# Patient Record
Sex: FEMALE | Race: WHITE | NOT HISPANIC OR LATINO | Employment: FULL TIME | URBAN - METROPOLITAN AREA
[De-identification: names, ages, dates, MRNs, and addresses within clinical notes are randomized per-mention and may not be internally consistent; named-entity substitution may affect disease eponyms.]

---

## 2017-06-01 ENCOUNTER — TRANSFERRED RECORDS (OUTPATIENT)
Dept: HEALTH INFORMATION MANAGEMENT | Facility: CLINIC | Age: 37
End: 2017-06-01

## 2019-09-14 ENCOUNTER — TRANSFERRED RECORDS (OUTPATIENT)
Dept: HEALTH INFORMATION MANAGEMENT | Facility: CLINIC | Age: 39
End: 2019-09-14

## 2019-09-16 NOTE — TELEPHONE ENCOUNTER
ONCOLOGY INTAKE: Records Information      APPT INFORMATION: [id number] Dr. Juan Manuel Mac  Referring provider:  Dr. Jessica Cox  Referring provider s clinic:  [facility]  Reason for visit/diagnosis:  Enlarged Paracaval Lymph Node  Has patient been notified of appointment date and time?: Yes    RECORDS INFORMATION:  Were the records received with the referral (via [employer])? No    Has patient been seen for any external appt for this diagnosis? Yes    If yes, where? [facility]    Has patient had any imaging or procedures outside of [place] for this condition? Yes      If Yes, where? [facility]    ADDITIONAL INFORMATION:  None

## 2019-09-16 NOTE — TELEPHONE ENCOUNTER
RECORDS STATUS - ALL OTHER DIAGNOSIS      RECORDS RECEIVED FROM: [facility] (All recs and imaging from [facility] requested)    DATE RECEIVED:    NOTES STATUS DETAILS   OFFICE NOTE from referring provider Requested - 9/16/19 [facility], Dr. Jessica Cox   OFFICE NOTE from medical oncologist     DISCHARGE SUMMARY from hospital     DISCHARGE REPORT from the ER     OPERATIVE REPORT     MEDICATION LIST     CLINICAL TRIAL TREATMENTS TO DATE     LABS     PATHOLOGY REPORTS     ANYTHING RELATED TO DIAGNOSIS     GENONOMIC TESTING     TYPE:     IMAGING (NEED IMAGES & REPORT)     CT SCANS     MRI     MAMMO     ULTRASOUND     PET

## 2019-09-17 ENCOUNTER — ONCOLOGY VISIT (OUTPATIENT)
Dept: ONCOLOGY | Facility: CLINIC | Age: 39
End: 2019-09-17
Attending: INTERNAL MEDICINE
Payer: COMMERCIAL

## 2019-09-17 ENCOUNTER — PRE VISIT (OUTPATIENT)
Dept: ONCOLOGY | Facility: CLINIC | Age: 39
End: 2019-09-17

## 2019-09-17 VITALS
DIASTOLIC BLOOD PRESSURE: 78 MMHG | TEMPERATURE: 98.8 F | SYSTOLIC BLOOD PRESSURE: 138 MMHG | BODY MASS INDEX: 40.5 KG/M2 | HEART RATE: 95 BPM | OXYGEN SATURATION: 98 % | HEIGHT: 66 IN | RESPIRATION RATE: 14 BRPM | WEIGHT: 252 LBS

## 2019-09-17 DIAGNOSIS — R59.1 LYMPHADENOPATHY: Primary | ICD-10-CM

## 2019-09-17 LAB
ALBUMIN SERPL-MCNC: 3.9 G/DL (ref 3.4–5)
ALP SERPL-CCNC: 92 U/L (ref 40–150)
ALT SERPL W P-5'-P-CCNC: 20 U/L (ref 0–50)
ANION GAP SERPL CALCULATED.3IONS-SCNC: 4 MMOL/L (ref 3–14)
AST SERPL W P-5'-P-CCNC: 11 U/L (ref 0–45)
BASOPHILS # BLD AUTO: 0.1 10E9/L (ref 0–0.2)
BASOPHILS NFR BLD AUTO: 0.7 %
BILIRUB SERPL-MCNC: 0.5 MG/DL (ref 0.2–1.3)
BUN SERPL-MCNC: 8 MG/DL (ref 7–30)
CALCIUM SERPL-MCNC: 9.2 MG/DL (ref 8.5–10.1)
CHLORIDE SERPL-SCNC: 105 MMOL/L (ref 94–109)
CO2 SERPL-SCNC: 27 MMOL/L (ref 20–32)
CREAT SERPL-MCNC: 0.58 MG/DL (ref 0.52–1.04)
DIFFERENTIAL METHOD BLD: NORMAL
EOSINOPHIL # BLD AUTO: 0.4 10E9/L (ref 0–0.7)
EOSINOPHIL NFR BLD AUTO: 4.9 %
ERYTHROCYTE [DISTWIDTH] IN BLOOD BY AUTOMATED COUNT: 13.1 % (ref 10–15)
GFR SERPL CREATININE-BSD FRML MDRD: >90 ML/MIN/{1.73_M2}
GLUCOSE SERPL-MCNC: 95 MG/DL (ref 70–99)
HCT VFR BLD AUTO: 43.3 % (ref 35–47)
HGB BLD-MCNC: 14.2 G/DL (ref 11.7–15.7)
IMM GRANULOCYTES # BLD: 0 10E9/L (ref 0–0.4)
IMM GRANULOCYTES NFR BLD: 0.3 %
LDH SERPL L TO P-CCNC: 193 U/L (ref 81–234)
LYMPHOCYTES # BLD AUTO: 2 10E9/L (ref 0.8–5.3)
LYMPHOCYTES NFR BLD AUTO: 26 %
MCH RBC QN AUTO: 30.1 PG (ref 26.5–33)
MCHC RBC AUTO-ENTMCNC: 32.8 G/DL (ref 31.5–36.5)
MCV RBC AUTO: 92 FL (ref 78–100)
MONOCYTES # BLD AUTO: 0.6 10E9/L (ref 0–1.3)
MONOCYTES NFR BLD AUTO: 7.3 %
NEUTROPHILS # BLD AUTO: 4.6 10E9/L (ref 1.6–8.3)
NEUTROPHILS NFR BLD AUTO: 60.8 %
NRBC # BLD AUTO: 0 10*3/UL
NRBC BLD AUTO-RTO: 0 /100
PLATELET # BLD AUTO: 267 10E9/L (ref 150–450)
POTASSIUM SERPL-SCNC: 3.6 MMOL/L (ref 3.4–5.3)
PROT SERPL-MCNC: 8.1 G/DL (ref 6.8–8.8)
RBC # BLD AUTO: 4.71 10E12/L (ref 3.8–5.2)
SODIUM SERPL-SCNC: 137 MMOL/L (ref 133–144)
WBC # BLD AUTO: 7.5 10E9/L (ref 4–11)

## 2019-09-17 PROCEDURE — 85025 COMPLETE CBC W/AUTO DIFF WBC: CPT | Performed by: INTERNAL MEDICINE

## 2019-09-17 PROCEDURE — 84165 PROTEIN E-PHORESIS SERUM: CPT | Performed by: INTERNAL MEDICINE

## 2019-09-17 PROCEDURE — 00000402 ZZHCL STATISTIC TOTAL PROTEIN: Performed by: INTERNAL MEDICINE

## 2019-09-17 PROCEDURE — 36415 COLL VENOUS BLD VENIPUNCTURE: CPT

## 2019-09-17 PROCEDURE — 83615 LACTATE (LD) (LDH) ENZYME: CPT | Performed by: INTERNAL MEDICINE

## 2019-09-17 PROCEDURE — 99204 OFFICE O/P NEW MOD 45 MIN: CPT | Mod: ZP | Performed by: INTERNAL MEDICINE

## 2019-09-17 PROCEDURE — 82232 ASSAY OF BETA-2 PROTEIN: CPT | Performed by: INTERNAL MEDICINE

## 2019-09-17 PROCEDURE — G0463 HOSPITAL OUTPT CLINIC VISIT: HCPCS | Mod: ZF

## 2019-09-17 PROCEDURE — 80053 COMPREHEN METABOLIC PANEL: CPT | Performed by: INTERNAL MEDICINE

## 2019-09-17 ASSESSMENT — MIFFLIN-ST. JEOR: SCORE: 1829.81

## 2019-09-17 ASSESSMENT — PAIN SCALES - GENERAL: PAINLEVEL: NO PAIN (0)

## 2019-09-17 NOTE — TELEPHONE ENCOUNTER
Images from [facility] resolved, and viewable to PACS    Recs rec'd from [facility] - sent to HIM for urgent upload, emailed to Shanel Sloan  7:26 AM

## 2019-09-17 NOTE — NURSING NOTE
Labs completed via venipuncture, patient discharged.    See flow sheets.    Radha Palmer CMA ([facility])

## 2019-09-17 NOTE — PROGRESS NOTES
Addendum.  [facility]  HEMATOLOGY AND MEDICAL ONCOLOGY    CONSULTATION    PATIENT NAME: Francesca Rowland   MRN# [id number]     Date of Visit: Sep 17, 2019    Referring Provider: Jessica Villagran  [facility]  [address] [address] YOB: 1980     Reason for consult: New pericaval lymphadenopathy with remote history of cervical cancer    CHIEF COMPLAINT   Oncology Clinic Visit (New - [facility] Enlarged Paracaval Lymph Node)     HISTORY OF PRESENTING ILLNESS     Francesca Rowland is a 38 year old female with a history of cervical cancer s/p hysterectomy () who was incidentally found to have an enlarged pericaval lymph node. She presented to the ED at [facility] on 2019 with severe 8.5/10 RUQ pain associated with nausea, one episode of emesis, and decreased appetite. RUQ demonstrated cholelithiasis without cholecystitis, so an abdominal and pelvic CT scan was obtained. This revealed post-surgical changes consistent with prior hysterectomy, left adnexal cysts thought to be ovarian, and an enlarged pericaval lymph node suspicious for metastatic disease vs primary lymphoma. Her condition stabilized and she was discharged home with referrals to general surgery and oncology for biliary colic and further management of the lymphadenopathy, respectively.    She presents today to establish care and for further evaluation of the pericaval lymph node. She has been in her usual state of health and, aside from the episode of biliary colic, has been feeling well. She has had recurrent ovarian cysts since the birth of her first child in , and they are bothersome approximately twice per year. She has also noted some increasing urinary frequency over the past few years, which she attributes to childbirth ( x2). She denies any fevers, chills, night sweats, lymphadenopathy, frequent infections, or fatigue. She denies bruising/bleeding, headaches, changes in  "vision/hearing, chest pain, SOB, coughing/wheezing, nausea/vomiting, diarrhea/constipation, LE swelling, and new MSK pain.     PAST MEDICAL HISTORY     Past Medical History:   Diagnosis Date     History of cervical cancer         PAST SURGICAL HISTORY     Past Surgical History:   Procedure Laterality Date     HYSTERECTOMY RADICAL      PAUL         CURRENT OUTPATIENT MEDICATIONS     Current Outpatient Medications   Medication Sig     ibuprofen (ADVIL) 200 MG capsule Take  by mouth. 2-4 capsules daily     citalopram (CELEXA) 20 MG tablet Take 1 tablet by mouth daily. Pt will need to be seen in clinic or by primary MD prior to further refills. (Patient not taking: Reported on 9/17/2019)     imiquimod (ALDARA) 5 % cream Apply topically twice weekly at bedtime to vulva and wash off after 8 hours. May use for up to 16 weeks. (Patient not taking: Reported on 9/17/2019)     Multiple Vitamins-Minerals (MULTIVITAL) CHEW Take  by mouth daily.     No current facility-administered medications for this visit.         ALLERGIES     Allergies   Allergen Reactions     Compazine      CPCR     Prochlorperazine      \"i was told I turn blue\"     .     SOCIAL HISTORY     Social History     Socioeconomic History     Marital status:      Spouse name: Not on file     Number of children: Not on file     Years of education: Not on file     Highest education level: Not on file   Occupational History     Not on file   Social Needs     Financial resource strain: Not on file     Food insecurity:     Worry: Not on file     Inability: Not on file     Transportation needs:     Medical: Not on file     Non-medical: Not on file   Tobacco Use     Smoking status: Never Smoker     Smokeless tobacco: Never Used   Substance and Sexual Activity     Alcohol use: Yes     Comment: one drink a week     Drug use: No     Sexual activity: Not on file   Lifestyle     Physical activity:     Days per week: Not on file     Minutes per session: Not on file     " "Stress: Not on file   Relationships     Social connections:     Talks on phone: Not on file     Gets together: Not on file     Attends [employer] service: Not on file     Active member of club or organization: Not on file     Attends meetings of clubs or organizations: Not on file     Relationship status: Not on file     Intimate partner violence:     Fear of current or ex partner: Not on file     Emotionally abused: Not on file     Physically abused: Not on file     Forced sexual activity: Not on file   Other Topics Concern     Parent/sibling w/ CABG, MI or angioplasty before 65F 55M? Not Asked   Social History Narrative     Not on file          FAMILY HISTORY   Mother recently  following brain aneurysm  PGM had breast cancer requiring b/l mastectomies  Maternal aunt had stage I breast cancer  Sister had thyroid cancer at age 23  Two children (born  and ) alive and healthy.     REVIEW OF SYSTEMS   Pertinent positives have been included in HPI;  Review Of Systems  General: negative for fever, chills or night sweats. No fatigue  Skin: negative for rash  Eyes: negative for visual blurring  Ears/Nose/Throat: negative for hearing loss  Respiratory: negative for shortness of breath, dyspnea on exertion, cough, or hemoptysis  Cardiovascular: negative for palpitations and tachycardia  Gastrointestinal: negative for nausea, vomiting and abdominal pain  Genitourinary: negative for nocturia and dysuria  Musculoskeletal: negative for muscular pain or bone pain  Neurologic: negative for migraine headaches  Psychiatric: negative for anxiety  Hematologic/Lymphatic/Immunologic: as per hpi  Endocrine: negative for heat or cold intolerance       PHYSICAL EXAM   /78   Pulse 95   Temp 98.8  F (37.1  C) (Oral)   Resp 14   Ht 1.676 m (5' 6\")   Wt 114.3 kg (252 lb)   SpO2 98%   BMI 40.67 kg/m      GEN: Alert, NAD, sitting comfortably in chair  HEENT: NCAT, EOMI, PERRLA, sclera non-icteric/non-injected, no " mucosal purpura  HEME: No cervical, supraclavicular, axillary, or inguinal lymphadenopathy  CV: Regular rate and rhythm, no murmurs/rubs/gallops appreciated  RESP: CTAB, no crackles/wheezes, breathing comfortably on room air  ABD: Soft, non-tender, non-distended, active bowel sounds, no masses/splenomegaly  EXT: Warm, well-perfused, no peripheral edema  SKIN: No rashes, lesions, or ecchymoses  NEURO: Alert and oriented, CN II-XII grossly intact, no focal deficits, follows commands appropriately  PSYCH: Appropriate mood and affect       LABORATORY AND IMAGING STUDIES     Recent Labs   Lab Test 09/17/19  1545   WBC 7.5   RBC 4.71   HGB 14.2   HCT 43.3   MCV 92   MCH 30.1   MCHC 32.8   RDW 13.1      NEUTROPHIL 60.8   ANEU 4.6   ALYM 2.0   VALENTINO 0.6   AEOS 0.4     Recent Labs   Lab Test 09/17/19  1545      POTASSIUM 3.6   CHLORIDE 105   CO2 27   ANIONGAP 4   GLC 95   BUN 8   CR 0.58   SHAD 9.2     Recent Labs   Lab Test 09/17/19  1545   BILITOTAL 0.5   ALKPHOS 92   AST 11   ALT 20     Recent Labs   Lab Test 09/17/19  1545          Results for orders placed or performed in visit on 03/26/08   SONO PELVIS COMPLETE    Impression       Examination: Pelvic ultrasound, 3/26/2008.     Comparison: Abdominal CT, 2/18/2008.     History: 27-year-old female with history of cervical cancer, status  post hysterectomy and salpingectomy. CT guided biopsy 2/26/2008 showed  ovarian parenchyma without malignancy.     Findings:   Surgical absence of the uterus and cervix.     No free fluid in the cul-de-sac.     The bilateral ovaries are demonstrated adjacent to the anterior  abdominal and are best demonstrated on transabdominal images. Right  ovary measures 3.6 x 2.6 x 2.7 cm. The ovary demonstrates multiple  small anechoic follicles with increased through-transmission. No  ovarian mass identified. Doppler demonstrates patent arterial and  venous flow.     The left ovary is also identified just deep to the anterior  "abdominal  wall and measures 2.7 x 2.3 x 1.3 cm. No ovarian mass identified.  Doppler demonstrates patent arterial and venous flow.     Impression:   1. Normal appearing bilateral ovaries, with anatomic repositioning  adjacent to the anterior abdominal wall. No ovarian mass identified.  The ovaries are not seen by transvaginal imaging.   2. Surgical absence of the uterus and cervix.  I have personally reviewed the image and initial interpretation, and I  agree with findings.     Lab Results   Component Value Date    PATH  07/14/2014     Patient Name: SHIRLENE CLAUDIO  MR#: [id number]  Specimen #: [id number]  Collected: 7/14/2014  Received: 7/14/2014  Reported: 7/15/2014 20:00  Ordering Phy(s): MICHAEL DEAL              SPECIMEN(S):  Clitoral torres    FINAL DIAGNOSIS:  Vulva, clitoral torres, punch biopsy  -No evidence of carcinoma  -Erosion with associated squamous hyperplasia, epidermalization, and  hyperkeratosis  -PAS fungal stain pending    I have personally reviewed all specimens and or slides, including the  listed special stains, and used them with my medical judgement to  determine the final diagnosis.    Electronically signed out by:    Liat Reyes M.D, [facility]      CLINICAL HISTORY:  The patient is a 33-year-old female with a history cervical  adenocarcinoma ([id number]) now with a pruritic vulvar lesion.  She has a  history of SIOBHAN.  Operative procedure: 3mm punch biopsy of clitoral  lesion.      GROSS:  A specimen is received in formalin labeled with proper patient  identification, and designated \"vulvar biopsy, clitoral torres.\"  The  specimen consists of 0.4 x 0.3 shave biopsy with a thickness of 0.2 cm.  The overlying skin is remarkable for its nodular appearance.  The margin  is inked blue.  The specimen is submitted intact in one cassette.  (Srini Ford MD  07/14/14)        MICROSCOPIC:  Microscopic examination is performed.  There are rare neutrophils within  surface epitheliujm. "  There is modest dermal perivascular chronic  inflammation.                  CPT Codes:  A: 27894-HL5, 59024-QNG    TESTING LAB LOCATION:  [facility], [address]   [id number] [phone]    COLLECTION SITE:  Client: [facility]  Location: [place] (B)          ECOG PS: 0   ASSESSMENT AND RECOMMENDATIONS     In summary, Francesca Rowland is a 38 year old female with a history of cervical cancer s/p hysterectomy (2007) who was incidentally found to have an enlarged pericaval lymph node on 9/14/2019 suspicious for metastatic disease vs primary lymphoma.    We reviewed the images with the patient and her . Explained that the solitary 3.1 cm lymph node is concerning for remote recurrence of her cervical cancer vs a new lymphoma vs a reactive lymph node. Explained that we cannot know the etiology without a tissue biopsy. Thus, we recommended a referral to Interventional Radiology for biopsy. We will also draw a standard panel of labs today to evaluate for lymphoma. We will call her with the results of the labs, and will plan to see her one week after the biopsy to review the results and establish an appropriate plan. She and her  expressed their understanding and agreement with the plan, and their questions were answered to the best of our ability.    # Lymphadenopathy, r/o metastasis vs lymphoma vs reactive  She feels well and, aside from symptomatic cholelithiasis, has no concerning symptoms or complaints. Plan to proceed with labs today and image-guided biopsy of the lymph node by Interventional Radiology.  -- Labs today (CBC, CMP, LDH, SPEP, beta-2-microglobulin)  -- Refer to Interventional Radiology for biopsy of pericaval lymph node  -- Follow up in clinic 1 week after biopsy to review results and establish plan    # Symptomatic cholelithiasis  -- Gallstones noted on RUQ U/S and CT  A/P from 9/15/2019  -- Follow up with surgeon in [place] as scheduled (9/24/2019)      Patient seen and discussed with attending physician, Dr. Juan Manuel Mac.    Honorio Wilcox, MS4    Physician Attestation     Patient was seen and examined by me with the medical student Honorio Wilcox. I have reviewed today's vital signs, medications, labs and imaging results. Agree with the HPI, physical exam, assessment and plan as documented in the note.    Juan Manuel Mac MD   of Medicine  Division of Hematology, Oncology and Transplantation  [facility]     Addendum  Labs were reviewed and she has normal BMP, B2M, LDH, CBC, CMP and SPEP    Juan Manuel Mac MD   of Medicine  Department of Hematology, Oncology and Transplantation  [facility]

## 2019-09-17 NOTE — NURSING NOTE
"Oncology Rooming Note    September 17, 2019 2:52 PM   Francesca Rowland is a 38 year old female who presents for:    No chief complaint on file.    Initial Vitals: /78   Pulse 95   Temp 98.8  F (37.1  C) (Oral)   Resp 14   Ht 1.676 m (5' 6\")   Wt 114.3 kg (252 lb)   SpO2 98%   BMI 40.67 kg/m   Estimated body mass index is 40.67 kg/m  as calculated from the following:    Height as of this encounter: 1.676 m (5' 6\").    Weight as of this encounter: 114.3 kg (252 lb). Body surface area is 2.31 meters squared.  No Pain (0) Comment: Data Unavailable   No LMP recorded. Patient has had a hysterectomy.  Allergies reviewed: Yes  Medications reviewed: Yes    Medications: Medication refills not needed today.  Pharmacy name entered into [employer]: [id number] - [place], [place] - [address]    Clinical concerns: Questions and Plan moving forward       Randy Bartlett, EMT                "

## 2019-09-17 NOTE — LETTER
9/17/2019       RE: Francesca Rowland  [address]  [facility] [address]     Dear Colleague,    Thank you for referring your patient, Francesca Rowland, to the [facility]. Please see a copy of my visit note below.    Addendum.  [facility] PHYSICIANS  HEMATOLOGY AND MEDICAL ONCOLOGY    CONSULTATION    PATIENT NAME: Francesca Rowland   MRN# [id number]     Date of Visit: Sep 17, 2019    Referring Provider: Jessica Villagran  [facility]  [address] [address] YOB: 1980     Reason for consult: New pericaval lymphadenopathy with remote history of cervical cancer    CHIEF COMPLAINT   Oncology Clinic Visit (New - [facility] Enlarged Paracaval Lymph Node)     HISTORY OF PRESENTING ILLNESS     Francesca Rowland is a 38 year old female with a history of cervical cancer s/p hysterectomy (2007) who was incidentally found to have an enlarged pericaval lymph node. She presented to the ED at [facility] on 9/14/2019 with severe 8.5/10 RUQ pain associated with nausea, one episode of emesis, and decreased appetite. RUQ demonstrated cholelithiasis without cholecystitis, so an abdominal and pelvic CT scan was obtained. This revealed post-surgical changes consistent with prior hysterectomy, left adnexal cysts thought to be ovarian, and an enlarged pericaval lymph node suspicious for metastatic disease vs primary lymphoma. Her condition stabilized and she was discharged home with referrals to general surgery and oncology for biliary colic and further management of the lymphadenopathy, respectively.    She presents today to establish care and for further evaluation of the pericaval lymph node. She has been in her usual state of health and, aside from the episode of biliary colic, has been feeling well. She has had recurrent ovarian cysts since the birth of her first child in 2003, and they are bothersome approximately twice per year. She has also noted some  "increasing urinary frequency over the past few years, which she attributes to childbirth ( x2). She denies any fevers, chills, night sweats, lymphadenopathy, frequent infections, or fatigue. She denies bruising/bleeding, headaches, changes in vision/hearing, chest pain, SOB, coughing/wheezing, nausea/vomiting, diarrhea/constipation, LE swelling, and new MSK pain.     PAST MEDICAL HISTORY     Past Medical History:   Diagnosis Date     History of cervical cancer         PAST SURGICAL HISTORY     Past Surgical History:   Procedure Laterality Date     HYSTERECTOMY RADICAL      PAUL         CURRENT OUTPATIENT MEDICATIONS     Current Outpatient Medications   Medication Sig     ibuprofen (ADVIL) 200 MG capsule Take  by mouth. 2-4 capsules daily     citalopram (CELEXA) 20 MG tablet Take 1 tablet by mouth daily. Pt will need to be seen in clinic or by primary MD prior to further refills. (Patient not taking: Reported on 2019)     imiquimod (ALDARA) 5 % cream Apply topically twice weekly at bedtime to vulva and wash off after 8 hours. May use for up to 16 weeks. (Patient not taking: Reported on 2019)     Multiple Vitamins-Minerals (MULTIVITAL) CHEW Take  by mouth daily.     No current facility-administered medications for this visit.         ALLERGIES     Allergies   Allergen Reactions     Compazine      CPCR     Prochlorperazine      \"i was told I turn blue\"        SOCIAL HISTORY     Social History     Socioeconomic History     Marital status:      Spouse name: Not on file     Number of children: Not on file     Years of education: Not on file     Highest education level: Not on file   Occupational History     Not on file   Social Needs     Financial resource strain: Not on file     Food insecurity:     Worry: Not on file     Inability: Not on file     Transportation needs:     Medical: Not on file     Non-medical: Not on file   Tobacco Use     Smoking status: Never Smoker     Smokeless tobacco: Never " Used   Substance and Sexual Activity     Alcohol use: Yes     Comment: one drink a week     Drug use: No     Sexual activity: Not on file   Lifestyle     Physical activity:     Days per week: Not on file     Minutes per session: Not on file     Stress: Not on file   Relationships     Social connections:     Talks on phone: Not on file     Gets together: Not on file     Attends Hoahaoism service: Not on file     Active member of club or organization: Not on file     Attends meetings of clubs or organizations: Not on file     Relationship status: Not on file     Intimate partner violence:     Fear of current or ex partner: Not on file     Emotionally abused: Not on file     Physically abused: Not on file     Forced sexual activity: Not on file   Other Topics Concern     Parent/sibling w/ CABG, MI or angioplasty before 65F 55M? Not Asked   Social History Narrative     Not on file          FAMILY HISTORY   Mother recently  following brain aneurysm  PGM had breast cancer requiring b/l mastectomies  Maternal aunt had stage I breast cancer  Sister had thyroid cancer at age 23  Two children (born  and ) alive and healthy.     REVIEW OF SYSTEMS   Pertinent positives have been included in HPI;  Review Of Systems  General: negative for fever, chills or night sweats. No fatigue  Skin: negative for rash  Eyes: negative for visual blurring  Ears/Nose/Throat: negative for hearing loss  Respiratory: negative for shortness of breath, dyspnea on exertion, cough, or hemoptysis  Cardiovascular: negative for palpitations and tachycardia  Gastrointestinal: negative for nausea, vomiting and abdominal pain  Genitourinary: negative for nocturia and dysuria  Musculoskeletal: negative for muscular pain or bone pain  Neurologic: negative for migraine headaches  Psychiatric: negative for anxiety  Hematologic/Lymphatic/Immunologic: as per hpi  Endocrine: negative for heat or cold intolerance       PHYSICAL EXAM   /78    "Pulse 95   Temp 98.8  F (37.1  C) (Oral)   Resp 14   Ht 1.676 m (5' 6\")   Wt 114.3 kg (252 lb)   SpO2 98%   BMI 40.67 kg/m       GEN: Alert, NAD, sitting comfortably in chair  HEENT: NCAT, EOMI, PERRLA, sclera non-icteric/non-injected, no mucosal purpura  HEME: No cervical, supraclavicular, axillary, or inguinal lymphadenopathy  CV: Regular rate and rhythm, no murmurs/rubs/gallops appreciated  RESP: CTAB, no crackles/wheezes, breathing comfortably on room air  ABD: Soft, non-tender, non-distended, active bowel sounds, no masses/splenomegaly  EXT: Warm, well-perfused, no peripheral edema  SKIN: No rashes, lesions, or ecchymoses  NEURO: Alert and oriented, CN II-XII grossly intact, no focal deficits, follows commands appropriately  PSYCH: Appropriate mood and affect       LABORATORY AND IMAGING STUDIES     Recent Labs   Lab Test 09/17/19  1545   WBC 7.5   RBC 4.71   HGB 14.2   HCT 43.3   MCV 92   MCH 30.1   MCHC 32.8   RDW 13.1      NEUTROPHIL 60.8   ANEU 4.6   ALYM 2.0   VALENTINO 0.6   AEOS 0.4     Recent Labs   Lab Test 09/17/19  1545      POTASSIUM 3.6   CHLORIDE 105   CO2 27   ANIONGAP 4   GLC 95   BUN 8   CR 0.58   SHAD 9.2     Recent Labs   Lab Test 09/17/19  1545   BILITOTAL 0.5   ALKPHOS 92   AST 11   ALT 20     Recent Labs   Lab Test 09/17/19  1545          Results for orders placed or performed in visit on 03/26/08   SONO PELVIS COMPLETE    Impression       Examination: Pelvic ultrasound, 3/26/2008.     Comparison: Abdominal CT, 2/18/2008.     History: 27-year-old female with history of cervical cancer, status  post hysterectomy and salpingectomy. CT guided biopsy 2/26/2008 showed  ovarian parenchyma without malignancy.     Findings:   Surgical absence of the uterus and cervix.     No free fluid in the cul-de-sac.     The bilateral ovaries are demonstrated adjacent to the anterior  abdominal and are best demonstrated on transabdominal images. Right  ovary measures 3.6 x 2.6 x 2.7 cm. The " "ovary demonstrates multiple  small anechoic follicles with increased through-transmission. No  ovarian mass identified. Doppler demonstrates patent arterial and  venous flow.     The left ovary is also identified just deep to the anterior abdominal  wall and measures 2.7 x 2.3 x 1.3 cm. No ovarian mass identified.  Doppler demonstrates patent arterial and venous flow.     Impression:   1. Normal appearing bilateral ovaries, with anatomic repositioning  adjacent to the anterior abdominal wall. No ovarian mass identified.  The ovaries are not seen by transvaginal imaging.   2. Surgical absence of the uterus and cervix.  I have personally reviewed the image and initial interpretation, and I  agree with findings.     Lab Results   Component Value Date    PATH  07/14/2014     Patient Name: SHIRLENE CLAUDIO  MR#: [id number]  Specimen #: [id number]  Collected: 7/14/2014  Received: 7/14/2014  Reported: 7/15/2014 20:00  Ordering Phy(s): MICHAEL DEAL              SPECIMEN(S):  Clitoral torres    FINAL DIAGNOSIS:  Vulva, clitoral torres, punch biopsy  -No evidence of carcinoma  -Erosion with associated squamous hyperplasia, epidermalization, and  hyperkeratosis  -PAS fungal stain pending    I have personally reviewed all specimens and or slides, including the  listed special stains, and used them with my medical judgement to  determine the final diagnosis.    Electronically signed out by:    Liat Reyes M.D, [facility]      CLINICAL HISTORY:  The patient is a 33-year-old female with a history cervical  adenocarcinoma ([id number]) now with a pruritic vulvar lesion.  She has a  history of SIOBHAN.  Operative procedure: 3mm punch biopsy of clitoral  lesion.      GROSS:  A specimen is received in formalin labeled with proper patient  identification, and designated \"vulvar biopsy, clitoral torres.\"  The  specimen consists of 0.4 x 0.3 shave biopsy with a thickness of 0.2 cm.  The overlying skin is remarkable for its nodular " appearance.  The margin  is inked blue.  The specimen is submitted intact in one cassette.  (Srini Ford MD  07/14/14)        MICROSCOPIC:  Microscopic examination is performed.  There are rare neutrophils within  surface epitheliujm.  There is modest dermal perivascular chronic  inflammation.                  CPT Codes:  A: 26879-DH6, 17044-IVJ    TESTING LAB LOCATION:  [facility], [address]   [address] [phone]    COLLECTION SITE:  Client: General acute hospital  Location: [facility] (B)          ECOG PS: 0   ASSESSMENT AND RECOMMENDATIONS     In summary, Francesca Rowland is a 38 year old female with a history of cervical cancer s/p hysterectomy (2007) who was incidentally found to have an enlarged pericaval lymph node on 9/14/2019 suspicious for metastatic disease vs primary lymphoma.    We reviewed the images with the patient and her . Explained that the solitary 3.1 cm lymph node is concerning for remote recurrence of her cervical cancer vs a new lymphoma vs a reactive lymph node. Explained that we cannot know the etiology without a tissue biopsy. Thus, we recommended a referral to Interventional Radiology for biopsy. We will also draw a standard panel of labs today to evaluate for lymphoma. We will call her with the results of the labs, and will plan to see her one week after the biopsy to review the results and establish an appropriate plan. She and her  expressed their understanding and agreement with the plan, and their questions were answered to the best of our ability.    # Lymphadenopathy, r/o metastasis vs lymphoma vs reactive  She feels well and, aside from symptomatic cholelithiasis, has no concerning symptoms or complaints. Plan to proceed with labs today and image-guided biopsy of the lymph node by Interventional Radiology.  -- Labs today (CBC, CMP, LDH, SPEP,  beta-2-microglobulin)  -- Refer to Interventional Radiology for biopsy of pericaval lymph node  -- Follow up in clinic 1 week after biopsy to review results and establish plan    # Symptomatic cholelithiasis  -- Gallstones noted on RUQ U/S and CT A/P from 9/15/2019  -- Follow up with surgeon in [place] as scheduled (9/24/2019)    Patient seen and discussed with attending physician, Dr. Juan Manuel Mac.    Honorio Wilcox, MS4    Physician Attestation     Patient was seen and examined by me with the medical student Honorio Wilcox. I have reviewed today's vital signs, medications, labs and imaging results. Agree with the HPI, physical exam, assessment and plan as documented in the note.    Juan Manuel Mac MD   of Medicine  Division of Hematology, Oncology and Transplantation  [facility]     Addendum  Labs were reviewed and she has normal BMP, B2M, LDH, CBC, CMP and SPEP

## 2019-09-18 LAB
ALBUMIN SERPL ELPH-MCNC: 4.3 G/DL (ref 3.7–5.1)
ALPHA1 GLOB SERPL ELPH-MCNC: 0.3 G/DL (ref 0.2–0.4)
ALPHA2 GLOB SERPL ELPH-MCNC: 0.8 G/DL (ref 0.5–0.9)
B-GLOBULIN SERPL ELPH-MCNC: 0.9 G/DL (ref 0.6–1)
B2 MICROGLOB SERPL-MCNC: 1.6 MG/L
GAMMA GLOB SERPL ELPH-MCNC: 1.3 G/DL (ref 0.7–1.6)
M PROTEIN SERPL ELPH-MCNC: 0 G/DL
PROT PATTERN SERPL ELPH-IMP: NORMAL

## 2019-09-23 NOTE — TELEPHONE ENCOUNTER
RECORDS RECEIVED FROM: IR guided biopsy of the pericaval lymph node // sukhdeep Medina in [facility] // Dr. Mac referring   DATE RECEIVED: 9.25.19   NOTES STATUS DETAILS   OFFICE NOTE from referring provider Internal 9.17.19 Dr. Mac   OFFICE NOTE from other specialist Internal/CE    DISCHARGE SUMMARY from hospital N/A    DISCHARGE REPORT from the ER Care Everywhere 9.14.19 [facility] ER   OPERATIVE REPORT N/A    MEDICATION LIST Care Everywhere    XRAYS (IMAGES & REPORTS) In Pacs    PATHOLOGY  Slides & report N/A

## 2019-09-25 ENCOUNTER — PRE VISIT (OUTPATIENT)
Dept: INTERVENTIONAL RADIOLOGY/VASCULAR | Facility: CLINIC | Age: 39
End: 2019-09-25

## 2019-10-03 ENCOUNTER — OFFICE VISIT (OUTPATIENT)
Dept: INTERVENTIONAL RADIOLOGY/VASCULAR | Facility: CLINIC | Age: 39
End: 2019-10-03
Payer: COMMERCIAL

## 2019-10-03 VITALS
BODY MASS INDEX: 40.64 KG/M2 | WEIGHT: 251.8 LBS | SYSTOLIC BLOOD PRESSURE: 122 MMHG | OXYGEN SATURATION: 95 % | DIASTOLIC BLOOD PRESSURE: 81 MMHG | HEART RATE: 81 BPM

## 2019-10-03 DIAGNOSIS — R59.9 ENLARGED LYMPH NODES: Primary | ICD-10-CM

## 2019-10-03 NOTE — LETTER
10/3/2019       RE: Francesca Rowland  [address]  [facility] [address]     Dear Colleague,    Thank you for referring your patient, Francesca Rowland, to the [facility] INTERVENTIONAL RADIOLOGY at [facility]. Please see a copy of my visit note below.    First Name: Francesca   Age: 38 year old   Referring Physician: Dr. Mac   REASON FOR REFERRAL: Consult for lymph node biopsy    Assessment:  Francesca coleman is a 38 yowith a PMH of cervical CA in 2007, now with an incidentally found 3.1 cm right kristina-caval LN.  Biopsy is requested to evaluate for malignancy (metastatic disease vs lymphoma)    Plan:  Image guided biopsy of right kristina-caval lymph node  Send for pathology and flow cytometry  Draw an INR on the day of the biopsy    HPI: This is a patient with a PMH of cervical cancer s/p hysterectomy (2007) who was incidentally found to have an enlarged right pericaval lymph node. She presented to the ED at [facility] on 9/14/2019 with severe 8.5/10 RUQ pain associated with nausea, one episode of emesis, and decreased appetite. RUQ demonstrated cholelithiasis without cholecystitis, so an abdominal and pelvic CT scan was obtained. This revealed post-surgical changes consistent with prior hysterectomy, left adnexal cysts thought to be ovarian, and an enlarged right 3.1 cm pericaval lymph node suspicious for metastatic disease vs primary lymphoma. Her condition stabilized and she was discharged home with referrals to general surgery and oncology for biliary colic and further management of the lymphadenopathy.    She met with Dr. Mac in oncology on 9/17 for evaluation.  Dr. Mac referred the patient for biopsy.  Dr. Langley from Interventional Radiology reviewed the imaging and approved the biopsy.  Series 2, image 52    Before the patient could make it to [facility] appt for a consult, she had her appt with general surgery and she had her gall bladder out on Sepetmber 25.  Her final  diagnosis:  Cholelithiasis with severe acute and chronic cholecystitis and extensive mucosal ulceration. No dysplasia or malignancy.       PAST MEDICAL HISTORY:   Past Medical History:   Diagnosis Date     Chronic cholecystitis 09/25/2019     History of cervical cancer      PAST SURGICAL HISTORY:   Past Surgical History:   Procedure Laterality Date     CHOLECYSTECTOMY, LAPOROSCOPIC  09/25/2019     HYSTERECTOMY RADICAL  2007    PAUL     FAMILY HISTORY:   Family History   Problem Relation Age of Onset     Aneurysm Mother      Breast Cancer Paternal Grandmother         bilat mastectomies     Breast Cancer Maternal Aunt         stage 1     Thyroid Cancer Sister 23     SOCIAL HISTORY:   Social History     Tobacco Use     Smoking status: Never Smoker     Smokeless tobacco: Never Used   Substance Use Topics     Alcohol use: Yes     Comment: one drink a week     PROBLEM LIST:   Patient Active Problem List    Diagnosis Date Noted     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 Pt will need to be seen in clinic or by primary MD prior to further refills.    Class: Fax    Route: Oral    ibuprofen (ADVIL) 200 MG capsule            Sig: Take  by mouth. 2-4 capsules daily    Class: Historical    Route: Oral    imiquimod (ALDARA) 5 % cream  8 packet 3 8/8/2013    [facility]/pharmacy #[id number] - [place], [place] - [address]    Sig: Apply topically twice weekly at bedtime to vulva and wash off after 8 hours. May use for up to 16 weeks.    Class: Fax    Multiple Vitamins-Minerals (MULTIVITAL) CHEW            Sig: Take  by mouth daily.    Class: Historical     Route: Oral        ALLERGIES: Compazine and Prochlorperazine  VITALS: /81   Pulse 81   Wt 114.2 kg (251 lb 12.8 oz)   SpO2 95%   BMI 40.64 kg/m       Physical Examination: Vital signs are reviewed and they are stable  Constitutional: Pleasant, middle aged woman, in no acute physical distress, came with her  to the appt  Cardiovascular: negative, RRR  Respiratory: negative findings: normal respiratory rate and rhythm, lungs clear to auscultation  Musculoskeletal: extremities normal- no gross deformities noted, gait normal and normal muscle tone  Skin: no suspicious lesions or rashes  Neurologic: negative  Psychiatric: affect normal/bright and mentation appears normal.    BMP RESULTS:  Lab Results   Component Value Date     09/17/2019    POTASSIUM 3.6 09/17/2019    CHLORIDE 105 09/17/2019    CO2 27 09/17/2019    ANIONGAP 4 09/17/2019    GLC 95 09/17/2019    BUN 8 09/17/2019    CR 0.58 09/17/2019    GFRESTIMATED >90 09/17/2019    GFRESTBLACK >90 09/17/2019    SHAD 9.2 09/17/2019        CBC RESULTS:  Lab Results   Component Value Date    WBC 7.5 09/17/2019    RBC 4.71 09/17/2019    HGB 14.2 09/17/2019    HCT 43.3 09/17/2019    MCV 92 09/17/2019    MCH 30.1 09/17/2019    MCHC 32.8 09/17/2019    RDW 13.1 09/17/2019     09/17/2019       INR/PTT:  Lab Results   Component Value Date    INR 0.93 02/26/2008    PTT 32 02/26/2008       Diagnostic studies: see CT scan    PROVIDER NOTE:  I explained the procedure to Francesca and her .  This included:  Preparing for the procedure, the actual procedure and recovery.  I explained the risks of the lymph node biopsy:  Bleeding, infection, hitting an unintended organ (vessel or nerve)  I explained that usually the results return after two to four business days.    I explained that he/she would be contacted by Dr. Mac's  office following this to determine a future plan.  Thank you for involving us in the care of your patient.    30 minutes was spent  with Francesca and her .  25 minutes was spent in counseling.  Connie Degroot MS, APRN, CNS, CRN  Clinical Nurse Specialist  Interventional Radiology  [phone] (voice mail)  [phone] (pager)    CC  Patient Care Team:  Tamara Carlos MD as PCP - General (Family Practice)  Juan Manuel Mac MD as MD (Hematology & Oncology)  Connie Degroot, MIRA CNS as Nurse Practitioner (CLINICAL NURSE SPECIALIST - ADULT HEALTH )  Radha Holt, RN as Specialty Care Coordinator (Hematology & Oncology)  JUAN MANUEL MAC

## 2019-10-04 NOTE — PROGRESS NOTES
First Name: Francesca   Age: 38 year old   Referring Physician: Dr. Mac   REASON FOR REFERRAL: Consult for lymph node biopsy    Assessment:  Francesca coleman is a 38 yowith a PMH of cervical CA in 2007, now with an incidentally found 3.1 cm right kristina-caval LN.  Biopsy is requested to evaluate for malignancy (metastatic disease vs lymphoma)    Plan:  Image guided biopsy of right kristina-caval lymph node  Send for pathology and flow cytometry  Draw an INR on the day of the biopsy    HPI: This is a patient with a PMH of cervical cancer s/p hysterectomy (2007) who was incidentally found to have an enlarged right pericaval lymph node. She presented to the ED at [facility] on 9/14/2019 with severe 8.5/10 RUQ pain associated with nausea, one episode of emesis, and decreased appetite. RUQ demonstrated cholelithiasis without cholecystitis, so an abdominal and pelvic CT scan was obtained. This revealed post-surgical changes consistent with prior hysterectomy, left adnexal cysts thought to be ovarian, and an enlarged right 3.1 cm pericaval lymph node suspicious for metastatic disease vs primary lymphoma. Her condition stabilized and she was discharged home with referrals to general surgery and oncology for biliary colic and further management of the lymphadenopathy.    She met with Dr. Mac in oncology on 9/17 for evaluation.  Dr. Mac referred the patient for biopsy.  Dr. Langley from Interventional Radiology reviewed the imaging and approved the biopsy.  Series 2, image 52    Before the patient could make it to maria c appt for a consult, she had her appt with general surgery and she had her gall bladder out on Sepetmber 25.  Her final diagnosis:  Cholelithiasis with severe acute and chronic cholecystitis and extensive mucosal ulceration. No dysplasia or malignancy.       PAST MEDICAL HISTORY:   Past Medical History:   Diagnosis Date     Chronic cholecystitis 09/25/2019     History of cervical cancer      PAST SURGICAL HISTORY:    Past Surgical History:   Procedure Laterality Date     CHOLECYSTECTOMY, LAPOROSCOPIC  09/25/2019     HYSTERECTOMY RADICAL  2007    PAUL     FAMILY HISTORY:   Family History   Problem Relation Age of Onset     Aneurysm Mother      Breast Cancer Paternal Grandmother         bilat mastectomies     Breast Cancer Maternal Aunt         stage 1     Thyroid Cancer Sister 23     SOCIAL HISTORY:   Social History     Tobacco Use     Smoking status: Never Smoker     Smokeless tobacco: Never Used   Substance Use Topics     Alcohol use: Yes     Comment: one drink a week     PROBLEM LIST:   Patient Active Problem List    Diagnosis Date Noted     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 Pt will need to be seen in clinic or by primary MD prior to further refills.    Class: Fax    Route: Oral    ibuprofen (ADVIL) 200 MG capsule            Sig: Take  by mouth. 2-4 capsules daily    Class: Historical    Route: Oral    imiquimod (ALDARA) 5 % cream  8 packet 3 8/8/2013    [facility]/pharmacy #[id number] - [place], [place] - [address]    Sig: Apply topically twice weekly at bedtime to vulva and wash off after 8 hours. May use for up to 16 weeks.    Class: Fax    Multiple Vitamins-Minerals (MULTIVITAL) CHEW            Sig: Take  by mouth daily.    Class: Historical    Route: Oral        ALLERGIES: Compazine and Prochlorperazine  VITALS: /81   Pulse 81   Wt 114.2 kg (251 lb 12.8 oz)   SpO2 95%   BMI 40.64 kg/m      ROS:  Answers for HPI/ROS submitted by the patient on 10/3/2019   General Symptoms: No  Skin Symptoms: No  HENT Symptoms: No  EYE SYMPTOMS:  No  HEART SYMPTOMS: No  LUNG SYMPTOMS: No  INTESTINAL SYMPTOMS: No  URINARY SYMPTOMS: No  GYNECOLOGIC SYMPTOMS: No  BREAST SYMPTOMS: No  SKELETAL SYMPTOMS: No  BLOOD SYMPTOMS: No  NERVOUS SYSTEM SYMPTOMS: No  MENTAL HEALTH SYMPTOMS: No    Physical Examination: Vital signs are reviewed and they are stable  Constitutional: Pleasant, middle aged woman, in no acute physical distress, came with her  to the appt  Cardiovascular: negative, RRR  Respiratory: negative findings: normal respiratory rate and rhythm, lungs clear to auscultation  Musculoskeletal: extremities normal- no gross deformities noted, gait normal and normal muscle tone  Skin: no suspicious lesions or rashes  Neurologic: negative  Psychiatric: affect normal/bright and mentation appears normal.    BMP RESULTS:  Lab Results   Component Value Date     09/17/2019    POTASSIUM 3.6 09/17/2019    CHLORIDE 105 09/17/2019    CO2 27 09/17/2019    ANIONGAP 4 09/17/2019    GLC 95 09/17/2019    BUN 8 09/17/2019    CR 0.58 09/17/2019    GFRESTIMATED >90 09/17/2019    GFRESTBLACK >90 09/17/2019    SHAD 9.2 09/17/2019        CBC RESULTS:  Lab Results   Component Value Date    WBC 7.5 09/17/2019    RBC 4.71 09/17/2019    HGB 14.2 09/17/2019    HCT 43.3 09/17/2019    MCV 92 09/17/2019    MCH 30.1 09/17/2019    MCHC 32.8 09/17/2019    RDW 13.1 09/17/2019     09/17/2019       INR/PTT:  Lab Results   Component Value Date    INR 0.93 02/26/2008    PTT 32 02/26/2008       Diagnostic studies: see CT scan    PROVIDER NOTE:  I explained the procedure to Francesca and her .  This included:  Preparing for the procedure, the actual procedure and recovery.  I explained the risks of the lymph node biopsy:  Bleeding, infection, hitting an unintended organ (vessel or nerve)  I explained that usually the results return after two to four business days.    I explained that he/she would be contacted by Dr. Mca's  office following this to determine a future  plan.  Thank you for involving us in the care of your patient.    30 minutes was spent with Francesca and her .  25 minutes was spent in counseling.  Connie Degroot MS, APRN, CNS, CRN  Clinical Nurse Specialist  Interventional Radiology  [phone] (voice mail)  [phone] (pager)    CC  Patient Care Team:  Tamara Carlos MD as PCP - General (Family Practice)  Juan Manuel Mac MD as MD (Hematology & Oncology)  Connie Degroot APRN CNS as Nurse Practitioner (CLINICAL NURSE SPECIALIST - ADULT HEALTH )  Radha Holt, RN as Specialty Care Coordinator (Hematology & Oncology)  JUAN MANUEL MAC

## 2019-10-07 NOTE — PATIENT INSTRUCTIONS
You are scheduled for your lymph node biopsy on Tuesday, October 15, 2019  Report at 8:30 AM  [address] [facility], 2nd floor (which is street level), Imaging   Your procedure will start approximately at 9:30 AM    No solid foods or milk products for 6 hours prior to the procedure 3:30 AM  You may have clear liquids until 2 hours prior to the procedure (this includes water, apple juice, broth, coffee or tea without milk or sugar, jello-o, white grape juice) 7:30 AM    You will need a       If you have any questions you may call the nurse at [phone]

## 2019-10-15 ENCOUNTER — HOSPITAL ENCOUNTER (OUTPATIENT)
Dept: GENERAL RADIOLOGY | Facility: CLINIC | Age: 39
End: 2019-10-15
Attending: RADIOLOGY | Admitting: RADIOLOGY
Payer: COMMERCIAL

## 2019-10-15 ENCOUNTER — HOSPITAL ENCOUNTER (OUTPATIENT)
Facility: CLINIC | Age: 39
Discharge: HOME | End: 2019-10-15
Attending: RADIOLOGY | Admitting: RADIOLOGY
Payer: COMMERCIAL

## 2019-10-15 ENCOUNTER — HOSPITAL ENCOUNTER (OUTPATIENT)
Dept: CT IMAGING | Facility: CLINIC | Age: 39
End: 2019-10-15
Attending: CLINICAL NURSE SPECIALIST | Admitting: RADIOLOGY
Payer: COMMERCIAL

## 2019-10-15 VITALS
RESPIRATION RATE: 12 BRPM | SYSTOLIC BLOOD PRESSURE: 122 MMHG | HEART RATE: 73 BPM | OXYGEN SATURATION: 93 % | TEMPERATURE: 98 F | DIASTOLIC BLOOD PRESSURE: 71 MMHG

## 2019-10-15 DIAGNOSIS — R59.9 ENLARGED LYMPH NODES: ICD-10-CM

## 2019-10-15 LAB — INR PPP: 1.07 (ref 0.86–1.14)

## 2019-10-15 PROCEDURE — 88305 TISSUE EXAM BY PATHOLOGIST: CPT | Performed by: INTERNAL MEDICINE

## 2019-10-15 PROCEDURE — 25000128 H RX IP 250 OP 636: Performed by: RADIOLOGY

## 2019-10-15 PROCEDURE — 88341 IMHCHEM/IMCYTCHM EA ADD ANTB: CPT | Performed by: INTERNAL MEDICINE

## 2019-10-15 PROCEDURE — 88333 PATH CONSLTJ SURG CYTO XM 1: CPT | Performed by: INTERNAL MEDICINE

## 2019-10-15 PROCEDURE — 25000125 ZZHC RX 250: Performed by: RADIOLOGY

## 2019-10-15 PROCEDURE — 85610 PROTHROMBIN TIME: CPT | Performed by: PHYSICIAN ASSISTANT

## 2019-10-15 PROCEDURE — 27210258 CT LYMPH NODE BIOPSY

## 2019-10-15 PROCEDURE — 88342 IMHCHEM/IMCYTCHM 1ST ANTB: CPT | Performed by: INTERNAL MEDICINE

## 2019-10-15 PROCEDURE — 99152 MOD SED SAME PHYS/QHP 5/>YRS: CPT

## 2019-10-15 RX ORDER — LIDOCAINE 40 MG/G
CREAM TOPICAL
Status: DISCONTINUED | OUTPATIENT
Start: 2019-10-15 | End: 2019-10-21 | Stop reason: HOSPADM

## 2019-10-15 RX ORDER — NALOXONE HYDROCHLORIDE 0.4 MG/ML
.1-.4 INJECTION, SOLUTION INTRAMUSCULAR; INTRAVENOUS; SUBCUTANEOUS
Status: DISCONTINUED | OUTPATIENT
Start: 2019-10-15 | End: 2019-10-21 | Stop reason: HOSPADM

## 2019-10-15 RX ORDER — DEXTROSE MONOHYDRATE 25 G/50ML
25-50 INJECTION, SOLUTION INTRAVENOUS
Status: CANCELLED | OUTPATIENT
Start: 2019-10-15

## 2019-10-15 RX ORDER — NICOTINE POLACRILEX 4 MG
15-30 LOZENGE BUCCAL
Status: CANCELLED | OUTPATIENT
Start: 2019-10-15

## 2019-10-15 RX ORDER — FENTANYL CITRATE 50 UG/ML
25-50 INJECTION, SOLUTION INTRAMUSCULAR; INTRAVENOUS EVERY 5 MIN PRN
Status: DISCONTINUED | OUTPATIENT
Start: 2019-10-15 | End: 2019-10-21 | Stop reason: HOSPADM

## 2019-10-15 RX ORDER — FLUMAZENIL 0.1 MG/ML
0.2 INJECTION, SOLUTION INTRAVENOUS
Status: DISCONTINUED | OUTPATIENT
Start: 2019-10-15 | End: 2019-10-21 | Stop reason: HOSPADM

## 2019-10-15 RX ADMIN — MIDAZOLAM 1 MG: 1 INJECTION INTRAMUSCULAR; INTRAVENOUS at 09:15

## 2019-10-15 RX ADMIN — FENTANYL CITRATE 50 MCG: 50 INJECTION INTRAMUSCULAR; INTRAVENOUS at 09:15

## 2019-10-15 RX ADMIN — LIDOCAINE HYDROCHLORIDE 1 ML: 10 INJECTION, SOLUTION EPIDURAL; INFILTRATION; INTRACAUDAL; PERINEURAL at 09:39

## 2019-10-15 RX ADMIN — FENTANYL CITRATE 50 MCG: 50 INJECTION INTRAMUSCULAR; INTRAVENOUS at 09:21

## 2019-10-15 RX ADMIN — MIDAZOLAM 1 MG: 1 INJECTION INTRAMUSCULAR; INTRAVENOUS at 09:21

## 2019-10-15 NOTE — PROCEDURES
[facility], [place]    Procedure: CT guided biopsy right retroperitoneal node  Date/Time: 10/15/2019 9:42 AM  Performed by: Justin Langley MD  Authorized by: Justin Langley MD     UNIVERSAL PROTOCOL   Site Marked: Yes  Prior Images Obtained and Reviewed:  Yes  Required items: Required blood products, implants, devices and special equipment available    Patient identity confirmed:  Verbally with patient  Patient was reevaluated immediately before administering moderate or deep sedation or anesthesia  Confirmation Checklist:  Patient's identity using two indicators, relevant allergies, procedure was appropriate and matched the consent or emergent situation and correct equipment/implants were available  Time out: Immediately prior to the procedure a time out was called    Preparation: Patient was prepped and draped in usual sterile fashion       ANESTHESIA    Local Anesthetic: Lidocaine 1% without epinephrine  Anesthetic Total (mL):  1      SEDATION    Patient Sedated: Yes    Sedation Type:  Moderate (conscious) sedation  Sedation:  Fentanyl and midazolam  Vital signs: Vital signs monitored during sedation    See dictated procedure note for full details.  Findings: Four 18 gauge cores of right retroperitoneal node    Specimens: core needle biopsy specimens sent for pathological analysis    Complications: None    Condition: Stable    PROCEDURE   Patient Tolerance:  Patient tolerated the procedure well with no immediate complications    Time of Sedation in Minutes by Physician:  15

## 2019-10-15 NOTE — DISCHARGE INSTRUCTIONS
Invasive Radiology Procedures Discharge Instructions         _____________________________________________________________________    Patient Name:  Francesca Rowland  Today's Date:  October 15, 2019    The doctor who did your procedure today was Dr. Langley.    Procedure:  Biopsy of  Lymph node    If you have not received your results after 5 days, please call the doctor who ordered your test.  Diet and medicines    Go back to your normal diet.    You may start taking your normal medicines again (including Coumadin, or warfarin), as shown on your medicine sheet.    If you take aspirin, Plavix or other anti-platelet drugs: Start taking it tomorrow.    If take Coumadin (warfarin): Ask your doctor when to have your INR checked.    For minor pain, you may take Tylenol (acetaminophen) or Advil (ibuprofen).    Activity and puncture site     You may go back to normal activity in 24 hours. Wait 48 hours before lifting,   straining, exercise or other strenuous activity.    For the next day or two, check your puncture site often while you are awake.    Change the Band-Aid or bandage tomorrow.    You may shower tomorrow morning. No bathing or swimming until your   puncture site has fully healed.    If you received IV medicine to sedate you:  You were given: Versed  and Fentanyl  You may feel drowsy, forgetful or unsteady. For the next 24 hours, do not drive, drink alcohol or make any important decisions.    Know when to call for help  Call your doctor if you have:    A fever greater over 101 F (38.3 C), taken under the tongue.    A lot of bleeding or swelling at your puncture site.    Pain that is getting worse.     Shortness of breath.  Call 911 or go the emergency room if you have:    Severe chest pain or trouble breathing.    A tube that falls out.     Increased blood in your sputum (phlegm).    Bleeding that you cannot control.   Important phone numbers:  [facility] at  [phone]

## 2019-10-15 NOTE — IP AVS SNAPSHOT
MRN:[id number]                      After Visit Summary   10/15/2019    Francesca Rowland    MRN: [id number]           Visit Information        Provider Department      10/15/2019  9:30 AM URCT2; UR IR RAD; [facility]IRCT [facility], [place], Radiology           Review of your medicines      Notice    This visit is during an admission. Changes to the med list made in this visit will be reflected in the After Visit Summary of the admission.           Protect others around you: Learn how to safely use, store and throw away your medicines at www.disposemymeds.org.       Follow-ups after your visit       Your next 10 appointments already scheduled    Oct 22, 2019  2:45 PM CDT  (Arrive by 2:30 PM)  Return Visit with Juan Manuel Mac MD  [facility] [place] [address] [place] [id number] [phone]         Care Instructions       Further instructions from your care team         Invasive Radiology Procedures Discharge Instructions         _____________________________________________________________________    Patient Name:  Francesca Rowland  Today's Date:  October 15, 2019    The doctor who did your procedure today was Dr. Langley.    Procedure:  Biopsy of  Lymph node    If you have not received your results after 5 days, please call the doctor who ordered your test.  Diet and medicines    Go back to your normal diet.    You may start taking your normal medicines again (including Coumadin, or warfarin), as shown on your medicine sheet.    If you take aspirin, Plavix or other anti-platelet drugs: Start taking it tomorrow.    If take Coumadin (warfarin): Ask your doctor when to have your INR checked.    For minor pain, you may take Tylenol (acetaminophen) or Advil (ibuprofen).    Activity and puncture site     You may go back to normal activity in 24 hours. Wait 48 hours before lifting,   straining, exercise or other strenuous  "activity.    For the next day or two, check your puncture site often while you are awake.    Change the Band-Aid or bandage tomorrow.    You may shower tomorrow morning. No bathing or swimming until your   puncture site has fully healed.    If you received IV medicine to sedate you:  You were given: Versed  and Fentanyl  You may feel drowsy, forgetful or unsteady. For the next 24 hours, do not drive, drink alcohol or make any important decisions.    Know when to call for help  Call your doctor if you have:    A fever greater over 101 F (38.3 C), taken under the tongue.    A lot of bleeding or swelling at your puncture site.    Pain that is getting worse.     Shortness of breath.  Call 911 or go the emergency room if you have:    Severe chest pain or trouble breathing.    A tube that falls out.     Increased blood in your sputum (phlegm).    Bleeding that you cannot control.   Important phone numbers:  [facility] at [phone]    Additional Information About Your Visit       [facility] Information    [facility] lets you send messages to your doctor, view your test results, renew your prescriptions, schedule appointments and more. To sign up, go to [email or web address] . Click on \"Log in\" on the left side of the screen, which will take you to the Welcome page. Then click on \"Sign up Now\" on the right side of the page.     You will be asked to enter the access code listed below, as well as some personal information. Please follow the directions to create your username and password.     Your access code is: [email or web address]  Expires: 2019  6:31 AM     Your access code will  in 60 days. If you need help or a new code, please call your [facility] clinic or [phone].       Care EveryWhere ID    This is your Care EveryWhere ID. This could be used by other organizations to access your [facility] medical records  [id number]       Your Vitals Were  Most recent update: " 10/15/2019 10:38 AM    Blood Pressure   122/71 (BP Location: Right arm)    Pulse   73    Temperature   98  F (36.7  C) (Oral)    Respirations   12    Pulse Oximetry   93%          Primary Care Provider Office Phone # Fax #    Tamara Carlos [phone]      Equal Access to Services    [facility]: Hadii aad ku hadasho Soomaali, waaxda luqadaha, qaybta kaalmada adeegyada, waxay martina hayaan adesofia kofiaileen labrien . So [place] [phone].    ATENCIÓN: Si habla español, tiene a orozco disposición servicios gratuitos de asistencia lingüística. Llame al [phone].    We comply with applicable federal civil rights laws and [place] laws. We do not discriminate on the basis of race, color, national origin, age, disability, sex, sexual orientation, or gender identity.           Thank you!    Thank you for choosing [facility] for your care. Our goal is always to provide you with excellent care. Hearing back from our patients is one way we can continue to improve our services. Please take a few minutes to complete the written survey that you may receive in the mail after you visit with us. Thank you!         Medication List     Notice    This visit is during an admission. Changes to the med list made in this visit will be reflected in the After Visit Summary of the admission.

## 2019-10-15 NOTE — IP AVS SNAPSHOT
[facility], [place], Radiology  [address] [facility] [address]  Phone:  [phone]                                    After Visit Summary   10/15/2019    Francesca Rowland    MRN: [id number]           After Visit Summary Signature Page    I have received my discharge instructions, and my questions have been answered. I have discussed any challenges I see with this plan with the nurse or doctor.    ..........................................................................................................................................  Patient/Patient Representative Signature      ..........................................................................................................................................  Patient Representative Print Name and Relationship to Patient    ..................................................               ................................................  Date                                   Time    ..........................................................................................................................................  Reviewed by Signature/Title    ...................................................              ..............................................  Date                                               Time          22EPIC Rev 08/18

## 2019-10-17 LAB — COPATH REPORT: NORMAL

## 2019-10-22 ENCOUNTER — ONCOLOGY VISIT (OUTPATIENT)
Dept: ONCOLOGY | Facility: CLINIC | Age: 39
End: 2019-10-22
Attending: INTERNAL MEDICINE
Payer: COMMERCIAL

## 2019-10-22 ENCOUNTER — PATIENT OUTREACH (OUTPATIENT)
Dept: ONCOLOGY | Facility: CLINIC | Age: 39
End: 2019-10-22

## 2019-10-22 VITALS
DIASTOLIC BLOOD PRESSURE: 87 MMHG | OXYGEN SATURATION: 96 % | HEIGHT: 66 IN | HEART RATE: 74 BPM | WEIGHT: 244.7 LBS | RESPIRATION RATE: 14 BRPM | TEMPERATURE: 98.1 F | SYSTOLIC BLOOD PRESSURE: 136 MMHG | BODY MASS INDEX: 39.32 KG/M2

## 2019-10-22 DIAGNOSIS — C79.82 METASTATIC ADENOCARCINOMA TO CERVIX (H): Primary | ICD-10-CM

## 2019-10-22 PROBLEM — K80.00 ACUTE CHOLECYSTITIS DUE TO BILIARY CALCULUS: Status: ACTIVE | Noted: 2019-09-25

## 2019-10-22 PROCEDURE — 99214 OFFICE O/P EST MOD 30 MIN: CPT | Mod: GC | Performed by: INTERNAL MEDICINE

## 2019-10-22 PROCEDURE — G0463 HOSPITAL OUTPT CLINIC VISIT: HCPCS | Mod: ZF

## 2019-10-22 PROCEDURE — 40000114 ZZH STATISTIC NO CHARGE CLINIC VISIT

## 2019-10-22 RX ORDER — HYDROCODONE BITARTRATE AND ACETAMINOPHEN 10; 325 MG/1; MG/1
TABLET ORAL
Refills: 0 | Status: ON HOLD | COMMUNITY
Start: 2019-09-21 | End: 2019-11-13

## 2019-10-22 RX ORDER — ACETAMINOPHEN 500 MG
1000 TABLET ORAL
Status: ON HOLD | COMMUNITY
Start: 2019-09-26 | End: 2019-11-13

## 2019-10-22 RX ORDER — ONDANSETRON 4 MG/1
TABLET, ORALLY DISINTEGRATING ORAL
Refills: 0 | COMMUNITY
Start: 2019-09-21 | End: 2020-02-20

## 2019-10-22 RX ORDER — OXYCODONE HYDROCHLORIDE 5 MG/1
5 TABLET ORAL
Status: ON HOLD | COMMUNITY
Start: 2019-09-26 | End: 2019-11-13

## 2019-10-22 ASSESSMENT — MIFFLIN-ST. JEOR: SCORE: 1796.7

## 2019-10-22 ASSESSMENT — PAIN SCALES - GENERAL: PAINLEVEL: NO PAIN (0)

## 2019-10-22 NOTE — PROGRESS NOTES
Addendum.  [facility] PHYSICIANS  HEMATOLOGY AND MEDICAL ONCOLOGY    CONSULTATION    PATIENT NAME: Francesca Rowland   MRN# [id number]     Date of Visit: Oct 22, 2019    Referring Provider: Jessica Villagran  [facility]  [address] [address] YOB: 1980     Reason for consult: New pericaval lymphadenopathy with remote history of cervical cancer    CHIEF COMPLAINT   Oncology clinic visit: Initial visit for possible lymphoma, now confirmed recurrence of cervical cancer   HISTORY OF PRESENTING ILLNESS   Francesca Rowland is a 38 year old female with a history of cervical cancer s/p hysterectomy (2007) who was incidentally found to have an enlarged pericaval lymph node and was referred to the lymphoma clinic for further evaluation.  Her history of lymphadenopathy is as follows. She presented to the ED at [facility] on 9/14/2019 with severe 8.5/10 RUQ pain associated with nausea, one episode of emesis, and decreased appetite. RUQ demonstrated cholelithiasis without cholecystitis, so an abdominal and pelvic CT scan was obtained. This revealed post-surgical changes consistent with prior hysterectomy, left adnexal cysts thought to be ovarian, and an enlarged pericaval lymph node suspicious for metastatic disease vs primary lymphoma. Her condition stabilized and she was discharged home with referrals to general surgery and oncology for biliary colic and further management of the lymphadenopathy, respectively. CT-guided right retroperitoneal lymph node biopsy on 10/15/2019 was consistent with metastatic cervical carcinoma    She is here to follow up with her . She does not have any active complaints other than some lower abdominal intermittent pain.  She denies any fevers, chills, night sweats, lymphadenopathy, frequent infections, or fatigue. She denies bruising/bleeding, headaches, changes in vision/hearing, chest pain, SOB, coughing/wheezing, nausea/vomiting,  "diarrhea/constipation, LE swelling, and new MSK pain. We informed her about the recurrence of cervical cancer.      PAST MEDICAL HISTORY     Past Medical History:   Diagnosis Date     Chronic cholecystitis 09/25/2019     History of cervical cancer         PAST SURGICAL HISTORY     Past Surgical History:   Procedure Laterality Date     CHOLECYSTECTOMY, LAPOROSCOPIC  09/25/2019     HYSTERECTOMY RADICAL  2007    PAUL         CURRENT OUTPATIENT MEDICATIONS     Current Outpatient Medications   Medication Sig     acetaminophen (TYLENOL) 500 MG tablet Take 1,000 mg by mouth     HYDROcodone-acetaminophen (NORCO)  MG per tablet YAKE ONE TABLET BY MOUTH EVERY 6-8 HOURS AS NEEDED FOR PAIN     ibuprofen (ADVIL) 200 MG capsule Take  by mouth. 2-4 capsules daily     ondansetron (ZOFRAN-ODT) 4 MG ODT tab TAKE ONE TABLET AS NEEDED EVERY 6 TO 8 HOURS     oxyCODONE (ROXICODONE) 5 MG tablet Take 5 mg by mouth     No current facility-administered medications for this visit.         ALLERGIES     Allergies   Allergen Reactions     Compazine      CPCR     Prochlorperazine      \"i was told I turn blue\"     .     SOCIAL HISTORY     Social History     Socioeconomic History     Marital status:      Spouse name: Not on file     Number of children: Not on file     Years of education: Not on file     Highest education level: Not on file   Occupational History     Not on file   Social Needs     Financial resource strain: Not on file     Food insecurity:     Worry: Not on file     Inability: Not on file     Transportation needs:     Medical: Not on file     Non-medical: Not on file   Tobacco Use     Smoking status: Never Smoker     Smokeless tobacco: Never Used   Substance and Sexual Activity     Alcohol use: Yes     Comment: one drink a week     Drug use: No     Sexual activity: Not on file   Lifestyle     Physical activity:     Days per week: Not on file     Minutes per session: Not on file     Stress: Not on file   Relationships " "    Social connections:     Talks on phone: Not on file     Gets together: Not on file     Attends Restoration service: Not on file     Active member of club or organization: Not on file     Attends meetings of clubs or organizations: Not on file     Relationship status: Not on file     Intimate partner violence:     Fear of current or ex partner: Not on file     Emotionally abused: Not on file     Physically abused: Not on file     Forced sexual activity: Not on file   Other Topics Concern     Parent/sibling w/ CABG, MI or angioplasty before 65F 55M? Not Asked   Social History Narrative     Not on file        REVIEW OF SYSTEMS   Pertinent positives have been included in HPI;  Review Of Systems: As per HPI  General: negative for fever, chills or night sweats. No fatigue  Skin: negative for rash  Eyes: negative for visual blurring  Ears/Nose/Throat: negative for hearing loss  Respiratory: negative for shortness of breath, dyspnea on exertion, cough, or hemoptysis  Cardiovascular: negative for palpitations and tachycardia  Gastrointestinal: negative for nausea, vomiting and abdominal pain  Genitourinary: negative for nocturia and dysuria  Musculoskeletal: negative for muscular pain or bone pain  Neurologic: negative for migraine headaches  Psychiatric: negative for anxiety  Hematologic/Lymphatic/Immunologic: as per hpi  Endocrine: negative for heat or cold intolerance       PHYSICAL EXAM   /87   Pulse 74   Temp 98.1  F (36.7  C) (Oral)   Resp 14   Ht 1.676 m (5' 6\")   Wt 111 kg (244 lb 11.2 oz)   SpO2 96%   BMI 39.50 kg/m      GEN: Alert, NAD, sitting comfortably in chair  HEME: No cervical, supraclavicular, axillary, or inguinal lymphadenopathy  CV: Regular rate and rhythm, no murmurs/rubs/gallops appreciated  RESP: CTAB, no crackles/wheezes, breathing comfortably on room air  ABD: Soft, non-tender, non-distended, active bowel sounds, no masses/splenomegaly  EXT: Warm, well-perfused, no peripheral " edema  SKIN: No rashes, lesions, or ecchymoses  PSYCH: Appropriate mood and affect       LABORATORY AND IMAGING STUDIES     Recent Labs   Lab Test 09/17/19  1545   WBC 7.5   RBC 4.71   HGB 14.2   HCT 43.3   MCV 92   MCH 30.1   MCHC 32.8   RDW 13.1      NEUTROPHIL 60.8   ANEU 4.6   ALYM 2.0   VALENTINO 0.6   AEOS 0.4     Recent Labs   Lab Test 09/17/19  1545      POTASSIUM 3.6   CHLORIDE 105   CO2 27   ANIONGAP 4   GLC 95   BUN 8   CR 0.58   SHAD 9.2     Recent Labs   Lab Test 09/17/19  1545   BILITOTAL 0.5   ALKPHOS 92   AST 11   ALT 20     Recent Labs   Lab Test 09/17/19  1545          Results for orders placed or performed in visit on 03/26/08   SONO PELVIS COMPLETE    Impression       Examination: Pelvic ultrasound, 3/26/2008.     Comparison: Abdominal CT, 2/18/2008.     History: 27-year-old female with history of cervical cancer, status  post hysterectomy and salpingectomy. CT guided biopsy 2/26/2008 showed  ovarian parenchyma without malignancy.     Findings:   Surgical absence of the uterus and cervix.     No free fluid in the cul-de-sac.     The bilateral ovaries are demonstrated adjacent to the anterior  abdominal and are best demonstrated on transabdominal images. Right  ovary measures 3.6 x 2.6 x 2.7 cm. The ovary demonstrates multiple  small anechoic follicles with increased through-transmission. No  ovarian mass identified. Doppler demonstrates patent arterial and  venous flow.     The left ovary is also identified just deep to the anterior abdominal  wall and measures 2.7 x 2.3 x 1.3 cm. No ovarian mass identified.  Doppler demonstrates patent arterial and venous flow.     Impression:   1. Normal appearing bilateral ovaries, with anatomic repositioning  adjacent to the anterior abdominal wall. No ovarian mass identified.  The ovaries are not seen by transvaginal imaging.   2. Surgical absence of the uterus and cervix.  I have personally reviewed the image and initial interpretation, and  "I  agree with findings.     Lab Results   Component Value Date    PATH  10/15/2019     Patient Name: SHIRLENE CLAUDIO  MR#: [id number]  Specimen #: [id number]  Collected: 10/15/2019  Received: 10/15/2019  Reported: 10/17/2019 12:16  Ordering Phy(s): LOUISA WHITMORE    For improved result formatting, select 'View Enhanced Report Format' under   Linked Documents section.    SPECIMEN(S):  Lymph node biopsy, pericaval    FINAL DIAGNOSIS:  Lymph node , pericaval, biopsy:  - Metastatic cervical adenocarcinoma    COMMENT:  Immunohistochemical stains show the tumor is positive for CK7, PAX8 and   CEA while synaptophysin, chromogranin  and p40 are negative. The prior cervical primary was reviewed and the   current tumor is morphologically  similar. These results support the diagnosis.    I have personally reviewed all specimens and/or slides, including the   listed special stains, and used them  with my medical judgement to determine or confirm the final diagnosis.    Electronically signed out by:    Navdeep Rouse M.D., [facility]    CLINICAL HISTORY:  38-year old female with a history invasive adenocarcinoma of the cervix   ([id number]).    GROSS:  ATP: Pericaval lymph node:  \"Adequate\" (Gil Bettencourt M.D. and B[facility]bara ROBLERO [facility] via   telepathology)    The specimen is received in formalin with proper patient identification,   labeled \"pericaval lymph node\".  The  specimen consists of a 1.1 x 1.0 x 0.1 cm aggregate of yellow-tan soft   tissue cores, which are wrapped and  entirely submitted in cassettes A1A2. (Dictated by: Magi Campbell   10/15/2019 10:21 AM)    MICROSCOPIC:  Microscopic examination was performed.    The technical component of this testing was completed at the [facility], with the professional component performed   at the [facility], [address] [place] [phone] " ([phone])    CPT Codes:   76407-TN/TC  A: 38786-AX6, 59983-GBU, 27922-TQO, 94035-DAE, 18428-MUP, 36790-XBT,   72088-EYV    COLLECTION SITE:  Client: [facility]  Location: [facility] (B)    Resident  [id number]          ECOG PS: 1   ASSESSMENT AND RECOMMENDATIONS     In summary, Francesca Rowland is a 38 year old female with a history of cervical cancer s/p hysterectomy (2007) who was incidentally found to have an enlarged pericaval lymph node on 9/14/2019 suspicious for metastatic disease vs primary lymphoma.  She has underwent a  biopsy of the lymph node and results came back positive for metastatic cervical carcinoma. We have explained her disease recurrence and told that she is already scheduled for appointment with Gyn Onc on Monday, 10/28/19. We also discussed about possible clinical trials at [facility]  by Олег Bae (for HPV positive malignancies)    # Recurrent cervical carcinoma (confirmed on biopsy of pericaval lymph node)  - Appointment is set up with Gyn Onc on 10/28/2019  - She will meet with Radha Pierson, who will help with scheduling  - No need to follow up with malignant hematology specialist in future    # Symptomatic cholelithiasis  -- Gallstones noted on RUQ U/S and CT A/P from 9/15/2019  -- Follow up with surgeon in [place] as scheduled (9/24/2019)    No further appointment with Dr. Mac.    The case was discussed with Dr. Mac.  Ruth Ortiz MD  Hem/Onc fellow    Physician Attestation     Patient was seen and examined by me with the hematology & oncology fellow Larry Ortiz. I have reviewed today's vital signs, medications, labs and imaging results. Agree with the HPI, physical exam, assessment and plan as documented in the note.       Juan Manuel Mac MD   of Medicine  Division of Hematology, Oncology and Transplantation  [facility]

## 2019-10-22 NOTE — PROGRESS NOTES
"RN Care Coordination Note  Met with Francesca after clinic visit with Dr Mac to review bx results: metastatic cervical cancer.   Pt  verbalizes understanding of Dr. Mac's plan of care (appt with Gyn Onc asap).  Introduced self and role of RN Care Coordinator at [facility].     Provided my contact information and update that the New Pt Scheduling team will be calling her to set the appt with Gyn Oncologist on Monday 10/28. She will call me back on Thursday if she has not received the call.  Emotional support and active listening provided as pt and her spouse were very emotional about the news of dx presented by Dr Mac today.  Provided [facility]'s Easy Reading: If You Have Cervical Cancer booklet and reviewed sections with pt, including \"Questions to Ask My Doctor\"  Francesca and her spouse voiced understanding and appreciation of above information and denies any further questions, and he/she understands that I will follow and provide coordination as needed.  Radha Holt, RN, BSN, OCN  Care Coordinator  [facility]        "

## 2019-10-22 NOTE — LETTER
10/22/2019       RE: Francesca Rowland  [address]  [place] [address]     Dear Colleague,    Thank you for referring your patient, Francesac Rowland, to the [facility]. Please see a copy of my visit note below.    Addendum.  [facility] PHYSICIANS  HEMATOLOGY AND MEDICAL ONCOLOGY    CONSULTATION    PATIENT NAME: Francesca Rowland   MRN# [id number]     Date of Visit: Oct 22, 2019    Referring Provider: Jessica Villagran  [facility]  [address] [address] YOB: 1980     Reason for consult: New pericaval lymphadenopathy with remote history of cervical cancer    CHIEF COMPLAINT   Oncology clinic visit: Initial visit for possible lymphoma, now confirmed recurrence of cervical cancer   HISTORY OF PRESENTING ILLNESS   Francesca Rowland is a 38 year old female with a history of cervical cancer s/p hysterectomy (2007) who was incidentally found to have an enlarged pericaval lymph node and was referred to the lymphoma clinic for further evaluation.  Her history of lymphadenopathy is as follows. She presented to the ED at [facility] on 9/14/2019 with severe 8.5/10 RUQ pain associated with nausea, one episode of emesis, and decreased appetite. RUQ demonstrated cholelithiasis without cholecystitis, so an abdominal and pelvic CT scan was obtained. This revealed post-surgical changes consistent with prior hysterectomy, left adnexal cysts thought to be ovarian, and an enlarged pericaval lymph node suspicious for metastatic disease vs primary lymphoma. Her condition stabilized and she was discharged home with referrals to general surgery and oncology for biliary colic and further management of the lymphadenopathy, respectively. CT-guided right retroperitoneal lymph node biopsy on 10/15/2019 was consistent with metastatic cervical carcinoma    She is here to follow up with her . She does not have any active complaints other than some lower  "abdominal intermittent pain.  She denies any fevers, chills, night sweats, lymphadenopathy, frequent infections, or fatigue. She denies bruising/bleeding, headaches, changes in vision/hearing, chest pain, SOB, coughing/wheezing, nausea/vomiting, diarrhea/constipation, LE swelling, and new MSK pain. We informed her about the recurrence of cervical cancer.      PAST MEDICAL HISTORY     Past Medical History:   Diagnosis Date     Chronic cholecystitis 09/25/2019     History of cervical cancer         PAST SURGICAL HISTORY     Past Surgical History:   Procedure Laterality Date     CHOLECYSTECTOMY, LAPOROSCOPIC  09/25/2019     HYSTERECTOMY RADICAL  2007    [facility]         CURRENT OUTPATIENT MEDICATIONS     Current Outpatient Medications   Medication Sig     acetaminophen (TYLENOL) 500 MG tablet Take 1,000 mg by mouth     HYDROcodone-acetaminophen (NORCO)  MG per tablet YAKE ONE TABLET BY MOUTH EVERY 6-8 HOURS AS NEEDED FOR PAIN     ibuprofen (ADVIL) 200 MG capsule Take  by mouth. 2-4 capsules daily     ondansetron (ZOFRAN-ODT) 4 MG ODT tab TAKE ONE TABLET AS NEEDED EVERY 6 TO 8 HOURS     oxyCODONE (ROXICODONE) 5 MG tablet Take 5 mg by mouth     No current facility-administered medications for this visit.         ALLERGIES     Allergies   Allergen Reactions     Compazine      CPCR     Prochlorperazine      \"i was told I turn blue\"     .     SOCIAL HISTORY     Social History     Socioeconomic History     Marital status:      Spouse name: Not on file     Number of children: Not on file     Years of education: Not on file     Highest education level: Not on file   Occupational History     Not on file   Social Needs     Financial resource strain: Not on file     Food insecurity:     Worry: Not on file     Inability: Not on file     Transportation needs:     Medical: Not on file     Non-medical: Not on file   Tobacco Use     Smoking status: Never Smoker     Smokeless tobacco: Never Used   Substance and Sexual Activity " "    Alcohol use: Yes     Comment: one drink a week     Drug use: No     Sexual activity: Not on file   Lifestyle     Physical activity:     Days per week: Not on file     Minutes per session: Not on file     Stress: Not on file   Relationships     Social connections:     Talks on phone: Not on file     Gets together: Not on file     Attends [employer] service: Not on file     Active member of club or organization: Not on file     Attends meetings of clubs or organizations: Not on file     Relationship status: Not on file     Intimate partner violence:     Fear of current or ex partner: Not on file     Emotionally abused: Not on file     Physically abused: Not on file     Forced sexual activity: Not on file   Other Topics Concern     Parent/sibling w/ CABG, MI or angioplasty before 65F 55M? Not Asked   Social History Narrative     Not on file        REVIEW OF SYSTEMS   Pertinent positives have been included in HPI;  Review Of Systems: As per HPI  General: negative for fever, chills or night sweats. No fatigue  Skin: negative for rash  Eyes: negative for visual blurring  Ears/Nose/Throat: negative for hearing loss  Respiratory: negative for shortness of breath, dyspnea on exertion, cough, or hemoptysis  Cardiovascular: negative for palpitations and tachycardia  Gastrointestinal: negative for nausea, vomiting and abdominal pain  Genitourinary: negative for nocturia and dysuria  Musculoskeletal: negative for muscular pain or bone pain  Neurologic: negative for migraine headaches  Psychiatric: negative for anxiety  Hematologic/Lymphatic/Immunologic: as per hpi  Endocrine: negative for heat or cold intolerance       PHYSICAL EXAM   /87   Pulse 74   Temp 98.1  F (36.7  C) (Oral)   Resp 14   Ht 1.676 m (5' 6\")   Wt 111 kg (244 lb 11.2 oz)   SpO2 96%   BMI 39.50 kg/m       GEN: Alert, NAD, sitting comfortably in chair  HEME: No cervical, supraclavicular, axillary, or inguinal lymphadenopathy  CV: Regular rate and " rhythm, no murmurs/rubs/gallops appreciated  RESP: CTAB, no crackles/wheezes, breathing comfortably on room air  ABD: Soft, non-tender, non-distended, active bowel sounds, no masses/splenomegaly  EXT: Warm, well-perfused, no peripheral edema  SKIN: No rashes, lesions, or ecchymoses  PSYCH: Appropriate mood and affect       LABORATORY AND IMAGING STUDIES     Recent Labs   Lab Test 09/17/19  1545   WBC 7.5   RBC 4.71   HGB 14.2   HCT 43.3   MCV 92   MCH 30.1   MCHC 32.8   RDW 13.1      NEUTROPHIL 60.8   ANEU 4.6   ALYM 2.0   VALENTINO 0.6   AEOS 0.4     Recent Labs   Lab Test 09/17/19  1545      POTASSIUM 3.6   CHLORIDE 105   CO2 27   ANIONGAP 4   GLC 95   BUN 8   CR 0.58   SHAD 9.2     Recent Labs   Lab Test 09/17/19  1545   BILITOTAL 0.5   ALKPHOS 92   AST 11   ALT 20     Recent Labs   Lab Test 09/17/19  1545          Results for orders placed or performed in visit on 03/26/08   SONO PELVIS COMPLETE    Impression       Examination: Pelvic ultrasound, 3/26/2008.     Comparison: Abdominal CT, 2/18/2008.     History: 27-year-old female with history of cervical cancer, status  post hysterectomy and salpingectomy. CT guided biopsy 2/26/2008 showed  ovarian parenchyma without malignancy.     Findings:   Surgical absence of the uterus and cervix.     No free fluid in the cul-de-sac.     The bilateral ovaries are demonstrated adjacent to the anterior  abdominal and are best demonstrated on transabdominal images. Right  ovary measures 3.6 x 2.6 x 2.7 cm. The ovary demonstrates multiple  small anechoic follicles with increased through-transmission. No  ovarian mass identified. Doppler demonstrates patent arterial and  venous flow.     The left ovary is also identified just deep to the anterior abdominal  wall and measures 2.7 x 2.3 x 1.3 cm. No ovarian mass identified.  Doppler demonstrates patent arterial and venous flow.     Impression:   1. Normal appearing bilateral ovaries, with anatomic  "repositioning  adjacent to the anterior abdominal wall. No ovarian mass identified.  The ovaries are not seen by transvaginal imaging.   2. Surgical absence of the uterus and cervix.  I have personally reviewed the image and initial interpretation, and I  agree with findings.     Lab Results   Component Value Date    PATH  10/15/2019     Patient Name: SHIRLENE CLAUDIO  MR#: [id number]  Specimen #: [id number]  Collected: 10/15/2019  Received: 10/15/2019  Reported: 10/17/2019 12:16  Ordering Phy(s): LOUISA WHITMORE    For improved result formatting, select 'View Enhanced Report Format' under   Linked Documents section.    SPECIMEN(S):  Lymph node biopsy, pericaval    FINAL DIAGNOSIS:  Lymph node , pericaval, biopsy:  - Metastatic cervical adenocarcinoma    COMMENT:  Immunohistochemical stains show the tumor is positive for CK7, PAX8 and   CEA while synaptophysin, chromogranin  and p40 are negative. The prior cervical primary was reviewed and the   current tumor is morphologically  similar. These results support the diagnosis.    I have personally reviewed all specimens and/or slides, including the   listed special stains, and used them  with my medical judgement to determine or confirm the final diagnosis.    Electronically signed out by:    Navdeep Rouse M.D., [facility]    CLINICAL HISTORY:  38-year old female with a history invasive adenocarcinoma of the cervix   ([id number]).    GROSS:  ATP: Pericaval lymph node:  \"Adequate\" (Gil Bettencourt M.D. and Bárbara Neumann steven CT ASCP via   telepathology)    The specimen is received in formalin with proper patient identification,   labeled \"pericaval lymph node\".  The  specimen consists of a 1.1 x 1.0 x 0.1 cm aggregate of yellow-tan soft   tissue cores, which are wrapped and  entirely submitted in cassettes A1A2. (Dictated by: Magi Campbell   10/15/2019 10:21 AM)    MICROSCOPIC:  Microscopic examination was performed.    The technical component of this testing was completed " at the [facility], with the professional component performed   at the [facility], [address],   [place] [address] ([phone])    CPT Codes:   24014-UG/TC  A: 51572-SM6, 43430-VVE, 44765-XTP, 97465-JKH, 75466-ETD, 53507-ZOE,   32419-MQG    COLLECTION SITE:  Client: [facility]  Location: URIR (B)    Resident  [id number]          ECOG PS: 1   ASSESSMENT AND RECOMMENDATIONS     In summary, Francesca Rowland is a 38 year old female with a history of cervical cancer s/p hysterectomy (2007) who was incidentally found to have an enlarged pericaval lymph node on 9/14/2019 suspicious for metastatic disease vs primary lymphoma.  She has underwent a  biopsy of the lymph node and results came back positive for metastatic cervical carcinoma. We have explained her disease recurrence and told that she is already scheduled for appointment with Gyn Onc on Monday, 10/28/19. We also discussed about possible clinical trials at [facility]  by Олег Bae (for HPV positive malignancies)    # Recurrent cervical carcinoma (confirmed on biopsy of pericaval lymph node)  - Appointment is set up with Gyn Onc on 10/28/2019  - She will meet with Radha Pierson, who will help with scheduling  - No need to follow up with malignant hematology specialist in future    # Symptomatic cholelithiasis  -- Gallstones noted on RUQ U/S and CT A/P from 9/15/2019  -- Follow up with surgeon in [place] as scheduled (9/24/2019)    No further appointment with Dr. Mac.    The case was discussed with Dr. Mac.  Ruth Ortiz MD  Hem/Onc fellow    Physician Attestation     Patient was seen and examined by me with the hematology & oncology fellow Larry Ortiz. I have reviewed today's vital signs, medications, labs and imaging results. Agree with the HPI, physical exam, assessment and plan as  documented in the note.     Juan Manuel Mac MD   of Medicine  Division of Hematology, Oncology and Transplantation  [facility]

## 2019-10-23 NOTE — TELEPHONE ENCOUNTER
ONCOLOGY INTAKE: Records Information      APPT INFORMATION:  Referring provider:  Dr. 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 No, internal referral    Has patient been seen for any external appt for this diagnosis? no    If yes, where? na    Has patient had any imaging or procedures outside of [facility] for this condition? no      If Yes, where? na    ADDITIONAL INFORMATION:  na

## 2019-10-23 NOTE — TELEPHONE ENCOUNTER
RECORDS STATUS - ALL OTHER DIAGNOSIS      RECORDS RECEIVED FROM: Internal Referral   DATE RECEIVED: In epic   NOTES STATUS DETAILS   OFFICE NOTE from referring provider     OFFICE NOTE from medical oncologist     DISCHARGE SUMMARY from hospital     DISCHARGE REPORT from the ER     OPERATIVE REPORT     MEDICATION LIST     CLINICAL TRIAL TREATMENTS TO DATE     LABS     PATHOLOGY REPORTS     ANYTHING RELATED TO DIAGNOSIS     GENONOMIC TESTING     TYPE:     IMAGING (NEED IMAGES & REPORT)     CT SCANS     MRI     MAMMO     ULTRASOUND     PET

## 2019-10-27 NOTE — PROGRESS NOTES
Consult Notes on Referred Patient      Dr. Juan Manuel Mac MD  [address] [place] [address]       RE: Francesca Rowland  : 1980  BEST: 10/28/2019    Dear Dr. Juan Manuel Mac:    I had the pleasure of seeing your patient Francesca Rowland here at the Gynecologic Cancer Clinic at the [facility] on 10/28/2019.  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  Given these findings she was subsequently sent to the Gynecologic Cancer Clinic for new patient consultation.     HPI  Patient initially presented as a 27-year-old woman seen in referral due to a Pap smear showing high-grade squamous intraepithelial lesion that was positive for high risk HPV subtype in 2007. This Pap smear had been taken during her six week postpartum visit. Patient did have a remote history of abnormal Pap smears back in  while serving in [place] for the [employer].  Pap smears during her first pregnancy were all negative. Her Pap smear at the beginning of the most recent pregnancy was normal. Patient did have a colposcopy for evaluation of this abnormal Pap smear on 2007 and cervical biopsies of 6 and 12 o'clock position showed features consistent with papillary serous adenocarcinoma. Endocervical curettings were also taken which showed fragments of papillary serous carcinoma. The patient then underwent a LEEP procedure on 10/15/07 which showed microinvasive adenocarcinoma with a depth of invasion of 0.4 mm. Patient then made the decision to proceed with radical hysterectomy. She subsequently underwent a radical hysterectomy, bilateral salpingectomy, bilateral pelvic and periaortic lymph node dissection on 10/19/07. Patient's operative course was otherwise uncomplicated and she recoverred uneventfully.  Pathology did reveal patient to be stage IA2 adenocarcinoma of the cervix.     She underwent routine  surveillence through 2014 complicated only by SIOBHAN 1    She recently presented after incidentally being found to have an enlarged pericaval lymph node.  She presented to the ED at [facility] on 9/14/2019 with severe 8.5/10 RUQ pain associated with nausea, one episode of emesis, and decreased appetite. RUQ demonstrated cholelithiasis without cholecystitis, so an abdominal and pelvic CT scan was obtained. This revealed post-surgical changes consistent with prior hysterectomy, left adnexal cysts thought to be ovarian, and an enlarged pericaval lymph node suspicious for metastatic disease vs primary lymphoma. Her condition stabilized and she was discharged home with referrals to general surgery and oncology for biliary colic and further management of the lymphadenopathy, respectively. CT-guided right retroperitoneal lymph node biopsy on 10/15/2019 was consistent with metastatic cervical carcinoma    She does not have any active complaints other than some lower abdominal intermittent pain.  She denies any fevers, chills, night sweats, lymphadenopathy, frequent infections, or fatigue. She denies bruising/bleeding, headaches, changes in vision/hearing, chest pain, SOB, coughing/wheezing, nausea/vomiting, diarrhea/constipation, LE swelling, and new MSK pain. We informed her about the recurrence of cervical cancer.       Review of Systems:    Systemic           no weight changes; no fever; no chills; no night sweats; no appetite changes  Skin           no rashes, or lesions  Eye           no irritation; no changes in vision  Payton-Laryngeal           no dysphagia; no hoarseness   Pulmonary    no cough; no shortness of breath  Cardiovascular    no chest pain; no palpitations  Gastrointestinal    no diarrhea; no constipation; no abdominal pain; no changes in bowel  habits; no blood in stool  Genitourinary   no urinary frequency; no urinary urgency; no dysuria; no pain; no abnormal vaginal discharge; no abnormal vaginal  "bleeding  Breast   no breast discharge; no breast changes; no breast pain  Musculoskeletal    no myalgias; no arthralgias; no back pain  Psychiatric           no depressed mood; no anxiety    Hematologic           no tender lymph nodes; no noticeable swellings or lumps   Endocrine    no hot flashes; no heat/cold intolerance         Neurological   no tremor; no numbness and tingling; no headaches; no difficulty  sleeping      Past Medical History:    Past Medical History:   Diagnosis Date     Chronic cholecystitis 09/25/2019     History of cervical cancer          Past Surgical History:    Past Surgical History:   Procedure Laterality Date     CHOLECYSTECTOMY, LAPOROSCOPIC  09/25/2019     HYSTERECTOMY RADICAL  2007    [facility]         Health Maintenance:  Health Maintenance Due   Topic Date Due     PREVENTIVE CARE VISIT  1980     DEPRESSION ACTION PLAN  1980     PHQ-9  1980     HIV SCREENING  12/30/1993     HPV  12/30/1999     PAP  07/14/2019       Last Pap Smear:  2014 /888    Last Mammogram: None yet    Last Colonoscopy: Not due      Current Medications:     has a current medication list which includes the following prescription(s): acetaminophen, hydrocodone-acetaminophen, ibuprofen, ondansetron, and oxycodone.       Allergies:        Allergies   Allergen Reactions     Compazine      CPCR     Prochlorperazine      \"i was told I turn blue\"          Social History:     Social History     Tobacco Use     Smoking status: Never Smoker     Smokeless tobacco: Never Used   Substance Use Topics     Alcohol use: Yes     Comment: one drink a week       History   Drug Use No       Family History:     The patient's family history is notable for .    Family History   Problem Relation Age of Onset     Aneurysm Mother      Breast Cancer Paternal Grandmother         bilat mastectomies     Breast Cancer Maternal Aunt         stage 1     Thyroid Cancer Sister 23         Physical Exam:     There were no vitals taken for " this visit.  There is no height or weight on file to calculate BMI.    General Appearance: healthy and alert, no distress     HEENT:  no thyromegaly, no palpable nodules or masses        Cardiovascular: regular rate and rhythm, no gallops, rubs or murmurs     Respiratory: lungs clear, no rales, rhonchi or wheezes, normal diaphragmatic excursion    Musculoskeletal: extremities non tender and without edema    Skin: no lesions or rashes     Neurological: normal gait, no gross defects     Psychiatric: appropriate mood and affect                               Hematological: normal cervical, supraclavicular and inguinal lymph nodes     Gastrointestinal:       abdomen soft, non-tender, non-distended, no organomegaly or masses    Genitourinary: External genitalia and urethral meatus appears normal.  Vagina is smooth without nodularity or masses.  Cervix and uterus are surgically absent.  Bimanual exam reveal no masses, nodularity or fullness.  Recto-vaginal exam confirms these findings.      Assessment:    Francesca Rowland is a 38 year old woman with a new diagnosis of recurrent cervical cancer.     A total of 60 minutes was spent with the patient, 40 minutes of which were spent in counseling the patient and/or treatment planning.      Plan:     1.)   CxCA - We have discussed the clinical and radiologic findings.  I have recommended that she undergo further assessment with a PET scan to R/O wide spread metastases.  In the absence of metastases I would recommend excision followed by chemo potentitiated RT to the site.  I have reviewed the CT images with the patient and I think the lesion can be excised safely, but it does approximate the JASS which may have to be sacrificed.       2.) Genetic risk factors were assessed and the patient does not meet the qualifications for a referral.      3.) Labs and/or tests ordered include:  Pre-op testing, PET-CT scan.     4.) Health maintenance issues addressed today include  none.    5.) Pre-op teaching was completed today.  Risks of surgery were discussed to include: bleeding, transfusion, infection, unintentional injury to surrounding organs/structures.      Thank you for allowing us to participate in the care of your patient.         Sincerely,  Jluis Canela MD        Patient Care Team:  Tamara Carlos MD as PCP - General (Family Practice)  Juan Manuel Mac MD as MD (Hematology & Oncology)  Connie Degroot APRN CNS as Nurse Practitioner (CLINICAL NURSE SPECIALIST - ADULT HEALTH )  Radha Holt, RN as Specialty Care Coordinator (Hematology & Oncology)  JUAN MANUEL MAC

## 2019-10-28 ENCOUNTER — PRE VISIT (OUTPATIENT)
Dept: ONCOLOGY | Facility: CLINIC | Age: 39
End: 2019-10-28

## 2019-10-28 ENCOUNTER — DOCUMENTATION ONLY (OUTPATIENT)
Dept: ONCOLOGY | Facility: CLINIC | Age: 39
End: 2019-10-28

## 2019-10-28 ENCOUNTER — ONCOLOGY VISIT (OUTPATIENT)
Dept: ONCOLOGY | Facility: CLINIC | Age: 39
End: 2019-10-28
Attending: INTERNAL MEDICINE
Payer: COMMERCIAL

## 2019-10-28 VITALS
TEMPERATURE: 98.4 F | HEART RATE: 85 BPM | SYSTOLIC BLOOD PRESSURE: 135 MMHG | WEIGHT: 242.5 LBS | HEIGHT: 66 IN | OXYGEN SATURATION: 96 % | BODY MASS INDEX: 38.97 KG/M2 | DIASTOLIC BLOOD PRESSURE: 86 MMHG | RESPIRATION RATE: 14 BRPM

## 2019-10-28 DIAGNOSIS — C79.82 METASTATIC ADENOCARCINOMA TO CERVIX (H): ICD-10-CM

## 2019-10-28 PROCEDURE — 99205 OFFICE O/P NEW HI 60 MIN: CPT | Mod: ZP | Performed by: OBSTETRICS & GYNECOLOGY

## 2019-10-28 PROCEDURE — G0463 HOSPITAL OUTPT CLINIC VISIT: HCPCS | Mod: ZF

## 2019-10-28 RX ORDER — PHENAZOPYRIDINE HYDROCHLORIDE 200 MG/1
200 TABLET, FILM COATED ORAL ONCE
Status: CANCELLED | OUTPATIENT
Start: 2019-10-28 | End: 2019-10-28

## 2019-10-28 RX ORDER — CEFAZOLIN SODIUM 1 G/50ML
1 INJECTION, SOLUTION INTRAVENOUS SEE ADMIN INSTRUCTIONS
Status: CANCELLED | OUTPATIENT
Start: 2019-10-28

## 2019-10-28 RX ORDER — CEFAZOLIN SODIUM 2 G/50ML
2 SOLUTION INTRAVENOUS
Status: CANCELLED | OUTPATIENT
Start: 2019-10-28

## 2019-10-28 ASSESSMENT — PAIN SCALES - GENERAL: PAINLEVEL: NO PAIN (0)

## 2019-10-28 ASSESSMENT — MIFFLIN-ST. JEOR: SCORE: 1786.47

## 2019-10-28 NOTE — PROGRESS NOTES
I scheduled surgery for this patient with Dr. Canela on 11/13 at [facility]. Scheduled per RN.    The RN has completed the education regarding the surgery, and they were provided a surgery packet with instructions and a map.     They are aware that they will receive a call  ~2 days prior to the scheduled procedure and will be given an exact arrival/start time.    Per communication - I did not need to call the patient to provide any extra additional information. I will follow up if anything changes.

## 2019-10-28 NOTE — NURSING NOTE
"Oncology Rooming Note    October 28, 2019 11:04 AM   Francesca Rowland is a 38 year old female who presents for:    Chief Complaint   Patient presents with     Oncology Clinic Visit     New; Metastatic Adenocarcinoma of Cervix     Initial Vitals: /86   Pulse 85   Temp 98.4  F (36.9  C) (Oral)   Resp 14   Ht 1.676 m (5' 5.98\")   Wt 110 kg (242 lb 8 oz)   SpO2 96%   BMI 39.16 kg/m   Estimated body mass index is 39.16 kg/m  as calculated from the following:    Height as of this encounter: 1.676 m (5' 5.98\").    Weight as of this encounter: 110 kg (242 lb 8 oz). Body surface area is 2.26 meters squared.  No Pain (0) Comment: Data Unavailable   No LMP recorded. Patient has had a hysterectomy.  Allergies reviewed: Yes  Medications reviewed: Yes    Medications: Medication refills not needed today.  Pharmacy name entered into [employer]: [id number] - [place], [place] - [facility] [address]    Clinical concerns: No concerns        Junie Quintanilla CMA              "

## 2019-10-28 NOTE — LETTER
10/28/2019       RE: Francesca Rowland  [address]  [facility] [address]     Dear Colleague,    Thank you for referring your patient, Francesca Rowland, to the [facility]. Please see a copy of my visit note below.                            Consult Notes on Referred Patient      Dr. Juan Manuel Mac MD  [address] [place] [address]       RE: Francesca Rowland  : 1980  BEST: 10/28/2019    Dear Dr. Juan Manuel Mac:    I had the pleasure of seeing your patient Francesca Rwoland here at the Gynecologic Cancer Clinic at the [facility] on 10/28/2019.  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  Given these findings she was subsequently sent to the Gynecologic Cancer Clinic for new patient consultation.     HPI  Patient initially presented as a 27-year-old woman seen in referral due to a Pap smear showing high-grade squamous intraepithelial lesion that was positive for high risk HPV subtype in 2007. This Pap smear had been taken during her six week postpartum visit. Patient did have a remote history of abnormal Pap smears back in  while serving in [place] for the [employer].  Pap smears during her first pregnancy were all negative. Her Pap smear at the beginning of the most recent pregnancy was normal. Patient did have a colposcopy for evaluation of this abnormal Pap smear on 2007 and cervical biopsies of 6 and 12 o'clock position showed features consistent with papillary serous adenocarcinoma. Endocervical curettings were also taken which showed fragments of papillary serous carcinoma. The patient then underwent a LEEP procedure on 10/15/07 which showed microinvasive adenocarcinoma with a depth of invasion of 0.4 mm. Patient then made the decision to proceed with radical hysterectomy. She subsequently underwent a radical hysterectomy, bilateral salpingectomy, bilateral pelvic and  periaortic lymph node dissection on 10/19/07. Patient's operative course was otherwise uncomplicated and she recoverred uneventfully.  Pathology did reveal patient to be stage IA2 adenocarcinoma of the cervix.     She underwent routine surveillence through 2014 complicated only by SIOBHAN 1    She recently presented after incidentally being found to have an enlarged pericaval lymph node.  She presented to the ED at [facility] on 9/14/2019 with severe 8.5/10 RUQ pain associated with nausea, one episode of emesis, and decreased appetite. RUQ demonstrated cholelithiasis without cholecystitis, so an abdominal and pelvic CT scan was obtained. This revealed post-surgical changes consistent with prior hysterectomy, left adnexal cysts thought to be ovarian, and an enlarged pericaval lymph node suspicious for metastatic disease vs primary lymphoma. Her condition stabilized and she was discharged home with referrals to general surgery and oncology for biliary colic and further management of the lymphadenopathy, respectively. CT-guided right retroperitoneal lymph node biopsy on 10/15/2019 was consistent with metastatic cervical carcinoma    She does not have any active complaints other than some lower abdominal intermittent pain.  She denies any fevers, chills, night sweats, lymphadenopathy, frequent infections, or fatigue. She denies bruising/bleeding, headaches, changes in vision/hearing, chest pain, SOB, coughing/wheezing, nausea/vomiting, diarrhea/constipation, LE swelling, and new MSK pain. We informed her about the recurrence of cervical cancer.       Review of Systems:    Systemic           no weight changes; no fever; no chills; no night sweats; no appetite changes  Skin           no rashes, or lesions  Eye           no irritation; no changes in vision  Payton-Laryngeal           no dysphagia; no hoarseness   Pulmonary    no cough; no shortness of breath  Cardiovascular    no chest pain; no palpitations  Gastrointestinal  "   no diarrhea; no constipation; no abdominal pain; no changes in bowel  habits; no blood in stool  Genitourinary   no urinary frequency; no urinary urgency; no dysuria; no pain; no abnormal vaginal discharge; no abnormal vaginal bleeding  Breast   no breast discharge; no breast changes; no breast pain  Musculoskeletal    no myalgias; no arthralgias; no back pain  Psychiatric           no depressed mood; no anxiety    Hematologic           no tender lymph nodes; no noticeable swellings or lumps   Endocrine    no hot flashes; no heat/cold intolerance         Neurological   no tremor; no numbness and tingling; no headaches; no difficulty  sleeping      Past Medical History:    Past Medical History:   Diagnosis Date     Chronic cholecystitis 09/25/2019     History of cervical cancer          Past Surgical History:    Past Surgical History:   Procedure Laterality Date     CHOLECYSTECTOMY, LAPOROSCOPIC  09/25/2019     HYSTERECTOMY RADICAL  2007    [facility]         Health Maintenance:  Health Maintenance Due   Topic Date Due     PREVENTIVE CARE VISIT  1980     DEPRESSION ACTION PLAN  1980     PHQ-9  1980     HIV SCREENING  12/30/1993     HPV  12/30/1999     PAP  07/14/2019       Last Pap Smear:  2014 /888    Last Mammogram: None yet    Last Colonoscopy: Not due      Current Medications:     has a current medication list which includes the following prescription(s): acetaminophen, hydrocodone-acetaminophen, ibuprofen, ondansetron, and oxycodone.       Allergies:        Allergies   Allergen Reactions     Compazine      CPCR     Prochlorperazine      \"i was told I turn blue\"          Social History:     Social History     Tobacco Use     Smoking status: Never Smoker     Smokeless tobacco: Never Used   Substance Use Topics     Alcohol use: Yes     Comment: one drink a week       History   Drug Use No       Family History:     The patient's family history is notable for .    Family History   Problem Relation Age " of Onset     Aneurysm Mother      Breast Cancer Paternal Grandmother         bilat mastectomies     Breast Cancer Maternal Aunt         stage 1     Thyroid Cancer Sister 23         Physical Exam:     There were no vitals taken for this visit.  There is no height or weight on file to calculate BMI.    General Appearance: healthy and alert, no distress     HEENT:  no thyromegaly, no palpable nodules or masses        Cardiovascular: regular rate and rhythm, no gallops, rubs or murmurs     Respiratory: lungs clear, no rales, rhonchi or wheezes, normal diaphragmatic excursion    Musculoskeletal: extremities non tender and without edema    Skin: no lesions or rashes     Neurological: normal gait, no gross defects     Psychiatric: appropriate mood and affect                               Hematological: normal cervical, supraclavicular and inguinal lymph nodes     Gastrointestinal:       abdomen soft, non-tender, non-distended, no organomegaly or masses    Genitourinary: External genitalia and urethral meatus appears normal.  Vagina is smooth without nodularity or masses.  Cervix and uterus are surgically absent.  Bimanual exam reveal no masses, nodularity or fullness.  Recto-vaginal exam confirms these findings.      Assessment:    Francesca Rowland is a 38 year old woman with a new diagnosis of recurrent cervical cancer.     A total of 60 minutes was spent with the patient, 40 minutes of which were spent in counseling the patient and/or treatment planning.      Plan:     1.)   CxCA - We have discussed the clinical and radiologic findings.  I have recommended that she undergo further assessment with a PET scan to R/O wide spread metastases.  In the absence of metastases I would recommend excision followed by chemo potentitiated RT to the site.  I have reviewed the CT images with the patient and I think the lesion can be excised safely, but it does approximate the JASS which may have to be sacrificed.       2.) Genetic risk  factors were assessed and the patient does not meet the qualifications for a referral.      3.) Labs and/or tests ordered include:  Pre-op testing, PET-CT scan.     4.) Health maintenance issues addressed today include none.    5.) Pre-op teaching was completed today.  Risks of surgery were discussed to include: bleeding, transfusion, infection, unintentional injury to surrounding organs/structures.      Thank you for allowing us to participate in the care of your patient.         Sincerely,  Jluis Canela MD        Patient Care Team:  Tamara Carlos MD as PCP - General (Family Practice)  Juan Manuel Mac MD as MD (Hematology & Oncology)  Connie Degroot APRN CNS as Nurse Practitioner (CLINICAL NURSE SPECIALIST - ADULT HEALTH )  Radha Holt, RN as Specialty Care Coordinator (Hematology & Oncology)

## 2019-10-29 ENCOUNTER — ANCILLARY PROCEDURE (OUTPATIENT)
Dept: CT IMAGING | Facility: CLINIC | Age: 39
End: 2019-10-29
Attending: OBSTETRICS & GYNECOLOGY
Payer: COMMERCIAL

## 2019-10-29 DIAGNOSIS — C79.82 METASTATIC ADENOCARCINOMA TO CERVIX (H): ICD-10-CM

## 2019-10-29 LAB
ALBUMIN SERPL-MCNC: 3.9 G/DL (ref 3.4–5)
ALP SERPL-CCNC: 99 U/L (ref 40–150)
ALT SERPL W P-5'-P-CCNC: 26 U/L (ref 0–50)
ANION GAP SERPL CALCULATED.3IONS-SCNC: 4 MMOL/L (ref 3–14)
AST SERPL W P-5'-P-CCNC: 19 U/L (ref 0–45)
BILIRUB SERPL-MCNC: 0.5 MG/DL (ref 0.2–1.3)
BUN SERPL-MCNC: 10 MG/DL (ref 7–30)
CALCIUM SERPL-MCNC: 8.9 MG/DL (ref 8.5–10.1)
CHLORIDE SERPL-SCNC: 103 MMOL/L (ref 94–109)
CO2 SERPL-SCNC: 28 MMOL/L (ref 20–32)
CREAT SERPL-MCNC: 0.57 MG/DL (ref 0.52–1.04)
ERYTHROCYTE [DISTWIDTH] IN BLOOD BY AUTOMATED COUNT: 13.7 % (ref 10–15)
GFR SERPL CREATININE-BSD FRML MDRD: >90 ML/MIN/{1.73_M2}
GLUCOSE SERPL-MCNC: 89 MG/DL (ref 70–99)
HCT VFR BLD AUTO: 42.3 % (ref 35–47)
HGB BLD-MCNC: 13.9 G/DL (ref 11.7–15.7)
MCH RBC QN AUTO: 30.2 PG (ref 26.5–33)
MCHC RBC AUTO-ENTMCNC: 32.9 G/DL (ref 31.5–36.5)
MCV RBC AUTO: 92 FL (ref 78–100)
PLATELET # BLD AUTO: 260 10E9/L (ref 150–450)
POTASSIUM SERPL-SCNC: 3.9 MMOL/L (ref 3.4–5.3)
PROT SERPL-MCNC: 8 G/DL (ref 6.8–8.8)
RBC # BLD AUTO: 4.6 10E12/L (ref 3.8–5.2)
SODIUM SERPL-SCNC: 135 MMOL/L (ref 133–144)
WBC # BLD AUTO: 7.7 10E9/L (ref 4–11)

## 2019-10-29 RX ORDER — IOPAMIDOL 755 MG/ML
135 INJECTION, SOLUTION INTRAVASCULAR ONCE
Status: COMPLETED | OUTPATIENT
Start: 2019-10-29 | End: 2019-10-29

## 2019-10-29 RX ADMIN — IOPAMIDOL 135 ML: 755 INJECTION, SOLUTION INTRAVASCULAR at 16:44

## 2019-10-29 NOTE — DISCHARGE INSTRUCTIONS

## 2019-10-30 NOTE — NURSING NOTE
Pre Op Nurse Teaching Template    Relevant Diagnosis:  Cervix cancer    Teaching Topic:  Laparoscopic removal of pelvic mass possible open surgery and cancer operation    Person(s) involved in teaching :  Patient  & spouse  Motivation Level:  Asks Questions:    Yes      Eager to Learn:     Yes     Cooperative:          Yes    Receptive (willing. Able to accept information):    Yes      Patient and those who are listed above demonstrates understanding of the following:   Reason for the appointment, diagnosis and treatment plan:   Yes   Knowledge of proper use of medications and conditions for which they are ordered (with special attention to potential side effects or drug interactions): Yes   Which situations necessitate calling provider and whom to contact: Yes         Nutritional needs and diet plan:  Yes      Pain management techniques:     Yes, Pain Scale   Diet:   Yes, [facility] Diet Instructions    Teaching Concerns addressed: Yes    Infection Prevention:  Patient and those who are listed above demonstrate understanding of the following:  Pre-Op CHG Bathing Instructions: Yes  Surgical procedure site care taught:   Yes   Signs and symptoms of infection taught: Yes       Instructional Materials Used/Given:  The [employer] Before You Surgery Booklet  Showering or Bathing before Surgery Instructions & CHG Product  Hysterectomy Guidelines  Pain Assessment Tool   Home Care after Major Abdominal or Vaginal Surgery  Map  Accommodations Brochure  Phone numbers for [facility] and [facility]  Copy of Surgical Consent    Done Today:  Lab work, EKG, Chest Xray,   Preop Visit needed/not needed   Tests Ordered:  Post Op Visit Scheduled:tbd  Comments:    Surgery date/time:11/13    Consent to file in medical records  .

## 2019-11-04 NOTE — PATIENT INSTRUCTIONS
Surgery scheduled  11/13/9  Post op appointment  12/2/19  Lab work done today-will be notified of test results  CT scan scheduled-will be notified of test results

## 2019-11-08 ENCOUNTER — HEALTH MAINTENANCE LETTER (OUTPATIENT)
Age: 39
End: 2019-11-08

## 2019-11-12 ENCOUNTER — ANESTHESIA EVENT (OUTPATIENT)
Dept: SURGERY | Facility: CLINIC | Age: 39
End: 2019-11-12
Payer: COMMERCIAL

## 2019-11-12 NOTE — ANESTHESIA PREPROCEDURE EVALUATION
Anesthesia Pre-Procedure Evaluation    Patient: Francesca Rowland   MRN:     [id number] Gender:   female   Age:    38 year old :      1980        Preoperative Diagnosis: Metastatic adenocarcinoma to cervix (H) [C79.82]   Procedure(s):  LAPAROSCOPY, removal of paraaortic lymph node  possible open surgery, possible cancer operation     Past Medical History:   Diagnosis Date     Chronic cholecystitis 2019     History of cervical cancer       Past Surgical History:   Procedure Laterality Date     CHOLECYSTECTOMY, LAPOROSCOPIC  2019     HYSTERECTOMY RADICAL  2007    [facility]          Anesthesia Evaluation     . Pt has had prior anesthetic.            ROS/MED HX    ENT/Pulmonary:  - neg pulmonary ROS     Neurologic:  - neg neurologic ROS     Cardiovascular:  - neg cardiovascular ROS       METS/Exercise Tolerance:     Hematologic:  - neg hematologic  ROS       Musculoskeletal:  - neg musculoskeletal ROS       GI/Hepatic: Comment: S/p cholecystectomy    (+) cholecystitis/cholelithiasis,       Renal/Genitourinary:  - ROS Renal section negative       Endo:     (+) Obesity, .      Psychiatric:     (+) psychiatric history depression      Infectious Disease:  - neg infectious disease ROS       Malignancy:   (+) Malignancy History of Other  Other CA Adenocarcinoma of the cervix Active status post Surgery         Other:    (+) H/O Chronic Pain,H/O chronic opiod use ,                    JZG FV AN PHYSICAL EXAM    LABS:  CBC:   Lab Results   Component Value Date    WBC 7.7 10/29/2019    WBC 7.5 2019    HGB 13.9 10/29/2019    HGB 14.2 2019    HCT 42.3 10/29/2019    HCT 43.3 2019     10/29/2019     2019     BMP:   Lab Results   Component Value Date     10/29/2019     2019    POTASSIUM 3.9 10/29/2019    POTASSIUM 3.6 2019    CHLORIDE 103 10/29/2019    CHLORIDE 105 2019    CO2 28 10/29/2019    CO2 27 2019    BUN 10 10/29/2019    BUN 8 2019     "CR 0.57 10/29/2019    CR 0.58 09/17/2019    GLC 89 10/29/2019    GLC 95 09/17/2019     COAGS:   Lab Results   Component Value Date    PTT 32 02/26/2008    INR 1.07 10/15/2019     POC:   Lab Results   Component Value Date    HCG Negative 10/19/2007     OTHER:   Lab Results   Component Value Date    SHAD 8.9 10/29/2019    PHOS 2.6 10/20/2007    MAG 2.0 10/20/2007    ALBUMIN 3.9 10/29/2019    PROTTOTAL 8.0 10/29/2019    ALT 26 10/29/2019    AST 19 10/29/2019    ALKPHOS 99 10/29/2019    BILITOTAL 0.5 10/29/2019        Preop Vitals    BP Readings from Last 3 Encounters:   10/28/19 135/86   10/22/19 136/87   10/15/19 (P) 122/75    Pulse Readings from Last 3 Encounters:   10/28/19 85   10/22/19 74   10/15/19 73      Resp Readings from Last 3 Encounters:   10/28/19 14   10/22/19 14   10/15/19 (P) 10    SpO2 Readings from Last 3 Encounters:   10/28/19 96%   10/22/19 96%   10/15/19 (P) 94%      Temp Readings from Last 1 Encounters:   10/28/19 36.9  C (98.4  F) (Oral)    Ht Readings from Last 1 Encounters:   10/28/19 1.676 m (5' 5.98\")      Wt Readings from Last 1 Encounters:   10/28/19 110 kg (242 lb 8 oz)    Estimated body mass index is 39.16 kg/m  as calculated from the following:    Height as of 10/28/19: 1.676 m (5' 5.98\").    Weight as of 10/28/19: 110 kg (242 lb 8 oz).     LDA:        Assessment:   ASA SCORE: 3      Smoking Status:  Non-Smoker/Unknown        Plan:   Anes. Type:  General; Peripheral Nerve Block     Block Details: TAP   Pre-Medication: Acetaminophen; Gabapentin   Induction:  IV (Standard)   Airway: ETT; Oral   Access/Monitoring: PIV; 2nd PIV   Maintenance: Balanced     Postop Plan:   Postop Pain: Opioids  Postop Sedation/Airway: Not planned  Disposition: Inpatient/Admit     PONV Management:   Adult Risk Factors: Female, Non-Smoker, Postop Opioids   Prevention: Ondansetron, Dexamethasone                   Ania Moore MD  "

## 2019-11-13 ENCOUNTER — ANESTHESIA (OUTPATIENT)
Dept: SURGERY | Facility: CLINIC | Age: 39
End: 2019-11-13
Payer: COMMERCIAL

## 2019-11-13 ENCOUNTER — SURGERY (OUTPATIENT)
Age: 39
End: 2019-11-13
Payer: COMMERCIAL

## 2019-11-13 ENCOUNTER — HOSPITAL ENCOUNTER (OUTPATIENT)
Facility: CLINIC | Age: 39
Discharge: HOME | End: 2019-11-13
Attending: OBSTETRICS & GYNECOLOGY | Admitting: OBSTETRICS & GYNECOLOGY
Payer: COMMERCIAL

## 2019-11-13 VITALS
RESPIRATION RATE: 17 BRPM | TEMPERATURE: 97.9 F | DIASTOLIC BLOOD PRESSURE: 60 MMHG | SYSTOLIC BLOOD PRESSURE: 122 MMHG | HEIGHT: 66 IN | WEIGHT: 243.83 LBS | OXYGEN SATURATION: 94 % | HEART RATE: 86 BPM | BODY MASS INDEX: 39.19 KG/M2

## 2019-11-13 DIAGNOSIS — C79.82 METASTATIC ADENOCARCINOMA TO CERVIX (H): ICD-10-CM

## 2019-11-13 DIAGNOSIS — Z98.890 S/P LAPAROSCOPY: Primary | ICD-10-CM

## 2019-11-13 LAB
ABO + RH BLD: NORMAL
ABO + RH BLD: NORMAL
BLD GP AB SCN SERPL QL: NORMAL
BLOOD BANK CMNT PATIENT-IMP: NORMAL
GLUCOSE BLDC GLUCOMTR-MCNC: 121 MG/DL (ref 70–99)
SPECIMEN EXP DATE BLD: NORMAL

## 2019-11-13 PROCEDURE — 25000566 ZZH SEVOFLURANE, EA 15 MIN: Performed by: OBSTETRICS & GYNECOLOGY

## 2019-11-13 PROCEDURE — 25000132 ZZH RX MED GY IP 250 OP 250 PS 637: Performed by: OBSTETRICS & GYNECOLOGY

## 2019-11-13 PROCEDURE — 25000128 H RX IP 250 OP 636: Performed by: STUDENT IN AN ORGANIZED HEALTH CARE EDUCATION/TRAINING PROGRAM

## 2019-11-13 PROCEDURE — 25000125 ZZHC RX 250: Performed by: STUDENT IN AN ORGANIZED HEALTH CARE EDUCATION/TRAINING PROGRAM

## 2019-11-13 PROCEDURE — 25800030 ZZH RX IP 258 OP 636: Performed by: STUDENT IN AN ORGANIZED HEALTH CARE EDUCATION/TRAINING PROGRAM

## 2019-11-13 PROCEDURE — 71000014 ZZH RECOVERY PHASE 1 LEVEL 2 FIRST HR: Performed by: OBSTETRICS & GYNECOLOGY

## 2019-11-13 PROCEDURE — 37000008 ZZH ANESTHESIA TECHNICAL FEE, 1ST 30 MIN: Performed by: OBSTETRICS & GYNECOLOGY

## 2019-11-13 PROCEDURE — 27210794 ZZH OR GENERAL SUPPLY STERILE: Performed by: OBSTETRICS & GYNECOLOGY

## 2019-11-13 PROCEDURE — 88341 IMHCHEM/IMCYTCHM EA ADD ANTB: CPT | Performed by: OBSTETRICS & GYNECOLOGY

## 2019-11-13 PROCEDURE — 37000009 ZZH ANESTHESIA TECHNICAL FEE, EACH ADDTL 15 MIN: Performed by: OBSTETRICS & GYNECOLOGY

## 2019-11-13 PROCEDURE — 36000062 ZZH SURGERY LEVEL 4 1ST 30 MIN - UMMC: Performed by: OBSTETRICS & GYNECOLOGY

## 2019-11-13 PROCEDURE — 71000027 ZZH RECOVERY PHASE 2 EACH 15 MINS: Performed by: OBSTETRICS & GYNECOLOGY

## 2019-11-13 PROCEDURE — 86900 BLOOD TYPING SEROLOGIC ABO: CPT | Performed by: ANESTHESIOLOGY

## 2019-11-13 PROCEDURE — 36000064 ZZH SURGERY LEVEL 4 EA 15 ADDTL MIN - UMMC: Performed by: OBSTETRICS & GYNECOLOGY

## 2019-11-13 PROCEDURE — 40000556 ZZH STATISTIC PERIPHERAL IV START W US GUIDANCE

## 2019-11-13 PROCEDURE — 49321 LAPAROSCOPY BIOPSY: CPT | Mod: GC | Performed by: OBSTETRICS & GYNECOLOGY

## 2019-11-13 PROCEDURE — 25000128 H RX IP 250 OP 636: Performed by: OBSTETRICS & GYNECOLOGY

## 2019-11-13 PROCEDURE — 36415 COLL VENOUS BLD VENIPUNCTURE: CPT | Performed by: ANESTHESIOLOGY

## 2019-11-13 PROCEDURE — 82962 GLUCOSE BLOOD TEST: CPT

## 2019-11-13 PROCEDURE — 25000132 ZZH RX MED GY IP 250 OP 250 PS 637: Performed by: STUDENT IN AN ORGANIZED HEALTH CARE EDUCATION/TRAINING PROGRAM

## 2019-11-13 PROCEDURE — 86850 RBC ANTIBODY SCREEN: CPT | Performed by: ANESTHESIOLOGY

## 2019-11-13 PROCEDURE — 25800030 ZZH RX IP 258 OP 636: Performed by: ANESTHESIOLOGY

## 2019-11-13 PROCEDURE — 40000171 ZZH STATISTIC PRE-PROCEDURE ASSESSMENT III: Performed by: OBSTETRICS & GYNECOLOGY

## 2019-11-13 PROCEDURE — 88342 IMHCHEM/IMCYTCHM 1ST ANTB: CPT | Performed by: OBSTETRICS & GYNECOLOGY

## 2019-11-13 PROCEDURE — 88307 TISSUE EXAM BY PATHOLOGIST: CPT | Performed by: OBSTETRICS & GYNECOLOGY

## 2019-11-13 PROCEDURE — 86901 BLOOD TYPING SEROLOGIC RH(D): CPT | Performed by: ANESTHESIOLOGY

## 2019-11-13 RX ORDER — ACETAMINOPHEN 325 MG/1
650 TABLET ORAL EVERY 6 HOURS
Qty: 24 TABLET | Refills: 0 | Status: SHIPPED | OUTPATIENT
Start: 2019-11-13

## 2019-11-13 RX ORDER — PHENAZOPYRIDINE HYDROCHLORIDE 200 MG/1
200 TABLET, FILM COATED ORAL ONCE
Status: COMPLETED | OUTPATIENT
Start: 2019-11-13 | End: 2019-11-13

## 2019-11-13 RX ORDER — SODIUM CHLORIDE, SODIUM LACTATE, POTASSIUM CHLORIDE, CALCIUM CHLORIDE 600; 310; 30; 20 MG/100ML; MG/100ML; MG/100ML; MG/100ML
INJECTION, SOLUTION INTRAVENOUS CONTINUOUS
Status: DISCONTINUED | OUTPATIENT
Start: 2019-11-13 | End: 2019-11-13 | Stop reason: HOSPADM

## 2019-11-13 RX ORDER — LIDOCAINE 40 MG/G
CREAM TOPICAL
Status: DISCONTINUED | OUTPATIENT
Start: 2019-11-13 | End: 2019-11-13 | Stop reason: HOSPADM

## 2019-11-13 RX ORDER — ACETAMINOPHEN 325 MG/1
975 TABLET ORAL ONCE
Status: COMPLETED | OUTPATIENT
Start: 2019-11-13 | End: 2019-11-13

## 2019-11-13 RX ORDER — ONDANSETRON 4 MG/1
4 TABLET, ORALLY DISINTEGRATING ORAL
Status: DISCONTINUED | OUTPATIENT
Start: 2019-11-13 | End: 2019-11-13 | Stop reason: HOSPADM

## 2019-11-13 RX ORDER — LIDOCAINE HYDROCHLORIDE 20 MG/ML
INJECTION, SOLUTION INFILTRATION; PERINEURAL PRN
Status: DISCONTINUED | OUTPATIENT
Start: 2019-11-13 | End: 2019-11-13

## 2019-11-13 RX ORDER — KETOROLAC TROMETHAMINE 30 MG/ML
30 INJECTION, SOLUTION INTRAMUSCULAR; INTRAVENOUS ONCE
Status: DISCONTINUED | OUTPATIENT
Start: 2019-11-13 | End: 2019-11-13 | Stop reason: HOSPADM

## 2019-11-13 RX ORDER — EPHEDRINE SULFATE 50 MG/ML
INJECTION, SOLUTION INTRAMUSCULAR; INTRAVENOUS; SUBCUTANEOUS PRN
Status: DISCONTINUED | OUTPATIENT
Start: 2019-11-13 | End: 2019-11-13

## 2019-11-13 RX ORDER — FLUMAZENIL 0.1 MG/ML
0.2 INJECTION, SOLUTION INTRAVENOUS
Status: DISCONTINUED | OUTPATIENT
Start: 2019-11-13 | End: 2019-11-13 | Stop reason: HOSPADM

## 2019-11-13 RX ORDER — IBUPROFEN 600 MG/1
600 TABLET, FILM COATED ORAL EVERY 6 HOURS
Qty: 12 TABLET | Refills: 0 | Status: SHIPPED | OUTPATIENT
Start: 2019-11-13 | End: 2019-12-11

## 2019-11-13 RX ORDER — IBUPROFEN 200 MG
600 TABLET ORAL
Status: DISCONTINUED | OUTPATIENT
Start: 2019-11-13 | End: 2019-11-13 | Stop reason: HOSPADM

## 2019-11-13 RX ORDER — BUPIVACAINE HYDROCHLORIDE 2.5 MG/ML
INJECTION, SOLUTION EPIDURAL; INFILTRATION; INTRACAUDAL PRN
Status: DISCONTINUED | OUTPATIENT
Start: 2019-11-13 | End: 2019-11-13

## 2019-11-13 RX ORDER — BUPIVACAINE HYDROCHLORIDE 2.5 MG/ML
INJECTION, SOLUTION EPIDURAL; INFILTRATION; INTRACAUDAL PRN
Status: DISCONTINUED | OUTPATIENT
Start: 2019-11-13 | End: 2019-11-13 | Stop reason: HOSPADM

## 2019-11-13 RX ORDER — NALOXONE HYDROCHLORIDE 0.4 MG/ML
.1-.4 INJECTION, SOLUTION INTRAMUSCULAR; INTRAVENOUS; SUBCUTANEOUS
Status: DISCONTINUED | OUTPATIENT
Start: 2019-11-13 | End: 2019-11-13 | Stop reason: HOSPADM

## 2019-11-13 RX ORDER — AMOXICILLIN 250 MG
1-2 CAPSULE ORAL 2 TIMES DAILY
Qty: 30 TABLET | Refills: 0 | Status: SHIPPED | OUTPATIENT
Start: 2019-11-13 | End: 2019-12-11

## 2019-11-13 RX ORDER — DEXAMETHASONE SODIUM PHOSPHATE 10 MG/ML
INJECTION, SOLUTION INTRAMUSCULAR; INTRAVENOUS PRN
Status: DISCONTINUED | OUTPATIENT
Start: 2019-11-13 | End: 2019-11-13

## 2019-11-13 RX ORDER — OXYCODONE AND ACETAMINOPHEN 5; 325 MG/1; MG/1
1-2 TABLET ORAL
Status: DISCONTINUED | OUTPATIENT
Start: 2019-11-13 | End: 2019-11-13 | Stop reason: HOSPADM

## 2019-11-13 RX ORDER — PROPOFOL 10 MG/ML
INJECTION, EMULSION INTRAVENOUS PRN
Status: DISCONTINUED | OUTPATIENT
Start: 2019-11-13 | End: 2019-11-13

## 2019-11-13 RX ORDER — ONDANSETRON 4 MG/1
4 TABLET, ORALLY DISINTEGRATING ORAL EVERY 6 HOURS PRN
Status: CANCELLED | OUTPATIENT
Start: 2019-11-13

## 2019-11-13 RX ORDER — OXYCODONE HYDROCHLORIDE 5 MG/1
5 TABLET ORAL EVERY 6 HOURS PRN
Qty: 6 TABLET | Refills: 0 | Status: SHIPPED | OUTPATIENT
Start: 2019-11-13 | End: 2019-12-11

## 2019-11-13 RX ORDER — HYDROMORPHONE HYDROCHLORIDE 1 MG/ML
.3-.5 INJECTION, SOLUTION INTRAMUSCULAR; INTRAVENOUS; SUBCUTANEOUS EVERY 5 MIN PRN
Status: DISCONTINUED | OUTPATIENT
Start: 2019-11-13 | End: 2019-11-13 | Stop reason: HOSPADM

## 2019-11-13 RX ORDER — ONDANSETRON 2 MG/ML
4 INJECTION INTRAMUSCULAR; INTRAVENOUS EVERY 6 HOURS PRN
Status: CANCELLED | OUTPATIENT
Start: 2019-11-13

## 2019-11-13 RX ORDER — LIDOCAINE 40 MG/G
CREAM TOPICAL
Status: CANCELLED | OUTPATIENT
Start: 2019-11-13

## 2019-11-13 RX ORDER — NALOXONE HYDROCHLORIDE 0.4 MG/ML
.1-.4 INJECTION, SOLUTION INTRAMUSCULAR; INTRAVENOUS; SUBCUTANEOUS
Status: CANCELLED | OUTPATIENT
Start: 2019-11-13

## 2019-11-13 RX ORDER — ONDANSETRON 2 MG/ML
4 INJECTION INTRAMUSCULAR; INTRAVENOUS EVERY 30 MIN PRN
Status: DISCONTINUED | OUTPATIENT
Start: 2019-11-13 | End: 2019-11-13 | Stop reason: HOSPADM

## 2019-11-13 RX ORDER — DEXAMETHASONE SODIUM PHOSPHATE 4 MG/ML
INJECTION, SOLUTION INTRA-ARTICULAR; INTRALESIONAL; INTRAMUSCULAR; INTRAVENOUS; SOFT TISSUE PRN
Status: DISCONTINUED | OUTPATIENT
Start: 2019-11-13 | End: 2019-11-13

## 2019-11-13 RX ORDER — ONDANSETRON 4 MG/1
4 TABLET, ORALLY DISINTEGRATING ORAL EVERY 30 MIN PRN
Status: DISCONTINUED | OUTPATIENT
Start: 2019-11-13 | End: 2019-11-13 | Stop reason: HOSPADM

## 2019-11-13 RX ORDER — FENTANYL CITRATE 50 UG/ML
25-50 INJECTION, SOLUTION INTRAMUSCULAR; INTRAVENOUS
Status: DISCONTINUED | OUTPATIENT
Start: 2019-11-13 | End: 2019-11-13 | Stop reason: HOSPADM

## 2019-11-13 RX ORDER — CEFAZOLIN SODIUM 2 G/100ML
2 INJECTION, SOLUTION INTRAVENOUS
Status: COMPLETED | OUTPATIENT
Start: 2019-11-13 | End: 2019-11-13

## 2019-11-13 RX ORDER — CEFAZOLIN SODIUM 1 G/3ML
1 INJECTION, POWDER, FOR SOLUTION INTRAMUSCULAR; INTRAVENOUS SEE ADMIN INSTRUCTIONS
Status: DISCONTINUED | OUTPATIENT
Start: 2019-11-13 | End: 2019-11-13 | Stop reason: HOSPADM

## 2019-11-13 RX ORDER — GABAPENTIN 300 MG/1
300 CAPSULE ORAL ONCE
Status: COMPLETED | OUTPATIENT
Start: 2019-11-13 | End: 2019-11-13

## 2019-11-13 RX ORDER — ONDANSETRON 2 MG/ML
INJECTION INTRAMUSCULAR; INTRAVENOUS PRN
Status: DISCONTINUED | OUTPATIENT
Start: 2019-11-13 | End: 2019-11-13

## 2019-11-13 RX ORDER — FENTANYL CITRATE 50 UG/ML
25-50 INJECTION, SOLUTION INTRAMUSCULAR; INTRAVENOUS EVERY 5 MIN PRN
Status: DISCONTINUED | OUTPATIENT
Start: 2019-11-13 | End: 2019-11-13 | Stop reason: HOSPADM

## 2019-11-13 RX ADMIN — CEFAZOLIN SODIUM 2 G: 2 INJECTION, SOLUTION INTRAVENOUS at 07:44

## 2019-11-13 RX ADMIN — ROCURONIUM BROMIDE 20 MG: 10 INJECTION INTRAVENOUS at 08:55

## 2019-11-13 RX ADMIN — SODIUM CHLORIDE, POTASSIUM CHLORIDE, SODIUM LACTATE AND CALCIUM CHLORIDE: 600; 310; 30; 20 INJECTION, SOLUTION INTRAVENOUS at 07:16

## 2019-11-13 RX ADMIN — HYDROMORPHONE HYDROCHLORIDE 0.3 MG: 1 INJECTION, SOLUTION INTRAMUSCULAR; INTRAVENOUS; SUBCUTANEOUS at 10:57

## 2019-11-13 RX ADMIN — Medication 10 MG: at 08:04

## 2019-11-13 RX ADMIN — ROCURONIUM BROMIDE 20 MG: 10 INJECTION INTRAVENOUS at 09:26

## 2019-11-13 RX ADMIN — FENTANYL CITRATE 50 MCG: 50 INJECTION INTRAMUSCULAR; INTRAVENOUS at 07:36

## 2019-11-13 RX ADMIN — PHENYLEPHRINE HYDROCHLORIDE 100 MCG: 10 INJECTION INTRAVENOUS at 09:52

## 2019-11-13 RX ADMIN — HYDROMORPHONE HYDROCHLORIDE 0.5 MG: 1 INJECTION, SOLUTION INTRAMUSCULAR; INTRAVENOUS; SUBCUTANEOUS at 07:52

## 2019-11-13 RX ADMIN — SUGAMMADEX 200 MG: 100 INJECTION, SOLUTION INTRAVENOUS at 09:46

## 2019-11-13 RX ADMIN — ROCURONIUM BROMIDE 20 MG: 10 INJECTION INTRAVENOUS at 08:32

## 2019-11-13 RX ADMIN — PHENYLEPHRINE HYDROCHLORIDE 100 MCG: 10 INJECTION INTRAVENOUS at 07:58

## 2019-11-13 RX ADMIN — ACETAMINOPHEN 975 MG: 325 TABLET, FILM COATED ORAL at 05:47

## 2019-11-13 RX ADMIN — PROPOFOL 170 MG: 10 INJECTION, EMULSION INTRAVENOUS at 07:36

## 2019-11-13 RX ADMIN — ROCURONIUM BROMIDE 80 MG: 10 INJECTION INTRAVENOUS at 07:36

## 2019-11-13 RX ADMIN — DEXAMETHASONE SODIUM PHOSPHATE 8 MG: 4 INJECTION, SOLUTION INTRA-ARTICULAR; INTRALESIONAL; INTRAMUSCULAR; INTRAVENOUS; SOFT TISSUE at 08:24

## 2019-11-13 RX ADMIN — DEXAMETHASONE SODIUM PHOSPHATE 2 MG: 10 INJECTION, SOLUTION INTRAMUSCULAR; INTRAVENOUS at 07:22

## 2019-11-13 RX ADMIN — FENTANYL CITRATE 25 MCG: 50 INJECTION, SOLUTION INTRAMUSCULAR; INTRAVENOUS at 10:17

## 2019-11-13 RX ADMIN — PHENYLEPHRINE HYDROCHLORIDE 100 MCG: 10 INJECTION INTRAVENOUS at 08:04

## 2019-11-13 RX ADMIN — PHENAZOPYRIDINE HYDROCHLORIDE 200 MG: 200 TABLET ORAL at 05:47

## 2019-11-13 RX ADMIN — DEXMEDETOMIDINE HYDROCHLORIDE 40 MCG: 100 INJECTION, SOLUTION INTRAVENOUS at 07:22

## 2019-11-13 RX ADMIN — BUPIVACAINE HYDROCHLORIDE 60 ML: 2.5 INJECTION, SOLUTION EPIDURAL; INFILTRATION; INTRACAUDAL; PERINEURAL at 07:22

## 2019-11-13 RX ADMIN — CEFAZOLIN 1 G: 1 INJECTION, POWDER, FOR SOLUTION INTRAMUSCULAR; INTRAVENOUS at 09:43

## 2019-11-13 RX ADMIN — GABAPENTIN 300 MG: 300 CAPSULE ORAL at 05:47

## 2019-11-13 RX ADMIN — LIDOCAINE HYDROCHLORIDE 100 MG: 20 INJECTION, SOLUTION INFILTRATION; PERINEURAL at 07:36

## 2019-11-13 RX ADMIN — BUPIVACAINE HYDROCHLORIDE 10 ML: 2.5 INJECTION, SOLUTION EPIDURAL; INFILTRATION; INTRACAUDAL; PERINEURAL at 09:57

## 2019-11-13 RX ADMIN — FENTANYL CITRATE 50 MCG: 50 INJECTION INTRAMUSCULAR; INTRAVENOUS at 07:19

## 2019-11-13 RX ADMIN — MIDAZOLAM 1 MG: 1 INJECTION INTRAMUSCULAR; INTRAVENOUS at 07:18

## 2019-11-13 RX ADMIN — FENTANYL CITRATE 25 MCG: 50 INJECTION, SOLUTION INTRAMUSCULAR; INTRAVENOUS at 10:36

## 2019-11-13 RX ADMIN — ONDANSETRON 4 MG: 2 INJECTION INTRAMUSCULAR; INTRAVENOUS at 09:45

## 2019-11-13 ASSESSMENT — PAIN DESCRIPTION - DESCRIPTORS
DESCRIPTORS: CRAMPING
DESCRIPTORS: CRAMPING

## 2019-11-13 ASSESSMENT — MIFFLIN-ST. JEOR: SCORE: 1792.5

## 2019-11-13 NOTE — OR NURSING
GYN/ONC resident at bedside in phase II to assess patient after meeting all of the discharge criteria. Patient cleared for discharge to home.

## 2019-11-13 NOTE — LETTER
[facility]  [address] [facility] [address]  [phone]    2019    Re: Francesca Rowland  [address]  [facility] [address]  [phone] (home)     : 1980      To Whom It May Concern:      Francesca Rowland was under our care on 2019 for surgery. She will be require recovery until 12/3/2019 at which time she may return to work. No heavy lifting for 6 weeks.       Sincerely,        Leti Bennett MD   Obstetrics & Gynecology  19

## 2019-11-13 NOTE — DISCHARGE INSTRUCTIONS
REMEMBER: SAME DAY SURGERY IS NOT SAME DAY RECOVERY. TAKE IT EASY, GET PLENTY OF REST, AND HAVE SOMEONE WITH YOU FOR 24 HOURS. FOLLOW THESE INSTRUCTIONS AND PLEASE DO NOT HESITATE TO CALL WITH ANY QUESTIONS.       [facility] [place]  Same-Day Surgery   Adult Discharge Orders & Instructions     For 24 hours after surgery    1. Get plenty of rest.  A responsible adult must stay with you for at least 24 hours after you leave the hospital.   2. Do not drive or use heavy equipment.  If you have weakness or tingling, don't drive or use heavy equipment until this feeling goes away.  3. Do not drink alcohol.  4. Avoid strenuous or risky activities.  Ask for help when climbing stairs.   5. You may feel lightheaded.  IF so, sit for a few minutes before standing.  Have someone help you get up.   6. If you have nausea (feel sick to your stomach): Drink only clear liquids such as apple juice, ginger ale, broth or 7-Up.  Rest may also help.  Be sure to drink enough fluids.  Move to a regular diet as you feel able.  7. You may have a slight fever. Call the doctor if your fever is over 100 F (37.7 C) (taken under the tongue) or lasts longer than 24 hours.  8. You may have a dry mouth, a sore throat, muscle aches or trouble sleeping.  These should go away after 24 hours.  9. Do not make important or legal decisions.   Call your doctor for any of the followin.  Signs of infection (fever, growing tenderness at the surgery site, a large amount of drainage or bleeding, severe pain, foul-smelling drainage, redness, swelling).    2. It has been over 8 to 10 hours since surgery and you are still not able to urinate (pass water).    3.  Headache for over 24 hours.    To contact a doctor, call Dr. Canela's office at [phone] at the Women's Health/Gynecologic Clinic or:        [phone] and ask for the resident on call for GYN/ONC (answered 24 hours a day)      Emergency Department:    [place]  [place]: [phone]       (TT[place] for hearing impaired: [phone])

## 2019-11-13 NOTE — OP NOTE
Gynecologic Oncology Operative Note    DATE OF SURGERY: 11/13/19    PREOPERATIVE DIAGNOSES: recurrent cervical cancer  POSTOPERATIVE DIAGNOSES: same    OPERATIVE PROCEDURES: diagnostic laparoscopy, resection of of paraaortic lymph node, lysis of adhesions    SURGEON: Jluis Canela MD     ASSISTANTS: Kelsey Monaco MD; Pamella Childress MD    ANESTHESIA: General endotracheal anesthesia     ESTIMATED BLOOD LOSS: 10 mL    TOTAL INTRAVENOUS FLUIDS: 900 mL crystalloid    TOTAL URINE OUTPUT: 310 mL clear urine in Sorto; removed at end of case    SPECIMENS:  ID Type Source Tests Collected by Time Destination   A : PARAAORTIC LYMPH NODE Tissue Lymph Node, Para-Aortic (6) SURGICAL PATHOLOGY EXAM Jluis Canela MD 11/13/2019  9:02 AM      COMPLICATIONS: None apparent.     CONDITION: Stable to PACU.     INDICATIONS FOR PROCEDURE: 38 year old with a distant history of cervical cancer, now with an incidentally found enlarge pericaval lymph node that was biopsied to show metastatic cervical carcinoma.      OPERATIVE FINDINGS:   Midline omental and small bowel mesentery adhesions to the anterior abdominal wall noted on entry.  Smooth diaphragms and liver surfaces. Descending colon adhered to left pelvic sidewall.  On entry into the retroperitoneum, enlarged 3 cm para-aortic node noted.  Right ureter identified in retroperitoneum lateral to aorta.  Surgicel placed in paraaortic node dissection bed to maintain hemostasis.    Ovaries noted to be adherent to respective pelvic sidewalls.  Ovaries appeared to be in approximate locations of sidewall masses seen on CT scan.  Multiple surgical clips placed to yamilex right and left ovaries. Adhesions noted in right and left retroperitoneum from prior lymph node dissection, no obviously enlarged lymph nodes seen in retroperitoneum along external iliac vessels.       OPERATIVE PROCEDURE IN DETAIL:  After consent was verified, the patient was brought to the operating room where she  underwent general anesthesia. She was prepped and draped in the usual sterile fashion, and a Sorto catheter was placed. A safety time out was performed.    The umbilicus was everted, and the Veress needle was used to insufflate the abdomen through the base of the umbilicus.  A 5 mm incision was made, and a 5 mm trocar was placed through the umbilicus. The laparoscope was placed, and survey of the abdomen revealed the findings noted above. No trauma from entry was noted. There was no overt evidence of peritoneal or upper abdominal disease.  Attention was turned to the left lower quadrant. At a point 2 cm superomedial to the ASIS, a 12 mm incision was made, and a 12 mm trocar was placed under direct visualization.  Attention was then turned to the right lower quadrant. Similarly, at a point 2 cm superomedial to the ASIS, a 12 mm incision was made, and a 12 mm trocar was placed under direct visualization.  Midline omental adhesions near the umbilical port were lysed sharply.  An additional 5 mm trocar was placed on the left group home between the LLQ port and the camera port. The patient was placed in steep Trendelenburg position.    The small bowel was then reflected up over its mesentery so that the common iliac vessels and aorta could be seen.  The peritoneum overlying the aorta was then incised which revealed the 3 cm enlarge para aortic mass.  Blunt dissection was used to free the margins of the mass, which revealed the right ureter laterally.  Using a combination of blunt and sharp dissection with the monopolar scissor, the mass was shelled out above the aorta and IVC.  The JASS was noted to the patients left and was peeled away from the mass with gentle dissection.  Surgical clips were placed on the remaining pedicle at the base of the mass, which was then excised with the monopolar scissor.  The specimen was placed in an Endocatch bag and removed through a lower quadrant port.  The port was replaced, and Surgicel  was placed in the dissection bed to maintain hemostasis. Attention was turned to the pelvis.    The right ovary was easily visible but adhered to the pelvic sidewall.  The sidewall peritoneum was incised around the ovary to separate the ovary away from the sidewall.  The right external iliac vessels could be identified but the retroperitoneal space was fibrotic from her prior dissection.  No obvious enlarged nodes were seen.  A series of 4 surgical clips were placed on the right ovary to allow for easy identification on imaging.    Attention was turned to the left, where there were significant adhesions of the descending colon and small bowel mesentery to the abdominal wall.  These were lysed sharply which allowed for identification of the left ovary.  The left pelvic sidewall peritoneum was incised, and again the external iliac vessels could be identified in the retroperitoneum but no enlarged nodes were seen.  The left ovary was then marked as well with a series of surgical clips for future identification.    Good hemostasis was noted at the prior dissection bed and in the pelvis. The 12 mm fascial incisions were then closed with an 0 Vicryl suture using the Nic-Kael device.  All skin incisions were closed using 4-0 Monocryl, and covered with steri strips and a sterile dressing.  All sponge, needle, and instrument counts were correct.    The patient tolerated the procedure well and was transferred to recovery in stable condition. Dr. Canela was present and scrubbed for the entire procedure.    Kelsey Monaco MD

## 2019-11-13 NOTE — PROGRESS NOTES
Gynecology Post-Op Check     S: Pt feels well. Pain is tolerable with medications. Has ambulated, voided and ate crackers and juice.  No SOB, CP.  No nausea.     O:  Vitals:    11/13/19 1130 11/13/19 1200 11/13/19 1215 11/13/19 1230   BP: 124/73 126/71 118/67 114/72   Pulse: 84   87   Resp: 16 16 15 16   Temp:  98.1  F (36.7  C)     TempSrc:  Oral     SpO2: 94% 94% 95% 96%   Weight:       Height:            Gen: comfortable, sitting up in chair, no acute distress  CV: RRR, no murmurs  Lungs: CTAB, appropriate work of breathing  Abd: soft, non tender.  Incision with bandages in place, no drainage over them  Ext: warm and well perfused    Hemoglobin   Date Value Ref Range Status   10/29/2019 13.9 11.7 - 15.7 g/dL Final   09/17/2019 14.2 11.7 - 15.7 g/dL Final       A/P:Francesca Rowland is a 38 year old female POD #0 s/p Diagnostic laparoscopy, resection of para-aortic lymph nodes, lysis of adhesions.    - Procedure and results discussed with patient  - Discussed return precautions including bleeding, abdominal pain unresolved with medications, signs of fever  - Meeting post operative goals, okay for discharge to home  - Hg 13. 9>EBL 10cc  - Path result pending  - Sent home with [facility] paperwork and work letter, excused until 12/3 per patient    Dispo: Discharge to home    Leti Bennett MD PGY1  Obstetrics & Gynecology  11/13/19

## 2019-11-13 NOTE — ANESTHESIA POSTPROCEDURE EVALUATION
Anesthesia POST Procedure Evaluation    Patient: Francesca Rowland   MRN:     [id number] Gender:   female   Age:    38 year old :      1980        Preoperative Diagnosis: Metastatic adenocarcinoma to cervix (H) [C79.82]   Procedure(s):  LAPAROSCOPY, resection of of paraaortic lymph node, lysis of adhesions  possible open surgery, possible cancer operation   Postop Comments: No value filed.       Anesthesia Type:  Not documented  General, Peripheral Nerve Block    Reportable Event: NO     PAIN: Uncomplicated   Sign Out status: Comfortable, Well controlled pain     PONV: No PONV   Sign Out status:  No Nausea or Vomiting     Neuro/Psych: Uneventful perioperative course   Sign Out Status: Preoperative baseline; Age appropriate mentation     Airway/Resp.: Uneventful perioperative course   Sign Out Status: Non labored breathing, age appropriate RR; Resp. Status within EXPECTED Parameters     CV: Uneventful perioperative course   Sign Out status: Appropriate BP and perfusion indices; Appropriate HR/Rhythm     Disposition:   Sign Out in:  PACU  Disposition:  Phase II; Home  Recovery Course: Uneventful  Follow-Up: Not required           Last Anesthesia Record Vitals:  CRNA VITALS  2019 0932 - 2019 1032      2019             Resp Rate (set):  10          Last PACU Vitals:  Vitals Value Taken Time   /76 2019 11:10 AM   Temp 36.7  C (98  F) 2019 10:30 AM   Pulse 84 2019 11:10 AM   Resp     SpO2 96 % 2019 11:12 AM   Temp src     NIBP     Pulse     SpO2     Resp     Temp     Ht Rate     Temp 2     Vitals shown include unvalidated device data.      Electronically Signed By: Yahaira Quinones MD, 2019, 11:13 AM

## 2019-11-13 NOTE — ANESTHESIA PROCEDURE NOTES
Peripheral Nerve Block Procedure Note  Date/Time: 11/13/2019 7:22 AM    Staff:     Anesthesiologist:  Paulo Ramos MD    Resident/CRNA:  Ania Moore MD    Block performed by resident/CRNA in the presence of a teaching physician    Location: Pre-op  Procedure Start/Stop TImes:      11/13/2019 7:10 AM     11/13/2019 7:22 AM    patient identified, IV checked, site marked, risks and benefits discussed, informed consent, monitors and equipment checked, pre-op evaluation, at physician/surgeon's request and post-op pain management      Correct Patient: Yes      Correct Position: Yes      Correct Site: Yes      Correct Procedure: Yes      Correct Laterality:  Yes    Site Marked:  Yes  Procedure details:     Procedure:  TAP    ASA:  3    Diagnosis:  Postop pain control.     Laterality:  Bilateral    Position:  Supine    Sterile Prep: chloraprep, mask and sterile gloves      Local skin infiltration:  None    Needle:  Short bevel    Needle gauge:  21    Needle length (mm):  110    Ultrasound: Yes      Ultrasound used to identify targeted nerve, plexus, or vascular structure and placed a needle adjacent to it      Permanent Image entered into patiient's record      Abnormal pain on injection: No      Blood Aspirated: No      Paresthesias:  No    Bleeding at site: No      Bolus via:  Needle    Infusion Method:  Single Shot    Complications:  None  Assessment/Narrative:     Injection made incrementally with aspirations every (mL):  5

## 2019-11-13 NOTE — ANESTHESIA CARE TRANSFER NOTE
Patient: Francesca Rowland    Procedure(s):  LAPAROSCOPY, resection of of paraaortic lymph node, lysis of adhesions  possible open surgery, possible cancer operation    Diagnosis: Metastatic adenocarcinoma to cervix (H) [C79.82]  Diagnosis Additional Information: No value filed.    Anesthesia Type:   General, Peripheral Nerve Block     Note:  Airway :Face Mask  Patient transferred to:PACU  Comments: VSS. Breathing spontaneously at a regular rate with adequate tidal volumes and maintaining O2 sats on .L Denies nausea or pain. No apparent complications from anesthesia.     Ania Moore MD [facility] [place] [phone]  Anesthesia CA-1  Handoff Report: Identifed the Patient, Identified the Reponsible Provider, Reviewed the pertinent medical history, Discussed the surgical course, Reviewed Intra-OP anesthesia mangement and issues during anesthesia, Set expectations for post-procedure period and Allowed opportunity for questions and acknowledgement of understanding      Vitals: (Last set prior to Anesthesia Care Transfer)    CRNA VITALS  11/13/2019 0932 - 11/13/2019 1012      11/13/2019             Resp Rate (set):  10                Electronically Signed By: Ania Moore MD  November 13, 2019  10:12 AM

## 2019-11-19 LAB — COPATH REPORT: NORMAL

## 2019-12-01 NOTE — PROGRESS NOTES
Consult Notes on Referred Patient      Dr. Juan Manuel Mac MD  [address] [place] [address]       RE: Francesca Rowland  : 1980  BEST: 2019     Dear Dr. Juan Manuel Mac:    I had the pleasure of seeing your patient Francesca Rowland here at the Gynecologic Cancer Clinic at the [facility] on 10/28/2019.  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  Given these findings she was subsequently sent to the Gynecologic Cancer Clinic for new patient consultation.     HPI  Patient initially presented as a 27-year-old woman seen in referral due to a Pap smear showing high-grade squamous intraepithelial lesion that was positive for high risk HPV subtype in 2007. This Pap smear had been taken during her six week postpartum visit. Patient did have a remote history of abnormal Pap smears back in  while serving in [place] for the [employer].  Pap smears during her first pregnancy were all negative. Her Pap smear at the beginning of the most recent pregnancy was normal. Patient did have a colposcopy for evaluation of this abnormal Pap smear on 2007 and cervical biopsies of 6 and 12 o'clock position showed features consistent with papillary serous adenocarcinoma. Endocervical curettings were also taken which showed fragments of papillary serous carcinoma. The patient then underwent a LEEP procedure on 10/15/07 which showed microinvasive adenocarcinoma with a depth of invasion of 0.4 mm. Patient then made the decision to proceed with radical hysterectomy. She subsequently underwent a radical hysterectomy, bilateral salpingectomy, bilateral pelvic and periaortic lymph node dissection on 10/19/07. Patient's operative course was otherwise uncomplicated and she recoverred uneventfully.  Pathology did reveal patient to be stage IA2 adenocarcinoma of the cervix.     She underwent routine  surveillence through 2014 complicated only by SIOBHAN 1    She recently presented after incidentally being found to have an enlarged pericaval lymph node.  She presented to the ED at [facility] on 9/14/2019 with severe 8.5/10 RUQ pain associated with nausea, one episode of emesis, and decreased appetite. RUQ demonstrated cholelithiasis without cholecystitis, so an abdominal and pelvic CT scan was obtained. This revealed post-surgical changes consistent with prior hysterectomy, left adnexal cysts thought to be ovarian, and an enlarged pericaval lymph node suspicious for metastatic disease vs primary lymphoma. Her condition stabilized and she was discharged home with referrals to general surgery and oncology for biliary colic and further management of the lymphadenopathy, respectively. CT-guided right retroperitoneal lymph node biopsy on 10/15/2019 was consistent with metastatic cervical carcinoma    She underwent surgery to remove the mass and determine the extent of disease on 11/13/19    PATH:  FINAL DIAGNOSIS:   LYMPH NODES, PARA-AORTIC, RESECTION:   - Positive for carcinoma in three of four lymph nodes examined (3/4),   consistent with recurrent endocervical   adenocarcinoma   - Largest metastatic focus is 3.5 cm, positive for extra yuridia extension     Review of Systems:    Systemic           no weight changes; no fever; no chills; no night sweats; no appetite changes  Skin           no rashes, or lesions  Eye           no irritation; no changes in vision  Payton-Laryngeal           no dysphagia; no hoarseness   Pulmonary    no cough; no shortness of breath  Cardiovascular    no chest pain; no palpitations  Gastrointestinal    no diarrhea; no constipation; no abdominal pain; no changes in bowel  habits; no blood in stool  Genitourinary   no urinary frequency; no urinary urgency; no dysuria; no pain; no abnormal vaginal discharge; no abnormal vaginal bleeding  Breast   no breast discharge; no breast changes; no  "breast pain  Musculoskeletal    no myalgias; no arthralgias; no back pain  Psychiatric           no depressed mood; no anxiety    Hematologic           no tender lymph nodes; no noticeable swellings or lumps   Endocrine    no hot flashes; no heat/cold intolerance         Neurological   no tremor; no numbness and tingling; no headaches; no difficulty  sleeping      Past Medical History:    Past Medical History:   Diagnosis Date     Chronic cholecystitis 09/25/2019     History of cervical cancer          Past Surgical History:    Past Surgical History:   Procedure Laterality Date     CHOLECYSTECTOMY, LAPOROSCOPIC  09/25/2019     HYSTERECTOMY RADICAL  2007    PAUL     LAPAROSCOPIC LYMPHADENECTOMY N/A 11/13/2019    Procedure: LAPAROSCOPY, resection of of paraaortic lymph node, lysis of adhesions;  Surgeon: Jluis Canela MD;  Location:  OR         Health Maintenance:  Health Maintenance Due   Topic Date Due     PREVENTIVE CARE VISIT  1980     DEPRESSION ACTION PLAN  1978     PHQ-9  1980     HIV SCREENING  12/30/1993     PNEUMOCOCCAL IMMUNIZATION 19-64 HIGHEST RISK (1 of 3 - PCV13) 12/30/1997     HPV  12/30/1999     PAP  07/14/2019       Last Pap Smear:  2014 /888    Last Mammogram: None yet    Last Colonoscopy: Not due      Current Medications:     has a current medication list which includes the following prescription(s): acetaminophen, ibuprofen, ondansetron, oxycodone, and senna-docusate.       Allergies:        Allergies   Allergen Reactions     Prochlorperazine      \"i was told I turn blue\"          Social History:     Social History     Tobacco Use     Smoking status: Never Smoker     Smokeless tobacco: Never Used   Substance Use Topics     Alcohol use: Yes     Comment: one drink a week       History   Drug Use No       Family History:     The patient's family history is notable for .    Family History   Problem Relation Age of Onset     Aneurysm Mother      Breast Cancer Paternal " "Grandmother         bilat mastectomies     Breast Cancer Maternal Aunt         stage 1     Thyroid Cancer Sister 23         Physical Exam:     PS 0  VS /78   Pulse 84   Temp 98  F (36.7  C) (Oral)   Resp 14   Ht 1.676 m (5' 5.98\")   Wt 112.5 kg (248 lb 1.6 oz)   SpO2 97%   BMI 40.06 kg/m       General Appearance: healthy and alert, no distress     HEENT:  no thyromegaly, no palpable nodules or masses        Cardiovascular: regular rate and rhythm, no gallops, rubs or murmurs     Respiratory: lungs clear, no rales, rhonchi or wheezes, normal diaphragmatic excursion    Musculoskeletal: extremities non tender and without edema    Skin: no lesions or rashes     Neurological: normal gait, no gross defects     Psychiatric: appropriate mood and affect                               Hematological: normal cervical, supraclavicular and inguinal lymph nodes     Gastrointestinal:       abdomen soft, non-tender, non-distended, no organomegaly or masses    Genitourinary: deferred    Assessment:    Francesca Rowland is a 38 year old woman with a new diagnosis of recurrent cervical cancer.     A total of 60 minutes was spent with the patient, 40 minutes of which were spent in counseling the patient and/or treatment planning.      Plan:     1.)   CxCA - We have discussed the clinical and and pathologic findings.  I am encouraged that there did not appear to be other disease present at the time of surgery and have recommended that she consider pelvic RT with PA extension as we discussed pre-operatively.  I think this could start at any time, given that she underwent laparoscopy and is recovering now.  I have recommended a PET scan prior to her visit with RT, this was ordered pre-operatively, but not done.     2.) Genetic risk factors were assessed and the patient does not meet the qualifications for a referral.      3.) Labs and/or tests ordered include:  None, RT consult     4.) Health maintenance issues addressed today " include none.        Sincerely,  Jluis Canela MD      CC  Patient Care Team:  Tamara Carlos MD as PCP - General (Family Practice)  Juan Manuel Mac MD as MD (Hematology & Oncology)  Connie Degroot APRN CNS as Nurse Practitioner (CLINICAL NURSE SPECIALIST - ADULT HEALTH )  Radha Holt, RN as Specialty Care Coordinator (Hematology & Oncology)  JUAN MANUEL MAC

## 2019-12-02 ENCOUNTER — OFFICE VISIT (OUTPATIENT)
Dept: ONCOLOGY | Facility: CLINIC | Age: 39
End: 2019-12-02
Attending: OBSTETRICS & GYNECOLOGY
Payer: COMMERCIAL

## 2019-12-02 VITALS
SYSTOLIC BLOOD PRESSURE: 130 MMHG | BODY MASS INDEX: 39.87 KG/M2 | OXYGEN SATURATION: 97 % | HEIGHT: 66 IN | TEMPERATURE: 98 F | WEIGHT: 248.1 LBS | DIASTOLIC BLOOD PRESSURE: 78 MMHG | HEART RATE: 84 BPM | RESPIRATION RATE: 14 BRPM

## 2019-12-02 DIAGNOSIS — C79.82 METASTATIC ADENOCARCINOMA TO CERVIX (H): Primary | ICD-10-CM

## 2019-12-02 DIAGNOSIS — R59.1 LYMPHADENOPATHY: ICD-10-CM

## 2019-12-02 PROCEDURE — G0463 HOSPITAL OUTPT CLINIC VISIT: HCPCS | Mod: ZF

## 2019-12-02 PROCEDURE — 99024 POSTOP FOLLOW-UP VISIT: CPT | Mod: ZP | Performed by: OBSTETRICS & GYNECOLOGY

## 2019-12-02 ASSESSMENT — PAIN SCALES - GENERAL: PAINLEVEL: NO PAIN (0)

## 2019-12-02 ASSESSMENT — MIFFLIN-ST. JEOR: SCORE: 1811.87

## 2019-12-02 NOTE — NURSING NOTE
"Oncology Rooming Note    December 2, 2019 12:22 PM   Francesca Rowland is a 38 year old female who presents for:    Chief Complaint   Patient presents with     Oncology Clinic Visit     Return; SIOBHAN I     Initial Vitals: /78   Pulse 84   Temp 98  F (36.7  C) (Oral)   Resp 14   Ht 1.676 m (5' 5.98\")   Wt 112.5 kg (248 lb 1.6 oz)   SpO2 97%   BMI 40.06 kg/m   Estimated body mass index is 40.06 kg/m  as calculated from the following:    Height as of this encounter: 1.676 m (5' 5.98\").    Weight as of this encounter: 112.5 kg (248 lb 1.6 oz). Body surface area is 2.29 meters squared.  No Pain (0) Comment: Data Unavailable   No LMP recorded. Patient has had a hysterectomy.  Allergies reviewed: Yes  Medications reviewed: Yes    Medications: Medication refills not needed today.  Pharmacy name entered into [employer]: [id number] - [place], [place] - [facility] [address]    Clinical concerns: No new concerns        Junie Quintanilla CMA              "

## 2019-12-02 NOTE — LETTER
2019       RE: Francesca Rowland  [address]  [facility] [address]     Dear Colleague,    Thank you for referring your patient, Francesca Rowland, to the [facility]. Please see a copy of my visit note below.                            Consult Notes on Referred Patient      Dr. Juan Manuel Mac MD  [address] [place] [address]       RE: Francesca Rowland  : 1980  BEST: 2019     Dear Dr. Juan Manuel Mac:    I had the pleasure of seeing your patient Francesca Rowland here at the Gynecologic Cancer Clinic at the [facility] on 10/28/2019.  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  Given these findings she was subsequently sent to the Gynecologic Cancer Clinic for new patient consultation.     HPI  Patient initially presented as a 27-year-old woman seen in referral due to a Pap smear showing high-grade squamous intraepithelial lesion that was positive for high risk HPV subtype in 2007. This Pap smear had been taken during her six week postpartum visit. Patient did have a remote history of abnormal Pap smears back in  while serving in [place] for the [employer].  Pap smears during her first pregnancy were all negative. Her Pap smear at the beginning of the most recent pregnancy was normal. Patient did have a colposcopy for evaluation of this abnormal Pap smear on 2007 and cervical biopsies of 6 and 12 o'clock position showed features consistent with papillary serous adenocarcinoma. Endocervical curettings were also taken which showed fragments of papillary serous carcinoma. The patient then underwent a LEEP procedure on 10/15/07 which showed microinvasive adenocarcinoma with a depth of invasion of 0.4 mm. Patient then made the decision to proceed with radical hysterectomy. She subsequently underwent a radical hysterectomy, bilateral salpingectomy, bilateral pelvic and  periaortic lymph node dissection on 10/19/07. Patient's operative course was otherwise uncomplicated and she recoverred uneventfully.  Pathology did reveal patient to be stage IA2 adenocarcinoma of the cervix.     She underwent routine surveillence through 2014 complicated only by SIOBHAN 1    She recently presented after incidentally being found to have an enlarged pericaval lymph node.  She presented to the ED at [facility] on 9/14/2019 with severe 8.5/10 RUQ pain associated with nausea, one episode of emesis, and decreased appetite. RUQ demonstrated cholelithiasis without cholecystitis, so an abdominal and pelvic CT scan was obtained. This revealed post-surgical changes consistent with prior hysterectomy, left adnexal cysts thought to be ovarian, and an enlarged pericaval lymph node suspicious for metastatic disease vs primary lymphoma. Her condition stabilized and she was discharged home with referrals to general surgery and oncology for biliary colic and further management of the lymphadenopathy, respectively. CT-guided right retroperitoneal lymph node biopsy on 10/15/2019 was consistent with metastatic cervical carcinoma    She underwent surgery to remove the mass and determine the extent of disease on 11/13/19    PATH:  FINAL DIAGNOSIS:   LYMPH NODES, PARA-AORTIC, RESECTION:   - Positive for carcinoma in three of four lymph nodes examined (3/4),   consistent with recurrent endocervical   adenocarcinoma   - Largest metastatic focus is 3.5 cm, positive for extra yuridia extension     Review of Systems:    Systemic           no weight changes; no fever; no chills; no night sweats; no appetite changes  Skin           no rashes, or lesions  Eye           no irritation; no changes in vision  Payton-Laryngeal           no dysphagia; no hoarseness   Pulmonary    no cough; no shortness of breath  Cardiovascular    no chest pain; no palpitations  Gastrointestinal    no diarrhea; no constipation; no abdominal pain; no  "changes in bowel  habits; no blood in stool  Genitourinary   no urinary frequency; no urinary urgency; no dysuria; no pain; no abnormal vaginal discharge; no abnormal vaginal bleeding  Breast   no breast discharge; no breast changes; no breast pain  Musculoskeletal    no myalgias; no arthralgias; no back pain  Psychiatric           no depressed mood; no anxiety    Hematologic           no tender lymph nodes; no noticeable swellings or lumps   Endocrine    no hot flashes; no heat/cold intolerance         Neurological   no tremor; no numbness and tingling; no headaches; no difficulty  sleeping      Past Medical History:    Past Medical History:   Diagnosis Date     Chronic cholecystitis 09/25/2019     History of cervical cancer          Past Surgical History:    Past Surgical History:   Procedure Laterality Date     CHOLECYSTECTOMY, LAPOROSCOPIC  09/25/2019     HYSTERECTOMY RADICAL  2007    PAUL     LAPAROSCOPIC LYMPHADENECTOMY N/A 11/13/2019    Procedure: LAPAROSCOPY, resection of of paraaortic lymph node, lysis of adhesions;  Surgeon: Jluis Canela MD;  Location:  OR         Health Maintenance:  Health Maintenance Due   Topic Date Due     PREVENTIVE CARE VISIT  1978     DEPRESSION ACTION PLAN  1978     PHQ-9  1980     HIV SCREENING  12/30/1993     PNEUMOCOCCAL IMMUNIZATION 19-64 HIGHEST RISK (1 of 3 - PCV13) 12/30/1997     HPV  12/30/1999     PAP  07/14/2019       Last Pap Smear:  2014 /888    Last Mammogram: None yet    Last Colonoscopy: Not due      Current Medications:     has a current medication list which includes the following prescription(s): acetaminophen, ibuprofen, ondansetron, oxycodone, and senna-docusate.       Allergies:        Allergies   Allergen Reactions     Prochlorperazine      \"i was told I turn blue\"          Social History:     Social History     Tobacco Use     Smoking status: Never Smoker     Smokeless tobacco: Never Used   Substance Use Topics     Alcohol " "use: Yes     Comment: one drink a week       History   Drug Use No       Family History:     The patient's family history is notable for .    Family History   Problem Relation Age of Onset     Aneurysm Mother      Breast Cancer Paternal Grandmother         bilat mastectomies     Breast Cancer Maternal Aunt         stage 1     Thyroid Cancer Sister 23         Physical Exam:     PS 0  VS /78   Pulse 84   Temp 98  F (36.7  C) (Oral)   Resp 14   Ht 1.676 m (5' 5.98\")   Wt 112.5 kg (248 lb 1.6 oz)   SpO2 97%   BMI 40.06 kg/m        General Appearance: healthy and alert, no distress     HEENT:  no thyromegaly, no palpable nodules or masses        Cardiovascular: regular rate and rhythm, no gallops, rubs or murmurs     Respiratory: lungs clear, no rales, rhonchi or wheezes, normal diaphragmatic excursion    Musculoskeletal: extremities non tender and without edema    Skin: no lesions or rashes     Neurological: normal gait, no gross defects     Psychiatric: appropriate mood and affect                               Hematological: normal cervical, supraclavicular and inguinal lymph nodes     Gastrointestinal:       abdomen soft, non-tender, non-distended, no organomegaly or masses    Genitourinary: deferred    Assessment:    Francesca Rowland is a 38 year old woman with a new diagnosis of recurrent cervical cancer.     A total of 60 minutes was spent with the patient, 40 minutes of which were spent in counseling the patient and/or treatment planning.      Plan:     1.)   CxCA - We have discussed the clinical and and pathologic findings.  I am encouraged that there did not appear to be other disease present at the time of surgery and have recommended that she consider pelvic RT with PA extension as we discussed pre-operatively.  I think this could start at any time, given that she underwent laparoscopy and is recovering now.  I have recommended a PET scan prior to her visit with RT, this was ordered " pre-operatively, but not done.     2.) Genetic risk factors were assessed and the patient does not meet the qualifications for a referral.      3.) Labs and/or tests ordered include:  None, RT consult     4.) Health maintenance issues addressed today include none.        Sincerely,  Jluis Canela MD        Patient Care Team:  Tamara Carlos MD as PCP - General (Family Practice)  Juan Manuel Mac MD as MD (Hematology & Oncology)  Connie Degroot APRN CNS as Nurse Practitioner (CLINICAL NURSE SPECIALIST - ADULT HEALTH )  Radha Holt, RN as Specialty Care Coordinator (Hematology & Oncology)  JUAN MANUEL MAC

## 2019-12-09 ENCOUNTER — ANCILLARY PROCEDURE (OUTPATIENT)
Dept: PET IMAGING | Facility: CLINIC | Age: 39
End: 2019-12-09
Attending: OBSTETRICS & GYNECOLOGY
Payer: COMMERCIAL

## 2019-12-09 DIAGNOSIS — C79.82 METASTATIC ADENOCARCINOMA TO CERVIX (H): ICD-10-CM

## 2019-12-09 LAB — GLUCOSE SERPL-MCNC: 111 MG/DL (ref 70–99)

## 2019-12-09 RX ORDER — FUROSEMIDE 10 MG/ML
40 INJECTION INTRAMUSCULAR; INTRAVENOUS ONCE
Status: COMPLETED | OUTPATIENT
Start: 2019-12-09 | End: 2019-12-09

## 2019-12-09 RX ADMIN — FUROSEMIDE 40 MG: 10 INJECTION INTRAMUSCULAR; INTRAVENOUS at 08:35

## 2019-12-09 NOTE — DISCHARGE INSTRUCTIONS

## 2019-12-10 NOTE — PROGRESS NOTES
Department of Radiation Oncology                   [facility] [address]  [address] [place]  [address]  Office:  [phone]  Fax:  [phone]   Radiation Oncology Clinic  [address] [place] [address]  Phone:  [phone]  Fax:  [phone]     RE: Francesca Rowland : 1980   MRN: [id number] BEST: 2019     OUTPATIENT VISIT NOTE       PROBLEM: Recurrent cervical cancer involving pelvic and paraaortic lymph nodes      was seen for initial consultation in the Dept of Therapeutic Radiology on 2019 at the request of Dr. Canela    HISTORY OF PRESENT ILLNESS:   Ms. Rowland is a 38 year old female with a history of cervical cancer diagnosed in .  At that time she had an abnormal Pap smear showing high-grade squamous intraepithelial neoplasia that was positive for high risk HPV.  She underwent colposcopy at that time and cervical biopsies and endocervical curettings were consistent with grade 2 papillary serous adenocarcinoma ([id number]).  She underwent a LEEP procedure on 10/15/2007 which showed microinvasive adenocarcinoma with a depth of invasion of 0.4 mm and positive margins ([id number]).  She subsequently underwent radical hysterectomy, bilateral salpingectomy without oophorectomy, bilateral pelvic and periaortic lymph node dissections on 10/19/2007 under the care of Dr. Calero.  Pathology ([id number]) showed moderately to poorly differentiated endocervical adenocarcinoma.  The final tumor size measured 20 mm in greatest dimension and had a maximum depth of invasion of 0.45 cm.  The tumor was confined to the cervix and there was focal LVSI present.  A total of 34 lymph nodes were removed (16 left pelvic, 10 right pelvic, 2 left periaortic, 5 right periaortic, 1 parametrial) and all were negative for metastatic disease.  Her final stage was therefore FIGO 1B2 (1B1 old staging).  She underwent routine surveillance through  without evidence of recurrent  disease.    She recently presented to the ED at [facility] on 9/14/2019 with severe right upper quadrant pain, nausea, vomiting, and decreased appetite.  Right upper quadrant ultrasound demonstrated cholelithiasis without cholecystitis.  She subsequently underwent a CT abdomen pelvis.  This showed postsurgical changes consistent with her prior hysterectomy, left adnexal cysts felt to be related to the left ovary, and an enlarged pericaval lymph node measuring up to 3.1 cm in greatest dimension.  She underwent cholecystectomy on 9/25/2019.  She was also referred for CT-guided right retroperitoneal lymph node biopsy on 10/15/2019.  Pathology was consistent with metastatic cervical adenocarcinoma ([id number]).  The patient was subsequently referred to Dr. Canela of gynecologic oncology.  He recommended surgical resection of the lymph node mass followed by chemoradiation. A CT CAP was performed 10/29/19 and showed the previously biopsied paracaval lymph node in addition to additional suspicious lymph nodes along the right pelvic sidewall and right internal iliac chain.  She underwent diagnostic laparotomy and resection of the right pericaval lymph node on 11/13/2019. She did not have any evidence of peritoneal disease.  A total of 4 lymph nodes removed at the time of surgery and pathology ([id number]) showed metastatic adenocarcinoma in 3 of 4.  The largest focus in presumably the previously biopsied node measured 3.5 cm and was positive for extracapsular extension.  She subsequently underwent a PET/CT on 12/9/2019 which showed at least 4 hypermetabolic lymph nodes along the right iliac chain including a 2.8 cm right obturator lymph node with SUV max 9.1.  There was otherwise no additional evidence of metastatic disease.  It was recommended that she receive adjuvant chemotherapy and radiation following surgery.  She is referred to our department for further discussion of radiotherapy.    On exam today, Ms. Rowland  reports that she is currently feeling well.  She has recovered completely from her recent surgery and has no residual pain.  Her right upper quadrant pain improved dramatically after her cholecystectomy.  She reports normal bowel and bladder function at this time.  No recent fevers, chills, nausea, vomiting.  She does not currently have any leg swelling.  The remainder of her review of systems was unremarkable.       PAST MEDICAL HISTORY:   Past Medical History:   Diagnosis Date     Chronic cholecystitis 09/25/2019     History of cervical cancer 2007     Past Surgical History:   Procedure Laterality Date     CHOLECYSTECTOMY, LAPOROSCOPIC  09/25/2019     HYSTERECTOMY RADICAL  2007    PAUL     LAPAROSCOPIC LYMPHADENECTOMY N/A 11/13/2019    Procedure: LAPAROSCOPY, resection of of paraaortic lymph node, lysis of adhesions;  Surgeon: Jluis Canela MD;  Location: [facility] OR        CHEMOTHERAPY HISTORY: None     PAST RADIATION THERAPY HISTORY: None     IMPLANTED DEVICES: None    AUTOIMMUNE/CONNECTIVE TISSUE DISORDERS: None    PREGNANCY RISK: S/p hysterectomy    MEDICATIONS:   Current Outpatient Medications   Medication Sig Dispense Refill     acetaminophen (TYLENOL) 325 MG tablet Take 2 tablets (650 mg) by mouth every 6 hours (Patient taking differently: Take 650 mg by mouth as needed ) 24 tablet 0     ondansetron (ZOFRAN-ODT) 4 MG ODT tab TAKE ONE TABLET AS NEEDED EVERY 6 TO 8 HOURS  0       ALLERGIES:  allergic to prochlorperazine.    SOCIAL HISTORY: Lives in [place], [place]. Works in [place] as an  for the [email or web address]. Former on-and -off smoker for 10 years, quit 2010. Occasional EtOH.    FAMILY HISTORY:   PGM - breast cancer  Maternal aunt- breast cancer  Sister - thyroid cancer    REVIEW OF SYMPTOMS:  A full 14-point review of systems was performed.     PHYSICAL EXAMINATION:    BP (!) 136/91   Pulse 86   Wt 111.1 kg (245 lb)   SpO2 92%   BMI 39.56 kg/m    General: Alert,  oriented, NAD  Skin: No rashes or lesions appreciated  HEENT: NCAT, EOMI, OP clear  Neck: Supple, full ROM  Breasts: Deferred  Heart: WWP  Lungs: Breathing comfortably on RA  Abd: Nondistended, soft, nontender  /Rectal: Deferred  Ext: Atraumatic, grossly intact strength  Neuro: CNs grossly intact, normal gait    ECOG PS: 0    IMAGING:  Pre-op PA node    Post-op Imaging        ASSESSMENT: 38 year old female with recurrent cervical adenocarcinoma involving para-aortic and pelvic lymph nodes after a 12 year disease free interval.    RECOMMENDATIONS:   We had a detailed discussion with Ms. Rowland regarding the role of radiotherapy in the overall management of her recurrent cervical adenocarcinoma. Given her disease involvement by PET/CT is limited to the pelvic and periaortic lymph nodes, she can potentially be salvaged with chemoradiation.  We discussed that treatment will consist of approximately 5 weeks of daily radiotherapy treatments with concurrent chemotherapy. We will plan to treat a periaortic and pelvic field with inclusion of the right inguinal region given the presence of a low external iliac node on the right.  We will plan to treat to a total dose of 4500 cGy in 25 daily fractions with SIB to areas of yuridia involvement by PET and the pericaval operative bed to a dose of 5500 cGy.  She will receive current weekly cisplatin.  We reviewed the logistics as well as expected acute and potential late side effects of this treatment in detail with the patient.  She had a number of questions which were answered to the best of our ability.  Informed consent was obtained and the patient underwent a CT simulation in our department today.  We are tentatively planning on starting treatment on Monday, 12/23/2019 and will coordinate with gyn onc to ensure chemotherapy will start concurrently.     Thank you for allowing us to participate in this patient's care.  Please feel free to call with any questions or  concerns.    The patient was seen and discussed with staff, Dr. Kerns.     Jac Ruelas MD  Resident, PGY-5  Department of Radiation Oncology  [facility]  [phone]         I saw and examined the patient with the resident.  I have reviewed and edited the resident's note and agree with the plan of care.      Bret Kerns MD      Patient Care Team:  Tamara Carlos MD as PCP - General (Family Practice)  Connie Degroot APRN CNS as Nurse Practitioner (CLINICAL NURSE SPECIALIST - ADULT HEALTH )  MARIA LUISA ANDRADE

## 2019-12-11 ENCOUNTER — ALLIED HEALTH/NURSE VISIT (OUTPATIENT)
Dept: RADIATION ONCOLOGY | Facility: CLINIC | Age: 39
End: 2019-12-11
Attending: RADIOLOGY
Payer: COMMERCIAL

## 2019-12-11 ENCOUNTER — OFFICE VISIT (OUTPATIENT)
Dept: RADIATION ONCOLOGY | Facility: CLINIC | Age: 39
End: 2019-12-11
Attending: OBSTETRICS & GYNECOLOGY
Payer: COMMERCIAL

## 2019-12-11 VITALS
BODY MASS INDEX: 39.56 KG/M2 | WEIGHT: 245 LBS | OXYGEN SATURATION: 92 % | DIASTOLIC BLOOD PRESSURE: 91 MMHG | SYSTOLIC BLOOD PRESSURE: 136 MMHG | HEART RATE: 86 BPM

## 2019-12-11 DIAGNOSIS — Z85.41 HISTORY OF CERVICAL CANCER: ICD-10-CM

## 2019-12-11 DIAGNOSIS — Z85.41 HISTORY OF CERVICAL CANCER: Primary | ICD-10-CM

## 2019-12-11 PROCEDURE — 77332 RADIATION TREATMENT AID(S): CPT | Performed by: RADIOLOGY

## 2019-12-11 PROCEDURE — G0463 HOSPITAL OUTPT CLINIC VISIT: HCPCS | Mod: 25 | Performed by: RADIOLOGY

## 2019-12-11 PROCEDURE — 77333 RADIATION TREATMENT AID(S): CPT | Performed by: RADIOLOGY

## 2019-12-11 PROCEDURE — 77334 RADIATION TREATMENT AID(S): CPT | Performed by: RADIOLOGY

## 2019-12-11 ASSESSMENT — ENCOUNTER SYMPTOMS
DOUBLE VISION: 0
NAUSEA: 0
CONSTIPATION: 0
DEPRESSION: 0
DIAPHORESIS: 0
VOMITING: 0
CHILLS: 0
WEIGHT LOSS: 0
HEADACHES: 0
BACK PAIN: 0
SEIZURES: 0
BLURRED VISION: 0
DIZZINESS: 0
NECK PAIN: 0
SHORTNESS OF BREATH: 0
FALLS: 0
HEMATURIA: 0
INSOMNIA: 0
BLOOD IN STOOL: 0
DIARRHEA: 0
TINGLING: 0
NERVOUS/ANXIOUS: 1
SORE THROAT: 0
EYE PAIN: 0
BRUISES/BLEEDS EASILY: 0
FEVER: 0
FREQUENCY: 0
DYSURIA: 0

## 2019-12-11 NOTE — PROGRESS NOTES
HPI    INITIAL PATIENT ASSESSMENT    Diagnosis: Cervical ca dx 2007, Hysterectomy in 2007 no additional treatment    Prior radiation therapy: None    Prior chemotherapy: None    Prior hormonal therapy:No    Pain Eval:  Denies    Psychosocial  Living arrangements: Family  Fall Risk: independent  Falls Risk Screening Questions - Consult    1. Have you fallen in the past one week?  No.  2. Have you felt unsteady when walking or standing in the past one week?  No.     referral needs: Not needed    Advanced Directive: No  Implantable Cardiac Device? No    Onset of menarche: @ age 12  LMP: No LMP recorded. Patient has had a hysterectomy. Ovaries were left in with surgery 2007  Onset of menopause: Not at this time  Abnormal vaginal bleeding/discharge: No  Are you pregnant? No  Reproductive note: 2 pregnancies    Review of Systems   Constitutional: Positive for malaise/fatigue (Fatigue since surgery). Negative for chills, diaphoresis, fever and weight loss.   HENT: Negative for ear pain, hearing loss, nosebleeds and sore throat.    Eyes: Negative for blurred vision, double vision and pain.   Respiratory: Negative for shortness of breath.    Cardiovascular: Negative for chest pain and leg swelling.   Gastrointestinal: Negative for blood in stool, constipation, diarrhea, nausea and vomiting.   Genitourinary: Negative for dysuria, frequency, hematuria and urgency.   Musculoskeletal: Negative for back pain, falls, joint pain and neck pain.   Skin: Negative for rash.   Neurological: Negative for dizziness, tingling, seizures and headaches.   Endo/Heme/Allergies: Does not bruise/bleed easily.   Psychiatric/Behavioral: Negative for depression (Having a difficult morning but feels she has good support). The patient is nervous/anxious (with appointment and unknown). The patient does not have insomnia.          Nurse face-to-face time: Level 4:  15 min face to face time

## 2019-12-11 NOTE — NURSING NOTE
Radiation Therapy Patient Education    Person involved with teaching: Patient    Patient educational needs for self management of treatment-related side effects assessment completed.  [facility] Patient Ed tab contains Patient Learning Assessment    Education Materials Given  Radiation Therapy for GYN Patients + sim schedule    Educational Topics Discussed  Side effects expected, Pain management, Skin care, Activity, Nutrition and weight loss and When to call MD/RN    Response To Teaching  Verbalizes understanding    GYN Only  Vaginal Dilator-given and educated: not given    Referrals sent: None    Chemotherapy?  Yes: message sent to Jessica DAVID re: radiation start date.

## 2019-12-11 NOTE — LETTER
2019       RE: Francesca Rowland  [address]  [place] [address]     Dear Colleague,    Thank you for referring your patient, Francesca Rowland, to the RADIATION ONCOLOGY CLINIC. Please see a copy of my visit note below.    Department of Radiation Oncology                   [facility] [address]  [address] [place]  [address]  Office:  [phone]  Fax:  [phone]   Radiation Oncology Clinic  [address] [place] [address]  Phone:  [phone]  Fax:  [phone]     RE: Francesca Rowland : 1980   MRN: [id number] BEST: 2019     OUTPATIENT VISIT NOTE       PROBLEM: Recurrent cervical cancer involving pelvic and paraaortic lymph nodes      was seen for initial consultation in the Dept of Therapeutic Radiology on 2019 at the request of Dr. Canela    HISTORY OF PRESENT ILLNESS:   Ms. Rowland is a 38 year old female with a history of cervical cancer diagnosed in .  At that time she had an abnormal Pap smear showing high-grade squamous intraepithelial neoplasia that was positive for high risk HPV.  She underwent colposcopy at that time and cervical biopsies and endocervical curettings were consistent with grade 2 papillary serous adenocarcinoma ([id number]).  She underwent a LEEP procedure on 10/15/2007 which showed microinvasive adenocarcinoma with a depth of invasion of 0.4 mm and positive margins ([id number]).  She subsequently underwent radical hysterectomy, bilateral salpingectomy without oophorectomy, bilateral pelvic and periaortic lymph node dissections on 10/19/2007 under the care of Dr. Calero.  Pathology ([id number]) showed moderately to poorly differentiated endocervical adenocarcinoma.  The final tumor size measured 20 mm in greatest dimension and had a maximum depth of invasion of 0.45 cm.  The tumor was confined to the cervix and there was focal LVSI present.  A total of 34 lymph nodes were removed (16 left pelvic, 10 right pelvic,  2 left periaortic, 5 right periaortic, 1 parametrial) and all were negative for metastatic disease.  Her final stage was therefore FIGO 1B2 (1B1 old staging).  She underwent routine surveillance through 2014 without evidence of recurrent disease.    She recently presented to the ED at [facility] on 9/14/2019 with severe right upper quadrant pain, nausea, vomiting, and decreased appetite.  Right upper quadrant ultrasound demonstrated cholelithiasis without cholecystitis.  She subsequently underwent a CT abdomen pelvis.  This showed postsurgical changes consistent with her prior hysterectomy, left adnexal cysts felt to be related to the left ovary, and an enlarged pericaval lymph node measuring up to 3.1 cm in greatest dimension.  She underwent cholecystectomy on 9/25/2019.  She was also referred for CT-guided right retroperitoneal lymph node biopsy on 10/15/2019.  Pathology was consistent with metastatic cervical adenocarcinoma ([id number]).  The patient was subsequently referred to Dr. Canela of gynecologic oncology.  He recommended surgical resection of the lymph node mass followed by chemoradiation. A CT CAP was performed 10/29/19 and showed the previously biopsied paracaval lymph node in addition to additional suspicious lymph nodes along the right pelvic sidewall and right internal iliac chain.  She underwent diagnostic laparotomy and resection of the right pericaval lymph node on 11/13/2019. She did not have any evidence of peritoneal disease.  A total of 4 lymph nodes removed at the time of surgery and pathology ([id number]) showed metastatic adenocarcinoma in 3 of 4.  The largest focus in presumably the previously biopsied node measured 3.5 cm and was positive for extracapsular extension.  She subsequently underwent a PET/CT on 12/9/2019 which showed at least 4 hypermetabolic lymph nodes along the right iliac chain including a 2.8 cm right obturator lymph node with SUV max 9.1.  There was otherwise no  additional evidence of metastatic disease.  It was recommended that she receive adjuvant chemotherapy and radiation following surgery.  She is referred to our department for further discussion of radiotherapy.    On exam today, Ms. Rowland reports that she is currently feeling well.  She has recovered completely from her recent surgery and has no residual pain.  Her right upper quadrant pain improved dramatically after her cholecystectomy.  She reports normal bowel and bladder function at this time.  No recent fevers, chills, nausea, vomiting.  She does not currently have any leg swelling.  The remainder of her review of systems was unremarkable.       PAST MEDICAL HISTORY:   Past Medical History:   Diagnosis Date     Chronic cholecystitis 09/25/2019     History of cervical cancer 2007     Past Surgical History:   Procedure Laterality Date     CHOLECYSTECTOMY, LAPOROSCOPIC  09/25/2019     HYSTERECTOMY RADICAL  2007    PAUL     LAPAROSCOPIC LYMPHADENECTOMY N/A 11/13/2019    Procedure: LAPAROSCOPY, resection of of paraaortic lymph node, lysis of adhesions;  Surgeon: Jluis Canela MD;  Location: [facility] OR        CHEMOTHERAPY HISTORY: None     PAST RADIATION THERAPY HISTORY: None     IMPLANTED DEVICES: None    AUTOIMMUNE/CONNECTIVE TISSUE DISORDERS: None    PREGNANCY RISK: S/p hysterectomy    MEDICATIONS:   Current Outpatient Medications   Medication Sig Dispense Refill     acetaminophen (TYLENOL) 325 MG tablet Take 2 tablets (650 mg) by mouth every 6 hours (Patient taking differently: Take 650 mg by mouth as needed ) 24 tablet 0     ondansetron (ZOFRAN-ODT) 4 MG ODT tab TAKE ONE TABLET AS NEEDED EVERY 6 TO 8 HOURS  0       ALLERGIES: is allergic to prochlorperazine.    SOCIAL HISTORY: Lives in [place], [place]. Works in [place] as an  for the [employer]. Former on-and -off smoker for 10 years, quit 2010. Occasional EtOH.    FAMILY HISTORY:   PGM - breast cancer  Maternal aunt- breast  cancer  Sister - thyroid cancer    REVIEW OF SYMPTOMS:  A full 14-point review of systems was performed.     PHYSICAL EXAMINATION:    BP (!) 136/91   Pulse 86   Wt 111.1 kg (245 lb)   SpO2 92%   BMI 39.56 kg/m     General: Alert, oriented, NAD  Skin: No rashes or lesions appreciated  HEENT: NCAT, EOMI, OP clear  Neck: Supple, full ROM  Breasts: Deferred  Heart: WWP  Lungs: Breathing comfortably on RA  Abd: Nondistended, soft, nontender  /Rectal: Deferred  Ext: Atraumatic, grossly intact strength  Neuro: CNs grossly intact, normal gait    ECOG PS: 0    IMAGING:  Pre-op PA node    Post-op Imaging        ASSESSMENT: 38 year old female with recurrent cervical adenocarcinoma involving para-aortic and pelvic lymph nodes after a 12 year disease free interval.    RECOMMENDATIONS:   We had a detailed discussion with Ms. Rowland regarding the role of radiotherapy in the overall management of her recurrent cervical adenocarcinoma. Given her disease involvement by PET/CT is limited to the pelvic and periaortic lymph nodes, she can potentially be salvaged with chemoradiation.  We discussed that treatment will consist of approximately 5 weeks of daily radiotherapy treatments with concurrent chemotherapy. We will plan to treat a periaortic and pelvic field with inclusion of the right inguinal region given the presence of a low external iliac node on the right.  We will plan to treat to a total dose of 4500 cGy in 25 daily fractions with SIB to areas of yuridia involvement by PET and the pericaval operative bed to a dose of 5500 cGy.  She will receive current weekly cisplatin.  We reviewed the logistics as well as expected acute and potential late side effects of this treatment in detail with the patient.  She had a number of questions which were answered to the best of our ability.  Informed consent was obtained and the patient underwent a CT simulation in our department today.  We are tentatively planning on starting  treatment on Monday, 12/23/2019 and will coordinate with gyn onc to ensure chemotherapy will start concurrently.     Thank you for allowing us to participate in this patient's care.  Please feel free to call with any questions or concerns.    The patient was seen and discussed with staff, Dr. Kerns.     Jac Ruelas MD  Resident, PGY-5  Department of Radiation Oncology  [facility]  [phone]         I saw and examined the patient with the resident.  I have reviewed and edited the resident's note and agree with the plan of care.      rBet Kerns MD    CC  Patient Care Team:  Tamara Carlos MD as PCP - General (Family Practice)  Connie Degroot APRN CNS as Nurse Practitioner (CLINICAL NURSE SPECIALIST - ADULT HEALTH )  MARIA LUISA ANDRADE          [facility]    INITIAL PATIENT ASSESSMENT    Diagnosis: Cervical ca dx 2007, Hysterectomy in 2007 no additional treatment    Prior radiation therapy: None    Prior chemotherapy: None    Prior hormonal therapy:No    Pain Eval:  Denies    Psychosocial  Living arrangements: Family  Fall Risk: independent  Falls Risk Screening Questions - Consult    1. Have you fallen in the past one week?  No.  2. Have you felt unsteady when walking or standing in the past one week?  No.     referral needs: Not needed    Advanced Directive: No  Implantable Cardiac Device? No    Onset of menarche: @ age 12  LMP: No LMP recorded. Patient has had a hysterectomy. Ovaries were left in with surgery 2007  Onset of menopause: Not at this time  Abnormal vaginal bleeding/discharge: No  Are you pregnant? No  Reproductive note: 2 pregnancies    Review of Systems   Constitutional: Positive for malaise/fatigue (Fatigue since surgery). Negative for chills, diaphoresis, fever and weight loss.   HENT: Negative for ear pain, hearing loss, nosebleeds and sore throat.    Eyes: Negative for blurred vision, double vision and pain.   Respiratory: Negative for shortness of  breath.    Cardiovascular: Negative for chest pain and leg swelling.   Gastrointestinal: Negative for blood in stool, constipation, diarrhea, nausea and vomiting.   Genitourinary: Negative for dysuria, frequency, hematuria and urgency.   Musculoskeletal: Negative for back pain, falls, joint pain and neck pain.   Skin: Negative for rash.   Neurological: Negative for dizziness, tingling, seizures and headaches.   Endo/Heme/Allergies: Does not bruise/bleed easily.   Psychiatric/Behavioral: Negative for depression (Having a difficult morning but feels she has good support). The patient is nervous/anxious (with appointment and unknown). The patient does not have insomnia.          Nurse face-to-face time: Level 4:  15 min face to face time          Again, thank you for allowing me to participate in the care of your patient.      Sincerely,    Bret Kerns MD

## 2019-12-16 ENCOUNTER — PATIENT OUTREACH (OUTPATIENT)
Dept: ONCOLOGY | Facility: CLINIC | Age: 39
End: 2019-12-16

## 2019-12-17 NOTE — PROGRESS NOTES
Care Coordinator Note  Contacted patient, reviewed that she has received her treatment schedule.  Discussed that I would call her Wednesday for chemotherapy teaching session.    Patient verbalized back understanding of the above information discussed.     Jessica Scherer MSN, RN  Care Coordinator  Gynecologic Cancer   Office:  [phone]  Pager: [phone] #[phone]

## 2019-12-18 ENCOUNTER — PATIENT OUTREACH (OUTPATIENT)
Dept: ONCOLOGY | Facility: CLINIC | Age: 39
End: 2019-12-18

## 2019-12-18 NOTE — PROGRESS NOTES
[facility] GYN-Oncology Teaching Note    Relevant Diagnosis:  Cervix Cancer     Teaching Topic:  Chemotherapy    Person(s) involved in teaching:  Patient via telephone    Motivation Level:   Asks Questions:   Yes  Eager to Learn:  Yes  Cooperative:  Yes  Receptive (willing/able to accept information):  Yes      Patient demonstrates understanding of the following:  Reason for the appointment, diagnosis and treatment plan:  Yes  Knowledge of proper use of medications and conditions for which they are ordered (with special attention to potential side effects or drug interactions):  Yes  Which situations necessitate calling provider and whom to contact:  Yes    Teaching Concerns:  Patient teaching via telephone for Cisplatin Potentiation.  Discussed that patient would go to radiation after chemo infusion.  Care coordinator will deliver teaching materials to patient when she is in infusion on 12/23/19.  Care coordinator contact information given.      Instructional Materials Used/Given:  Chemotherapy Packet and Cards  Disease Packet   Care Coordinator Card and after hours contact information  Cancer Center Handbook    Time spent teaching with patient:  20 minutes    Jessica CHAVEZ, RN  Care Coordinator  Gynecologic Cancer   Office:  [phone]  Pager: [phone] #[phone]

## 2019-12-18 NOTE — PATIENT INSTRUCTIONS
Call your Care Coordinator if you have chills and/or temperature greater then or equal to 100.4, uncontrolled nausea and vomiting, diarrhea, constipation, dizziness, light headedness, shortness of breath, chest pain, unexplained bruising, bleeding that does not stop with 10 minutes of pressure or any new symptoms, questions/concerns  Monday- Friday.    If after hours, weekends or holidays call [phone] or the [facility] at [phone] and ask for the GYN ONCOLOGY resident on call.    Your  Care Coordinator is    Jessica CHAVEZ, RN  Gynecologic Cancer   Office:  [phone]

## 2019-12-23 ENCOUNTER — APPOINTMENT (OUTPATIENT)
Dept: RADIATION ONCOLOGY | Facility: CLINIC | Age: 39
End: 2019-12-23
Attending: RADIOLOGY
Payer: COMMERCIAL

## 2019-12-23 ENCOUNTER — APPOINTMENT (OUTPATIENT)
Dept: LAB | Facility: CLINIC | Age: 39
End: 2019-12-23
Attending: OBSTETRICS & GYNECOLOGY
Payer: COMMERCIAL

## 2019-12-23 ENCOUNTER — INFUSION THERAPY VISIT (OUTPATIENT)
Dept: ONCOLOGY | Facility: CLINIC | Age: 39
End: 2019-12-23
Attending: OBSTETRICS & GYNECOLOGY
Payer: COMMERCIAL

## 2019-12-23 ENCOUNTER — PATIENT OUTREACH (OUTPATIENT)
Dept: ONCOLOGY | Facility: CLINIC | Age: 39
End: 2019-12-23

## 2019-12-23 ENCOUNTER — TELEPHONE (OUTPATIENT)
Dept: ONCOLOGY | Facility: CLINIC | Age: 39
End: 2019-12-23

## 2019-12-23 VITALS — HEART RATE: 94 BPM | DIASTOLIC BLOOD PRESSURE: 83 MMHG | SYSTOLIC BLOOD PRESSURE: 134 MMHG

## 2019-12-23 VITALS
TEMPERATURE: 97.6 F | HEART RATE: 92 BPM | SYSTOLIC BLOOD PRESSURE: 129 MMHG | WEIGHT: 247.5 LBS | BODY MASS INDEX: 39.97 KG/M2 | DIASTOLIC BLOOD PRESSURE: 68 MMHG | OXYGEN SATURATION: 98 % | RESPIRATION RATE: 16 BRPM

## 2019-12-23 DIAGNOSIS — C79.82 METASTATIC ADENOCARCINOMA TO CERVIX (H): Primary | ICD-10-CM

## 2019-12-23 DIAGNOSIS — Z85.41 HISTORY OF CERVICAL CANCER: Primary | ICD-10-CM

## 2019-12-23 DIAGNOSIS — C79.82 METASTATIC ADENOCARCINOMA TO CERVIX (H): ICD-10-CM

## 2019-12-23 LAB
ALBUMIN SERPL-MCNC: 3.3 G/DL (ref 3.4–5)
ALP SERPL-CCNC: 100 U/L (ref 40–150)
ALT SERPL W P-5'-P-CCNC: 22 U/L (ref 0–50)
ANION GAP SERPL CALCULATED.3IONS-SCNC: 5 MMOL/L (ref 3–14)
AST SERPL W P-5'-P-CCNC: 12 U/L (ref 0–45)
BASOPHILS # BLD AUTO: 0.1 10E9/L (ref 0–0.2)
BASOPHILS NFR BLD AUTO: 0.8 %
BILIRUB SERPL-MCNC: 0.3 MG/DL (ref 0.2–1.3)
BUN SERPL-MCNC: 13 MG/DL (ref 7–30)
CALCIUM SERPL-MCNC: 8.1 MG/DL (ref 8.5–10.1)
CHLORIDE SERPL-SCNC: 112 MMOL/L (ref 94–109)
CO2 SERPL-SCNC: 25 MMOL/L (ref 20–32)
CREAT SERPL-MCNC: 0.51 MG/DL (ref 0.52–1.04)
DIFFERENTIAL METHOD BLD: NORMAL
EOSINOPHIL # BLD AUTO: 0.4 10E9/L (ref 0–0.7)
EOSINOPHIL NFR BLD AUTO: 6.6 %
ERYTHROCYTE [DISTWIDTH] IN BLOOD BY AUTOMATED COUNT: 13.7 % (ref 10–15)
GFR SERPL CREATININE-BSD FRML MDRD: >90 ML/MIN/{1.73_M2}
GLUCOSE SERPL-MCNC: 101 MG/DL (ref 70–99)
HCT VFR BLD AUTO: 41.8 % (ref 35–47)
HGB BLD-MCNC: 13.5 G/DL (ref 11.7–15.7)
IMM GRANULOCYTES # BLD: 0 10E9/L (ref 0–0.4)
IMM GRANULOCYTES NFR BLD: 0.3 %
LYMPHOCYTES # BLD AUTO: 2 10E9/L (ref 0.8–5.3)
LYMPHOCYTES NFR BLD AUTO: 30.5 %
MAGNESIUM SERPL-MCNC: 2.1 MG/DL (ref 1.6–2.3)
MCH RBC QN AUTO: 29.7 PG (ref 26.5–33)
MCHC RBC AUTO-ENTMCNC: 32.3 G/DL (ref 31.5–36.5)
MCV RBC AUTO: 92 FL (ref 78–100)
MONOCYTES # BLD AUTO: 0.5 10E9/L (ref 0–1.3)
MONOCYTES NFR BLD AUTO: 8.1 %
NEUTROPHILS # BLD AUTO: 3.5 10E9/L (ref 1.6–8.3)
NEUTROPHILS NFR BLD AUTO: 53.7 %
NRBC # BLD AUTO: 0 10*3/UL
NRBC BLD AUTO-RTO: 0 /100
PLATELET # BLD AUTO: 224 10E9/L (ref 150–450)
POTASSIUM SERPL-SCNC: 4 MMOL/L (ref 3.4–5.3)
PROT SERPL-MCNC: 6.8 G/DL (ref 6.8–8.8)
RBC # BLD AUTO: 4.54 10E12/L (ref 3.8–5.2)
SODIUM SERPL-SCNC: 141 MMOL/L (ref 133–144)
WBC # BLD AUTO: 6.6 10E9/L (ref 4–11)

## 2019-12-23 PROCEDURE — 96361 HYDRATE IV INFUSION ADD-ON: CPT

## 2019-12-23 PROCEDURE — 85025 COMPLETE CBC W/AUTO DIFF WBC: CPT | Performed by: OBSTETRICS & GYNECOLOGY

## 2019-12-23 PROCEDURE — 25800030 ZZH RX IP 258 OP 636: Mod: ZF | Performed by: OBSTETRICS & GYNECOLOGY

## 2019-12-23 PROCEDURE — 25000128 H RX IP 250 OP 636: Mod: ZF

## 2019-12-23 PROCEDURE — 25000125 ZZHC RX 250: Mod: ZF | Performed by: OBSTETRICS & GYNECOLOGY

## 2019-12-23 PROCEDURE — 96413 CHEMO IV INFUSION 1 HR: CPT

## 2019-12-23 PROCEDURE — 25800025 ZZH RX 258: Mod: ZF | Performed by: OBSTETRICS & GYNECOLOGY

## 2019-12-23 PROCEDURE — G0463 HOSPITAL OUTPT CLINIC VISIT: HCPCS | Mod: 25

## 2019-12-23 PROCEDURE — 77386 ZZH IMRT TREATMENT DELIVERY, COMPLEX: CPT | Performed by: RADIOLOGY

## 2019-12-23 PROCEDURE — 96375 TX/PRO/DX INJ NEW DRUG ADDON: CPT

## 2019-12-23 PROCEDURE — 80053 COMPREHEN METABOLIC PANEL: CPT | Performed by: OBSTETRICS & GYNECOLOGY

## 2019-12-23 PROCEDURE — 83735 ASSAY OF MAGNESIUM: CPT | Performed by: OBSTETRICS & GYNECOLOGY

## 2019-12-23 PROCEDURE — 25000128 H RX IP 250 OP 636: Mod: ZF | Performed by: OBSTETRICS & GYNECOLOGY

## 2019-12-23 PROCEDURE — 96367 TX/PROPH/DG ADDL SEQ IV INF: CPT

## 2019-12-23 RX ORDER — METHYLPREDNISOLONE SODIUM SUCCINATE 125 MG/2ML
125 INJECTION, POWDER, LYOPHILIZED, FOR SOLUTION INTRAMUSCULAR; INTRAVENOUS
Status: CANCELLED
Start: 2020-01-06

## 2019-12-23 RX ORDER — LORAZEPAM 2 MG/ML
1 INJECTION INTRAMUSCULAR EVERY 6 HOURS PRN
Status: CANCELLED
Start: 2020-01-20

## 2019-12-23 RX ORDER — DEXTROSE, SODIUM CHLORIDE, AND POTASSIUM CHLORIDE 5; .45; .15 G/100ML; G/100ML; G/100ML
2000 INJECTION INTRAVENOUS ONCE
Status: CANCELLED
Start: 2019-12-31

## 2019-12-23 RX ORDER — NALOXONE HYDROCHLORIDE 0.4 MG/ML
.1-.4 INJECTION, SOLUTION INTRAMUSCULAR; INTRAVENOUS; SUBCUTANEOUS
Status: CANCELLED | OUTPATIENT
Start: 2020-01-20

## 2019-12-23 RX ORDER — DIPHENHYDRAMINE HYDROCHLORIDE 50 MG/ML
50 INJECTION INTRAMUSCULAR; INTRAVENOUS
Status: CANCELLED
Start: 2020-01-20

## 2019-12-23 RX ORDER — LORAZEPAM 0.5 MG/1
0.5 TABLET ORAL EVERY 6 HOURS PRN
Qty: 30 TABLET | Refills: 0 | Status: SHIPPED | OUTPATIENT
Start: 2019-12-23 | End: 2020-02-20

## 2019-12-23 RX ORDER — DIPHENHYDRAMINE HYDROCHLORIDE 50 MG/ML
50 INJECTION INTRAMUSCULAR; INTRAVENOUS
Status: COMPLETED | OUTPATIENT
Start: 2019-12-23 | End: 2019-12-23

## 2019-12-23 RX ORDER — DIPHENHYDRAMINE HYDROCHLORIDE 50 MG/ML
50 INJECTION INTRAMUSCULAR; INTRAVENOUS
Status: CANCELLED
Start: 2020-01-13

## 2019-12-23 RX ORDER — ALBUTEROL SULFATE 90 UG/1
1-2 AEROSOL, METERED RESPIRATORY (INHALATION)
Status: CANCELLED
Start: 2020-01-27

## 2019-12-23 RX ORDER — ALBUTEROL SULFATE 90 UG/1
1-2 AEROSOL, METERED RESPIRATORY (INHALATION)
Status: CANCELLED
Start: 2019-12-31

## 2019-12-23 RX ORDER — ALBUTEROL SULFATE 90 UG/1
1-2 AEROSOL, METERED RESPIRATORY (INHALATION)
Status: CANCELLED
Start: 2020-01-06

## 2019-12-23 RX ORDER — EPINEPHRINE 0.3 MG/.3ML
0.3 INJECTION SUBCUTANEOUS EVERY 5 MIN PRN
Status: CANCELLED | OUTPATIENT
Start: 2020-01-20

## 2019-12-23 RX ORDER — NALOXONE HYDROCHLORIDE 0.4 MG/ML
.1-.4 INJECTION, SOLUTION INTRAMUSCULAR; INTRAVENOUS; SUBCUTANEOUS
Status: CANCELLED | OUTPATIENT
Start: 2020-01-06

## 2019-12-23 RX ORDER — LORAZEPAM 2 MG/ML
1 INJECTION INTRAMUSCULAR EVERY 6 HOURS PRN
Status: CANCELLED
Start: 2020-01-06

## 2019-12-23 RX ORDER — NALOXONE HYDROCHLORIDE 0.4 MG/ML
.1-.4 INJECTION, SOLUTION INTRAMUSCULAR; INTRAVENOUS; SUBCUTANEOUS
Status: CANCELLED | OUTPATIENT
Start: 2020-01-13

## 2019-12-23 RX ORDER — LORAZEPAM 2 MG/ML
1 INJECTION INTRAMUSCULAR EVERY 6 HOURS PRN
Status: DISCONTINUED | OUTPATIENT
Start: 2019-12-23 | End: 2019-12-23 | Stop reason: HOSPADM

## 2019-12-23 RX ORDER — ONDANSETRON 8 MG/1
8 TABLET, FILM COATED ORAL EVERY 8 HOURS PRN
Qty: 30 TABLET | Refills: 0 | Status: SHIPPED | OUTPATIENT
Start: 2019-12-23 | End: 2020-02-20

## 2019-12-23 RX ORDER — ALBUTEROL SULFATE 90 UG/1
1-2 AEROSOL, METERED RESPIRATORY (INHALATION)
Status: CANCELLED
Start: 2019-12-23

## 2019-12-23 RX ORDER — SODIUM CHLORIDE 9 MG/ML
1000 INJECTION, SOLUTION INTRAVENOUS CONTINUOUS PRN
Status: CANCELLED
Start: 2020-01-27

## 2019-12-23 RX ORDER — EPINEPHRINE 1 MG/ML
0.3 INJECTION, SOLUTION INTRAMUSCULAR; SUBCUTANEOUS EVERY 5 MIN PRN
Status: CANCELLED | OUTPATIENT
Start: 2020-01-13

## 2019-12-23 RX ORDER — EPINEPHRINE 1 MG/ML
0.3 INJECTION, SOLUTION INTRAMUSCULAR; SUBCUTANEOUS EVERY 5 MIN PRN
Status: CANCELLED | OUTPATIENT
Start: 2019-12-31

## 2019-12-23 RX ORDER — MEPERIDINE HYDROCHLORIDE 25 MG/ML
25 INJECTION INTRAMUSCULAR; INTRAVENOUS; SUBCUTANEOUS EVERY 30 MIN PRN
Status: CANCELLED | OUTPATIENT
Start: 2019-12-23

## 2019-12-23 RX ORDER — ALBUTEROL SULFATE 0.83 MG/ML
2.5 SOLUTION RESPIRATORY (INHALATION)
Status: CANCELLED | OUTPATIENT
Start: 2020-01-06

## 2019-12-23 RX ORDER — EPINEPHRINE 0.3 MG/.3ML
0.3 INJECTION SUBCUTANEOUS EVERY 5 MIN PRN
Status: CANCELLED | OUTPATIENT
Start: 2019-12-23

## 2019-12-23 RX ORDER — DIPHENHYDRAMINE HYDROCHLORIDE 50 MG/ML
50 INJECTION INTRAMUSCULAR; INTRAVENOUS
Status: CANCELLED
Start: 2020-01-06

## 2019-12-23 RX ORDER — DEXTROSE, SODIUM CHLORIDE, AND POTASSIUM CHLORIDE 5; .45; .15 G/100ML; G/100ML; G/100ML
2000 INJECTION INTRAVENOUS ONCE
Status: CANCELLED
Start: 2020-01-20

## 2019-12-23 RX ORDER — PALONOSETRON 0.05 MG/ML
0.25 INJECTION, SOLUTION INTRAVENOUS ONCE
Status: CANCELLED
Start: 2019-12-23

## 2019-12-23 RX ORDER — METHYLPREDNISOLONE SODIUM SUCCINATE 125 MG/2ML
125 INJECTION, POWDER, LYOPHILIZED, FOR SOLUTION INTRAMUSCULAR; INTRAVENOUS
Status: CANCELLED
Start: 2020-01-20

## 2019-12-23 RX ORDER — DEXTROSE, SODIUM CHLORIDE, AND POTASSIUM CHLORIDE 5; .45; .15 G/100ML; G/100ML; G/100ML
2000 INJECTION INTRAVENOUS ONCE
Status: CANCELLED
Start: 2020-01-13

## 2019-12-23 RX ORDER — EPINEPHRINE 1 MG/ML
0.3 INJECTION, SOLUTION INTRAMUSCULAR; SUBCUTANEOUS EVERY 5 MIN PRN
Status: CANCELLED | OUTPATIENT
Start: 2019-12-23

## 2019-12-23 RX ORDER — EPINEPHRINE 0.3 MG/.3ML
0.3 INJECTION SUBCUTANEOUS EVERY 5 MIN PRN
Status: CANCELLED | OUTPATIENT
Start: 2020-01-27

## 2019-12-23 RX ORDER — NALOXONE HYDROCHLORIDE 0.4 MG/ML
.1-.4 INJECTION, SOLUTION INTRAMUSCULAR; INTRAVENOUS; SUBCUTANEOUS
Status: CANCELLED | OUTPATIENT
Start: 2020-01-27

## 2019-12-23 RX ORDER — ALBUTEROL SULFATE 0.83 MG/ML
2.5 SOLUTION RESPIRATORY (INHALATION)
Status: CANCELLED | OUTPATIENT
Start: 2020-01-27

## 2019-12-23 RX ORDER — LORAZEPAM 2 MG/ML
1 INJECTION INTRAMUSCULAR EVERY 6 HOURS PRN
Status: CANCELLED
Start: 2020-01-13

## 2019-12-23 RX ORDER — DIPHENHYDRAMINE HYDROCHLORIDE 50 MG/ML
50 INJECTION INTRAMUSCULAR; INTRAVENOUS
Status: CANCELLED
Start: 2019-12-31

## 2019-12-23 RX ORDER — DEXTROSE, SODIUM CHLORIDE, AND POTASSIUM CHLORIDE 5; .45; .15 G/100ML; G/100ML; G/100ML
2000 INJECTION INTRAVENOUS ONCE
Status: CANCELLED
Start: 2019-12-23

## 2019-12-23 RX ORDER — DIPHENHYDRAMINE HYDROCHLORIDE 50 MG/ML
50 INJECTION INTRAMUSCULAR; INTRAVENOUS
Status: CANCELLED
Start: 2020-01-27

## 2019-12-23 RX ORDER — SODIUM CHLORIDE 9 MG/ML
1000 INJECTION, SOLUTION INTRAVENOUS CONTINUOUS PRN
Status: CANCELLED
Start: 2020-01-13

## 2019-12-23 RX ORDER — PALONOSETRON 0.05 MG/ML
0.25 INJECTION, SOLUTION INTRAVENOUS ONCE
Status: CANCELLED
Start: 2020-01-20

## 2019-12-23 RX ORDER — PALONOSETRON 0.05 MG/ML
0.25 INJECTION, SOLUTION INTRAVENOUS ONCE
Status: CANCELLED
Start: 2020-01-13

## 2019-12-23 RX ORDER — EPINEPHRINE 0.3 MG/.3ML
0.3 INJECTION SUBCUTANEOUS EVERY 5 MIN PRN
Status: CANCELLED | OUTPATIENT
Start: 2020-01-13

## 2019-12-23 RX ORDER — MEPERIDINE HYDROCHLORIDE 25 MG/ML
25 INJECTION INTRAMUSCULAR; INTRAVENOUS; SUBCUTANEOUS EVERY 30 MIN PRN
Status: CANCELLED | OUTPATIENT
Start: 2020-01-13

## 2019-12-23 RX ORDER — LORAZEPAM 2 MG/ML
1 INJECTION INTRAMUSCULAR EVERY 6 HOURS PRN
Status: CANCELLED
Start: 2019-12-31

## 2019-12-23 RX ORDER — SODIUM CHLORIDE 9 MG/ML
1000 INJECTION, SOLUTION INTRAVENOUS CONTINUOUS PRN
Status: CANCELLED
Start: 2020-01-20

## 2019-12-23 RX ORDER — ALBUTEROL SULFATE 0.83 MG/ML
2.5 SOLUTION RESPIRATORY (INHALATION)
Status: CANCELLED | OUTPATIENT
Start: 2019-12-31

## 2019-12-23 RX ORDER — NALOXONE HYDROCHLORIDE 0.4 MG/ML
.1-.4 INJECTION, SOLUTION INTRAMUSCULAR; INTRAVENOUS; SUBCUTANEOUS
Status: CANCELLED | OUTPATIENT
Start: 2019-12-23

## 2019-12-23 RX ORDER — DIPHENHYDRAMINE HYDROCHLORIDE 50 MG/ML
50 INJECTION INTRAMUSCULAR; INTRAVENOUS
Status: CANCELLED
Start: 2019-12-23

## 2019-12-23 RX ORDER — LORAZEPAM 2 MG/ML
INJECTION INTRAMUSCULAR
Status: COMPLETED
Start: 2019-12-23 | End: 2019-12-23

## 2019-12-23 RX ORDER — PALONOSETRON 0.05 MG/ML
0.25 INJECTION, SOLUTION INTRAVENOUS ONCE
Status: CANCELLED
Start: 2019-12-31

## 2019-12-23 RX ORDER — MEPERIDINE HYDROCHLORIDE 25 MG/ML
25 INJECTION INTRAMUSCULAR; INTRAVENOUS; SUBCUTANEOUS EVERY 30 MIN PRN
Status: CANCELLED | OUTPATIENT
Start: 2020-01-06

## 2019-12-23 RX ORDER — ALBUTEROL SULFATE 0.83 MG/ML
2.5 SOLUTION RESPIRATORY (INHALATION)
Status: CANCELLED | OUTPATIENT
Start: 2020-01-20

## 2019-12-23 RX ORDER — MEPERIDINE HYDROCHLORIDE 25 MG/ML
25 INJECTION INTRAMUSCULAR; INTRAVENOUS; SUBCUTANEOUS EVERY 30 MIN PRN
Status: CANCELLED | OUTPATIENT
Start: 2020-01-20

## 2019-12-23 RX ORDER — ALBUTEROL SULFATE 90 UG/1
1-2 AEROSOL, METERED RESPIRATORY (INHALATION)
Status: CANCELLED
Start: 2020-01-13

## 2019-12-23 RX ORDER — NALOXONE HYDROCHLORIDE 0.4 MG/ML
.1-.4 INJECTION, SOLUTION INTRAMUSCULAR; INTRAVENOUS; SUBCUTANEOUS
Status: CANCELLED | OUTPATIENT
Start: 2019-12-31

## 2019-12-23 RX ORDER — SODIUM CHLORIDE 9 MG/ML
1000 INJECTION, SOLUTION INTRAVENOUS CONTINUOUS PRN
Status: CANCELLED
Start: 2019-12-31

## 2019-12-23 RX ORDER — SODIUM CHLORIDE 9 MG/ML
1000 INJECTION, SOLUTION INTRAVENOUS CONTINUOUS PRN
Status: CANCELLED
Start: 2020-01-06

## 2019-12-23 RX ORDER — METHYLPREDNISOLONE SODIUM SUCCINATE 125 MG/2ML
125 INJECTION, POWDER, LYOPHILIZED, FOR SOLUTION INTRAMUSCULAR; INTRAVENOUS
Status: CANCELLED
Start: 2019-12-23

## 2019-12-23 RX ORDER — ALBUTEROL SULFATE 90 UG/1
1-2 AEROSOL, METERED RESPIRATORY (INHALATION)
Status: CANCELLED
Start: 2020-01-20

## 2019-12-23 RX ORDER — LORAZEPAM 2 MG/ML
1 INJECTION INTRAMUSCULAR EVERY 6 HOURS PRN
Status: CANCELLED
Start: 2020-01-27

## 2019-12-23 RX ORDER — METHYLPREDNISOLONE SODIUM SUCCINATE 125 MG/2ML
125 INJECTION, POWDER, LYOPHILIZED, FOR SOLUTION INTRAMUSCULAR; INTRAVENOUS
Status: CANCELLED
Start: 2020-01-13

## 2019-12-23 RX ORDER — PALONOSETRON 0.05 MG/ML
0.25 INJECTION, SOLUTION INTRAVENOUS ONCE
Status: COMPLETED | OUTPATIENT
Start: 2019-12-23 | End: 2019-12-23

## 2019-12-23 RX ORDER — EPINEPHRINE 0.3 MG/.3ML
0.3 INJECTION SUBCUTANEOUS EVERY 5 MIN PRN
Status: CANCELLED | OUTPATIENT
Start: 2020-01-06

## 2019-12-23 RX ORDER — DEXTROSE, SODIUM CHLORIDE, AND POTASSIUM CHLORIDE 5; .45; .15 G/100ML; G/100ML; G/100ML
2000 INJECTION INTRAVENOUS ONCE
Status: CANCELLED
Start: 2020-01-06

## 2019-12-23 RX ORDER — EPINEPHRINE 1 MG/ML
0.3 INJECTION, SOLUTION INTRAMUSCULAR; SUBCUTANEOUS EVERY 5 MIN PRN
Status: CANCELLED | OUTPATIENT
Start: 2020-01-20

## 2019-12-23 RX ORDER — SODIUM CHLORIDE 9 MG/ML
1000 INJECTION, SOLUTION INTRAVENOUS CONTINUOUS PRN
Status: CANCELLED
Start: 2019-12-23

## 2019-12-23 RX ORDER — PALONOSETRON 0.05 MG/ML
0.25 INJECTION, SOLUTION INTRAVENOUS ONCE
Status: CANCELLED
Start: 2020-01-27

## 2019-12-23 RX ORDER — EPINEPHRINE 1 MG/ML
0.3 INJECTION, SOLUTION INTRAMUSCULAR; SUBCUTANEOUS EVERY 5 MIN PRN
Status: CANCELLED | OUTPATIENT
Start: 2020-01-27

## 2019-12-23 RX ORDER — ALBUTEROL SULFATE 0.83 MG/ML
2.5 SOLUTION RESPIRATORY (INHALATION)
Status: CANCELLED | OUTPATIENT
Start: 2020-01-13

## 2019-12-23 RX ORDER — PALONOSETRON 0.05 MG/ML
0.25 INJECTION, SOLUTION INTRAVENOUS ONCE
Status: CANCELLED
Start: 2020-01-06

## 2019-12-23 RX ORDER — EPINEPHRINE 0.3 MG/.3ML
0.3 INJECTION SUBCUTANEOUS EVERY 5 MIN PRN
Status: CANCELLED | OUTPATIENT
Start: 2019-12-31

## 2019-12-23 RX ORDER — MEPERIDINE HYDROCHLORIDE 25 MG/ML
25 INJECTION INTRAMUSCULAR; INTRAVENOUS; SUBCUTANEOUS EVERY 30 MIN PRN
Status: CANCELLED | OUTPATIENT
Start: 2019-12-31

## 2019-12-23 RX ORDER — EPINEPHRINE 1 MG/ML
0.3 INJECTION, SOLUTION INTRAMUSCULAR; SUBCUTANEOUS EVERY 5 MIN PRN
Status: CANCELLED | OUTPATIENT
Start: 2020-01-06

## 2019-12-23 RX ORDER — METHYLPREDNISOLONE SODIUM SUCCINATE 125 MG/2ML
125 INJECTION, POWDER, LYOPHILIZED, FOR SOLUTION INTRAMUSCULAR; INTRAVENOUS
Status: CANCELLED
Start: 2019-12-31

## 2019-12-23 RX ORDER — DEXTROSE, SODIUM CHLORIDE, AND POTASSIUM CHLORIDE 5; .45; .15 G/100ML; G/100ML; G/100ML
2000 INJECTION INTRAVENOUS ONCE
Status: CANCELLED
Start: 2020-01-27

## 2019-12-23 RX ORDER — DEXTROSE, SODIUM CHLORIDE, AND POTASSIUM CHLORIDE 5; .45; .15 G/100ML; G/100ML; G/100ML
2000 INJECTION INTRAVENOUS ONCE
Status: COMPLETED | OUTPATIENT
Start: 2019-12-23 | End: 2019-12-23

## 2019-12-23 RX ORDER — METHYLPREDNISOLONE SODIUM SUCCINATE 125 MG/2ML
125 INJECTION, POWDER, LYOPHILIZED, FOR SOLUTION INTRAMUSCULAR; INTRAVENOUS
Status: CANCELLED
Start: 2020-01-27

## 2019-12-23 RX ORDER — MEPERIDINE HYDROCHLORIDE 25 MG/ML
25 INJECTION INTRAMUSCULAR; INTRAVENOUS; SUBCUTANEOUS EVERY 30 MIN PRN
Status: CANCELLED | OUTPATIENT
Start: 2020-01-27

## 2019-12-23 RX ORDER — LORAZEPAM 2 MG/ML
1 INJECTION INTRAMUSCULAR EVERY 6 HOURS PRN
Status: CANCELLED
Start: 2019-12-23

## 2019-12-23 RX ORDER — ALBUTEROL SULFATE 0.83 MG/ML
2.5 SOLUTION RESPIRATORY (INHALATION)
Status: CANCELLED | OUTPATIENT
Start: 2019-12-23

## 2019-12-23 RX ORDER — METHYLPREDNISOLONE SODIUM SUCCINATE 125 MG/2ML
125 INJECTION, POWDER, LYOPHILIZED, FOR SOLUTION INTRAMUSCULAR; INTRAVENOUS
Status: DISCONTINUED | OUTPATIENT
Start: 2019-12-23 | End: 2019-12-23 | Stop reason: HOSPADM

## 2019-12-23 RX ADMIN — POTASSIUM CHLORIDE, DEXTROSE MONOHYDRATE AND SODIUM CHLORIDE 2000 ML: 150; 5; 450 INJECTION, SOLUTION INTRAVENOUS at 08:59

## 2019-12-23 RX ADMIN — DIPHENHYDRAMINE HYDROCHLORIDE 50 MG: 50 INJECTION INTRAMUSCULAR; INTRAVENOUS at 08:23

## 2019-12-23 RX ADMIN — METHYLPREDNISOLONE SODIUM SUCCINATE 125 MG: 125 INJECTION, POWDER, FOR SOLUTION INTRAMUSCULAR; INTRAVENOUS at 08:25

## 2019-12-23 RX ADMIN — FOSAPREPITANT: 150 INJECTION, POWDER, LYOPHILIZED, FOR SOLUTION INTRAVENOUS at 08:18

## 2019-12-23 RX ADMIN — LORAZEPAM 0.5 MG: 2 INJECTION INTRAMUSCULAR at 08:30

## 2019-12-23 RX ADMIN — PALONOSETRON HYDROCHLORIDE 0.25 MG: 0.25 INJECTION, SOLUTION INTRAVENOUS at 08:15

## 2019-12-23 RX ADMIN — FAMOTIDINE 20 MG: 10 INJECTION INTRAVENOUS at 08:32

## 2019-12-23 RX ADMIN — LORAZEPAM 0.5 MG: 2 INJECTION INTRAMUSCULAR; INTRAVENOUS at 08:30

## 2019-12-23 RX ADMIN — CISPLATIN 90 MG: 1 INJECTION, SOLUTION INTRAVENOUS at 10:48

## 2019-12-23 RX ADMIN — DEXAMETHASONE SODIUM PHOSPHATE 12 MG: 10 INJECTION, SOLUTION INTRAMUSCULAR; INTRAVENOUS at 08:43

## 2019-12-23 ASSESSMENT — PAIN SCALES - GENERAL: PAINLEVEL: NO PAIN (0)

## 2019-12-23 NOTE — PROGRESS NOTES
[facility] PHYSICIANS  SPECIALIZING IN BREAKTHROUGHS  Radiation Oncology    On Treatment Visit Note      Francesca Rowland      Date: 2019   MRN: [id number]   : 1980  Diagnosis: Cervical cancer      Reason for Visit:  On Radiation Treatment Visit     Treatment Summary to Date  Treatment Site: pelvis Current Dose: 180/4500 cGy Fractions:       Chemotherapy  Chemo concurrent with radx?: Yes  Oncologist: Rola  Drug Name/Frequency 1: Cisplatin    ED Visit/Hosiptal Admission: None     Treatment Breaks: None       Subjective:   Had chemo and first radiation therapy today.  She developed allergic reaction to Emend, and will use Zofran for nausea control.  Otherwise tolerated treatment well.     Nursing ROS:   Nutrition Alteration  Diet Type: Patient's Preference  Skin  Skin Reaction: 0 - No changes           Gastrointestinal  GI Note: WNL  Genitourinary   Note: WNL, no vaginal bleeding  Psychosocial  Pyschosocial Note: feeling tired from the long day  Pain Assessment  0-10 Pain Scale: 0      Objective:   /83   Pulse 94   Gen: Appears well, in no acute distress  Skin: No erythema    Labs:  CBC RESULTS:   Recent Labs   Lab Test 19  0635   WBC 6.6   RBC 4.54   HGB 13.5   HCT 41.8   MCV 92   MCH 29.7   MCHC 32.3   RDW 13.7        ELECTROLYTES:  Recent Labs   Lab Test 19  0635      POTASSIUM 4.0   CHLORIDE 112*   SHAD 8.1*   CO2 25   BUN 13   CR 0.51*   *       Assessment:    Tolerating radiation therapy well.  All questions and concerns addressed.    Toxicities:  Nausea: Grade 0: No toxicity  Dermatitis: Grade 0: No toxicity    Plan:   1. Continue current therapy.    2. Reviewed radiation plan.        Mosaiq chart and setup information reviewed  MVCT/IGRT images checked    Medication Review  Med Note: no changes per pt    Educational Topic Discussed  Education Instructions: reviewed        Bret Kerns MD/PhD  [phone] clinic  Pager  [phone]    Please do not send letter to referring physician.

## 2019-12-23 NOTE — LETTER
2019       RE: Francesca Rowland  [address]  [place] [address]     Dear Colleague,    Thank you for referring your patient, Francesca Rowland, to the RADIATION ONCOLOGY CLINIC. Please see a copy of my visit note below.    [facility] PHYSICIANS  SPECIALIZING IN BREAKTHROUGHS  Radiation Oncology    On Treatment Visit Note      Francesca Rowland      Date: 2019   MRN: [id number]   : 1980  Diagnosis: Cervical cancer      Reason for Visit:  On Radiation Treatment Visit     Treatment Summary to Date  Treatment Site: pelvis Current Dose: 180/4500 cGy Fractions:       Chemotherapy  Chemo concurrent with radx?: Yes  Oncologist: Rola  Drug Name/Frequency 1: Cisplatin    ED Visit/Hosiptal Admission: None     Treatment Breaks: None       Subjective:   Had chemo and first radiation therapy today.  She developed allergic reaction to Emend, and will use Zofran for nausea control.  Otherwise tolerated treatment well.     Nursing ROS:   Nutrition Alteration  Diet Type: Patient's Preference  Skin  Skin Reaction: 0 - No changes           Gastrointestinal  GI Note: WNL  Genitourinary   Note: WNL, no vaginal bleeding  Psychosocial  Pyschosocial Note: feeling tired from the long day  Pain Assessment  0-10 Pain Scale: 0      Objective:   /83   Pulse 94   Gen: Appears well, in no acute distress  Skin: No erythema    Labs:  CBC RESULTS:   Recent Labs   Lab Test 19  0635   WBC 6.6   RBC 4.54   HGB 13.5   HCT 41.8   MCV 92   MCH 29.7   MCHC 32.3   RDW 13.7        ELECTROLYTES:  Recent Labs   Lab Test 19  0635      POTASSIUM 4.0   CHLORIDE 112*   SHAD 8.1*   CO2 25   BUN 13   CR 0.51*   *       Assessment:    Tolerating radiation therapy well.  All questions and concerns addressed.    Toxicities:  Nausea: Grade 0: No toxicity  Dermatitis: Grade 0: No toxicity    Plan:   1. Continue current therapy.    2. Reviewed radiation plan.        Carmineiq chart and  setup information reviewed  MVCT/IGRT images checked    Medication Review  Med Note: no changes per pt    Educational Topic Discussed  Education Instructions: reviewed        Bret Kerns MD/PhD  [phone] clinic  Pager [phone]    Please do not send letter to referring physician.      Again, thank you for allowing me to participate in the care of your patient.      Sincerely,    Bret Kerns MD

## 2019-12-23 NOTE — NURSING NOTE
Chief Complaint   Patient presents with     Blood Draw     Labs drawn via PIV placed by RN in lab. VS taken. Pt checked in for next appt     Labs drawn from PIV placed by RN. Line flushed with saline. Vitals taken. Pt checked in for appointment(s).    Liss MURILLO RN PHN BSN  BMT/Oncology Lab

## 2019-12-23 NOTE — LETTER
Date:December 26, 2019      Provider requested that no letter be sent. Do not send.       [facility] Health Information

## 2019-12-23 NOTE — PROGRESS NOTES
Infusion Nursing Note:  Francesca Rowland presents today for Cycle 1 Day 1 Cisplatin.    Patient seen by provider today: No   present during visit today: Not Applicable.    Note: Patient and patient's  Bill oriented to infusion suite. Patient received verbal and written education on Cisplatin. Patient's questions answered. Patient stated she was ready to proceed with treatment. During treatment Jessica Scherer RN met with patient to further discuss side effects.    Patient had a reaction to Emend, see note below. Emend was added to allergy list. Patient is also allergic to Compazine. Patient prescribed PO Zofran and Ativan for breakthrough nausea.     TORB 12/23/19 0840 Dr. Canela/Kaya Gonzalez RN: Ok to continue with treatment after Emend reaction.     Intravenous Access:  Peripheral IV placed.    Treatment Conditions:  Lab Results   Component Value Date    HGB 13.5 12/23/2019     Lab Results   Component Value Date    WBC 6.6 12/23/2019      Lab Results   Component Value Date    ANEU 3.5 12/23/2019     Lab Results   Component Value Date     12/23/2019      Lab Results   Component Value Date     12/23/2019                   Lab Results   Component Value Date    POTASSIUM 4.0 12/23/2019           Lab Results   Component Value Date    MAG 2.1 12/23/2019            Lab Results   Component Value Date    CR 0.51 12/23/2019                   Lab Results   Component Value Date    SHAD 8.1 12/23/2019                Lab Results   Component Value Date    BILITOTAL 0.3 12/23/2019           Lab Results   Component Value Date    ALBUMIN 3.3 12/23/2019                    Lab Results   Component Value Date    ALT 22 12/23/2019           Lab Results   Component Value Date    AST 12 12/23/2019     Results reviewed, labs MET treatment parameters, ok to proceed with treatment.      Post Infusion Assessment:  Patient tolerated infusion poorly due to : Hypersensitivity: Did patient have a hypersensitivity  reaction? : Yes  Drug or Product name: Emend  Were pre-meds administered?: Yes  If Yes, what pre-meds were administered?: Palonesetron (aloxi);Fosaprepitant (emend);Dexamethasone (decadron  First or Subsequent treatment: First time receiving  Rate of infusion when patient had hypersensitivity reaction: 825  Time the hypersensitivity reaction was first recognized: 0822  Symptoms observed or reported (select all that apply): Chest tightness;Flushing;Nausea;Hypertension  Interventions/treatment following reaction: Infusion stopped;Hypersensitivity medications administered;Oxygen applied;Therapy discontinued  What hypersensitivity medications were administered?: DiphendydrAMINE (benadryl);Methylprednisolone;Famotidine(Pepcid);Other: (Comment)(Ativan)  Name of provider notified: Jluis Canela  Time provider notified: 0830  Type of notification (select all that apply): Paged/Phone  Was the patient re-challenged today?: No - no re-challenge today(Emend added to allergy list)   Patient tolerated remaining IVF and Cisplatin without incident.  Blood return noted pre and post infusion.  Site patent and intact, free from redness, edema or discomfort.  No evidence of extravasations.  Access discontinued per protocol.     Discharge Plan:   Prescription refills given for Ativan, Zofran.  Patient and/or family verbalized understanding of discharge instructions and all questions answered.  Copy of AVS reviewed with patient and/or family. Patient will return 12/31/19 for next appointment.  Patient discharged in stable condition accompanied by: self and .  Departure Mode: Ambulatory.  Face to face time: 20    Kaya Gonzalez RN

## 2019-12-23 NOTE — PATIENT INSTRUCTIONS
[facility] Triage and after hours / weekends / holidays:  [phone]    Please call the triage or after hours line if you experience a temperature greater than or equal to 100.5, shaking chills, have uncontrolled nausea, vomiting and/or diarrhea, dizziness, shortness of breath, chest pain, bleeding, unexplained bruising, or if you have any other new/concerning symptoms, questions or concerns.      If you are having any concerning symptoms or wish to speak to a provider before your next infusion visit, please call your care coordinator or triage to notify them so we can adequately serve you.     If you need a refill on a narcotic prescription or other medication, please call before your infusion appointment.         December 2019 Sunday Monday Tuesday Wednesday Thursday Friday Saturday   1     2    P POST-OP  12:15 PM   (30 min.)   Jluis Canela MD   [facility] 3     4     5     6     7       8     9    [facility]/ ONCOLOGY   7:00 AM   (120 min.)   [facility] 10     11    P CONSULT   2:00 PM   (90 min.)   Bret Kerns MD   Radiation Oncology Clinic    [facility] TCT/SIM SUITE   3:00 PM   (60 min.)   Bret Kerns MD   Radiation Oncology Clinic 12    [facility] TREATMENT PLAN VISIT   7:00 AM   (15 min.)   Bret Kerns MD   Radiation Oncology Clinic 13     14       15     16     17     18     19     20     21       22     23    [facility] LAB DRAW   6:30 AM   (15 min.)    [facility] LAB DRAW   [facility] Lab Draw    [facility] ONC INFUSION 360   7:00 AM   (360 min.)    ONCOLOGY INFUSION   [facility] EXTERNAL RADIATION TREATMT   2:00 PM   (30 min.)   [facility] RAD ONC ERYN   Radiation Oncology Clinic    [facility] ON TREATMENT VISIT   2:30 PM   (15 min.)   Bret Kerns MD   Radiation Oncology Clinic 24    [facility] EXTERNAL RADIATION TREATMT   8:30 AM   (30 min.)   [facility] RAD ONC ERYN   Radiation Oncology Clinic 25     26    [facility] EXTERNAL  RADIATION TREATMT   2:00 PM   (30 min.)   [facility] RAD ONC ERYN   Radiation Oncology Clinic 27    [facility] EXTERNAL RADIATION TREATMT   2:45 PM   (30 min.)   [facility] RAD ONC ERYN   [facility]iation Oncology Clinic 28    [facility] EXTERNAL RADIATION TREATMT  11:30 AM   (30 min.)   [facility] RAD ONC ERYN   Radiation Oncology Clinic   29     30  Happy Birthday!    [facility] EXTERNAL RADIATION TREATMT   2:00 PM   (30 min.)   [facility] RAD ONC ERYN   [facility] Oncology Clinic 31    [facility] DRAW   6:15 AM   (15 min.)   [facility] DRAW   [facility] Lab Draw    [facility] ONC INFUSION 360   7:00 AM   (360 min.)   [facility]   [facility]    [facility] EXTERNAL RADIATION TREATMT   2:45 PM   (30 min.)   [facility] RAD ONC ERYN   Radiation Oncology Clinic    [facility] ON TREATMENT VISIT   3:15 PM   (15 min.)   Bret Kerns MD   Radiation Oncology Clinic                                 January 2020 Sunday Monday Tuesday Wednesday Thursday Friday Saturday                  1     2    [facility] EXTERNAL RADIATION TREATMT   3:30 PM   (30 min.)   [facility] RAD ONC ERYN   [facility] Oncology Clinic 3    [facility] EXTERNAL RADIATION TREATMT   3:30 PM   (30 min.)   [facility] RAD ONC ERYN   [facility] Oncology Clinic 4    [facility] EXTERNAL RADIATION TREATMT   9:30 AM   (30 min.)   [facility] RAD ONC ERYN   Radiation Oncology Clinic   5     6    [facility] DRAW   6:15 AM   (15 min.)   [facility] DRAW   [facility] Lab Draw    [facility] ONC INFUSION 360   7:00 AM   (360 min.)   [facility]COL[facility]Y INFUSION   [facility]    [facility] EXTERNAL RADIATION TREATMT   3:30 PM   (30 min.)   [facility] RAD ONC ERYN   Radiation Oncology Clinic    [facility] ON TREATMENT VISIT   4:00 PM   (15 min.)   Bret Kerns MD   Radiation Oncology Clinic 7    [facility] EXTERNAL RADIATION TREATMT   3:30 PM   (30 min.)   [facility] RAD ONC ERYN   [facility] Oncology Clinic 8    [facility] EXTERNAL RADIATION TREATMT   3:30 PM   (30 min.)   [facility] RAD ONC ERYN   [facility] Oncology Clinic 9    [facility] EXTERNAL RADIATION TREATMT   3:30 PM   (30  min.)   [facility] RAD ONC ERYN   [facility] Oncology Clinic 10    [facility] EXTERNAL RADIATION TREATMT   3:30 PM   (30 min.)   [facility] RAD ONC ERYN   Radiation Oncology Clinic 11       12     13    [facility] DRAW   6:15 AM   (15 min.)   [facility] DRAW   [facility] Lab Draw    [facility] ONC INFUSION 360   7:00 AM   (360 min.)   [facility] ONCOLOGY INFUSION   [facility]    [facility] EXTERNAL RADIATION TREATMT   3:30 PM   (30 min.)   [facility] RAD ONC ERYN   Radiation Oncology Clinic    [facility] ON TREATMENT VISIT   4:00 PM   (15 min.)   Bret Kerns MD   Radiation Oncology Clinic 14    [facility] EXTERNAL RADIATION TREATMT   3:30 PM   (30 min.)   [facility] RAD ONC ERYN   Radiation Oncology Clinic 15    [facility] EXTERNAL RADIATION TREATMT   3:30 PM   (30 min.)   [facility] RAD ONC ERYN   Radiation Oncology Clinic 16    [facility] EXTERNAL RADIATION TREATMT   3:30 PM   (30 min.)   [facility] RAD ONC ERYN   [facility]iation Oncology Clinic 17    [facility] EXTERNAL RADIATION TREATMT   3:30 PM   (30 min.)   [facility] RAD ONC ERYN   [facility]iation Oncology Clinic 18       19     20    [facility] DRAW   6:15 AM   (15 min.)    [facility] LAB DRAW   [facility] Lab Draw    [facility] ONC INFUSION 360   7:00 AM   (360 min.)   [facility] ONCOLOGY INFUSION   [facility]    [facility] EXTERNAL RADIATION TREATMT   3:30 PM   (30 min.)   [facility] RAD ONC ERYN   Radiation Oncology Clinic    [facility] ON TREATMENT VISIT   4:00 PM   (15 min.)   Bret Kerns MD   Radiation Oncology Clinic 21    [facility] EXTERNAL RADIATION TREATMT   3:30 PM   (30 min.)   [facility] RAD ONC ERYN   [facility]iation Oncology Clinic 22    [facility] EXTERNAL RADIATION TREATMT   3:30 PM   (30 min.)   [facility] RAD ONC ERYN   Radiation Oncology Clinic 23    [facility] EXTERNAL RADIATION TREATMT   3:30 PM   (30 min.)   [facility] RAD ONC ERYN   Radiation Oncology Clinic 24    [facility] EXTERNAL RADIATION TREATMT   3:30 PM   (30 min.)   [facility] RAD ONC ERYN   Radiation Oncology Clinic 25       26     27    [facility] DRAW   6:15 AM   (15 min.)   [facility] DRAW    [employer]  Lab Draw    [facility] ONC INFUSION 360   7:00 AM   (360 min.)    ONCOLOGY INFUSION   [facility] 28     29     30     31                         Recent Results (from the past 24 hour(s))   CBC with platelets differential    Collection Time: 12/23/19  6:35 AM   Result Value Ref Range    WBC 6.6 4.0 - 11.0 10e9/L    RBC Count 4.54 3.8 - 5.2 10e12/L    Hemoglobin 13.5 11.7 - 15.7 g/dL    Hematocrit 41.8 35.0 - 47.0 %    MCV 92 78 - 100 fl    MCH 29.7 26.5 - 33.0 pg    MCHC 32.3 31.5 - 36.5 g/dL    RDW 13.7 10.0 - 15.0 %    Platelet Count 224 150 - 450 10e9/L    Diff Method Automated Method     % Neutrophils 53.7 %    % Lymphocytes 30.5 %    % Monocytes 8.1 %    % Eosinophils 6.6 %    % Basophils 0.8 %    % Immature Granulocytes 0.3 %    Nucleated RBCs 0 0 /100    Absolute Neutrophil 3.5 1.6 - 8.3 10e9/L    Absolute Lymphocytes 2.0 0.8 - 5.3 10e9/L    Absolute Monocytes 0.5 0.0 - 1.3 10e9/L    Absolute Eosinophils 0.4 0.0 - 0.7 10e9/L    Absolute Basophils 0.1 0.0 - 0.2 10e9/L    Abs Immature Granulocytes 0.0 0 - 0.4 10e9/L    Absolute Nucleated RBC 0.0    Comprehensive metabolic panel    Collection Time: 12/23/19  6:35 AM   Result Value Ref Range    Sodium 141 133 - 144 mmol/L    Potassium 4.0 3.4 - 5.3 mmol/L    Chloride 112 (H) 94 - 109 mmol/L    Carbon Dioxide 25 20 - 32 mmol/L    Anion Gap 5 3 - 14 mmol/L    Glucose 101 (H) 70 - 99 mg/dL    Urea Nitrogen 13 7 - 30 mg/dL    Creatinine 0.51 (L) 0.52 - 1.04 mg/dL    GFR Estimate >90 >60 mL/min/[1.73_m2]    GFR Estimate If Black >90 >60 mL/min/[1.73_m2]    Calcium 8.1 (L) 8.5 - 10.1 mg/dL    Bilirubin Total 0.3 0.2 - 1.3 mg/dL    Albumin 3.3 (L) 3.4 - 5.0 g/dL    Protein Total 6.8 6.8 - 8.8 g/dL    Alkaline Phosphatase 100 40 - 150 U/L    ALT 22 0 - 50 U/L    AST 12 0 - 45 U/L   Magnesium    Collection Time: 12/23/19  6:35 AM   Result Value Ref Range    Magnesium 2.1 1.6 - 2.3 mg/dL

## 2019-12-23 NOTE — PROGRESS NOTES
[facility] GYN-Oncology Teaching Note    Relevant Diagnosis:  Cervix Cancer     Teaching Topic:  Chemotherapy    Person(s) involved in teaching:  Patient and      Motivation Level:   Asks Questions:   Yes  Eager to Learn:  Yes  Cooperative:  Yes  Receptive (willing/able to accept information):  Yes      Patient demonstrates understanding of the following:  Reason for the appointment, diagnosis and treatment plan:  Yes  Knowledge of proper use of medications and conditions for which they are ordered (with special attention to potential side effects or drug interactions):  Yes  Which situations necessitate calling provider and whom to contact:  Yes    Teaching Concerns:  Brief review of teaching materials with patient and .      Instructional Materials Used/Given:  Chemotherapy Packet and Cards  Disease Packet   Cancer Center Handbook   Care coordinator cards  Treatment calendar    Time spent teaching with patient:  10 minutes    Jessica CHAVEZ, RN  Care Coordinator  Gynecologic Cancer   Office:  [phone]  Pager: [phone] #[phone]

## 2019-12-23 NOTE — TELEPHONE ENCOUNTER
PA Initiation    Medication: Ondansetron-PA Initiated  Insurance Company: [employer] - Phone [phone] Fax [phone]  Pharmacy Filling the Rx: [facility] [place], [place] - [address] [place] [address] [phone]  Filling Pharmacy Phone:    Filling Pharmacy Fax:    Start Date: 12/23/2019

## 2019-12-23 NOTE — TELEPHONE ENCOUNTER
Prior Authorization Approval    Authorization Effective Date: 12/23/2019  Authorization Expiration Date: 6/23/2020  Medication: Ondansetron-PA Approved  Approved Dose/Quantity: 30/10ds  Reference #: [id number]   Insurance Company: [employer] - Phone [phone] Fax [phone]  Which Pharmacy is filling the prescription (Not needed for infusion/clinic administered): [facility] [address]  Pharmacy Notified:  yes  Patient Notified:  yes

## 2019-12-24 ENCOUNTER — APPOINTMENT (OUTPATIENT)
Dept: RADIATION ONCOLOGY | Facility: CLINIC | Age: 39
End: 2019-12-24
Attending: RADIOLOGY
Payer: COMMERCIAL

## 2019-12-24 PROCEDURE — 77386 ZZH IMRT TREATMENT DELIVERY, COMPLEX: CPT | Performed by: RADIOLOGY

## 2019-12-26 ENCOUNTER — APPOINTMENT (OUTPATIENT)
Dept: RADIATION ONCOLOGY | Facility: CLINIC | Age: 39
End: 2019-12-26
Attending: RADIOLOGY
Payer: COMMERCIAL

## 2019-12-26 PROCEDURE — 77386 ZZH IMRT TREATMENT DELIVERY, COMPLEX: CPT | Performed by: RADIOLOGY

## 2019-12-27 ENCOUNTER — APPOINTMENT (OUTPATIENT)
Dept: RADIATION ONCOLOGY | Facility: CLINIC | Age: 39
End: 2019-12-27
Attending: RADIOLOGY
Payer: COMMERCIAL

## 2019-12-27 PROCEDURE — 77386 ZZH IMRT TREATMENT DELIVERY, COMPLEX: CPT | Performed by: RADIOLOGY

## 2019-12-28 ENCOUNTER — APPOINTMENT (OUTPATIENT)
Dept: RADIATION ONCOLOGY | Facility: CLINIC | Age: 39
End: 2019-12-28
Attending: RADIOLOGY
Payer: COMMERCIAL

## 2019-12-28 PROCEDURE — 77386 ZZH IMRT TREATMENT DELIVERY, COMPLEX: CPT | Performed by: RADIOLOGY

## 2019-12-28 PROCEDURE — 77336 RADIATION PHYSICS CONSULT: CPT | Performed by: RADIOLOGY

## 2019-12-30 ENCOUNTER — APPOINTMENT (OUTPATIENT)
Dept: RADIATION ONCOLOGY | Facility: CLINIC | Age: 39
End: 2019-12-30
Attending: RADIOLOGY
Payer: COMMERCIAL

## 2019-12-30 PROCEDURE — 77386 ZZH IMRT TREATMENT DELIVERY, COMPLEX: CPT | Performed by: RADIOLOGY

## 2019-12-31 ENCOUNTER — APPOINTMENT (OUTPATIENT)
Dept: RADIATION ONCOLOGY | Facility: CLINIC | Age: 39
End: 2019-12-31
Attending: RADIOLOGY
Payer: COMMERCIAL

## 2019-12-31 ENCOUNTER — APPOINTMENT (OUTPATIENT)
Dept: LAB | Facility: CLINIC | Age: 39
End: 2019-12-31
Attending: OBSTETRICS & GYNECOLOGY
Payer: COMMERCIAL

## 2019-12-31 ENCOUNTER — INFUSION THERAPY VISIT (OUTPATIENT)
Dept: ONCOLOGY | Facility: CLINIC | Age: 39
End: 2019-12-31
Attending: OBSTETRICS & GYNECOLOGY
Payer: COMMERCIAL

## 2019-12-31 VITALS
BODY MASS INDEX: 37.45 KG/M2 | TEMPERATURE: 97.8 F | SYSTOLIC BLOOD PRESSURE: 128 MMHG | HEART RATE: 89 BPM | RESPIRATION RATE: 12 BRPM | DIASTOLIC BLOOD PRESSURE: 83 MMHG | WEIGHT: 231.9 LBS | OXYGEN SATURATION: 97 %

## 2019-12-31 VITALS
BODY MASS INDEX: 37.3 KG/M2 | WEIGHT: 231 LBS | DIASTOLIC BLOOD PRESSURE: 80 MMHG | HEART RATE: 103 BPM | SYSTOLIC BLOOD PRESSURE: 127 MMHG

## 2019-12-31 DIAGNOSIS — C79.82 METASTATIC ADENOCARCINOMA TO CERVIX (H): Primary | ICD-10-CM

## 2019-12-31 DIAGNOSIS — C53.9 MALIGNANT NEOPLASM OF CERVIX, UNSPECIFIED SITE (H): Primary | ICD-10-CM

## 2019-12-31 LAB
ALBUMIN SERPL-MCNC: 4.1 G/DL (ref 3.4–5)
ALP SERPL-CCNC: 82 U/L (ref 40–150)
ALT SERPL W P-5'-P-CCNC: 37 U/L (ref 0–50)
ANION GAP SERPL CALCULATED.3IONS-SCNC: 3 MMOL/L (ref 3–14)
AST SERPL W P-5'-P-CCNC: 19 U/L (ref 0–45)
BASOPHILS # BLD AUTO: 0 10E9/L (ref 0–0.2)
BASOPHILS NFR BLD AUTO: 0.8 %
BILIRUB SERPL-MCNC: 0.7 MG/DL (ref 0.2–1.3)
BUN SERPL-MCNC: 14 MG/DL (ref 7–30)
CALCIUM SERPL-MCNC: 8.9 MG/DL (ref 8.5–10.1)
CHLORIDE SERPL-SCNC: 101 MMOL/L (ref 94–109)
CO2 SERPL-SCNC: 31 MMOL/L (ref 20–32)
CREAT SERPL-MCNC: 0.79 MG/DL (ref 0.52–1.04)
DIFFERENTIAL METHOD BLD: ABNORMAL
EOSINOPHIL # BLD AUTO: 0.2 10E9/L (ref 0–0.7)
EOSINOPHIL NFR BLD AUTO: 4.2 %
ERYTHROCYTE [DISTWIDTH] IN BLOOD BY AUTOMATED COUNT: 12.8 % (ref 10–15)
GFR SERPL CREATININE-BSD FRML MDRD: >90 ML/MIN/{1.73_M2}
GLUCOSE SERPL-MCNC: 137 MG/DL (ref 70–99)
HCT VFR BLD AUTO: 43.2 % (ref 35–47)
HGB BLD-MCNC: 15.1 G/DL (ref 11.7–15.7)
IMM GRANULOCYTES # BLD: 0 10E9/L (ref 0–0.4)
IMM GRANULOCYTES NFR BLD: 0.8 %
LYMPHOCYTES # BLD AUTO: 0.5 10E9/L (ref 0.8–5.3)
LYMPHOCYTES NFR BLD AUTO: 9.3 %
MAGNESIUM SERPL-MCNC: 2.4 MG/DL (ref 1.6–2.3)
MCH RBC QN AUTO: 30 PG (ref 26.5–33)
MCHC RBC AUTO-ENTMCNC: 35 G/DL (ref 31.5–36.5)
MCV RBC AUTO: 86 FL (ref 78–100)
MONOCYTES # BLD AUTO: 0.5 10E9/L (ref 0–1.3)
MONOCYTES NFR BLD AUTO: 10.1 %
NEUTROPHILS # BLD AUTO: 3.9 10E9/L (ref 1.6–8.3)
NEUTROPHILS NFR BLD AUTO: 74.8 %
NRBC # BLD AUTO: 0 10*3/UL
NRBC BLD AUTO-RTO: 0 /100
PLATELET # BLD AUTO: 236 10E9/L (ref 150–450)
POTASSIUM SERPL-SCNC: 3.3 MMOL/L (ref 3.4–5.3)
PROT SERPL-MCNC: 8.1 G/DL (ref 6.8–8.8)
RBC # BLD AUTO: 5.03 10E12/L (ref 3.8–5.2)
SODIUM SERPL-SCNC: 135 MMOL/L (ref 133–144)
WBC # BLD AUTO: 5.3 10E9/L (ref 4–11)

## 2019-12-31 PROCEDURE — 25800030 ZZH RX IP 258 OP 636: Mod: ZF | Performed by: OBSTETRICS & GYNECOLOGY

## 2019-12-31 PROCEDURE — 25000128 H RX IP 250 OP 636: Mod: ZF | Performed by: OBSTETRICS & GYNECOLOGY

## 2019-12-31 PROCEDURE — 36415 COLL VENOUS BLD VENIPUNCTURE: CPT

## 2019-12-31 PROCEDURE — 85025 COMPLETE CBC W/AUTO DIFF WBC: CPT | Performed by: OBSTETRICS & GYNECOLOGY

## 2019-12-31 PROCEDURE — 96413 CHEMO IV INFUSION 1 HR: CPT

## 2019-12-31 PROCEDURE — 83735 ASSAY OF MAGNESIUM: CPT | Performed by: OBSTETRICS & GYNECOLOGY

## 2019-12-31 PROCEDURE — 96361 HYDRATE IV INFUSION ADD-ON: CPT

## 2019-12-31 PROCEDURE — 25800025 ZZH RX 258: Mod: ZF | Performed by: OBSTETRICS & GYNECOLOGY

## 2019-12-31 PROCEDURE — 80053 COMPREHEN METABOLIC PANEL: CPT | Performed by: OBSTETRICS & GYNECOLOGY

## 2019-12-31 PROCEDURE — 96375 TX/PRO/DX INJ NEW DRUG ADDON: CPT

## 2019-12-31 PROCEDURE — 77386 ZZH IMRT TREATMENT DELIVERY, COMPLEX: CPT | Performed by: RADIOLOGY

## 2019-12-31 PROCEDURE — 96367 TX/PROPH/DG ADDL SEQ IV INF: CPT

## 2019-12-31 PROCEDURE — 40000556 ZZH STATISTIC PERIPHERAL IV START W US GUIDANCE: Mod: ZF

## 2019-12-31 RX ORDER — LORAZEPAM 2 MG/ML
1 INJECTION INTRAMUSCULAR EVERY 6 HOURS PRN
Status: DISCONTINUED | OUTPATIENT
Start: 2019-12-31 | End: 2019-12-31 | Stop reason: HOSPADM

## 2019-12-31 RX ORDER — DEXTROSE, SODIUM CHLORIDE, AND POTASSIUM CHLORIDE 5; .45; .15 G/100ML; G/100ML; G/100ML
2000 INJECTION INTRAVENOUS ONCE
Status: COMPLETED | OUTPATIENT
Start: 2019-12-31 | End: 2019-12-31

## 2019-12-31 RX ORDER — PALONOSETRON 0.05 MG/ML
0.25 INJECTION, SOLUTION INTRAVENOUS ONCE
Status: COMPLETED | OUTPATIENT
Start: 2019-12-31 | End: 2019-12-31

## 2019-12-31 RX ADMIN — CISPLATIN 90 MG: 1 INJECTION, SOLUTION INTRAVENOUS at 09:59

## 2019-12-31 RX ADMIN — PALONOSETRON HYDROCHLORIDE 0.25 MG: 0.25 INJECTION, SOLUTION INTRAVENOUS at 08:02

## 2019-12-31 RX ADMIN — DEXAMETHASONE SODIUM PHOSPHATE: 10 INJECTION, SOLUTION INTRAMUSCULAR; INTRAVENOUS at 09:32

## 2019-12-31 RX ADMIN — LORAZEPAM 1 MG: 2 INJECTION INTRAMUSCULAR; INTRAVENOUS at 09:52

## 2019-12-31 RX ADMIN — POTASSIUM CHLORIDE, DEXTROSE MONOHYDRATE AND SODIUM CHLORIDE 2000 ML: 150; 5; 450 INJECTION, SOLUTION INTRAVENOUS at 08:07

## 2019-12-31 ASSESSMENT — PAIN SCALES - GENERAL: PAINLEVEL: NO PAIN (0)

## 2019-12-31 NOTE — PROGRESS NOTES
[facility] PHYSICIANS  SPECIALIZING IN BREAKTHROUGHS  Radiation Oncology    On Treatment Visit Note      Francesca Rowland      Date: 2019   MRN: [id number]   : 1980  Diagnosis: Cervical cancer      Reason for Visit:  On Radiation Treatment Visit     Treatment Summary to Date  Treatment Site: pelvis Current Dose: 1260/4500 cGy Fractions:       Chemotherapy  Chemo concurrent with radx?: Yes  Oncologist: Rola  Drug Name/Frequency 1: Cisplatin    ED Visit/Hosiptal Admission: None     Treatment Breaks:  ()      Subjective:   Ms. Rowland continues to struggle with nausea.  She had an allergic reaction to Emend and therefore has only been taking Ativan.  She is also allergic to compazine.  She does have Zofran but has not started using it.  She had one episode of diarrhea on Saturday, which has since resolved.      Nursing ROS:   Nutrition Alteration  Diet Type: Patient's Preference  Skin  Skin Reaction: 0 - No changes     Cardiovascular  Respiratory effort: 1 - Normal - without distress  Gastrointestinal  Nausea: 1 - One to two episodes of nausea/24  GI Note: WNL  Genitourinary   Note: WNL, no vaginal bleeding     Pain Assessment  0-10 Pain Scale: 0      Objective:   /80   Pulse 103   Wt 104.8 kg (231 lb)   BMI 37.30 kg/m    Gen: Appears well, in no acute distress  Skin: Mild diffuse erythema over treatment field    Labs:  CBC RESULTS:   Recent Labs   Lab Test 19  0627   WBC 5.3   RBC 5.03   HGB 15.1   HCT 43.2   MCV 86   MCH 30.0   MCHC 35.0   RDW 12.8        ELECTROLYTES:  Recent Labs   Lab Test 19  0627      POTASSIUM 3.3*   CHLORIDE 101   SHAD 8.9   CO2 31   BUN 14   CR 0.79   *       Assessment:    Tolerating radiation therapy well.  All questions and concerns addressed.    Toxicities:  Nausea: Grade 1: Loss of appetite without alteration in eating habits    Plan:   1. Continue current therapy.    2. Nausea: recommend taking  Zofran every 8 hours if nausea is persistent.  3. Diarrhea: maybe diet related.  Will continue to monitor.        Mosaiq chart and setup information reviewed  Ports checked    Medication Review  Med list reviewed with patient?: Yes    Educational Topic Discussed  Education Instructions: reviewed        Bret Kerns MD/PhD  [phone] clinic  Pager [phone]    Please do not send letter to referring physician.

## 2019-12-31 NOTE — PATIENT INSTRUCTIONS
Contact Numbers  [facility]: [phone]    After Hours:  [phone]  Triage: [phone]    Please call the [facility] Triage line if you experience a temperature greater than or equal to 100.5, shaking chills, have uncontrolled nausea, vomiting and/or diarrhea, dizziness, shortness of breath, chest pain, bleeding, unexplained bruising, or if you have any other new/concerning symptoms, questions or concerns.     If it is after hours, weekends, or holidays, please call the main hospital  at  [phone] and ask to speak to the Oncology doctor on call.     If you are having any concerning symptoms or wish to speak to a provider before your next infusion visit, please call your care coordinator or triage to notify them so we can adequately serve you.     If you need a refill on a narcotic prescription or other medication, please call triage before your infusion appointment.         December 2019 Sunday Monday Tuesday Wednesday Thursday Friday Saturday   1     2    P POST-OP  12:15 PM   (30 min.)   Jluis Canela MD   [facility] 3     4     5     6     7       8     9    [facility]/ ONCOLOGY   7:00 AM   (120 min.)   [facility]   [facility] 10     11    P CONSULT   2:00 PM   (90 min.)   Bret Kerns MD   Radiation Oncology Clinic    [facility] TCT/SIM SUITE   3:00 PM   (60 min.)   Bret Kerns MD   [facility] 12    [facility] TREATMENT PLAN VISIT   7:00 AM   (15 min.)   Bret Kerns MD   [facility] 13     14       15     16     17     18     19    [facility] TREATMENT PLAN VISIT   7:00 AM   (15 min.)   Bret Kerns MD   [facility] 20     21       22     23    [facility] DRAW   6:30 AM   (15 min.)    [facility] DRAW   [facility] Lab Draw    [facility] ONC INFUSION 360   7:00 AM   (360 min.)    ONCOLOGY INFUSION   [facility] EXTERNAL RADIATION TREATMT   2:00 PM   (30  min.)   [facility] ONC ERYN   [facility]    [facility] ON TREATMENT VISIT   2:30 PM   (15 min.)   Bret Kerns MD   [facility] 24    [facility] EXTERNAL RADIATION TREATMT   8:30 AM   (30 min.)   [facility] RAD ONC ERYN   [facility] 25     26    [facility] EXTERNAL RADIATION TREATMT   2:45 PM   (30 min.)   [facility] RAD ONC ERYN   [facility] 27    [facility] EXTERNAL RADIATION TREATMT   2:45 PM   (30 min.)   [facility] RAD ONC ERYN   [facility] 28    [facility] EXTERNAL RADIATION TREATMT  11:30 AM   (30 min.)   [facility] RAD ONC ERYN   [facility]   29     30  Happy Birthday!    [facility] EXTERNAL RADIATION TREATMT   2:45 PM   (30 min.)   [facility] RAD ONC ERYN   [facility] 31    [facility] DRAW   6:15 AM   (15 min.)   [facility] DRAW   [facility] Draw    [facility] ONC INFUSION 360   7:00 AM   (360 min.)   [facility] ONCOLOGY INFUSION   [facility]    [facility] EXTERNAL RADIATION TREATMT   2:45 PM   (30 min.)   [facility] RAD ONC ERYN   [facility]    [facility] ON TREATMENT VISIT   3:15 PM   (15 min.)   Bret Krens MD   [facility]cology Clinic                                 January 2020 Sunday Monday Tuesday Wednesday Thursday Friday Saturday                  1     2    [facility] EXTERNAL RADIATION TREATMT   3:30 PM   (30 min.)   [facility] RAD ONC ERYN   [facility] 3    [facility] EXTERNAL RADIATION TREATMT   3:30 PM   (30 min.)   [facility] RAD ONC ERYN   [facility] 4    [facility] EXTERNAL RADIATION TREATMT   9:30 AM   (30 min.)   [facility] RAD ONC ERYN   [facility]   5     6    [facility] DRAW   6:15 AM   (15 min.)   [facility] DRAW   [facility] Lab Draw    P ONC INFUSION 360   7:00 AM   (360 min.)   [facility] ONCOLOGY INFUSION   [facility]    [facility] EXTERNAL RADIATION TREATMT   3:30 PM   (30 min.)   [facility] RAD ONC ERYN   [facility]    [facility] ON TREATMENT VISIT   4:00 PM   (15 min.)   Bret Kerns MD    [facility] 7    [facility] EXTERNAL RADIATION TREATMT   3:30 PM   (30 min.)   [facility] RAD ONC ERYN   [facility] 8    [facility] EXTERNAL RADIATION TREATMT   3:30 PM   (30 min.)   [facility] RAD ONC ERYN   [facility] 9    [facility] EXTERNAL RADIATION TREATMT   3:30 PM   (30 min.)   [facility] RAD ONC ERYN   [facility] 10    [facility] EXTERNAL RADIATION TREATMT   3:30 PM   (30 min.)   [facility] RAD ONC ERYN   [facility] 11       12     13    [facility] DRAW   6:15 AM   (15 min.)   [facility] DRAW   [facility] Draw    [facility] ONC INFUSION 360   7:00 AM   (360 min.)   [facility] ONCOLOGY INFUSION   [facility]    [facility] EXTERNAL RADIATION TREATMT   3:30 PM   (30 min.)   [facility] RAD ONC ERYN   [facility]    [facility] ON TREATMENT VISIT   4:00 PM   (15 min.)   Bret Kerns MD   [facility] 14    [facility] EXTERNAL RADIATION TREATMT   3:30 PM   (30 min.)   [facility] RAD ONC ERYN   [facility] 15    [facility] EXTERNAL RADIATION TREATMT   3:30 PM   (30 min.)   [facility] RAD ONC ERYN   [facility] 16    [facility] EXTERNAL RADIATION TREATMT   3:30 PM   (30 min.)   [facility] RAD ONC ERYN   [facility] 17    [facility] EXTERNAL RADIATION TREATMT   3:30 PM   (30 min.)   [facility] RAD ONC ERYN   [facility] 18       19     20    [facility] DRAW   6:15 AM   (15 min.)   [facility] DRAW   [facility] Lab Draw    [facility] ONC INFUSION 360   7:00 AM   (360 min.)   [facility] ONCOLOGY INFUSION   [facility]    [facility] EXTERNAL RADIATION TREATMT   3:30 PM   (30 min.)   [facility] RAD ONC ERYN   [facility]    [facility] ON TREATMENT VISIT   4:00 PM   (15 min.)   Bret Kerns MD   [facility] 21    [facility] EXTERNAL RADIATION TREATMT   3:30 PM   (30 min.)   [facility] RAD ONC ERYN   [facility] 22    [facility] EXTERNAL RADIATION TREATMT   3:30 PM   (30 min.)   [facility] RAD ONC ERYN   [facility] 23    [facility] EXTERNAL RADIATION  TREATMT   3:30 PM   (30 min.)   [facility] RAD ONC ERYN   Radiation Oncology Clinic 24    [facility] EXTERNAL RADIATION TREATMT   3:30 PM   (30 min.)   [facility] RAD ONC ERYN   [facility] Oncology Clinic 25       26     27    [facility] DRAW   6:15 AM   (15 min.)   [facility] LAB DRAW   [facility] Lab Draw    [facility] ONC INFUSION 360   7:00 AM   (360 min.)    ONCOLOGY INFUSION   [facility] [facility] 28     29     30     31                          Lab Results:  Recent Results (from the past 12 hour(s))   Comprehensive metabolic panel    Collection Time: 12/31/19  6:27 AM   Result Value Ref Range    Sodium 135 133 - 144 mmol/L    Potassium 3.3 (L) 3.4 - 5.3 mmol/L    Chloride 101 94 - 109 mmol/L    Carbon Dioxide 31 20 - 32 mmol/L    Anion Gap 3 3 - 14 mmol/L    Glucose 137 (H) 70 - 99 mg/dL    Urea Nitrogen 14 7 - 30 mg/dL    Creatinine 0.79 0.52 - 1.04 mg/dL    GFR Estimate >90 >60 mL/min/[1.73_m2]    GFR Estimate If Black >90 >60 mL/min/[1.73_m2]    Calcium 8.9 8.5 - 10.1 mg/dL    Bilirubin Total 0.7 0.2 - 1.3 mg/dL    Albumin 4.1 3.4 - 5.0 g/dL    Protein Total 8.1 6.8 - 8.8 g/dL    Alkaline Phosphatase 82 40 - 150 U/L    ALT 37 0 - 50 U/L    AST 19 0 - 45 U/L   Magnesium    Collection Time: 12/31/19  6:27 AM   Result Value Ref Range    Magnesium 2.4 (H) 1.6 - 2.3 mg/dL   CBC with platelets differential    Collection Time: 12/31/19  6:27 AM   Result Value Ref Range    WBC 5.3 4.0 - 11.0 10e9/L    RBC Count 5.03 3.8 - 5.2 10e12/L    Hemoglobin 15.1 11.7 - 15.7 g/dL    Hematocrit 43.2 35.0 - 47.0 %    MCV 86 78 - 100 fl    MCH 30.0 26.5 - 33.0 pg    MCHC 35.0 31.5 - 36.5 g/dL    RDW 12.8 10.0 - 15.0 %    Platelet Count 236 150 - 450 10e9/L    Diff Method Automated Method     % Neutrophils 74.8 %    % Lymphocytes 9.3 %    % Monocytes 10.1 %    % Eosinophils 4.2 %    % Basophils 0.8 %    % Immature Granulocytes 0.8 %    Nucleated RBCs 0 0 /100    Absolute Neutrophil 3.9 1.6 - 8.3 10e9/L    Absolute Lymphocytes 0.5 (L)  0.8 - 5.3 10e9/L    Absolute Monocytes 0.5 0.0 - 1.3 10e9/L    Absolute Eosinophils 0.2 0.0 - 0.7 10e9/L    Absolute Basophils 0.0 0.0 - 0.2 10e9/L    Abs Immature Granulocytes 0.0 0 - 0.4 10e9/L    Absolute Nucleated RBC 0.0

## 2019-12-31 NOTE — LETTER
Date:January 2, 2020      Provider requested that no letter be sent. Do not send.       [facility] Health Information

## 2019-12-31 NOTE — NURSING NOTE
Chief Complaint   Patient presents with     Blood Draw     Labs drawn via  by RN in lab. VS taken.      Labs drawn via venipuncture. Vital signs taken. Checked into next appointment.   Lis Robles RN

## 2019-12-31 NOTE — PROGRESS NOTES
Infusion Nursing Note:  Francesca Rowland presents today for C1D8 Cisplatin.    Patient seen by provider today: No   present during visit today: Not Applicable.    Note: Patient feels well. States had few episode of nausea with no vomiting. Good relief with medication. States eating and drinking well. No new concerns made. Otherwise well.     Patient request Ativan as premeds. Voiding freely pre, during and post Cisplatin.     Intravenous Access:  Peripheral IV placed by Vascular Access.    Treatment Conditions:  Lab Results   Component Value Date    HGB 15.1 12/31/2019     Lab Results   Component Value Date    WBC 5.3 12/31/2019      Lab Results   Component Value Date    ANEU 3.9 12/31/2019     Lab Results   Component Value Date     12/31/2019      Lab Results   Component Value Date     12/31/2019                   Lab Results   Component Value Date    POTASSIUM 3.3 12/31/2019           Lab Results   Component Value Date    MAG 2.4 12/31/2019            Lab Results   Component Value Date    CR 0.79 12/31/2019                   Lab Results   Component Value Date    SHAD 8.9 12/31/2019                Lab Results   Component Value Date    BILITOTAL 0.7 12/31/2019           Lab Results   Component Value Date    ALBUMIN 4.1 12/31/2019                    Lab Results   Component Value Date    ALT 37 12/31/2019           Lab Results   Component Value Date    AST 19 12/31/2019       Results reviewed, labs MET treatment parameters, ok to proceed with treatment.    TORB: 12/31/19/0946/ Lissy Meade (oncall resident)/ Supriya Almeida RN. K 3.3 no intervention needed.       Post Infusion Assessment:  Patient tolerated infusion without incident.  Blood return noted pre and post infusion.  Site patent and intact, free from redness, edema or discomfort.  No evidence of extravasations.  Access discontinued per protocol.       Discharge Plan:   Patient declined prescription refills.  Discharge instructions  reviewed with: Patient and Family.  Patient and/or family verbalized understanding of discharge instructions and all questions answered.  AVS to patient via [employer].  Patient will return 1/6/20 for next appointment.   Patient discharged in stable condition accompanied by: self and friend.  Departure Mode: Ambulatory.  Face to Face time: 5.    MANUEL LAZO RN

## 2020-01-02 ENCOUNTER — APPOINTMENT (OUTPATIENT)
Dept: RADIATION ONCOLOGY | Facility: CLINIC | Age: 40
End: 2020-01-02
Attending: RADIOLOGY
Payer: COMMERCIAL

## 2020-01-02 PROCEDURE — 77386 ZZH IMRT TREATMENT DELIVERY, COMPLEX: CPT | Performed by: RADIOLOGY

## 2020-01-03 ENCOUNTER — APPOINTMENT (OUTPATIENT)
Dept: RADIATION ONCOLOGY | Facility: CLINIC | Age: 40
End: 2020-01-03
Attending: RADIOLOGY
Payer: COMMERCIAL

## 2020-01-03 PROCEDURE — 77386 ZZH IMRT TREATMENT DELIVERY, COMPLEX: CPT | Performed by: RADIOLOGY

## 2020-01-04 ENCOUNTER — APPOINTMENT (OUTPATIENT)
Dept: RADIATION ONCOLOGY | Facility: CLINIC | Age: 40
End: 2020-01-04
Attending: RADIOLOGY
Payer: COMMERCIAL

## 2020-01-04 PROCEDURE — 77336 RADIATION PHYSICS CONSULT: CPT | Performed by: RADIOLOGY

## 2020-01-04 PROCEDURE — 77386 ZZH IMRT TREATMENT DELIVERY, COMPLEX: CPT | Performed by: RADIOLOGY

## 2020-01-06 ENCOUNTER — INFUSION THERAPY VISIT (OUTPATIENT)
Dept: ONCOLOGY | Facility: CLINIC | Age: 40
End: 2020-01-06
Attending: OBSTETRICS & GYNECOLOGY
Payer: COMMERCIAL

## 2020-01-06 ENCOUNTER — APPOINTMENT (OUTPATIENT)
Dept: RADIATION ONCOLOGY | Facility: CLINIC | Age: 40
End: 2020-01-06
Attending: RADIOLOGY
Payer: COMMERCIAL

## 2020-01-06 ENCOUNTER — APPOINTMENT (OUTPATIENT)
Dept: LAB | Facility: CLINIC | Age: 40
End: 2020-01-06
Attending: OBSTETRICS & GYNECOLOGY
Payer: COMMERCIAL

## 2020-01-06 VITALS
HEART RATE: 72 BPM | OXYGEN SATURATION: 97 % | WEIGHT: 233.5 LBS | SYSTOLIC BLOOD PRESSURE: 111 MMHG | RESPIRATION RATE: 18 BRPM | TEMPERATURE: 97.5 F | BODY MASS INDEX: 37.71 KG/M2 | DIASTOLIC BLOOD PRESSURE: 76 MMHG

## 2020-01-06 VITALS — OXYGEN SATURATION: 96 % | HEART RATE: 85 BPM | DIASTOLIC BLOOD PRESSURE: 82 MMHG | SYSTOLIC BLOOD PRESSURE: 118 MMHG

## 2020-01-06 DIAGNOSIS — C53.9 MALIGNANT NEOPLASM OF CERVIX, UNSPECIFIED SITE (H): Primary | ICD-10-CM

## 2020-01-06 DIAGNOSIS — C79.82 METASTATIC ADENOCARCINOMA TO CERVIX (H): Primary | ICD-10-CM

## 2020-01-06 LAB
ALBUMIN SERPL-MCNC: 3.7 G/DL (ref 3.4–5)
ALP SERPL-CCNC: 73 U/L (ref 40–150)
ALT SERPL W P-5'-P-CCNC: 38 U/L (ref 0–50)
ANION GAP SERPL CALCULATED.3IONS-SCNC: 2 MMOL/L (ref 3–14)
AST SERPL W P-5'-P-CCNC: 20 U/L (ref 0–45)
BASOPHILS # BLD AUTO: 0 10E9/L (ref 0–0.2)
BASOPHILS NFR BLD AUTO: 0.7 %
BILIRUB SERPL-MCNC: 0.5 MG/DL (ref 0.2–1.3)
BUN SERPL-MCNC: 10 MG/DL (ref 7–30)
CALCIUM SERPL-MCNC: 8.5 MG/DL (ref 8.5–10.1)
CHLORIDE SERPL-SCNC: 105 MMOL/L (ref 94–109)
CO2 SERPL-SCNC: 30 MMOL/L (ref 20–32)
CREAT BLD-MCNC: 0.5 MG/DL (ref 0.52–1.04)
CREAT SERPL-MCNC: 0.69 MG/DL (ref 0.52–1.04)
DIFFERENTIAL METHOD BLD: ABNORMAL
EOSINOPHIL # BLD AUTO: 0.3 10E9/L (ref 0–0.7)
EOSINOPHIL NFR BLD AUTO: 7.5 %
ERYTHROCYTE [DISTWIDTH] IN BLOOD BY AUTOMATED COUNT: 12.9 % (ref 10–15)
GFR SERPL CREATININE-BSD FRML MDRD: >90 ML/MIN/{1.73_M2}
GFR SERPL CREATININE-BSD FRML MDRD: >90 ML/MIN/{1.73_M2}
GLUCOSE SERPL-MCNC: 110 MG/DL (ref 70–99)
HCT VFR BLD AUTO: 38.4 % (ref 35–47)
HGB BLD-MCNC: 13.2 G/DL (ref 11.7–15.7)
IMM GRANULOCYTES # BLD: 0 10E9/L (ref 0–0.4)
IMM GRANULOCYTES NFR BLD: 0.5 %
LYMPHOCYTES # BLD AUTO: 0.5 10E9/L (ref 0.8–5.3)
LYMPHOCYTES NFR BLD AUTO: 12.2 %
MAGNESIUM SERPL-MCNC: 1.8 MG/DL (ref 1.6–2.3)
MCH RBC QN AUTO: 29.9 PG (ref 26.5–33)
MCHC RBC AUTO-ENTMCNC: 34.4 G/DL (ref 31.5–36.5)
MCV RBC AUTO: 87 FL (ref 78–100)
MONOCYTES # BLD AUTO: 0.3 10E9/L (ref 0–1.3)
MONOCYTES NFR BLD AUTO: 8.5 %
NEUTROPHILS # BLD AUTO: 2.8 10E9/L (ref 1.6–8.3)
NEUTROPHILS NFR BLD AUTO: 70.6 %
NRBC # BLD AUTO: 0 10*3/UL
NRBC BLD AUTO-RTO: 0 /100
PLATELET # BLD AUTO: 106 10E9/L (ref 150–450)
POTASSIUM SERPL-SCNC: 3.8 MMOL/L (ref 3.4–5.3)
PROT SERPL-MCNC: 7.2 G/DL (ref 6.8–8.8)
RBC # BLD AUTO: 4.41 10E12/L (ref 3.8–5.2)
SODIUM SERPL-SCNC: 137 MMOL/L (ref 133–144)
WBC # BLD AUTO: 4 10E9/L (ref 4–11)

## 2020-01-06 PROCEDURE — 96367 TX/PROPH/DG ADDL SEQ IV INF: CPT

## 2020-01-06 PROCEDURE — 80053 COMPREHEN METABOLIC PANEL: CPT | Performed by: OBSTETRICS & GYNECOLOGY

## 2020-01-06 PROCEDURE — 85025 COMPLETE CBC W/AUTO DIFF WBC: CPT | Performed by: OBSTETRICS & GYNECOLOGY

## 2020-01-06 PROCEDURE — 25000128 H RX IP 250 OP 636: Mod: ZF | Performed by: NURSE PRACTITIONER

## 2020-01-06 PROCEDURE — 96413 CHEMO IV INFUSION 1 HR: CPT

## 2020-01-06 PROCEDURE — 96375 TX/PRO/DX INJ NEW DRUG ADDON: CPT

## 2020-01-06 PROCEDURE — 25800030 ZZH RX IP 258 OP 636: Mod: ZF | Performed by: OBSTETRICS & GYNECOLOGY

## 2020-01-06 PROCEDURE — 77386 ZZH IMRT TREATMENT DELIVERY, COMPLEX: CPT | Performed by: RADIOLOGY

## 2020-01-06 PROCEDURE — 25800030 ZZH RX IP 258 OP 636: Mod: ZF | Performed by: NURSE PRACTITIONER

## 2020-01-06 PROCEDURE — 40000556 ZZH STATISTIC PERIPHERAL IV START W US GUIDANCE: Mod: ZF

## 2020-01-06 PROCEDURE — 25000128 H RX IP 250 OP 636: Mod: ZF | Performed by: OBSTETRICS & GYNECOLOGY

## 2020-01-06 PROCEDURE — 25800025 ZZH RX 258: Mod: ZF | Performed by: OBSTETRICS & GYNECOLOGY

## 2020-01-06 PROCEDURE — 83735 ASSAY OF MAGNESIUM: CPT | Performed by: OBSTETRICS & GYNECOLOGY

## 2020-01-06 RX ORDER — DEXTROSE, SODIUM CHLORIDE, AND POTASSIUM CHLORIDE 5; .45; .15 G/100ML; G/100ML; G/100ML
2000 INJECTION INTRAVENOUS ONCE
Status: COMPLETED | OUTPATIENT
Start: 2020-01-06 | End: 2020-01-06

## 2020-01-06 RX ORDER — PALONOSETRON 0.05 MG/ML
0.25 INJECTION, SOLUTION INTRAVENOUS ONCE
Status: COMPLETED | OUTPATIENT
Start: 2020-01-06 | End: 2020-01-06

## 2020-01-06 RX ADMIN — POTASSIUM CHLORIDE, DEXTROSE MONOHYDRATE AND SODIUM CHLORIDE 2000 ML: 150; 5; 450 INJECTION, SOLUTION INTRAVENOUS at 08:14

## 2020-01-06 RX ADMIN — CISPLATIN 90 MG: 1 INJECTION, SOLUTION INTRAVENOUS at 09:33

## 2020-01-06 RX ADMIN — PALONOSETRON HYDROCHLORIDE 0.25 MG: 0.25 INJECTION, SOLUTION INTRAVENOUS at 08:29

## 2020-01-06 RX ADMIN — DEXAMETHASONE SODIUM PHOSPHATE: 10 INJECTION, SOLUTION INTRAMUSCULAR; INTRAVENOUS at 09:03

## 2020-01-06 ASSESSMENT — PAIN SCALES - GENERAL: PAINLEVEL: NO PAIN (0)

## 2020-01-06 NOTE — LETTER
2020       RE: Francesca Rowland  [address]  [place] [address]     Dear Colleague,    Thank you for referring your patient, Francesca Rowland, to the RADIATION ONCOLOGY CLINIC. Please see a copy of my visit note below.    [facility] PHYSICIANS  SPECIALIZING IN BREAKTHROUGHS  Radiation Oncology    On Treatment Visit Note      Francesca Rowland      Date: 2020   MRN: [id number]   : 1980  Diagnosis: Cervical cancer      Reason for Visit:  On Radiation Treatment Visit     Treatment Summary to Date  Treatment Site: pelvis Current Dose:  cGy Fractions:       Chemotherapy  Chemo concurrent with radx?: Yes  Oncologist: Rola  Drug Name/Frequency 1: Cisplatin    ED Visit/Hosiptal Admission: None     Treatment Breaks: None       Subjective:   Francesca denies nausea despite not on anti-emetics. She is allergic to Emend and Compazine, and not taking Zofran.  She did however develop watery diarrhea, up to 5 episodes a day.  She is getting a bit fatigued and find it difficult to work full time at [employer].      Nursing ROS:   Nutrition Alteration  Diet Type: Patient's Preference  Skin  Skin Reaction: 0 - No changes        Cardiovascular  Respiratory effort: 1 - Normal - without distress  Gastrointestinal  Nausea: 0 - None  Diarrhea: 2 - Three to five soft or liquid bowel movements per day(Will try some imodium)  GI Note: WNL  Genitourinary   Note: WNL, no vaginal bleeding  Psychosocial  Pyschosocial Note: No complaints per pt  Pain Assessment  0-10 Pain Scale: 0      Objective:   /82   Pulse 85   SpO2 96%   Gen: Appears well, in no acute distress  Skin: Deferred    Labs:  CBC RESULTS:   Recent Labs   Lab Test 20  0703   WBC 4.0   RBC 4.41   HGB 13.2   HCT 38.4   MCV 87   MCH 29.9   MCHC 34.4   RDW 12.9   *     ELECTROLYTES:  Recent Labs   Lab Test 20  0703      POTASSIUM 3.8   CHLORIDE 105   SHAD 8.5   CO2 30   BUN 10   CR 0.69   GLC  110*       Assessment:    Tolerating radiation therapy well.  All questions and concerns addressed.    Toxicities:  Fatigue: Grade 1: Fatigue relieved by rest  Nausea: Grade 0: No toxicity  Diarrhea: Grade 1: Increase of <4 stools per day over baseline; mild increase in ostomy output compared to baseline  Urinary frequency: Grade 0: No toxicity  Urinary tract pain: Grade 0: No toxicity  Urinary urgency: Grade 0: No toxicity    Plan:   1. Continue current therapy.    2. Diarrhea: she will start Imodium.       Mosaiq chart and setup information reviewed  MVCT checked    Medication Review  Med Note: No changes per pt    Educational Topic Discussed  Education Instructions: reviewed        Bret Kerns MD/PhD  [phone] clinic  Pager [phone]    Please do not send letter to referring physician.      Again, thank you for allowing me to participate in the care of your patient.      Sincerely,    Bret Kerns MD

## 2020-01-06 NOTE — NURSING NOTE
Chief Complaint   Patient presents with     Blood Draw     Venipuncture labs collected by RN. Unable to place PIV, will need ultra sound.

## 2020-01-06 NOTE — PROGRESS NOTES
Infusion Nursing Note:  Francesca Rowland presents today for Cycle 1 Day 15 Cisplatin.    Patient seen by provider today: No   present during visit today: Not Applicable.    Note: Pt presents to infusion today feeling well. Reports nausea relieved by at home Ativan. She states she has been having diarrhea x4 days and multiple times per day. Pt receiving radiation treatment. IB sent to Jessica Scherer, RN Care Coordinator, for Immodium. Pt reports her appetite has been good and has been eating. Encouraged her to increase her fluid intake due to side effects from Cisplatin and the diarrhea. Pt verbalized an understanding.     Intravenous Access:  Peripheral IV placed by Vascular Access.    Treatment Conditions:  Lab Results   Component Value Date    HGB 13.2 01/06/2020     Lab Results   Component Value Date    WBC 4.0 01/06/2020      Lab Results   Component Value Date    ANEU 2.8 01/06/2020     Lab Results   Component Value Date     01/06/2020      Lab Results   Component Value Date     01/06/2020                   Lab Results   Component Value Date    POTASSIUM 3.8 01/06/2020           Lab Results   Component Value Date    MAG 1.8 01/06/2020            Lab Results   Component Value Date    CR 0.69 01/06/2020                   Lab Results   Component Value Date    SHAD 8.5 01/06/2020                Lab Results   Component Value Date    BILITOTAL 0.5 01/06/2020           Lab Results   Component Value Date    ALBUMIN 3.7 01/06/2020                    Lab Results   Component Value Date    ALT 38 01/06/2020           Lab Results   Component Value Date    AST 20 01/06/2020       Results reviewed, labs MET treatment parameters, ok to proceed with treatment.      Post Infusion Assessment:  Patient tolerated infusion without incident.  Blood return noted pre and post infusion.  Site patent and intact, free from redness, edema or discomfort.  No evidence of extravasations.  Access discontinued per protocol.        Discharge Plan:   Patient declined prescription refills.  Discharge instructions reviewed with: Patient.  Patient and/or family verbalized understanding of discharge instructions and all questions answered.  AVS to patient via [employer].  Patient will return 1/13 for next appointment.   Patient discharged in stable condition accompanied by: self and friend.  Departure Mode: Ambulatory.  Face to Face time: 1.    Izabela Mcgarry RN

## 2020-01-06 NOTE — PROGRESS NOTES
[facility] PHYSICIANS  SPECIALIZING IN BREAKTHROUGHS  Radiation Oncology    On Treatment Visit Note      Francesca Rowland      Date: 2020   MRN: [id number]   : 1980  Diagnosis: Cervical cancer      Reason for Visit:  On Radiation Treatment Visit     Treatment Summary to Date  Treatment Site: pelvis Current Dose: / cGy Fractions:       Chemotherapy  Chemo concurrent with radx?: Yes  Oncologist: Rola  Drug Name/Frequency 1: Cisplatin    ED Visit/Hosiptal Admission: None     Treatment Breaks: None       Subjective:   Francesca denies nausea despite not on anti-emetics. She is allergic to Emend and Compazine, and not taking Zofran.  She did however develop watery diarrhea, up to 5 episodes a day.  She is getting a bit fatigued and find it difficult to work full time at the [employer].      Nursing ROS:   Nutrition Alteration  Diet Type: Patient's Preference  Skin  Skin Reaction: 0 - No changes        Cardiovascular  Respiratory effort: 1 - Normal - without distress  Gastrointestinal  Nausea: 0 - None  Diarrhea: 2 - Three to five soft or liquid bowel movements per day(Will try some imodium)  GI Note: WNL  Genitourinary   Note: WNL, no vaginal bleeding  Psychosocial  Pyschosocial Note: No complaints per pt  Pain Assessment  0-10 Pain Scale: 0      Objective:   /82   Pulse 85   SpO2 96%   Gen: Appears well, in no acute distress  Skin: Deferred    Labs:  CBC RESULTS:   Recent Labs   Lab Test 20  0703   WBC 4.0   RBC 4.41   HGB 13.2   HCT 38.4   MCV 87   MCH 29.9   MCHC 34.4   RDW 12.9   *     ELECTROLYTES:  Recent Labs   Lab Test 20  0703      POTASSIUM 3.8   CHLORIDE 105   SHAD 8.5   CO2 30   BUN 10   CR 0.69   *       Assessment:    Tolerating radiation therapy well.  All questions and concerns addressed.    Toxicities:  Fatigue: Grade 1: Fatigue relieved by rest  Nausea: Grade 0: No toxicity  Diarrhea: Grade 1: Increase of <4 stools per day  over baseline; mild increase in ostomy output compared to baseline  Urinary frequency: Grade 0: No toxicity  Urinary tract pain: Grade 0: No toxicity  Urinary urgency: Grade 0: No toxicity    Plan:   1. Continue current therapy.    2. Diarrhea: she will start Imodium.       Mosaiq chart and setup information reviewed  MVCT checked    Medication Review  Med Note: No changes per pt    Educational Topic Discussed  Education Instructions: reviewed        Bret Kerns MD/PhD  [phone] clinic  Pager [phone]    Please do not send letter to referring physician.

## 2020-01-06 NOTE — LETTER
[facility] [place]  RADIATION ONCOLOGY CLINIC  [facility]  [address]ST FLOOR  [address] [address]  [phone]      1/22/2020    Re: Francesca Rowland      TO WHOM IT MAY CONCERN:    Francesca Rowland is currently under my care.  She will be finishing her treatments 1/24/2020. Please allow her to continue to decrease her work schedule to 2 days per week for the next 2 weeks. She is experiencing side effects caused by her treatments and they will continue for several weeks after her radiation is completed.     Cordially,    Bret Kerns MD

## 2020-01-06 NOTE — LETTER
Date:January 7, 2020      Provider requested that no letter be sent. Do not send.       [facility] Health Information

## 2020-01-07 ENCOUNTER — APPOINTMENT (OUTPATIENT)
Dept: RADIATION ONCOLOGY | Facility: CLINIC | Age: 40
End: 2020-01-07
Attending: RADIOLOGY
Payer: COMMERCIAL

## 2020-01-07 PROCEDURE — 77386 ZZH IMRT TREATMENT DELIVERY, COMPLEX: CPT | Performed by: RADIOLOGY

## 2020-01-08 ENCOUNTER — APPOINTMENT (OUTPATIENT)
Dept: RADIATION ONCOLOGY | Facility: CLINIC | Age: 40
End: 2020-01-08
Attending: RADIOLOGY
Payer: COMMERCIAL

## 2020-01-08 PROCEDURE — 77386 ZZH IMRT TREATMENT DELIVERY, COMPLEX: CPT | Performed by: RADIOLOGY

## 2020-01-09 ENCOUNTER — APPOINTMENT (OUTPATIENT)
Dept: RADIATION ONCOLOGY | Facility: CLINIC | Age: 40
End: 2020-01-09
Attending: RADIOLOGY
Payer: COMMERCIAL

## 2020-01-09 PROCEDURE — 77386 ZZH IMRT TREATMENT DELIVERY, COMPLEX: CPT | Performed by: RADIOLOGY

## 2020-01-10 ENCOUNTER — APPOINTMENT (OUTPATIENT)
Dept: RADIATION ONCOLOGY | Facility: CLINIC | Age: 40
End: 2020-01-10
Attending: RADIOLOGY
Payer: COMMERCIAL

## 2020-01-10 PROCEDURE — 77386 ZZH IMRT TREATMENT DELIVERY, COMPLEX: CPT | Performed by: RADIOLOGY

## 2020-01-10 PROCEDURE — 77336 RADIATION PHYSICS CONSULT: CPT | Performed by: RADIOLOGY

## 2020-01-13 ENCOUNTER — APPOINTMENT (OUTPATIENT)
Dept: LAB | Facility: CLINIC | Age: 40
End: 2020-01-13
Attending: OBSTETRICS & GYNECOLOGY
Payer: COMMERCIAL

## 2020-01-13 ENCOUNTER — APPOINTMENT (OUTPATIENT)
Dept: RADIATION ONCOLOGY | Facility: CLINIC | Age: 40
End: 2020-01-13
Attending: RADIOLOGY
Payer: COMMERCIAL

## 2020-01-13 ENCOUNTER — INFUSION THERAPY VISIT (OUTPATIENT)
Dept: ONCOLOGY | Facility: CLINIC | Age: 40
End: 2020-01-13
Attending: OBSTETRICS & GYNECOLOGY
Payer: COMMERCIAL

## 2020-01-13 VITALS
WEIGHT: 227.7 LBS | RESPIRATION RATE: 16 BRPM | BODY MASS INDEX: 36.77 KG/M2 | TEMPERATURE: 98.4 F | HEART RATE: 79 BPM | SYSTOLIC BLOOD PRESSURE: 113 MMHG | DIASTOLIC BLOOD PRESSURE: 79 MMHG | OXYGEN SATURATION: 98 %

## 2020-01-13 VITALS — HEART RATE: 84 BPM | DIASTOLIC BLOOD PRESSURE: 80 MMHG | SYSTOLIC BLOOD PRESSURE: 121 MMHG

## 2020-01-13 DIAGNOSIS — C53.9 MALIGNANT NEOPLASM OF CERVIX, UNSPECIFIED SITE (H): Primary | ICD-10-CM

## 2020-01-13 DIAGNOSIS — C79.82 METASTATIC ADENOCARCINOMA TO CERVIX (H): Primary | ICD-10-CM

## 2020-01-13 DIAGNOSIS — R30.0 DYSURIA: ICD-10-CM

## 2020-01-13 LAB
ALBUMIN SERPL-MCNC: 3.7 G/DL (ref 3.4–5)
ALBUMIN UR-MCNC: NEGATIVE MG/DL
ALP SERPL-CCNC: 78 U/L (ref 40–150)
ALT SERPL W P-5'-P-CCNC: 41 U/L (ref 0–50)
ANION GAP SERPL CALCULATED.3IONS-SCNC: 7 MMOL/L (ref 3–14)
APPEARANCE UR: CLEAR
AST SERPL W P-5'-P-CCNC: 21 U/L (ref 0–45)
BASOPHILS # BLD AUTO: 0 10E9/L (ref 0–0.2)
BASOPHILS NFR BLD AUTO: 0.8 %
BILIRUB SERPL-MCNC: 0.7 MG/DL (ref 0.2–1.3)
BILIRUB UR QL STRIP: NEGATIVE
BUN SERPL-MCNC: 10 MG/DL (ref 7–30)
CALCIUM SERPL-MCNC: 8.6 MG/DL (ref 8.5–10.1)
CHLORIDE SERPL-SCNC: 105 MMOL/L (ref 94–109)
CO2 SERPL-SCNC: 27 MMOL/L (ref 20–32)
COLOR UR AUTO: YELLOW
CREAT SERPL-MCNC: 0.76 MG/DL (ref 0.52–1.04)
DIFFERENTIAL METHOD BLD: ABNORMAL
EOSINOPHIL # BLD AUTO: 0.4 10E9/L (ref 0–0.7)
EOSINOPHIL NFR BLD AUTO: 12.3 %
ERYTHROCYTE [DISTWIDTH] IN BLOOD BY AUTOMATED COUNT: 12.3 % (ref 10–15)
GFR SERPL CREATININE-BSD FRML MDRD: >90 ML/MIN/{1.73_M2}
GLUCOSE SERPL-MCNC: 118 MG/DL (ref 70–99)
GLUCOSE UR STRIP-MCNC: 50 MG/DL
HCT VFR BLD AUTO: 37 % (ref 35–47)
HGB BLD-MCNC: 13 G/DL (ref 11.7–15.7)
HGB UR QL STRIP: NEGATIVE
IMM GRANULOCYTES # BLD: 0 10E9/L (ref 0–0.4)
IMM GRANULOCYTES NFR BLD: 0.8 %
KETONES UR STRIP-MCNC: NEGATIVE MG/DL
LEUKOCYTE ESTERASE UR QL STRIP: NEGATIVE
LYMPHOCYTES # BLD AUTO: 0.3 10E9/L (ref 0.8–5.3)
LYMPHOCYTES NFR BLD AUTO: 7.3 %
MAGNESIUM SERPL-MCNC: 1.8 MG/DL (ref 1.6–2.3)
MCH RBC QN AUTO: 29.5 PG (ref 26.5–33)
MCHC RBC AUTO-ENTMCNC: 35.1 G/DL (ref 31.5–36.5)
MCV RBC AUTO: 84 FL (ref 78–100)
MONOCYTES # BLD AUTO: 0.4 10E9/L (ref 0–1.3)
MONOCYTES NFR BLD AUTO: 9.8 %
MUCOUS THREADS #/AREA URNS LPF: PRESENT /LPF
NEUTROPHILS # BLD AUTO: 2.5 10E9/L (ref 1.6–8.3)
NEUTROPHILS NFR BLD AUTO: 69 %
NITRATE UR QL: NEGATIVE
NRBC # BLD AUTO: 0 10*3/UL
NRBC BLD AUTO-RTO: 0 /100
PH UR STRIP: 6 PH (ref 5–7)
PLATELET # BLD AUTO: 101 10E9/L (ref 150–450)
POTASSIUM SERPL-SCNC: 3.3 MMOL/L (ref 3.4–5.3)
PROT SERPL-MCNC: 7.3 G/DL (ref 6.8–8.8)
RBC # BLD AUTO: 4.4 10E12/L (ref 3.8–5.2)
RBC #/AREA URNS AUTO: <1 /HPF (ref 0–2)
SODIUM SERPL-SCNC: 139 MMOL/L (ref 133–144)
SOURCE: ABNORMAL
SP GR UR STRIP: 1.01 (ref 1–1.03)
SQUAMOUS #/AREA URNS AUTO: <1 /HPF (ref 0–1)
UROBILINOGEN UR STRIP-MCNC: 0 MG/DL (ref 0–2)
WBC # BLD AUTO: 3.6 10E9/L (ref 4–11)
WBC #/AREA URNS AUTO: 1 /HPF (ref 0–5)

## 2020-01-13 PROCEDURE — 96375 TX/PRO/DX INJ NEW DRUG ADDON: CPT

## 2020-01-13 PROCEDURE — 36415 COLL VENOUS BLD VENIPUNCTURE: CPT

## 2020-01-13 PROCEDURE — 80053 COMPREHEN METABOLIC PANEL: CPT | Performed by: OBSTETRICS & GYNECOLOGY

## 2020-01-13 PROCEDURE — 96361 HYDRATE IV INFUSION ADD-ON: CPT

## 2020-01-13 PROCEDURE — 77386 ZZH IMRT TREATMENT DELIVERY, COMPLEX: CPT | Performed by: RADIOLOGY

## 2020-01-13 PROCEDURE — 83735 ASSAY OF MAGNESIUM: CPT | Performed by: OBSTETRICS & GYNECOLOGY

## 2020-01-13 PROCEDURE — 25800030 ZZH RX IP 258 OP 636: Mod: ZF | Performed by: OBSTETRICS & GYNECOLOGY

## 2020-01-13 PROCEDURE — 40000556 ZZH STATISTIC PERIPHERAL IV START W US GUIDANCE: Mod: ZF

## 2020-01-13 PROCEDURE — G0463 HOSPITAL OUTPT CLINIC VISIT: HCPCS | Mod: 25

## 2020-01-13 PROCEDURE — 25000128 H RX IP 250 OP 636: Mod: ZF | Performed by: NURSE PRACTITIONER

## 2020-01-13 PROCEDURE — 96413 CHEMO IV INFUSION 1 HR: CPT

## 2020-01-13 PROCEDURE — 25800030 ZZH RX IP 258 OP 636: Mod: ZF | Performed by: NURSE PRACTITIONER

## 2020-01-13 PROCEDURE — 25000128 H RX IP 250 OP 636: Mod: ZF | Performed by: OBSTETRICS & GYNECOLOGY

## 2020-01-13 PROCEDURE — 81001 URINALYSIS AUTO W/SCOPE: CPT | Performed by: NURSE PRACTITIONER

## 2020-01-13 PROCEDURE — 25800025 ZZH RX 258: Mod: ZF | Performed by: OBSTETRICS & GYNECOLOGY

## 2020-01-13 PROCEDURE — 85025 COMPLETE CBC W/AUTO DIFF WBC: CPT | Performed by: OBSTETRICS & GYNECOLOGY

## 2020-01-13 RX ORDER — PALONOSETRON 0.05 MG/ML
0.25 INJECTION, SOLUTION INTRAVENOUS ONCE
Status: COMPLETED | OUTPATIENT
Start: 2020-01-13 | End: 2020-01-13

## 2020-01-13 RX ORDER — LOPERAMIDE HCL 2 MG
2 CAPSULE ORAL 4 TIMES DAILY PRN
COMMUNITY
End: 2020-02-20

## 2020-01-13 RX ORDER — DEXTROSE, SODIUM CHLORIDE, AND POTASSIUM CHLORIDE 5; .45; .15 G/100ML; G/100ML; G/100ML
2000 INJECTION INTRAVENOUS ONCE
Status: COMPLETED | OUTPATIENT
Start: 2020-01-13 | End: 2020-01-13

## 2020-01-13 RX ADMIN — DEXAMETHASONE SODIUM PHOSPHATE: 10 INJECTION, SOLUTION INTRAMUSCULAR; INTRAVENOUS at 08:41

## 2020-01-13 RX ADMIN — CISPLATIN 90 MG: 1 INJECTION, SOLUTION INTRAVENOUS at 09:47

## 2020-01-13 RX ADMIN — PALONOSETRON HYDROCHLORIDE 0.25 MG: 0.25 INJECTION, SOLUTION INTRAVENOUS at 08:41

## 2020-01-13 RX ADMIN — POTASSIUM CHLORIDE, DEXTROSE MONOHYDRATE AND SODIUM CHLORIDE 2000 ML: 150; 5; 450 INJECTION, SOLUTION INTRAVENOUS at 08:00

## 2020-01-13 ASSESSMENT — PAIN SCALES - GENERAL: PAINLEVEL: NO PAIN (0)

## 2020-01-13 NOTE — PATIENT INSTRUCTIONS
Contact Numbers    [facility] Main Line (for Scheduling/Triage/After Hours Nurse Line): [phone]    Please call the [facility] nurse triage or the after hours nurse line if you experience a temperature greater than or equal to 100.5, shaking chills, have uncontrolled nausea, vomiting and/or diarrhea, dizziness, lightheadedness, shortness of breath, chest pain, bleeding, unexplained bruising, or if you have any other new/concerning symptoms, questions or concerns.     If you are having any concerning symptoms or wish to speak to a provider before your next infusion visit, please call your care coordinator or triage to notify them so we can adequately serve you.     If you need a refill on a narcotic prescription or other medication, please call triage before your infusion appointment.       January 2020 Sunday Monday Tuesday Wednesday Thursday Friday Saturday                  1     2    [facility] EXTERNAL RADIATION TREATMT   3:30 PM   (30 min.)   [facility] RAD ONC ERYN   Radiation Oncology Clinic 3    P EXTERNAL RADIATION TREATMT   3:30 PM   (30 min.)   [facility] RAD ONC ERYN   Radiation Oncology Clinic 4    P EXTERNAL RADIATION TREATMT   9:30 AM   (30 min.)   [facility] RAD ONC ERYN   Radiation Oncology Clinic   5     6    [facility] LAB DRAW   6:15 AM   (15 min.)    [facility] LAB DRAW   [facility] Lab Draw    [facility] ONC INFUSION 360   7:00 AM   (360 min.)    ONCOLOGY INFUSION   [facility]   3:30 PM   (30 min.)   [facility] RAD ONC ERYN   Radiation Oncology Clinic    [facility] ON TREATMENT VISIT   4:00 PM   (15 min.)   Bret Kerns MD   Radiation Oncology Clinic 7    P EXTERNAL RADIATION TREATMT   3:30 PM   (30 min.)   [facility] RAD ONC ERYN   Radiation Oncology Clinic 8    P EXTERNAL RADIATION TREATMT   3:30 PM   (30 min.)   [facility] RAD ONC ERYN   Radiation Oncology Clinic 9    [facility] EXTERNAL RADIATION TREATMT   2:00 PM   (30 min.)   [facility] RAD ONC ERYN   Radiation Oncology Clinic 10    [facility]  EXTERNAL RADIATION TREATMT   3:30 PM   (30 min.)   [facility] RAD ONC ERYN   Radiation Oncology Clinic 11       12     13    [facility] DRAW   6:15 AM   (15 min.)   [facility]   [facility] Lab Draw    [facility] ONC INFUSION 360   7:00 AM   (360 min.)   [facility] ONCOLOGY INFUSION   [facility]    [facility] EXTERNAL RADIATION TREATMT   3:30 PM   (30 min.)   [facility] RAD ONC ERYN   Radiation Oncology Clinic    [facility] ON TREATMENT VISIT   4:00 PM   (15 min.)   Bret Kerns MD   Radiation Oncology Clinic 14    [facility] EXTERNAL RADIATION TREATMT   3:30 PM   (30 min.)   [facility] RAD ONC ERYN   Radiation Oncology Clinic 15    [facility] EXTERNAL RADIATION TREATMT   3:30 PM   (30 min.)   [facility] RAD ONC ERYN   Radiation Oncology Clinic 16    [facility] EXTERNAL RADIATION TREATMT   3:30 PM   (30 min.)   P RAD ONC ERYN   Radiation Oncology Clinic 17    [facility] EXTERNAL RADIATION TREATMT   3:30 PM   (30 min.)   [facility] RAD ONC ERYN   Radiation Oncology Clinic 18       19     20    [facility] DRAW   6:15 AM   (15 min.)   [facility] D[facility]   [facility] [address] Lab Draw    P ONC INFUSION 360   7:00 AM   (360 min.)   [facility] ONCOLOGY INFUSION   [facility]    [facility] EXTERNAL RADIATION TREATMT   3:30 PM   (30 min.)   [facility] RAD ONC ERYN   Radiation Oncology Clinic    [facility] ON TREATMENT VISIT   4:00 PM   (15 min.)   Bret Kerns MD   Radiation Oncology Clinic 21    [facility] EXTERNAL RADIATION TREATMT   3:30 PM   (30 min.)   [facility] RAD ONC ERYN   Radiation Oncology Clinic 22    [facility] EXTERNAL RADIATION TREATMT   3:30 PM   (30 min.)   [facility] RAD ONC ERYN   Radiation Oncology Clinic 23    [facility] EXTERNAL RADIATION TREATMT   3:30 PM   (30 min.)   [facility] RAD ONC ERYN   Radiation Oncology Clinic 24    [facility] EXTERNAL RADIATION TREATMT   3:30 PM   (30 min.)   [facility] RAD ONC ERYN   Radiation Oncology Clinic 25       26     27    [facility] DRAW   6:15 AM   (15 min.)   [facility] DRAW   [facility] Lab Draw    [facility] ONC INFUSION 360   7:00 AM   (360 min.)   [facility]  ONCOLOGY [facility] 28 29 30 31 February 2020 Sunday Monday Tuesday Wednesday Thursday Friday Saturday                                 1       2     3     4     5     6     7     8       9     10     11     12     13     14     15       16     17     18     19     20     21     22       23     24     25     26     27     28     29                    Recent Results (from the past 24 hour(s))   CBC with platelets differential    Collection Time: 01/13/20  6:31 AM   Result Value Ref Range    WBC 3.6 (L) 4.0 - 11.0 10e9/L    RBC Count 4.40 3.8 - 5.2 10e12/L    Hemoglobin 13.0 11.7 - 15.7 g/dL    Hematocrit 37.0 35.0 - 47.0 %    MCV 84 78 - 100 fl    MCH 29.5 26.5 - 33.0 pg    MCHC 35.1 31.5 - 36.5 g/dL    RDW 12.3 10.0 - 15.0 %    Platelet Count 101 (L) 150 - 450 10e9/L    Diff Method Automated Method     % Neutrophils 69.0 %    % Lymphocytes 7.3 %    % Monocytes 9.8 %    % Eosinophils 12.3 %    % Basophils 0.8 %    % Immature Granulocytes 0.8 %    Nucleated RBCs 0 0 /100    Absolute Neutrophil 2.5 1.6 - 8.3 10e9/L    Absolute Lymphocytes 0.3 (L) 0.8 - 5.3 10e9/L    Absolute Monocytes 0.4 0.0 - 1.3 10e9/L    Absolute Eosinophils 0.4 0.0 - 0.7 10e9/L    Absolute Basophils 0.0 0.0 - 0.2 10e9/L    Abs Immature Granulocytes 0.0 0 - 0.4 10e9/L    Absolute Nucleated RBC 0.0    Comprehensive metabolic panel    Collection Time: 01/13/20  6:31 AM   Result Value Ref Range    Sodium 139 133 - 144 mmol/L    Potassium 3.3 (L) 3.4 - 5.3 mmol/L    Chloride 105 94 - 109 mmol/L    Carbon Dioxide 27 20 - 32 mmol/L    Anion Gap 7 3 - 14 mmol/L    Glucose 118 (H) 70 - 99 mg/dL    Urea Nitrogen 10 7 - 30 mg/dL    Creatinine 0.76 0.52 - 1.04 mg/dL    GFR Estimate >90 >60 mL/min/[1.73_m2]    GFR Estimate If Black >90 >60 mL/min/[1.73_m2]    Calcium 8.6 8.5 - 10.1 mg/dL    Bilirubin Total 0.7 0.2 - 1.3 mg/dL    Albumin 3.7 3.4 - 5.0 g/dL    Protein Total 7.3 6.8 - 8.8 g/dL     Alkaline Phosphatase 78 40 - 150 U/L    ALT 41 0 - 50 U/L    AST 21 0 - 45 U/L   Magnesium    Collection Time: 01/13/20  6:31 AM   Result Value Ref Range    Magnesium 1.8 1.6 - 2.3 mg/dL

## 2020-01-13 NOTE — LETTER
2020       RE: Francesca Rowland  [address]  [place] [address]     Dear Colleague,    Thank you for referring your patient, Francesca Rowland, to the RADIATION ONCOLOGY CLINIC. Please see a copy of my visit note below.    [facility] PHYSICIANS  SPECIALIZING IN BREAKTHROUGHS  Radiation Oncology    On Treatment Visit Note      Francesca Rowland      Date: 2020   MRN: [id number]   : 1980  Diagnosis: Cervical cancer      Reason for Visit:  On Radiation Treatment Visit     Treatment Summary to Date  Treatment Site: pelvis Current Dose: 3880/4500 cGy Fractions:       Chemotherapy  Chemo concurrent with radx?: Yes  Oncologist: Rola  Drug Name/Frequency 1: Cisplatin    ED Visit/Hosiptal Admission: None     Treatment Breaks: None      Subjective:   Francesca continues to have diarrhea.  She usually takes 2 Imodium a day.  Today, she had 3 episodes of diarrhea.  She also complains of sensitivity in her labia and mild sting with urination.  UA done today does not suggest UTI.  She is now feeling more fatigued, working only 2 days a week instead of full time.      Nursing ROS:   Nutrition Alteration  Diet Type: Patient's Preference  Skin  Skin Reaction: 0 - No changes        Cardiovascular  Respiratory effort: 1 - Normal - without distress  Gastrointestinal  Nausea: 0 - None  Diarrhea: 2 - Three to five soft or liquid bowel movements per day(using imodium 2-3 tbas/day.)  GI Note: WNL  Genitourinary   Note: WNL, no vaginal bleeding  Psychosocial  Pyschosocial Note: No complaints per pt  Pain Assessment  0-10 Pain Scale: 0      Objective:   /80   Pulse 84   Gen: Fatigued, but no acute distress  Skin: Will check on treatment table tomorrow     Labs:  CBC RESULTS:   Recent Labs   Lab Test 20   WBC 3.6*   RBC 4.40   HGB 13.0   HCT 37.0   MCV 84   MCH 29.5   MCHC 35.1   RDW 12.3   *     ELECTROLYTES:  Recent Labs   Lab Test 20      POTASSIUM  3.3*   CHLORIDE 105   SHAD 8.6   CO2 27   BUN 10   CR 0.76   *       Assessment:    Tolerating radiation therapy well.  All questions and concerns addressed.    Toxicities:  Fatigue: Grade 1: Fatigue relieved by rest  Diarrhea: Grade 1: Increase of <4 stools per day over baseline; mild increase in ostomy output compared to baseline  Urinary frequency: Grade 0: No toxicity  Urinary tract pain: Grade 1: Mild pain  Urinary urgency: Grade 0: No toxicity    Plan:   1. Continue current therapy.    2. Dysuria and labial tenderness: Camelia-bottle provided.  3. Diarrhea: continue wit Imodium as needed.        Mosaiq chart and setup information reviewed  MVCT/IGRT images checked    Medication Review  Med Note: No changes per pt    Educational Topic Discussed  Education Instructions: reviewed        Bret Kerns MD/PhD  [phone] clinic  Pager [phone]    Please do not send letter to referring physician.      Again, thank you for allowing me to participate in the care of your patient.      Sincerely,    Bret Kerns MD

## 2020-01-13 NOTE — PROGRESS NOTES
"Infusion Nursing Note:  Francesca Rowland presents today for Cycle 1 Day 22 Cisplatin.    Patient seen by provider today: No   present during visit today: Not Applicable.    Note: Patient states she continues to have diarrhea.  Patient just started using Imodium 3 days ago with some relief.  Encouraged patient to continue to use Imodium and increase as needed.  Patient states her nausea has been \"okay\" this week, but that she doesn't have an appetite.  Encouraged patient to eat small frequent meals and to try to increase protein intake when she can.  Patient also reports some \"discomfort\" when she voids.  Radha Rocha NP notified of patient's symptoms and labs today.    Intravenous Access:  Peripheral IV placed by vascular access via ultrasound.    Treatment Conditions:  Lab Results   Component Value Date    HGB 13.0 01/13/2020     Lab Results   Component Value Date    WBC 3.6 01/13/2020      Lab Results   Component Value Date    ANEU 2.5 01/13/2020     Lab Results   Component Value Date     01/13/2020      Lab Results   Component Value Date     01/13/2020                   Lab Results   Component Value Date    POTASSIUM 3.3 01/13/2020           Lab Results   Component Value Date    MAG 1.8 01/13/2020            Lab Results   Component Value Date    CR 0.76 01/13/2020                   Lab Results   Component Value Date    SHAD 8.6 01/13/2020                Lab Results   Component Value Date    BILITOTAL 0.7 01/13/2020           Lab Results   Component Value Date    ALBUMIN 3.7 01/13/2020                    Lab Results   Component Value Date    ALT 41 01/13/2020           Lab Results   Component Value Date    AST 21 01/13/2020       Results reviewed, labs MET treatment parameters, ok to proceed with treatment.    1/13/20 0820 TORB:  Radha Valdez NP/Naeem Maloeny RN  - Please send UA   - No additional potassium replacement needed at this time.  The 40 mEq that patient receives in her " IV fluids is okay for now.  Will continue to monitor labs.    1/13/20 1011 TORB:  Radha Valdez NP/Naeem Branch RN  - Relay to patient that her UA looked good, other than she did have some glucose in it.  Would recommend that patient should follow-up with her PCP regarding elevated blood glucose levels.      Relayed above information to patient and her spouse.  They verbalized understanding.    Post Infusion Assessment:  Patient tolerated infusion without incident.  Patient voided pre, during, and post Cisplatin infusion.  Blood return noted pre and post infusion.  Site patent and intact, free from redness, edema or discomfort.  No evidence of extravasations.  Access discontinued per protocol.     Discharge Plan:   Patient declined prescription refills.  Discharge instructions reviewed with: Patient and Family.  Patient and/or family verbalized understanding of discharge instructions and all questions answered.  Copy of AVS reviewed with patient and/or family.  Patient will return 1/20/20 for next appointment.  Patient discharged in stable condition accompanied by: .  Departure Mode: Ambulatory.  Face to Face time: 4 mininutes.    NAEEM BRANCH RN

## 2020-01-13 NOTE — LETTER
Date:January 14, 2020      Provider requested that no letter be sent. Do not send.       [facility] Health Information

## 2020-01-13 NOTE — NURSING NOTE
Chief Complaint   Patient presents with     Blood Draw     labs drawn with vpt by rn.  vs taken     Labs drawn with vpt by rn.  Pt tolerated well.  VS taken.  Pt checked in for next appt.    Jocelyn Adorno RN

## 2020-01-14 ENCOUNTER — APPOINTMENT (OUTPATIENT)
Dept: RADIATION ONCOLOGY | Facility: CLINIC | Age: 40
End: 2020-01-14
Attending: RADIOLOGY
Payer: COMMERCIAL

## 2020-01-14 PROCEDURE — 77386 ZZH IMRT TREATMENT DELIVERY, COMPLEX: CPT | Performed by: RADIOLOGY

## 2020-01-15 ENCOUNTER — APPOINTMENT (OUTPATIENT)
Dept: RADIATION ONCOLOGY | Facility: CLINIC | Age: 40
End: 2020-01-15
Attending: RADIOLOGY
Payer: COMMERCIAL

## 2020-01-15 PROCEDURE — 77386 ZZH IMRT TREATMENT DELIVERY, COMPLEX: CPT | Performed by: RADIOLOGY

## 2020-01-16 ENCOUNTER — APPOINTMENT (OUTPATIENT)
Dept: RADIATION ONCOLOGY | Facility: CLINIC | Age: 40
End: 2020-01-16
Attending: RADIOLOGY
Payer: COMMERCIAL

## 2020-01-16 PROCEDURE — 77386 ZZH IMRT TREATMENT DELIVERY, COMPLEX: CPT | Performed by: RADIOLOGY

## 2020-01-17 ENCOUNTER — APPOINTMENT (OUTPATIENT)
Dept: RADIATION ONCOLOGY | Facility: CLINIC | Age: 40
End: 2020-01-17
Attending: RADIOLOGY
Payer: COMMERCIAL

## 2020-01-17 DIAGNOSIS — C53.9 MALIGNANT NEOPLASM OF CERVIX, UNSPECIFIED SITE (H): ICD-10-CM

## 2020-01-17 PROCEDURE — 77386 ZZH IMRT TREATMENT DELIVERY, COMPLEX: CPT | Performed by: RADIOLOGY

## 2020-01-17 PROCEDURE — 77336 RADIATION PHYSICS CONSULT: CPT | Performed by: RADIOLOGY

## 2020-01-20 ENCOUNTER — INFUSION THERAPY VISIT (OUTPATIENT)
Dept: ONCOLOGY | Facility: CLINIC | Age: 40
End: 2020-01-20
Attending: OBSTETRICS & GYNECOLOGY
Payer: COMMERCIAL

## 2020-01-20 ENCOUNTER — APPOINTMENT (OUTPATIENT)
Dept: RADIATION ONCOLOGY | Facility: CLINIC | Age: 40
End: 2020-01-20
Attending: RADIOLOGY
Payer: COMMERCIAL

## 2020-01-20 VITALS
TEMPERATURE: 97.7 F | OXYGEN SATURATION: 97 % | DIASTOLIC BLOOD PRESSURE: 75 MMHG | RESPIRATION RATE: 16 BRPM | WEIGHT: 223.8 LBS | SYSTOLIC BLOOD PRESSURE: 117 MMHG | BODY MASS INDEX: 36.14 KG/M2 | HEART RATE: 78 BPM

## 2020-01-20 VITALS — DIASTOLIC BLOOD PRESSURE: 83 MMHG | HEART RATE: 100 BPM | OXYGEN SATURATION: 99 % | SYSTOLIC BLOOD PRESSURE: 130 MMHG

## 2020-01-20 DIAGNOSIS — C53.9 MALIGNANT NEOPLASM OF CERVIX, UNSPECIFIED SITE (H): Primary | ICD-10-CM

## 2020-01-20 DIAGNOSIS — C79.82 METASTATIC ADENOCARCINOMA TO CERVIX (H): ICD-10-CM

## 2020-01-20 LAB
ALBUMIN SERPL-MCNC: 3.8 G/DL (ref 3.4–5)
ALP SERPL-CCNC: 82 U/L (ref 40–150)
ALT SERPL W P-5'-P-CCNC: 65 U/L (ref 0–50)
ANION GAP SERPL CALCULATED.3IONS-SCNC: 6 MMOL/L (ref 3–14)
AST SERPL W P-5'-P-CCNC: 32 U/L (ref 0–45)
BASOPHILS # BLD AUTO: 0 10E9/L (ref 0–0.2)
BASOPHILS NFR BLD AUTO: 0.8 %
BILIRUB SERPL-MCNC: 0.8 MG/DL (ref 0.2–1.3)
BUN SERPL-MCNC: 11 MG/DL (ref 7–30)
CALCIUM SERPL-MCNC: 8.8 MG/DL (ref 8.5–10.1)
CHLORIDE SERPL-SCNC: 101 MMOL/L (ref 94–109)
CO2 SERPL-SCNC: 29 MMOL/L (ref 20–32)
CREAT SERPL-MCNC: 0.74 MG/DL (ref 0.52–1.04)
DIFFERENTIAL METHOD BLD: ABNORMAL
EOSINOPHIL # BLD AUTO: 0.1 10E9/L (ref 0–0.7)
EOSINOPHIL NFR BLD AUTO: 4 %
ERYTHROCYTE [DISTWIDTH] IN BLOOD BY AUTOMATED COUNT: 12.1 % (ref 10–15)
GFR SERPL CREATININE-BSD FRML MDRD: >90 ML/MIN/{1.73_M2}
GLUCOSE SERPL-MCNC: 134 MG/DL (ref 70–99)
HCT VFR BLD AUTO: 33.2 % (ref 35–47)
HGB BLD-MCNC: 11.9 G/DL (ref 11.7–15.7)
IMM GRANULOCYTES # BLD: 0 10E9/L (ref 0–0.4)
IMM GRANULOCYTES NFR BLD: 0.8 %
LYMPHOCYTES # BLD AUTO: 0.2 10E9/L (ref 0.8–5.3)
LYMPHOCYTES NFR BLD AUTO: 7.6 %
MAGNESIUM SERPL-MCNC: 1.6 MG/DL (ref 1.6–2.3)
MCH RBC QN AUTO: 29.8 PG (ref 26.5–33)
MCHC RBC AUTO-ENTMCNC: 35.8 G/DL (ref 31.5–36.5)
MCV RBC AUTO: 83 FL (ref 78–100)
MONOCYTES # BLD AUTO: 0.3 10E9/L (ref 0–1.3)
MONOCYTES NFR BLD AUTO: 10.4 %
NEUTROPHILS # BLD AUTO: 1.9 10E9/L (ref 1.6–8.3)
NEUTROPHILS NFR BLD AUTO: 76.4 %
NRBC # BLD AUTO: 0 10*3/UL
NRBC BLD AUTO-RTO: 0 /100
PLATELET # BLD AUTO: 81 10E9/L (ref 150–450)
POTASSIUM SERPL-SCNC: 3.6 MMOL/L (ref 3.4–5.3)
PROT SERPL-MCNC: 7.4 G/DL (ref 6.8–8.8)
RBC # BLD AUTO: 4 10E12/L (ref 3.8–5.2)
SODIUM SERPL-SCNC: 136 MMOL/L (ref 133–144)
WBC # BLD AUTO: 2.5 10E9/L (ref 4–11)

## 2020-01-20 PROCEDURE — 25000128 H RX IP 250 OP 636: Mod: ZF | Performed by: OBSTETRICS & GYNECOLOGY

## 2020-01-20 PROCEDURE — 25800025 ZZH RX 258: Mod: ZF | Performed by: OBSTETRICS & GYNECOLOGY

## 2020-01-20 PROCEDURE — 77386 ZZH IMRT TREATMENT DELIVERY, COMPLEX: CPT | Performed by: RADIOLOGY

## 2020-01-20 PROCEDURE — 96413 CHEMO IV INFUSION 1 HR: CPT

## 2020-01-20 PROCEDURE — 25800030 ZZH RX IP 258 OP 636: Mod: ZF | Performed by: OBSTETRICS & GYNECOLOGY

## 2020-01-20 PROCEDURE — 83735 ASSAY OF MAGNESIUM: CPT | Performed by: OBSTETRICS & GYNECOLOGY

## 2020-01-20 PROCEDURE — 80053 COMPREHEN METABOLIC PANEL: CPT | Performed by: OBSTETRICS & GYNECOLOGY

## 2020-01-20 PROCEDURE — 96361 HYDRATE IV INFUSION ADD-ON: CPT

## 2020-01-20 PROCEDURE — 96367 TX/PROPH/DG ADDL SEQ IV INF: CPT

## 2020-01-20 PROCEDURE — 96375 TX/PRO/DX INJ NEW DRUG ADDON: CPT

## 2020-01-20 PROCEDURE — 85025 COMPLETE CBC W/AUTO DIFF WBC: CPT | Performed by: OBSTETRICS & GYNECOLOGY

## 2020-01-20 PROCEDURE — 40000556 ZZH STATISTIC PERIPHERAL IV START W US GUIDANCE: Mod: ZF

## 2020-01-20 PROCEDURE — 36415 COLL VENOUS BLD VENIPUNCTURE: CPT

## 2020-01-20 RX ORDER — PALONOSETRON 0.05 MG/ML
0.25 INJECTION, SOLUTION INTRAVENOUS ONCE
Status: COMPLETED | OUTPATIENT
Start: 2020-01-20 | End: 2020-01-20

## 2020-01-20 RX ORDER — DEXTROSE, SODIUM CHLORIDE, AND POTASSIUM CHLORIDE 5; .45; .15 G/100ML; G/100ML; G/100ML
2000 INJECTION INTRAVENOUS ONCE
Status: COMPLETED | OUTPATIENT
Start: 2020-01-20 | End: 2020-01-20

## 2020-01-20 RX ADMIN — PALONOSETRON HYDROCHLORIDE 0.25 MG: 0.25 INJECTION, SOLUTION INTRAVENOUS at 08:37

## 2020-01-20 RX ADMIN — DEXAMETHASONE SODIUM PHOSPHATE: 10 INJECTION, SOLUTION INTRAMUSCULAR; INTRAVENOUS at 08:39

## 2020-01-20 RX ADMIN — CISPLATIN 90 MG: 1 INJECTION, SOLUTION INTRAVENOUS at 10:04

## 2020-01-20 RX ADMIN — POTASSIUM CHLORIDE, DEXTROSE MONOHYDRATE AND SODIUM CHLORIDE 2000 ML: 150; 5; 450 INJECTION, SOLUTION INTRAVENOUS at 08:26

## 2020-01-20 ASSESSMENT — PAIN SCALES - GENERAL: PAINLEVEL: NO PAIN (0)

## 2020-01-20 NOTE — LETTER
2020       RE: Francesca Rowland  [address]  [place] [address]     Dear Colleague,    Thank you for referring your patient, Francesca Rowland, to the RADIATION ONCOLOGY CLINIC. Please see a copy of my visit note below.    [facility] PHYSICIANS  SPECIALIZING IN BREAKTHROUGHS  Radiation Oncology    On Treatment Visit Note      Fracnesca Rowland      Date: 2020   MRN: [id number]   : 1980  Diagnosis: Cervical cancer      Reason for Visit:  On Radiation Treatment Visit     Treatment Summary to Date  Treatment Site: pelvis Current Dose: 3780/4500 cGy Fractions:       Chemotherapy  Chemo concurrent with radx?: Yes  Oncologist: Rola  Drug Name/Frequency 1: Cisplatin    ED Visit/Hosiptal Admission: None     Treatment Breaks: None       Subjective:   Francesca is doing better this week in terms of nausea.  She takes Zofran as needed.  Diarrhea is controlled with 1 to 2 tabs of Imodium a day.  No urinary complaints.  She is getting increasingly more fatigued, currently not working. Platelet count was 81,000; nevertheless cisplatin was administered today.    Nursing ROS:   Nutrition Alteration  Diet Type: Patient's Preference  Nutrition Note: appeite good  Skin  Skin Reaction: 0 - No changes        Cardiovascular  Respiratory effort: 1 - Normal - without distress  Gastrointestinal  Nausea: 0 - None  Diarrhea: 1 - Abdominal cramping, two or less soft or liquid bowel movements(using imodium 1/day)  GI Note: WNL  Genitourinary   Note: WNL, no vaginal bleeding(No dilator needed)  Psychosocial  Pyschosocial Note: No complaints per pt  Pain Assessment  0-10 Pain Scale: 0      Objective:   /83   Pulse 100   SpO2 99%   Gen: fatigued, but no acute distress.   Skin: No erythema    Labs:  CBC RESULTS:   Recent Labs   Lab Test 20  0652   WBC 2.5*   RBC 4.00   HGB 11.9   HCT 33.2*   MCV 83   MCH 29.8   MCHC 35.8   RDW 12.1   PLT 81*     ELECTROLYTES:  Recent Labs   Lab Test  01/20/20  0652      POTASSIUM 3.6   CHLORIDE 101   SHAD 8.8   CO2 29   BUN 11   CR 0.74   *       Assessment:    Tolerating radiation therapy well.  All questions and concerns addressed.    Toxicities:  Fatigue: Grade 1: Fatigue relieved by rest  Nausea: Grade 1: Loss of appetite without alteration in eating habits  Diarrhea: Grade 1: Increase of <4 stools per day over baseline; mild increase in ostomy output compared to baseline  Urinary frequency: Grade 0: No toxicity  Urinary tract pain: Grade 0: No toxicity  Urinary urgency: Grade 0: No toxicity    Plan:   1. Continue current therapy.    2. Will complete treatment in 4 more fractions this Friday.  3. Follow-up in our clinic in 1 month.       Mosaiq chart and setup information reviewed  MVCT/IGRT images checked    Medication Review  Med Note: No changes per pt    Educational Topic Discussed  Education Instructions: reviewed        Bret Kerns MD/PhD  [phone] clinic  Pager [phone]    Please do not send letter to referring physician.      Again, thank you for allowing me to participate in the care of your patient.      Sincerely,    Bret Kerns MD

## 2020-01-20 NOTE — LETTER
Date:January 21, 2020      Provider requested that no letter be sent. Do not send.       [facility] Health Information       Patient examined at bedside, reports feeling better than yesterday  Still having LLQ pain  No nausea, no vomiting  Is passing flatus, had BM, nonbloody   Reports a burning sensation of both of her legs        T(F): 97.9 (03-30-18 @ 05:02), Max: 99.1 (03-29-18 @ 13:28)  HR: 77 (03-30-18 @ 05:02) (77 - 82)  BP: 106/70 (03-30-18 @ 05:02) (99/61 - 115/73)  RR: 16 (03-30-18 @ 05:02) (15 - 16)  SpO2: 98% (03-30-18 @ 05:02) (96% - 98%)  Wt(kg): --        Physical Exam  General: AAOx3, No acute distress  Skin: No jaundice, no icterus  Abdomen: soft, LLQ tenderness, localized LLQ rebound tenderness, nondistended, no guarding, no palpable masses  Extremities: non edematous bilaterally, no calf pain bilaterally, no chords bilaterally, sensation and motor are intact without deficits

## 2020-01-20 NOTE — PROGRESS NOTES
Infusion Nursing Note:  Francesca Rowland presents today for Cycle 1 Day 29 Cisplatin.  Patient seen by provider today: No   present during visit today: Not Applicable.    Note: Patient presents for chemotherapy, saying she felt very tired since treatment last week.  Has not had much energy, had some vomiting last Wednesday. Her appetite has decreased, but she's drinking fluids well, small amounts of food, diarrhea has resolved.    Intravenous Access:  PIV placed by vascular access.    Treatment Conditions:  Lab Results   Component Value Date    HGB 11.9 01/20/2020     Lab Results   Component Value Date    WBC 2.5 01/20/2020      Lab Results   Component Value Date    ANEU 1.9 01/20/2020     Lab Results   Component Value Date    PLT 81 01/20/2020      Lab Results   Component Value Date     01/20/2020                   Lab Results   Component Value Date    POTASSIUM 3.6 01/20/2020           Lab Results   Component Value Date    MAG 1.6 01/20/2020            Lab Results   Component Value Date    CR 0.74 01/20/2020                   Lab Results   Component Value Date    SHAD 8.8 01/20/2020                Lab Results   Component Value Date    BILITOTAL 0.8 01/20/2020           Lab Results   Component Value Date    ALBUMIN 3.8 01/20/2020                    Lab Results   Component Value Date    ALT 65 01/20/2020           Lab Results   Component Value Date    AST 32 01/20/2020       Results reviewed, labs did NOT meet treatment parameters: Platelets 81,000 today. SEE TORB on treatment plan, ok to treat today per Dr. Rola RICO.       Post Infusion Assessment:  Patient tolerated infusion without incident.  Positive blood return pre and post cisplatin.   Patient voiding in large amounts pre during and post chemo.   Patient educated on thrombocytopenic precautions.       Discharge Plan:   Patient declined prescription refills.  Discharge instructions reviewed with: Patient.  AVS to patient via [employer].  Patient  call clinic for follow up as this week is her last chemo and week of radiation. IB sent to her care team.   Patient discharged in stable condition accompanied by: friend.  Departure Mode: Ambulatory.  Face to Face time: 5 minutes.    Antonieta Adam RN

## 2020-01-20 NOTE — PATIENT INSTRUCTIONS
Continuing Management of the Effects of Radiation Treatment    The side effects of radiation therapy should gradually decrease in 2 to 3 weeks after you have finished radiation.  Some effects take longer to resolve.    Skin reactions:  Skin changes (such as redness or irritation) should begin to get better gradually.  Some people will have a permanent change in skin color.  Their skin may be more pink or  tan  than the untreated skin.  The skin may be thinner or more fragile than before treatment.  Continue to use a gentle moisturizing lotion for several months.  You should always protect the skin in the area that was treated by using sunscreen of spf 30 or higher.      Other skin care instructions:    Fatigue caused by radiation therapy will decrease and your energy will improve.    For concerns or questions call Department of Therapeutic Radiology [phone]

## 2020-01-20 NOTE — PATIENT INSTRUCTIONS
[facility] Triage and after hours / weekends / holidays:  [phone]    Please call the triage or after hours line if you experience a temperature greater than or equal to 100.5, shaking chills, have uncontrolled nausea, vomiting and/or diarrhea, dizziness, shortness of breath, chest pain, bleeding, unexplained bruising, or if you have any other new/concerning symptoms, questions or concerns.      If you are having any concerning symptoms or wish to speak to a provider before your next infusion visit, please call your care coordinator or triage to notify them so we can adequately serve you.     If you need a refill on a narcotic prescription or other medication, please call before your infusion appointment.

## 2020-01-20 NOTE — PROGRESS NOTES
[facility] PHYSICIANS  SPECIALIZING IN BREAKTHROUGHS  Radiation Oncology    On Treatment Visit Note      Francesca Rowland      Date: 2020   MRN: [id number]   : 1980  Diagnosis: Cervical cancer      Reason for Visit:  On Radiation Treatment Visit     Treatment Summary to Date  Treatment Site: pelvis Current Dose: 3780/4500 cGy Fractions:       Chemotherapy  Chemo concurrent with radx?: Yes  Oncologist: Rola  Drug Name/Frequency 1: Cisplatin    ED Visit/Hosiptal Admission: None     Treatment Breaks: None       Subjective:   Francesca is doing better this week in terms of nausea.  She takes Zofran as needed.  Diarrhea is controlled with 1 to 2 tabs of Imodium a day.  No urinary complaints.  She is getting increasingly more fatigued, currently not working. Platelet count was 81,000; nevertheless cisplatin was administered today.    Nursing ROS:   Nutrition Alteration  Diet Type: Patient's Preference  Nutrition Note: appeite good  Skin  Skin Reaction: 0 - No changes        Cardiovascular  Respiratory effort: 1 - Normal - without distress  Gastrointestinal  Nausea: 0 - None  Diarrhea: 1 - Abdominal cramping, two or less soft or liquid bowel movements(using imodium 1/day)  GI Note: WNL  Genitourinary   Note: WNL, no vaginal bleeding(No dilator needed)  Psychosocial  Pyschosocial Note: No complaints per pt  Pain Assessment  0-10 Pain Scale: 0      Objective:   /83   Pulse 100   SpO2 99%   Gen: fatigued, but no acute distress.   Skin: No erythema    Labs:  CBC RESULTS:   Recent Labs   Lab Test 20  0652   WBC 2.5*   RBC 4.00   HGB 11.9   HCT 33.2*   MCV 83   MCH 29.8   MCHC 35.8   RDW 12.1   PLT 81*     ELECTROLYTES:  Recent Labs   Lab Test 20  0652      POTASSIUM 3.6   CHLORIDE 101   SHAD 8.8   CO2 29   BUN 11   CR 0.74   *       Assessment:    Tolerating radiation therapy well.  All questions and concerns addressed.    Toxicities:  Fatigue: Grade 1: Fatigue  relieved by rest  Nausea: Grade 1: Loss of appetite without alteration in eating habits  Diarrhea: Grade 1: Increase of <4 stools per day over baseline; mild increase in ostomy output compared to baseline  Urinary frequency: Grade 0: No toxicity  Urinary tract pain: Grade 0: No toxicity  Urinary urgency: Grade 0: No toxicity    Plan:   1. Continue current therapy.    2. Will complete treatment in 4 more fractions this Friday.  3. Follow-up in our clinic in 1 month.       Mosaiq chart and setup information reviewed  MVCT/IGRT images checked    Medication Review  Med Note: No changes per pt    Educational Topic Discussed  Education Instructions: reviewed        Bret Kerns MD/PhD  [phone] clinic  Pager [phone]    Please do not send letter to referring physician.

## 2020-01-21 ENCOUNTER — APPOINTMENT (OUTPATIENT)
Dept: RADIATION ONCOLOGY | Facility: CLINIC | Age: 40
End: 2020-01-21
Attending: RADIOLOGY
Payer: COMMERCIAL

## 2020-01-21 ENCOUNTER — PATIENT OUTREACH (OUTPATIENT)
Dept: ONCOLOGY | Facility: CLINIC | Age: 40
End: 2020-01-21

## 2020-01-21 PROCEDURE — 77386 ZZH IMRT TREATMENT DELIVERY, COMPLEX: CPT | Performed by: RADIOLOGY

## 2020-01-21 NOTE — PROGRESS NOTES
Care Coordinator Note  Patient notified that 1/27/20 appointments for lab and infusion will be cancelled, as not needed.  Also stated I would request an April treatment follow up appointment for her with Dr. Canela.    Patient verbalized back understanding of the above information discussed.     Jessica Scherer MSN, RN  Care Coordinator  Gynecologic Cancer   Office:  [phone]  Pager: [phone] #[phone]

## 2020-01-22 ENCOUNTER — APPOINTMENT (OUTPATIENT)
Dept: RADIATION ONCOLOGY | Facility: CLINIC | Age: 40
End: 2020-01-22
Attending: RADIOLOGY
Payer: COMMERCIAL

## 2020-01-22 PROCEDURE — 77386 ZZH IMRT TREATMENT DELIVERY, COMPLEX: CPT | Performed by: RADIOLOGY

## 2020-01-23 ENCOUNTER — APPOINTMENT (OUTPATIENT)
Dept: RADIATION ONCOLOGY | Facility: CLINIC | Age: 40
End: 2020-01-23
Attending: RADIOLOGY
Payer: COMMERCIAL

## 2020-01-23 PROCEDURE — 77386 ZZH IMRT TREATMENT DELIVERY, COMPLEX: CPT | Performed by: RADIOLOGY

## 2020-01-24 ENCOUNTER — APPOINTMENT (OUTPATIENT)
Dept: RADIATION ONCOLOGY | Facility: CLINIC | Age: 40
End: 2020-01-24
Attending: RADIOLOGY
Payer: COMMERCIAL

## 2020-01-24 PROCEDURE — 77386 ZZH IMRT TREATMENT DELIVERY, COMPLEX: CPT | Performed by: RADIOLOGY

## 2020-01-24 PROCEDURE — 77336 RADIATION PHYSICS CONSULT: CPT | Performed by: RADIOLOGY

## 2020-01-25 ENCOUNTER — ONCOLOGY VISIT (OUTPATIENT)
Dept: RADIATION ONCOLOGY | Facility: CLINIC | Age: 40
End: 2020-01-25

## 2020-01-28 NOTE — PROCEDURES
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     Treatment Summary:  Radiation Oncology - Course: 1 Protocol:   Treatment Site  Current Dose Modality From To Elapsed Days Fx.  1 Pelvis and PA   4,500 cGy 06 X 12/23/2019  2020  32  25                Dose per Fraction:     180 cGy/220 cGy   Total Dose:      4500 cGy/5500 cGy        COMMENTS:     Ms. Claudio is a 38 year old female with a history of cervical cancer diagnosed in .  She   underwent a LEEP procedure on 10/15/2007 which showed microinvasive adenocarcinoma with a depth of invasion of 0.4   mm and positive margins ([id number]).  She subsequently underwent radical hysterectomy, bilateral salpingectomy without   oophorectomy, bilateral pelvic and periaortic lymph node dissections on 10/19/2007.  Pathology ([id number]) showed moderately to poorly differentiated endocervical adenocarcinoma.  The final tumor size measured 20 mm in   greatest dimension and had a maximum depth of invasion of 0.45 cm.  The tumor was confined to the cervix and there   was focal LVSI present.  A total of 34 lymph nodes were removed (16 left pelvic, 10 right pelvic, 2 left periaortic, 5 right   periaortic, 1 parametrial) and all were negative for metastatic disease.  Her final stage was therefore FIGO 1B2 (1B1 old   staging).  She underwent routine surveillance through  without evidence of recurrent disease.     She recently presented to the ED at [facility] on 2019 with  severe right upper quadrant pain, nausea,   vomiting, and decreased appetite.  CT scan showed postsurgical changes consistent with her prior hysterectomy, left   adnexal cysts felt to be related to the left ovary, and an enlarged pericaval lymph node measuring up to 3.1 cm in greatest   dimension. CT guided biopsy was consistent with metastatic cervical adenocarcinoma ([id number]). She underwent   diagnostic laparotomy and resection of the right pericaval lymph node on 11/13/2019. A total of 4 lymph nodes removed   at the time of surgery and pathology ([id number]) showed metastatic adenocarcinoma in 3 of 4.  The largest focus in   presumably the previously biopsied node measured 3.5 cm and was positive for extracapsular extension.  She   subsequently underwent a PET/CT on 12/9/2019 which showed at least 4 hypermetabolic lymph nodes along the right   iliac chain including a 2.8 cm right obturator lymph node with SUV max 9.1.  There was otherwise no additional evidence   of metastatic disease.       She was subsequently referred to us and received salvage radiation therapy with concurrent cisplatin.  She was treated to   4500 cGy in 25 fractions to the para-aortic chain and pelvis.  The resection bed and the grossly positive obturator lymph   nodes received simultaneously integrated boost of 5500 cGy in 220 cGy per fraction.  Given the long disease free interval   and lack of evidence of cuff recurrence, the vagina was not included in the treatment field.      Ms. Rowland tolerated treatment reasonably well.  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  She was allergic to Emend and Compazine, and thus used Zofran for nausea control.  She   developed mild diarrhea, requiring Imodium as needed.          ED visits/hospitalizations: None      Missed treatments:  None      Acute Toxicity Profile by CTC v5.0:  Fatigue: Grade 1: Fatigue relieved by rest  Nausea: Grade 1: Loss  of appetite without alteration in eating habits  Diarrhea: Grade 1: Increase of <4 stools per day over baseline; mild increase in ostomy output compared to baseline  Urinary frequency: Grade 0: No toxicity  Urinary tract pain: Grade 0: No toxicity  Urinary urgency: Grade 0: No toxicity                PAIN MANAGEMENT:   Not required                           FOLLOW UP PLAN:    1. Follow up in our clinic in 1 month  2. Follow up with Dr. Canela in April as scheduled.                Staff Physician: Bret Kerns M.D.  Physicist: Jesus Phoenix , PhD     CC: MD Tamara Shaffer MD                                     Radiation Oncology:  [facility] [address] [place] [address]

## 2020-02-20 ENCOUNTER — OFFICE VISIT (OUTPATIENT)
Dept: RADIATION ONCOLOGY | Facility: CLINIC | Age: 40
End: 2020-02-20
Attending: RADIOLOGY
Payer: COMMERCIAL

## 2020-02-20 VITALS
WEIGHT: 222.7 LBS | HEART RATE: 86 BPM | DIASTOLIC BLOOD PRESSURE: 77 MMHG | SYSTOLIC BLOOD PRESSURE: 129 MMHG | BODY MASS INDEX: 35.96 KG/M2 | OXYGEN SATURATION: 100 %

## 2020-02-20 DIAGNOSIS — C53.9 MALIGNANT NEOPLASM OF CERVIX, UNSPECIFIED SITE (H): Primary | ICD-10-CM

## 2020-02-20 PROCEDURE — G0463 HOSPITAL OUTPT CLINIC VISIT: HCPCS | Performed by: RADIOLOGY

## 2020-02-20 RX ORDER — IBUPROFEN 200 MG
200 TABLET ORAL EVERY 4 HOURS PRN
Status: ON HOLD | COMMUNITY
End: 2021-03-19

## 2020-02-20 RX ORDER — ASPIRIN 81 MG
100 TABLET, DELAYED RELEASE (ENTERIC COATED) ORAL DAILY
Status: ON HOLD | COMMUNITY
End: 2020-11-25

## 2020-02-20 NOTE — LETTER
2020       RE: Francesca Rowland  [address]  [facility] [address]     Dear Colleague,    Thank you for referring your patient, Francesca Rowland, to the RADIATION ONCOLOGY CLINIC. Please see a copy of my visit note below.    Department of Therapeutic Radiology--Radiation Oncology                   [place] [address]  [address] [place]  [address]  Office:  [phone]  Fax:  [phone]   Radiation Oncology Clinic  [address] [place] [address]  Phone:  [phone]  Fax:  [phone]     RE: Francesca Rowland : 1980   MRN: [id number] BEST: 2020     OUTPATIENT VISIT NOTE       DIAGNOSIS: Recurrent cervical cancer     AREAS TREATED: Pelvis and paraaortic chains    DOSE:  4500 cGy in 25 fractions to the pelvis and paraaortic chains with simultaneous integrated boost of 5500 cGy in 25 fractions to the grossly positive lymph nodes and paraaortic yuridia bed    TYPES OF RADIATION GIVEN: Tomotherapy      INTERVAL SINCE COMPLETION OF RADIATION THERAPY: ~ 1 month (she completed treatment on 2020)    SUBJECTIVE: Ms. Rowland is a 39 year old female with a history of cervical cancer diagnosed in .  She underwent a LEEP procedure on 10/15/2007 which showed microinvasive adenocarcinoma with a depth of invasion of 0.4 mm and positive margins ([id number]).  She subsequently underwent radical hysterectomy, bilateral salpingectomy without oophorectomy, bilateral pelvic and periaortic lymph node dissections on 10/19/2007.  Pathology ([id number]) showed moderately to poorly differentiated endocervical adenocarcinoma.  The final tumor size measured 20 mm in greatest dimension and had a maximum depth of invasion of 0.45 cm.  The tumor was confined to the cervix and there was focal LVSI present.  A total of 34 lymph nodes were removed (16 left pelvic, 10 right pelvic, 2 left periaortic, 5 right periaortic, 1 parametrial) and all were negative for metastatic disease.  Her  final stage was therefore FIGO 1B2 (1B1 old staging).  She underwent routine surveillance through 2014 without evidence of recurrent disease.     She recently presented to the ED at [facility] on 9/14/2019 with severe right upper quadrant pain, nausea, vomiting, and decreased appetite.  CT scan showed postsurgical changes consistent with her prior hysterectomy, left adnexal cysts felt to be related to the left ovary, and an enlarged pericaval lymph node measuring up to 3.1 cm in greatest dimension. CT guided biopsy was consistent with metastatic cervical adenocarcinoma ([id number]). She underwent diagnostic laparotomy and resection of the right pericaval lymph node on 11/13/2019. A total of 4 lymph nodes removed at the time of surgery and pathology ([id number]) showed metastatic adenocarcinoma in 3 of 4.  The largest focus in   presumably the previously biopsied node measured 3.5 cm and was positive for extracapsular extension.  She subsequently underwent a PET/CT on 12/9/2019 which showed at least 4 hypermetabolic lymph nodes along the right iliac chain including a 2.8 cm right obturator lymph node with SUV max 9.1.  There was otherwise no additional evidence of metastatic disease.       She was subsequently referred to us and received salvage radiation therapy with concurrent cisplatin.  She was treated to 4500 cGy in 25 fractions to the para-aortic chain and pelvis.  The resection bed and the grossly positive obturator lymph nodes received simultaneously integrated boost of 5500 cGy in 220 cGy per fraction.  Given the long disease free interval and lack of evidence of cuff recurrence, the vagina was not included in the treatment field.      Ms. Rowland tolerated treatment reasonably well.  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  She was allergic to Emend and Compazine, and thus used Zofran for nausea control.  She developed mild diarrhea, requiring Imodium  as needed.          Ms. Rowland is here today for a routine 1 month follow-up visit.  She is still fatigued and not quite back at baseline, but she did resume working this week at [employer].  She no longer has nausea.  Her diarrhea resolved but now she is constipated.  She denies any change in her urinary habits.  She has no vaginal discharge/bleeding, and does not have tightness or discomfort with sexual intercourse.  She reports daily headache in the last week or so.  She also developed low back pain, radiating to her bilateral legs.  The pain is not quite to the point where she needs to take any pain relievers.            OBJECTIVE:    /77   Pulse 86   Wt 101 kg (222 lb 11.2 oz)   SpO2 100%   BMI 35.96 kg/m      Gen: NAD  HEENT: unremarkable.  Abd: soft, nontender.  Skin: Hyperpigmentation of the pelvis and bilateral groin.  Evidence of desquamation in the right groin, which is now healed.  No apparent skin lesion on the back.   MS: Tenderness to palpation in lower lumbar/sacral area.  No mass lesions.     ASSESSMENT AND PLAN: In summary, Ms. Rowland is a 38 yo female with recurrent cervical cancer in the pelvic and paraaortic lymph nodes.  She is status post resection of grossly positive lymph nodes.  She is 1 month status post radiation therapy directed at the pelvic and paraaortic yuridia chain.     Francesca is recovering from the acute side effects of treatment.  Her skin is nicely healed.  She no longer has nausea and diarrhea.  She has been using enema for constipation.  We discussed prunes and stool softeners which she will try.      The etiology of her back pain is unclear.  The symptom is likely degenerative in nature.  Nevertheless, given her advanced yuridia disease, I will order a CT of the abdomen and pelvis to evaluate the status of her gross lymph nodes and rule out possible bony disease.  She is in agreement.     Assuming CT scan does not reveal any concerning findings, she will be  discharged from our clinic.  She has follow-up appointment with Dr. Canela in April.           Bret Kerns M.D./Ph.D.  Radiation Oncologist   Department of Therapeutic Radiology   [facility] [place]  Phone: [phone]

## 2020-02-20 NOTE — PROGRESS NOTES
Department of Therapeutic Radiology--Radiation Oncology                   [place] [address]  [address] [place]  [address]  Office:  [phone]  Fax:  [phone]   Radiation Oncology Clinic  [address] [place] [address]  Phone:  [phone]  Fax:  [phone]     RE: Francesca Rowland : 1980   MRN: [id number] BEST: 2020     OUTPATIENT VISIT NOTE       DIAGNOSIS: Recurrent cervical cancer     AREAS TREATED: Pelvis and paraaortic chains    DOSE:  4500 cGy in 25 fractions to the pelvis and paraaortic chains with simultaneous integrated boost of 5500 cGy in 25 fractions to the grossly positive lymph nodes and paraaortic yuridia bed    TYPES OF RADIATION GIVEN: Tomotherapy      INTERVAL SINCE COMPLETION OF RADIATION THERAPY: ~ 1 month (she completed treatment on 2020)    SUBJECTIVE: Ms. Rowland is a 39 year old female with a history of cervical cancer diagnosed in .  She underwent a LEEP procedure on 10/15/2007 which showed microinvasive adenocarcinoma with a depth of invasion of 0.4 mm and positive margins ([id number]).  She subsequently underwent radical hysterectomy, bilateral salpingectomy without oophorectomy, bilateral pelvic and periaortic lymph node dissections on 10/19/2007.  Pathology ([id number]) showed moderately to poorly differentiated endocervical adenocarcinoma.  The final tumor size measured 20 mm in greatest dimension and had a maximum depth of invasion of 0.45 cm.  The tumor was confined to the cervix and there was focal LVSI present.  A total of 34 lymph nodes were removed (16 left pelvic, 10 right pelvic, 2 left periaortic, 5 right periaortic, 1 parametrial) and all were negative for metastatic disease.  Her final stage was therefore FIGO 1B2 (1B1 old staging).  She underwent routine surveillance through  without evidence of recurrent disease.     She recently presented to the ED at [facility] on 2019 with severe right upper quadrant  pain, nausea, vomiting, and decreased appetite.  CT scan showed postsurgical changes consistent with her prior hysterectomy, left adnexal cysts felt to be related to the left ovary, and an enlarged pericaval lymph node measuring up to 3.1 cm in greatest dimension. CT guided biopsy was consistent with metastatic cervical adenocarcinoma ([id number]). She underwent diagnostic laparotomy and resection of the right pericaval lymph node on 11/13/2019. A total of 4 lymph nodes removed at the time of surgery and pathology ([id number]) showed metastatic adenocarcinoma in 3 of 4.  The largest focus in   presumably the previously biopsied node measured 3.5 cm and was positive for extracapsular extension.  She subsequently underwent a PET/CT on 12/9/2019 which showed at least 4 hypermetabolic lymph nodes along the right iliac chain including a 2.8 cm right obturator lymph node with SUV max 9.1.  There was otherwise no additional evidence of metastatic disease.       She was subsequently referred to us and received salvage radiation therapy with concurrent cisplatin.  She was treated to 4500 cGy in 25 fractions to the para-aortic chain and pelvis.  The resection bed and the grossly positive obturator lymph nodes received simultaneously integrated boost of 5500 cGy in 220 cGy per fraction.  Given the long disease free interval and lack of evidence of cuff recurrence, the vagina was not included in the treatment field.      Ms. Rowland tolerated treatment reasonably well.  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  She was allergic to Emend and Compazine, and thus used Zofran for nausea control.  She developed mild diarrhea, requiring Imodium as needed.          Ms. Rowland is here today for a routine 1 month follow-up visit.  She is still fatigued and not quite back at baseline, but she did resume working this week at [employer].  She no longer has nausea.  Her diarrhea  resolved but now she is constipated.  She denies any change in her urinary habits.  She has no vaginal discharge/bleeding, and does not have tightness or discomfort with sexual intercourse.  She reports daily headache in the last week or so.  She also developed low back pain, radiating to her bilateral legs.  The pain is not quite to the point where she needs to take any pain relievers.            OBJECTIVE:    /77   Pulse 86   Wt 101 kg (222 lb 11.2 oz)   SpO2 100%   BMI 35.96 kg/m     Gen: NAD  HEENT: unremarkable.  Abd: soft, nontender.  Skin: Hyperpigmentation of the pelvis and bilateral groin.  Evidence of desquamation in the right groin, which is now healed.  No apparent skin lesion on the back.   MS: Tenderness to palpation in lower lumbar/sacral area.  No mass lesions.     ASSESSMENT AND PLAN: In summary, Ms. Rowland is a 40 yo female with recurrent cervical cancer in the pelvic and paraaortic lymph nodes.  She is status post resection of grossly positive lymph nodes.  She is 1 month status post radiation therapy directed at the pelvic and paraaortic yuridia chain.     Francesca is recovering from the acute side effects of treatment.  Her skin is nicely healed.  She no longer has nausea and diarrhea.  She has been using enema for constipation.  We discussed prunes and stool softeners which she will try.      The etiology of her back pain is unclear.  The symptom is likely degenerative in nature.  Nevertheless, given her advanced yuridia disease, I will order a CT of the abdomen and pelvis to evaluate the status of her gross lymph nodes and rule out possible bony disease.  She is in agreement.     Assuming CT scan does not reveal any concerning findings, she will be discharged from our clinic.  She has follow-up appointment with Dr. Canela in April.           Bret Kerns M.D./Ph.D.  Radiation Oncologist   Department of Therapeutic Radiology   [facility]  Phone: [phone]

## 2020-02-20 NOTE — NURSING NOTE
FOLLOW-UP VISIT    Patient Name: Francesca Rowland      : 1980     Age: 39 year old        ______________________________________________________________________________     Chief Complaint   Patient presents with     Cancer     Radiation follow up     /77   Pulse 86   Wt 101 kg (222 lb 11.2 oz)   SpO2 100%   BMI 35.96 kg/m       Date Radiation Completed: Pelvis 4500 cGy completed 20    Pain  Current history of pain associated with this visit:   Intensity: 6/10  Location: Near the butt crack  Treatment: Ibuprofen 2 tabs q 6 hours. Brings pain 4    Labs  Other Labs: No    Imaging  None        Diarrhea: 0- None    Constipation: 2- Persistent symptoms with regular use of laxative or enemas indicated    Nausea: 0- None    Vomitin- No vomiting    Genitourinary system: 0- Normal    Dysuria: 0- None    Vaginal dilator use: No dilator at this time. No vaginal discharge    Other Appointments: No    MD Name:  Appointment Date:    MD Name: Appointment Date:   MD Name: Appointment Date:   Other Appointment Notes:     Residual Radiation side effect: Energy better but still improving. Skin had opened up after radiation but healed/intack now.     Additional Instructions:     Nurse face-to-face time: Level 3:  10 min face to face time

## 2020-02-23 ENCOUNTER — HEALTH MAINTENANCE LETTER (OUTPATIENT)
Age: 40
End: 2020-02-23

## 2020-02-27 ENCOUNTER — ANCILLARY PROCEDURE (OUTPATIENT)
Dept: CT IMAGING | Facility: CLINIC | Age: 40
End: 2020-02-27
Attending: RADIOLOGY
Payer: COMMERCIAL

## 2020-02-27 DIAGNOSIS — C53.9 MALIGNANT NEOPLASM OF CERVIX, UNSPECIFIED SITE (H): ICD-10-CM

## 2020-02-27 RX ORDER — IOPAMIDOL 755 MG/ML
135 INJECTION, SOLUTION INTRAVASCULAR ONCE
Status: COMPLETED | OUTPATIENT
Start: 2020-02-27 | End: 2020-02-27

## 2020-02-27 RX ADMIN — IOPAMIDOL 135 ML: 755 INJECTION, SOLUTION INTRAVASCULAR at 13:49

## 2020-04-20 ENCOUNTER — PATIENT OUTREACH (OUTPATIENT)
Dept: ONCOLOGY | Facility: CLINIC | Age: 40
End: 2020-04-20

## 2020-04-20 NOTE — PROGRESS NOTES
Pt left message on voice mail  Was in ER early this am and missed appt today  Just got home from ER   Needs to reschedule

## 2020-04-21 NOTE — PROGRESS NOTES
Spoke with pt   In ER yesterday for right sided abdominal and chest pain  Labs, ekg , CT done could find no cause for pain  Received dilaudid and morphine for pain while there  Having no pain today and taking no pain medication  Has hx of gall bladder issues, did call surgeons office on this nurse there did not think it was gall bladder issue but advised her to take prilosec for a few weeks. They did not think they needed to see her at this time    Encouraged pt to follow up with PCP on this issue and call surgeons office back if pain returns    Discussed CT scan results from ER visit explained to nothing in chest scan to indicate a cancer concern  Last CT here in February was negative    appt on 4/27 with MD to have 3 month follow up exam via phone,

## 2020-04-26 NOTE — PROGRESS NOTES
Consult Notes on Referred Patient      Dr. Juan Manuel Mac MD  [address] [place] [address]       RE: Francesca Rowland  : 1980  BEST: 2020     Dear Dr. Juan Manuel Mac:    I had the pleasure of seeing your patient Francesca Rowland here at the Gynecologic Cancer Clinic at the [facility] on 10/28/2019.  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  Given these findings she was subsequently sent to the Gynecologic Cancer Clinic for new patient consultation.     HPI  Patient initially presented as a 27-year-old woman seen in referral due to a Pap smear showing high-grade squamous intraepithelial lesion that was positive for high risk HPV subtype in 2007. This Pap smear had been taken during her six week postpartum visit. Patient did have a remote history of abnormal Pap smears back in  while serving in [place] for the [employer].  Pap smears during her first pregnancy were all negative. Her Pap smear at the beginning of the most recent pregnancy was normal. Patient did have a colposcopy for evaluation of this abnormal Pap smear on 2007 and cervical biopsies of 6 and 12 o'clock position showed features consistent with papillary serous adenocarcinoma. Endocervical curettings were also taken which showed fragments of papillary serous carcinoma. The patient then underwent a LEEP procedure on 10/15/07 which showed microinvasive adenocarcinoma with a depth of invasion of 0.4 mm. Patient then made the decision to proceed with radical hysterectomy. She subsequently underwent a radical hysterectomy, bilateral salpingectomy, bilateral pelvic and periaortic lymph node dissection on 10/19/07. Patient's operative course was otherwise uncomplicated and she recoverred uneventfully.  Pathology did reveal patient to be stage IA2 adenocarcinoma of the cervix.     She underwent routine  Group: [facility]         H/p  NOTE    Patient Identification:  Caitlyn Longoria  82 y.o.  female  1936  [id number]                CC: Worsening shortness of breath    History of Present Illness:  Very pleasant 82-year-old female with history of asthma and elevated left hemidiaphragm possibly GERD follows my partner Dr. Coburn.  She tells me that she has been short of breath since April progressively getting worse.  She was started on bronchodilators with no improvement.  She saw her cardiologist who stated it could be related to GERD and treated her GERD with no improvement.  Today she came to the emergency room and was noted to be hypoxic on room air.  Previously patient has not been on any oxygen at home.  She says she has had some cough congestion and sputum production.  No fever chills or aspiration was reported.  She reports that she saw her allergist to put her on some Levaquin and told her to go to the emergency room if she did not improve.  She is currently on Coumadin for A. fib.      Review of Systems  Constitutional: Negative for chills and fever.   HENT: Positive for congestion (chest). Negative for sore throat.    Eyes: Negative.    Respiratory: Positive for cough (non-productive) and shortness of breath.    Cardiovascular: Negative for chest pain.   Gastrointestinal: Negative for abdominal pain, diarrhea and vomiting.   Genitourinary: Negative for dysuria.   Musculoskeletal: Negative for neck pain.   Skin: Negative for rash.   Allergic/Immunologic: Negative.    Neurological: Negative for weakness, numbness and headaches.   Hematological: Negative.    Psychiatric/Behavioral: Negative.    All other systems reviewed and are negative.  Past Medical History:  Past Medical History:   Diagnosis Date   • Aortic calcification (CMS/HCC)     mild, on echo 12/17/2017   • Aortic regurgitation     Trace   • CAD (coronary artery disease)    • Chronic combined systolic and diastolic congestive  heart failure (CMS/[facility])    • CKD (chronic kidney disease) stage 3, GFR 30-59 ml/min (CMS/[facility])    • Coronary artery disease involving native coronary artery of native heart with angina pectoris (CMS/[facility])    • DM type 2 (diabetes mellitus, type 2) (CMS/[facility])    • Hypertension    • Mild mitral regurgitation    • Mitral annular calcification     12/8/2017- echo, moderate   • PAF (paroxysmal atrial fibrillation) (CMS/[facility])    • SSS (sick sinus syndrome) (CMS/[facility])    • Tricuspid regurgitation     Trace       Past Surgical History:  Past Surgical History:   Procedure Laterality Date   • CARDIAC CATHETERIZATION     • CHOLECYSTECTOMY     • CORONARY STENT PLACEMENT     • HERNIA REPAIR     • HYSTERECTOMY     • PACEMAKER IMPLANTATION     • REPLACEMENT TOTAL KNEE          Home Meds:    (Not in a hospital admission)    Allergies:  Allergies   Allergen Reactions   • Accupril [Quinapril Hcl] Delirium     Swelling, HA,, confusion and constipation per pt   • Ahist [Chlorpheniramine] Nausea Only, Other (See Comments) and Dizziness     Headache, Blurred vision   • Chlorcyclizine Unknown (See Comments)   • Clarithromycin Nausea Only     Other reaction(s): Headache, Depression, Flushed   • Diclofenac Sodium Unknown (See Comments)   • Diphenhydramine      Benadryl   • Esomeprazole GI Intolerance     Nexium. Stomach issues   • Ibuprofen Other (See Comments)     Does not recall    • Levalbuterol Swelling     Xopenex   • Levocetirizine Other (See Comments)     Xyzal. Diarrhea, Stomach cramps   • Lipitor [Atorvastatin] Other (See Comments)     Muscle pain and weakness, dark urine   • Lodine [Etodolac]    • Omeprazole Nausea Only     Prilosec. Headache   • Pravachol [Pravastatin] Nausea Only and Other (See Comments)     Bloated, Constipation, Headaches   • Quinapril Swelling and Confusion     Accupril. Headaches, constipation   • Sulfa Antibiotics    • Sulindac Myalgia     Other reaction(s): Headache, joint pain, bruising   • Valdecoxib  surveillence through 2014 complicated only by SIOBHAN 1    She recently presented after incidentally being found to have an enlarged pericaval lymph node.  She presented to the ED at [facility] on 9/14/2019 with severe 8.5/10 RUQ pain associated with nausea, one episode of emesis, and decreased appetite. RUQ demonstrated cholelithiasis without cholecystitis, so an abdominal and pelvic CT scan was obtained. This revealed post-surgical changes consistent with prior hysterectomy, left adnexal cysts thought to be ovarian, and an enlarged pericaval lymph node suspicious for metastatic disease vs primary lymphoma. Her condition stabilized and she was discharged home with referrals to general surgery and oncology for biliary colic and further management of the lymphadenopathy, respectively. CT-guided right retroperitoneal lymph node biopsy on 10/15/2019 was consistent with metastatic cervical carcinoma    She underwent surgery to remove the mass and determine the extent of disease on 11/13/19    PATH:  FINAL DIAGNOSIS:   LYMPH NODES, PARA-AORTIC, RESECTION:   - Positive for carcinoma in three of four lymph nodes examined (3/4),   consistent with recurrent endocervical   adenocarcinoma   - Largest metastatic focus is 3.5 cm, positive for extra yuridia extension       Chemo-Potentiated RT 12/2019-1/2020 2/2020 CT:  IMPRESSION: Postoperative changes of hysterectomy and lymph node  resection for cervical cancer. The metastatic right pelvic lymph nodes  which were previously identified have decreased in size. No new  metastases identified.     She recently presented to the ED for an acute RUQ pain episode, which has greatly improved despite no clear diagnosis.  During this visit she underwent a CAP CT which did not demonstrate evidence of cancer but a slightly enlarged spleen.  She  had a mild eosinophilia (~2%) but has not traveled or eaten exotic food recently, and has not had any new contacts to suggest mono.      She  "Irritability   • Prednisone Rash       Social History:   Social History     Socioeconomic History   • Marital status: Single     Spouse name: Not on file   • Number of children: Not on file   • Years of education: Not on file   • Highest education level: Not on file   Tobacco Use   • Smoking status: Never Smoker   • Smokeless tobacco: Never Used   • Tobacco comment: caffeine use   Substance and Sexual Activity   • Alcohol use: No   • Drug use: No   • Sexual activity: Defer       Family History:  Family History   Problem Relation Age of Onset   • Heart disease Mother    • Hypertension Mother    • Heart disease Father    • Hypertension Father    • Hypertension Brother    • Heart disease Brother        Physical Exam:  /78   Pulse 80   Temp 99 °F (37.2 °C) (Tympanic)   Resp 24   Ht 162.6 cm (64\")   Wt 106 kg (233 lb)   SpO2 96%   BMI 39.99 kg/m²  Body mass index is 39.99 kg/m². 96% 106 kg (233 lb)  Physical Exam  Elderly female resting comfortably no distress  Well-developed obese body habitus  Eyes pupils reactive to light normal conjunctiva  ENT Mallampati 4 with normal nasal exam  Neck midline trachea no thyromegaly  Chest diminished breath sound occasional rhonchi on the bases bilaterally diminished breath sounds on the left base no labored breathing  CVs irregular rate and rhythm 1+ lower extremity edema  Abdomen soft nontender no masses felt no hepatosplenomegaly  CNS intact normal sensory exam  Skin no rashes no nodules  Psych oriented to time place and person normal memory  Musculoskeletal no cyanosis no clubbing normal range of motion      LABS:  Lab Results   Component Value Date    CALCIUM 9.0 09/16/2019    PHOS 2.9 05/01/2014     Results from last 7 days   Lab Units 09/16/19  1207   SODIUM mmol/L 142   POTASSIUM mmol/L 3.7   CHLORIDE mmol/L 102   CO2 mmol/L 24.6   BUN mg/dL 22   CREATININE mg/dL 1.12*   GLUCOSE mg/dL 92   CALCIUM mg/dL 9.0   WBC 10*3/mm3 12.92*   HEMOGLOBIN g/dL 13.9   PLATELETS " reports no vaginal bleeding and no weight changes.  Her bladder function is normal but she has been having mild diarrhea.      Review of Systems:    Systemic           no weight changes; no fever; no chills; no night sweats; no appetite changes  Skin           no rashes, or lesions  Eye           no irritation; no changes in vision  Payton-Laryngeal           no dysphagia; no hoarseness   Pulmonary    no cough; no shortness of breath  Cardiovascular    no chest pain; no palpitations  Gastrointestinal    no diarrhea; no constipation; no abdominal pain; no changes in bowel  habits; no blood in stool  Genitourinary   no urinary frequency; no urinary urgency; no dysuria; no pain; no abnormal vaginal discharge; no abnormal vaginal bleeding  Breast   no breast discharge; no breast changes; no breast pain  Musculoskeletal    no myalgias; no arthralgias; no back pain  Psychiatric           no depressed mood; no anxiety    Hematologic           no tender lymph nodes; no noticeable swellings or lumps   Endocrine    no hot flashes; no heat/cold intolerance         Neurological   no tremor; no numbness and tingling; no headaches; no difficulty  sleeping      Past Medical History:    Past Medical History:   Diagnosis Date     Chronic cholecystitis 09/25/2019     History of cervical cancer 2007     Metastatic adenocarcinoma to cervix (H) 10/28/2019    Added automatically from request for surgery [id number]         Past Surgical History:    Past Surgical History:   Procedure Laterality Date     CHOLECYSTECTOMY, LAPOROSCOPIC  09/25/2019     HYSTERECTOMY RADICAL  2007    PAUL     LAPAROSCOPIC LYMPHADENECTOMY N/A 11/13/2019    Procedure: LAPAROSCOPY, resection of of paraaortic lymph node, lysis of adhesions;  Surgeon: Jluis Canela MD;  Location:  OR         Health Maintenance:  Health Maintenance Due   Topic Date Due     PREVENTIVE CARE VISIT  1980     DEPRESSION ACTION PLAN  1978     PHQ-9  1980     HIV  "SCREENING  12/30/1993     PNEUMOCOCCAL IMMUNIZATION 19-64 HIGHEST RISK (1 of 3 - PCV13) 12/30/1997     PAP  07/14/2017       Last Pap Smear:  2014 /888    Last Mammogram: None yet    Last Colonoscopy: Not due      Current Medications:     has a current medication list which includes the following prescription(s): acetaminophen, docusate sodium, and ibuprofen.       Allergies:        Allergies   Allergen Reactions     Emend [Aprepitant]      Prochlorperazine      \"i was told I turn blue\"          Social History:     Social History     Tobacco Use     Smoking status: Never Smoker     Smokeless tobacco: Never Used   Substance Use Topics     Alcohol use: Yes     Comment: one drink a week       History   Drug Use No       Family History:     The patient's family history is notable for .    Family History   Problem Relation Age of Onset     Aneurysm Mother      Breast Cancer Paternal Grandmother         bilat mastectomies     Breast Cancer Maternal Aunt         stage 1     Thyroid Cancer Sister 23         Physical Exam:     No- examination; phone visit    Assessment:    Francesca Rowland is a 38 year old woman with a new diagnosis of recurrent cervical cancer.     A total of 25 minutes was spent with the patient, 17 minutes of which were spent in phone consultation and treatment planning.      Plan:     1.)   CxCA -  We have discussed the clinical and and pathologic findings.  She has completed planned RT/Chemo 1/2020 and has subsequently had 2 CT scans without evidence of disease.  Her current symptoms are unusual, but appear to be unrealted to cancer.       2.) Genetic risk factors were assessed and the patient does not meet the qualifications for a referral.      3.) Labs and/or tests ordered include:  None, RT consult     4.) Health maintenance issues addressed today: her lab abnormalities should be furhter evaluated and I have recommended that she have her CBC redrawn in 1-2 weeks with her miguel RICO, and to present " 10*3/mm3 165   ALT (SGPT) U/L 19   AST (SGOT) U/L 25   PROBNP pg/mL 291.9     Lab Results   Component Value Date    CKTOTAL 91 12/09/2017    TROPONINT <0.010 09/16/2019     Results from last 7 days   Lab Units 09/16/19  1207   TROPONIN T ng/mL <0.010         Results from last 7 days   Lab Units 09/16/19  1406   LACTATE mmol/L 1.2     Results from last 7 days   Lab Units 09/16/19  1707   PH, ARTERIAL pH units 7.495*   PCO2, ARTERIAL mm Hg 30.0*   PO2 ART mm Hg 53.5*   MODALITY  Room Air   O2 SATURATION CALC % 90.5*         Results from last 7 days   Lab Units 09/16/19  1207   INR  2.50*         No results found for: TSH  Estimated Creatinine Clearance: 44.4 mL/min (A) (by C-G formula based on SCr of 1.12 mg/dL (H)).         Imaging: I personally visualized the images of scans/x-rays performed within last 3 days.      Assessment:  Acute hypoxemia  Asthma exacerbation  GERD suspected hiatal hernia  Elevated left hemidiaphragm  Paroxysmal A. fib  Diabetes mellitus  Hypertension  History of CVA  History of systolic and diastolic CHF  CHARLENE on CPAP  Morbid obesity      Recommendations:  At this point we have female with multiple medical problems and issues.  Acute hypoxemia with dyspnea etiology asthma exacerbation versus elevated hemidiaphragm/hiatal hernia.  CT chest with PE protocol has been ordered in the emergency room.  I will treat with steroid and bronchodilators  She recently completed antibiotics and I doubt she has any infection reflecting as such with minimal leukocytosis.  I will check a procalcitonin  Home CPAP once available  Home medications be restarted  Early physical therapy  Discussed plan of care with the family  She will be admitted to monitored bed            Dania La MD  9/16/2019  5:46 PM      Much of this encounter note is an electronic transcription/translation of spoken language to printed text using Dragon Software.   if her symptoms do not continue to improve.        Sincerely,  Jluis Canela MD      CC  Patient Care Team:  Tamara Carlos MD as PCP - General (Family Practice)  Tawana Rivera RN as Specialty Care Coordinator (Gyn-Onc)  Jluis Canela MD as MD (Gyn-Onc)  LOUISA WHITMORE

## 2020-04-27 ENCOUNTER — VIRTUAL VISIT (OUTPATIENT)
Dept: ONCOLOGY | Facility: CLINIC | Age: 40
End: 2020-04-27
Attending: OBSTETRICS & GYNECOLOGY
Payer: COMMERCIAL

## 2020-04-27 DIAGNOSIS — C53.9 MALIGNANT NEOPLASM OF CERVIX, UNSPECIFIED SITE (H): Primary | ICD-10-CM

## 2020-04-27 PROCEDURE — 40000114 ZZH STATISTIC NO CHARGE CLINIC VISIT

## 2020-04-27 PROCEDURE — 99214 OFFICE O/P EST MOD 30 MIN: CPT | Mod: 95 | Performed by: OBSTETRICS & GYNECOLOGY

## 2020-04-27 NOTE — PROGRESS NOTES
"Francesca Rowland is a 39 year old female who is being evaluated via a billable video visit.      The patient has been notified of following:     \"This video visit will be conducted via a call between you and your physician/provider. We have found that certain health care needs can be provided without the need for an in-person physical exam.  This service lets us provide the care you need with a video conversation.  If a prescription is necessary we can send it directly to your pharmacy.  If lab work is needed we can place an order for that and you can then stop by our lab to have the test done at a later time.    Video visits are billed at different rates depending on your insurance coverage.  Please reach out to your insurance provider with any questions.    If during the course of the call the physician/provider feels a video visit is not appropriate, you will not be charged for this service.\"    Patient has given verbal consent for Video visit? Yes    How would you like to obtain your AVS? Arthur    Patient would like the video invitation sent by: Text to cell phone: [phone]    Will anyone else be joining your video visit? No       GIANCARLO Stewart            "

## 2020-07-21 ENCOUNTER — ONCOLOGY VISIT (OUTPATIENT)
Dept: ONCOLOGY | Facility: CLINIC | Age: 40
End: 2020-07-21
Attending: OBSTETRICS & GYNECOLOGY
Payer: COMMERCIAL

## 2020-07-21 VITALS
TEMPERATURE: 97.9 F | RESPIRATION RATE: 16 BRPM | WEIGHT: 219.6 LBS | SYSTOLIC BLOOD PRESSURE: 123 MMHG | HEART RATE: 68 BPM | BODY MASS INDEX: 35.46 KG/M2 | DIASTOLIC BLOOD PRESSURE: 81 MMHG | OXYGEN SATURATION: 99 %

## 2020-07-21 DIAGNOSIS — N90.0 VULVAR INTRAEPITHELIAL NEOPLASIA (VIN) GRADE 1: Primary | ICD-10-CM

## 2020-07-21 DIAGNOSIS — C79.82 METASTATIC ADENOCARCINOMA TO CERVIX (H): ICD-10-CM

## 2020-07-21 PROCEDURE — G0463 HOSPITAL OUTPT CLINIC VISIT: HCPCS | Mod: ZF

## 2020-07-21 PROCEDURE — 99213 OFFICE O/P EST LOW 20 MIN: CPT | Mod: ZP | Performed by: OBSTETRICS & GYNECOLOGY

## 2020-07-21 ASSESSMENT — PAIN SCALES - GENERAL: PAINLEVEL: NO PAIN (0)

## 2020-07-21 NOTE — PROGRESS NOTES
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   She is here today for surveillance visit.  She reports feeling well today.  She denies any vaginal discharge or bleeding. She denies changes in bowel or bladder habits.  She denies abdominal pain or LE edema/pain.  She does notes that she has had 3 episodes of fever, anorexia and fatigue over the last month.  She reports that the episode lasts for about 24 hours and then resolves.  She denies weight loss and reports a good appetite.  She has no other concerns today.     Oncology History:     Patient initially presented as a 27-year-old woman seen in referral due to a Pap smear showing high-grade squamous intraepithelial lesion that was positive for high risk HPV subtype in 2007. This Pap smear had been taken during her six week postpartum visit. Patient did have a remote history of abnormal Pap smears back in  while serving in [place] for the [employer].  Pap smears during her first pregnancy were all negative. Her Pap smear at the beginning of the most recent pregnancy was normal. Patient did have a colposcopy for evaluation of this abnormal Pap smear on 2007 and cervical biopsies of 6 and 12 o'clock position showed features consistent with papillary serous adenocarcinoma. Endocervical curettings were also taken which showed fragments of papillary serous carcinoma. The patient then underwent a LEEP procedure on 10/15/07 which showed microinvasive adenocarcinoma with a depth of invasion of 0.4 mm. Patient then made the decision to proceed with radical hysterectomy. She subsequently underwent a radical hysterectomy, bilateral salpingectomy, bilateral pelvic and periaortic lymph node dissection on 10/19/07. Patient's operative course was otherwise uncomplicated and she  recoverred uneventfully.  Pathology did reveal patient to be stage IA2 adenocarcinoma of the cervix.      She underwent routine surveillence through 2014 complicated only by SIOBHAN 1     She recently presented after incidentally being found to have an enlarged pericaval lymph node.  She presented to the ED at [facility] on 9/14/2019 with severe 8.5/10 RUQ pain associated with nausea, one episode of emesis, and decreased appetite. RUQ demonstrated cholelithiasis without cholecystitis, so an abdominal and pelvic CT scan was obtained. This revealed post-surgical changes consistent with prior hysterectomy, left adnexal cysts thought to be ovarian, and an enlarged pericaval lymph node suspicious for metastatic disease vs primary lymphoma. Her condition stabilized and she was discharged home with referrals to general surgery and oncology for biliary colic and further management of the lymphadenopathy, respectively. CT-guided right retroperitoneal lymph node biopsy on 10/15/2019 was consistent with metastatic cervical carcinoma     She underwent surgery to remove the mass and determine the extent of disease on 11/13/19     PATH:  FINAL DIAGNOSIS:   LYMPH NODES, PARA-AORTIC, RESECTION:   - Positive for carcinoma in three of four lymph nodes examined (3/4),   consistent with recurrent endocervical   adenocarcinoma   - Largest metastatic focus is 3.5 cm, positive for extra yuridia extension         Chemo-Potentiated RT 12/2019-1/2020 2/2020 CT:  IMPRESSION: Postoperative changes of hysterectomy and lymph node  resection for cervical cancer. The metastatic right pelvic lymph nodes  which were previously identified have decreased in size. No new  metastases identified.      She recently presented to the ED for an acute RUQ pain episode, which has greatly improved despite no clear diagnosis.  During this visit she underwent a CAP CT which did not demonstrate evidence of cancer but a slightly enlarged spleen.  She  had a mild  eosinophilia (~2%) but has not traveled or eaten exotic food recently, and has not had any new contacts to suggest mono.        Review of Systems:    Systemic           no weight changes; no fever; no chills; no night sweats; no appetite changes  Skin           no rashes, or lesions  Eye           no irritation; no changes in vision  Payton-Laryngeal           no dysphagia; no hoarseness   Pulmonary    no cough; no shortness of breath  Cardiovascular    no chest pain; no palpitations  Gastrointestinal    no diarrhea; no constipation; no abdominal pain; no changes in bowel  habits; no blood in stool  Genitourinary   no urinary frequency; no urinary urgency; no dysuria; no pain; no abnormal vaginal discharge; no abnormal vaginal bleeding  Breast   no breast discharge; no breast changes; no breast pain  Musculoskeletal    no myalgias; no arthralgias; no back pain  Psychiatric           no depressed mood; no anxiety    Hematologic           no tender lymph nodes; no noticeable swellings or lumps   Endocrine    no hot flashes; no heat/cold intolerance         Neurological   no tremor; no numbness and tingling; no headaches; no difficulty  sleeping      Past Medical History:    Past Medical History:   Diagnosis Date     Chronic cholecystitis 09/25/2019     History of cervical cancer 2007     Metastatic adenocarcinoma to cervix (H) 10/28/2019    Added automatically from request for surgery [id number]         Past Surgical History:    Past Surgical History:   Procedure Laterality Date     CHOLECYSTECTOMY, LAPOROSCOPIC  09/25/2019     HYSTERECTOMY RADICAL  2007    PAUL     LAPAROSCOPIC LYMPHADENECTOMY N/A 11/13/2019    Procedure: LAPAROSCOPY, resection of of paraaortic lymph node, lysis of adhesions;  Surgeon: Jluis Canela MD;  Location:  OR         Health Maintenance Due   Topic Date Due     PREVENTIVE CARE VISIT  1978     DEPRESSION ACTION PLAN  1978     PHQ-9  1978     HIV SCREENING  12/30/1993  "    PNEUMOCOCCAL IMMUNIZATION 19-64 HIGHEST RISK (1 of 3 - PCV13) 12/30/1997     PAP  07/14/2017       Current Medications:     Current Outpatient Medications   Medication Sig Dispense Refill     Ascorbic Acid (VITAMIN C GUMMIE PO) Take by mouth daily       acetaminophen (TYLENOL) 325 MG tablet Take 2 tablets (650 mg) by mouth every 6 hours (Patient not taking: Reported on 4/27/2020) 24 tablet 0     docusate sodium 100 MG PO tablet Take 100 mg by mouth daily       ibuprofen 200 MG PO tablet Take 200 mg by mouth every 4 hours as needed for mild pain           Allergies:        Allergies   Allergen Reactions     Emend [Aprepitant]      Prochlorperazine      \"i was told I turn blue\"        Social History:     Social History     Tobacco Use     Smoking status: Never Smoker     Smokeless tobacco: Never Used   Substance Use Topics     Alcohol use: Yes     Comment: one drink a week       History   Drug Use No         Family History:     The patient's family history is notable for:    Family History   Problem Relation Age of Onset     Aneurysm Mother      Breast Cancer Paternal Grandmother         bilat mastectomies     Breast Cancer Maternal Aunt         stage 1     Thyroid Cancer Sister 23         Physical Exam:     /81   Pulse 68   Temp 97.9  F (36.6  C) (Oral)   Resp 16   Wt 99.6 kg (219 lb 9.6 oz)   SpO2 99%   BMI 35.46 kg/m    Body mass index is 35.46 kg/m .    General Appearance: healthy and alert, no distress     HEENT:  no thyromegaly, no palpable nodules or masses        Cardiovascular: regular rate and rhythm, no gallops, rubs or murmurs     Respiratory: lungs clear, no rales, rhonchi or wheezes, normal diaphragmatic excursion    Musculoskeletal: extremities non tender and without edema    Skin: no lesions or rashes     Neurological: normal gait, no gross defects     Psychiatric: appropriate mood and affect                               Hematological: normal cervical, supraclavicular and inguinal " lymph nodes     Gastrointestinal:       abdomen soft, non-tender, non-distended, no organomegaly or masses. Laparoscopic scars well healed.     Genitourinary: External genitalia and urethral meatus appears normal.  Vagina is smooth without nodularity or masses.  Vaginal cuff appears normal and without lesions.  Bimanual exam reveal no masses, nodularity or fullness.  Recto-vaginal exam confirms these findings.      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   She is here today for surveillance visit.       A total of 20 minutes was spent with the patient, 10 minutes of which were spent in counseling the patient and/or treatment planning.      Plan:     1.)      CxCA -  We have discussed the clinical and and pathologic findings.  She has completed planned RT/Chemo 1/2020 and has subsequently had a CT scan without evidence of disease.  Her current symptoms are unusual, but appear to be unrealted to cancer.  Will plan for continued surveillance at this time with follow up in 3 months with a CT scan prior to that visit.  Discussed keeping track of her symptoms and fever should it recur.      2.)        Genetic risk factors were assessed and the patient does not meet the qualifications for a referral.       3.)        Labs and/or tests ordered include: CT abdomen/pelvis                4.)        Health maintenance issues addressed today: None    Charly Arias MD  Gynecologic Oncology  Fellow Physician, PGY5  Pager: [phone]    I have examined this patient personally with Dr. Arias and agree with the above findings and plan.    Jluis Canela        CC  Patient Care Team:  Tamara Carlos MD as PCP - General (Family Practice)  Tawana Rivera RN as Specialty Care Coordinator (Gyn-Onc)  Jluis Canela MD as MD (Gyn-Onc)

## 2020-07-21 NOTE — LETTER
2020         RE: Francesca Rowland  [address]  [place] [address]        Dear Colleague,    Thank you for referring your patient, Francesca Rowland, to the [facility]. Please see a copy of my visit note below.                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   She is here today for surveillance visit.  She reports feeling well today.  She denies any vaginal discharge or bleeding. She denies changes in bowel or bladder habits.  She denies abdominal pain or LE edema/pain.  She does notes that she has had 3 episodes of fever, anorexia and fatigue over the last month.  She reports that the episode lasts for about 24 hours and then resolves.  She denies weight loss and reports a good appetite.  She has no other concerns today.     Oncology History:     Patient initially presented as a 27-year-old woman seen in referral due to a Pap smear showing high-grade squamous intraepithelial lesion that was positive for high risk HPV subtype in 2007. This Pap smear had been taken during her six week postpartum visit. Patient did have a remote history of abnormal Pap smears back in  while serving in [place] for the [employer].  Pap smears during her first pregnancy were all negative. Her Pap smear at the beginning of the most recent pregnancy was normal. Patient did have a colposcopy for evaluation of this abnormal Pap smear on 2007 and cervical biopsies of 6 and 12 o'clock position showed features consistent with papillary serous adenocarcinoma. Endocervical curettings were also taken which showed fragments of papillary serous carcinoma. The patient then underwent a LEEP procedure on 10/15/07 which showed microinvasive adenocarcinoma with a depth of invasion of 0.4 mm. Patient then made the  decision to proceed with radical hysterectomy. She subsequently underwent a radical hysterectomy, bilateral salpingectomy, bilateral pelvic and periaortic lymph node dissection on 10/19/07. Patient's operative course was otherwise uncomplicated and she recoverred uneventfully.  Pathology did reveal patient to be stage IA2 adenocarcinoma of the cervix.      She underwent routine surveillence through 2014 complicated only by SIOBHAN 1     She recently presented after incidentally being found to have an enlarged pericaval lymph node.  She presented to the ED at [facility] on 9/14/2019 with severe 8.5/10 RUQ pain associated with nausea, one episode of emesis, and decreased appetite. RUQ demonstrated cholelithiasis without cholecystitis, so an abdominal and pelvic CT scan was obtained. This revealed post-surgical changes consistent with prior hysterectomy, left adnexal cysts thought to be ovarian, and an enlarged pericaval lymph node suspicious for metastatic disease vs primary lymphoma. Her condition stabilized and she was discharged home with referrals to general surgery and oncology for biliary colic and further management of the lymphadenopathy, respectively. CT-guided right retroperitoneal lymph node biopsy on 10/15/2019 was consistent with metastatic cervical carcinoma     She underwent surgery to remove the mass and determine the extent of disease on 11/13/19     PATH:  FINAL DIAGNOSIS:   LYMPH NODES, PARA-AORTIC, RESECTION:   - Positive for carcinoma in three of four lymph nodes examined (3/4),   consistent with recurrent endocervical   adenocarcinoma   - Largest metastatic focus is 3.5 cm, positive for extra yuridia extension         Chemo-Potentiated RT 12/2019-1/2020 2/2020 CT:  IMPRESSION: Postoperative changes of hysterectomy and lymph node  resection for cervical cancer. The metastatic right pelvic lymph nodes  which were previously identified have decreased in size. No new  metastases identified.       She recently presented to the ED for an acute RUQ pain episode, which has greatly improved despite no clear diagnosis.  During this visit she underwent a CAP CT which did not demonstrate evidence of cancer but a slightly enlarged spleen.  She  had a mild eosinophilia (~2%) but has not traveled or eaten exotic food recently, and has not had any new contacts to suggest mono.        Review of Systems:    Systemic           no weight changes; no fever; no chills; no night sweats; no appetite changes  Skin           no rashes, or lesions  Eye           no irritation; no changes in vision  Payton-Laryngeal           no dysphagia; no hoarseness   Pulmonary    no cough; no shortness of breath  Cardiovascular    no chest pain; no palpitations  Gastrointestinal    no diarrhea; no constipation; no abdominal pain; no changes in bowel  habits; no blood in stool  Genitourinary   no urinary frequency; no urinary urgency; no dysuria; no pain; no abnormal vaginal discharge; no abnormal vaginal bleeding  Breast   no breast discharge; no breast changes; no breast pain  Musculoskeletal    no myalgias; no arthralgias; no back pain  Psychiatric           no depressed mood; no anxiety    Hematologic           no tender lymph nodes; no noticeable swellings or lumps   Endocrine    no hot flashes; no heat/cold intolerance         Neurological   no tremor; no numbness and tingling; no headaches; no difficulty  sleeping      Past Medical History:    Past Medical History:   Diagnosis Date     Chronic cholecystitis 09/25/2019     History of cervical cancer 2007     Metastatic adenocarcinoma to cervix (H) 10/28/2019    Added automatically from request for surgery [id number]         Past Surgical History:    Past Surgical History:   Procedure Laterality Date     CHOLECYSTECTOMY, LAPOROSCOPIC  09/25/2019     HYSTERECTOMY RADICAL  2007    PAUL     LAPAROSCOPIC LYMPHADENECTOMY N/A 11/13/2019    Procedure: LAPAROSCOPY, resection of of paraaortic lymph  "node, lysis of adhesions;  Surgeon: Jluis Canela MD;  Location:  OR         Health Maintenance Due   Topic Date Due     PREVENTIVE CARE VISIT  1978     DEPRESSION ACTION PLAN  1978     PHQ-9  1978     HIV SCREENING  12/30/1993     PNEUMOCOCCAL IMMUNIZATION 19-64 HIGHEST RISK (1 of 3 - PCV13) 12/30/1997     PAP  07/14/2017       Current Medications:     Current Outpatient Medications   Medication Sig Dispense Refill     Ascorbic Acid (VITAMIN C GUMMIE PO) Take by mouth daily       acetaminophen (TYLENOL) 325 MG tablet Take 2 tablets (650 mg) by mouth every 6 hours (Patient not taking: Reported on 4/27/2020) 24 tablet 0     docusate sodium 100 MG PO tablet Take 100 mg by mouth daily       ibuprofen 200 MG PO tablet Take 200 mg by mouth every 4 hours as needed for mild pain           Allergies:        Allergies   Allergen Reactions     Emend [Aprepitant]      Prochlorperazine      \"i was told I turn blue\"        Social History:     Social History     Tobacco Use     Smoking status: Never Smoker     Smokeless tobacco: Never Used   Substance Use Topics     Alcohol use: Yes     Comment: one drink a week       History   Drug Use No         Family History:     The patient's family history is notable for:    Family History   Problem Relation Age of Onset     Aneurysm Mother      Breast Cancer Paternal Grandmother         bilat mastectomies     Breast Cancer Maternal Aunt         stage 1     Thyroid Cancer Sister 23         Physical Exam:     /81   Pulse 68   Temp 97.9  F (36.6  C) (Oral)   Resp 16   Wt 99.6 kg (219 lb 9.6 oz)   SpO2 99%   BMI 35.46 kg/m    Body mass index is 35.46 kg/m .    General Appearance: healthy and alert, no distress     HEENT:  no thyromegaly, no palpable nodules or masses        Cardiovascular: regular rate and rhythm, no gallops, rubs or murmurs     Respiratory: lungs clear, no rales, rhonchi or wheezes, normal diaphragmatic " excursion    Musculoskeletal: extremities non tender and without edema    Skin: no lesions or rashes     Neurological: normal gait, no gross defects     Psychiatric: appropriate mood and affect                               Hematological: normal cervical, supraclavicular and inguinal lymph nodes     Gastrointestinal:       abdomen soft, non-tender, non-distended, no organomegaly or masses. Laparoscopic scars well healed.     Genitourinary: External genitalia and urethral meatus appears normal.  Vagina is smooth without nodularity or masses.  Vaginal cuff appears normal and without lesions.  Bimanual exam reveal no masses, nodularity or fullness.  Recto-vaginal exam confirms these findings.      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   She is here today for surveillance visit.       A total of 20 minutes was spent with the patient, 10 minutes of which were spent in counseling the patient and/or treatment planning.      Plan:     1.)      CxCA -  We have discussed the clinical and and pathologic findings.  She has completed planned RT/Chemo 1/2020 and has subsequently had a CT scan without evidence of disease.  Her current symptoms are unusual, but appear to be unrealted to cancer.  Will plan for continued surveillance at this time with follow up in 3 months with a CT scan prior to that visit.  Discussed keeping track of her symptoms and fever should it recur.      2.)        Genetic risk factors were assessed and the patient does not meet the qualifications for a referral.       3.)        Labs and/or tests ordered include: CT abdomen/pelvis                4.)        Health maintenance issues addressed today: None    Charly Arias MD  Gynecologic Oncology  Fellow Physician, PGY5  Pager: [phone]    I have examined this patient personally with Dr. Arias and agree with the above findings and plan.    Jluis RICHARDS  Patient Care  Team:  Tamara Carlos MD as PCP - General (Family Practice)  Tawana Rivera RN as Specialty Care Coordinator (Gyn-Onc)  Jluis Canela MD as MD (Gyn-Onc)    Again, thank you for allowing me to participate in the care of your patient.        Sincerely,        Jluis Canela MD

## 2020-10-13 ENCOUNTER — HOSPITAL ENCOUNTER (OUTPATIENT)
Dept: CT IMAGING | Facility: CLINIC | Age: 40
Discharge: HOME | End: 2020-10-13
Attending: OBSTETRICS & GYNECOLOGY | Admitting: OBSTETRICS & GYNECOLOGY
Payer: COMMERCIAL

## 2020-10-13 DIAGNOSIS — N90.0 VULVAR INTRAEPITHELIAL NEOPLASIA (VIN) GRADE 1: ICD-10-CM

## 2020-10-13 DIAGNOSIS — C79.82 METASTATIC ADENOCARCINOMA TO CERVIX (H): ICD-10-CM

## 2020-10-13 PROCEDURE — 250N000011 HC RX IP 250 OP 636: Performed by: OBSTETRICS & GYNECOLOGY

## 2020-10-13 PROCEDURE — 250N000009 HC RX 250: Performed by: OBSTETRICS & GYNECOLOGY

## 2020-10-13 PROCEDURE — 74177 CT ABD & PELVIS W/CONTRAST: CPT

## 2020-10-13 RX ORDER — IOPAMIDOL 755 MG/ML
107 INJECTION, SOLUTION INTRAVASCULAR ONCE
Status: COMPLETED | OUTPATIENT
Start: 2020-10-13 | End: 2020-10-13

## 2020-10-13 RX ADMIN — SODIUM CHLORIDE 71 ML: 9 INJECTION, SOLUTION INTRAVENOUS at 16:17

## 2020-10-13 RX ADMIN — IOPAMIDOL 107 ML: 755 INJECTION, SOLUTION INTRAVENOUS at 16:16

## 2020-10-19 ENCOUNTER — ONCOLOGY VISIT (OUTPATIENT)
Dept: ONCOLOGY | Facility: CLINIC | Age: 40
End: 2020-10-19
Attending: OBSTETRICS & GYNECOLOGY
Payer: COMMERCIAL

## 2020-10-19 DIAGNOSIS — C79.82 METASTATIC ADENOCARCINOMA TO CERVIX (H): Primary | ICD-10-CM

## 2020-10-19 DIAGNOSIS — C53.1 MALIGNANT NEOPLASM OF EXOCERVIX (H): ICD-10-CM

## 2020-10-19 PROCEDURE — 99214 OFFICE O/P EST MOD 30 MIN: CPT | Performed by: OBSTETRICS & GYNECOLOGY

## 2020-10-19 PROCEDURE — 999N001193 HC VIDEO/TELEPHONE VISIT; NO CHARGE

## 2020-10-19 NOTE — LETTER
10/19/2020         RE: Francesca Rowland  [address]  [place] [address]        Dear Colleague,    Thank you for referring your patient, Francesca Rowland, to the [facility]. Please see a copy of my visit note below.                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   She is here today for surveillance visit.  She reports feeling well today.  She denies any vaginal discharge or bleeding. She denies changes in bowel or bladder habits.  She denies abdominal pain or LE edema/pain.  She does notes that she has had 3 episodes of fever, anorexia and fatigue over the last month.  She reports that the episode lasts for about 24 hours and then resolves.  She denies weight loss and reports a good appetite.  She has no other concerns today.     Oncology History:     Patient initially presented as a 27-year-old woman seen in referral due to a Pap smear showing high-grade squamous intraepithelial lesion that was positive for high risk HPV subtype in 2007. This Pap smear had been taken during her six week postpartum visit. Patient did have a remote history of abnormal Pap smears back in  while serving in [place] for the [employer].  Pap smears during her first pregnancy were all negative. Her Pap smear at the beginning of the most recent pregnancy was normal. Patient did have a colposcopy for evaluation of this abnormal Pap smear on 2007 and cervical biopsies of 6 and 12 o'clock position showed features consistent with papillary serous adenocarcinoma. Endocervical curettings were also taken which showed fragments of papillary serous carcinoma. The patient then underwent a LEEP procedure on 10/15/07 which showed microinvasive adenocarcinoma with a depth of invasion of 0.4 mm. Patient then made the decision to proceed  with radical hysterectomy. She subsequently underwent a radical hysterectomy, bilateral salpingectomy, bilateral pelvic and periaortic lymph node dissection on 10/19/07. Patient's operative course was otherwise uncomplicated and she recoverred uneventfully.  Pathology did reveal patient to be stage IA2 adenocarcinoma of the cervix.      She underwent routine surveillence through 2014 complicated only by SIOBHAN 1     She recently presented after incidentally being found to have an enlarged pericaval lymph node.  She presented to the ED at [facility] on 9/14/2019 with severe 8.5/10 RUQ pain associated with nausea, one episode of emesis, and decreased appetite. RUQ demonstrated cholelithiasis without cholecystitis, so an abdominal and pelvic CT scan was obtained. This revealed post-surgical changes consistent with prior hysterectomy, left adnexal cysts thought to be ovarian, and an enlarged pericaval lymph node suspicious for metastatic disease vs primary lymphoma. Her condition stabilized and she was discharged home with referrals to general surgery and oncology for biliary colic and further management of the lymphadenopathy, respectively. CT-guided right retroperitoneal lymph node biopsy on 10/15/2019 was consistent with metastatic cervical carcinoma     She underwent surgery to remove the mass and determine the extent of disease on 11/13/19     PATH:  FINAL DIAGNOSIS:   LYMPH NODES, PARA-AORTIC, RESECTION:   - Positive for carcinoma in three of four lymph nodes examined (3/4),   consistent with recurrent endocervical   adenocarcinoma   - Largest metastatic focus is 3.5 cm, positive for extra yuridia extension         Chemo-Potentiated RT 12/2019-1/2020 2/2020 CT:  IMPRESSION: Postoperative changes of hysterectomy and lymph node  resection for cervical cancer. The metastatic right pelvic lymph nodes  which were previously identified have decreased in size. No new  metastases identified.      She recently presented  to the ED for an acute RUQ pain episode, which has greatly improved despite no clear diagnosis.  During this visit she underwent a CAP CT which did not demonstrate evidence of cancer but a slightly enlarged spleen.  She  had a mild eosinophilia (~2%) but has not traveled or eaten exotic food recently, and has not had any new contacts to suggest mono.     10/13/2020 CT:  IMPRESSION:  1.  Recurrent right external iliac lymphadenopathy.  2.  Rebound thymic hyperplasia.      Review of Systems:    Systemic           no weight changes; no fever; no chills; no night sweats; no appetite changes  Skin           no rashes, or lesions  Eye           no irritation; no changes in vision  Payton-Laryngeal           no dysphagia; no hoarseness   Pulmonary    no cough; no shortness of breath  Cardiovascular    no chest pain; no palpitations  Gastrointestinal    no diarrhea; no constipation; no abdominal pain; no changes in bowel  habits; no blood in stool  Genitourinary   no urinary frequency; no urinary urgency; no dysuria; no pain; no abnormal vaginal discharge; no abnormal vaginal bleeding  Breast   no breast discharge; no breast changes; no breast pain  Musculoskeletal    no myalgias; no arthralgias; no back pain  Psychiatric           no depressed mood; no anxiety    Hematologic           no tender lymph nodes; no noticeable swellings or lumps   Endocrine    no hot flashes; no heat/cold intolerance         Neurological   no tremor; no numbness and tingling; no headaches; no difficulty  sleeping      Past Medical History:    Past Medical History:   Diagnosis Date     Chronic cholecystitis 09/25/2019     History of cervical cancer 2007     Metastatic adenocarcinoma to cervix (H) 10/28/2019    Added automatically from request for surgery [id number]         Past Surgical History:    Past Surgical History:   Procedure Laterality Date     CHOLECYSTECTOMY, LAPOROSCOPIC  09/25/2019     HYSTERECTOMY RADICAL  2007    PAUL     LAPAROSCOPIC  "LYMPHADENECTOMY N/A 11/13/2019    Procedure: LAPAROSCOPY, resection of of paraaortic lymph node, lysis of adhesions;  Surgeon: Jluis Canela MD;  Location:  OR         Health Maintenance Due   Topic Date Due     PREVENTIVE CARE VISIT  1978     DEPRESSION ACTION PLAN  1978     PHQ-9  1978     Pneumococcal Vaccine: Pediatrics (0 to 5 Years) and At-Risk Patients (6 to 64 Years) (1 of 3 - PCV13) 12/30/1984     HIV SCREENING  12/30/1993     PAP  07/14/2017     INFLUENZA VACCINE (1) 09/01/2020       Current Medications:     Current Outpatient Medications   Medication Sig Dispense Refill     acetaminophen (TYLENOL) 325 MG tablet Take 2 tablets (650 mg) by mouth every 6 hours (Patient not taking: Reported on 4/27/2020) 24 tablet 0     Ascorbic Acid (VITAMIN C GUMMIE PO) Take by mouth daily       docusate sodium 100 MG PO tablet Take 100 mg by mouth daily       ibuprofen 200 MG PO tablet Take 200 mg by mouth every 4 hours as needed for mild pain       iohexol (OMNIPAQUE) 140 MG/ML solution for oral use Mix entire bottle (50ml) of contast with 600ml (20 ounces) of water and drink half 2 hrs prior to CT scan and half 1 hr prior to scan 140 mL 0         Allergies:        Allergies   Allergen Reactions     Emend [Aprepitant]      Prochlorperazine      \"i was told I turn blue\"        Social History:     Social History     Tobacco Use     Smoking status: Never Smoker     Smokeless tobacco: Never Used   Substance Use Topics     Alcohol use: Yes     Comment: one drink a week       History   Drug Use No         Family History:     The patient's family history is notable for:    Family History   Problem Relation Age of Onset     Aneurysm Mother      Breast Cancer Paternal Grandmother         bilat mastectomies     Breast Cancer Maternal Aunt         stage 1     Thyroid Cancer Sister 23         Physical Exam:     PS 1  VS: P 80, rr 12    General Appearance: healthy and alert, no distress     HEENT:  no " thyromegaly, no palpable nodules or masses        Cardiovascular: regular rate and rhythm, no gallops, rubs or murmurs     Respiratory: lungs clear, no rales, rhonchi or wheezes, normal diaphragmatic excursion    Musculoskeletal: extremities non tender and without edema    Skin: no lesions or rashes     Neurological: normal gait, no gross defects     Psychiatric: appropriate mood and affect                               Hematological: normal cervical, supraclavicular and inguinal lymph nodes     Gastrointestinal:       abdomen soft, non-tender, non-distended, no organomegaly or masses. Laparoscopic scars well healed.     LE - no sig edema - no evidence of skin breaks or infection    Genitourinary: External genitalia and urethral meatus appears normal.  Vagina is smooth without nodularity or masses.  Vaginal cuff appears normal and without lesions.  Bimanual exam reveal no masses, nodularity or fullness.  Recto-vaginal exam confirms these findings.      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   She is here today for surveillance visit.       A total of 30 minutes was spent with the patient, 20 minutes of which were spent in counseling the patient and/or treatment planning.      Plan:     1.)      CxCA -  We have discussed the clinical and and pathologic findings.  She has completed planned RT/Chemo 1/2020.  She has a mildly enlarged R LN int he chain previously involved with cancer.  I have recommended a PET scan followed by possible biopsy or excision if positive, versus observation if negative.  She is in agreement with this plan - and does not want to simply observe at this point which is reasonable.     2.)        Genetic risk factors were assessed and the patient does not meet the qualifications for a referral.       3.)        Labs and/or tests ordered include: PET scan                4.)        Health maintenance issues addressed today:  None        Jluis Canela          Patient Care Team:  Tamara Carlos MD as PCP - General (Family Practice)  Tawana Rivera RN as Specialty Care Coordinator (Gyn-Onc)  Jluis Canela MD as MD (Gyn-Onc)      Again, thank you for allowing me to participate in the care of your patient.        Sincerely,        Jluis Canela MD

## 2020-10-19 NOTE — PROGRESS NOTES
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   She is here today for surveillance visit.  She reports feeling well today.  She denies any vaginal discharge or bleeding. She denies changes in bowel or bladder habits.  She denies abdominal pain or LE edema/pain.  She does notes that she has had 3 episodes of fever, anorexia and fatigue over the last month.  She reports that the episode lasts for about 24 hours and then resolves.  She denies weight loss and reports a good appetite.  She has no other concerns today.     Oncology History:     Patient initially presented as a 27-year-old woman seen in referral due to a Pap smear showing high-grade squamous intraepithelial lesion that was positive for high risk HPV subtype in 2007. This Pap smear had been taken during her six week postpartum visit. Patient did have a remote history of abnormal Pap smears back in  while serving in [place] for the [employer].  Pap smears during her first pregnancy were all negative. Her Pap smear at the beginning of the most recent pregnancy was normal. Patient did have a colposcopy for evaluation of this abnormal Pap smear on 2007 and cervical biopsies of 6 and 12 o'clock position showed features consistent with papillary serous adenocarcinoma. Endocervical curettings were also taken which showed fragments of papillary serous carcinoma. The patient then underwent a LEEP procedure on 10/15/07 which showed microinvasive adenocarcinoma with a depth of invasion of 0.4 mm. Patient then made the decision to proceed with radical hysterectomy. She subsequently underwent a radical hysterectomy, bilateral salpingectomy, bilateral pelvic and periaortic lymph node dissection on 10/19/07. Patient's operative course was otherwise uncomplicated and she recoverred uneventfully.   Pathology did reveal patient to be stage IA2 adenocarcinoma of the cervix.      She underwent routine surveillence through 2014 complicated only by SIOBHAN 1     She recently presented after incidentally being found to have an enlarged pericaval lymph node.  She presented to the ED at [facility] on 9/14/2019 with severe 8.5/10 RUQ pain associated with nausea, one episode of emesis, and decreased appetite. RUQ demonstrated cholelithiasis without cholecystitis, so an abdominal and pelvic CT scan was obtained. This revealed post-surgical changes consistent with prior hysterectomy, left adnexal cysts thought to be ovarian, and an enlarged pericaval lymph node suspicious for metastatic disease vs primary lymphoma. Her condition stabilized and she was discharged home with referrals to general surgery and oncology for biliary colic and further management of the lymphadenopathy, respectively. CT-guided right retroperitoneal lymph node biopsy on 10/15/2019 was consistent with metastatic cervical carcinoma     She underwent surgery to remove the mass and determine the extent of disease on 11/13/19     PATH:  FINAL DIAGNOSIS:   LYMPH NODES, PARA-AORTIC, RESECTION:   - Positive for carcinoma in three of four lymph nodes examined (3/4),   consistent with recurrent endocervical   adenocarcinoma   - Largest metastatic focus is 3.5 cm, positive for extra yuridia extension         Chemo-Potentiated RT 12/2019-1/2020 2/2020 CT:  IMPRESSION: Postoperative changes of hysterectomy and lymph node  resection for cervical cancer. The metastatic right pelvic lymph nodes  which were previously identified have decreased in size. No new  metastases identified.      She recently presented to the ED for an acute RUQ pain episode, which has greatly improved despite no clear diagnosis.  During this visit she underwent a CAP CT which did not demonstrate evidence of cancer but a slightly enlarged spleen.  She  had a mild eosinophilia (~2%) but has  not traveled or eaten exotic food recently, and has not had any new contacts to suggest mono.     10/13/2020 CT:  IMPRESSION:  1.  Recurrent right external iliac lymphadenopathy.  2.  Rebound thymic hyperplasia.      Review of Systems:    Systemic           no weight changes; no fever; no chills; no night sweats; no appetite changes  Skin           no rashes, or lesions  Eye           no irritation; no changes in vision  Payton-Laryngeal           no dysphagia; no hoarseness   Pulmonary    no cough; no shortness of breath  Cardiovascular    no chest pain; no palpitations  Gastrointestinal    no diarrhea; no constipation; no abdominal pain; no changes in bowel  habits; no blood in stool  Genitourinary   no urinary frequency; no urinary urgency; no dysuria; no pain; no abnormal vaginal discharge; no abnormal vaginal bleeding  Breast   no breast discharge; no breast changes; no breast pain  Musculoskeletal    no myalgias; no arthralgias; no back pain  Psychiatric           no depressed mood; no anxiety    Hematologic           no tender lymph nodes; no noticeable swellings or lumps   Endocrine    no hot flashes; no heat/cold intolerance         Neurological   no tremor; no numbness and tingling; no headaches; no difficulty  sleeping      Past Medical History:    Past Medical History:   Diagnosis Date     Chronic cholecystitis 09/25/2019     History of cervical cancer 2007     Metastatic adenocarcinoma to cervix (H) 10/28/2019    Added automatically from request for surgery [id number]         Past Surgical History:    Past Surgical History:   Procedure Laterality Date     CHOLECYSTECTOMY, LAPOROSCOPIC  09/25/2019     HYSTERECTOMY RADICAL  2007    PAUL     LAPAROSCOPIC LYMPHADENECTOMY N/A 11/13/2019    Procedure: LAPAROSCOPY, resection of of paraaortic lymph node, lysis of adhesions;  Surgeon: Jluis Canela MD;  Location:  OR         Health Maintenance Due   Topic Date Due     PREVENTIVE CARE VISIT  1978      "DEPRESSION ACTION PLAN  1978     PHQ-9  1978     Pneumococcal Vaccine: Pediatrics (0 to 5 Years) and At-Risk Patients (6 to 64 Years) (1 of 3 - PCV13) 12/30/1984     HIV SCREENING  12/30/1993     PAP  07/14/2017     INFLUENZA VACCINE (1) 09/01/2020       Current Medications:     Current Outpatient Medications   Medication Sig Dispense Refill     acetaminophen (TYLENOL) 325 MG tablet Take 2 tablets (650 mg) by mouth every 6 hours (Patient not taking: Reported on 4/27/2020) 24 tablet 0     Ascorbic Acid (VITAMIN C GUMMIE PO) Take by mouth daily       docusate sodium 100 MG PO tablet Take 100 mg by mouth daily       ibuprofen 200 MG PO tablet Take 200 mg by mouth every 4 hours as needed for mild pain       iohexol (OMNIPAQUE) 140 MG/ML solution for oral use Mix entire bottle (50ml) of contast with 600ml (20 ounces) of water and drink half 2 hrs prior to CT scan and half 1 hr prior to scan 140 mL 0         Allergies:        Allergies   Allergen Reactions     Emend [Aprepitant]      Prochlorperazine      \"i was told I turn blue\"        Social History:     Social History     Tobacco Use     Smoking status: Never Smoker     Smokeless tobacco: Never Used   Substance Use Topics     Alcohol use: Yes     Comment: one drink a week       History   Drug Use No         Family History:     The patient's family history is notable for:    Family History   Problem Relation Age of Onset     Aneurysm Mother      Breast Cancer Paternal Grandmother         bilat mastectomies     Breast Cancer Maternal Aunt         stage 1     Thyroid Cancer Sister 23         Physical Exam:     PS 1  VS: P 80, rr 12    General Appearance: healthy and alert, no distress     HEENT:  no thyromegaly, no palpable nodules or masses        Cardiovascular: regular rate and rhythm, no gallops, rubs or murmurs     Respiratory: lungs clear, no rales, rhonchi or wheezes, normal diaphragmatic excursion    Musculoskeletal: extremities non tender and without " edema    Skin: no lesions or rashes     Neurological: normal gait, no gross defects     Psychiatric: appropriate mood and affect                               Hematological: normal cervical, supraclavicular and inguinal lymph nodes     Gastrointestinal:       abdomen soft, non-tender, non-distended, no organomegaly or masses. Laparoscopic scars well healed.     LE - no sig edema - no evidence of skin breaks or infection    Genitourinary: External genitalia and urethral meatus appears normal.  Vagina is smooth without nodularity or masses.  Vaginal cuff appears normal and without lesions.  Bimanual exam reveal no masses, nodularity or fullness.  Recto-vaginal exam confirms these findings.      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   She is here today for surveillance visit.       A total of 30 minutes was spent with the patient, 20 minutes of which were spent in counseling the patient and/or treatment planning.      Plan:     1.)      CxCA -  We have discussed the clinical and and pathologic findings.  She has completed planned RT/Chemo 1/2020.  She has a mildly enlarged R LN int he chain previously involved with cancer.  I have recommended a PET scan followed by possible biopsy or excision if positive, versus observation if negative.  She is in agreement with this plan - and does not want to simply observe at this point which is reasonable.     2.)        Genetic risk factors were assessed and the patient does not meet the qualifications for a referral.       3.)        Labs and/or tests ordered include: PET scan                4.)        Health maintenance issues addressed today: None        Jluis Canela        CC  Patient Care Team:  Tamara Carlos MD as PCP - General (Family Practice)  Tawana Rivera RN as Specialty Care Coordinator (Gyn-Onc)  Jluis Canela MD as MD (Gyn-Onc)

## 2020-10-19 NOTE — PATIENT INSTRUCTIONS
Follow up appointment in 3 months, scheduling will call you to help make an appointment  PET scan ordered, scheduling will call you to help make an appointment

## 2020-10-30 ENCOUNTER — ANCILLARY PROCEDURE (OUTPATIENT)
Dept: PET IMAGING | Facility: CLINIC | Age: 40
End: 2020-10-30
Attending: OBSTETRICS & GYNECOLOGY
Payer: COMMERCIAL

## 2020-10-30 DIAGNOSIS — C79.82 METASTATIC ADENOCARCINOMA TO CERVIX (H): ICD-10-CM

## 2020-10-30 LAB
CREAT BLD-MCNC: 0.8 MG/DL (ref 0.5–1.2)
GFR SERPL CREATININE-BSD FRML MDRD: >90 ML/MIN/{1.73_M2}
GFRB: NORMAL
GLUCOSE SERPL-MCNC: 95 MG/DL (ref 70–99)

## 2020-10-30 RX ORDER — FUROSEMIDE 10 MG/ML
40 INJECTION INTRAMUSCULAR; INTRAVENOUS ONCE
Status: COMPLETED | OUTPATIENT
Start: 2020-10-30 | End: 2020-10-30

## 2020-10-30 RX ADMIN — FUROSEMIDE 40 MG: 10 INJECTION INTRAMUSCULAR; INTRAVENOUS at 09:25

## 2020-10-30 NOTE — DISCHARGE INSTRUCTIONS

## 2020-11-02 ENCOUNTER — TELEPHONE (OUTPATIENT)
Dept: ONCOLOGY | Facility: CLINIC | Age: 40
End: 2020-11-02

## 2020-11-02 NOTE — TELEPHONE ENCOUNTER
Spoke with patient today, regarding her recent PET. The LN in the pelvis is stable to slightly enlarged.  There were other, not heretofore identified lesions in the axilla (right) which should be evaluated for mets/new breast primary, prior to making plan for her abdominal tumor.    Rupa will help to arrange an US/biopsy in breast imaging clinic    Jluis Canela MD

## 2020-11-09 ENCOUNTER — ANCILLARY PROCEDURE (OUTPATIENT)
Dept: MAMMOGRAPHY | Facility: CLINIC | Age: 40
End: 2020-11-09
Attending: OBSTETRICS & GYNECOLOGY
Payer: COMMERCIAL

## 2020-11-09 DIAGNOSIS — R92.8 ABNORMAL FINDING ON BREAST IMAGING: ICD-10-CM

## 2020-11-09 PROCEDURE — G0279 TOMOSYNTHESIS, MAMMO: HCPCS | Performed by: RADIOLOGY

## 2020-11-09 PROCEDURE — 77066 DX MAMMO INCL CAD BI: CPT | Performed by: RADIOLOGY

## 2020-11-09 PROCEDURE — 76642 ULTRASOUND BREAST LIMITED: CPT | Mod: RT | Performed by: RADIOLOGY

## 2020-11-10 DIAGNOSIS — C79.82 METASTATIC ADENOCARCINOMA TO CERVIX (H): Primary | ICD-10-CM

## 2020-11-10 DIAGNOSIS — R59.9 ENLARGED LYMPH NODE: ICD-10-CM

## 2020-11-10 DIAGNOSIS — C53.1 MALIGNANT NEOPLASM OF EXOCERVIX (H): Primary | ICD-10-CM

## 2020-11-10 DIAGNOSIS — R59.9 LYMPH NODE ENLARGEMENT: ICD-10-CM

## 2020-11-12 DIAGNOSIS — R59.1 LYMPHADENOPATHY: Primary | ICD-10-CM

## 2020-11-12 DIAGNOSIS — C79.82 METASTATIC ADENOCARCINOMA TO CERVIX (H): ICD-10-CM

## 2020-11-12 DIAGNOSIS — R59.9 ENLARGED LYMPH NODES: ICD-10-CM

## 2020-11-12 NOTE — PROGRESS NOTES
Request for lymph node biopsy below worked up in error.    Biopsy request is concerning the small node in the right axillary breast region seen on PET CT dated 10/30/2020 and possibly captured on ultrasound dated 11/9/2020.    Plan for procedure changed, IR will attempt biopsy of the right axillary node.  It is very small and may have a nondiagnostic sampling.  Patient may need excisional biopsy if unsuccessful.    Plan communicated to referring through epic messaging.

## 2020-11-13 DIAGNOSIS — Z11.59 ENCOUNTER FOR SCREENING FOR OTHER VIRAL DISEASES: Primary | ICD-10-CM

## 2020-11-23 ENCOUNTER — TELEPHONE (OUTPATIENT)
Dept: INTERVENTIONAL RADIOLOGY/VASCULAR | Facility: CLINIC | Age: 40
End: 2020-11-23

## 2020-11-23 DIAGNOSIS — Z11.59 ENCOUNTER FOR SCREENING FOR OTHER VIRAL DISEASES: ICD-10-CM

## 2020-11-23 PROCEDURE — U0003 INFECTIOUS AGENT DETECTION BY NUCLEIC ACID (DNA OR RNA); SEVERE ACUTE RESPIRATORY SYNDROME CORONAVIRUS 2 (SARS-COV-2) (CORONAVIRUS DISEASE [COVID-19]), AMPLIFIED PROBE TECHNIQUE, MAKING USE OF HIGH THROUGHPUT TECHNOLOGIES AS DESCRIBED BY CMS-2020-01-R: HCPCS | Performed by: OBSTETRICS & GYNECOLOGY

## 2020-11-24 LAB
LABORATORY COMMENT REPORT: NORMAL
SARS-COV-2 RNA SPEC QL NAA+PROBE: NEGATIVE
SARS-COV-2 RNA SPEC QL NAA+PROBE: NORMAL
SPECIMEN SOURCE: NORMAL
SPECIMEN SOURCE: NORMAL

## 2020-11-25 ENCOUNTER — APPOINTMENT (OUTPATIENT)
Dept: MEDSURG UNIT | Facility: CLINIC | Age: 40
End: 2020-11-25
Attending: OBSTETRICS & GYNECOLOGY
Payer: COMMERCIAL

## 2020-11-25 ENCOUNTER — HOSPITAL ENCOUNTER (OUTPATIENT)
Facility: CLINIC | Age: 40
Discharge: HOME | End: 2020-11-25
Attending: OBSTETRICS & GYNECOLOGY | Admitting: RADIOLOGY
Payer: COMMERCIAL

## 2020-11-25 ENCOUNTER — APPOINTMENT (OUTPATIENT)
Dept: INTERVENTIONAL RADIOLOGY/VASCULAR | Facility: CLINIC | Age: 40
End: 2020-11-25
Attending: OBSTETRICS & GYNECOLOGY
Payer: COMMERCIAL

## 2020-11-25 VITALS
DIASTOLIC BLOOD PRESSURE: 51 MMHG | OXYGEN SATURATION: 100 % | HEART RATE: 65 BPM | SYSTOLIC BLOOD PRESSURE: 100 MMHG | RESPIRATION RATE: 16 BRPM | TEMPERATURE: 97.7 F

## 2020-11-25 DIAGNOSIS — R59.1 LYMPHADENOPATHY: ICD-10-CM

## 2020-11-25 LAB
B-HCG FREE SERPL-ACNC: 1 [IU]/L (ref 0.86–1.14)
ERYTHROCYTE [DISTWIDTH] IN BLOOD BY AUTOMATED COUNT: 13.8 % (ref 10–15)
HCT VFR BLD AUTO: 37.4 % (ref 35–47)
HGB BLD-MCNC: 11.7 G/DL (ref 11.7–15.7)
MCH RBC QN AUTO: 29.9 PG (ref 26.5–33)
MCHC RBC AUTO-ENTMCNC: 31.3 G/DL (ref 31.5–36.5)
MCV RBC AUTO: 96 FL (ref 78–100)
PLATELET # BLD AUTO: 167 10E9/L (ref 150–450)
RBC # BLD AUTO: 3.91 10E12/L (ref 3.8–5.2)
WBC # BLD AUTO: 3.3 10E9/L (ref 4–11)

## 2020-11-25 PROCEDURE — 70490 CT SOFT TISSUE NECK W/O DYE: CPT | Mod: 26 | Performed by: RADIOLOGY

## 2020-11-25 PROCEDURE — 999N000142 HC STATISTIC PROCEDURE PREP ONLY

## 2020-11-25 PROCEDURE — 85610 PROTHROMBIN TIME: CPT

## 2020-11-25 PROCEDURE — 70490 CT SOFT TISSUE NECK W/O DYE: CPT

## 2020-11-25 PROCEDURE — 85027 COMPLETE CBC AUTOMATED: CPT | Performed by: PHYSICIAN ASSISTANT

## 2020-11-25 PROCEDURE — 258N000003 HC RX IP 258 OP 636: Performed by: PHYSICIAN ASSISTANT

## 2020-11-25 RX ORDER — FLUMAZENIL 0.1 MG/ML
0.2 INJECTION, SOLUTION INTRAVENOUS
Status: DISCONTINUED | OUTPATIENT
Start: 2020-11-25 | End: 2020-11-25 | Stop reason: HOSPADM

## 2020-11-25 RX ORDER — MULTIPLE VITAMINS W/ MINERALS TAB 9MG-400MCG
1 TAB ORAL DAILY
COMMUNITY

## 2020-11-25 RX ORDER — FENTANYL CITRATE 50 UG/ML
25-50 INJECTION, SOLUTION INTRAMUSCULAR; INTRAVENOUS EVERY 5 MIN PRN
Status: DISCONTINUED | OUTPATIENT
Start: 2020-11-25 | End: 2020-11-25 | Stop reason: HOSPADM

## 2020-11-25 RX ORDER — NICOTINE POLACRILEX 4 MG
15-30 LOZENGE BUCCAL
Status: DISCONTINUED | OUTPATIENT
Start: 2020-11-25 | End: 2020-11-25 | Stop reason: HOSPADM

## 2020-11-25 RX ORDER — NALOXONE HYDROCHLORIDE 0.4 MG/ML
.1-.4 INJECTION, SOLUTION INTRAMUSCULAR; INTRAVENOUS; SUBCUTANEOUS
Status: DISCONTINUED | OUTPATIENT
Start: 2020-11-25 | End: 2020-11-25 | Stop reason: HOSPADM

## 2020-11-25 RX ORDER — DEXTROSE MONOHYDRATE 25 G/50ML
25-50 INJECTION, SOLUTION INTRAVENOUS
Status: DISCONTINUED | OUTPATIENT
Start: 2020-11-25 | End: 2020-11-25 | Stop reason: HOSPADM

## 2020-11-25 RX ORDER — LIDOCAINE 40 MG/G
CREAM TOPICAL
Status: DISCONTINUED | OUTPATIENT
Start: 2020-11-25 | End: 2020-11-25 | Stop reason: HOSPADM

## 2020-11-25 RX ORDER — SODIUM CHLORIDE 9 MG/ML
INJECTION, SOLUTION INTRAVENOUS CONTINUOUS
Status: DISCONTINUED | OUTPATIENT
Start: 2020-11-25 | End: 2020-11-25 | Stop reason: HOSPADM

## 2020-11-25 RX ADMIN — SODIUM CHLORIDE: 9 INJECTION, SOLUTION INTRAVENOUS at 11:57

## 2020-11-25 NOTE — PRE-PROCEDURE
GENERAL PRE-PROCEDURE:   Procedure:  Right chest wall lymphadenopathy biopsy.   Date/Time:  11/25/2020 11:31 AM    Verbal consent obtained?: Yes    Written consent obtained?: Yes    Risks and benefits: Risks, benefits and alternatives were discussed    Consent given by:  Parent  Patient states understanding of procedure being performed: Yes    Patient's understanding of procedure matches consent: Yes    Procedure consent matches procedure scheduled: Yes    Expected level of sedation:  Moderate  Appropriately NPO:  Yes  ASA Class:  Class 2- mild systemic disease, no acute problems, no functional limitations  Mallampati  :  Grade 2- soft palate, base of uvula, tonsillar pillars, and portion of posterior pharyngeal wall visible  Lungs:  Lungs clear with good breath sounds bilaterally  Heart:  Normal heart sounds and rate  History & Physical reviewed:  History and physical reviewed and no updates needed  Statement of review:  I have reviewed the lab findings, diagnostic data, medications, and the plan for sedation

## 2020-11-25 NOTE — PROGRESS NOTES
Patient arrived to floor with RN from IR procedure cancelled, no sedation administered. IV discontinued.

## 2020-11-25 NOTE — PROGRESS NOTES
Patient Name: Francesca Rowland  Medical Record Number: [id number]  Today's Date: 11/25/2020    Procedure: Image guided right right chest biopsy  Proceduralist: Dr. Vogel, Dr. Davis    On pre-scan, biopsy site had shrunk in size. It was decided by MDs to cancel biopsy. Dr. Davis discussed this with patient and patient was transferred back to .

## 2020-12-06 ENCOUNTER — HEALTH MAINTENANCE LETTER (OUTPATIENT)
Age: 40
End: 2020-12-06

## 2021-01-17 NOTE — PROGRESS NOTES
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   She is here today for surveillance visit.  She reports feeling well today.  She denies any vaginal discharge or bleeding. She denies changes in bowel or bladder habits.  She denies abdominal pain or LE edema/pain.  She does notes that she has had 3 episodes of fever, anorexia and fatigue over the last month.  She reports that the episode lasts for about 24 hours and then resolves.  She denies weight loss and reports a good appetite.  She has no other concerns today.     Oncology History:     Patient initially presented as a 27-year-old woman seen in referral due to a Pap smear showing high-grade squamous intraepithelial lesion that was positive for high risk HPV subtype in 2007. This Pap smear had been taken during her six week postpartum visit. Patient did have a remote history of abnormal Pap smears back in  while serving in [place] for the [employer].  Pap smears during her first pregnancy were all negative. Her Pap smear at the beginning of the most recent pregnancy was normal. Patient did have a colposcopy for evaluation of this abnormal Pap smear on 2007 and cervical biopsies of 6 and 12 o'clock position showed features consistent with papillary serous adenocarcinoma. Endocervical curettings were also taken which showed fragments of papillary serous carcinoma. The patient then underwent a LEEP procedure on 10/15/07 which showed microinvasive adenocarcinoma with a depth of invasion of 0.4 mm. Patient then made the decision to proceed with radical hysterectomy. She subsequently underwent a radical hysterectomy, bilateral salpingectomy, bilateral pelvic and periaortic lymph node dissection on 10/19/07. Patient's operative course was otherwise uncomplicated and she recoverred uneventfully.   Pathology did reveal patient to be stage IA2 adenocarcinoma of the cervix.      She underwent routine surveillence through 2014 complicated only by SIOBHAN 1     She recently presented after incidentally being found to have an enlarged pericaval lymph node.  She presented to the ED at [facility] on 9/14/2019 with severe 8.5/10 RUQ pain associated with nausea, one episode of emesis, and decreased appetite. RUQ demonstrated cholelithiasis without cholecystitis, so an abdominal and pelvic CT scan was obtained. This revealed post-surgical changes consistent with prior hysterectomy, left adnexal cysts thought to be ovarian, and an enlarged pericaval lymph node suspicious for metastatic disease vs primary lymphoma. Her condition stabilized and she was discharged home with referrals to general surgery and oncology for biliary colic and further management of the lymphadenopathy, respectively. CT-guided right retroperitoneal lymph node biopsy on 10/15/2019 was consistent with metastatic cervical carcinoma     She underwent surgery to remove the mass and determine the extent of disease on 11/13/19     PATH:  FINAL DIAGNOSIS:   LYMPH NODES, PARA-AORTIC, RESECTION:   - Positive for carcinoma in three of four lymph nodes examined (3/4),   consistent with recurrent endocervical   adenocarcinoma   - Largest metastatic focus is 3.5 cm, positive for extra yuridia extension         Chemo-Potentiated RT 12/2019-1/2020 2/2020 CT:  IMPRESSION: Postoperative changes of hysterectomy and lymph node  resection for cervical cancer. The metastatic right pelvic lymph nodes  which were previously identified have decreased in size. No new  metastases identified.      She recently presented to the ED for an acute RUQ pain episode, which has greatly improved despite no clear diagnosis.  During this visit she underwent a CAP CT which did not demonstrate evidence of cancer but a slightly enlarged spleen.  She  had a mild eosinophilia (~2%) but has  not traveled or eaten exotic food recently, and has not had any new contacts to suggest mono.     10/13/2020 CT:  IMPRESSION:  1.  Recurrent right external iliac lymphadenopathy.  2.  Rebound thymic hyperplasia.    10/30/2020 PET  IMPRESSION:   In this patient with history of metastatic cervical cancer status-post  radical hysterectomy and chemoradiation, there is evidence of  recurrent disease:  1. Increased size of a hypermetabolic right external iliac chain lymph  node since 2/27/2020.  2. Hypermetabolic right subpectoral and axillary lymph nodes.  Recommend follow-up in the breast center for axillary ultrasound and  possible tissue sampling.  3. Idiopathic thymic activation/hyperplasia.    She was evaluated int he breast clinic with plan made for biopsy - however at biopsy the node had shruck considerably and was thus felt to be c/w inflammation (no biopsy obtained)    She presents for routine follow-up.      Review of Systems:    Systemic           no weight changes; no fever; no chills; no night sweats; no appetite changes  Skin           no rashes, or lesions  Eye           no irritation; no changes in vision  Payton-Laryngeal           no dysphagia; no hoarseness   Pulmonary    no cough; no shortness of breath  Cardiovascular    no chest pain; no palpitations  Gastrointestinal    no diarrhea; no constipation; no abdominal pain; no changes in bowel  habits; no blood in stool  Genitourinary   no urinary frequency; no urinary urgency; no dysuria; no pain; no abnormal vaginal discharge; no abnormal vaginal bleeding  Breast   no breast discharge; no breast changes; no breast pain  Musculoskeletal    no myalgias; no arthralgias; no back pain  Psychiatric           no depressed mood; no anxiety    Hematologic           no tender lymph nodes; no noticeable swellings or lumps   Endocrine    no hot flashes; no heat/cold intolerance         Neurological   no tremor; no numbness and tingling; no headaches; no difficulty   "sleeping      Past Medical History:    Past Medical History:   Diagnosis Date     Chronic cholecystitis 09/25/2019     History of cervical cancer 2007     Metastatic adenocarcinoma to cervix (H) 10/28/2019    Added automatically from request for surgery [id number]         Past Surgical History:    Past Surgical History:   Procedure Laterality Date     CHOLECYSTECTOMY, LAPOROSCOPIC  09/25/2019     HYSTERECTOMY RADICAL  2007    PAUL     LAPAROSCOPIC LYMPHADENECTOMY N/A 11/13/2019    Procedure: LAPAROSCOPY, resection of of paraaortic lymph node, lysis of adhesions;  Surgeon: Jluis Canela MD;  Location: [facility] OR         Health Maintenance Due   Topic Date Due     PREVENTIVE CARE VISIT  1978     DEPRESSION ACTION PLAN  1978     PHQ-9  1978     Pneumococcal Vaccine: Pediatrics (0 to 5 Years) and At-Risk Patients (6 to 64 Years) (1 of 3 - PCV13) 12/30/1984     HIV SCREENING  12/30/1993     HEPATITIS C SCREENING  12/30/1996     PAP  07/14/2017       Current Medications:     Current Outpatient Medications   Medication Sig Dispense Refill     acetaminophen (TYLENOL) 325 MG tablet Take 2 tablets (650 mg) by mouth every 6 hours (Patient not taking: Reported on 4/27/2020) 24 tablet 0     Ascorbic Acid (VITAMIN C GUMMIE PO) Take by mouth daily       ibuprofen 200 MG PO tablet Take 200 mg by mouth every 4 hours as needed for mild pain       multivitamin w/minerals (MULTI-VITAMIN) tablet Take 1 tablet by mouth daily       Prenatal Vit-Fe Fumarate-FA (M-VIT PO)            Allergies:        Allergies   Allergen Reactions     Emend [Aprepitant]      Prochlorperazine      \"i was told I turn blue\"        Social History:     Social History     Tobacco Use     Smoking status: Never Smoker     Smokeless tobacco: Never Used   Substance Use Topics     Alcohol use: Yes     Comment: one drink a week       History   Drug Use No         Family History:     The patient's family history is notable for:    Family History "   Problem Relation Age of Onset     Aneurysm Mother      Breast Cancer Paternal Grandmother         bilat mastectomies     Breast Cancer Maternal Aunt         stage 1     Thyroid Cancer Sister 23         Physical Exam:     PS 1  VS: P 80 RR14    General Appearance: healthy and alert, no distress     HEENT:  no thyromegaly, no palpable nodules or masses        Cardiovascular: regular rate and rhythm, no gallops, rubs or murmurs     Respiratory: lungs clear, no rales, rhonchi or wheezes, normal diaphragmatic excursion    Musculoskeletal: extremities non tender and without edema    Skin: no lesions or rashes     Neurological: normal gait, no gross defects     Psychiatric: appropriate mood and affect                               Hematological: normal cervical, supraclavicular and inguinal lymph nodes     Gastrointestinal:       abdomen soft, non-tender, non-distended, no organomegaly or masses. Laparoscopic scars well healed.     LE - no sig edema - no evidence of skin breaks or infection    Genitourinary: External genitalia and urethral meatus appears normal.  Vagina is smooth without nodularity or masses.  Vaginal cuff appears normal and without lesions.  Bimanual exam reveal no masses, nodularity or fullness.  Recto-vaginal exam confirms these findings.      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   She is here today for surveillance visit.       A total of 30 minutes was spent with the patient, 20 minutes of which were spent in counseling the patient and/or treatment planning.      Plan:     1.)      CxCA -  We have discussed the clinical and and pathologic findings.  She has completed planned RT/Chemo 1/2020.  She has a mildly enlarged R LN on her 10/2020 CT/PET but attempted biopsy of this showed essential resolution in the axilla (the pelvis has not been re-evaluated yet).  I have recommended re-evaluation of the pelvis and Axilla (with CT PET) followed  by routine surveillance if negative.      2.)        Genetic risk factors were assessed again and the patient has multiple family members with cancer diagnosed younger than age 50 including herself (age 40, but with likely HPV related disease) and her sister (thyroid cancer at age 23) among others.  I will ask her to meet with the genetic counseling division.     3.)        Labs and/or tests ordered include: PET, genetic ref                4.)        Health maintenance issues addressed today: None        Jluis Canela        CC  Patient Care Team:  Tamara Carlos MD as PCP - General (Family Practice)  Tawana Rivera RN as Specialty Care Coordinator (Gynecologic Oncology)  Jluis Canela MD as MD (Gynecologic Oncology)  Bret Kerns MD as Assigned Cancer Care Provider    Answers for HPI/ROS submitted by the patient on 1/15/2021   General Symptoms: No  Skin Symptoms: No  HENT Symptoms: No  EYE SYMPTOMS: No  HEART SYMPTOMS: No  LUNG SYMPTOMS: No  INTESTINAL SYMPTOMS: No  URINARY SYMPTOMS: No  GYNECOLOGIC SYMPTOMS: No  BREAST SYMPTOMS: No  SKELETAL SYMPTOMS: No  BLOOD SYMPTOMS: No  NERVOUS SYSTEM SYMPTOMS: No  MENTAL HEALTH SYMPTOMS: No

## 2021-01-18 ENCOUNTER — PRE VISIT (OUTPATIENT)
Dept: ONCOLOGY | Facility: CLINIC | Age: 41
End: 2021-01-18

## 2021-01-18 ENCOUNTER — ONCOLOGY VISIT (OUTPATIENT)
Dept: ONCOLOGY | Facility: CLINIC | Age: 41
End: 2021-01-18
Attending: OBSTETRICS & GYNECOLOGY
Payer: COMMERCIAL

## 2021-01-18 DIAGNOSIS — C79.82 METASTATIC ADENOCARCINOMA TO CERVIX (H): Primary | ICD-10-CM

## 2021-01-18 PROCEDURE — G0463 HOSPITAL OUTPT CLINIC VISIT: HCPCS

## 2021-01-18 PROCEDURE — 99214 OFFICE O/P EST MOD 30 MIN: CPT | Performed by: OBSTETRICS & GYNECOLOGY

## 2021-01-18 NOTE — PATIENT INSTRUCTIONS
Follow up appointment in 3 months, scheduling will call you to help make an appointment  referral to genetic counseling, scheduling will call you to help make an appointment  Pet scan ordered to be done in the next few weeks, you will be notified via my chart/telephone with test results

## 2021-01-18 NOTE — TELEPHONE ENCOUNTER
ONCOLOGY INTAKE: Records Information      APPT INFORMATION:  Referring provider:  Nneka Childress  Referring provider s clinic:  [facility]  Reason for visit/diagnosis:  Metastatic adenocarcinoma to cervix  Has patient been notified of appointment date and time?: yes    RECORDS INFORMATION:  Were the records received with the referral (via [employer])? no    Has patient been seen for any external appt for this diagnosis? no    If yes, where? n/a    Has patient had any imaging or procedures outside of [facility] for this condition? no      If Yes, where? n/a    ADDITIONAL INFORMATION:  none

## 2021-01-18 NOTE — TELEPHONE ENCOUNTER
ONCOLOGY INTAKE: Records Information      APPT INFORMATION:  Referring provider:  dr. montgomery  Referring provider s clinic:  [facility]  Reason for visit/diagnosis:  Metastatic adenocarcinoma to cervix  Has patient been notified of appointment date and time?: yes    RECORDS INFORMATION:  Were the records received with the referral (via [employer])? no    Has patient been seen for any external appt for this diagnosis? no    If yes, where? n/a    Has patient had any imaging or procedures outside of [facility] for this condition? no      If Yes, where? n/a    ADDITIONAL INFORMATION:  none

## 2021-01-18 NOTE — LETTER
2021         RE: Francesca Rowland  [address]  [place] [address]        Dear Colleague,    Thank you for referring your patient, Francesca Rowland, to the [facility]. Please see a copy of my visit note below.                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   She is here today for surveillance visit.  She reports feeling well today.  She denies any vaginal discharge or bleeding. She denies changes in bowel or bladder habits.  She denies abdominal pain or LE edema/pain.  She does notes that she has had 3 episodes of fever, anorexia and fatigue over the last month.  She reports that the episode lasts for about 24 hours and then resolves.  She denies weight loss and reports a good appetite.  She has no other concerns today.     Oncology History:     Patient initially presented as a 27-year-old woman seen in referral due to a Pap smear showing high-grade squamous intraepithelial lesion that was positive for high risk HPV subtype in 2007. This Pap smear had been taken during her six week postpartum visit. Patient did have a remote history of abnormal Pap smears back in  while serving in [place] for the [employer].  Pap smears during her first pregnancy were all negative. Her Pap smear at the beginning of the most recent pregnancy was normal. Patient did have a colposcopy for evaluation of this abnormal Pap smear on 2007 and cervical biopsies of 6 and 12 o'clock position showed features consistent with papillary serous adenocarcinoma. Endocervical curettings were also taken which showed fragments of papillary serous carcinoma. The patient then underwent a LEEP procedure on 10/15/07 which showed microinvasive adenocarcinoma with a depth of invasion of 0.4 mm. Patient then made the decision to proceed  with radical hysterectomy. She subsequently underwent a radical hysterectomy, bilateral salpingectomy, bilateral pelvic and periaortic lymph node dissection on 10/19/07. Patient's operative course was otherwise uncomplicated and she recoverred uneventfully.  Pathology did reveal patient to be stage IA2 adenocarcinoma of the cervix.      She underwent routine surveillence through 2014 complicated only by SIOBHAN 1     She recently presented after incidentally being found to have an enlarged pericaval lymph node.  She presented to the ED at [facility] on 9/14/2019 with severe 8.5/10 RUQ pain associated with nausea, one episode of emesis, and decreased appetite. RUQ demonstrated cholelithiasis without cholecystitis, so an abdominal and pelvic CT scan was obtained. This revealed post-surgical changes consistent with prior hysterectomy, left adnexal cysts thought to be ovarian, and an enlarged pericaval lymph node suspicious for metastatic disease vs primary lymphoma. Her condition stabilized and she was discharged home with referrals to general surgery and oncology for biliary colic and further management of the lymphadenopathy, respectively. CT-guided right retroperitoneal lymph node biopsy on 10/15/2019 was consistent with metastatic cervical carcinoma     She underwent surgery to remove the mass and determine the extent of disease on 11/13/19     PATH:  FINAL DIAGNOSIS:   LYMPH NODES, PARA-AORTIC, RESECTION:   - Positive for carcinoma in three of four lymph nodes examined (3/4),   consistent with recurrent endocervical   adenocarcinoma   - Largest metastatic focus is 3.5 cm, positive for extra yuridia extension         Chemo-Potentiated RT 12/2019-1/2020 2/2020 CT:  IMPRESSION: Postoperative changes of hysterectomy and lymph node  resection for cervical cancer. The metastatic right pelvic lymph nodes  which were previously identified have decreased in size. No new  metastases identified.      She recently presented  to the ED for an acute RUQ pain episode, which has greatly improved despite no clear diagnosis.  During this visit she underwent a CAP CT which did not demonstrate evidence of cancer but a slightly enlarged spleen.  She  had a mild eosinophilia (~2%) but has not traveled or eaten exotic food recently, and has not had any new contacts to suggest mono.     10/13/2020 CT:  IMPRESSION:  1.  Recurrent right external iliac lymphadenopathy.  2.  Rebound thymic hyperplasia.    10/30/2020 PET  IMPRESSION:   In this patient with history of metastatic cervical cancer status-post  radical hysterectomy and chemoradiation, there is evidence of  recurrent disease:  1. Increased size of a hypermetabolic right external iliac chain lymph  node since 2/27/2020.  2. Hypermetabolic right subpectoral and axillary lymph nodes.  Recommend follow-up in the breast center for axillary ultrasound and  possible tissue sampling.  3. Idiopathic thymic activation/hyperplasia.    She was evaluated int he breast clinic with plan made for biopsy - however at biopsy the node had shruck considerably and was thus felt to be c/w inflammation (no biopsy obtained)    She presents for routine follow-up.      Review of Systems:    Systemic           no weight changes; no fever; no chills; no night sweats; no appetite changes  Skin           no rashes, or lesions  Eye           no irritation; no changes in vision  Payton-Laryngeal           no dysphagia; no hoarseness   Pulmonary    no cough; no shortness of breath  Cardiovascular    no chest pain; no palpitations  Gastrointestinal    no diarrhea; no constipation; no abdominal pain; no changes in bowel  habits; no blood in stool  Genitourinary   no urinary frequency; no urinary urgency; no dysuria; no pain; no abnormal vaginal discharge; no abnormal vaginal bleeding  Breast   no breast discharge; no breast changes; no breast pain  Musculoskeletal    no myalgias; no arthralgias; no back pain  Psychiatric           " no depressed mood; no anxiety    Hematologic           no tender lymph nodes; no noticeable swellings or lumps   Endocrine    no hot flashes; no heat/cold intolerance         Neurological   no tremor; no numbness and tingling; no headaches; no difficulty  sleeping      Past Medical History:    Past Medical History:   Diagnosis Date     Chronic cholecystitis 09/25/2019     History of cervical cancer 2007     Metastatic adenocarcinoma to cervix (H) 10/28/2019    Added automatically from request for surgery [id number]         Past Surgical History:    Past Surgical History:   Procedure Laterality Date     CHOLECYSTECTOMY, LAPOROSCOPIC  09/25/2019     HYSTERECTOMY RADICAL  2007    PAUL     LAPAROSCOPIC LYMPHADENECTOMY N/A 11/13/2019    Procedure: LAPAROSCOPY, resection of of paraaortic lymph node, lysis of adhesions;  Surgeon: Jluis Canela MD;  Location:  OR         Health Maintenance Due   Topic Date Due     PREVENTIVE CARE VISIT  1978     DEPRESSION ACTION PLAN  1978     PHQ-9  1978     Pneumococcal Vaccine: Pediatrics (0 to 5 Years) and At-Risk Patients (6 to 64 Years) (1 of 3 - PCV13) 12/30/1984     HIV SCREENING  12/30/1993     HEPATITIS C SCREENING  12/30/1996     PAP  07/14/2017       Current Medications:     Current Outpatient Medications   Medication Sig Dispense Refill     acetaminophen (TYLENOL) 325 MG tablet Take 2 tablets (650 mg) by mouth every 6 hours (Patient not taking: Reported on 4/27/2020) 24 tablet 0     Ascorbic Acid (VITAMIN C GUMMIE PO) Take by mouth daily       ibuprofen 200 MG PO tablet Take 200 mg by mouth every 4 hours as needed for mild pain       multivitamin w/minerals (MULTI-VITAMIN) tablet Take 1 tablet by mouth daily       Prenatal Vit-Fe Fumarate-FA (M-VIT PO)            Allergies:        Allergies   Allergen Reactions     Emend [Aprepitant]      Prochlorperazine      \"i was told I turn blue\"        Social History:     Social History     Tobacco Use     " Smoking status: Never Smoker     Smokeless tobacco: Never Used   Substance Use Topics     Alcohol use: Yes     Comment: one drink a week       History   Drug Use No         Family History:     The patient's family history is notable for:    Family History   Problem Relation Age of Onset     Aneurysm Mother      Breast Cancer Paternal Grandmother         bilat mastectomies     Breast Cancer Maternal Aunt         stage 1     Thyroid Cancer Sister 23         Physical Exam:     PS 1  VS: P 80 RR14    General Appearance: healthy and alert, no distress     HEENT:  no thyromegaly, no palpable nodules or masses        Cardiovascular: regular rate and rhythm, no gallops, rubs or murmurs     Respiratory: lungs clear, no rales, rhonchi or wheezes, normal diaphragmatic excursion    Musculoskeletal: extremities non tender and without edema    Skin: no lesions or rashes     Neurological: normal gait, no gross defects     Psychiatric: appropriate mood and affect                               Hematological: normal cervical, supraclavicular and inguinal lymph nodes     Gastrointestinal:       abdomen soft, non-tender, non-distended, no organomegaly or masses. Laparoscopic scars well healed.     LE - no sig edema - no evidence of skin breaks or infection    Genitourinary: External genitalia and urethral meatus appears normal.  Vagina is smooth without nodularity or masses.  Vaginal cuff appears normal and without lesions.  Bimanual exam reveal no masses, nodularity or fullness.  Recto-vaginal exam confirms these findings.      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   She is here today for surveillance visit.       A total of 30 minutes was spent with the patient, 20 minutes of which were spent in counseling the patient and/or treatment planning.      Plan:     1.)      CxCA -  We have discussed the clinical and and pathologic findings.  She has completed planned RT/Chemo  1/2020.  She has a mildly enlarged R LN on her 10/2020 CT/PET but attempted biopsy of this showed essential resolution in the axilla (the pelvis has not been re-evaluated yet).  I have recommended re-evaluation of the pelvis and Axilla (with CT PET) followed by routine surveillance if negative.      2.)        Genetic risk factors were assessed again and the patient has multiple family members with cancer diagnosed younger than age 50 including herself (age 38, but with likely HPV related disease) and her sister (thyroid cancer at age 23) among others.  I will ask her to meet with the genetic counseling division.     3.)        Labs and/or tests ordered include: PET, genetic ref                4.)        Health maintenance issues addressed today: None        Jluis Canela        CC  Patient Care Team:  Tamara Carlos MD as PCP - General (Family Practice)  Tawana Rivera RN as Specialty Care Coordinator (Gynecologic Oncology)  Bret Kerns MD as Assigned Cancer Care Provider

## 2021-01-19 ENCOUNTER — VIRTUAL VISIT (OUTPATIENT)
Dept: ONCOLOGY | Facility: CLINIC | Age: 41
End: 2021-01-19
Attending: OBSTETRICS & GYNECOLOGY
Payer: COMMERCIAL

## 2021-01-19 DIAGNOSIS — Z80.8 FAMILY HISTORY OF THYROID CANCER: ICD-10-CM

## 2021-01-19 DIAGNOSIS — C53.9 MALIGNANT NEOPLASM OF CERVIX, UNSPECIFIED SITE (H): Primary | ICD-10-CM

## 2021-01-19 DIAGNOSIS — Z80.3 FAMILY HISTORY OF MALIGNANT NEOPLASM OF BREAST: ICD-10-CM

## 2021-01-19 PROCEDURE — 96040 HC GENETIC COUNSELING, EACH 30 MINUTES: CPT | Mod: GT | Performed by: GENETIC COUNSELOR, MS

## 2021-01-19 NOTE — PROGRESS NOTES
1/19/2021    Francesca is a 40 year old who is being evaluated via a billable video visit.      Video-Visit Details    Type of service:  Video Visit    Video Start Time: 11:54 am   Video End Time: 12:47 pm    Originating Location (pt. Location): Home    Distant Location (provider location): Cancer Risk Management Program    Platform used for Video Visit: [facility]     Video visit joined today by Samera Al-Najjar, GC Intern    Referring Provider: Jluis Canela MD    Presenting Information:   I spoke with Francesca Rowland over video today for genetic counseling to discuss her personal and family history of cancer. With her permission, this appointment was conducted over video due to COVID-19 precautions. We talked today to review this history, cancer screening recommendations, and available genetic testing options.    Personal History:  Francesca is a 40 year old female. She was first diagnosed with cervical cancer in 2007 at age 25. She had a pap smear showing high-grade squamous intraepithelial lesion that was positive for high risk HPV subtype in August 2007. This was followed by colposcopy, cervical biopsies, and a LEEP procedure. She then opted to proceed with radical hysterectomy and bilateral salpingectomy, bilateral pelvic and periaortic lymph node dissection on 10/19/2007. Surgical pathology revealed endocervical adenocarcinoma. On 10/15/19 she underwent CT guided biopsy of right retroperitoneal node, and pathology revealed metastatic cervical adenocarcinoma. She had surgery on 11/13/19 and Chemo-Potentiated RT from 12/2019-1/2020.    She had her first menstrual period at age 12 and her first child at age 22. Francesca has her ovaries in place. She reports that she has not used hormone replacement therapy. She used oral contraceptives in the past. She has clinical breast exams and mammograms; her most recent mammogram on 11/9/20 had led to recommendation for a biopsy, although this was cancelled when follow up  imaging revealed that the suspicious lymph node had decreased in size. Her most recent colonoscopy was in 2008 and was negative per her report. Francesca reported former tobacco use and alcohol use.    Family History: (Please see scanned pedigree for detailed family history information)  Siblings:  Her sister is 41 years old and was diagnosed with thyroid cancer at age 22. She underwent thyroidectomy and radiation.   Maternal:  Her mother passed away at age 64 with no known history of cancer.   She is not aware of any diagnoses of cancer in her maternal relatives.   Paternal:  Her father is 70 years old with no known history of cancer.    Her aunt is 72 years old and was diagnosed with breast cancer at age 65. Treatment included chemotherapy. Her uterus and ovaries are intact.   Her son (Francesca's cousin) is 52 years old and was diagnosed with a blood cancer at age 49 (unknown type).   Her half-uncle (through her grandfather) was diagnosed with throat cancer in his early-mid 70s. He had no known history of smoking, but may have had chemical exposures through his work as a hairstylist.   Her grandmother was diagnosed with breast cancer in approximately her early 50s. Treatment included bilateral mastectomy. She passed away at age 89.    Her maternal ethnicity is [place], [place]. Her paternal ethnicity is [place]. There is no known Ashkenazi Confucianism ancestry on either side of her family.     Discussion:    We reviewed the features of sporadic, familial, and hereditary cancers. In looking at Francesca's family history, it is possible that a cancer susceptibility gene is present due to her family history of cancers. We reviewed that most cervical cancers are not due to an inherited gene mutation. She did test positive for HPV at the time of her original diagnosis in 2007.     We discussed the natural history and genetics of hereditary cancer. A detailed handout regarding genetic testing will be provided to Francesca via [employer]  and can be found in the after visit summary. Topics included: inheritance pattern, cancer risks, cancer screening recommendations, and also risks, benefits and limitations of testing.    Based on her personal and family history, we discussed the option of genetic testing. We discussed that there are multiple genes that could cause increased risk for breast and thyroid cancer. As many of these genes present with overlapping features in a family and accurate cancer risk cannot always be established based upon the pedigree analysis alone, it would be reasonable for Francesca to consider panel genetic testing to analyze multiple genes at once.    We reviewed genetic testing options for Francesca based on her personal and family history: a smaller panel of genes associated with certain cancer types, or larger panel options to include genes associated with multiple different types of cancer. She expressed an interest in more broad testing and opted for a CustomNext-Cancer Panel (a combination of the CancerNext panel + additional genes associated with an increased risk for thyroid cancer).  Genetic testing is available for 36 genes associated with hereditary cancer: CancerNext (APC, ACE, AXIN2, BARD1, BMPR1A, BRCA1, BRCA2, BRIP1, CDH1, CDK4, CDKN2A, CHEK2, DICER1, EPCAM, GREM1, HOXB13, MLH1, MSH2, MSH3, MSH6, MUTYH, NBN, NF1, NTHL1, PALB2, PMS2, POLD1, POLE, PTEN, RAD51C, RAD51D, RECQL, SMAD4, SMARCA4, STK11, and TP53).  We discussed that many of the genes in the CancerNext panel are associated with specific hereditary cancer syndromes and published management guidelines: Hereditary Breast and Ovarian Cancer syndrome (BRCA1, BRCA2), Cordero syndrome (MLH1, MSH2, MSH6, PMS2, EPCAM), Familial Adenomatous Polyposis (APC), Hereditary Diffuse Gastric Cancer (CDH1), Familial Atypical Multiple Mole Melanoma syndrome (CDK4, CDKN2A), Juvenile Polyposis syndrome (BMPR1A, SMAD4), Cowden syndrome (PTEN), Li Fraumeni syndrome (TP53),  Peutz-Jeghers syndrome (STK11), MUTYH Associated Polyposis (MUTYH), and Neurofibromatosis type 1 (NF1).   The ACE, AXIN2, BRIP1, CHEK2, GREM1, MSH3, NBN, NTHL1, PALB2, POLD1, POLE, RAD51C, and RAD51D genes are associated with increased cancer risk and have published management guidelines for certain cancers.    The remaining genes (BARD1, DICER1, HOXB13, RECQL, and SMARCA4) are associated with increased cancer risk and may allow us to make medical recommendations when mutations are identified.    As described above, additional genes associated with an increased risk for thyroid cancer will be added to her testing (IZLAG0I, RET).   Due to COVID-19 precautions consent was obtained over the video today. Genetic testing via a CustomNext-Cancer Panel (a combination of the CancerNext panel + additional genes associated with an increased risk for thyroid cancer) will be sent to [facility]. Francesca opted to have her blood drawn for testing. Turnaround time from date when sample is received at the lab: approximately 3-4 weeks.    Medical Management: For Francesca, we reviewed that the information from genetic testing may determine:    additional cancer screening for which Francesca may qualify (i.e. mammogram and breast MRI, more frequent colonoscopies, more frequent dermatologic exams, etc.),    options for risk reducing surgeries Francesca could consider (i.e. bilateral mastectomy, surgery to remove her ovaries, etc),      and targeted chemotherapies for certain cancers (i.e. immunotherapy for individuals with Cordero syndrome, PARP inhibitors, etc.).     These recommendations will be discussed in detail once genetic testing is completed.     Plan:  1) Today Francesca elected to proceed with genetic testing via a CustomNext-Cancer Panel (a combination of the CancerNext panel + additional genes associated with an increased risk for thyroid cancer) offered by [facility].  2) She will be scheduled for a virtual visit to  discuss these results when available.     Time spent over video: 53 minutes    Nneka Childress MS, [facility]  Licensed, Certified Genetic Counselor  Office: [phone]  Email: [email or web address]

## 2021-01-19 NOTE — LETTER
Cancer Risk Management  Program Locations    [facility]  [facility]  [facility]  [facility]  [facility]  Mailing Address  Cancer Risk Management Program  [facility]  [address]  [place] [address]    New patient appointments  [phone]  May 31, 2021    Francesca Rowland  [address]  [place] [address]      Dear Francesca,    It was a pleasure speaking with you over video for genetic counseling on 1/19/2021. Here is a copy of the progress note from our discussion. If you have any additional questions, please feel free to call.    Referring Provider: Jluis Canela MD    Presenting Information:   I spoke with Francesca Janett over video today for genetic counseling to discuss her personal and family history of cancer. With her permission, this appointment was conducted over video due to COVID-19 precautions. We talked today to review this history, cancer screening recommendations, and available genetic testing options.    Personal History:  Francesca is a 40 year old female. She was first diagnosed with cervical cancer in 2007 at age 25. She had a pap smear showing high-grade squamous intraepithelial lesion that was positive for high risk HPV subtype in August 2007. This was followed by colposcopy, cervical biopsies, and a LEEP procedure. She then opted to proceed with radical hysterectomy and bilateral salpingectomy, bilateral pelvic and periaortic lymph node dissection on 10/19/2007. Surgical pathology revealed endocervical adenocarcinoma. On 10/15/19 she underwent CT guided biopsy of right retroperitoneal node, and pathology revealed metastatic cervical adenocarcinoma. She had surgery on 11/13/19 and Chemo-Potentiated RT from 12/2019-1/2020.    She had her first menstrual period at age 12 and her first child at age 22. Francesca has her  ovaries in place. She reports that she has not used hormone replacement therapy. She used oral contraceptives in the past. She has clinical breast exams and mammograms; her most recent mammogram on 11/9/20 had led to recommendation for a biopsy, although this was cancelled when follow up imaging revealed that the suspicious lymph node had decreased in size. Her most recent colonoscopy was in 2008 and was negative per her report. Francesca reported former tobacco use and alcohol use.    Family History: (Please see scanned pedigree for detailed family history information)  Siblings:  Her sister is 41 years old and was diagnosed with thyroid cancer at age 22. She underwent thyroidectomy and radiation.   Maternal:  Her mother passed away at age 64 with no known history of cancer.   She is not aware of any diagnoses of cancer in her maternal relatives.   Paternal:  Her father is 70 years old with no known history of cancer.    Her aunt is 72 years old and was diagnosed with breast cancer at age 65. Treatment included chemotherapy. Her uterus and ovaries are intact.   Her son (Francesca's cousin) is 52 years old and was diagnosed with a blood cancer at age 49 (unknown type).   Her half-uncle (through her grandfather) was diagnosed with throat cancer in his early-mid 70s. He had no known history of smoking, but may have had chemical exposures through his work as a hairstylist.   Her grandmother was diagnosed with breast cancer in approximately her early 50s. Treatment included bilateral mastectomy. She passed away at age 89.    Her maternal ethnicity is [place], Occitan. Her paternal ethnicity is [place]. There is no known Ashkenazi Roman Catholic ancestry on either side of her family.     Discussion:    We reviewed the features of sporadic, familial, and hereditary cancers. In looking at Francesca's family history, it is possible that a cancer susceptibility gene is present due to her family history of cancers. We reviewed that most  cervical cancers are not due to an inherited gene mutation. She did test positive for HPV at the time of her original diagnosis in 2007.     We discussed the natural history and genetics of hereditary cancer. A detailed handout regarding genetic testing will be provided to Francesca via [employer] and can be found in the after visit summary. Topics included: inheritance pattern, cancer risks, cancer screening recommendations, and also risks, benefits and limitations of testing.    Based on her personal and family history, we discussed the option of genetic testing. We discussed that there are multiple genes that could cause increased risk for breast and thyroid cancer. As many of these genes present with overlapping features in a family and accurate cancer risk cannot always be established based upon the pedigree analysis alone, it would be reasonable for Francesca to consider panel genetic testing to analyze multiple genes at once.    We reviewed genetic testing options for Francesca based on her personal and family history: a smaller panel of genes associated with certain cancer types, or larger panel options to include genes associated with multiple different types of cancer. She expressed an interest in more broad testing and opted for a CustomNext-Cancer Panel (a combination of the CancerNext panel + additional genes associated with an increased risk for thyroid cancer).  Genetic testing is available for 36 genes associated with hereditary cancer: CancerNext (APC, ACE, AXIN2, BARD1, BMPR1A, BRCA1, BRCA2, BRIP1, CDH1, CDK4, CDKN2A, CHEK2, DICER1, EPCAM, GREM1, HOXB13, MLH1, MSH2, MSH3, MSH6, MUTYH, NBN, NF1, NTHL1, PALB2, PMS2, POLD1, POLE, PTEN, RAD51C, RAD51D, RECQL, SMAD4, SMARCA4, STK11, and TP53).  We discussed that many of the genes in the CancerNext panel are associated with specific hereditary cancer syndromes and published management guidelines: Hereditary Breast and Ovarian Cancer syndrome (BRCA1, BRCA2), Cordero  syndrome (MLH1, MSH2, MSH6, PMS2, EPCAM), Familial Adenomatous Polyposis (APC), Hereditary Diffuse Gastric Cancer (CDH1), Familial Atypical Multiple Mole Melanoma syndrome (CDK4, CDKN2A), Juvenile Polyposis syndrome (BMPR1A, SMAD4), Cowden syndrome (PTEN), Li Fraumeni syndrome (TP53), Peutz-Jeghers syndrome (STK11), MUTYH Associated Polyposis (MUTYH), and Neurofibromatosis type 1 (NF1).   The ACE, AXIN2, BRIP1, CHEK2, GREM1, MSH3, NBN, NTHL1, PALB2, POLD1, POLE, RAD51C, and RAD51D genes are associated with increased cancer risk and have published management guidelines for certain cancers.    The remaining genes (BARD1, DICER1, HOXB13, RECQL, and SMARCA4) are associated with increased cancer risk and may allow us to make medical recommendations when mutations are identified.    As described above, additional genes associated with an increased risk for thyroid cancer will be added to her testing (DVHUS5K, RET).   Due to COVID-19 precautions consent was obtained over the video today. Genetic testing via a CustomNext-Cancer Panel (a combination of the CancerNext panel + additional genes associated with an increased risk for thyroid cancer) [facility]. Francesca opted to have her blood drawn for testing. Turnaround time from date when sample is received at the lab: approximately 3-4 weeks.    Medical Management: For Francesca, we reviewed that the information from genetic testing may determine:    additional cancer screening for which Francesca may qualify (i.e. mammogram and breast MRI, more frequent colonoscopies, more frequent dermatologic exams, etc.),    options for risk reducing surgeries Francesca could consider (i.e. bilateral mastectomy, surgery to remove her ovaries, etc),      and targeted chemotherapies for certain cancers (i.e. immunotherapy for individuals with Cordero syndrome, PARP inhibitors, etc.).     These recommendations will be discussed in detail once genetic testing is completed.      Plan:  1) Today Francesca elected to proceed with genetic testing via a CustomNext-Cancer Panel (a combination of the CancerNext panel + additional genes associated with an increased risk for thyroid cancer) offered by [employer].  2) She will be scheduled for a virtual visit to discuss these results when available.     Nneka Childress MS, [facility]  Licensed, Certified Genetic Counselor  Office: [phone]  Email: [email or web address]

## 2021-01-29 ENCOUNTER — ANCILLARY PROCEDURE (OUTPATIENT)
Dept: PET IMAGING | Facility: CLINIC | Age: 41
End: 2021-01-29
Attending: OBSTETRICS & GYNECOLOGY
Payer: COMMERCIAL

## 2021-01-29 DIAGNOSIS — C79.82 METASTATIC ADENOCARCINOMA TO CERVIX (H): ICD-10-CM

## 2021-01-29 LAB
CREAT BLD-MCNC: 0.8 MG/DL (ref 0.5–1.2)
GFR SERPL CREATININE-BSD FRML MDRD: 91 ML/MIN/{1.73_M2}
GFRB: NORMAL
GLUCOSE SERPL-MCNC: 60 MG/DL (ref 70–99)

## 2021-01-29 RX ORDER — FUROSEMIDE 10 MG/ML
40 INJECTION INTRAMUSCULAR; INTRAVENOUS ONCE
Status: COMPLETED | OUTPATIENT
Start: 2021-01-29 | End: 2021-01-29

## 2021-01-29 RX ADMIN — FUROSEMIDE 40 MG: 10 INJECTION INTRAMUSCULAR; INTRAVENOUS at 09:51

## 2021-01-29 NOTE — DISCHARGE INSTRUCTIONS

## 2021-02-11 ENCOUNTER — PATIENT OUTREACH (OUTPATIENT)
Dept: ONCOLOGY | Facility: CLINIC | Age: 41
End: 2021-02-11

## 2021-02-22 ENCOUNTER — TELEPHONE (OUTPATIENT)
Dept: ONCOLOGY | Facility: CLINIC | Age: 41
End: 2021-02-22

## 2021-02-22 DIAGNOSIS — C53.9 CERVIX CANCER (H): Primary | ICD-10-CM

## 2021-03-01 DIAGNOSIS — Z11.59 ENCOUNTER FOR SCREENING FOR OTHER VIRAL DISEASES: ICD-10-CM

## 2021-03-01 DIAGNOSIS — C79.82 METASTATIC ADENOCARCINOMA TO CERVIX (H): Primary | ICD-10-CM

## 2021-03-01 DIAGNOSIS — C53.9 MALIGNANT NEOPLASM OF CERVIX, UNSPECIFIED SITE (H): Primary | ICD-10-CM

## 2021-03-01 NOTE — PROGRESS NOTES
Patient referred to interventional radiology by:     IR REFERRAL [[id number]]    Awaiting signature from: Jluis Canela MD Status: Active   Mode: Ordering in Verbal with readback mode Communicated by: Trish Rivera RN   Ordering user: Trish Rivera RN 03/01/21 0941           Request for biopsy of RIGHT pelvic wall lesion noted PET + on recent scan.    ===    See imaging PET and CT on 1/29/2021     INDICATION: follow up cervical cancer; Metastatic adenocarcinoma to  cervix (H)    ABDOMEN AND PELVIS:  Postoperative changes from radical hysterectomy, chemotherapy  radiation and lymph node dissection. FDG avid lymph node along the  right pelvic wall measures 2.6 x 1.5 cm (unchanged in size since  10/30/2020), and has an SUV max of 12.14 (12.82 on 10/30/2020).    IMPRESSION: In this patient with a history of cervical cancer who  completed radiation therapy and chemotherapy in January 2020:     1. Stable hypermetabolic lymph node within the right hemipelvis since  PET CT of 11/25/2020. Finding is still suspicious disease given its  size and hypermatabolism although it remains stable since prior exam.          ===    Review of Epic entered chart data shows the patient is on no anticoagulation    Platelets = none recent  INR = none recent    COVID-19 PCR results = none recent    ===    Case request and imaging reviewed with IR Dr. Oral [place]. Approved for CT-guided biopsy of RIGHT pelvic sidewall lesion seen on PET/CT dated 1/29/2021 Se: 503, Im 202.    LEIGH Rock, PAEricC

## 2021-03-08 ENCOUNTER — TRANSFERRED RECORDS (OUTPATIENT)
Dept: HEALTH INFORMATION MANAGEMENT | Facility: CLINIC | Age: 41
End: 2021-03-08

## 2021-03-11 ENCOUNTER — TELEPHONE (OUTPATIENT)
Dept: ONCOLOGY | Facility: CLINIC | Age: 41
End: 2021-03-11

## 2021-03-11 DIAGNOSIS — Z85.41 HISTORY OF CERVICAL CANCER: Primary | ICD-10-CM

## 2021-03-11 DIAGNOSIS — G93.89 BRAIN MASS: ICD-10-CM

## 2021-03-11 DIAGNOSIS — C53.9 MALIGNANT NEOPLASM OF CERVIX, UNSPECIFIED SITE (H): Primary | ICD-10-CM

## 2021-03-11 RX ORDER — DEXAMETHASONE 4 MG/1
4 TABLET ORAL 2 TIMES DAILY WITH MEALS
Qty: 30 TABLET | Refills: 0 | Status: SHIPPED | OUTPATIENT
Start: 2021-03-11 | End: 2021-03-11

## 2021-03-11 RX ORDER — DEXAMETHASONE 4 MG/1
4 TABLET ORAL 2 TIMES DAILY WITH MEALS
Qty: 30 TABLET | Refills: 0 | Status: ON HOLD | OUTPATIENT
Start: 2021-03-11 | End: 2021-03-19

## 2021-03-11 NOTE — TELEPHONE ENCOUNTER
Patient recently presented to her primary with headache, also reported some possible changes in her gait.  MRI demonstrated new cerebellar lesion.  She is interactive na appropriate in conversation today (by phone) and I have recommended that she meets with RT and neurosurgery ASAP (Rupa is working on this).  She is aware that this will likely require multimodal therapy (including chemo given what appears to be stable lesion in pelvis).    Jluis Canela MD

## 2021-03-12 ENCOUNTER — TELEPHONE (OUTPATIENT)
Dept: NEUROSURGERY | Facility: CLINIC | Age: 41
End: 2021-03-12

## 2021-03-12 ENCOUNTER — PRE VISIT (OUTPATIENT)
Dept: NEUROSURGERY | Facility: CLINIC | Age: 41
End: 2021-03-12

## 2021-03-12 ENCOUNTER — VIRTUAL VISIT (OUTPATIENT)
Dept: NEUROSURGERY | Facility: CLINIC | Age: 41
End: 2021-03-12
Payer: COMMERCIAL

## 2021-03-12 ENCOUNTER — TELEPHONE (OUTPATIENT)
Dept: INTERVENTIONAL RADIOLOGY/VASCULAR | Facility: CLINIC | Age: 41
End: 2021-03-12

## 2021-03-12 DIAGNOSIS — G93.6 CEREBRAL EDEMA (H): ICD-10-CM

## 2021-03-12 DIAGNOSIS — Z11.59 ENCOUNTER FOR SCREENING FOR OTHER VIRAL DISEASES: ICD-10-CM

## 2021-03-12 DIAGNOSIS — C79.31 BRAIN METASTASIS: Primary | ICD-10-CM

## 2021-03-12 LAB
SARS-COV-2 RNA RESP QL NAA+PROBE: NORMAL
SPECIMEN SOURCE: NORMAL

## 2021-03-12 PROCEDURE — U0005 INFEC AGEN DETEC AMPLI PROBE: HCPCS | Performed by: PHYSICIAN ASSISTANT

## 2021-03-12 PROCEDURE — U0003 INFECTIOUS AGENT DETECTION BY NUCLEIC ACID (DNA OR RNA); SEVERE ACUTE RESPIRATORY SYNDROME CORONAVIRUS 2 (SARS-COV-2) (CORONAVIRUS DISEASE [COVID-19]), AMPLIFIED PROBE TECHNIQUE, MAKING USE OF HIGH THROUGHPUT TECHNOLOGIES AS DESCRIBED BY CMS-2020-01-R: HCPCS | Performed by: PHYSICIAN ASSISTANT

## 2021-03-12 PROCEDURE — 99204 OFFICE O/P NEW MOD 45 MIN: CPT | Mod: 95 | Performed by: NEUROLOGICAL SURGERY

## 2021-03-12 RX ORDER — CEFAZOLIN SODIUM 2 G/50ML
2 SOLUTION INTRAVENOUS
Status: CANCELLED | OUTPATIENT
Start: 2021-03-12

## 2021-03-12 RX ORDER — CEFAZOLIN SODIUM 2 G/50ML
2 SOLUTION INTRAVENOUS SEE ADMIN INSTRUCTIONS
Status: CANCELLED | OUTPATIENT
Start: 2021-03-12

## 2021-03-12 NOTE — TELEPHONE ENCOUNTER
Patient is scheduled for surgery with Dr. Myrick       Spoke or left message with: Patient    Date of Surgery: 03/15/21    Location [facility] OR    H&P: Scheduled with same day possibly working with Jair to see if she can do video Monday morning left a voice message     Post op: 03/31/21    Additional imaging/appointments: COVID done 03/12    Surgery packet sent: Given address over the phone      Additional comments: The patient was given my direct number for any questions [phone]

## 2021-03-12 NOTE — PROGRESS NOTES
Francesca is a 40 year old who is being evaluated via a billable video visit.      How would you like to obtain your AVS? [employer]  If the video visit is dropped, the invitation should be resent by: [phone]  Will anyone else be joining your video visit? no        Video-Visit Details    Type of service:  Video Visit          Distant Location (provider location):  [facility] NEUROSURGERY [facility]     Platform used for Video Visit: [employer]

## 2021-03-12 NOTE — TELEPHONE ENCOUNTER
I spoke to the patient and let her know that I was working on her case request and will be calling her back by the end of the day with an update once I hear back from the OR.

## 2021-03-12 NOTE — TELEPHONE ENCOUNTER
RECORDS RECEIVED FROM: Internal   Date of Appt: 3/12/21   NOTES (FOR ALL VISITS) STATUS DETAILS   OFFICE NOTE from referring provider Internal Dr Canela @ [facility]:  3/11/21 telephone encounter  1/18/21   OFFICE NOTE from other specialist N/A    DISCHARGE SUMMARY from hospital N/A    DISCHARGE REPORT from the ER [facility]:  10/11/19   OPERATIVE REPORT N/A    MEDICATION LIST Internal    IMAGING  (FOR ALL VISITS)     EMG N/A    EEG N/A    LUMBAR PUNCTURE N/A    GARRETT SCAN N/A    ULTRASOUND (CAROTID BILAT) *VASCULAR* N/A    MRI (HEAD, NECK, SPINE) Received [facility]:  MRI Brain 3/8/21  MRI Brain 10/11/19   CT (HEAD, NECK, SPINE) N/A       Action 3/12/21 MV 10.09am   Action Taken Imaging request faxed to [facility] for:  MRI Brain 10/11/19     Imaging Received  3/12/21 MV 12.43pm  [facility]   Image Type (x): Disc:   PACS: x   Exam Date/Name MRI Brain 10/11/19 Comments: images resolved in PACS

## 2021-03-12 NOTE — PROGRESS NOTES
[facility]  Department of Neurosurgery  Center for Skull Base and Pituitary Surgery      Francesca Rowland   40 year old female who is being evaluated via a video visit      Dear Dr. Kerns and Dr. Canela,    It was a pleasure to see Ms. Rowland in [facility] today as a new patient. As you recall, Ms. Rowland is a 40 year old right-handed female who has a history of cervical cancer diagnosed at age 25 in 2007.  She underwent a hysterectomy with lymph node dissection in 2007 with pathology showing endocervical adenocarcinoma. She then did well until 2019 when she was found with a positive retroperitoneal lymph node after presenting with abdominal pain and underwent surgery and radiation and chemotherapy treatment. She had an MRI in 2019 for symptoms but this was clear. She has followed with her oncologist every three months. In October 2020, she had a CT scan that showed lesions in the hip and right arm. A biopsy of the arm lesion was planned but a new scan was clear so this was aborted. A PET scan was then performed which showed the hip lesion was still present. A biopsy of the hip lesion was planned.  Then more recently, she had developed consistent headaches and being unable to bend down without losing balance. After this continued for two weeks, she believed something was wrong. Her PCP ordered labs that were normal. An MRI was then done on 3/8/2021 then results of which she is referred here. She was started on dexamethasone by the Radiation Oncology office yesterday.  She is ambulatory but her balance is off for the past two weeks. No nausea, vomiting. She gets only occasional headaches but over the past two weeks, these headaches are uncharacteristic.    I have reviewed and updated the patient's Past Medical History (cholecystectomy, surgeries as above), Allergies (prochlorperazine, aprepitant), Social History (originally from [place], family here, two sons and a step daughter, works as a  field ), Family History (mother with brain aneurysm, multiple family members with cancer diagnosed before age 50), and Medication List (dexamethasone, omeprazole, MVA, vitamin C).    Exam by video:  EOMI  Face symmetric, HB1  Shoulders full  Tongue midline  No drift    Imaging:  We reviewed the MRI brain from 3/8/2021 which demonstrates a 2.7 x 2.7cm enhancing mass in the right inferior cerebellar hemisphere with surrounding FLAIR change. This comes close to the posterior aspect of the medulla. There is mass effect on the fourth ventricle but no overt hydrocephalus. There is a right dominant transverse-sigmoid sinus system but no enlarged occipital sinus. This is new from the noncontrast MRI in 2019.    Assessment:  1. Metastatic cervical adenocarcinoma  2. New right inferior cerebellar metastasis  3. Preoperative headaches and gait imbalance    Plan:  1. We reviewed the imaging findings and options for management, including surveillance, radiation, and surgical resection followed by radiation. We discussed the relative advantages and disadvantages/risks of each approach. After discussing with Dr. Kerns, we recommended surgery for resection followed by radiation given its size and proximity to the fourth ventricle and brainstem. We discussed the risks of surgery including bleeding, infection, hydrocephalus, CSF leak, need for  shunt, neurologic injury, stroke, incomplete resection, tumor recurrence, need for rehab stay given her imbalance. She agrees to proceed.   2. The procedure will be a midline suboccipital craniectomy/craniotomy with right frontal ventriculostomy.  3. We will place an urgent case request and will contact her regarding timing of surgery.  4. She will have a covid19 test later today.  5. We will try to get an urgent PAC appointment  6. I encouraged her to contact us with any questions or concerns.    Visit:  Video started at 1:00pm  Video ended at 1:37pm      Martín  "Elen RICO      Patient statement: cannot visit in person due to Covid19    Physician has received verbal consent for a Video Visit from the patient? Yes    Patient would like the video invitation sent by: Am Well    Originating Location (pt. Location): Home    Distant Location (provider location):  [facility] NEUROSURGERY [facility]     Mode of Communication:  Video Conference via [employer] DIANA Rowland is a 40 year old female who is being evaluated via a billable video visit.      The patient has been notified of following:     \"This video visit will be conducted via a call between you and your physician/provider. We have found that certain health care needs can be provided without the need for an in-person physical exam.  This service lets us provide the care you need with a video conversation.  If a prescription is necessary we can send it directly to your pharmacy.  If lab work is needed we can place an order for that and you can then stop by our lab to have the test done at a later time.    If during the course of the call the physician/provider feels a video visit is not appropriate, you will not be charged for this service.\"     "

## 2021-03-12 NOTE — PROGRESS NOTES
I called Francesca to follow up. She is exhausted as her headache has been keeping her up last night. It is focal, posterior, pressure-like headache. She also cannot walk in straight line, but she denies vertigo. She has no focal weakness or numbness, nausea, or lightheadedness.    I briefly reviewed the MRI finding with Francesca, and discussed the utility and side effects of Decadron. I prescribed 4 mg BID to start with PPI and sent to her pharmacy to fill immediately. We will arrange to see her for consultation in coordination with Neurosurgery. I finally discussed that she should be seen in the ED should there be any clear exacerbation of her current symptoms or development of new neurological deficits. She is in agreement.     The patient was discussed and managed with my attending, Dr. Kerns,    Quang Lua MD  Resident, PGY-3  Department of Radiation Oncology  [facility]

## 2021-03-12 NOTE — LETTER
CENTER FOR SKULL BASE AND PITUITARY SURGERY  [facility]UROSURGERY [facility]INIC [place]  [address]  [address]RD FLO[address]  [place] [address]  Phone: [phone]  Fax: [phone]          3/12/2021    RE:   Francesca Rowland  [address]  [place] [address]      Dear Colleague,    Thank you for referring your patient, Francesca Rowland, to the Center for Skull Base and Pituitary Surgery. Please see a copy of my visit note below.      Francesca is a 40 year old who is being evaluated via a billable video visit.      How would you like to obtain your AVS? Clinkle  If the video visit is dropped, the invitation should be resent by: [phone]  Will anyone else be joining your video visit? no    Video-Visit Details    Type of service:  Video Visit      Distant Location (provider location):  [facility] NEUROSURGERY [facility]     Platform used for Video Visit: [employer]    [facility]  Department of Neurosurgery  Center for Skull Base and Pituitary Surgery    Francesca Rowland   40 year old female who is being evaluated via a video visit      Dear Dr. Kerns and Dr. Canela,    It was a pleasure to see Ms. Rowland in [facility] today as a new patient. As you recall, Ms. Rowland is a 40 year old right-handed female who has a history of cervical cancer diagnosed at age 25 in 2007.  She underwent a hysterectomy with lymph node dissection in 2007 with pathology showing endocervical adenocarcinoma. She then did well until 2019 when she was found with a positive retroperitoneal lymph node after presenting with abdominal pain and underwent surgery and radiation and chemotherapy treatment. She had an MRI in 2019 for symptoms but this was clear. She has followed with her oncologist every three months. In October 2020, she had a CT scan that showed lesions in the hip and right arm. A biopsy of the arm lesion was planned but a new scan was clear so this was aborted. A  PET scan was then performed which showed the hip lesion was still present. A biopsy of the hip lesion was planned.  Then more recently, she had developed consistent headaches and being unable to bend down without losing balance. After this continued for two weeks, she believed something was wrong. Her PCP ordered labs that were normal. An MRI was then done on 3/8/2021 then results of which she is referred here. She was started on dexamethasone by the Radiation Oncology office yesterday.  She is ambulatory but her balance is off for the past two weeks. No nausea, vomiting. She gets only occasional headaches but over the past two weeks, these headaches are uncharacteristic.    I have reviewed and updated the patient's Past Medical History (cholecystectomy, surgeries as above), Allergies (prochlorperazine, aprepitant), Social History (originally from [place], family here, two sons and a step daughter, works as a field ), Family History (mother with brain aneurysm, multiple family members with cancer diagnosed before age 50), and Medication List (dexamethasone, omeprazole, MVA, vitamin C).    Exam by video:  EOMI  Face symmetric, HB1  Shoulders full  Tongue midline  No drift    Imaging:  We reviewed the MRI brain from 3/8/2021 which demonstrates a 2.7 x 2.7cm enhancing mass in the right inferior cerebellar hemisphere with surrounding FLAIR change. This comes close to the posterior aspect of the medulla. There is mass effect on the fourth ventricle but no overt hydrocephalus. There is a right dominant transverse-sigmoid sinus system but no enlarged occipital sinus. This is new from the noncontrast MRI in 2019.    Assessment:  1. Metastatic cervical adenocarcinoma  2. New right inferior cerebellar metastasis  3. Preoperative headaches and gait imbalance    Plan:  1. We reviewed the imaging findings and options for management, including surveillance, radiation, and surgical resection followed by radiation.  "We discussed the relative advantages and disadvantages/risks of each approach. After discussing with Dr. Kerns, we recommended surgery for resection followed by radiation given its size and proximity to the fourth ventricle and brainstem. We discussed the risks of surgery including bleeding, infection, hydrocephalus, CSF leak, need for  shunt, neurologic injury, stroke, incomplete resection, tumor recurrence, need for rehab stay given her imbalance. She agrees to proceed.   2. The procedure will be a midline suboccipital craniectomy/craniotomy with right frontal ventriculostomy.  3. We will place an urgent case request and will contact her regarding timing of surgery.  4. She will have a covid19 test later today.  5. We will try to get an urgent PAC appointment  6. I encouraged her to contact us with any questions or concerns.    Visit:  Video started at 1:00pm  Video ended at 1:37pm      Martín Myrick MD      Patient statement: cannot visit in person due to Covid19    Physician has received verbal consent for a Video Visit from the patient? Yes    Patient would like the video invitation sent by: Am Well    Originating Location (pt. Location): Home    Distant Location (provider location):  [facility] NEUROSURGERY CLINIC [place]     Mode of Communication:  Video Conference via [employer] S Janett is a 40 year old female who is being evaluated via a billable video visit.      The patient has been notified of following:     \"This video visit will be conducted via a call between you and your physician/provider. We have found that certain health care needs can be provided without the need for an in-person physical exam.  This service lets us provide the care you need with a video conversation.  If a prescription is necessary we can send it directly to your pharmacy.  If lab work is needed we can place an order for that and you can then stop by our lab to have the test done at a later " "time.    If during the course of the call the physician/provider feels a video visit is not appropriate, you will not be charged for this service.\"       Again, thank you for allowing me to participate in the care of your patient.      Sincerely,    Martín Myrick MD      "

## 2021-03-12 NOTE — LETTER
3/12/2021       RE: Francesca Rowland  [address]  [place] [address]     Dear Colleague,    Thank you for referring your patient, Francesca Rowland, to the [facility] NEUROSURGERY CLINIC [place] [facility]. Please see a copy of my visit note below.    Francesca is a 40 year old who is being evaluated via a billable video visit.      How would you like to obtain your AVS? [facility]  If the video visit is dropped, the invitation should be resent by: [phone]  Will anyone else be joining your video visit? no        Video-Visit Details    Type of service:  Video Visit          Distant Location (provider location):  [facility] NEUROSURGERY [facility]     Platform used for Video Visit: [employer]    [facility]  Department of Neurosurgery  Center for Skull Base and Pituitary Surgery      Francesca Rowland   40 year old female who is being evaluated via a video visit      Dear Dr. Kerns and Dr. Canela,    It was a pleasure to see Ms. Rowland in [facility] today as a new patient. As you recall, Ms. Rowland is a 40 year old right-handed female who has a history of cervical cancer diagnosed at age 25 in 2007.  She underwent a hysterectomy with lymph node dissection in 2007 with pathology showing endocervical adenocarcinoma. She then did well until 2019 when she was found with a positive retroperitoneal lymph node after presenting with abdominal pain and underwent surgery and radiation and chemotherapy treatment. She had an MRI in 2019 for symptoms but this was clear. She has followed with her oncologist every three months. In October 2020, she had a CT scan that showed lesions in the hip and right arm. A biopsy of the arm lesion was planned but a new scan was clear so this was aborted. A PET scan was then performed which showed the hip lesion was still present. A biopsy of the hip lesion was planned.  Then more  recently, she had developed consistent headaches and being unable to bend down without losing balance. After this continued for two weeks, she believed something was wrong. Her PCP ordered labs that were normal. An MRI was then done on 3/8/2021 then results of which she is referred here. She was started on dexamethasone by the Radiation Oncology office yesterday.  She is ambulatory but her balance is off for the past two weeks. No nausea, vomiting. She gets only occasional headaches but over the past two weeks, these headaches are uncharacteristic.    I have reviewed and updated the patient's Past Medical History (cholecystectomy, surgeries as above), Allergies (prochlorperazine, aprepitant), Social History (originally from [place], family here, two sons and a step daughter, works as a field ), Family History (mother with brain aneurysm, multiple family members with cancer diagnosed before age 50), and Medication List (dexamethasone, omeprazole, MVA, vitamin C).    Exam by video:  EOMI  Face symmetric, HB1  Shoulders full  Tongue midline  No drift    Imaging:  We reviewed the MRI brain from 3/8/2021 which demonstrates a 2.7 x 2.7cm enhancing mass in the right inferior cerebellar hemisphere with surrounding FLAIR change. This comes close to the posterior aspect of the medulla. There is mass effect on the fourth ventricle but no overt hydrocephalus. There is a right dominant transverse-sigmoid sinus system but no enlarged occipital sinus. This is new from the noncontrast MRI in 2019.    Assessment:  1. Metastatic cervical adenocarcinoma  2. New right inferior cerebellar metastasis  3. Preoperative headaches and gait imbalance    Plan:  1. We reviewed the imaging findings and options for management, including surveillance, radiation, and surgical resection followed by radiation. We discussed the relative advantages and disadvantages/risks of each approach. After discussing with Dr. Kerns, we recommended  "surgery for resection followed by radiation given its size and proximity to the fourth ventricle and brainstem. We discussed the risks of surgery including bleeding, infection, hydrocephalus, CSF leak, need for  shunt, neurologic injury, stroke, incomplete resection, tumor recurrence, need for rehab stay given her imbalance. She agrees to proceed.   2. The procedure will be a midline suboccipital craniectomy/craniotomy with right frontal ventriculostomy.  3. We will place an urgent case request and will contact her regarding timing of surgery.  4. She will have a covid19 test later today.  5. We will try to get an urgent PAC appointment  6. I encouraged her to contact us with any questions or concerns.    Visit:  Video started at 1:00pm  Video ended at 1:37pm      Martín Myrick MD      Patient statement: cannot visit in person due to Covid19    Physician has received verbal consent for a Video Visit from the patient? Yes    Patient would like the video invitation sent by: Am Well    Originating Location (pt. Location): Home    Distant Location (provider location):  [facility] NEUROSURGERY CLINIC [place]     Mode of Communication:  Video Conference via [employer]      Chelby S Janett is a 40 year old female who is being evaluated via a billable video visit.      The patient has been notified of following:     \"This video visit will be conducted via a call between you and your physician/provider. We have found that certain health care needs can be provided without the need for an in-person physical exam.  This service lets us provide the care you need with a video conversation.  If a prescription is necessary we can send it directly to your pharmacy.  If lab work is needed we can place an order for that and you can then stop by our lab to have the test done at a later time.    If during the course of the call the physician/provider feels a video visit is not appropriate, you will not be charged for " "this service.\"         Again, thank you for allowing me to participate in the care of your patient.      Sincerely,    Martín Myrick MD      "

## 2021-03-13 ENCOUNTER — ANESTHESIA EVENT (OUTPATIENT)
Dept: SURGERY | Facility: CLINIC | Age: 41
End: 2021-03-13
Payer: COMMERCIAL

## 2021-03-13 LAB
LABORATORY COMMENT REPORT: NORMAL
SARS-COV-2 RNA RESP QL NAA+PROBE: NEGATIVE
SPECIMEN SOURCE: NORMAL

## 2021-03-13 NOTE — ANESTHESIA PREPROCEDURE EVALUATION
"Anesthesia Pre-Procedure Evaluation    Patient: Francesca Rowland   MRN: [id number] : 1980        Preoperative Diagnosis: Brain metastasis (H) [C79.31]  Cerebral edema (H) [G93.6]   Procedure : Procedure(s):  stealth assisted Midline suboccipital craniectomy/craniotomy for tumor  stealth assisted  right frontal ventriculostomy  SURGICAL PROCUREMENT, FAT GRAFT     Past Medical History:   Diagnosis Date     Chronic cholecystitis 2019     History of cervical cancer      Metastatic adenocarcinoma to cervix (H) 10/28/2019    Added automatically from request for surgery [id number]      Past Surgical History:   Procedure Laterality Date     CHOLECYSTECTOMY, LAPOROSCOPIC  2019     HYSTERECTOMY RADICAL  2007    PAUL     LAPAROSCOPIC LYMPHADENECTOMY N/A 2019    Procedure: LAPAROSCOPY, resection of of paraaortic lymph node, lysis of adhesions;  Surgeon: Jluis Canela MD;  Location: [facility] OR      Allergies   Allergen Reactions     Emend [Aprepitant]      Prochlorperazine      \"i was told I turn blue\"      Social History     Tobacco Use     Smoking status: Never Smoker     Smokeless tobacco: Never Used   Substance Use Topics     Alcohol use: Yes     Comment: one drink a week      Wt Readings from Last 1 Encounters:   20 99.6 kg (219 lb 9.6 oz)        Anesthesia Evaluation   Pt has had prior anesthetic. Type: General.    No history of anesthetic complications       ROS/MED HX  ENT/Pulmonary:  - neg pulmonary ROS     Neurologic: Comment: Headaches, balance issues, new right inferior cerebellar metastasis from cervical adenoCA      Cardiovascular:  - neg cardiovascular ROS     METS/Exercise Tolerance: >4 METS    Hematologic:  - neg hematologic  ROS     Musculoskeletal:  - neg musculoskeletal ROS     GI/Hepatic:     (+) GERD,     Renal/Genitourinary:  - neg Renal ROS     Endo:     (+) Chronic steroid usage for Other. Obesity,     Psychiatric/Substance Use:  - neg psychiatric ROS   "   Infectious Disease:       Malignancy:   (+) Malignancy, History of Other.Other CA Metastatic cervical adenocarcinoma, new brain mets Active status post Surgery.    Other:            Physical Exam    Airway        Mallampati: II   TM distance: > 3 FB   Neck ROM: full   Mouth opening: > 3 cm    Respiratory Devices and Support         Dental  no notable dental history         Cardiovascular   cardiovascular exam normal          Pulmonary   pulmonary exam normal                OUTSIDE LABS:  CBC:   Lab Results   Component Value Date    WBC 3.3 (L) 11/25/2020    WBC 2.5 (L) 01/20/2020    HGB 11.7 11/25/2020    HGB 11.9 01/20/2020    HCT 37.4 11/25/2020    HCT 33.2 (L) 01/20/2020     11/25/2020    PLT 81 (L) 01/20/2020     BMP:   Lab Results   Component Value Date     01/20/2020     01/13/2020    POTASSIUM 3.6 01/20/2020    POTASSIUM 3.3 (L) 01/13/2020    CHLORIDE 101 01/20/2020    CHLORIDE 105 01/13/2020    CO2 29 01/20/2020    CO2 27 01/13/2020    BUN 11 01/20/2020    BUN 10 01/13/2020    CR 0.74 01/20/2020    CR 0.76 01/13/2020    GLC 60 (A) 01/29/2021    GLC 95 10/30/2020     COAGS:   Lab Results   Component Value Date    PTT 32 02/26/2008    INR 1.07 10/15/2019     POC:   Lab Results   Component Value Date     (H) 11/13/2019    HCG Negative 10/19/2007     HEPATIC:   Lab Results   Component Value Date    ALBUMIN 3.8 01/20/2020    PROTTOTAL 7.4 01/20/2020    ALT 65 (H) 01/20/2020    AST 32 01/20/2020    ALKPHOS 82 01/20/2020    BILITOTAL 0.8 01/20/2020     OTHER:   Lab Results   Component Value Date    SHAD 8.8 01/20/2020    PHOS 2.6 10/20/2007    MAG 1.6 01/20/2020       Anesthesia Plan    ASA Status:  3   NPO Status:  NPO Appropriate    Anesthesia Type: General.     - Airway: ETT   Induction: Propofol.   Maintenance: Balanced.   Techniques and Equipment:     - Lines/Monitors: 2nd IV, Arterial Line     Consents    Anesthesia Plan(s) and associated risks, benefits, and realistic alternatives  discussed. Questions answered and patient/representative(s) expressed understanding.     - Discussed with:  Patient      - Extended Intubation/Ventilatory Support Discussed: Yes.      - Patient is DNR/DNI Status: No    Use of blood products discussed: Yes.     - Discussed with: Patient.     - Consented: consented to blood products     Postoperative Care    Pain management: IV analgesics.   PONV prophylaxis: Ondansetron (or other 5HT-3), Dexamethasone or Solumedrol     Comments:         H&P reviewed: Unable to attach H&P to encounter due to EHR limitations. H&P Update: appropriate H&P reviewed, patient examined. No interval changes since H&P (within 30 days).         Rigo Hernandez MD

## 2021-03-15 ENCOUNTER — APPOINTMENT (OUTPATIENT)
Dept: CT IMAGING | Facility: CLINIC | Age: 41
End: 2021-03-15
Attending: NEUROLOGICAL SURGERY
Payer: COMMERCIAL

## 2021-03-15 ENCOUNTER — HOSPITAL ENCOUNTER (INPATIENT)
Facility: CLINIC | Age: 41
LOS: 5 days | Discharge: HOME | End: 2021-03-20
Attending: NEUROLOGICAL SURGERY | Admitting: NEUROLOGICAL SURGERY
Payer: COMMERCIAL

## 2021-03-15 ENCOUNTER — ANESTHESIA (OUTPATIENT)
Dept: SURGERY | Facility: CLINIC | Age: 41
End: 2021-03-15
Payer: COMMERCIAL

## 2021-03-15 ENCOUNTER — TRANSFERRED RECORDS (OUTPATIENT)
Dept: HEALTH INFORMATION MANAGEMENT | Facility: CLINIC | Age: 41
End: 2021-03-15

## 2021-03-15 DIAGNOSIS — C79.31 BRAIN METASTASIS: ICD-10-CM

## 2021-03-15 DIAGNOSIS — G93.6 CEREBRAL EDEMA (H): ICD-10-CM

## 2021-03-15 LAB
ABO + RH BLD: NORMAL
ABO + RH BLD: NORMAL
ALBUMIN SERPL-MCNC: 3.5 G/DL (ref 3.4–5)
ANION GAP SERPL CALCULATED.3IONS-SCNC: 8 MMOL/L (ref 3–14)
BLD GP AB SCN SERPL QL: NORMAL
BLOOD BANK CMNT PATIENT-IMP: NORMAL
BUN SERPL-MCNC: 15 MG/DL (ref 7–30)
CALCIUM SERPL-MCNC: 8.5 MG/DL (ref 8.5–10.1)
CHLORIDE SERPL-SCNC: 106 MMOL/L (ref 94–109)
CO2 SERPL-SCNC: 24 MMOL/L (ref 20–32)
CREAT SERPL-MCNC: 0.74 MG/DL (ref 0.52–1.04)
CREAT SERPL-MCNC: 0.76 MG/DL (ref 0.52–1.04)
GFR SERPL CREATININE-BSD FRML MDRD: >90 ML/MIN/{1.73_M2}
GFR SERPL CREATININE-BSD FRML MDRD: >90 ML/MIN/{1.73_M2}
GLUCOSE BLDC GLUCOMTR-MCNC: 160 MG/DL (ref 70–99)
GLUCOSE BLDC GLUCOMTR-MCNC: 206 MG/DL (ref 70–99)
GLUCOSE BLDC GLUCOMTR-MCNC: 92 MG/DL (ref 70–99)
GLUCOSE SERPL-MCNC: 210 MG/DL (ref 70–99)
HCG UR QL: NEGATIVE
HGB BLD-MCNC: 12.9 G/DL (ref 11.7–15.7)
MAGNESIUM SERPL-MCNC: 2.1 MG/DL (ref 1.6–2.3)
PHOSPHATE SERPL-MCNC: 4.9 MG/DL (ref 2.5–4.5)
POTASSIUM SERPL-SCNC: 4 MMOL/L (ref 3.4–5.3)
POTASSIUM SERPL-SCNC: 4 MMOL/L (ref 3.4–5.3)
SODIUM SERPL-SCNC: 138 MMOL/L (ref 133–144)
SPECIMEN EXP DATE BLD: NORMAL

## 2021-03-15 PROCEDURE — 999N001017 HC STATISTIC GLUCOSE BY METER IP

## 2021-03-15 PROCEDURE — 88342 IMHCHEM/IMCYTCHM 1ST ANTB: CPT | Mod: TC | Performed by: NEUROLOGICAL SURGERY

## 2021-03-15 PROCEDURE — 86850 RBC ANTIBODY SCREEN: CPT | Performed by: ANESTHESIOLOGY

## 2021-03-15 PROCEDURE — 88307 TISSUE EXAM BY PATHOLOGIST: CPT | Mod: 26 | Performed by: SPECIALIST

## 2021-03-15 PROCEDURE — 88341 IMHCHEM/IMCYTCHM EA ADD ANTB: CPT | Mod: 26 | Performed by: SPECIALIST

## 2021-03-15 PROCEDURE — 85018 HEMOGLOBIN: CPT | Performed by: ANESTHESIOLOGY

## 2021-03-15 PROCEDURE — 009630Z DRAINAGE OF CEREBRAL VENTRICLE WITH DRAINAGE DEVICE, PERCUTANEOUS APPROACH: ICD-10-PCS | Performed by: NEUROLOGICAL SURGERY

## 2021-03-15 PROCEDURE — 250N000009 HC RX 250

## 2021-03-15 PROCEDURE — 250N000025 HC SEVOFLURANE, PER MIN: Performed by: NEUROLOGICAL SURGERY

## 2021-03-15 PROCEDURE — 70470 CT HEAD/BRAIN W/O & W/DYE: CPT

## 2021-03-15 PROCEDURE — 999N000128 HC STATISTIC PERIPHERAL IV START W/O US GUIDANCE

## 2021-03-15 PROCEDURE — 370N000017 HC ANESTHESIA TECHNICAL FEE, PER MIN: Performed by: NEUROLOGICAL SURGERY

## 2021-03-15 PROCEDURE — 84132 ASSAY OF SERUM POTASSIUM: CPT | Performed by: ANESTHESIOLOGY

## 2021-03-15 PROCEDURE — 250N000011 HC RX IP 250 OP 636

## 2021-03-15 PROCEDURE — 250N000011 HC RX IP 250 OP 636: Performed by: STUDENT IN AN ORGANIZED HEALTH CARE EDUCATION/TRAINING PROGRAM

## 2021-03-15 PROCEDURE — 88307 TISSUE EXAM BY PATHOLOGIST: CPT | Mod: TC | Performed by: NEUROLOGICAL SURGERY

## 2021-03-15 PROCEDURE — 0NU03JZ SUPPLEMENT SKULL WITH SYNTHETIC SUBSTITUTE, PERCUTANEOUS APPROACH: ICD-10-PCS | Performed by: NEUROLOGICAL SURGERY

## 2021-03-15 PROCEDURE — 250N000011 HC RX IP 250 OP 636: Performed by: ANESTHESIOLOGY

## 2021-03-15 PROCEDURE — 999N001021 HC STATISTIC H-SPECIAL HANDLING: Performed by: NEUROLOGICAL SURGERY

## 2021-03-15 PROCEDURE — 272N000004 HC RX 272: Performed by: NEUROLOGICAL SURGERY

## 2021-03-15 PROCEDURE — 93005 ELECTROCARDIOGRAM TRACING: CPT

## 2021-03-15 PROCEDURE — 83735 ASSAY OF MAGNESIUM: CPT | Performed by: STUDENT IN AN ORGANIZED HEALTH CARE EDUCATION/TRAINING PROGRAM

## 2021-03-15 PROCEDURE — 36415 COLL VENOUS BLD VENIPUNCTURE: CPT | Performed by: ANESTHESIOLOGY

## 2021-03-15 PROCEDURE — 250N000011 HC RX IP 250 OP 636: Performed by: NEUROLOGICAL SURGERY

## 2021-03-15 PROCEDURE — 999N000127 HC STATISTIC PERIPHERAL IV START W US GUIDANCE

## 2021-03-15 PROCEDURE — 999N000141 HC STATISTIC PRE-PROCEDURE NURSING ASSESSMENT: Performed by: NEUROLOGICAL SURGERY

## 2021-03-15 PROCEDURE — 250N000011 HC RX IP 250 OP 636: Performed by: NURSE ANESTHETIST, CERTIFIED REGISTERED

## 2021-03-15 PROCEDURE — 82565 ASSAY OF CREATININE: CPT | Performed by: ANESTHESIOLOGY

## 2021-03-15 PROCEDURE — 00U20KZ SUPPLEMENT DURA MATER WITH NONAUTOLOGOUS TISSUE SUBSTITUTE, OPEN APPROACH: ICD-10-PCS | Performed by: NEUROLOGICAL SURGERY

## 2021-03-15 PROCEDURE — 8E09XBG COMPUTER ASSISTED PROCEDURE OF HEAD AND NECK REGION, WITH COMPUTERIZED TOMOGRAPHY: ICD-10-PCS | Performed by: NEUROLOGICAL SURGERY

## 2021-03-15 PROCEDURE — 80069 RENAL FUNCTION PANEL: CPT | Performed by: STUDENT IN AN ORGANIZED HEALTH CARE EDUCATION/TRAINING PROGRAM

## 2021-03-15 PROCEDURE — 278N000051 HC OR IMPLANT GENERAL: Performed by: NEUROLOGICAL SURGERY

## 2021-03-15 PROCEDURE — 250N000009 HC RX 250: Performed by: NEUROLOGICAL SURGERY

## 2021-03-15 PROCEDURE — 258N000003 HC RX IP 258 OP 636

## 2021-03-15 PROCEDURE — 86900 BLOOD TYPING SEROLOGIC ABO: CPT | Performed by: ANESTHESIOLOGY

## 2021-03-15 PROCEDURE — 88341 IMHCHEM/IMCYTCHM EA ADD ANTB: CPT | Mod: TC | Performed by: NEUROLOGICAL SURGERY

## 2021-03-15 PROCEDURE — 250N000012 HC RX MED GY IP 250 OP 636 PS 637: Performed by: STUDENT IN AN ORGANIZED HEALTH CARE EDUCATION/TRAINING PROGRAM

## 2021-03-15 PROCEDURE — 710N000010 HC RECOVERY PHASE 1, LEVEL 2, PER MIN: Performed by: NEUROLOGICAL SURGERY

## 2021-03-15 PROCEDURE — 70470 CT HEAD/BRAIN W/O & W/DYE: CPT | Mod: 26 | Performed by: RADIOLOGY

## 2021-03-15 PROCEDURE — 81025 URINE PREGNANCY TEST: CPT | Performed by: ANESTHESIOLOGY

## 2021-03-15 PROCEDURE — 88342 IMHCHEM/IMCYTCHM 1ST ANTB: CPT | Mod: 26 | Performed by: SPECIALIST

## 2021-03-15 PROCEDURE — 88331 PATH CONSLTJ SURG 1 BLK 1SPC: CPT | Mod: 26 | Performed by: SPECIALIST

## 2021-03-15 PROCEDURE — 272N000001 HC OR GENERAL SUPPLY STERILE: Performed by: NEUROLOGICAL SURGERY

## 2021-03-15 PROCEDURE — 258N000003 HC RX IP 258 OP 636: Performed by: STUDENT IN AN ORGANIZED HEALTH CARE EDUCATION/TRAINING PROGRAM

## 2021-03-15 PROCEDURE — 93010 ELECTROCARDIOGRAM REPORT: CPT | Performed by: INTERNAL MEDICINE

## 2021-03-15 PROCEDURE — 360N000079 HC SURGERY LEVEL 6, PER MIN: Performed by: NEUROLOGICAL SURGERY

## 2021-03-15 PROCEDURE — 86901 BLOOD TYPING SEROLOGIC RH(D): CPT | Performed by: ANESTHESIOLOGY

## 2021-03-15 PROCEDURE — 00BC0ZZ EXCISION OF CEREBELLUM, OPEN APPROACH: ICD-10-PCS | Performed by: NEUROLOGICAL SURGERY

## 2021-03-15 PROCEDURE — C1763 CONN TISS, NON-HUMAN: HCPCS | Performed by: NEUROLOGICAL SURGERY

## 2021-03-15 PROCEDURE — 88331 PATH CONSLTJ SURG 1 BLK 1SPC: CPT | Mod: TC | Performed by: NEUROLOGICAL SURGERY

## 2021-03-15 PROCEDURE — C1713 ANCHOR/SCREW BN/BN,TIS/BN: HCPCS | Performed by: NEUROLOGICAL SURGERY

## 2021-03-15 PROCEDURE — 200N000002 HC R&B ICU UMMC

## 2021-03-15 PROCEDURE — 250N000009 HC RX 250: Performed by: STUDENT IN AN ORGANIZED HEALTH CARE EDUCATION/TRAINING PROGRAM

## 2021-03-15 PROCEDURE — C1762 CONN TISS, HUMAN(INC FASCIA): HCPCS | Performed by: NEUROLOGICAL SURGERY

## 2021-03-15 PROCEDURE — 250N000009 HC RX 250: Performed by: NURSE ANESTHETIST, CERTIFIED REGISTERED

## 2021-03-15 DEVICE — IMP SCR SYN MATRIX LOW PRO 1.5X04MM SELF DRILL 04.503.104.01: Type: IMPLANTABLE DEVICE | Site: CRANIAL | Status: FUNCTIONAL

## 2021-03-15 DEVICE — IMP BUR HOLE COVER 17MM LOW PROFILE TI 421.527: Type: IMPLANTABLE DEVICE | Site: CRANIAL | Status: FUNCTIONAL

## 2021-03-15 DEVICE — GRAFT DUREPAIR DURA MATRIX 2X2" 62100: Type: IMPLANTABLE DEVICE | Site: BRAIN | Status: FUNCTIONAL

## 2021-03-15 RX ORDER — HYDRALAZINE HYDROCHLORIDE 20 MG/ML
10-20 INJECTION INTRAMUSCULAR; INTRAVENOUS EVERY 30 MIN PRN
Status: DISCONTINUED | OUTPATIENT
Start: 2021-03-15 | End: 2021-03-20 | Stop reason: HOSPADM

## 2021-03-15 RX ORDER — DEXAMETHASONE 1 MG
1 TABLET ORAL EVERY 8 HOURS SCHEDULED
Status: DISCONTINUED | OUTPATIENT
Start: 2021-03-21 | End: 2021-03-20 | Stop reason: HOSPADM

## 2021-03-15 RX ORDER — NALOXONE HYDROCHLORIDE 0.4 MG/ML
0.4 INJECTION, SOLUTION INTRAMUSCULAR; INTRAVENOUS; SUBCUTANEOUS
Status: DISCONTINUED | OUTPATIENT
Start: 2021-03-15 | End: 2021-03-15

## 2021-03-15 RX ORDER — SODIUM CHLORIDE, SODIUM LACTATE, POTASSIUM CHLORIDE, CALCIUM CHLORIDE 600; 310; 30; 20 MG/100ML; MG/100ML; MG/100ML; MG/100ML
INJECTION, SOLUTION INTRAVENOUS CONTINUOUS PRN
Status: DISCONTINUED | OUTPATIENT
Start: 2021-03-15 | End: 2021-03-15

## 2021-03-15 RX ORDER — MULTIPLE VITAMINS W/ MINERALS TAB 9MG-400MCG
1 TAB ORAL DAILY
Status: DISCONTINUED | OUTPATIENT
Start: 2021-03-16 | End: 2021-03-20 | Stop reason: HOSPADM

## 2021-03-15 RX ORDER — CEFAZOLIN SODIUM 2 G/100ML
2 INJECTION, SOLUTION INTRAVENOUS SEE ADMIN INSTRUCTIONS
Status: DISCONTINUED | OUTPATIENT
Start: 2021-03-15 | End: 2021-03-15 | Stop reason: HOSPADM

## 2021-03-15 RX ORDER — LIDOCAINE 40 MG/G
CREAM TOPICAL
Status: DISCONTINUED | OUTPATIENT
Start: 2021-03-15 | End: 2021-03-15 | Stop reason: HOSPADM

## 2021-03-15 RX ORDER — BUPIVACAINE HYDROCHLORIDE AND EPINEPHRINE 5; 5 MG/ML; UG/ML
INJECTION, SOLUTION PERINEURAL PRN
Status: DISCONTINUED | OUTPATIENT
Start: 2021-03-15 | End: 2021-03-15 | Stop reason: HOSPADM

## 2021-03-15 RX ORDER — DEXTROSE MONOHYDRATE 25 G/50ML
25-50 INJECTION, SOLUTION INTRAVENOUS
Status: DISCONTINUED | OUTPATIENT
Start: 2021-03-15 | End: 2021-03-20 | Stop reason: HOSPADM

## 2021-03-15 RX ORDER — DEXAMETHASONE 1.5 MG/1
3 TABLET ORAL EVERY 8 HOURS SCHEDULED
Status: COMPLETED | OUTPATIENT
Start: 2021-03-17 | End: 2021-03-19

## 2021-03-15 RX ORDER — METOCLOPRAMIDE HYDROCHLORIDE 5 MG/ML
10 INJECTION INTRAMUSCULAR; INTRAVENOUS EVERY 6 HOURS PRN
Status: DISCONTINUED | OUTPATIENT
Start: 2021-03-15 | End: 2021-03-20 | Stop reason: HOSPADM

## 2021-03-15 RX ORDER — ONDANSETRON 2 MG/ML
4-8 INJECTION INTRAMUSCULAR; INTRAVENOUS EVERY 6 HOURS PRN
Status: DISCONTINUED | OUTPATIENT
Start: 2021-03-15 | End: 2021-03-20 | Stop reason: HOSPADM

## 2021-03-15 RX ORDER — POLYETHYLENE GLYCOL 3350 17 G/17G
17 POWDER, FOR SOLUTION ORAL DAILY
Status: DISCONTINUED | OUTPATIENT
Start: 2021-03-16 | End: 2021-03-20 | Stop reason: HOSPADM

## 2021-03-15 RX ORDER — ONDANSETRON 2 MG/ML
4 INJECTION INTRAMUSCULAR; INTRAVENOUS EVERY 30 MIN PRN
Status: DISCONTINUED | OUTPATIENT
Start: 2021-03-15 | End: 2021-03-15 | Stop reason: HOSPADM

## 2021-03-15 RX ORDER — SODIUM CHLORIDE 9 MG/ML
INJECTION, SOLUTION INTRAVENOUS CONTINUOUS
Status: ACTIVE | OUTPATIENT
Start: 2021-03-15 | End: 2021-03-16

## 2021-03-15 RX ORDER — METOPROLOL TARTRATE 1 MG/ML
1-2 INJECTION, SOLUTION INTRAVENOUS EVERY 5 MIN PRN
Status: DISCONTINUED | OUTPATIENT
Start: 2021-03-15 | End: 2021-03-15 | Stop reason: HOSPADM

## 2021-03-15 RX ORDER — NALOXONE HYDROCHLORIDE 0.4 MG/ML
0.2 INJECTION, SOLUTION INTRAMUSCULAR; INTRAVENOUS; SUBCUTANEOUS
Status: DISCONTINUED | OUTPATIENT
Start: 2021-03-15 | End: 2021-03-20 | Stop reason: HOSPADM

## 2021-03-15 RX ORDER — LABETALOL HYDROCHLORIDE 5 MG/ML
INJECTION, SOLUTION INTRAVENOUS PRN
Status: DISCONTINUED | OUTPATIENT
Start: 2021-03-15 | End: 2021-03-15

## 2021-03-15 RX ORDER — HYDRALAZINE HYDROCHLORIDE 20 MG/ML
INJECTION INTRAMUSCULAR; INTRAVENOUS PRN
Status: DISCONTINUED | OUTPATIENT
Start: 2021-03-15 | End: 2021-03-15

## 2021-03-15 RX ORDER — ONDANSETRON 4 MG/1
4 TABLET, ORALLY DISINTEGRATING ORAL EVERY 30 MIN PRN
Status: DISCONTINUED | OUTPATIENT
Start: 2021-03-15 | End: 2021-03-15 | Stop reason: HOSPADM

## 2021-03-15 RX ORDER — FENTANYL CITRATE 50 UG/ML
INJECTION, SOLUTION INTRAMUSCULAR; INTRAVENOUS PRN
Status: DISCONTINUED | OUTPATIENT
Start: 2021-03-15 | End: 2021-03-15

## 2021-03-15 RX ORDER — CEFAZOLIN SODIUM 1 G/3ML
1 INJECTION, POWDER, FOR SOLUTION INTRAMUSCULAR; INTRAVENOUS EVERY 8 HOURS
Status: COMPLETED | OUTPATIENT
Start: 2021-03-16 | End: 2021-03-16

## 2021-03-15 RX ORDER — ONDANSETRON 2 MG/ML
INJECTION INTRAMUSCULAR; INTRAVENOUS PRN
Status: DISCONTINUED | OUTPATIENT
Start: 2021-03-15 | End: 2021-03-15

## 2021-03-15 RX ORDER — NICOTINE POLACRILEX 4 MG
15-30 LOZENGE BUCCAL
Status: DISCONTINUED | OUTPATIENT
Start: 2021-03-15 | End: 2021-03-20 | Stop reason: HOSPADM

## 2021-03-15 RX ORDER — SODIUM CHLORIDE 9 MG/ML
75 INJECTION, SOLUTION INTRAVENOUS CONTINUOUS
Status: DISCONTINUED | OUTPATIENT
Start: 2021-03-15 | End: 2021-03-15

## 2021-03-15 RX ORDER — LABETALOL HYDROCHLORIDE 5 MG/ML
10-40 INJECTION, SOLUTION INTRAVENOUS EVERY 10 MIN PRN
Status: DISCONTINUED | OUTPATIENT
Start: 2021-03-15 | End: 2021-03-20 | Stop reason: HOSPADM

## 2021-03-15 RX ORDER — MEPERIDINE HYDROCHLORIDE 25 MG/ML
12.5 INJECTION INTRAMUSCULAR; INTRAVENOUS; SUBCUTANEOUS EVERY 5 MIN PRN
Status: DISCONTINUED | OUTPATIENT
Start: 2021-03-15 | End: 2021-03-15 | Stop reason: HOSPADM

## 2021-03-15 RX ORDER — ACETAMINOPHEN 325 MG/1
650 TABLET ORAL EVERY 4 HOURS PRN
Status: DISCONTINUED | OUTPATIENT
Start: 2021-03-15 | End: 2021-03-20 | Stop reason: HOSPADM

## 2021-03-15 RX ORDER — FENTANYL CITRATE 50 UG/ML
25-50 INJECTION, SOLUTION INTRAMUSCULAR; INTRAVENOUS
Status: DISCONTINUED | OUTPATIENT
Start: 2021-03-15 | End: 2021-03-15 | Stop reason: HOSPADM

## 2021-03-15 RX ORDER — SODIUM CHLORIDE, SODIUM LACTATE, POTASSIUM CHLORIDE, CALCIUM CHLORIDE 600; 310; 30; 20 MG/100ML; MG/100ML; MG/100ML; MG/100ML
INJECTION, SOLUTION INTRAVENOUS CONTINUOUS
Status: DISCONTINUED | OUTPATIENT
Start: 2021-03-15 | End: 2021-03-15 | Stop reason: HOSPADM

## 2021-03-15 RX ORDER — DEXAMETHASONE 4 MG/1
4 TABLET ORAL EVERY 8 HOURS SCHEDULED
Status: COMPLETED | OUTPATIENT
Start: 2021-03-15 | End: 2021-03-17

## 2021-03-15 RX ORDER — AMOXICILLIN 250 MG
1 CAPSULE ORAL 2 TIMES DAILY
Status: DISCONTINUED | OUTPATIENT
Start: 2021-03-15 | End: 2021-03-20 | Stop reason: HOSPADM

## 2021-03-15 RX ORDER — CEFAZOLIN SODIUM 2 G/100ML
2 INJECTION, SOLUTION INTRAVENOUS
Status: COMPLETED | OUTPATIENT
Start: 2021-03-15 | End: 2021-03-15

## 2021-03-15 RX ORDER — NALOXONE HYDROCHLORIDE 0.4 MG/ML
0.2 INJECTION, SOLUTION INTRAMUSCULAR; INTRAVENOUS; SUBCUTANEOUS
Status: DISCONTINUED | OUTPATIENT
Start: 2021-03-15 | End: 2021-03-15

## 2021-03-15 RX ORDER — OXYCODONE HYDROCHLORIDE 5 MG/1
5-10 TABLET ORAL
Status: DISCONTINUED | OUTPATIENT
Start: 2021-03-15 | End: 2021-03-20 | Stop reason: HOSPADM

## 2021-03-15 RX ORDER — GLYCOPYRROLATE 0.2 MG/ML
INJECTION, SOLUTION INTRAMUSCULAR; INTRAVENOUS PRN
Status: DISCONTINUED | OUTPATIENT
Start: 2021-03-15 | End: 2021-03-15

## 2021-03-15 RX ORDER — DEXAMETHASONE 2 MG/1
2 TABLET ORAL EVERY 8 HOURS SCHEDULED
Status: DISCONTINUED | OUTPATIENT
Start: 2021-03-19 | End: 2021-03-20 | Stop reason: HOSPADM

## 2021-03-15 RX ORDER — IOPAMIDOL 755 MG/ML
75 INJECTION, SOLUTION INTRAVASCULAR ONCE
Status: DISCONTINUED | OUTPATIENT
Start: 2021-03-15 | End: 2021-03-15

## 2021-03-15 RX ORDER — LIDOCAINE HYDROCHLORIDE 20 MG/ML
INJECTION, SOLUTION INFILTRATION; PERINEURAL PRN
Status: DISCONTINUED | OUTPATIENT
Start: 2021-03-15 | End: 2021-03-15

## 2021-03-15 RX ORDER — AMOXICILLIN 250 MG
2 CAPSULE ORAL 2 TIMES DAILY
Status: DISCONTINUED | OUTPATIENT
Start: 2021-03-15 | End: 2021-03-20 | Stop reason: HOSPADM

## 2021-03-15 RX ORDER — ONDANSETRON 4 MG/1
4-8 TABLET, ORALLY DISINTEGRATING ORAL EVERY 6 HOURS PRN
Status: DISCONTINUED | OUTPATIENT
Start: 2021-03-15 | End: 2021-03-20 | Stop reason: HOSPADM

## 2021-03-15 RX ORDER — LIDOCAINE 40 MG/G
CREAM TOPICAL
Status: DISCONTINUED | OUTPATIENT
Start: 2021-03-15 | End: 2021-03-20 | Stop reason: HOSPADM

## 2021-03-15 RX ORDER — HYDROMORPHONE HYDROCHLORIDE 1 MG/ML
.3-.5 INJECTION, SOLUTION INTRAMUSCULAR; INTRAVENOUS; SUBCUTANEOUS
Status: DISCONTINUED | OUTPATIENT
Start: 2021-03-15 | End: 2021-03-20 | Stop reason: HOSPADM

## 2021-03-15 RX ORDER — NALOXONE HYDROCHLORIDE 0.4 MG/ML
0.4 INJECTION, SOLUTION INTRAMUSCULAR; INTRAVENOUS; SUBCUTANEOUS
Status: DISCONTINUED | OUTPATIENT
Start: 2021-03-15 | End: 2021-03-20 | Stop reason: HOSPADM

## 2021-03-15 RX ORDER — HYDROMORPHONE HYDROCHLORIDE 1 MG/ML
.3-.5 INJECTION, SOLUTION INTRAMUSCULAR; INTRAVENOUS; SUBCUTANEOUS EVERY 5 MIN PRN
Status: DISCONTINUED | OUTPATIENT
Start: 2021-03-15 | End: 2021-03-15 | Stop reason: HOSPADM

## 2021-03-15 RX ORDER — FLUMAZENIL 0.1 MG/ML
0.2 INJECTION, SOLUTION INTRAVENOUS
Status: ACTIVE | OUTPATIENT
Start: 2021-03-15 | End: 2021-03-17

## 2021-03-15 RX ORDER — DIAZEPAM 5 MG
5 TABLET ORAL EVERY 8 HOURS PRN
Status: DISCONTINUED | OUTPATIENT
Start: 2021-03-15 | End: 2021-03-16

## 2021-03-15 RX ORDER — HYDRALAZINE HYDROCHLORIDE 20 MG/ML
2.5-5 INJECTION INTRAMUSCULAR; INTRAVENOUS EVERY 10 MIN PRN
Status: DISCONTINUED | OUTPATIENT
Start: 2021-03-15 | End: 2021-03-15 | Stop reason: HOSPADM

## 2021-03-15 RX ORDER — PROPOFOL 10 MG/ML
INJECTION, EMULSION INTRAVENOUS PRN
Status: DISCONTINUED | OUTPATIENT
Start: 2021-03-15 | End: 2021-03-15

## 2021-03-15 RX ORDER — MANNITOL 20 G/100ML
INJECTION, SOLUTION INTRAVENOUS PRN
Status: DISCONTINUED | OUTPATIENT
Start: 2021-03-15 | End: 2021-03-15

## 2021-03-15 RX ORDER — DEXAMETHASONE SODIUM PHOSPHATE 4 MG/ML
INJECTION, SOLUTION INTRA-ARTICULAR; INTRALESIONAL; INTRAMUSCULAR; INTRAVENOUS; SOFT TISSUE PRN
Status: DISCONTINUED | OUTPATIENT
Start: 2021-03-15 | End: 2021-03-15

## 2021-03-15 RX ADMIN — DEXAMETHASONE 4 MG: 4 TABLET ORAL at 22:04

## 2021-03-15 RX ADMIN — ROCURONIUM BROMIDE 60 MG: 10 INJECTION INTRAVENOUS at 10:50

## 2021-03-15 RX ADMIN — HYDROMORPHONE HYDROCHLORIDE 0.5 MG: 1 INJECTION, SOLUTION INTRAMUSCULAR; INTRAVENOUS; SUBCUTANEOUS at 18:20

## 2021-03-15 RX ADMIN — GLYCOPYRROLATE 0.1 MG: 0.2 INJECTION, SOLUTION INTRAMUSCULAR; INTRAVENOUS at 11:38

## 2021-03-15 RX ADMIN — PROPOFOL 30 MG: 10 INJECTION, EMULSION INTRAVENOUS at 13:51

## 2021-03-15 RX ADMIN — LABETALOL HYDROCHLORIDE 10 MG: 5 INJECTION INTRAVENOUS at 13:23

## 2021-03-15 RX ADMIN — ONDANSETRON 4 MG: 2 INJECTION INTRAMUSCULAR; INTRAVENOUS at 17:28

## 2021-03-15 RX ADMIN — LIDOCAINE HYDROCHLORIDE 100 MG: 20 INJECTION, SOLUTION INFILTRATION; PERINEURAL at 10:49

## 2021-03-15 RX ADMIN — SUGAMMADEX 200 MG: 100 INJECTION, SOLUTION INTRAVENOUS at 17:42

## 2021-03-15 RX ADMIN — SODIUM CHLORIDE, POTASSIUM CHLORIDE, SODIUM LACTATE AND CALCIUM CHLORIDE: 600; 310; 30; 20 INJECTION, SOLUTION INTRAVENOUS at 10:39

## 2021-03-15 RX ADMIN — ROCURONIUM BROMIDE 10 MG: 10 INJECTION INTRAVENOUS at 16:31

## 2021-03-15 RX ADMIN — HYDRALAZINE HYDROCHLORIDE 10 MG: 20 INJECTION, SOLUTION INTRAMUSCULAR; INTRAVENOUS at 19:48

## 2021-03-15 RX ADMIN — MANNITOL 50 G: 20 INJECTION, SOLUTION INTRAVENOUS at 12:56

## 2021-03-15 RX ADMIN — ROCURONIUM BROMIDE 10 MG: 10 INJECTION INTRAVENOUS at 11:59

## 2021-03-15 RX ADMIN — DEXAMETHASONE SODIUM PHOSPHATE 10 MG: 4 INJECTION, SOLUTION INTRA-ARTICULAR; INTRALESIONAL; INTRAMUSCULAR; INTRAVENOUS; SOFT TISSUE at 11:22

## 2021-03-15 RX ADMIN — HYDRALAZINE HYDROCHLORIDE 10 MG: 20 INJECTION, SOLUTION INTRAMUSCULAR; INTRAVENOUS at 20:22

## 2021-03-15 RX ADMIN — ROCURONIUM BROMIDE 30 MG: 10 INJECTION INTRAVENOUS at 13:18

## 2021-03-15 RX ADMIN — HYDROMORPHONE HYDROCHLORIDE 0.5 MG: 1 INJECTION, SOLUTION INTRAMUSCULAR; INTRAVENOUS; SUBCUTANEOUS at 17:07

## 2021-03-15 RX ADMIN — FENTANYL CITRATE 50 MCG: 50 INJECTION, SOLUTION INTRAMUSCULAR; INTRAVENOUS at 12:36

## 2021-03-15 RX ADMIN — PHENYLEPHRINE HYDROCHLORIDE 50 MCG: 10 INJECTION INTRAVENOUS at 11:38

## 2021-03-15 RX ADMIN — ROCURONIUM BROMIDE 30 MG: 10 INJECTION INTRAVENOUS at 12:11

## 2021-03-15 RX ADMIN — HYDRALAZINE HYDROCHLORIDE 10 MG: 20 INJECTION, SOLUTION INTRAMUSCULAR; INTRAVENOUS at 18:28

## 2021-03-15 RX ADMIN — ROCURONIUM BROMIDE 20 MG: 10 INJECTION INTRAVENOUS at 14:20

## 2021-03-15 RX ADMIN — METOCLOPRAMIDE HYDROCHLORIDE 10 MG: 5 INJECTION INTRAMUSCULAR; INTRAVENOUS at 20:29

## 2021-03-15 RX ADMIN — GLYCOPYRROLATE 0.2 MG: 0.2 INJECTION, SOLUTION INTRAMUSCULAR; INTRAVENOUS at 13:47

## 2021-03-15 RX ADMIN — FENTANYL CITRATE 100 MCG: 50 INJECTION, SOLUTION INTRAMUSCULAR; INTRAVENOUS at 12:00

## 2021-03-15 RX ADMIN — LABETALOL HYDROCHLORIDE 5 MG: 5 INJECTION INTRAVENOUS at 18:02

## 2021-03-15 RX ADMIN — ROCURONIUM BROMIDE 10 MG: 10 INJECTION INTRAVENOUS at 16:29

## 2021-03-15 RX ADMIN — HYDRALAZINE HYDROCHLORIDE 2 MG: 20 INJECTION INTRAMUSCULAR; INTRAVENOUS at 13:52

## 2021-03-15 RX ADMIN — NICARDIPINE HYDROCHLORIDE 2.5 MG/HR: 0.2 INJECTION, SOLUTION INTRAVENOUS at 21:39

## 2021-03-15 RX ADMIN — PROPOFOL 50 MG: 10 INJECTION, EMULSION INTRAVENOUS at 13:47

## 2021-03-15 RX ADMIN — LABETALOL HYDROCHLORIDE 10 MG: 5 INJECTION INTRAVENOUS at 17:45

## 2021-03-15 RX ADMIN — PROPOFOL 100 MG: 10 INJECTION, EMULSION INTRAVENOUS at 10:50

## 2021-03-15 RX ADMIN — ROCURONIUM BROMIDE 20 MG: 10 INJECTION INTRAVENOUS at 13:57

## 2021-03-15 RX ADMIN — ROCURONIUM BROMIDE 10 MG: 10 INJECTION INTRAVENOUS at 16:07

## 2021-03-15 RX ADMIN — FENTANYL CITRATE 100 MCG: 50 INJECTION, SOLUTION INTRAMUSCULAR; INTRAVENOUS at 10:48

## 2021-03-15 RX ADMIN — ROCURONIUM BROMIDE 30 MG: 10 INJECTION INTRAVENOUS at 11:29

## 2021-03-15 RX ADMIN — Medication 2 G: at 11:17

## 2021-03-15 RX ADMIN — FENTANYL CITRATE 50 MCG: 50 INJECTION, SOLUTION INTRAMUSCULAR; INTRAVENOUS at 14:17

## 2021-03-15 RX ADMIN — FENTANYL CITRATE 100 MCG: 50 INJECTION, SOLUTION INTRAMUSCULAR; INTRAVENOUS at 13:46

## 2021-03-15 RX ADMIN — ROCURONIUM BROMIDE 10 MG: 10 INJECTION INTRAVENOUS at 15:08

## 2021-03-15 RX ADMIN — SODIUM CHLORIDE, POTASSIUM CHLORIDE, SODIUM LACTATE AND CALCIUM CHLORIDE: 600; 310; 30; 20 INJECTION, SOLUTION INTRAVENOUS at 12:50

## 2021-03-15 RX ADMIN — CEFAZOLIN 1 G: 330 INJECTION, POWDER, FOR SOLUTION INTRAMUSCULAR; INTRAVENOUS at 23:57

## 2021-03-15 RX ADMIN — PROPOFOL 20 MG: 10 INJECTION, EMULSION INTRAVENOUS at 11:38

## 2021-03-15 RX ADMIN — FENTANYL CITRATE 50 MCG: 50 INJECTION, SOLUTION INTRAMUSCULAR; INTRAVENOUS at 11:18

## 2021-03-15 RX ADMIN — FENTANYL CITRATE 50 MCG: 50 INJECTION, SOLUTION INTRAMUSCULAR; INTRAVENOUS at 14:21

## 2021-03-15 RX ADMIN — LABETALOL HYDROCHLORIDE 10 MG: 5 INJECTION INTRAVENOUS at 17:50

## 2021-03-15 RX ADMIN — LABETALOL HYDROCHLORIDE 5 MG: 5 INJECTION INTRAVENOUS at 16:37

## 2021-03-15 RX ADMIN — LABETALOL HYDROCHLORIDE 10 MG: 5 INJECTION INTRAVENOUS at 13:32

## 2021-03-15 RX ADMIN — SODIUM CHLORIDE: 9 INJECTION, SOLUTION INTRAVENOUS at 21:16

## 2021-03-15 RX ADMIN — Medication 2 G: at 15:17

## 2021-03-15 RX ADMIN — ROCURONIUM BROMIDE 10 MG: 10 INJECTION INTRAVENOUS at 15:38

## 2021-03-15 RX ADMIN — IOPAMIDOL 75 ML: 755 INJECTION, SOLUTION INTRAVENOUS at 10:02

## 2021-03-15 RX ADMIN — MIDAZOLAM 2 MG: 1 INJECTION INTRAMUSCULAR; INTRAVENOUS at 10:39

## 2021-03-15 RX ADMIN — HYDRALAZINE HYDROCHLORIDE 20 MG: 20 INJECTION, SOLUTION INTRAMUSCULAR; INTRAVENOUS at 20:53

## 2021-03-15 ASSESSMENT — MIFFLIN-ST. JEOR: SCORE: 1760.75

## 2021-03-15 ASSESSMENT — ACTIVITIES OF DAILY LIVING (ADL): ADLS_ACUITY_SCORE: 9

## 2021-03-15 NOTE — OR NURSING
Vipin RICO at bedside. Opened ICP drain, leveled at 10. No need to draw labs currently. MD performed neuro exam at bedside. Assessed pt's symptoms, ordered reglan PRN. Assessed pressures and gave parameters to call (if >20 ml out in an hour).     MDA called [phone], will see and place signout.

## 2021-03-15 NOTE — ANESTHESIA CARE TRANSFER NOTE
Patient: Francesca Rowland    Procedure(s):  stealth assisted Midline suboccipital craniectomy/craniotomy for tumor  stealth assisted  right frontal ventriculostomy    Diagnosis: Brain metastasis (H) [C79.31]  Cerebral edema (H) [G93.6]  Diagnosis Additional Information: No value filed.    Anesthesia Type:   General     Note:    Oropharynx: oropharynx clear of all foreign objects and spontaneously breathing  Level of Consciousness: drowsy  Oxygen Supplementation: face mask  Level of Supplemental Oxygen (L/min / FiO2): 6  Independent Airway: airway patency satisfactory and stable  Dentition: dentition unchanged  Vital Signs Stable: post-procedure vital signs reviewed and stable  Report to RN Given: handoff report given  Patient transferred to: PACU    Handoff Report: Identifed the Patient, Identified the Reponsible Provider, Reviewed the pertinent medical history, Discussed the surgical course, Reviewed Intra-OP anesthesia mangement and issues during anesthesia, Set expectations for post-procedure period and Allowed opportunity for questions and acknowledgement of understanding      Vitals: (Last set prior to Anesthesia Care Transfer)  CRNA VITALS  3/15/2021 1725 - 3/15/2021 1802      3/15/2021             EKG:  Sinus rhythm        Electronically Signed By: MIRA Orta CRNA  March 15, 2021  6:02 PM

## 2021-03-15 NOTE — BRIEF OP NOTE
"[facility]    Brief Operative Note    Pre-operative diagnosis: Brain metastasis (H) [C79.31]  Cerebral edema (H) [G93.6]  Post-operative diagnosis Same as pre-operative diagnosis    Procedure: Procedure(s):  stealth assisted Midline suboccipital craniectomy/craniotomy for tumor  stealth assisted  right frontal ventriculostomy  Surgeon: Surgeon(s) and Role:     * Martín Myrick MD - Primary     * Leif Lew MD - Resident - Assisting  Anesthesia: General   Estimated blood loss: 100 mL  Drains: None  Specimens:   ID Type Source Tests Collected by Time Destination   A : Cerebellar Tumor Tissue Brain SURGICAL PATHOLOGY EXAM Martín Myrick MD 3/15/2021  2:04 PM    B : Cerebellar Tumor Tissue Brain SURGICAL PATHOLOGY EXAM Martín Myrick MD 3/15/2021  2:24 PM      Findings:   Resection of vascular mass in right cerebellar hemisphere encroaching upon medulla..  Complications: None.  Implants:   Implant Name Type Inv. Item Serial No.  Lot No. LRB No. Used Action   GRAFT DUREPAIR DURA MATRIX 2X2\" [id number] Bone/Tissue/Biologic GRAFT DUREPAIR DURA MATRIX 2X2\" [id number]  [employer]-NEURO [id number] N/A 1 Implanted   IMP SCR SYN MATRIX LOW PRO 1.5X04MM SELF DRILL [id number] Metallic Hardware/[place] IMP SCR SYN MATRIX LOW PRO 1.5X04MM SELF DRILL [id number]  [employer]   N/A 6 Implanted   IMP BUR HOLE COVER 17MM LOW PROFILE .527 Metallic Hardware/[place] IMP BUR HOLE COVER 17MM LOW PROFILE .527  [employer]STRATEC   N/A 2 Implanted       Leif Lew M.D.  Neurosurgery Resident, PGY-2    Please contact neurosurgery resident on call with questions.    Dial * * [phone], enter [phone] when prompted.     "

## 2021-03-15 NOTE — ANESTHESIA PROCEDURE NOTES
Airway   Date/Time: 3/15/2021 10:52 AM   Patient location during procedure: OR  Staff -   Anesthesiologist:  Rigo Hernandez MD  CRNA: Wilmer Davenport APRN CRNA  Performed By: CRNA    Consent for Airway   Urgency: elective    Indications and Patient Condition  Indications for airway management: kristina-procedural  Induction type:intravenousMask difficulty assessment: 1 - vent by mask    Final Airway Details  Final airway type: endotracheal airway  Successful airway:ETT - single and Oral  Endotracheal Airway Details   ETT size (mm): 7.5  Cuffed: yes  Successful intubation technique: direct laryngoscopy  Grade View of Cords: 2  Adjucts: stylet  Measured from: lips  Secured at (cm): 21  Secured with: pink tape  Bite block used: Soft    Post intubation assessment   Placement verified by: capnometry, equal breath sounds and chest rise   Number of attempts at approach: 1  Number of other approaches attempted: 0  Secured with:pink tape  Ease of procedure: easy  Dentition: Intact and Unchanged

## 2021-03-15 NOTE — ANESTHESIA PROCEDURE NOTES
Arterial Line Procedure Note    Staff -   Anesthesiologist:  Rigo Hernandez MD  CRNA: Wilmer Davenport APRN CRNA  Other Anesthesia Staff: Shiva Good  Performed By: KAYKAY  Location: In OR After Induction  Procedure Start/Stop Times:     patient identified, IV checked, site marked, risks and benefits discussed, informed consent, monitors and equipment checked, pre-op evaluation and at physician/surgeon's request      Correct Patient: Yes      Correct Position: Yes      Correct Site: Yes      Correct Procedure: Yes      Correct Laterality:  Yes    Site Marked:  N/A  Line Placement:     Procedure:  Arterial Line    Insertion Site:  Radial    Insertion laterality:  Left    Skin Prep: Chloraprep      Patient Prep: patient draped, mask, sterile gloves, sterile gown, hat and hand hygiene      Local skin infiltration:  None    Ultrasound Guided?: No      Catheter size:  20 gauge, 12 cm    Cath secured with: other (comment)      Cath secured with comment:  Tegaderm    Dressing:  Tegaderm    Complications:  None obvious    Arterial waveform: Yes      IBP within 10% of NIBP: Yes

## 2021-03-15 NOTE — OP NOTE
[facility]  Department of Neurosurgery  Operative Report     Procedure Date:  3/15/2020     STAFF SURGEON:  Martín Myrick MD     RESIDENT SURGEON:  Thomas Lew MD     PREOPERATIVE DIAGNOSIS:    1. Cerebellar mass  2. Cerebral edema  3. History of metastatic cervical adenocarcinoma    POSTOPERATIVE DIAGNOSIS:  Same     PROCEDURE PERFORMED:   1. Midline suboccipital craniotomy for resection of mass  2. Right frontal ventriculostomy  3. Stereotactic navigation  4. Use of operating microscope    ANESTHESIA:  General endotracheal.      ESTIMATED BLOOD LOSS:  100 mL.      INDICATION FOR THE OPERATION:    Patient is a 40 year old female with history of metastatic cervical adenocarcinoma who presented with gait imbalance and worsening headaches. Imaging demonstrated a 2.7cm cerebellar mass with surrounding edema. She was discussed together with the Radiation Oncology team and a recommendation for surgery followed by radiation was made, given the lesion size and proximity to the brainstem and fourth ventricle. After a discussion of risks, benefits, and alternatives, she agreed to proceed with resection of this cerebellar mass with ventriculostomy placement. Informed consent was obtained.    DESCRIPTION OF PROCEDURE:    Patient was brought to the operating room in the hospital bed. The anesthesia team induced general anesthesia and performed the intubation. Intravenous and intraarterial access was obtained.    The bed was turned 180 degrees in preparation for the right frontal ventriculostomy. Frameless Axiem navigation was registered and its accuracy confirmed. An incision was marked 11cm behind the nasion and 3cm to the right of midline. Local anesthetic was administered. A timeout was performed to confirm details of the procedure.  A skin incision was made with a 10 blade. A twist drill was used to make the fredy hole. The dura was pierced and a small cortiectomy was made. Using image guidance, a bactiseal  catheter was introduced and clear, colorless CSF was obtained. Opening pressure was 15. The catheter was placed to 7cm at the outer table. The skin incision was closed with a running monocryl suture. The wound was sterilely dressed and a sterile drainage bag was connected to the ventriculostomy catheter and kept clamped.    We then positioned the patient prone. The [place] head clamp was attached and patient was positioned prone on the operating room table with gel rolls in place. All pressure points were well padded. The head was placed in  tuck position and attached to the bed frame. Stereotaxy was registered and its accuracy confirmed.  The back of the head and neck was marked, prepped, and draped in usual sterile fashion. A second time out was performed to verify the details of the procedure. Lidocaine with 1:200,000 epinepherine was injected into the incision for a total of 10cc.    A 10 blade was used to make the initial incision from above the external occipital protuberance down to C2. The avascular midline raphe was developed. We exposed the occipital bone and C1 posterior arch. A suboccipital craniotomy was performed using a cutting matchstick drill bit connecting this to the foramen magnum, which was very thick. The dura was intact and clearly under moderate pressure.      We then proceeded with tumor resection. We did this using the operating microscope. We used stereotaxy to confirm our exposure around the tumor. The dura was then opened in a standard  Y  configuration. We made a small corticectomy in the right inferior cerebellum and the tumor was encountered. A sample was sent for frozen section which returned  metastatic carcinoma.  We continued to debulk the center. It was very vascular.  We continued to debulk the tumor until the capsule could be mobilized. Most of the capsule fell apart making this a challenging dissection. We were able to fold in the capsule along the inferior, medial,  and lateral borders. We reached normal cerebellar cortex and developed this plane. We continued the resection until clean borders were achieved. We then reinspected the cavity and found no evidence of residual tumor. At this point, we had completed the tumor resection. Point sources of bleeding were identified and cauterized. Hemostasis was excellent at this point, and we confirmed hemostasis with several rounds of warm saline irrigation.    We then turned to closure. A piece of durarepair was sewn in as a graft. Duraseal was used to cover the duraplasty. Surgicel was layered on this. The bone flap was replaced with the [employer] plating system. The muscle was reapproximated with 0 vicryl sutures. The fascia was reapproximated with 0 vicryl sutures.  The subcutaneous layer was reapproximated with interrupted 2-0 vicryl sutures. The skin was closed with a running nylon suture. The wound was sterilely dressed and the drapes were taken down.     The patient was then flipped supine on the hospital bed and taken out of the [place] headholder. The ventriculostomy at this point was opened and kept at 10cm above the EAM postoperatively. The patient was extubated and brought to the postoperative care unit.  I updated the  by telephone.    I was present during the key portions of the operation and I was immediately available for the entire procedure.     Martín Myrick MD PhD

## 2021-03-16 ENCOUNTER — APPOINTMENT (OUTPATIENT)
Dept: OCCUPATIONAL THERAPY | Facility: CLINIC | Age: 41
End: 2021-03-16
Attending: NEUROLOGICAL SURGERY
Payer: COMMERCIAL

## 2021-03-16 ENCOUNTER — APPOINTMENT (OUTPATIENT)
Dept: MRI IMAGING | Facility: CLINIC | Age: 41
End: 2021-03-16
Attending: NEUROLOGICAL SURGERY
Payer: COMMERCIAL

## 2021-03-16 ENCOUNTER — APPOINTMENT (OUTPATIENT)
Dept: MEDSURG UNIT | Facility: CLINIC | Age: 41
End: 2021-03-16
Payer: COMMERCIAL

## 2021-03-16 LAB
ANION GAP SERPL CALCULATED.3IONS-SCNC: 8 MMOL/L (ref 3–14)
BUN SERPL-MCNC: 13 MG/DL (ref 7–30)
CALCIUM SERPL-MCNC: 8.4 MG/DL (ref 8.5–10.1)
CHLORIDE SERPL-SCNC: 107 MMOL/L (ref 94–109)
CO2 SERPL-SCNC: 25 MMOL/L (ref 20–32)
CREAT SERPL-MCNC: 0.75 MG/DL (ref 0.52–1.04)
ERYTHROCYTE [DISTWIDTH] IN BLOOD BY AUTOMATED COUNT: 14.2 % (ref 10–15)
GFR SERPL CREATININE-BSD FRML MDRD: >90 ML/MIN/{1.73_M2}
GLUCOSE BLDC GLUCOMTR-MCNC: 118 MG/DL (ref 70–99)
GLUCOSE BLDC GLUCOMTR-MCNC: 127 MG/DL (ref 70–99)
GLUCOSE BLDC GLUCOMTR-MCNC: 130 MG/DL (ref 70–99)
GLUCOSE BLDC GLUCOMTR-MCNC: 138 MG/DL (ref 70–99)
GLUCOSE BLDC GLUCOMTR-MCNC: 157 MG/DL (ref 70–99)
GLUCOSE SERPL-MCNC: 150 MG/DL (ref 70–99)
HCT VFR BLD AUTO: 36.8 % (ref 35–47)
HGB BLD-MCNC: 12.2 G/DL (ref 11.7–15.7)
INTERPRETATION ECG - MUSE: NORMAL
MAGNESIUM SERPL-MCNC: 2.1 MG/DL (ref 1.6–2.3)
MCH RBC QN AUTO: 30.9 PG (ref 26.5–33)
MCHC RBC AUTO-ENTMCNC: 33.2 G/DL (ref 31.5–36.5)
MCV RBC AUTO: 93 FL (ref 78–100)
PHOSPHATE SERPL-MCNC: 2.8 MG/DL (ref 2.5–4.5)
PLATELET # BLD AUTO: 208 10E9/L (ref 150–450)
POTASSIUM SERPL-SCNC: 3.6 MMOL/L (ref 3.4–5.3)
RBC # BLD AUTO: 3.95 10E12/L (ref 3.8–5.2)
SODIUM SERPL-SCNC: 140 MMOL/L (ref 133–144)
WBC # BLD AUTO: 10.7 10E9/L (ref 4–11)

## 2021-03-16 PROCEDURE — 97535 SELF CARE MNGMENT TRAINING: CPT | Mod: GO | Performed by: OCCUPATIONAL THERAPIST

## 2021-03-16 PROCEDURE — 84100 ASSAY OF PHOSPHORUS: CPT | Performed by: NEUROLOGICAL SURGERY

## 2021-03-16 PROCEDURE — 255N000002 HC RX 255 OP 636: Performed by: NEUROLOGICAL SURGERY

## 2021-03-16 PROCEDURE — 80048 BASIC METABOLIC PNL TOTAL CA: CPT | Performed by: NEUROLOGICAL SURGERY

## 2021-03-16 PROCEDURE — 97166 OT EVAL MOD COMPLEX 45 MIN: CPT | Mod: GO | Performed by: OCCUPATIONAL THERAPIST

## 2021-03-16 PROCEDURE — 70553 MRI BRAIN STEM W/O & W/DYE: CPT

## 2021-03-16 PROCEDURE — 258N000003 HC RX IP 258 OP 636: Performed by: STUDENT IN AN ORGANIZED HEALTH CARE EDUCATION/TRAINING PROGRAM

## 2021-03-16 PROCEDURE — 83735 ASSAY OF MAGNESIUM: CPT | Performed by: NEUROLOGICAL SURGERY

## 2021-03-16 PROCEDURE — 250N000013 HC RX MED GY IP 250 OP 250 PS 637: Performed by: NEUROLOGICAL SURGERY

## 2021-03-16 PROCEDURE — 250N000013 HC RX MED GY IP 250 OP 250 PS 637: Performed by: STUDENT IN AN ORGANIZED HEALTH CARE EDUCATION/TRAINING PROGRAM

## 2021-03-16 PROCEDURE — A9585 GADOBUTROL INJECTION: HCPCS | Performed by: NEUROLOGICAL SURGERY

## 2021-03-16 PROCEDURE — 999N001017 HC STATISTIC GLUCOSE BY METER IP

## 2021-03-16 PROCEDURE — 250N000009 HC RX 250: Performed by: STUDENT IN AN ORGANIZED HEALTH CARE EDUCATION/TRAINING PROGRAM

## 2021-03-16 PROCEDURE — 250N000012 HC RX MED GY IP 250 OP 636 PS 637: Performed by: STUDENT IN AN ORGANIZED HEALTH CARE EDUCATION/TRAINING PROGRAM

## 2021-03-16 PROCEDURE — 70553 MRI BRAIN STEM W/O & W/DYE: CPT | Mod: 26 | Performed by: RADIOLOGY

## 2021-03-16 PROCEDURE — 250N000013 HC RX MED GY IP 250 OP 250 PS 637: Performed by: NURSE PRACTITIONER

## 2021-03-16 PROCEDURE — 200N000002 HC R&B ICU UMMC

## 2021-03-16 PROCEDURE — 85027 COMPLETE CBC AUTOMATED: CPT | Performed by: NEUROLOGICAL SURGERY

## 2021-03-16 PROCEDURE — 250N000011 HC RX IP 250 OP 636: Performed by: STUDENT IN AN ORGANIZED HEALTH CARE EDUCATION/TRAINING PROGRAM

## 2021-03-16 RX ORDER — GADOBUTROL 604.72 MG/ML
10 INJECTION INTRAVENOUS ONCE
Status: COMPLETED | OUTPATIENT
Start: 2021-03-16 | End: 2021-03-16

## 2021-03-16 RX ORDER — SODIUM CHLORIDE 9 MG/ML
INJECTION, SOLUTION INTRAVENOUS CONTINUOUS
Status: DISCONTINUED | OUTPATIENT
Start: 2021-03-16 | End: 2021-03-17

## 2021-03-16 RX ORDER — POTASSIUM CHLORIDE 750 MG/1
20 TABLET, EXTENDED RELEASE ORAL ONCE
Status: COMPLETED | OUTPATIENT
Start: 2021-03-16 | End: 2021-03-16

## 2021-03-16 RX ADMIN — MULTIPLE VITAMINS W/ MINERALS TAB 1 TABLET: TAB at 08:28

## 2021-03-16 RX ADMIN — LABETALOL HYDROCHLORIDE 20 MG: 5 INJECTION, SOLUTION INTRAVENOUS at 16:07

## 2021-03-16 RX ADMIN — CEFAZOLIN 1 G: 330 INJECTION, POWDER, FOR SOLUTION INTRAMUSCULAR; INTRAVENOUS at 08:22

## 2021-03-16 RX ADMIN — DEXAMETHASONE 4 MG: 4 TABLET ORAL at 05:53

## 2021-03-16 RX ADMIN — DEXAMETHASONE 4 MG: 4 TABLET ORAL at 22:27

## 2021-03-16 RX ADMIN — Medication 5 MG: at 13:19

## 2021-03-16 RX ADMIN — ONDANSETRON 4 MG: 2 INJECTION INTRAMUSCULAR; INTRAVENOUS at 08:16

## 2021-03-16 RX ADMIN — GADOBUTROL 10 ML: 604.72 INJECTION INTRAVENOUS at 18:34

## 2021-03-16 RX ADMIN — POLYETHYLENE GLYCOL 3350 17 G: 17 POWDER, FOR SOLUTION ORAL at 08:29

## 2021-03-16 RX ADMIN — HYDROMORPHONE HYDROCHLORIDE 0.5 MG: 1 INJECTION, SOLUTION INTRAMUSCULAR; INTRAVENOUS; SUBCUTANEOUS at 00:46

## 2021-03-16 RX ADMIN — METOCLOPRAMIDE HYDROCHLORIDE 10 MG: 5 INJECTION INTRAMUSCULAR; INTRAVENOUS at 17:15

## 2021-03-16 RX ADMIN — CEFAZOLIN 1 G: 330 INJECTION, POWDER, FOR SOLUTION INTRAMUSCULAR; INTRAVENOUS at 16:07

## 2021-03-16 RX ADMIN — OMEPRAZOLE 20 MG: 20 CAPSULE, DELAYED RELEASE ORAL at 08:28

## 2021-03-16 RX ADMIN — ACETAMINOPHEN 650 MG: 325 TABLET, FILM COATED ORAL at 08:28

## 2021-03-16 RX ADMIN — METOCLOPRAMIDE HYDROCHLORIDE 10 MG: 5 INJECTION INTRAMUSCULAR; INTRAVENOUS at 08:38

## 2021-03-16 RX ADMIN — NICARDIPINE HYDROCHLORIDE 12.5 MG/HR: 0.2 INJECTION, SOLUTION INTRAVENOUS at 04:02

## 2021-03-16 RX ADMIN — HYDROMORPHONE HYDROCHLORIDE 0.5 MG: 1 INJECTION, SOLUTION INTRAMUSCULAR; INTRAVENOUS; SUBCUTANEOUS at 11:13

## 2021-03-16 RX ADMIN — NICARDIPINE HYDROCHLORIDE 12.5 MG/HR: 0.2 INJECTION, SOLUTION INTRAVENOUS at 00:54

## 2021-03-16 RX ADMIN — LABETALOL HYDROCHLORIDE 10 MG: 5 INJECTION, SOLUTION INTRAVENOUS at 15:25

## 2021-03-16 RX ADMIN — POTASSIUM CHLORIDE 20 MEQ: 750 TABLET, EXTENDED RELEASE ORAL at 05:53

## 2021-03-16 RX ADMIN — SODIUM CHLORIDE: 9 INJECTION, SOLUTION INTRAVENOUS at 19:08

## 2021-03-16 RX ADMIN — ONDANSETRON 4 MG: 2 INJECTION INTRAMUSCULAR; INTRAVENOUS at 00:46

## 2021-03-16 RX ADMIN — DOCUSATE SODIUM 50 MG AND SENNOSIDES 8.6 MG 2 TABLET: 8.6; 5 TABLET, FILM COATED ORAL at 20:13

## 2021-03-16 RX ADMIN — SODIUM CHLORIDE: 9 INJECTION, SOLUTION INTRAVENOUS at 04:02

## 2021-03-16 RX ADMIN — DEXAMETHASONE 4 MG: 4 TABLET ORAL at 13:19

## 2021-03-16 RX ADMIN — HYDROMORPHONE HYDROCHLORIDE 0.5 MG: 1 INJECTION, SOLUTION INTRAMUSCULAR; INTRAVENOUS; SUBCUTANEOUS at 16:07

## 2021-03-16 RX ADMIN — ONDANSETRON 4 MG: 2 INJECTION INTRAMUSCULAR; INTRAVENOUS at 15:25

## 2021-03-16 ASSESSMENT — ACTIVITIES OF DAILY LIVING (ADL)
ADLS_ACUITY_SCORE: 12

## 2021-03-16 ASSESSMENT — MIFFLIN-ST. JEOR: SCORE: 1770.75

## 2021-03-16 NOTE — PLAN OF CARE
Admitted/transferred from: PACU    Reason for admission/transfer: R Crani, R ventriculostomy    2 RN skin assessment: completed by Gab VAUGHAN RN. Vic ORTIZ RN    Result of skin assessment and interventions/actions: Protective mepilex applied.    Height, weight, drug calc weight: Done    Patient belongings (see Flowsheet)    MDRO education added to care planN/A  ?  Pt A&O x4 this shift. Pain controlled with IV analgesia. Pt c/o nausea and vomiting with mobility. SR-ST. Nicardipine drip titrated to maintain SBP<140. O2 WNL on 2L via NC. Pt passed bedside swallow eval, tolerating sips of CLR liquids with meds. AUOP via mosley. R EVD and posterior crani site dressings WNL. PIV x2 WNL. L radial ART WNL.

## 2021-03-16 NOTE — PROGRESS NOTES
IR note.    Pt was on IR outpatient schedule 3/16 for CT guided right pelvic side wall mass biopsy (referral dated 3/1). Given surgery for brain metastasis 3/15 unclear need for this biopsy. Will cancel procedure at this time and this can be rescheduled in the future by oncology provider if desired.    Page out to Dr. Canela 3/15 without response. Discussed with Dr. Linn from IR.    Leona Morales DNP, APRN  Interventional Radiology   IR on-call pager: [phone]

## 2021-03-16 NOTE — ANESTHESIA POSTPROCEDURE EVALUATION
Patient: Francesca Rowland    Procedure(s):  stealth assisted Midline suboccipital craniectomy/craniotomy for tumor  stealth assisted  right frontal ventriculostomy    Diagnosis:Brain metastasis (H) [C79.31]  Cerebral edema (H) [G93.6]  Diagnosis Additional Information: No value filed.    Anesthesia Type:  General    Note:  Disposition: ICU            ICU Sign Out: Anesthesiologist/ICU physician sign out WAS performed   Postop Pain Control: Uneventful            Sign Out: Well controlled pain   PONV:    Neuro/Psych: Uneventful            Sign Out: Acceptable/Baseline neuro status   Airway/Respiratory: Uneventful            Sign Out: Acceptable/Baseline resp. status   CV/Hemodynamics: Uneventful            Sign Out: Acceptable CV status   Other NRE:    DID A NON-ROUTINE EVENT OCCUR?          Last vitals:  Vitals:    03/15/21 2115 03/15/21 2130 03/15/21 2145   BP:      Pulse: 73 79 82   Resp:      Temp:      SpO2: 98% 98% 98%       Last vitals prior to Anesthesia Care Transfer:  CRNA VITALS  3/15/2021 1725 - 3/15/2021 1825      3/15/2021             EKG:  Sinus rhythm          Electronically Signed By: Mary Cristobal MD  March 15, 2021  10:00 PM

## 2021-03-16 NOTE — PROGRESS NOTES
03/16/21 1400   Quick Adds   Type of Visit Initial Occupational Therapy Evaluation   Living Environment   People in home significant other;child(elfego), dependent   Current Living Arrangements house   Transportation Anticipated car, drives self;family or friend will provide   Living Environment Comments 3 JORDY, 7 stairs up to bedroom and tub shower, has 24/7 assist if needed at d/c   Self-Care   Usual Activity Tolerance good   Current Activity Tolerance poor   Regular Exercise No   Equipment Currently Used at Home none   Activity/Exercise/Self-Care Comment Pt was I in all ADL/IADLs prior to admission including cooking, cleaning, driving, and medication management. Pt states she can walk a couple miles if needed. 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    Disability/Function   Fall history within last six months no   Change in Functional Status Since Onset of Current Illness/Injury yes   General Information   Referring Physician Leif Lew MD   Patient/Family Therapy Goal Statement (OT) Return to PLOF   Additional Occupational Profile Info/Pertinent History of Current Problem 40 year old female who is now s/p midline suboccipital craniectomy/craniotomy for tumor resection with EVD placement on 3/15. The tumor was causing brain compression and cerebral edema.   Existing Precautions/Restrictions fall  (craniotomy)   Cognitive Status Examination   Cognitive Status Comments No overt concerns on eval, but groggy. Pt reports no issues at baseline. Will monitor.   Visual Perception   Visual Acuity No concerns   Range of Motion Comprehensive   Comment, General Range of Motion WFL B UEs   Strength Comprehensive (MMT)   Comment, General Manual Muscle Testing (MMT) Assessment 4/5 grossly B hands   Clinical Impression   Criteria for Skilled Therapeutic Interventions Met (OT) yes   OT Diagnosis Decreased I in ADLs due to deconditioning   Assessment of Occupational Performance 3-5 Performance Deficits   Identified  Performance Deficits dressing, bathing, toileting, functional endurnace   Clinical Decision Making Complexity (OT) moderate complexity   Therapy Frequency (OT) 5x/week   Predicted Duration of Therapy 2 weeks   Risk & Benefits of therapy have been explained patient;spouse/significant other   Total Evaluation Time (Minutes)   Total Evaluation Time (Minutes) 10

## 2021-03-16 NOTE — PLAN OF CARE
Major Shift Events:  Neuros intact. EVD 10 above. ICPs 8-16, outputs <20/hr. C/o neck stiffness, valium given prn. Nicardipine gtt turned off this am. SBP goal <140, labetalol given prn. Weaned to RA, capno WNL. Sorto in place. Intermittent nausea with emesis x2 this shift, especially after movement. IV zofran and Reglan given with some relief. PIV x2, NS @125. Pain managed with tylenol and IV dilaudid. Brain MRI completed.     Plan: Continue to monitor neuro status. Work to manage pt nausea and emesis. Encourage activity as tolerated. Keep SBPs <140 with prns.     For vital signs and complete assessments, please see documentation flowsheets.

## 2021-03-16 NOTE — PROGRESS NOTES
[facility], [place]   03/16/2021  Neurosurgery Progress Note:    Assessment:  Francesca Rowland is a 40 year old female who is now s/p midline suboccipital craniectomy/craniotomy for tumor resection with EVD placement on 3/15. The tumor was causing brain compression and cerebral edema.    Plan:  - Serial neuro exams  - EVD @ 10  - MRI today  - Ancef x24 hours  - Pain control  - HOB 30 degrees  - Advance diet as tolerated  - Bowel regimen  - PRN antiemetics  - IVF until taking adequate PO  - PT/OT  - SCDs for DVT proph    -----------------------------------  Kunal Vazquez MD  Neurosurgery Resident, PGY-2    Please contact neurosurgery resident on call with questions.    Dial * * [phone], enter [phone] when prompted.   -----------------------------------    Interval History: OR yesterday    Objective:   Temp:  [98.2  F (36.8  C)-100.2  F (37.9  C)] 100.2  F (37.9  C)  Pulse:  [] 81  Resp:  [16-20] 16  BP: (151-161)/(80-91) 151/80  MAP:  [68 mmHg-116 mmHg] 70 mmHg  Arterial Line BP: (0-172)/(-21-77) 114/51  SpO2:  [93 %-98 %] 97 %  I/O last 3 completed shifts:  In: 3798.93 [I.V.:3798.93]  Out: 3174 [Urine:2660; Emesis/NG output:250; Other:164; Blood:100]    Gen: Appears comfortable, NAD  Wound: clean, dry, intact  Neurologic:  - Alert & Oriented to person, place, time, and situation  - Follows commands briskly  - Speech fluent, spontaneous. No aphasia or dysarthria.  - No gaze preference. No apparent hemineglect.  - PERRL, EOMI  - Face symmetric  - No dysmetria noted     Del Tr Bi WE WF Gr   R 5 5 5 5 5 5   L 5 5 5 5 5 5    HF KE KF DF PF EHL   R 5 5 5 5 5 5   L 5 5 5 5 5 5     Sensation intact and symmetric to light touch throughout    LABS:  Recent Labs   Lab 03/16/21  0357 03/15/21  2004 03/15/21  0850    138  --    POTASSIUM 3.6 4.0 4.0   CHLORIDE 107 106  --    CO2 25 24  --    ANIONGAP 8 8  --    * 210*  --    BUN 13 15  --    CR 0.75 0.76 0.74   SHAD 8.4* 8.5  --         Recent Labs   Lab 03/16/21  0357   WBC 10.7   RBC 3.95   HGB 12.2   HCT 36.8   MCV 93   MCH 30.9   MCHC 33.2   RDW 14.2          IMAGING:  Recent Results (from the past 24 hour(s))   CT Head w/o & w Contrast    Narrative    Stealth CT imaging for purposes of stereotactic evaluation    Provided History: Preprocedural planning. Right cerebellar mass.  Comparison: MRI 8/20/2021, PET 1/29/2021    Technique: CT imaging performed with axial, sagittal, and coronal  reconstructed images obtained without intravenous contrast.    Contrast: iopamidol (ISOVUE-370) solution 75 mL    Findings: Limited imaging for stereotactic localization demonstrates a  mixed attenuation rim enhancing mass in the inferior right cerebellum  measuring 2.6 x 2.7 x 2.3 cm (AP, transverse, craniocaudal). No  intracranial hemorrhage or mass effect.    The ventricles are not enlarged, and there is no evidence of  hydrocephalus. The visualized paranasal sinuses and mastoid air cells  are clear.      Impression    Impression: Peripherally enhancing mass in the inferior right  cerebellum. Limited imaging performed primarily for the purposes of  stereotactic localization.    I have personally reviewed the examination and initial interpretation  and I agree with the findings.    HAWK FINN MD

## 2021-03-17 ENCOUNTER — APPOINTMENT (OUTPATIENT)
Dept: PHYSICAL THERAPY | Facility: CLINIC | Age: 41
End: 2021-03-17
Attending: NEUROLOGICAL SURGERY
Payer: COMMERCIAL

## 2021-03-17 LAB
ANION GAP SERPL CALCULATED.3IONS-SCNC: 8 MMOL/L (ref 3–14)
BUN SERPL-MCNC: 13 MG/DL (ref 7–30)
CALCIUM SERPL-MCNC: 8.9 MG/DL (ref 8.5–10.1)
CHLORIDE SERPL-SCNC: 103 MMOL/L (ref 94–109)
CO2 SERPL-SCNC: 24 MMOL/L (ref 20–32)
COPATH REPORT: NORMAL
CREAT SERPL-MCNC: 0.66 MG/DL (ref 0.52–1.04)
ERYTHROCYTE [DISTWIDTH] IN BLOOD BY AUTOMATED COUNT: 14.3 % (ref 10–15)
GFR SERPL CREATININE-BSD FRML MDRD: >90 ML/MIN/{1.73_M2}
GLUCOSE BLDC GLUCOMTR-MCNC: 121 MG/DL (ref 70–99)
GLUCOSE BLDC GLUCOMTR-MCNC: 130 MG/DL (ref 70–99)
GLUCOSE BLDC GLUCOMTR-MCNC: 131 MG/DL (ref 70–99)
GLUCOSE BLDC GLUCOMTR-MCNC: 157 MG/DL (ref 70–99)
GLUCOSE BLDC GLUCOMTR-MCNC: 160 MG/DL (ref 70–99)
GLUCOSE SERPL-MCNC: 116 MG/DL (ref 70–99)
HCT VFR BLD AUTO: 34.9 % (ref 35–47)
HGB BLD-MCNC: 11.4 G/DL (ref 11.7–15.7)
MAGNESIUM SERPL-MCNC: 2.4 MG/DL (ref 1.6–2.3)
MCH RBC QN AUTO: 30.2 PG (ref 26.5–33)
MCHC RBC AUTO-ENTMCNC: 32.7 G/DL (ref 31.5–36.5)
MCV RBC AUTO: 92 FL (ref 78–100)
PHOSPHATE SERPL-MCNC: 2.2 MG/DL (ref 2.5–4.5)
PLATELET # BLD AUTO: 219 10E9/L (ref 150–450)
POTASSIUM SERPL-SCNC: 4 MMOL/L (ref 3.4–5.3)
RBC # BLD AUTO: 3.78 10E12/L (ref 3.8–5.2)
SODIUM SERPL-SCNC: 135 MMOL/L (ref 133–144)
WBC # BLD AUTO: 8.8 10E9/L (ref 4–11)

## 2021-03-17 PROCEDURE — 80048 BASIC METABOLIC PNL TOTAL CA: CPT | Performed by: NEUROLOGICAL SURGERY

## 2021-03-17 PROCEDURE — 999N000157 HC STATISTIC RCP TIME EA 10 MIN

## 2021-03-17 PROCEDURE — 250N000011 HC RX IP 250 OP 636: Performed by: STUDENT IN AN ORGANIZED HEALTH CARE EDUCATION/TRAINING PROGRAM

## 2021-03-17 PROCEDURE — 200N000002 HC R&B ICU UMMC

## 2021-03-17 PROCEDURE — 250N000013 HC RX MED GY IP 250 OP 250 PS 637: Performed by: NEUROLOGICAL SURGERY

## 2021-03-17 PROCEDURE — 999N000015 HC STATISTIC ARTERIAL MONITORING DAILY

## 2021-03-17 PROCEDURE — 250N000013 HC RX MED GY IP 250 OP 250 PS 637: Performed by: NURSE PRACTITIONER

## 2021-03-17 PROCEDURE — 85027 COMPLETE CBC AUTOMATED: CPT | Performed by: NEUROLOGICAL SURGERY

## 2021-03-17 PROCEDURE — 97161 PT EVAL LOW COMPLEX 20 MIN: CPT | Mod: GP | Performed by: REHABILITATION PRACTITIONER

## 2021-03-17 PROCEDURE — 83735 ASSAY OF MAGNESIUM: CPT | Performed by: NEUROLOGICAL SURGERY

## 2021-03-17 PROCEDURE — 36415 COLL VENOUS BLD VENIPUNCTURE: CPT | Performed by: NEUROLOGICAL SURGERY

## 2021-03-17 PROCEDURE — 258N000003 HC RX IP 258 OP 636: Performed by: STUDENT IN AN ORGANIZED HEALTH CARE EDUCATION/TRAINING PROGRAM

## 2021-03-17 PROCEDURE — 97530 THERAPEUTIC ACTIVITIES: CPT | Mod: GP | Performed by: REHABILITATION PRACTITIONER

## 2021-03-17 PROCEDURE — 999N000076 HC STATISTIC ICP MONITORING

## 2021-03-17 PROCEDURE — 84100 ASSAY OF PHOSPHORUS: CPT | Performed by: NEUROLOGICAL SURGERY

## 2021-03-17 PROCEDURE — 999N000127 HC STATISTIC PERIPHERAL IV START W US GUIDANCE

## 2021-03-17 PROCEDURE — 999N001017 HC STATISTIC GLUCOSE BY METER IP

## 2021-03-17 PROCEDURE — 250N000013 HC RX MED GY IP 250 OP 250 PS 637: Performed by: STUDENT IN AN ORGANIZED HEALTH CARE EDUCATION/TRAINING PROGRAM

## 2021-03-17 PROCEDURE — 97116 GAIT TRAINING THERAPY: CPT | Mod: GP | Performed by: REHABILITATION PRACTITIONER

## 2021-03-17 PROCEDURE — 250N000012 HC RX MED GY IP 250 OP 636 PS 637: Performed by: STUDENT IN AN ORGANIZED HEALTH CARE EDUCATION/TRAINING PROGRAM

## 2021-03-17 RX ORDER — CEFAZOLIN SODIUM 1 G/3ML
1 INJECTION, POWDER, FOR SOLUTION INTRAMUSCULAR; INTRAVENOUS EVERY 8 HOURS
Status: DISCONTINUED | OUTPATIENT
Start: 2021-03-17 | End: 2021-03-20 | Stop reason: HOSPADM

## 2021-03-17 RX ADMIN — POTASSIUM & SODIUM PHOSPHATES POWDER PACK 280-160-250 MG 1 PACKET: 280-160-250 PACK at 14:07

## 2021-03-17 RX ADMIN — LABETALOL HYDROCHLORIDE 10 MG: 5 INJECTION, SOLUTION INTRAVENOUS at 04:36

## 2021-03-17 RX ADMIN — DOCUSATE SODIUM 50 MG AND SENNOSIDES 8.6 MG 2 TABLET: 8.6; 5 TABLET, FILM COATED ORAL at 07:53

## 2021-03-17 RX ADMIN — DEXAMETHASONE 4 MG: 4 TABLET ORAL at 14:07

## 2021-03-17 RX ADMIN — OMEPRAZOLE 20 MG: 20 CAPSULE, DELAYED RELEASE ORAL at 07:53

## 2021-03-17 RX ADMIN — OXYCODONE HYDROCHLORIDE 5 MG: 5 TABLET ORAL at 20:26

## 2021-03-17 RX ADMIN — SODIUM CHLORIDE: 9 INJECTION, SOLUTION INTRAVENOUS at 01:59

## 2021-03-17 RX ADMIN — DOCUSATE SODIUM 50 MG AND SENNOSIDES 8.6 MG 2 TABLET: 8.6; 5 TABLET, FILM COATED ORAL at 20:26

## 2021-03-17 RX ADMIN — CEFAZOLIN 1 G: 330 INJECTION, POWDER, FOR SOLUTION INTRAMUSCULAR; INTRAVENOUS at 14:06

## 2021-03-17 RX ADMIN — LABETALOL HYDROCHLORIDE 10 MG: 5 INJECTION, SOLUTION INTRAVENOUS at 06:21

## 2021-03-17 RX ADMIN — CEFAZOLIN 1 G: 330 INJECTION, POWDER, FOR SOLUTION INTRAMUSCULAR; INTRAVENOUS at 06:07

## 2021-03-17 RX ADMIN — Medication 5 MG: at 00:09

## 2021-03-17 RX ADMIN — POTASSIUM & SODIUM PHOSPHATES POWDER PACK 280-160-250 MG 1 PACKET: 280-160-250 PACK at 09:00

## 2021-03-17 RX ADMIN — CEFAZOLIN 1 G: 330 INJECTION, POWDER, FOR SOLUTION INTRAMUSCULAR; INTRAVENOUS at 22:29

## 2021-03-17 RX ADMIN — DEXAMETHASONE 4 MG: 4 TABLET ORAL at 06:07

## 2021-03-17 RX ADMIN — OXYCODONE HYDROCHLORIDE 5 MG: 5 TABLET ORAL at 12:24

## 2021-03-17 RX ADMIN — POLYETHYLENE GLYCOL 3350 17 G: 17 POWDER, FOR SOLUTION ORAL at 07:53

## 2021-03-17 RX ADMIN — MULTIPLE VITAMINS W/ MINERALS TAB 1 TABLET: TAB at 07:53

## 2021-03-17 RX ADMIN — DEXAMETHASONE 3 MG: 1.5 TABLET ORAL at 22:29

## 2021-03-17 RX ADMIN — Medication 5 MG: at 06:21

## 2021-03-17 RX ADMIN — POTASSIUM & SODIUM PHOSPHATES POWDER PACK 280-160-250 MG 1 PACKET: 280-160-250 PACK at 20:26

## 2021-03-17 RX ADMIN — ONDANSETRON 4 MG: 2 INJECTION INTRAMUSCULAR; INTRAVENOUS at 10:30

## 2021-03-17 ASSESSMENT — VISUAL ACUITY
OU: NORMAL ACUITY
OU: NORMAL ACUITY

## 2021-03-17 ASSESSMENT — ACTIVITIES OF DAILY LIVING (ADL)
ADLS_ACUITY_SCORE: 12

## 2021-03-17 ASSESSMENT — MIFFLIN-ST. JEOR: SCORE: 1772.75

## 2021-03-17 NOTE — PLAN OF CARE
PT 4A: Cancel- Per discussion with OT who worked with patient this afternoon, patient with ongoing nausea limiting participation in therapy today. Will reschedule PT evaluation.

## 2021-03-17 NOTE — PROGRESS NOTES
[facility], [place]   03/17/2021  Neurosurgery Progress Note:    Assessment:  Francesca Rowland is a 40 year old female who is now s/p midline suboccipital craniectomy/craniotomy for tumor resection with EVD placement on 3/15. The tumor was causing brain compression and cerebral edema.    Plan:  - Serial neuro exams  - EVD @ 10  - Pain control  - HOB 30 degrees  - Advance diet as tolerated  - Bowel regimen  - PRN antiemetics  - IVF until taking adequate PO  - PT/OT  - SCDs for DVT proph    -----------------------------------  Kunal Vazquez MD  Neurosurgery Resident, PGY-2    Please contact neurosurgery resident on call with questions.    Dial * * *[phone], enter [phone] when prompted.   -----------------------------------    Interval History: MRI completed yesterday.    Objective:   Temp:  [98.9  F (37.2  C)-100  F (37.8  C)] 99.7  F (37.6  C)  Pulse:  [] 65  Resp:  [14-18] 18  BP: (131-152)/(78-93) 134/81  MAP:  [73 mmHg-107 mmHg] 93 mmHg  Arterial Line BP: (112-151)/(53-92) 124/74  SpO2:  [95 %-100 %] 96 %  I/O last 3 completed shifts:  In: 2497.33 [I.V.:2497.33]  Out: 3942 [Urine:3335; Emesis/NG output:250; Other:357]    Gen: Appears comfortable, NAD  Wound: clean, dry, intact  Neurologic:  - Alert & Oriented to person, place, time, and situation  - Follows commands briskly  - Speech fluent, spontaneous. No aphasia or dysarthria.  - No gaze preference. No apparent hemineglect.  - PERRL, EOMI  - Face symmetric  - No dysmetria noted     Del Tr Bi WE WF Gr   R 5 5 5 5 5 5   L 5 5 5 5 5 5    HF KE KF DF PF EHL   R 5 5 5 5 5 5   L 5 5 5 5 5 5     Sensation intact and symmetric to light touch throughout    LABS:  Recent Labs   Lab 03/17/21  0743 03/16/21  0357 03/15/21  2004    140 138   POTASSIUM 4.0 3.6 4.0   CHLORIDE 103 107 106   CO2 24 25 24   ANIONGAP 8 8 8   * 150* 210*   BUN 13 13 15   CR 0.66 0.75 0.76   SHAD 8.9 8.4* 8.5       Recent Labs   Lab 03/17/21  0743   WBC  8.8   RBC 3.78*   HGB 11.4*   HCT 34.9*   MCV 92   MCH 30.2   MCHC 32.7   RDW 14.3          IMAGING:  Recent Results (from the past 24 hour(s))   MR Brain w/o & w Contrast    Narrative     MR BRAIN W/O & W CONTRAST 3/16/2021 6:33 PM    Provided History: Brain mass or lesion.    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  Suboccipital craniotomy and mass resection, right frontal  ventriculostomy on 3/15/2021.    Comparison: CT head 3/15/2021, PET/CT 1/29/2021. Brain MRI from an  outside institution 3/8/2021.    Technique: Multiplanar T1-weighted, axial FLAIR, and susceptibility  images were obtained without intravenous contrast. Following  intravenous gadolinium-based contrast administration, axial  T2-weighted, diffusion, and T1-weighted images (in multiple planes)  were obtained, including dynamic perfusion imaging.    Contrast: Gadavist 10 mL IV.    Findings:  Postsurgical changes of suboccipital craniotomy and right cerebellar  mass resection. The resection cavity measures approximately 1.9 x 1.5  cm and exhibits mild peripheral intrinsic T1 hyperintensity. No  significant masslike enhancement at the margins of the resection  cavity. There is mild restricted diffusion surrounding the resection  cavity. Slight decrease in vasogenic edema in the right cerebellar  hemisphere surrounding the resection cavity with persistent local mass  effect. There is a 4 mm leftward midline shift in the posterior fossa  and effacement of the fourth ventricle similar to the prior exam. Mild  downward displacement of the cerebellar tonsils. There is decreased  relative cerebral blood volume in the resection cavity.    Right frontal approach ventriculostomy drain with the tip in the right  lateral ventricle near the foramen of Linton. Minimal postoperative  pneumocephalus. Ventricles appear adequately decompressed.    Susceptibility  weighted images demonstrating hemosiderin staining in  the resection cavity and deep to the craniotomy. Additional foci of  susceptibility effect associated with the ventriculostomy drain, and  perioperative pneumocephalus.    No new intracranial mass lesion.    The paranasal sinuses and mastoid air cells are clear. The orbits are  unremarkable.      Impression    Impression:  1. Interval postsurgical changes of suboccipital craniotomy and right  cerebellar hemisphere tumor resection. Mild adjacent parenchymal  cytotoxic edema.  2. Slight decrease in the parenchymal edema surrounding the resection  cavity with persistent local mass effect including effacement of the  fourth ventricle and mild downward displacement of the tonsils.  3. Stable shunted ventricles.    I have personally reviewed the examination and initial interpretation  and I agree with the findings.    ZACK GRACE MD

## 2021-03-17 NOTE — PROGRESS NOTES
03/17/21 1016   Quick Adds   Type of Visit Initial PT Evaluation   Living Environment   People in home significant other;child(elfego), dependent   Current Living Arrangements house   Home Accessibility stairs to enter home;stairs within home   Number of Stairs, Main Entrance 3   Number of Stairs, Within Home, Primary 7   Transportation Anticipated car, drives self   Self-Care   Usual Activity Tolerance good   Current Activity Tolerance fair   Regular Exercise No   Equipment Currently Used at Home none   Activity/Exercise/Self-Care Comment Pt reports she is IND at baseline with all ADLs and IADLs, and 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    Disability/Function   Hearing Difficulty or Deaf no   Wear Glasses or Blind no   Concentrating, Remembering or Making Decisions Difficulty no   Difficulty Communicating no   Difficulty Eating/Swallowing no   Walking or Climbing Stairs Difficulty no   Dressing/Bathing Difficulty no   Toileting issues no   Doing Errands Independently Difficulty (such as shopping) no   Fall history within last six months no   Change in Functional Status Since Onset of Current Illness/Injury yes   General Information   Onset of Illness/Injury or Date of Surgery 03/15/21   Referring Physician Leif Lew MD   Pertinent History of Current Problem (include personal factors and/or comorbidities that impact the POC) Pt is a 40 year old female who is now s/p midline suboccipital craniectomy/craniotomy for tumor resection with EVD placement on 3/15. The tumor was causing brain compression and cerebral edema.   Existing Precautions/Restrictions other (see comments);fall  (crani precautions)   Cognition   Orientation Status (Cognition) oriented x 4   Affect/Mental Status (Cognition) WNL   Follows Commands (Cognition) WNL   Pain Assessment   Patient Currently in Pain Yes, see Vital Sign flowsheet  (Pt reports mild headache)   Integumentary/Edema   Integumentary/Edema other  (describe)   Integumentary/Edema Comments EVD, posterior cranial incision   Posture    Posture Not impaired   Range of Motion (ROM)   ROM Quick Adds ROM WNL   Strength   Strength Comments Formal MMT not tested, 3/5 strength observed with functional transfers   Transfers   Transfer Safety Comments Pt tx sit->stand with CGA.    Gait/Stairs (Locomotion)   Comment (Gait/Stairs) Pt amb ~ 20 feet with CGA and single UE on IV pole. Pt ambulates with NBOS, small steps.   Balance   Balance other (describe)   Balance Comments Minimal sway in static standing   Sensory Examination   Sensory Perception WNL   Clinical Impression   Criteria for Skilled Therapeutic Intervention yes, treatment indicated   PT Diagnosis (PT) Impaired Functional Mobility   Influenced by the following impairments activity intolerance, impaired balance, impaired gait, post-surgical precautions   Functional limitations due to impairments bed mob, transfers, gait   Clinical Presentation Evolving/Changing   Clinical Presentation Rationale PMHx, clinical judgement, current presentation   Clinical Decision Making (Complexity) low complexity   Therapy Frequency (PT) Daily   Predicted Duration of Therapy Intervention (days/wks) 3/24/21   Planned Therapy Interventions (PT) bed mobility training;gait training;neuromuscular re-education;stair training;strengthening;transfer training   Anticipated Equipment Needs at Discharge (PT) shower chair  (pt is interested in a shower chair at discharge)   Risk & Benefits of therapy have been explained care plan/treatment goals reviewed   PT Discharge Planning    PT Discharge Recommendation (DC Rec) home with assist;home with outpatient physical therapy   PT Rationale for DC Rec Pt is mobilizing well, limited by headache and emesis after activity this session. Anticipate pt will be safe to discharge home when medically ready for discharge.    PT Brief overview of current status  Nursing Staff: up with A x 1 + gait belt    Total Evaluation Time   Total Evaluation Time (Minutes) 10

## 2021-03-17 NOTE — PLAN OF CARE
Major Shift Events: Pt grossly neuro intact. EVD 10 above EAM. ICP 12-17, OP 10-20. SR, no ectopy noted. IV Labetalol effective for occasional HTN. O2 WNL on RA. Tolerating CLR liquids. No emesis this shift. AUOP via mosley. Skin unchanged. Access unchanged.    Plan: Continue serial neuros. Encourage mobility. Maintain SBP<140.    For vital signs and complete assessments, please see documentation flowsheets.

## 2021-03-18 ENCOUNTER — APPOINTMENT (OUTPATIENT)
Dept: PHYSICAL THERAPY | Facility: CLINIC | Age: 41
End: 2021-03-18
Attending: NEUROLOGICAL SURGERY
Payer: COMMERCIAL

## 2021-03-18 ENCOUNTER — APPOINTMENT (OUTPATIENT)
Dept: OCCUPATIONAL THERAPY | Facility: CLINIC | Age: 41
End: 2021-03-18
Attending: NEUROLOGICAL SURGERY
Payer: COMMERCIAL

## 2021-03-18 LAB
ABO + RH BLD: NORMAL
ABO + RH BLD: NORMAL
ANION GAP SERPL CALCULATED.3IONS-SCNC: 8 MMOL/L (ref 3–14)
BLD GP AB SCN SERPL QL: NORMAL
BLOOD BANK CMNT PATIENT-IMP: NORMAL
BUN SERPL-MCNC: 18 MG/DL (ref 7–30)
CALCIUM SERPL-MCNC: 9.2 MG/DL (ref 8.5–10.1)
CHLORIDE SERPL-SCNC: 101 MMOL/L (ref 94–109)
CO2 SERPL-SCNC: 26 MMOL/L (ref 20–32)
CREAT SERPL-MCNC: 0.74 MG/DL (ref 0.52–1.04)
ERYTHROCYTE [DISTWIDTH] IN BLOOD BY AUTOMATED COUNT: 13.9 % (ref 10–15)
GFR SERPL CREATININE-BSD FRML MDRD: >90 ML/MIN/{1.73_M2}
GLUCOSE BLDC GLUCOMTR-MCNC: 111 MG/DL (ref 70–99)
GLUCOSE BLDC GLUCOMTR-MCNC: 132 MG/DL (ref 70–99)
GLUCOSE BLDC GLUCOMTR-MCNC: 133 MG/DL (ref 70–99)
GLUCOSE BLDC GLUCOMTR-MCNC: 145 MG/DL (ref 70–99)
GLUCOSE SERPL-MCNC: 135 MG/DL (ref 70–99)
HCT VFR BLD AUTO: 35.1 % (ref 35–47)
HGB BLD-MCNC: 11.4 G/DL (ref 11.7–15.7)
MAGNESIUM SERPL-MCNC: 2.5 MG/DL (ref 1.6–2.3)
MCH RBC QN AUTO: 30 PG (ref 26.5–33)
MCHC RBC AUTO-ENTMCNC: 32.5 G/DL (ref 31.5–36.5)
MCV RBC AUTO: 92 FL (ref 78–100)
PHOSPHATE SERPL-MCNC: 2.8 MG/DL (ref 2.5–4.5)
PLATELET # BLD AUTO: 229 10E9/L (ref 150–450)
POTASSIUM SERPL-SCNC: 4 MMOL/L (ref 3.4–5.3)
RBC # BLD AUTO: 3.8 10E12/L (ref 3.8–5.2)
SODIUM SERPL-SCNC: 134 MMOL/L (ref 133–144)
SPECIMEN EXP DATE BLD: NORMAL
WBC # BLD AUTO: 8.4 10E9/L (ref 4–11)

## 2021-03-18 PROCEDURE — 999N001018 HC STATISTIC H-CELL BLOCK W/STAIN: Performed by: STUDENT IN AN ORGANIZED HEALTH CARE EDUCATION/TRAINING PROGRAM

## 2021-03-18 PROCEDURE — 250N000013 HC RX MED GY IP 250 OP 250 PS 637: Performed by: STUDENT IN AN ORGANIZED HEALTH CARE EDUCATION/TRAINING PROGRAM

## 2021-03-18 PROCEDURE — 86850 RBC ANTIBODY SCREEN: CPT | Performed by: STUDENT IN AN ORGANIZED HEALTH CARE EDUCATION/TRAINING PROGRAM

## 2021-03-18 PROCEDURE — 80048 BASIC METABOLIC PNL TOTAL CA: CPT | Performed by: NEUROLOGICAL SURGERY

## 2021-03-18 PROCEDURE — 97535 SELF CARE MNGMENT TRAINING: CPT | Mod: GO | Performed by: OCCUPATIONAL THERAPIST

## 2021-03-18 PROCEDURE — 88108 CYTOPATH CONCENTRATE TECH: CPT | Mod: 26 | Performed by: PATHOLOGY

## 2021-03-18 PROCEDURE — 200N000002 HC R&B ICU UMMC

## 2021-03-18 PROCEDURE — 999N000076 HC STATISTIC ICP MONITORING

## 2021-03-18 PROCEDURE — 85027 COMPLETE CBC AUTOMATED: CPT | Performed by: NEUROLOGICAL SURGERY

## 2021-03-18 PROCEDURE — 97116 GAIT TRAINING THERAPY: CPT | Mod: GP | Performed by: REHABILITATION PRACTITIONER

## 2021-03-18 PROCEDURE — 97530 THERAPEUTIC ACTIVITIES: CPT | Mod: GP | Performed by: REHABILITATION PRACTITIONER

## 2021-03-18 PROCEDURE — 88305 TISSUE EXAM BY PATHOLOGIST: CPT | Mod: TC | Performed by: STUDENT IN AN ORGANIZED HEALTH CARE EDUCATION/TRAINING PROGRAM

## 2021-03-18 PROCEDURE — 86901 BLOOD TYPING SEROLOGIC RH(D): CPT | Performed by: STUDENT IN AN ORGANIZED HEALTH CARE EDUCATION/TRAINING PROGRAM

## 2021-03-18 PROCEDURE — 250N000012 HC RX MED GY IP 250 OP 636 PS 637: Performed by: STUDENT IN AN ORGANIZED HEALTH CARE EDUCATION/TRAINING PROGRAM

## 2021-03-18 PROCEDURE — 999N001017 HC STATISTIC GLUCOSE BY METER IP

## 2021-03-18 PROCEDURE — 88305 TISSUE EXAM BY PATHOLOGIST: CPT | Mod: 26 | Performed by: PATHOLOGY

## 2021-03-18 PROCEDURE — 88108 CYTOPATH CONCENTRATE TECH: CPT | Mod: TC | Performed by: STUDENT IN AN ORGANIZED HEALTH CARE EDUCATION/TRAINING PROGRAM

## 2021-03-18 PROCEDURE — 250N000011 HC RX IP 250 OP 636: Performed by: STUDENT IN AN ORGANIZED HEALTH CARE EDUCATION/TRAINING PROGRAM

## 2021-03-18 PROCEDURE — 999N001014 HC STATISTIC CYTO WRIGHT STAIN TC: Performed by: STUDENT IN AN ORGANIZED HEALTH CARE EDUCATION/TRAINING PROGRAM

## 2021-03-18 PROCEDURE — 999N000157 HC STATISTIC RCP TIME EA 10 MIN

## 2021-03-18 PROCEDURE — 84100 ASSAY OF PHOSPHORUS: CPT | Performed by: NEUROLOGICAL SURGERY

## 2021-03-18 PROCEDURE — 36415 COLL VENOUS BLD VENIPUNCTURE: CPT | Performed by: NEUROLOGICAL SURGERY

## 2021-03-18 PROCEDURE — 83735 ASSAY OF MAGNESIUM: CPT | Performed by: NEUROLOGICAL SURGERY

## 2021-03-18 PROCEDURE — 86900 BLOOD TYPING SEROLOGIC ABO: CPT | Performed by: STUDENT IN AN ORGANIZED HEALTH CARE EDUCATION/TRAINING PROGRAM

## 2021-03-18 RX ADMIN — OXYCODONE HYDROCHLORIDE 5 MG: 5 TABLET ORAL at 04:22

## 2021-03-18 RX ADMIN — DEXAMETHASONE 3 MG: 1.5 TABLET ORAL at 22:05

## 2021-03-18 RX ADMIN — CEFAZOLIN 1 G: 330 INJECTION, POWDER, FOR SOLUTION INTRAMUSCULAR; INTRAVENOUS at 22:05

## 2021-03-18 RX ADMIN — ACETAMINOPHEN 650 MG: 325 TABLET, FILM COATED ORAL at 04:22

## 2021-03-18 RX ADMIN — INSULIN ASPART 1 UNITS: 100 INJECTION, SOLUTION INTRAVENOUS; SUBCUTANEOUS at 08:17

## 2021-03-18 RX ADMIN — DEXAMETHASONE 3 MG: 1.5 TABLET ORAL at 06:07

## 2021-03-18 RX ADMIN — OMEPRAZOLE 20 MG: 20 CAPSULE, DELAYED RELEASE ORAL at 07:34

## 2021-03-18 RX ADMIN — CEFAZOLIN 1 G: 330 INJECTION, POWDER, FOR SOLUTION INTRAMUSCULAR; INTRAVENOUS at 06:07

## 2021-03-18 RX ADMIN — DOCUSATE SODIUM 50 MG AND SENNOSIDES 8.6 MG 2 TABLET: 8.6; 5 TABLET, FILM COATED ORAL at 20:28

## 2021-03-18 RX ADMIN — POLYETHYLENE GLYCOL 3350 17 G: 17 POWDER, FOR SOLUTION ORAL at 07:34

## 2021-03-18 RX ADMIN — MULTIPLE VITAMINS W/ MINERALS TAB 1 TABLET: TAB at 07:34

## 2021-03-18 RX ADMIN — DOCUSATE SODIUM 50 MG AND SENNOSIDES 8.6 MG 2 TABLET: 8.6; 5 TABLET, FILM COATED ORAL at 07:34

## 2021-03-18 RX ADMIN — CEFAZOLIN 1 G: 330 INJECTION, POWDER, FOR SOLUTION INTRAMUSCULAR; INTRAVENOUS at 13:47

## 2021-03-18 RX ADMIN — DEXAMETHASONE 3 MG: 1.5 TABLET ORAL at 13:46

## 2021-03-18 RX ADMIN — ACETAMINOPHEN 650 MG: 325 TABLET, FILM COATED ORAL at 22:05

## 2021-03-18 ASSESSMENT — VISUAL ACUITY
OU: NORMAL ACUITY

## 2021-03-18 ASSESSMENT — ACTIVITIES OF DAILY LIVING (ADL)
ADLS_ACUITY_SCORE: 12
ADLS_ACUITY_SCORE: 11
ADLS_ACUITY_SCORE: 12
ADLS_ACUITY_SCORE: 12
ADLS_ACUITY_SCORE: 11
ADLS_ACUITY_SCORE: 11

## 2021-03-18 NOTE — PLAN OF CARE
0700 - 1900   Neuro: A&Ox4. Follows all commands. +5 strength throughout. PERRLA intact. Denied any numbness/tingling. EVD 10 above EAM has been clamped since 0900 tolerating well - ICP 12-20.   Cardiac: SR 70's. SBP <140 -  no PRN needed. Afebrile. Generalized edema. Palpable pulses throughout.   Respiratory: RA. LS clear. Denies any SOB. No cough noted.   GI: +gas/+BS. No BM this shift. Denied any abd pain, N/V. Abd soft, non-tender.   : Voids via commode adequate amounts.   Diet: Advanced to Regular diet, tolerating well.   Skin: Posterior crani incision - primapore CDI. EVD site - dressing marked.  Lines: R PIV SL with intermittent ancef.   Labs: BG q4h - sliding scale insulin given.   Pain: Denied any pain.   Activity: up in chair majority of day. Walked around 4A multiple times with therapy.   Plan: Monitor neuro status. Possible EVD removal tomorrow pending CT and tolerating being clamped overnight.  For vital signs and complete assessments, please see documentation flowsheets.

## 2021-03-18 NOTE — PROGRESS NOTES
[facility], [place]   03/18/2021  Neurosurgery Progress Note:    Assessment:  Francesca Rowland is a 40 year old female who is now s/p midline suboccipital craniectomy/craniotomy for tumor resection with EVD placement on 3/15. The tumor was causing brain compression and cerebral edema.    Plan:  - Serial neuro exams  - EVD clamped  - Pain control  - HOB 30 degrees  - Advance diet as tolerated  - Bowel regimen  - PRN antiemetics  - IVF until taking adequate PO  - PT/OT  - SCDs for DVT proph    -----------------------------------  Kunal Vazquez MD  Neurosurgery Resident, PGY-2    Please contact neurosurgery resident on call with questions.    Dial * * *[phone], enter [phone] when prompted.   -----------------------------------    Interval History: PARUL, clamping EVD today    Objective:   Temp:  [98.4  F (36.9  C)-100.2  F (37.9  C)] 99.1  F (37.3  C)  Pulse:  [64-98] 76  Resp:  [14-16] 14  BP: (120-146)/(72-89) 137/85  SpO2:  [91 %-98 %] 95 %  I/O last 3 completed shifts:  In: 396 [I.V.:396]  Out: 3233 [Urine:2925; Other:308]    Gen: Appears comfortable, NAD  Wound: clean, dry, intact  Neurologic:  - Alert & Oriented to person, place, time, and situation  - Follows commands briskly  - Speech fluent, spontaneous. No aphasia or dysarthria.  - No gaze preference. No apparent hemineglect.  - PERRL, EOMI  - Face symmetric  - No dysmetria noted     Del Tr Bi WE WF Gr   R 5 5 5 5 5 5   L 5 5 5 5 5 5    HF KE KF DF PF EHL   R 5 5 5 5 5 5   L 5 5 5 5 5 5     Sensation intact and symmetric to light touch throughout    LABS:  Recent Labs   Lab 03/18/21  0536 03/17/21  0743 03/16/21  0357    135 140   POTASSIUM 4.0 4.0 3.6   CHLORIDE 101 103 107   CO2 26 24 25   ANIONGAP 8 8 8   * 116* 150*   BUN 18 13 13   CR 0.74 0.66 0.75   SHAD 9.2 8.9 8.4*       Recent Labs   Lab 03/18/21  0536   WBC 8.4   RBC 3.80   HGB 11.4*   HCT 35.1   MCV 92   MCH 30.0   MCHC 32.5   RDW 13.9           IMAGING:  No results found for this or any previous visit (from the past 24 hour(s)).

## 2021-03-18 NOTE — PLAN OF CARE
Major Shift Events: Q2h neuro exam intact and unchanged. SBA. EVD 10 above with ICP's 7-10 and output 8-19 and clear. Systolic <140 without intervention. Room air. Voiding on commode. Not voiding as frequently as during the day. Tolerating clear liquid diet.     Plan: Continue with current POC    For vital signs and complete assessments, please see documentation flowsheets.

## 2021-03-19 ENCOUNTER — APPOINTMENT (OUTPATIENT)
Dept: CT IMAGING | Facility: CLINIC | Age: 41
End: 2021-03-19
Attending: STUDENT IN AN ORGANIZED HEALTH CARE EDUCATION/TRAINING PROGRAM
Payer: COMMERCIAL

## 2021-03-19 ENCOUNTER — APPOINTMENT (OUTPATIENT)
Dept: PHYSICAL THERAPY | Facility: CLINIC | Age: 41
End: 2021-03-19
Attending: NEUROLOGICAL SURGERY
Payer: COMMERCIAL

## 2021-03-19 ENCOUNTER — APPOINTMENT (OUTPATIENT)
Dept: OCCUPATIONAL THERAPY | Facility: CLINIC | Age: 41
End: 2021-03-19
Attending: NEUROLOGICAL SURGERY
Payer: COMMERCIAL

## 2021-03-19 LAB
ANION GAP SERPL CALCULATED.3IONS-SCNC: 7 MMOL/L (ref 3–14)
BUN SERPL-MCNC: 21 MG/DL (ref 7–30)
CALCIUM SERPL-MCNC: 9.2 MG/DL (ref 8.5–10.1)
CHLORIDE SERPL-SCNC: 102 MMOL/L (ref 94–109)
CO2 SERPL-SCNC: 27 MMOL/L (ref 20–32)
COPATH REPORT: NORMAL
CREAT SERPL-MCNC: 0.67 MG/DL (ref 0.52–1.04)
ERYTHROCYTE [DISTWIDTH] IN BLOOD BY AUTOMATED COUNT: 13.6 % (ref 10–15)
GFR SERPL CREATININE-BSD FRML MDRD: >90 ML/MIN/{1.73_M2}
GLUCOSE BLDC GLUCOMTR-MCNC: 102 MG/DL (ref 70–99)
GLUCOSE BLDC GLUCOMTR-MCNC: 122 MG/DL (ref 70–99)
GLUCOSE BLDC GLUCOMTR-MCNC: 136 MG/DL (ref 70–99)
GLUCOSE BLDC GLUCOMTR-MCNC: 164 MG/DL (ref 70–99)
GLUCOSE SERPL-MCNC: 119 MG/DL (ref 70–99)
HCT VFR BLD AUTO: 34.3 % (ref 35–47)
HGB BLD-MCNC: 11.6 G/DL (ref 11.7–15.7)
MAGNESIUM SERPL-MCNC: 2.4 MG/DL (ref 1.6–2.3)
MCH RBC QN AUTO: 30.9 PG (ref 26.5–33)
MCHC RBC AUTO-ENTMCNC: 33.8 G/DL (ref 31.5–36.5)
MCV RBC AUTO: 92 FL (ref 78–100)
PHOSPHATE SERPL-MCNC: 2.6 MG/DL (ref 2.5–4.5)
PLATELET # BLD AUTO: 214 10E9/L (ref 150–450)
POTASSIUM SERPL-SCNC: 3.8 MMOL/L (ref 3.4–5.3)
RBC # BLD AUTO: 3.75 10E12/L (ref 3.8–5.2)
SODIUM SERPL-SCNC: 136 MMOL/L (ref 133–144)
WBC # BLD AUTO: 6.3 10E9/L (ref 4–11)

## 2021-03-19 PROCEDURE — 97116 GAIT TRAINING THERAPY: CPT | Mod: GP

## 2021-03-19 PROCEDURE — 999N001017 HC STATISTIC GLUCOSE BY METER IP

## 2021-03-19 PROCEDURE — 70450 CT HEAD/BRAIN W/O DYE: CPT

## 2021-03-19 PROCEDURE — 97530 THERAPEUTIC ACTIVITIES: CPT | Mod: GP

## 2021-03-19 PROCEDURE — 80048 BASIC METABOLIC PNL TOTAL CA: CPT | Performed by: NEUROLOGICAL SURGERY

## 2021-03-19 PROCEDURE — 83735 ASSAY OF MAGNESIUM: CPT | Performed by: NEUROLOGICAL SURGERY

## 2021-03-19 PROCEDURE — 250N000013 HC RX MED GY IP 250 OP 250 PS 637: Performed by: STUDENT IN AN ORGANIZED HEALTH CARE EDUCATION/TRAINING PROGRAM

## 2021-03-19 PROCEDURE — 85027 COMPLETE CBC AUTOMATED: CPT | Performed by: NEUROLOGICAL SURGERY

## 2021-03-19 PROCEDURE — 70450 CT HEAD/BRAIN W/O DYE: CPT | Mod: 26 | Performed by: STUDENT IN AN ORGANIZED HEALTH CARE EDUCATION/TRAINING PROGRAM

## 2021-03-19 PROCEDURE — 250N000013 HC RX MED GY IP 250 OP 250 PS 637: Performed by: NEUROLOGICAL SURGERY

## 2021-03-19 PROCEDURE — 84100 ASSAY OF PHOSPHORUS: CPT | Performed by: NEUROLOGICAL SURGERY

## 2021-03-19 PROCEDURE — 250N000012 HC RX MED GY IP 250 OP 636 PS 637: Performed by: STUDENT IN AN ORGANIZED HEALTH CARE EDUCATION/TRAINING PROGRAM

## 2021-03-19 PROCEDURE — 999N000076 HC STATISTIC ICP MONITORING

## 2021-03-19 PROCEDURE — 97530 THERAPEUTIC ACTIVITIES: CPT | Mod: GO

## 2021-03-19 PROCEDURE — 120N000002 HC R&B MED SURG/OB UMMC

## 2021-03-19 PROCEDURE — 999N000157 HC STATISTIC RCP TIME EA 10 MIN

## 2021-03-19 PROCEDURE — 250N000011 HC RX IP 250 OP 636: Performed by: STUDENT IN AN ORGANIZED HEALTH CARE EDUCATION/TRAINING PROGRAM

## 2021-03-19 PROCEDURE — 97535 SELF CARE MNGMENT TRAINING: CPT | Mod: GO

## 2021-03-19 PROCEDURE — 36415 COLL VENOUS BLD VENIPUNCTURE: CPT | Performed by: NEUROLOGICAL SURGERY

## 2021-03-19 RX ORDER — DIAZEPAM 5 MG
5 TABLET ORAL EVERY 8 HOURS PRN
Qty: 30 TABLET | Refills: 0 | Status: SHIPPED | OUTPATIENT
Start: 2021-03-19 | End: 2021-03-20

## 2021-03-19 RX ORDER — IBUPROFEN 200 MG
200 TABLET ORAL EVERY 4 HOURS PRN
COMMUNITY
Start: 2021-03-22

## 2021-03-19 RX ORDER — DEXAMETHASONE 2 MG/1
2 TABLET ORAL EVERY 8 HOURS
Qty: 5 TABLET | Refills: 0 | Status: SHIPPED | OUTPATIENT
Start: 2021-03-19 | End: 2021-03-20

## 2021-03-19 RX ORDER — AMOXICILLIN 250 MG
2 CAPSULE ORAL 2 TIMES DAILY PRN
Qty: 60 TABLET | Refills: 0 | Status: SHIPPED | OUTPATIENT
Start: 2021-03-19 | End: 2021-03-20

## 2021-03-19 RX ORDER — POTASSIUM CHLORIDE 1.5 G/1.58G
20 POWDER, FOR SOLUTION ORAL ONCE
Status: COMPLETED | OUTPATIENT
Start: 2021-03-19 | End: 2021-03-19

## 2021-03-19 RX ORDER — OXYCODONE HYDROCHLORIDE 5 MG/1
5-10 TABLET ORAL EVERY 4 HOURS PRN
Qty: 56 TABLET | Refills: 0 | Status: SHIPPED | OUTPATIENT
Start: 2021-03-19 | End: 2021-03-20

## 2021-03-19 RX ADMIN — DEXAMETHASONE 3 MG: 1.5 TABLET ORAL at 06:14

## 2021-03-19 RX ADMIN — CEFAZOLIN 1 G: 330 INJECTION, POWDER, FOR SOLUTION INTRAMUSCULAR; INTRAVENOUS at 23:12

## 2021-03-19 RX ADMIN — DEXAMETHASONE 2 MG: 2 TABLET ORAL at 22:28

## 2021-03-19 RX ADMIN — CEFAZOLIN 1 G: 330 INJECTION, POWDER, FOR SOLUTION INTRAMUSCULAR; INTRAVENOUS at 06:14

## 2021-03-19 RX ADMIN — DOCUSATE SODIUM 50 MG AND SENNOSIDES 8.6 MG 2 TABLET: 8.6; 5 TABLET, FILM COATED ORAL at 08:22

## 2021-03-19 RX ADMIN — MULTIPLE VITAMINS W/ MINERALS TAB 1 TABLET: TAB at 08:22

## 2021-03-19 RX ADMIN — DEXAMETHASONE 3 MG: 1.5 TABLET ORAL at 14:15

## 2021-03-19 RX ADMIN — POLYETHYLENE GLYCOL 3350 17 G: 17 POWDER, FOR SOLUTION ORAL at 08:22

## 2021-03-19 RX ADMIN — POTASSIUM & SODIUM PHOSPHATES POWDER PACK 280-160-250 MG 1 PACKET: 280-160-250 PACK at 19:44

## 2021-03-19 RX ADMIN — OMEPRAZOLE 20 MG: 20 CAPSULE, DELAYED RELEASE ORAL at 08:22

## 2021-03-19 RX ADMIN — CEFAZOLIN 1 G: 330 INJECTION, POWDER, FOR SOLUTION INTRAMUSCULAR; INTRAVENOUS at 14:15

## 2021-03-19 RX ADMIN — DOCUSATE SODIUM 50 MG AND SENNOSIDES 8.6 MG 1 TABLET: 8.6; 5 TABLET, FILM COATED ORAL at 19:44

## 2021-03-19 RX ADMIN — POTASSIUM & SODIUM PHOSPHATES POWDER PACK 280-160-250 MG 1 PACKET: 280-160-250 PACK at 08:22

## 2021-03-19 RX ADMIN — POTASSIUM CHLORIDE 20 MEQ: 1.5 POWDER, FOR SOLUTION ORAL at 06:14

## 2021-03-19 ASSESSMENT — ACTIVITIES OF DAILY LIVING (ADL)
ADLS_ACUITY_SCORE: 12
ADLS_ACUITY_SCORE: 11
ADLS_ACUITY_SCORE: 11
ADLS_ACUITY_SCORE: 12
ADLS_ACUITY_SCORE: 12
ADLS_ACUITY_SCORE: 11

## 2021-03-19 ASSESSMENT — VISUAL ACUITY
OU: NORMAL ACUITY

## 2021-03-19 ASSESSMENT — MIFFLIN-ST. JEOR: SCORE: 1770.75

## 2021-03-19 NOTE — PROGRESS NOTES
Neurosurgery Brief Procedure Note     External ventricular drain removal     Drain not deemed to be necessary with low output. Drain site was prepped in usual sterile fashion with chloraprep. The drain was taken off suction. The sutures securing the drain were cut and the site was re-prepped. The drain was removed. A single interrupted stitch with 3-0 monocryl was placed with adequate hemostasis and on visible egress of CSF. No immediate complication was observed and the patient tolerated the procedure well.      Paulo Duncan MD  Neurosurgery, PGY-1     Please contact neurosurgery resident on call with questions.    Dial * * [phone], enter [phone] when prompted.

## 2021-03-19 NOTE — PROGRESS NOTES
"SPIRITUAL HEALTH SERVICES  SPIRITUAL ASSESSMENT Progress Note  [facility] ([place]) 4A     REFERRAL SOURCE: -initiated visited to assess emotional and spiritual needs in light of length of hospital stay.      Per patient, \"doing pretty well\" today; patient appreciative of check in, declined SHS support at this time. Oriented to SHS availability during hospital stay in case needs change..       PLAN: No follow up indicated at this time. Spiritual Health Services remains available for patient, family, and staff support.     Please page the on call  either via [employer] (Spiritual Health/[facility]) or by placing a STAT or ASAP Epic referral for Spiritual Health (which will roll to the on call pager).          Liliam Montelongo  Chaplain Resident  Pager: [phone]    "

## 2021-03-19 NOTE — DISCHARGE SUMMARY
[facility] Discharge Summary and Instructions    Francesca Rowland MRN# [id number]   Age: 40 year old YOB: 1980     Date of Admission:  3/15/2021  Date of Discharge::  3/20/2021  Admitting Physician:  Martín Myrick MD  Discharge Physician:  Martín Myrick MD          Admission Diagnoses:   Brain metastasis (H) [C79.31]  Cerebral edema (H) [G93.6]          Discharge Diagnosis:     Brain metastasis (H) [C79.31]  Cerebral edema (H) [G93.6]          Procedures:   3/15/2021:   Midline Suboccipital Craniotomy for Resection of Mass  Right Frontal Ventriculostomy  Stereotactic Navigation  Use of Operating Microscope            Brief History of Illness:   Ms. Rowland is a delightful 40 year old female whose past medical history includes Cervical Cancer. 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 In 2019 she was found to have a positive retroperitoneal lymph node for which she underwent surgery followed by chemoradiation. In October 2020, surveillance imaging demonstrated a lesion on the patient's hip -- present on CT and PET scan. Biopsy was planned but was postponed after the patient started to develop persistent headaches and imbalance. An MRI Brain was obtained on 3/8/2021 that demonstrated an enhancing mass in the right inferior cerebellar region with mass effect on the 4th ventricle. Given these findings, the patient was referred to Dr. Myrick for surgical consideration. The patient had a virtual appointment with Dr. Myrick on 3/12/2021. Surgical resection of the lesion was recommended. After the risks and benefits of the operation had been discussed with the patient, she provided consent to proceed with the operative intervention.            Hospital Course:   Ms. Rowland was admitted to the hospital on 3/15/2021 and underwent the above named operation by Dr. Myrick. There  "were no kristina-operative complications. Surgical pathology was consistent with \"Metatstatic Carcinoma consistent with Metastasis with patient's known Cervical Adenocarcinoma.\" Following the operation, the patient was transferred to Unit 4A, Neuro-Surgical ICU for routine post-operative cares and EVD management. She was initiated on an 8 day taper of Dexamethasone for the treatment of Brain Edema. On post-operative day #1, the patient was evaluated with an MRI Brain with and without Contrast which demonstrated post-surgical resection of the right cerebellar mass, mildly decreased brain edema, and no evidence of hydrocephalus. On post-operative day #1, the patient was experiencing neck stiffness and incisional pain. She was initiated on Diazepam for treatment of muscle spasm. She also received anti-emetics for post-operative nausea. On 3/18/2021, CSF studies were sent for Cytology. The results of CSF Cytology are \"Negative for Malignancy.\"  On 3/18/2021, the patient's EVD was clamped. That day, the patient was also evaluated by our inpatient rehabilitation therapists and was found to have no needs from a rehabilitation standpoint upon discharge. A CT Head was obtained on 3/19/2021 and demonstrated stable size of the ventricles. Therefore, the patient's EVD was removed on 3/19/2021. A follow-up CT Head was obtained on 3/20/2021 that demonstrated no change in the size of the patient's ventricles. On 3/20/2021, the patient had met all of her discharge goals and was deemed medically and neurologically stable and was therefore discharged home.     Examination:   /81 (BP Location: Right arm)   Pulse 71   Temp 97.4  F (36.3  C) (Oral)   Resp 16   Ht 1.676 m (5' 6\")   Wt 109.4 kg (241 lb 2.9 oz)   SpO2 95%   BMI 38.93 kg/m      General Appearance: Calm; Pleasant and Conversant; In No Apparent Distress  Mentation: Alert; Oriented to Person, Place, Time and Situation  Cranial Nerves: CN II-XII Intact  Motor: 5/5 in " BUE/BLE  Sensory: Intact to Light Touch   Cerebellum: No Dysmetria Present  Sensation: Intact to Light Touch in BUE/BLE            Discharge Medications:     Current Discharge Medication List      START taking these medications    Details   diazepam (VALIUM) 5 MG tablet Take 1 tablet (5 mg) by mouth every 8 hours as needed for muscle spasms  Qty: 30 tablet, Refills: 0    Comments: Indication: Neck Pain Related to Muscle Spasm  Associated Diagnoses: Brain metastasis (H)      oxyCODONE (ROXICODONE) 5 MG tablet Take 1-2 tablets (5-10 mg) by mouth every 4 hours as needed (Moderate to Severe Pain)  Qty: 56 tablet, Refills: 0    Comments: Indication: Moderate to Severe Post-Operative Pain  Associated Diagnoses: Brain metastasis (H)      senna-docusate (SENOKOT-S/PERICOLACE) 8.6-50 MG tablet Take 2 tablets by mouth 2 times daily as needed for constipation  Qty: 60 tablet, Refills: 0    Comments: Indication: Prevention of Constipation with Concurrent use of Opioid Analgesics (Oxycodone)  Associated Diagnoses: Brain metastasis (H)         CONTINUE these medications which have CHANGED    Details   dexamethasone (DECADRON) 2 MG tablet Take 1 tablet (2 mg) by mouth every 8 hours  Qty: 5 tablet, Refills: 0    Comments: Indication:  Brain Swelling  Please take 2 mg every 8 hours on 3/20/2021 for 3 doses.  Then, take 1 mg every 8 hours on 3/21/2021 and 3/22/2021, then stop.  Associated Diagnoses: Brain metastasis (H)      ibuprofen (ADVIL/MOTRIN) 200 MG tablet Take 1 tablet (200 mg) by mouth every 4 hours as needed for mild pain  Qty:      Comments: Please DO NOT resume until post-operative day #7 (3/22/2021). Thank you.         CONTINUE these medications which have NOT CHANGED    Details   acetaminophen (TYLENOL) 325 MG tablet Take 2 tablets (650 mg) by mouth every 6 hours  Qty: 24 tablet, Refills: 0    Associated Diagnoses: S/P laparoscopy      Ascorbic Acid (VITAMIN C GUMMIE PO) Take by mouth daily      multivitamin w/minerals  (MULTI-VITAMIN) tablet Take 1 tablet by mouth daily      omeprazole (PRILOSEC) 20 MG DR capsule Take 1 capsule (20 mg) by mouth daily  Qty: 30 capsule, Refills: 0    Associated Diagnoses: Malignant neoplasm of cervix, unspecified site (H)      Prenatal Vit-Fe Fumarate-FA (M-VIT PO)                      Discharge Instructions and Follow-Up:     Discharge diet: Regular Diet with No Restrictions     Discharge activity: You may advance activity as tolerated. No strenuous exercise or heay lifting greater than 10 lbs for 4 weeks or until seen and cleared in clinic.    Please DO NOT drive or operate heavy machinery while using Oxycodone or Diazepam.      Discharge follow-up: Please follow-up with Dr. Martín Myrick in the Neurosurgery Clinic on 3/31/2021 at 9:30AM for routine post-operative evaluation and wound assessment. If you need to reschedule your appointment, please call the Neurosurgery Office: [phone]    Please follow-up with your Primary Oncologist and Radiation Oncologist within 2 weeks.      Wound care: Ok to shower,however no scrubbing of the wound and no soaking of the wound, meaning no bathtubs or swimming pools. Pat dry only. Leave wound open to air.    Wound Closure Method: Nylon Suture        Please call if you have:  1. increased pain, redness, drainage, swelling at your incision  2. fevers > 101.5 F degrees  3. with any questions or concerns.  You may reach the Neurosurgery clinic at [phone] during regular work hours. ER at [phone].    and ask for the Neurosurgery Resident on call at [phone], during off hours or weekends.         Discharge Disposition:     Discharged to home          Rosie Alas, MSN, ACNP-BC, CNRN  Department of Neurosurgery  Pager: [phone]

## 2021-03-19 NOTE — PLAN OF CARE
Physical Therapy Discharge Summary    Reason for therapy discharge:    All goals and outcomes met, no further needs identified.    Progress towards therapy goal(s). See goals on Care Plan in [facility] electronic health record for goal details.  Goals met    Therapy recommendation(s):    No further therapy is recommended.  Recommend ambulation 3-4x/day to progress activity tolerance.

## 2021-03-19 NOTE — PLAN OF CARE
Major Shift Events:  EVD discontinued this am. Patient tolerated well. No acute events this shift. Patient is alert and oriented x 4, ambulating with SBA. LS clear on room air. SR on monitor, Afebrile. Voiding well, BM X 1. PIV saline locked. Denies pain.     Plan: Continue POC  For vital signs and complete assessments, please see documentation flowsheets.     Brianda Coronado RN

## 2021-03-19 NOTE — PROGRESS NOTES
[facility], [place]   03/19/2021  Neurosurgery Progress Note:    Assessment:  Francesca Rowland is a 40 year old female who is now s/p midline suboccipital craniectomy/craniotomy for tumor resection with EVD placement on 3/15. The tumor was causing brain compression and cerebral edema.    Plan:  - Serial neuro exams  - EVD clamped, possible remove today  - Pain control  - HOB 30 degrees  - Advance diet as tolerated  - Bowel regimen  - PRN antiemetics  - IVF until taking adequate PO  - PT/OT  - SCDs for DVT proph    -----------------------------------  Kunal Vazquez MD  Neurosurgery Resident, PGY-2    Please contact neurosurgery resident on call with questions.    Dial * * [phone], enter [phone] when prompted.   -----------------------------------    Interval History: Tolerating EVD clamping    Objective:   Temp:  [98.2  F (36.8  C)-99.1  F (37.3  C)] 98.5  F (36.9  C)  Pulse:  [55-87] 71  Resp:  [12-17] 14  BP: (120-142)/(60-94) 123/79  SpO2:  [93 %-98 %] 98 %  I/O last 3 completed shifts:  In: 2490 [P.O.:2180; I.V.:310]  Out: 2994 [Urine:2950; Other:44]    Gen: Appears comfortable, NAD  Wound: clean, dry, intact  Neurologic:  - Alert & Oriented to person, place, time, and situation  - Follows commands briskly  - Speech fluent, spontaneous. No aphasia or dysarthria.  - No gaze preference. No apparent hemineglect.  - PERRL, EOMI  - Face symmetric  - No dysmetria noted     Del Tr Bi WE WF Gr   R 5 5 5 5 5 5   L 5 5 5 5 5 5    HF KE KF DF PF EHL   R 5 5 5 5 5 5   L 5 5 5 5 5 5     Sensation intact and symmetric to light touch throughout    LABS:  Recent Labs   Lab 03/19/21  0340 03/18/21  0536 03/17/21  0743    134 135   POTASSIUM 3.8 4.0 4.0   CHLORIDE 102 101 103   CO2 27 26 24   ANIONGAP 7 8 8   * 135* 116*   BUN 21 18 13   CR 0.67 0.74 0.66   SHAD 9.2 9.2 8.9       Recent Labs   Lab 03/19/21  0340   WBC 6.3   RBC 3.75*   HGB 11.6*   HCT 34.3*   MCV 92   MCH 30.9   MCHC 33.8    RDW 13.6          IMAGING:  No results found for this or any previous visit (from the past 24 hour(s)).

## 2021-03-19 NOTE — PLAN OF CARE
Major Shift Events: Q2h neuro exams unchanged and intact. EVD clamped all shift. ICP's 8-20. Denies much pain. SBA. Systolic <140 without intervention. Room air. Denies nausea. Voiding on commode well. No BM. K replaced. Phos scheduled to be replaced today. Both recheck tomorrow AM.    Plan: Continue with current POC.    For vital signs and complete assessments, please see documentation flowsheets.

## 2021-03-19 NOTE — PLAN OF CARE
Occupational Therapy Discharge Summary    Reason for therapy discharge:    All goals and outcomes met, no further needs identified.    Progress towards therapy goal(s). See goals on Care Plan in [facility] electronic health record for goal details.  Goals met    Therapy recommendation(s):    No further therapy is recommended.

## 2021-03-20 ENCOUNTER — APPOINTMENT (OUTPATIENT)
Dept: CT IMAGING | Facility: CLINIC | Age: 41
End: 2021-03-20
Attending: STUDENT IN AN ORGANIZED HEALTH CARE EDUCATION/TRAINING PROGRAM
Payer: COMMERCIAL

## 2021-03-20 VITALS
OXYGEN SATURATION: 95 % | WEIGHT: 241.18 LBS | RESPIRATION RATE: 16 BRPM | DIASTOLIC BLOOD PRESSURE: 81 MMHG | TEMPERATURE: 97.4 F | HEIGHT: 66 IN | BODY MASS INDEX: 38.76 KG/M2 | HEART RATE: 71 BPM | SYSTOLIC BLOOD PRESSURE: 133 MMHG

## 2021-03-20 LAB
ANION GAP SERPL CALCULATED.3IONS-SCNC: 5 MMOL/L (ref 3–14)
BUN SERPL-MCNC: 20 MG/DL (ref 7–30)
CALCIUM SERPL-MCNC: 9.3 MG/DL (ref 8.5–10.1)
CHLORIDE SERPL-SCNC: 102 MMOL/L (ref 94–109)
CO2 SERPL-SCNC: 28 MMOL/L (ref 20–32)
CREAT SERPL-MCNC: 0.74 MG/DL (ref 0.52–1.04)
ERYTHROCYTE [DISTWIDTH] IN BLOOD BY AUTOMATED COUNT: 13.7 % (ref 10–15)
GFR SERPL CREATININE-BSD FRML MDRD: >90 ML/MIN/{1.73_M2}
GLUCOSE BLDC GLUCOMTR-MCNC: 127 MG/DL (ref 70–99)
GLUCOSE SERPL-MCNC: 111 MG/DL (ref 70–99)
HCT VFR BLD AUTO: 33.4 % (ref 35–47)
HGB BLD-MCNC: 10.9 G/DL (ref 11.7–15.7)
MAGNESIUM SERPL-MCNC: 2.2 MG/DL (ref 1.6–2.3)
MCH RBC QN AUTO: 29.8 PG (ref 26.5–33)
MCHC RBC AUTO-ENTMCNC: 32.6 G/DL (ref 31.5–36.5)
MCV RBC AUTO: 91 FL (ref 78–100)
PHOSPHATE SERPL-MCNC: 3 MG/DL (ref 2.5–4.5)
PLATELET # BLD AUTO: 234 10E9/L (ref 150–450)
POTASSIUM SERPL-SCNC: 3.9 MMOL/L (ref 3.4–5.3)
RBC # BLD AUTO: 3.66 10E12/L (ref 3.8–5.2)
SODIUM SERPL-SCNC: 135 MMOL/L (ref 133–144)
WBC # BLD AUTO: 5.4 10E9/L (ref 4–11)

## 2021-03-20 PROCEDURE — 250N000011 HC RX IP 250 OP 636: Performed by: STUDENT IN AN ORGANIZED HEALTH CARE EDUCATION/TRAINING PROGRAM

## 2021-03-20 PROCEDURE — 70450 CT HEAD/BRAIN W/O DYE: CPT | Mod: 26 | Performed by: RADIOLOGY

## 2021-03-20 PROCEDURE — 85027 COMPLETE CBC AUTOMATED: CPT | Performed by: STUDENT IN AN ORGANIZED HEALTH CARE EDUCATION/TRAINING PROGRAM

## 2021-03-20 PROCEDURE — 250N000012 HC RX MED GY IP 250 OP 636 PS 637: Performed by: STUDENT IN AN ORGANIZED HEALTH CARE EDUCATION/TRAINING PROGRAM

## 2021-03-20 PROCEDURE — 999N001017 HC STATISTIC GLUCOSE BY METER IP

## 2021-03-20 PROCEDURE — 84100 ASSAY OF PHOSPHORUS: CPT | Performed by: STUDENT IN AN ORGANIZED HEALTH CARE EDUCATION/TRAINING PROGRAM

## 2021-03-20 PROCEDURE — 250N000013 HC RX MED GY IP 250 OP 250 PS 637: Performed by: STUDENT IN AN ORGANIZED HEALTH CARE EDUCATION/TRAINING PROGRAM

## 2021-03-20 PROCEDURE — 83735 ASSAY OF MAGNESIUM: CPT | Performed by: STUDENT IN AN ORGANIZED HEALTH CARE EDUCATION/TRAINING PROGRAM

## 2021-03-20 PROCEDURE — 70450 CT HEAD/BRAIN W/O DYE: CPT

## 2021-03-20 PROCEDURE — 80048 BASIC METABOLIC PNL TOTAL CA: CPT | Performed by: STUDENT IN AN ORGANIZED HEALTH CARE EDUCATION/TRAINING PROGRAM

## 2021-03-20 PROCEDURE — 36415 COLL VENOUS BLD VENIPUNCTURE: CPT | Performed by: STUDENT IN AN ORGANIZED HEALTH CARE EDUCATION/TRAINING PROGRAM

## 2021-03-20 RX ORDER — DIAZEPAM 5 MG
5 TABLET ORAL EVERY 8 HOURS PRN
Qty: 30 TABLET | Refills: 0 | Status: SHIPPED | OUTPATIENT
Start: 2021-03-20 | End: 2021-04-08

## 2021-03-20 RX ORDER — AMOXICILLIN 250 MG
2 CAPSULE ORAL 2 TIMES DAILY PRN
Qty: 60 TABLET | Refills: 0 | Status: SHIPPED | OUTPATIENT
Start: 2021-03-20 | End: 2021-04-08

## 2021-03-20 RX ORDER — DEXAMETHASONE 2 MG/1
2 TABLET ORAL EVERY 8 HOURS
Qty: 5 TABLET | Refills: 0 | Status: SHIPPED | OUTPATIENT
Start: 2021-03-20 | End: 2021-04-08

## 2021-03-20 RX ORDER — OXYCODONE HYDROCHLORIDE 5 MG/1
5-10 TABLET ORAL EVERY 4 HOURS PRN
Qty: 56 TABLET | Refills: 0 | Status: SHIPPED | OUTPATIENT
Start: 2021-03-20 | End: 2021-04-08

## 2021-03-20 RX ADMIN — OMEPRAZOLE 20 MG: 20 CAPSULE, DELAYED RELEASE ORAL at 08:32

## 2021-03-20 RX ADMIN — MULTIPLE VITAMINS W/ MINERALS TAB 1 TABLET: TAB at 08:32

## 2021-03-20 RX ADMIN — CEFAZOLIN 1 G: 330 INJECTION, POWDER, FOR SOLUTION INTRAMUSCULAR; INTRAVENOUS at 05:20

## 2021-03-20 RX ADMIN — DEXAMETHASONE 2 MG: 2 TABLET ORAL at 05:20

## 2021-03-20 ASSESSMENT — ACTIVITIES OF DAILY LIVING (ADL)
ADLS_ACUITY_SCORE: 12

## 2021-03-20 NOTE — PROGRESS NOTES
Admitted/transferred from: 4A to 6A. Pt now in room 6214-2.  Reason for admission/transfer: transfer out of ICU.  Patient belongings (see Flowsheet). Sent w/ pt to 5804-2.  MDRO education added to care plan. Yes  Report given to POLO RN on 6A.  Wilfrid Bravo RN on 3/19/2021 at 8:03 PM    ?

## 2021-03-20 NOTE — PLAN OF CARE
VSS. AxOx4. Steady on feet, ambulting to and from bathroom. PIV removed prior to discharge. Voiding spontaneously. Incision PARIS. Patient educated regarding follow up appointments and signs and symptoms of infection. Pt verbalized understanding of this. Discussed steroid taper plan and patient verbalized understanding. Significant other at bedside, supportive with discharge teaching. Patient wheeled to discharge pharmacy with all belongings by significant other.

## 2021-03-20 NOTE — PLAN OF CARE
Arrived from:  4A @ 2030  Status: s/p midline suboccipital craniectomy/craniotomy for tumor resection with EVD placement on 3/15. EVD removed 3/19.  Vitals: VSS on RA  Neuros: Intact  IV: PIV TKO In between abx  Labs/Electrolytes: WNL  Resp/trach: LS clear, denies SOB  Diet: Regular  Bowel status: BS+, reports having BM earlier today.  : Voiding spontaneously   Skin: Posterior head incision covered with primapore- CDI. 2 frontal head incisions. WNL, PARIS.  Pain: denies  Activity: SBA   Plan: Head CT at 4am. Potential discharge home tomorrow.  Updates this shift: admitted to 6a

## 2021-03-20 NOTE — PLAN OF CARE
Status: s/p midline suboccipital craniectomy/craniotomy for tumor resection with EVD placement on 3/15. EVD removed 3/19.  Vitals: VSS on RA  Neuros: A&O x4. Strength 5/5 throughout. Denies N/T  IV: PIV TKO between antibiotics  Labs/Electrolytes: WNL  Resp/trach: LS clear  Diet: Regular  Bowel status: Last BM 03/19  : Voiding spontaneously   Skin: Posterior head incision with primapore dressing, CDI.  2 frontal head incisions with sutures, WDL PARIS.  Pain: denies  Activity: SBA.   Social: Family up to date per pt. Pt expressed she feels ready to discharge   Plan: Head CT completed this AM. Cont with current POC

## 2021-03-21 ENCOUNTER — PATIENT OUTREACH (OUTPATIENT)
Dept: CARE COORDINATION | Facility: CLINIC | Age: 41
End: 2021-03-21

## 2021-03-22 DIAGNOSIS — C79.31 METASTASIS TO BRAIN (H): Primary | ICD-10-CM

## 2021-03-22 NOTE — PROGRESS NOTES
[facility]: Post-Discharge Note  SITUATION                                                      Admission:    Admission Date: 03/15/21   Reason for Admission: Brain metastasis  Discharge:   Discharge Date: 03/20/21  Discharge Diagnosis: Brain metastasis    BACKGROUND                                                      Ms. Rowland is a delightful 40 year old female whose past medical history includes Cervical Cancer. 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 In 2019 she was found to have a positive retroperitoneal lymph node for which she underwent surgery followed by chemoradiation. In October 2020, surveillance imaging demonstrated a lesion on the patient's hip -- present on CT and PET scan. Biopsy was planned but was postponed after the patient started to develop persistent headaches and imbalance. An MRI Brain was obtained on 3/8/2021 that demonstrated an enhancing mass in the right inferior cerebellar region with mass effect on the 4th ventricle. Given these findings, the patient was referred to Dr. Myrick for surgical consideration. The patient had a virtual appointment with Dr. Myrick on 3/12/2021. Surgical resection of the lesion was recommended. After the risks and benefits of the operation had been discussed with the patient, she provided consent to proceed with the operative intervention.     ASSESSMENT      Discharge Assessment  Patient reports symptoms are: Improved  Does the patient have all of their medications?: Yes  Does patient know what their new medications are for?: Yes  Does patient have a follow-up appointment scheduled?: Yes  Does patient have any other questions or concerns?: No    Post-op  Did the patient have surgery or a procedure: Yes  Fever: No  Chills: No  Eating & Drinking: eating and drinking without complaints/concerns  Bowel Function: normal  Urinary Status: voiding without  complaint/concerns        PLAN                                                      Outpatient Plan:  As below     Future Appointments   Date Time Provider Department Center   3/31/2021  9:30 AM Martín Myrick MD [facility]           Ely Ortez

## 2021-03-29 ENCOUNTER — TELEPHONE (OUTPATIENT)
Dept: NEUROSURGERY | Facility: CLINIC | Age: 41
End: 2021-03-29

## 2021-03-29 ENCOUNTER — PATIENT OUTREACH (OUTPATIENT)
Dept: ONCOLOGY | Facility: CLINIC | Age: 41
End: 2021-03-29

## 2021-03-29 NOTE — TELEPHONE ENCOUNTER
Writer WALT for pt letting her know that her appt on 3/31 has been changed to be with Summer Patel instead of Dr. Myrick, Writer explained that Dr. Myrick will still be in the appt as well    Liat Carcamo

## 2021-03-30 NOTE — PROGRESS NOTES
Spoke with pt   reviewed pet scan results  Explained md has not seen pet results yet   Will discuss results with md and have md reach out with plan  Pt reports understanding

## 2021-03-31 ENCOUNTER — PATIENT OUTREACH (OUTPATIENT)
Dept: ONCOLOGY | Facility: CLINIC | Age: 41
End: 2021-03-31

## 2021-03-31 ENCOUNTER — OFFICE VISIT (OUTPATIENT)
Dept: NEUROSURGERY | Facility: CLINIC | Age: 41
End: 2021-03-31
Payer: COMMERCIAL

## 2021-03-31 VITALS
RESPIRATION RATE: 16 BRPM | DIASTOLIC BLOOD PRESSURE: 78 MMHG | OXYGEN SATURATION: 97 % | SYSTOLIC BLOOD PRESSURE: 114 MMHG | HEART RATE: 71 BPM

## 2021-03-31 DIAGNOSIS — Z48.02 VISIT FOR SUTURE REMOVAL: ICD-10-CM

## 2021-03-31 DIAGNOSIS — C79.31 BRAIN METASTASIS: Primary | ICD-10-CM

## 2021-03-31 PROCEDURE — 99024 POSTOP FOLLOW-UP VISIT: CPT | Performed by: PHYSICIAN ASSISTANT

## 2021-03-31 ASSESSMENT — PAIN SCALES - GENERAL: PAINLEVEL: NO PAIN (0)

## 2021-03-31 NOTE — PROGRESS NOTES
[facility]      Name: Francesca Rowland  MRN: [id number]  Age: 40 year old  : 1980      Chief Complaint:   Brain metastasis s/p midline suboccipital craniotomy for resection 3/15/2021, 2 week postop    History of Present Illness:   Francesca Rowland is a pleasant 40 year old female with a history of cervical cancer, here with her  Bill for her 2 week postoperative visit. She had a hysterectomy with lymph node dissection in , surgical pathology was endocervical adenocarcinoma. She underwent surgery and chemoradiation in  for positive retroperitoneal lymph node, then in 2020 she had a lesion on her hip found during surveillance. She developed persistent headaches and imbalance so MRI of her brain was done which found a mass in the right inferior cerebellar region with mass effect on the 4th ventricle. She underwent a midline suboccipital craniotomy for mass resection on 3/15/2021 with Dr. Myrick. Right frontal ventriculostomy was placed and she was monitored in the ICU postoperatively and she passed an EVD clamp trial 3/18. EVD was removed the following day and she discharged home on 3/20/2021. She's been doing well since then. She has some mild left anterior thigh numbness that was present following surgery, has improved slightly since she got home. She denies new headache, fever, nausea, vomiting, dizziness, vertigo, confusion, other paresthesias, or weakness. She has plans set to meet with Radiation Oncology in earlier April. She is also due for a follow up with her Oncologist in the next month.        Review of Systems:   Pertinent items are noted in HPI or as in patient entered ROS below, remainder of complete ROS is negative.     Physical Exam:   /78   Pulse 71   Resp 16   SpO2 97%    General: No acute distress.   Neuro: The patient is fully oriented and quite pleasant. Speech is normal. Gait is normal.  Psych: Normal mood  and affect. Behavior is normal.    Incision: Her midline suboccipital incision is clean, dry, intact and well healed. The lower portion of the suture line is slightly buried, skin has started to heal over the sutures. The running suture was removed with sterile technique without complication today. Wound remained well healed and intact.    Imaging:  No new imaging to review.     Assessment:  Brain metastasis s/p midline suboccipital craniotomy for resection 3/15/2021, 2 week postop    Plan:  Discussed wound care, her wound has healed nicely and she's cleared for Radiation at this point from a wound perspective. She will follow up with her Oncologist for ongoing management, and with Rad Onc as scheduled. Future follow ups with Neurosurgery to be discussed with Dr. Myrick.        Summer Patel PA-C

## 2021-03-31 NOTE — LETTER
3/31/2021       RE: Francesca Rowland  [address]  [facility] [address]     Dear Colleague,    Thank you for referring your patient, Francesca Rowland, to the [facility] [place] at [facility]. Please see a copy of my visit note below.      [facility]      Name: Francesca Rowland  MRN: [id number]  Age: 40 year old  : 1980      Chief Complaint:   Brain metastasis s/p midline suboccipital craniotomy for resection 3/15/2021, 2 week postop    History of Present Illness:   Francesca Rowland is a pleasant 40 year old female with a history of cervical cancer, here with her  Bill for her 2 week postoperative visit. She had a hysterectomy with lymph node dissection in , surgical pathology was endocervical adenocarcinoma. She underwent surgery and chemoradiation in  for positive retroperitoneal lymph node, then in 2020 she had a lesion on her hip found during surveillance. She developed persistent headaches and imbalance so MRI of her brain was done which found a mass in the right inferior cerebellar region with mass effect on the 4th ventricle. She underwent a midline suboccipital craniotomy for mass resection on 3/15/2021 with Dr. Myrick. Right frontal ventriculostomy was placed and she was monitored in the ICU postoperatively and she passed an EVD clamp trial 3/18. EVD was removed the following day and she discharged home on 3/20/2021. She's been doing well since then. She has some mild left anterior thigh numbness that was present following surgery, has improved slightly since she got home. She denies new headache, fever, nausea, vomiting, dizziness, vertigo, confusion, other paresthesias, or weakness. She has plans set to meet with Radiation Oncology in earlier April. She is also due for a follow up with her Oncologist in the next month.        Review of  Systems:   Pertinent items are noted in HPI or as in patient entered ROS below, remainder of complete ROS is negative.     Physical Exam:   /78   Pulse 71   Resp 16   SpO2 97%    General: No acute distress.   Neuro: The patient is fully oriented and quite pleasant. Speech is normal. Gait is normal.  Psych: Normal mood and affect. Behavior is normal.    Incision: Her midline suboccipital incision is clean, dry, intact and well healed. The lower portion of the suture line is slightly buried, skin has started to heal over the sutures. The running suture was removed with sterile technique without complication today. Wound remained well healed and intact.    Imaging:  No new imaging to review.     Assessment:  Brain metastasis s/p midline suboccipital craniotomy for resection 3/15/2021, 2 week postop    Plan:  Discussed wound care, her wound has healed nicely and she's cleared for Radiation at this point from a wound perspective. She will follow up with her Oncologist for ongoing management, and with Rad Onc as scheduled. Future follow ups with Neurosurgery to be discussed with Dr. Myrick.      Summer Patel PA-C

## 2021-04-01 ENCOUNTER — PRE VISIT (OUTPATIENT)
Dept: RADIATION ONCOLOGY | Facility: CLINIC | Age: 41
End: 2021-04-01

## 2021-04-01 NOTE — PROGRESS NOTES
Spoke with pt   Brain surgery procedure went well   Plan for radiation in 2 weeks, start 4/12 for 5 days  Pt inquiring on follow up with dr Canela  Explained that will now need to reschedule IR appt for biopsy  Once that appt is set can make arrangements for follow up with Dr Canela  Pt reports unstanding

## 2021-04-01 NOTE — TELEPHONE ENCOUNTER
Date: 2021   Age: 40 year old  Ethnicity:   Sex: female  : 1980   Lives In: [place]      Diagnosis: Metastatic Carcinoma Cervix    Prior radiation therapy:   Site Treated: pelvis  Facility: [facility] Dr[facility]  Dates: 19 thru 20  Dose: 4500cGy in 25 fractions      Prior chemotherapy:   See Below    RN time with patient:  Educated on Gamma knife;    Doctors: Dr. Martín Myrick, Dr. Jluis Canela,     Pain at time of consult:  Does pt have a living will:  Is patient pregnant: had a hysterectomy  Does pt have implanted cardiac device:    Has pt fallen in past week:  Does pt feel steady on feet:       Review Since Diagnosis:    ; abnormal pap smears while serving in [employer] in [place]    2007, abnormal pap smear, showed a high grade squamous intraepithelial lesion, positive for high risk HPV subtype    2007; colposcopy done with cervical biopsies which showed features consistent with papillary serous adenocarcinoma    10/15/07; LEEP procedure (Loop Electrosurgical Excision Procedure), showed non invasive adenocarcinoma     10/19/07; radical hysterectomy with lymph node dissection, path showed stage IA2 adenocarcinoma of cervix     Surveillance    ; incidental finding of enlarged pericaval lymph node    19; to ED at [facility] due to severe right upper quadrant pain, N/V, found cholelithiasis.  Led to CT Abd/Pelvis, postsurgical changes hysterectomy, left adnexal cysts thought to be ovarian and enlarged pericaval lyph node suspicious for mets     10/15/19; right retroperitoneal lymph node biopsy showed metastatic cervical carcinoma     19; had para aortic resection, pos for carcinoma in 3/4 lymph nodes, consistent with recurrent endocervical adenocarcinoma    19 thru 20; chemo radiation, weekly Cisplatin with radiation     ; CT right pelvic lymph nodes decreased in size, no new mets     10/13/20; CT recurrent right external iliac  lymphadenopathy    10/30/20; PET, increase size of right external iliac chain lymph node, hypermetabolic right subpectoral and axillary lymph nodes    Plan to do axillary ultrasound at breast center with tissue sampling but at biopsy node shrunk considerably so felt to be inflammation     1/18/21; Dr. Jluis Canela saw pt for surveillance visit, feels is doing well, has had three episodes in past month of fever, anorexia and fatigue which last about 24 hours and then resolve.  Wants CT/PET     1/29/21;PET, stable lymph node right hemipelvis since PET 11/25/20, suspicious for mets but stable, thymic activation anterior mediastinum unchanged, previously hypermetabolic right axillary and subpectoral lymph nodes no longer active    March; 2021; headaches and unable to bend down without losing balance     3/8/21; Brain MRI, 2.7 cm solid/cystic mass right cerebellum, some edema     3/12/21; saw Dr. Myrick, discussed options, favored surgery    3/15/21; crani by Dr. Myrick for gross total resection of met in cerebellum, biopsy showed metastatic carcinoma, consistent with cervical met     3/16/2; Brain MRI, postsurgical resection    Chief Complaint: at consult

## 2021-04-02 ENCOUNTER — HOSPITAL ENCOUNTER (OUTPATIENT)
Facility: CLINIC | Age: 41
End: 2021-04-02
Admitting: OBSTETRICS & GYNECOLOGY
Payer: COMMERCIAL

## 2021-04-02 DIAGNOSIS — C79.31 METASTASIS TO BRAIN (H): Primary | ICD-10-CM

## 2021-04-02 RX ORDER — SODIUM CHLORIDE 9 MG/ML
INJECTION, SOLUTION INTRAVENOUS CONTINUOUS
Status: CANCELLED | OUTPATIENT
Start: 2021-04-02

## 2021-04-02 RX ORDER — LIDOCAINE 40 MG/G
CREAM TOPICAL
Status: CANCELLED | OUTPATIENT
Start: 2021-04-02

## 2021-04-02 NOTE — PROGRESS NOTES
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 Canela    HISTORY OF PRESENT ILLNESS:     Ms. Rowland is a 40-year-old female, well acquainted to our clinic, who has a known history of cervical cancer.  She was first diagnosed in  and was definitively treated with radical hysterectomy, bilateral salpingectomy without oophorectomy, bilateral pelvic and periaortic lymph node dissections on 10/19/2007, with surgical pathology supporting disease stage of FIGO IB2. She finished surveillance in  without evidence of recurrence, but in 2019, she developed abdominal pain, nausea and vomiting, and workup with imaging revealed left adnexal cysts and an enlarged pericaval lymph node, which was biopsy proven to be recurrence of her cervical cancer. She underwent laparotomy and resection of the right pericaval node on 2019. A total of 4 nodes were resected, and 3 were found involved with the largest measuring 3.5 cm with PATRICIA. Restaging PET/CT on 2019 found at least 4 hypermetabolic lymph nodes along the right iliac chain but no other evidence of disease spread. She was therefore referred to us and underwent radiation concurrent with cisplatin as outlined below.     She had since been followed with serial imaging studies and with Dr. Canela of Gynecology Oncology. The treated right pelvic nodes had decreased in size initially, but a right external iliac node was again found enlarged and  hypermetabolic. A biopsy was planned. Additionally, there were suspicion for diease spread to right axilla per PET/CT on 10/30/2020, but the suspicious axillary nodes have decreased in size upon repeat CT later, and were favored to be reactive but not malignant.     Most recently, Ms. Rowland developed intractable headache and imbalance for 2 weeks. Ultimately a MRI brain was obtained on 3/8/2021, which revealed a heterogeneous mixed solid and cystic 2.7 cm mass in the right cerebellum. We were notified at that time for possible GK radiosurgery. Upon review of the imaging and assessment of her symptoms, it was recommended that she undergo surgical resection first to relieve the mass effect. Dr. Myrick of Neurosurgery brought her to the OR on 3/15/2021 for midline a suboccipital craniotomy, right ventriculostomy and stereotactic-guided resection of the mass. Gross total resection was noted by Dr. Myrick and confirmed with repeat MRI brain on 3/15/2021. Surgical pathology confirmed metastatic carcinoma with immunohistochemical profile consistent with known cervical adenocarcinoma ([id number]).    Ms. Rowland presents with her  today to discuss SRS to op bed for improved disease control. She is doing well, and has no headache, nausea/vomiting, dizziness, lightheadedness or imbalance. She has some tingling in her left leg but has no other symptoms.          PAST MEDICAL HISTORY:   Known cervical cancer, as outlined above.  Chronic cholecstitis, s/p cholecystectomy on 9/25/2019.     CHEMOTHERAPY HISTORY:   CDDP concurrent with RT 90 mg weekly from 12/23/2019 - 1/20/2020.      PAST RADIATION THERAPY HISTORY:   4500 cGy in 25 fractions to the pelvis and paraaortic chains with simultaneous integrated boost of 5500 cGy in 25 fractions to the grossly positive lymph nodes and paraaortic yuridia bed, completed on 1/24/2020.          IMPLANTED DEVICES: None     AUTOIMMUNE/CONNECTIVE TISSUE DISORDERS:  None     PREGNANCY RISK: S/p hysterectomy    MEDICATIONS: reviewed    ALLERGIES:  is allergic to emend [aprepitant] and prochlorperazine.    SOCIAL HISTORY:   Lives in [place], [place]. Works in [place] as an  for the [employer]. Former on-and -off smoker for 10 years, quit 2010. Occasional EtOH.       FAMILY HISTORY:   PGM - breast cancer  Maternal aunt- breast cancer  Sister - thyroid cancer    REVIEW OF SYMPTOMS:  A full 14-point review of systems was performed.     PHYSICAL EXAMINATION:    /70   Pulse 72   Resp 16   SpO2 95%     General: NAD, patient well appearing.  Extremities: No acute findings. No edema   Skin: color, texture and turgor wnl. No rashes and lesions.  Neuro:   MS: AAO x 3, appropriately interactive, normal affect, speech fluent  CN: II,III -- PERRLA, no visual field cut;   III,IV,VI -- EOMI, no ptosis;   V -- sensation intact to LT/PP; masseter function intact  VII -- no facial weakness/droop;   VIII -- hears finger rub equally bilaterally;   IX,X -- voice normal, palate elevates symmetrically,  XI -- SCM/trapezii 5/5 strength bilaterally;   XII -- tongue protrudes midline, no atrophy or fasciculation.  Motor: Normal tone, no tremor.   0-No movement. 1-Muscle contrac. 2-Moves on bed. 3-Antigrav. 4-Mildly weak. 5-Full.               Delt     Bicep   Tricep   FinAb  Ilopso  Hams  Quad   Gastr  TA      ExHL   R  5  5  5  5  5  5  5  5  5  5    L  5  5  5  5  5  5  5  5  5  5        Sensation: Normal sensation to LT intact bilaterally upper and lower extremities  Gait: Natural gait intact. Tandem gait revealed slight imbalance.     ECOG PS: 1    IMAGING:  Pre-op MRI 3/8/2021    Post-op MRI      PET/CT 1/29/2021      Planning MRI      ASSESSMENT:     Ms. Rowland  is a 40 year-old female with brain metastasis secondary to cervical cancer, s/p surgical resection.     RECOMMENDATIONS:     We discussed with the patient on our recommendation for gamma knife  stereotactic radiosurgery to improve local control following resection. We plan to treat the post resection cavity with 5 fractions. As part of the planning process, a mask will be created for immobilization, and she is scheduled to undergo brain MRI with 1 mm fine slice. We specifically discussed the possibility of detecting additional intracranial diseases, and discussed the possibility of recommending whole brain radiation if her intracranial disease burden is significant.     We discussed the logistics, the technical aspects, and the side effects of radiation therapy. Overall, it is anticipated to be well tolerated, but there is a low risk of radionecrosis that might require further treatment and potentially surgical resection. All of their questions were answered and the patient signed informed consent. Mask was made and brain MRI was done afterwards. The patient will start treatment on 4/12/2021.     Tentatively, we are planning to deliver 3,000 cGy to the resection cavity in 5 daily fractions. The specifics of her treatment will be finalized upon completion of treatment planning.    Thank you for allowing us to participate in this patient's care.  Please feel free to call with any questions or concerns.     The patient was seen and discussed with staff, Dr. Kerns.    Quang Lua MD  Resident, PGY-3  Department of Radiation Oncology  [facility]           I saw and examined the patient with the resident.  I have reviewed and edited the resident's note and agree with the plan of care.      I reviewed patient's chart, internal/external medical records, imaging studies (including actual images), labs and pathology reports.  I interviewed and counseled the patient face to face.  I additionally ordered tests and discussed the case with patient's referring physicians and care team (Dr. Myrick, Dr. Padilla).    Reviewing of the planning MRI revealed an additional 3 mm lesion in the right occipital  lobe.  This lesion will also be treated with GK SRS using a single fraction.      90 minutes were spent on the date of the encounter doing chart review, history and exam, documentation and further activities as noted above.       Bret Kerns MD      CC  Patient Care Team:  Tyrese Bowie as PCP - General (Family Medicine)  Tawana Rivera RN as Specialty Care Coordinator (Gynecologic Oncology)  Jluis Canela MD as MD (Gynecologic Oncology)  Bret Kerns MD as Assigned Cancer Care Provider  Martín Myrick MD as Assigned Neuroscience Provider

## 2021-04-06 DIAGNOSIS — Z11.59 ENCOUNTER FOR SCREENING FOR OTHER VIRAL DISEASES: ICD-10-CM

## 2021-04-07 DIAGNOSIS — C79.31 METASTASIS TO BRAIN (H): ICD-10-CM

## 2021-04-07 LAB
LABORATORY COMMENT REPORT: NORMAL
SARS-COV-2 RNA RESP QL NAA+PROBE: NEGATIVE
SARS-COV-2 RNA RESP QL NAA+PROBE: NORMAL
SPECIMEN SOURCE: NORMAL
SPECIMEN SOURCE: NORMAL

## 2021-04-07 PROCEDURE — 99207 PR NO CHARGE LOS: CPT

## 2021-04-07 PROCEDURE — U0003 INFECTIOUS AGENT DETECTION BY NUCLEIC ACID (DNA OR RNA); SEVERE ACUTE RESPIRATORY SYNDROME CORONAVIRUS 2 (SARS-COV-2) (CORONAVIRUS DISEASE [COVID-19]), AMPLIFIED PROBE TECHNIQUE, MAKING USE OF HIGH THROUGHPUT TECHNOLOGIES AS DESCRIBED BY CMS-2020-01-R: HCPCS | Performed by: RADIOLOGY

## 2021-04-07 PROCEDURE — U0005 INFEC AGEN DETEC AMPLI PROBE: HCPCS | Performed by: RADIOLOGY

## 2021-04-08 ENCOUNTER — HOSPITAL ENCOUNTER (OUTPATIENT)
Dept: MRI IMAGING | Facility: CLINIC | Age: 41
End: 2021-04-08
Attending: RADIOLOGY
Payer: COMMERCIAL

## 2021-04-08 ENCOUNTER — OFFICE VISIT (OUTPATIENT)
Dept: RADIATION ONCOLOGY | Facility: CLINIC | Age: 41
End: 2021-04-08
Attending: RADIOLOGY
Payer: COMMERCIAL

## 2021-04-08 VITALS
DIASTOLIC BLOOD PRESSURE: 70 MMHG | OXYGEN SATURATION: 95 % | SYSTOLIC BLOOD PRESSURE: 120 MMHG | RESPIRATION RATE: 16 BRPM | HEART RATE: 72 BPM

## 2021-04-08 DIAGNOSIS — C79.31 METASTASIS TO BRAIN (H): ICD-10-CM

## 2021-04-08 DIAGNOSIS — C79.31 METASTASIS TO BRAIN (H): Primary | ICD-10-CM

## 2021-04-08 PROCEDURE — 70552 MRI BRAIN STEM W/DYE: CPT | Mod: 26 | Performed by: RADIOLOGY

## 2021-04-08 PROCEDURE — 255N000002 HC RX 255 OP 636: Performed by: RADIOLOGY

## 2021-04-08 PROCEDURE — 70552 MRI BRAIN STEM W/DYE: CPT

## 2021-04-08 PROCEDURE — 77290 THER RAD SIMULAJ FIELD CPLX: CPT | Performed by: RADIOLOGY

## 2021-04-08 PROCEDURE — 77334 RADIATION TREATMENT AID(S): CPT | Performed by: RADIOLOGY

## 2021-04-08 PROCEDURE — 77334 RADIATION TREATMENT AID(S): CPT | Mod: 26 | Performed by: RADIOLOGY

## 2021-04-08 PROCEDURE — A9585 GADOBUTROL INJECTION: HCPCS | Performed by: RADIOLOGY

## 2021-04-08 PROCEDURE — G0463 HOSPITAL OUTPT CLINIC VISIT: HCPCS | Mod: 25 | Performed by: RADIOLOGY

## 2021-04-08 PROCEDURE — 77290 THER RAD SIMULAJ FIELD CPLX: CPT | Mod: 26 | Performed by: RADIOLOGY

## 2021-04-08 RX ORDER — GADOBUTROL 604.72 MG/ML
10 INJECTION INTRAVENOUS ONCE
Status: COMPLETED | OUTPATIENT
Start: 2021-04-08 | End: 2021-04-08

## 2021-04-08 RX ADMIN — GADOBUTROL 10 ML: 604.72 INJECTION INTRAVENOUS at 15:42

## 2021-04-08 SDOH — HEALTH STABILITY: MENTAL HEALTH: HOW OFTEN DO YOU HAVE A DRINK CONTAINING ALCOHOL?: 2-3 TIMES A WEEK

## 2021-04-08 SDOH — ECONOMIC STABILITY: TRANSPORTATION INSECURITY
IN THE PAST 12 MONTHS, HAS LACK OF TRANSPORTATION KEPT YOU FROM MEETINGS, WORK, OR FROM GETTING THINGS NEEDED FOR DAILY LIVING?: NOT ASKED

## 2021-04-08 SDOH — ECONOMIC STABILITY: INCOME INSECURITY: HOW HARD IS IT FOR YOU TO PAY FOR THE VERY BASICS LIKE FOOD, HOUSING, MEDICAL CARE, AND HEATING?: NOT ASKED

## 2021-04-08 SDOH — ECONOMIC STABILITY: TRANSPORTATION INSECURITY
IN THE PAST 12 MONTHS, HAS THE LACK OF TRANSPORTATION KEPT YOU FROM MEDICAL APPOINTMENTS OR FROM GETTING MEDICATIONS?: NOT ASKED

## 2021-04-08 SDOH — HEALTH STABILITY: MENTAL HEALTH: HOW MANY STANDARD DRINKS CONTAINING ALCOHOL DO YOU HAVE ON A TYPICAL DAY?: NOT ASKED

## 2021-04-08 SDOH — HEALTH STABILITY: MENTAL HEALTH: HOW OFTEN DO YOU HAVE 6 OR MORE DRINKS ON ONE OCCASION?: NOT ASKED

## 2021-04-08 SDOH — ECONOMIC STABILITY: FOOD INSECURITY: WITHIN THE PAST 12 MONTHS, YOU WORRIED THAT YOUR FOOD WOULD RUN OUT BEFORE YOU GOT MONEY TO BUY MORE.: NOT ASKED

## 2021-04-08 SDOH — ECONOMIC STABILITY: FOOD INSECURITY: WITHIN THE PAST 12 MONTHS, THE FOOD YOU BOUGHT JUST DIDN'T LAST AND YOU DIDN'T HAVE MONEY TO GET MORE.: NOT ASKED

## 2021-04-08 ASSESSMENT — PAIN SCALES - GENERAL: PAINLEVEL: NO PAIN (0)

## 2021-04-08 ASSESSMENT — ENCOUNTER SYMPTOMS
CARDIOVASCULAR NEGATIVE: 1
EYES NEGATIVE: 1
GASTROINTESTINAL NEGATIVE: 1
TINGLING: 1
MUSCULOSKELETAL NEGATIVE: 1
CONSTITUTIONAL NEGATIVE: 1
INSOMNIA: 1
RESPIRATORY NEGATIVE: 1

## 2021-04-08 NOTE — PROGRESS NOTES
HPI  Date: 2021   Age: 40 year old  Ethnicity:   Sex: female  : 1980   Lives In: [place], [place]      Diagnosis: Metastatic Carcinoma Cervix    Prior radiation therapy:   Site Treated: pelvis  Facility: [facility] Dr. Kerns  Dates: 19 thru 20  Dose: 4500cGy in 25 fractions      Prior chemotherapy:   See Below    RN time with patient: 50 minutes  Educated on Gamma knife: Gamma Knife    Doctors: Dr. Martín Myrick, Dr. Jluis Canela, Dr. Tyrese Bowie (PCP)    Pain at time of consult: None  Does pt have a living will: No  Is patient pregnant: had a hysterectomy  Does pt have implanted cardiac device: No    Has pt fallen in past week: No  Does pt feel steady on feet: Yes      Review Since Diagnosis:    ; abnormal pap smears while serving in [employer] in [place]    2007, abnormal pap smear, showed a high grade squamous intraepithelial lesion, positive for high risk HPV subtype    2007; colposcopy done with cervical biopsies which showed features consistent with papillary serous adenocarcinoma    10/15/07; LEEP procedure (Loop Electrosurgical Excision Procedure), showed non invasive adenocarcinoma     10/19/07; radical hysterectomy with lymph node dissection, path showed stage IA2 adenocarcinoma of cervix     Surveillance    ; incidental finding of enlarged pericaval lymph node    19; to ED at [facility] due to severe right upper quadrant pain, N/V, found cholelithiasis.  Led to CT Abd/Pelvis, postsurgical changes hysterectomy, left adnexal cysts thought to be ovarian and enlarged pericaval lyph node suspicious for mets     10/15/19; right retroperitoneal lymph node biopsy showed metastatic cervical carcinoma     19; had para aortic resection, pos for carcinoma in 3/4 lymph nodes, consistent with recurrent endocervical adenocarcinoma    19 thru 20; chemo radiation, weekly Cisplatin with radiation     ; CT right pelvic lymph nodes decreased in  size, no new mets     10/13/20; CT recurrent right external iliac lymphadenopathy    10/30/20; PET, increase size of right external iliac chain lymph node, hypermetabolic right subpectoral and axillary lymph nodes    Plan to do axillary ultrasound at breast center with tissue sampling but at biopsy node shrunk considerably so felt to be inflammation     1/18/21; Dr. Jluis Canela saw pt for surveillance visit, feels is doing well, has had three episodes in past month of fever, anorexia and fatigue which last about 24 hours and then resolve.  Wants CT/PET     1/29/21;PET, stable lymph node right hemipelvis since PET 11/25/20, suspicious for mets but stable, thymic activation anterior mediastinum unchanged, previously hypermetabolic right axillary and subpectoral lymph nodes no longer active    March; 2021; headaches and unable to bend down without losing balance     3/8/21; Brain MRI, 2.7 cm solid/cystic mass right cerebellum, some edema     3/12/21; saw Dr. Myrick, discussed options, favored surgery    3/15/21; crani by Dr. Myrick for gross total resection of met in cerebellum, biopsy showed metastatic carcinoma, consistent with cervical met     3/16/21; Brain MRI, postsurgical resection    Chief Complaint: at consult    Review of Systems   Constitutional: Negative.    HENT: Negative.    Eyes: Negative.    Respiratory: Negative.    Cardiovascular: Negative.    Gastrointestinal: Negative.    Genitourinary: Negative.    Musculoskeletal: Negative.    Skin: Negative.    Neurological: Positive for tingling.        Tingling in left upper leg. Symptom occurred after the crani surgery on 3/15/21. Feel it is related to positioning during surgery. It is improving. No issues with ambulation   Endo/Heme/Allergies: Negative.    Psychiatric/Behavioral: The patient has insomnia.

## 2021-04-08 NOTE — PROGRESS NOTES
Name:Francesca Rowland  :1980  Medical Record # [id number]  Diagnosis:C77.8  Date of Treatment Plannin2021    Gamma Knife Simulation Note       After thorough review of this patient s clinical and radiographic data, I determined Fractionated Stereotactic Radiosurgery using the ICON Gamma Knife was indicated as explained in my consult with the patient.       The patient was brought to the Gamma Knife suite.  The MASK was fabricated consisting of an accuform mold at the posterior skull and a special aquaplast mask over the face and chin.        The High Definition Motion Management (HDMM) system consists of an infrared stereoscopic camera, a set of reference markers, and a patient marker will be placed.    A cone beam CT scan will be done to use as reference and for fusion.      A stereotactic brain   planning MRI with gadolinium  will be performed.  The MRI images will be transferred electronically to the Gamma Knife treatment planning computer.  The Loop App Gamma Knife treatment planning software will be used to design the optimal treatment plan.       procedures will be done prior to treatment.        Bret Kerns MD

## 2021-04-08 NOTE — LETTER
2021         RE: Francesca Rowland  [address]  Located within [facility] [address]        Dear Colleague,    Thank you for referring your patient, Francesca Rowland, to the [facility] RADIATION ONCOLOGY GAMMA KNIFE. Please see a copy of my visit note below.    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 Canela    HISTORY OF PRESENT ILLNESS:     Ms. Rowland is a 40-year-old female, well acquainted to our clinic, who has a known history of cervical cancer.  She was first diagnosed in  and was definitively treated with radical hysterectomy, bilateral salpingectomy without oophorectomy, bilateral pelvic and periaortic lymph node dissections on 10/19/2007, with surgical pathology supporting disease stage of FIGO IB2. She finished surveillance in  without evidence of recurrence, but in 2019, she developed abdominal pain, nausea and vomiting, and workup with imaging revealed left adnexal cysts and an enlarged pericaval lymph node, which was biopsy proven to be recurrence of her cervical cancer. She underwent laparotomy and resection of the right pericaval node on 2019. A total of 4 nodes were resected, and 3 were found involved with the largest measuring 3.5 cm with PATRICIA. Restaging PET/CT on 2019 found at least 4 hypermetabolic lymph nodes along the right iliac chain but no other evidence of disease spread. She was therefore referred to us and underwent radiation  concurrent with cisplatin as outlined below.     She had since been followed with serial imaging studies and with Dr. Canela of Gynecology Oncology. The treated right pelvic nodes had decreased in size initially, but a right external iliac node was again found enlarged and hypermetabolic. A biopsy was planned. Additionally, there were suspicion for diease spread to right axilla per PET/CT on 10/30/2020, but the suspicious axillary nodes have decreased in size upon repeat CT later, and were favored to be reactive but not malignant.     Most recently, Ms. Rowland developed intractable headache and imbalance for 2 weeks. Ultimately a MRI brain was obtained on 3/8/2021, which revealed a heterogeneous mixed solid and cystic 2.7 cm mass in the right cerebellum. We were notified at that time for possible GK radiosurgery. Upon review of the imaging and assessment of her symptoms, it was recommended that she undergo surgical resection first to relieve the mass effect. Dr. Myrick of Neurosurgery brought her to the OR on 3/15/2021 for midline a suboccipital craniotomy, right ventriculostomy and stereotactic-guided resection of the mass. Gross total resection was noted by Dr. Myrick and confirmed with repeat MRI brain on 3/15/2021. Surgical pathology confirmed metastatic carcinoma with immunohistochemical profile consistent with known cervical adenocarcinoma ([id number]).    Ms. Rowland presents with her  today to discuss SRS to op bed for improved disease control. She is doing well, and has no headache, nausea/vomiting, dizziness, lightheadedness or imbalance. She has some tingling in her left leg but has no other symptoms.          PAST MEDICAL HISTORY:   Known cervical cancer, as outlined above.  Chronic cholecstitis, s/p cholecystectomy on 9/25/2019.     CHEMOTHERAPY HISTORY:   CDDP concurrent with RT 90 mg weekly from 12/23/2019 - 1/20/2020.      PAST RADIATION THERAPY HISTORY:   4500 cGy in 25 fractions to  the pelvis and paraaortic chains with simultaneous integrated boost of 5500 cGy in 25 fractions to the grossly positive lymph nodes and paraaortic yuridia bed, completed on 1/24/2020.          IMPLANTED DEVICES: None     AUTOIMMUNE/CONNECTIVE TISSUE DISORDERS: None     PREGNANCY RISK: S/p hysterectomy    MEDICATIONS: reviewed    ALLERGIES:  is allergic to emend [aprepitant] and prochlorperazine.    SOCIAL HISTORY:   Lives in [place], [place]. Works in [place] as an  for the [employer]. Former on-and -off smoker for 10 years, quit 2010. Occasional EtOH.       FAMILY HISTORY:   PGM - breast cancer  Maternal aunt- breast cancer  Sister - thyroid cancer    REVIEW OF SYMPTOMS:  A full 14-point review of systems was performed.     PHYSICAL EXAMINATION:    /70   Pulse 72   Resp 16   SpO2 95%     General: NAD, patient well appearing.  Extremities: No acute findings. No edema   Skin: color, texture and turgor wnl. No rashes and lesions.  Neuro:   MS: AAO x 3, appropriately interactive, normal affect, speech fluent  CN: II,III -- PERRLA, no visual field cut;   III,IV,VI -- EOMI, no ptosis;   V -- sensation intact to LT/PP; masseter function intact  VII -- no facial weakness/droop;   VIII -- hears finger rub equally bilaterally;   IX,X -- voice normal, palate elevates symmetrically,  XI -- SCM/trapezii 5/5 strength bilaterally;   XII -- tongue protrudes midline, no atrophy or fasciculation.  Motor: Normal tone, no tremor.   0-No movement. 1-Muscle contrac. 2-Moves on bed. 3-Antigrav. 4-Mildly weak. 5-Full.               Delt     Bicep   Tricep   FinAb  Ilopso  Hams  Quad   Gastr  TA      ExHL   R  5  5  5  5  5  5  5  5  5  5    L  5  5  5  5  5  5  5  5  5  5        Sensation: Normal sensation to LT intact bilaterally upper and lower extremities  Gait: Natural gait intact. Tandem gait revealed slight imbalance.     ECOG PS: 1    IMAGING:  Pre-op MRI 3/8/2021    Post-op MRI      PET/CT  1/29/2021      Planning MRI      ASSESSMENT:     Ms. Rowland  is a 40 year-old female with brain metastasis secondary to cervical cancer, s/p surgical resection.     RECOMMENDATIONS:     We discussed with the patient on our recommendation for gamma knife stereotactic radiosurgery to improve local control following resection. We plan to treat the post resection cavity with 5 fractions. As part of the planning process, a mask will be created for immobilization, and she is scheduled to undergo brain MRI with 1 mm fine slice. We specifically discussed the possibility of detecting additional intracranial diseases, and discussed the possibility of recommending whole brain radiation if her intracranial disease burden is significant.     We discussed the logistics, the technical aspects, and the side effects of radiation therapy. Overall, it is anticipated to be well tolerated, but there is a low risk of radionecrosis that might require further treatment and potentially surgical resection. All of their questions were answered and the patient signed informed consent. Mask was made and brain MRI was done afterwards. The patient will start treatment on 4/12/2021.     Tentatively, we are planning to deliver 3,000 cGy to the resection cavity in 5 daily fractions. The specifics of her treatment will be finalized upon completion of treatment planning.    Thank you for allowing us to participate in this patient's care.  Please feel free to call with any questions or concerns.     The patient was seen and discussed with staff, Dr. Kerns.    Quang Lua MD  Resident, PGY-3  Department of Radiation Oncology  [facility]           I saw and examined the patient with the resident.  I have reviewed and edited the resident's note and agree with the plan of care.      I reviewed patient's chart, internal/external medical records, imaging studies (including actual images), labs and pathology reports.  I interviewed and  counseled the patient face to face.  I additionally ordered tests and discussed the case with patient's referring physicians and care team (Dr. Myrick, Dr. Padilla).    Reviewing of the planning MRI revealed an additional 3 mm lesion in the right occipital lobe.  This lesion will also be treated with GK SRS using a single fraction.      90 minutes were spent on the date of the encounter doing chart review, history and exam, documentation and further activities as noted above.       Bret Kerns MD      CC  Patient Care Team:  Tyrese Bowie as PCP - General (Family Medicine)  Tawana Rivera, RN as Specialty Care Coordinator (Gynecologic Oncology)  Jluis Canela MD as MD (Gynecologic Oncology)  Bret Kerns MD as Assigned Cancer Care Provider  Martín Myrick MD as Assigned Neuroscience Provider        HPI  Date: 2021   Age: 40 year old  Ethnicity:   Sex: female  : 1980   Lives In: [place], [place]      Diagnosis: Metastatic Carcinoma Cervix    Prior radiation therapy:   Site Treated: pelvis  Facility: [facility] Dr. Kerns  Dates: 19 thru 20  Dose: 4500cGy in 25 fractions      Prior chemotherapy:   See Below    RN time with patient: 50 minutes  Educated on Gamma knife: Gamma Knife    Doctors: Dr. Martín Myrick, Dr. Jluis Canela, Dr. Tyrese Bowie (PCP)    Pain at time of consult: None  Does pt have a living will: No  Is patient pregnant: had a hysterectomy  Does pt have implanted cardiac device: No    Has pt fallen in past week: No  Does pt feel steady on feet: Yes      Review Since Diagnosis:    ; abnormal pap smears while serving in  in [place]    2007, abnormal pap smear, showed a high grade squamous intraepithelial lesion, positive for high risk HPV subtype    2007; colposcopy done with cervical biopsies which showed features consistent with papillary serous adenocarcinoma    10/15/07; LEEP procedure (Loop Electrosurgical  Excision Procedure), showed non invasive adenocarcinoma     10/19/07; radical hysterectomy with lymph node dissection, path showed stage IA2 adenocarcinoma of cervix     Surveillance    9/19; incidental finding of enlarged pericaval lymph node    9/14/19; to ED at [facility] due to severe right upper quadrant pain, N/V, found cholelithiasis.  Led to CT Abd/Pelvis, postsurgical changes hysterectomy, left adnexal cysts thought to be ovarian and enlarged pericaval lyph node suspicious for mets     10/15/19; right retroperitoneal lymph node biopsy showed metastatic cervical carcinoma     11/13/19; had para aortic resection, pos for carcinoma in 3/4 lymph nodes, consistent with recurrent endocervical adenocarcinoma    12/23/19 thru 1/24/20; chemo radiation, weekly Cisplatin with radiation     2/20; CT right pelvic lymph nodes decreased in size, no new mets     10/13/20; CT recurrent right external iliac lymphadenopathy    10/30/20; PET, increase size of right external iliac chain lymph node, hypermetabolic right subpectoral and axillary lymph nodes    Plan to do axillary ultrasound at breast center with tissue sampling but at biopsy node shrunk considerably so felt to be inflammation     1/18/21; Dr. Jluis Canela saw pt for surveillance visit, feels is doing well, has had three episodes in past month of fever, anorexia and fatigue which last about 24 hours and then resolve.  Wants CT/PET     1/29/21;PET, stable lymph node right hemipelvis since PET 11/25/20, suspicious for mets but stable, thymic activation anterior mediastinum unchanged, previously hypermetabolic right axillary and subpectoral lymph nodes no longer active    March; 2021; headaches and unable to bend down without losing balance     3/8/21; Brain MRI, 2.7 cm solid/cystic mass right cerebellum, some edema     3/12/21; saw Dr. Myrick, discussed options, favored surgery    3/15/21; crani by Dr. Myrick for gross total resection of met in cerebellum, biopsy  showed metastatic carcinoma, consistent with cervical met     3/16/21; Brain MRI, postsurgical resection    Chief Complaint: at consult    Review of Systems   Constitutional: Negative.    HENT: Negative.    Eyes: Negative.    Respiratory: Negative.    Cardiovascular: Negative.    Gastrointestinal: Negative.    Genitourinary: Negative.    Musculoskeletal: Negative.    Skin: Negative.    Neurological: Positive for tingling.        Tingling in left upper leg. Symptom occurred after the crani surgery on 3/15/21. Feel it is related to positioning during surgery. It is improving. No issues with ambulation   Endo/Heme/Allergies: Negative.    Psychiatric/Behavioral: The patient has insomnia.          Again, thank you for allowing me to participate in the care of your patient.        Sincerely,        rBet Kerns MD

## 2021-04-08 NOTE — LETTER
2021         RE: Francesca Rowland  [address]   [place] [address]        Dear Colleague,    Thank you for referring your patient, Francesca Rowland, to the [facility] RADIATION ONCOLOGY GAMMA KNIFE. Please see a copy of my visit note below.    Name:Francesca Rowland  :1980  Medical Record # [id number]  Diagnosis:C77.8  Date of Treatment Plannin2021    Gamma Knife Simulation Note       After thorough review of this patient s clinical and radiographic data, I determined Fractionated Stereotactic Radiosurgery using the ICON Gamma Knife was indicated as explained in my consult with the patient.       The patient was brought to the Gamma Knife suite.  The MASK was fabricated consisting of an accuform mold at the posterior skull and a special aquaplast mask over the face and chin.        The High Definition Motion Management (HDMM) system consists of an infrared stereoscopic camera, a set of reference markers, and a patient marker will be placed.    A cone beam CT scan will be done to use as reference and for fusion.      A stereotactic brain   planning MRI with gadolinium  will be performed.  The MRI images will be transferred electronically to the Gamma Knife treatment planning computer.  The Promethean Gamma Knife treatment planning software will be used to design the optimal treatment plan.       procedures will be done prior to treatment.        Bret Kerns MD      Again, thank you for allowing me to participate in the care of your patient.        Sincerely,        Bret Kerns MD

## 2021-04-12 ENCOUNTER — OFFICE VISIT (OUTPATIENT)
Dept: RADIATION ONCOLOGY | Facility: CLINIC | Age: 41
End: 2021-04-12
Attending: NEUROLOGICAL SURGERY
Payer: COMMERCIAL

## 2021-04-12 ENCOUNTER — OFFICE VISIT (OUTPATIENT)
Dept: RADIATION ONCOLOGY | Facility: CLINIC | Age: 41
End: 2021-04-12
Attending: RADIOLOGY
Payer: COMMERCIAL

## 2021-04-12 DIAGNOSIS — C79.31 METASTASIS TO BRAIN (H): Primary | ICD-10-CM

## 2021-04-12 DIAGNOSIS — C79.31 BRAIN METASTASIS: Primary | ICD-10-CM

## 2021-04-12 PROCEDURE — 77263 THER RADIOLOGY TX PLNG CPLX: CPT | Performed by: RADIOLOGY

## 2021-04-12 PROCEDURE — 77370 RADIATION PHYSICS CONSULT: CPT | Performed by: RADIOLOGY

## 2021-04-12 PROCEDURE — 77300 RADIATION THERAPY DOSE PLAN: CPT | Mod: 26 | Performed by: RADIOLOGY

## 2021-04-12 PROCEDURE — 77470 SPECIAL RADIATION TREATMENT: CPT | Mod: 26 | Performed by: RADIOLOGY

## 2021-04-12 PROCEDURE — 77300 RADIATION THERAPY DOSE PLAN: CPT | Performed by: RADIOLOGY

## 2021-04-12 PROCEDURE — 77470 SPECIAL RADIATION TREATMENT: CPT | Performed by: RADIOLOGY

## 2021-04-12 PROCEDURE — 77334 RADIATION TREATMENT AID(S): CPT | Performed by: RADIOLOGY

## 2021-04-12 PROCEDURE — 77373 STRTCTC BDY RAD THER TX DLVR: CPT | Performed by: RADIOLOGY

## 2021-04-12 PROCEDURE — 77295 3-D RADIOTHERAPY PLAN: CPT | Mod: 26 | Performed by: RADIOLOGY

## 2021-04-12 PROCEDURE — 77334 RADIATION TREATMENT AID(S): CPT | Mod: 26 | Performed by: RADIOLOGY

## 2021-04-12 PROCEDURE — 77295 3-D RADIOTHERAPY PLAN: CPT | Performed by: RADIOLOGY

## 2021-04-12 NOTE — LETTER
2021         RE: Francesca Rowland  [address]  formerly [facility] [address]        Dear Colleague,    Thank you for referring your patient, Francesca Rowland, to the [facility] RADIATION ONCOLOGY GAMMA KNIFE. Please see a copy of my visit note below.    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   2aa R Occipital 2,000 cGy to 50% [place] 60 21  0 1              Fraction 1 of 5  Treatment Summary:  Radiation Oncology - Course: 2 Protocol:    Treatment Site Current Dose Modality From To Elapsed Days Fx.   2a R Cerebel Cavity 500 cGy [place] 60 21  0 1       DESCRIPTION OF PROCEDURE:    On 2021 the patient was brought to the [place] Gamma Knife IconTM suite at [facility] and treated with fractionated stereotactic radiotherapy.     The [place] Gamma Knife IconTM Plan software was used to create a highly conformal dose distribution using the number and size collimators detailed above.     TREATMENT:    The patient was brought to the Gamma Knife suite. A timeout was performed to confirm the correct patient and correct procedure.    Patient was identified by 2 methods.  Site was verified.    The mask was placed and a cone beam CT was done and fused to the previously approved plan.        The physicist and I evaluated the fusion and confirmed the target coverage after auto-registration.      The treatment was delivered using the [place] Gamma Knife IconTM without complication.      The mask was removed and the patient was discharged  home in stable condition.    The patient tolerated the treatment well and had no complications.    Approved by:  Bret Kerns MD        Again, thank you for allowing me to participate in the care of your patient.        Sincerely,        Bret Kerns MD

## 2021-04-12 NOTE — PROGRESS NOTES
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   2aa R Occipital 2,000 cGy to 50% [place] 60 21  0 1              Fraction 1 of 5  Treatment Summary:  Radiation Oncology - Course: 2 Protocol:    Treatment Site Current Dose Modality From To Elapsed Days Fx.   2a R Cerebel Cavity 500 cGy [place] 60 21  0 1       DESCRIPTION OF PROCEDURE:    On 2021 the patient was brought to the [facility] Gamma Knife IconTM suite at [facility] and treated with fractionated stereotactic radiotherapy.     The [place] Gamma Knife IconTM Plan software was used to create a highly conformal dose distribution using the number and size collimators detailed above.     TREATMENT:    The patient was brought to the Gamma Knife suite. A timeout was performed to confirm the correct patient and correct procedure.    Patient was identified by 2 methods.  Site was verified.    The mask was placed and a cone beam CT was done and fused to the previously approved plan.        The physicist and I evaluated the fusion and confirmed the target coverage after auto-registration.      The treatment was delivered using the [place] Gamma Knife IconTM without complication.      The mask was removed and the patient was discharged home in stable condition.    The patient tolerated the treatment well and had no complications.    Approved by:  Bret Kerns MD

## 2021-04-12 NOTE — LETTER
4/12/2021         RE: Francesca Rowland  [address]  [facility] [address]        Dear Colleague,    Thank you for referring your patient, Francesca Rowland, to the [facility] RADIATION ONCOLOGY GAMMA KNIFE. Please see a copy of my visit note below.    [facility]  Department of Neurosurgery  Operative Report      DATE OF INITIAL TREATMENT: 4/12/2021    PROCEDURES:  1. Stereotactic MRI of the Brain  2. Generation of Stereotactic Treatment Plan  3. Gamma Knife Treatment (two lesions)    RADIATION ONCOLOGIST: Bret Kerns MD    RADIATION PHYSICIST:  Jennifer Miguel PhD    NEUROSURGEON: Martín Myrick MD     INDICATIONS FOR PROCEDURE:  Patient is a 40 year old female with history of cervical cancer diagnosed in 2007 with known metastases to the retroperitoneal lymph nodes. She has done well until she developed new headaches and an MRI brain showed a large cerebellar mass. She underwent a midline suboccipital craniotomy with ventriculostomy with me on 3/15/2021. She did well postoperatively and her ventriculostomy was weaned. Pathology was consistent with metastasis. We discussed the role of fractionated postoperative radiotherapy to improve local control rates. After a full discussion of risks, benefits and alternatives, patient requested we proceed.    DESCRIPTION OF PROCEDURE:    After obtaining informed consent, the patient had an Aquaplast mask generated for fractionated stereotactic radiotherapy.  We obtained an initial stereotactic MRI on 4/8/2021 with thin sections and used this to generate our initial treatment plan. The stereotactic MRI was transferred to the Gamma Knife treatment planning station, and a three-dimensional stereotactic treatment plan was generated for the resection cavity of the metastasis by the radiation oncology and neurosurgery teams. We outlined the resection cavity to create a three dimensional volume representation. A 1 mm margin was added to the lesion.  The right cerebellar cavity plan had 10 shots of mixed collimator sizes, treated to a dose of 30 Gy to the 50% isodose line. Volume was 4.86cm3 treated over 5 fractions. A second new right occipital lesion was identified which was consistent with metastasis as well, so a target was drawn around this lesion too. The right occipital lesion plan was made with two shots using a 4mm collimator size, treated to a dose of 20 Gy at the 50% isodose line. Volume was 0.125cm3 treated in 1 fraction. The plan was then turned over to our radiation physicist for .  Our radiation oncology colleagues then prescribed the radiation doses.  Maximum dimension was 2.2cm.     I was present for all neurosurgical portions of the procedure.    Martín Myrick MD

## 2021-04-13 ENCOUNTER — OFFICE VISIT (OUTPATIENT)
Dept: RADIATION ONCOLOGY | Facility: CLINIC | Age: 41
End: 2021-04-13
Attending: RADIOLOGY
Payer: COMMERCIAL

## 2021-04-13 DIAGNOSIS — C79.31 BRAIN METASTASIS: Primary | ICD-10-CM

## 2021-04-13 PROCEDURE — 77373 STRTCTC BDY RAD THER TX DLVR: CPT | Performed by: RADIOLOGY

## 2021-04-13 NOTE — PROGRESS NOTES
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   2a R Cerebel Cavity 1,000 cGy [place] 60 21  1 2       DESCRIPTION OF PROCEDURE:    On 2021 the patient was brought to the [facility] Gamma Knife IconTM suite at [facility] and treated with fractionated stereotactic radiotherapy.     The [employer] Gamma Knife IconTM Plan software was used to create a highly conformal dose distribution using the number and size collimators detailed above.     TREATMENT:    The patient was brought to the Gamma Knife suite. A timeout was performed to confirm the correct patient and correct procedure.    Patient was identified by 2 methods.  Site was verified.    The mask was placed and a cone beam CT was done and fused to the previously approved plan.        The physicist and I evaluated the fusion and confirmed the target coverage after auto-registration.      The treatment was delivered using the [place] Gamma Knife IconTM without complication.      The mask was removed and the patient was discharged home in stable condition.    The patient tolerated the treatment well and had no complications.    Approved by:  LARRY Cordova MD

## 2021-04-13 NOTE — LETTER
2021         RE: Francesca Rowland  [address]  [facility] [address]        Dear Colleague,    Thank you for referring your patient, Francesca Rowland, to the [facility] RADIATION ONCOLOGY GAMMA KNIFE. Please see a copy of my visit note below.      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   2a R Cerebel Cavity 1,000 cGy [place] 60 21  1 2       DESCRIPTION OF PROCEDURE:    On 2021 the patient was brought to the [facility] Gamma Knife IconTM suite at [facility] and treated with fractionated stereotactic radiotherapy.     The [facility] Gamma Knife IconTM Plan software was used to create a highly conformal dose distribution using the number and size collimators detailed above.     TREATMENT:    The patient was brought to the Gamma Knife suite. A timeout was performed to confirm the correct patient and correct procedure.    Patient was identified by 2 methods.  Site was verified.    The mask was placed and a cone beam CT was done and fused to the previously approved plan.        The physicist and I evaluated the fusion and confirmed the target coverage after auto-registration.      The treatment was delivered using the [place] Gamma Knife IconTM without complication.      The mask was removed and the patient was discharged home in stable condition.    The patient tolerated the treatment well and had no complications.    Approved by:  LARRY Cordova MD        Again, thank you for allowing me to participate in the care of your  patient.        Sincerely,        Zeke Cordova MD

## 2021-04-13 NOTE — PROGRESS NOTES
[facility]  Department of Neurosurgery  Operative Report      DATE OF INITIAL TREATMENT: 4/12/2021    PROCEDURES:  1. Stereotactic MRI of the Brain  2. Generation of Stereotactic Treatment Plan  3. Gamma Knife Treatment (two lesions)    RADIATION ONCOLOGIST: Bret Kerns MD    RADIATION PHYSICIST:  Jennifer Miguel PhD    NEUROSURGEON: Martín Myrick MD     INDICATIONS FOR PROCEDURE:  Patient is a 40 year old female with history of cervical cancer diagnosed in 2007 with known metastases to the retroperitoneal lymph nodes. She has done well until she developed new headaches and an MRI brain showed a large cerebellar mass. She underwent a midline suboccipital craniotomy with ventriculostomy with me on 3/15/2021. She did well postoperatively and her ventriculostomy was weaned. Pathology was consistent with metastasis. We discussed the role of fractionated postoperative radiotherapy to improve local control rates. After a full discussion of risks, benefits and alternatives, patient requested we proceed.    DESCRIPTION OF PROCEDURE:    After obtaining informed consent, the patient had an Aquaplast mask generated for fractionated stereotactic radiotherapy.  We obtained an initial stereotactic MRI on 4/8/2021 with thin sections and used this to generate our initial treatment plan. The stereotactic MRI was transferred to the Gamma Knife treatment planning station, and a three-dimensional stereotactic treatment plan was generated for the resection cavity of the metastasis by the radiation oncology and neurosurgery teams. We outlined the resection cavity to create a three dimensional volume representation. A 1 mm margin was added to the lesion. The right cerebellar cavity plan had 10 shots of mixed collimator sizes, treated to a dose of 30 Gy to the 50% isodose line. Volume was 4.86cm3 treated over 5 fractions. A second new right occipital lesion was identified which was consistent with metastasis as well, so  a target was drawn around this lesion too. The right occipital lesion plan was made with two shots using a 4mm collimator size, treated to a dose of 20 Gy at the 50% isodose line. Volume was 0.125cm3 treated in 1 fraction. The plan was then turned over to our radiation physicist for .  Our radiation oncology colleagues then prescribed the radiation doses.  Maximum dimension was 2.2cm.     I was present for all neurosurgical portions of the procedure.    Martín Myrick MD

## 2021-04-14 ENCOUNTER — OFFICE VISIT (OUTPATIENT)
Dept: RADIATION ONCOLOGY | Facility: CLINIC | Age: 41
End: 2021-04-14
Attending: RADIOLOGY
Payer: COMMERCIAL

## 2021-04-14 DIAGNOSIS — C79.31 BRAIN METASTASIS: Primary | ICD-10-CM

## 2021-04-14 PROCEDURE — 77373 STRTCTC BDY RAD THER TX DLVR: CPT | Performed by: RADIOLOGY

## 2021-04-14 NOTE — PROGRESS NOTES
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  2a R Cerebel Cavity 1,500 cGy [place] 60 21  2 3    DESCRIPTION OF PROCEDURE:  On 2021 the patient was brought to the [facility] Gamma Knife IconTM suite at [facility] and treated with fractionated stereotactic radiotherapy.     The [facility] Gamma Knife IconTM Plan software was used to create a highly conformal dose distribution using the number and size collimators detailed above.     TREATMENT:    The patient was brought to the Gamma Knife suite. A timeout was performed to confirm the correct patient and correct procedure.    Patient was identified by 2 methods.  Site was verified.    The mask was placed and a cone beam CT was done and fused to the previously approved plan.        The physicist and I evaluated the fusion and confirmed the target coverage after auto-registration.      The treatment was delivered using the [facility] Gamma Knife IconTM without complication.      The mask was removed and the patient was discharged home in stable condition.    The patient tolerated the treatment well and had no complications.    Approved by:  Tawnya Craig MD

## 2021-04-14 NOTE — LETTER
2021         RE: Francesca Rowland  [address]  [facility] [address]        Dear Colleague,    Thank you for referring your patient, Francesca Rowland, to the [facility] RADIATION ONCOLOGY GAMMA KNIFE. Please see a copy of my visit note below.      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  2a R Cerebel Cavity 1,500 cGy [place] 60 21  2 3    DESCRIPTION OF PROCEDURE:  On 2021 the patient was brought to the [facility] Gamma Knife IconTM suite at [facility] and treated with fractionated stereotactic radiotherapy.     The [employer] Gamma Knife IconTM Plan software was used to create a highly conformal dose distribution using the number and size collimators detailed above.     TREATMENT:    The patient was brought to the Gamma Knife suite. A timeout was performed to confirm the correct patient and correct procedure.    Patient was identified by 2 methods.  Site was verified.    The mask was placed and a cone beam CT was done and fused to the previously approved plan.        The physicist and I evaluated the fusion and confirmed the target coverage after auto-registration.      The treatment was delivered using the [facility] Gamma Knife IconTM without complication.      The mask was removed and the patient was discharged home in stable condition.    The patient tolerated the treatment well and had no complications.    Approved by:  Tawnya Craig MD        Again, thank you for allowing me to participate in the care of your patient.         Sincerely,        Tawnya Craig MD

## 2021-04-15 ENCOUNTER — OFFICE VISIT (OUTPATIENT)
Dept: RADIATION ONCOLOGY | Facility: CLINIC | Age: 41
End: 2021-04-15
Attending: RADIOLOGY
Payer: COMMERCIAL

## 2021-04-15 DIAGNOSIS — C79.31 BRAIN METASTASIS: Primary | ICD-10-CM

## 2021-04-15 PROCEDURE — 77373 STRTCTC BDY RAD THER TX DLVR: CPT | Performed by: RADIOLOGY

## 2021-04-15 NOTE — LETTER
4/15/2021         RE: Francesca Rowland  [address]  [facility] [address]        Dear Colleague,    Thank you for referring your patient, Francesca Rowland, to the [facility] RADIATION ONCOLOGY GAMMA KNIFE. Please see a copy of my visit note below.    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  2a R Cerebel Cavity 2,000 cGy [place] 60 04/12/21 04/15/21  3 4      DESCRIPTION OF PROCEDURE:    On 4/15/2021 the patient was brought to the [facility] Gamma Knife IconTM suite at [facility] and treated with fractionated stereotactic radiotherapy.     The [facility] Gamma Knife IconTM Plan software was used to create a highly conformal dose distribution using the number and size collimators detailed above.     TREATMENT:    The patient was brought to the Gamma Knife suite. A timeout was performed to confirm the correct patient and correct procedure.    Patient was identified by 2 methods.  Site was verified.    The mask was placed and a cone beam CT was done and fused to the previously approved plan.        The physicist and I evaluated the fusion and confirmed the target coverage after auto-registration.      The treatment was delivered using the [facility] Gamma Knife IconTM without complication.      The mask was removed and the patient was discharged home in stable condition.    The patient tolerated the treatment well and had no complications.    Approved by:  Bret Kerns MD        Again, thank you for allowing me to participate in the care of your patient.         Sincerely,        Bret Kerns MD

## 2021-04-15 NOTE — PROGRESS NOTES
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  2a R Cerebel Cavity 2,000 cGy [place] 60 04/12/21 04/15/21  3 4      DESCRIPTION OF PROCEDURE:    On 4/15/2021 the patient was brought to the [facility] Gamma Knife IconTM suite at [facility] and treated with fractionated stereotactic radiotherapy.     The [facility] Gamma Knife IconTM Plan software was used to create a highly conformal dose distribution using the number and size collimators detailed above.     TREATMENT:    The patient was brought to the Gamma Knife suite. A timeout was performed to confirm the correct patient and correct procedure.    Patient was identified by 2 methods.  Site was verified.    The mask was placed and a cone beam CT was done and fused to the previously approved plan.        The physicist and I evaluated the fusion and confirmed the target coverage after auto-registration.      The treatment was delivered using the [facility] Gamma Knife IconTM without complication.      The mask was removed and the patient was discharged home in stable condition.    The patient tolerated the treatment well and had no complications.    Approved by:  Bret Kerns MD

## 2021-04-16 ENCOUNTER — OFFICE VISIT (OUTPATIENT)
Dept: RADIATION ONCOLOGY | Facility: CLINIC | Age: 41
End: 2021-04-16
Attending: RADIOLOGY
Payer: COMMERCIAL

## 2021-04-16 VITALS
RESPIRATION RATE: 16 BRPM | DIASTOLIC BLOOD PRESSURE: 85 MMHG | SYSTOLIC BLOOD PRESSURE: 127 MMHG | OXYGEN SATURATION: 97 % | HEART RATE: 76 BPM

## 2021-04-16 DIAGNOSIS — C79.31 BRAIN METASTASIS: Primary | ICD-10-CM

## 2021-04-16 DIAGNOSIS — C79.31 METASTASIS TO BRAIN (H): Primary | ICD-10-CM

## 2021-04-16 PROCEDURE — 77373 STRTCTC BDY RAD THER TX DLVR: CPT | Performed by: RADIOLOGY

## 2021-04-16 PROCEDURE — 77336 RADIATION PHYSICS CONSULT: CPT | Performed by: RADIOLOGY

## 2021-04-16 PROCEDURE — 77435 SBRT MANAGEMENT: CPT | Performed by: RADIOLOGY

## 2021-04-16 NOTE — PROGRESS NOTES
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  2a R Cerebel Cavity 2,500 cGy [place] 60 21  4 5      DESCRIPTION OF PROCEDURE:    On 2021 the patient was brought to the [facility] Gamma Knife IconTM suite at [facility] and treated with fractionated stereotactic radiotherapy.     The [employer] Gamma Knife IconTM Plan software was used to create a highly conformal dose distribution using the number and size collimators detailed above.     TREATMENT:    The patient was brought to the Gamma Knife suite. A timeout was performed to confirm the correct patient and correct procedure.    Patient was identified by 2 methods.  Site was verified.    The mask was placed and a cone beam CT was done and fused to the previously approved plan.        The physicist and I evaluated the fusion and confirmed the target coverage after auto-registration.      The treatment was delivered using the [facility] Gamma Knife IconTM without complication.      The mask was removed and the patient was discharged home in stable condition.    The patient tolerated the treatment well and had no complications.    Approved by:  Meng Horton MD

## 2021-04-19 ENCOUNTER — TELEPHONE (OUTPATIENT)
Dept: RADIATION ONCOLOGY | Facility: CLINIC | Age: 41
End: 2021-04-19

## 2021-04-19 ENCOUNTER — ONCOLOGY VISIT (OUTPATIENT)
Dept: ONCOLOGY | Facility: CLINIC | Age: 41
End: 2021-04-19
Attending: OBSTETRICS & GYNECOLOGY
Payer: COMMERCIAL

## 2021-04-19 VITALS
DIASTOLIC BLOOD PRESSURE: 79 MMHG | SYSTOLIC BLOOD PRESSURE: 124 MMHG | TEMPERATURE: 98.3 F | RESPIRATION RATE: 16 BRPM | OXYGEN SATURATION: 97 % | HEART RATE: 76 BPM | WEIGHT: 225 LBS | BODY MASS INDEX: 36.16 KG/M2 | HEIGHT: 66 IN

## 2021-04-19 DIAGNOSIS — C53.0 MALIGNANT NEOPLASM OF ENDOCERVIX (H): Primary | ICD-10-CM

## 2021-04-19 PROCEDURE — 99214 OFFICE O/P EST MOD 30 MIN: CPT | Performed by: OBSTETRICS & GYNECOLOGY

## 2021-04-19 PROCEDURE — G0463 HOSPITAL OUTPT CLINIC VISIT: HCPCS

## 2021-04-19 ASSESSMENT — MIFFLIN-ST. JEOR: SCORE: 1697.34

## 2021-04-19 ASSESSMENT — PAIN SCALES - GENERAL: PAINLEVEL: NO PAIN (0)

## 2021-04-19 NOTE — TELEPHONE ENCOUNTER
Called and spoke to patient for follow-up post gamma knife on 4/16/21. States generally feeling well. Some fatigue but no other concerns. Just leaving oncology appointment. Will start chemotherapy next week for spot on hip. Patient has our number to call if concerns.

## 2021-04-19 NOTE — PROGRESS NOTES
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   She is here today for surveillance visit.  She reports feeling well today.  She denies any vaginal discharge or bleeding. She denies changes in bowel or bladder habits.  She denies abdominal pain or LE edema/pain.  She does notes that she has had 3 episodes of fever, anorexia and fatigue over the last month.  She reports that the episode lasts for about 24 hours and then resolves.  She denies weight loss and reports a good appetite.  She has no other concerns today.     Oncology History:     Patient initially presented as a 27-year-old woman seen in referral due to a Pap smear showing high-grade squamous intraepithelial lesion that was positive for high risk HPV subtype in 2007. This Pap smear had been taken during her six week postpartum visit. Patient did have a remote history of abnormal Pap smears back in  while serving in [place] for the [employer].  Pap smears during her first pregnancy were all negative. Her Pap smear at the beginning of the most recent pregnancy was normal. Patient did have a colposcopy for evaluation of this abnormal Pap smear on 2007 and cervical biopsies of 6 and 12 o'clock position showed features consistent with papillary serous adenocarcinoma. Endocervical curettings were also taken which showed fragments of papillary serous carcinoma. The patient then underwent a LEEP procedure on 10/15/07 which showed microinvasive adenocarcinoma with a depth of invasion of 0.4 mm. Patient then made the decision to proceed with radical hysterectomy. She subsequently underwent a radical hysterectomy, bilateral salpingectomy, bilateral pelvic and periaortic lymph node dissection on 10/19/07. Patient's operative course was otherwise uncomplicated and she recoverred uneventfully.   Pathology did reveal patient to be stage IA2 adenocarcinoma of the cervix.      She underwent routine surveillence through 2014 complicated only by SIOBHAN 1     She recently presented after incidentally being found to have an enlarged pericaval lymph node.  She presented to the ED at [facility] on 9/14/2019 with severe 8.5/10 RUQ pain associated with nausea, one episode of emesis, and decreased appetite. RUQ demonstrated cholelithiasis without cholecystitis, so an abdominal and pelvic CT scan was obtained. This revealed post-surgical changes consistent with prior hysterectomy, left adnexal cysts thought to be ovarian, and an enlarged pericaval lymph node suspicious for metastatic disease vs primary lymphoma. Her condition stabilized and she was discharged home with referrals to general surgery and oncology for biliary colic and further management of the lymphadenopathy, respectively. CT-guided right retroperitoneal lymph node biopsy on 10/15/2019 was consistent with metastatic cervical carcinoma     She underwent surgery to remove the mass and determine the extent of disease on 11/13/19     PATH:  FINAL DIAGNOSIS:   LYMPH NODES, PARA-AORTIC, RESECTION:   - Positive for carcinoma in three of four lymph nodes examined (3/4),   consistent with recurrent endocervical   adenocarcinoma   - Largest metastatic focus is 3.5 cm, positive for extra yuridia extension         Chemo-Potentiated RT 12/2019-1/2020 2/2020 CT:  IMPRESSION: Postoperative changes of hysterectomy and lymph node  resection for cervical cancer. The metastatic right pelvic lymph nodes  which were previously identified have decreased in size. No new  metastases identified.      She recently presented to the ED for an acute RUQ pain episode, which has greatly improved despite no clear diagnosis.  During this visit she underwent a CAP CT which did not demonstrate evidence of cancer but a slightly enlarged spleen.  She  had a mild eosinophilia (~2%) but has  not traveled or eaten exotic food recently, and has not had any new contacts to suggest mono.     10/13/2020 CT:  IMPRESSION:  1.  Recurrent right external iliac lymphadenopathy.  2.  Rebound thymic hyperplasia.    10/30/2020 PET  IMPRESSION:   In this patient with history of metastatic cervical cancer status-post  radical hysterectomy and chemoradiation, there is evidence of  recurrent disease:  1. Increased size of a hypermetabolic right external iliac chain lymph node since 2/27/2020.  2. Hypermetabolic right subpectoral and axillary lymph nodes.  Recommend follow-up in the breast center for axillary ultrasound and  possible tissue sampling.  3. Idiopathic thymic activation/hyperplasia.    She was evaluated int he breast clinic with plan made for biopsy - however at biopsy the node had shruck considerably and was thus felt to be c/w inflammation (no biopsy obtained)    3/8/2021 MRI Brain (for dizziness)  IMPRESSION:  1. Heterogeneous mixed solid and cystic 2.7 cm mass in the right cerebellum, most likely a solitary metastatic lesion. There is moderate surrounding vasogenic edema as well as mass effect on the fourth ventricle. No obstructive hydrocephalus.    2. Unremarkable MR angiogram of the head and neck.    [facility]  1. Midline suboccipital craniotomy for resection of mass  2. Right frontal ventriculostomy  3. Stereotactic navigation  4. Use of operating microscope    4/16/21 Completed 5 fx of gamma knife (Luis F)      Review of Systems:    Systemic           no weight changes; no fever; no chills; no night sweats; no appetite changes  Skin           no rashes, or lesions  Eye           no irritation; no changes in vision  Payton-Laryngeal           no dysphagia; no hoarseness   Pulmonary    no cough; no shortness of breath  Cardiovascular    no chest pain; no palpitations  Gastrointestinal    no diarrhea; no constipation; no abdominal pain; no changes in bowel  habits; no blood in  stool  Genitourinary   no urinary frequency; no urinary urgency; no dysuria; no pain; no abnormal vaginal discharge; no abnormal vaginal bleeding  Breast   no breast discharge; no breast changes; no breast pain  Musculoskeletal    no myalgias; no arthralgias; no back pain  Psychiatric           no depressed mood; no anxiety    Hematologic           no tender lymph nodes; no noticeable swellings or lumps   Endocrine    no hot flashes; no heat/cold intolerance         Neurological   no tremor; no numbness and tingling; no headaches; no difficulty  sleeping      Past Medical History:    Past Medical History:   Diagnosis Date     Chronic cholecystitis 09/25/2019     History of cervical cancer 2007     Metastasis to brain (H) 03/08/2021     Metastatic adenocarcinoma to cervix (H) 10/28/2019    Added automatically from request for surgery [id number]         Past Surgical History:    Past Surgical History:   Procedure Laterality Date     CHOLECYSTECTOMY, LAPOROSCOPIC  09/25/2019     HYSTERECTOMY RADICAL  2007    PAUL     LAPAROSCOPIC LYMPHADENECTOMY N/A 11/13/2019    Procedure: LAPAROSCOPY, resection of of paraaortic lymph node, lysis of adhesions;  Surgeon: Jluis Canela MD;  Location: [facility] OR     OPTICAL TRACKING SYSTEM CRANIOTOMY, EXCISE TUMOR, COMBINED Bilateral 3/15/2021    Procedure: stealth assisted Midline suboccipital craniectomy/craniotomy for tumor;  Surgeon: Martín Myrick MD;  Location: [facility] OR     OPTICAL TRACKING SYSTEM VENTRICULOSTOMY Right 3/15/2021    Procedure: stealth assisted  right frontal ventriculostomy;  Surgeon: Martín Myrick MD;  Location: [facility] OR         Health Maintenance Due   Topic Date Due     PREVENTIVE CARE VISIT  Never done     ADVANCE CARE PLANNING  Never done     DEPRESSION ACTION PLAN  Never done     PHQ-9  Never done     Pneumococcal Vaccine: Pediatrics (0 to 5 Years) and At-Risk Patients (6 to 64 Years) (1 of 4 - PCV13) Never done     HIV SCREENING  Never done  "    HEPATITIS C SCREENING  Never done     PAP  07/14/2017       Current Medications:     Current Outpatient Medications   Medication Sig Dispense Refill     acetaminophen (TYLENOL) 325 MG tablet Take 2 tablets (650 mg) by mouth every 6 hours (Patient taking differently: Take 650 mg by mouth every 6 hours As needed) 24 tablet 0     Ascorbic Acid (VITAMIN C GUMMIE PO) Take by mouth daily       ibuprofen (ADVIL/MOTRIN) 200 MG tablet Take 1 tablet (200 mg) by mouth every 4 hours as needed for mild pain       multivitamin w/minerals (MULTI-VITAMIN) tablet Take 1 tablet by mouth daily           Allergies:        Allergies   Allergen Reactions     Emend [Aprepitant]      Prochlorperazine      \"i was told I turn blue\"        Social History:     Social History     Tobacco Use     Smoking status: Former Smoker     Types: Cigarettes     Smokeless tobacco: Never Used   Substance Use Topics     Alcohol use: Yes     Frequency: 2-3 times a week     Comment: one drink a week       History   Drug Use No         Family History:     The patient's family history is notable for:    Family History   Problem Relation Age of Onset     Aneurysm Mother      Breast Cancer Paternal Grandmother         bilat mastectomies     Breast Cancer Maternal Aunt         stage 1     Thyroid Cancer Sister 23         Physical Exam:     PS 1  VS: /79 (BP Location: Right arm, Patient Position: Chair, Cuff Size: Adult Large)   Pulse 76   Temp 98.3  F (36.8  C) (Oral)   Resp 16   Ht 1.676 m (5' 6\")   Wt 102.1 kg (225 lb)   SpO2 97%   BMI 36.32 kg/m       General Appearance: healthy and alert, no distress     HEENT:  no thyromegaly, no palpable nodules or masses; she has the stigmata of recent craniotomy and gamma knife surgery.        Cardiovascular: regular rate and rhythm, no gallops, rubs or murmurs     Respiratory: lungs clear, no rales, rhonchi or wheezes, normal diaphragmatic excursion    Musculoskeletal: extremities non tender and without " edema    Skin: no lesions or rashes     Neurological: normal gait, no gross defects     Psychiatric: appropriate mood and affect                               Hematological: normal cervical, supraclavicular and inguinal lymph nodes     Gastrointestinal:       abdomen soft, non-tender, non-distended, no organomegaly or masses. Laparoscopic scars well healed.     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   She is here today for surveillance visit.     A total of 30 minutes was spent with the patient, 20 minutes of which were spent in counseling the patient and/or treatment planning.    Plan:   1.)      CxCA with metastasis to the brain - we have discussed options and she is inclined to treat with chemo post surgery.  WE have discussed the implications of the blood brain barrier as well as recent data suggesting that that should not be a significant consideration.  I have recommended CDDP/Tax/Avastin and we have discussed the potential treatment risks.    I have had a lengthy (and upsetting) discussion with Francesca and her  about the uncertainties of her prognosis in the setting of brain mets; as well as the importance of having an advanced directive if she were to recur or have an unexpected neurologic event.  They are willing to begin these discussions and I have offered to help insomuch as they would like.    2.)        Genetic risk factors were assessed again and the patient has multiple family members with cancer diagnosed younger than age 50 including herself (age 40, but with likely HPV related disease) and her sister (thyroid cancer at age 23) among others.  I will ask her to meet with the genetic counseling division.     3.)        Labs and/or tests ordered include: none - will have her brain mass set for Foundation/Car[facility] testing                4.)         Health maintenance issues addressed today: None        Jluis Canela        CC  Patient Care Team:  Tyrese Bowie as PCP - General (Family Medicine)  Tawana Rivera, RN as Specialty Care Coordinator (Gynecologic Oncology)  Jluis Canela MD as MD (Gynecologic Oncology)  Bret Kerns MD as Assigned Cancer Care Provider  Martín Myrick MD as Assigned Neuroscience Provider

## 2021-04-19 NOTE — LETTER
2021         RE: Francesca Rowland  [address]  [place] [address]        Dear Colleague,    Thank you for referring your patient, Francesca Rowland, to the [facility]. Please see a copy of my visit note below.                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   She is here today for surveillance visit.  She reports feeling well today.  She denies any vaginal discharge or bleeding. She denies changes in bowel or bladder habits.  She denies abdominal pain or LE edema/pain.  She does notes that she has had 3 episodes of fever, anorexia and fatigue over the last month.  She reports that the episode lasts for about 24 hours and then resolves.  She denies weight loss and reports a good appetite.  She has no other concerns today.     Oncology History:     Patient initially presented as a 27-year-old woman seen in referral due to a Pap smear showing high-grade squamous intraepithelial lesion that was positive for high risk HPV subtype in 2007. This Pap smear had been taken during her six week postpartum visit. Patient did have a remote history of abnormal Pap smears back in  while serving in [place] for the [employer].  Pap smears during her first pregnancy were all negative. Her Pap smear at the beginning of the most recent pregnancy was normal. Patient did have a colposcopy for evaluation of this abnormal Pap smear on 2007 and cervical biopsies of 6 and 12 o'clock position showed features consistent with papillary serous adenocarcinoma. Endocervical curettings were also taken which showed fragments of papillary serous carcinoma. The patient then underwent a LEEP procedure on 10/15/07 which showed microinvasive adenocarcinoma with a depth of invasion of 0.4 mm. Patient then made the decision to proceed  with radical hysterectomy. She subsequently underwent a radical hysterectomy, bilateral salpingectomy, bilateral pelvic and periaortic lymph node dissection on 10/19/07. Patient's operative course was otherwise uncomplicated and she recoverred uneventfully.  Pathology did reveal patient to be stage IA2 adenocarcinoma of the cervix.      She underwent routine surveillence through 2014 complicated only by SIOBHAN 1     She recently presented after incidentally being found to have an enlarged pericaval lymph node.  She presented to the ED at [facility] on 9/14/2019 with severe 8.5/10 RUQ pain associated with nausea, one episode of emesis, and decreased appetite. RUQ demonstrated cholelithiasis without cholecystitis, so an abdominal and pelvic CT scan was obtained. This revealed post-surgical changes consistent with prior hysterectomy, left adnexal cysts thought to be ovarian, and an enlarged pericaval lymph node suspicious for metastatic disease vs primary lymphoma. Her condition stabilized and she was discharged home with referrals to general surgery and oncology for biliary colic and further management of the lymphadenopathy, respectively. CT-guided right retroperitoneal lymph node biopsy on 10/15/2019 was consistent with metastatic cervical carcinoma     She underwent surgery to remove the mass and determine the extent of disease on 11/13/19     PATH:  FINAL DIAGNOSIS:   LYMPH NODES, PARA-AORTIC, RESECTION:   - Positive for carcinoma in three of four lymph nodes examined (3/4),   consistent with recurrent endocervical   adenocarcinoma   - Largest metastatic focus is 3.5 cm, positive for extra yuridia extension         Chemo-Potentiated RT 12/2019-1/2020 2/2020 CT:  IMPRESSION: Postoperative changes of hysterectomy and lymph node  resection for cervical cancer. The metastatic right pelvic lymph nodes  which were previously identified have decreased in size. No new  metastases identified.      She recently presented  to the ED for an acute RUQ pain episode, which has greatly improved despite no clear diagnosis.  During this visit she underwent a CAP CT which did not demonstrate evidence of cancer but a slightly enlarged spleen.  She  had a mild eosinophilia (~2%) but has not traveled or eaten exotic food recently, and has not had any new contacts to suggest mono.     10/13/2020 CT:  IMPRESSION:  1.  Recurrent right external iliac lymphadenopathy.  2.  Rebound thymic hyperplasia.    10/30/2020 PET  IMPRESSION:   In this patient with history of metastatic cervical cancer status-post  radical hysterectomy and chemoradiation, there is evidence of  recurrent disease:  1. Increased size of a hypermetabolic right external iliac chain lymph node since 2/27/2020.  2. Hypermetabolic right subpectoral and axillary lymph nodes.  Recommend follow-up in the breast center for axillary ultrasound and  possible tissue sampling.  3. Idiopathic thymic activation/hyperplasia.    She was evaluated int he breast clinic with plan made for biopsy - however at biopsy the node had shruck considerably and was thus felt to be c/w inflammation (no biopsy obtained)    3/8/2021 MRI Brain (for dizziness)  IMPRESSION:  1. Heterogeneous mixed solid and cystic 2.7 cm mass in the right cerebellum, most likely a solitary metastatic lesion. There is moderate surrounding vasogenic edema as well as mass effect on the fourth ventricle. No obstructive hydrocephalus.    2. Unremarkable MR angiogram of the head and neck.    [facility]  1. Midline suboccipital craniotomy for resection of mass  2. Right frontal ventriculostomy  3. Stereotactic navigation  4. Use of operating microscope    4/16/21 Completed 5 fx of gamma knife (Luis F)      Review of Systems:    Systemic           no weight changes; no fever; no chills; no night sweats; no appetite changes  Skin           no rashes, or lesions  Eye           no irritation; no changes in  vision  Payton-Laryngeal           no dysphagia; no hoarseness   Pulmonary    no cough; no shortness of breath  Cardiovascular    no chest pain; no palpitations  Gastrointestinal    no diarrhea; no constipation; no abdominal pain; no changes in bowel  habits; no blood in stool  Genitourinary   no urinary frequency; no urinary urgency; no dysuria; no pain; no abnormal vaginal discharge; no abnormal vaginal bleeding  Breast   no breast discharge; no breast changes; no breast pain  Musculoskeletal    no myalgias; no arthralgias; no back pain  Psychiatric           no depressed mood; no anxiety    Hematologic           no tender lymph nodes; no noticeable swellings or lumps   Endocrine    no hot flashes; no heat/cold intolerance         Neurological   no tremor; no numbness and tingling; no headaches; no difficulty  sleeping      Past Medical History:    Past Medical History:   Diagnosis Date     Chronic cholecystitis 09/25/2019     History of cervical cancer 2007     Metastasis to brain (H) 03/08/2021     Metastatic adenocarcinoma to cervix (H) 10/28/2019    Added automatically from request for surgery [id number]         Past Surgical History:    Past Surgical History:   Procedure Laterality Date     CHOLECYSTECTOMY, LAPOROSCOPIC  09/25/2019     HYSTERECTOMY RADICAL  2007    PAUL     LAPAROSCOPIC LYMPHADENECTOMY N/A 11/13/2019    Procedure: LAPAROSCOPY, resection of of paraaortic lymph node, lysis of adhesions;  Surgeon: Jluis Canela MD;  Location:  OR     OPTICAL TRACKING SYSTEM CRANIOTOMY, EXCISE TUMOR, COMBINED Bilateral 3/15/2021    Procedure: stealth assisted Midline suboccipital craniectomy/craniotomy for tumor;  Surgeon: Martín Myrick MD;  Location:  OR     OPTICAL TRACKING SYSTEM VENTRICULOSTOMY Right 3/15/2021    Procedure: stealth assisted  right frontal ventriculostomy;  Surgeon: Martín Myrick MD;  Location:  OR         Health Maintenance Due   Topic Date Due     PREVENTIVE  "CARE VISIT  Never done     ADVANCE CARE PLANNING  Never done     DEPRESSION ACTION PLAN  Never done     PHQ-9  Never done     Pneumococcal Vaccine: Pediatrics (0 to 5 Years) and At-Risk Patients (6 to 64 Years) (1 of 4 - PCV13) Never done     HIV SCREENING  Never done     HEPATITIS C SCREENING  Never done     PAP  07/14/2017       Current Medications:     Current Outpatient Medications   Medication Sig Dispense Refill     acetaminophen (TYLENOL) 325 MG tablet Take 2 tablets (650 mg) by mouth every 6 hours (Patient taking differently: Take 650 mg by mouth every 6 hours As needed) 24 tablet 0     Ascorbic Acid (VITAMIN C GUMMIE PO) Take by mouth daily       ibuprofen (ADVIL/MOTRIN) 200 MG tablet Take 1 tablet (200 mg) by mouth every 4 hours as needed for mild pain       multivitamin w/minerals (MULTI-VITAMIN) tablet Take 1 tablet by mouth daily           Allergies:        Allergies   Allergen Reactions     Emend [Aprepitant]      Prochlorperazine      \"i was told I turn blue\"        Social History:     Social History     Tobacco Use     Smoking status: Former Smoker     Types: Cigarettes     Smokeless tobacco: Never Used   Substance Use Topics     Alcohol use: Yes     Frequency: 2-3 times a week     Comment: one drink a week       History   Drug Use No         Family History:     The patient's family history is notable for:    Family History   Problem Relation Age of Onset     Aneurysm Mother      Breast Cancer Paternal Grandmother         bilat mastectomies     Breast Cancer Maternal Aunt         stage 1     Thyroid Cancer Sister 23         Physical Exam:     PS 1  VS: /79 (BP Location: Right arm, Patient Position: Chair, Cuff Size: Adult Large)   Pulse 76   Temp 98.3  F (36.8  C) (Oral)   Resp 16   Ht 1.676 m (5' 6\")   Wt 102.1 kg (225 lb)   SpO2 97%   BMI 36.32 kg/m       General Appearance: healthy and alert, no distress     HEENT:  no thyromegaly, no palpable nodules or masses; she has the stigmata " of recent craniotomy and gamma knife surgery.        Cardiovascular: regular rate and rhythm, no gallops, rubs or murmurs     Respiratory: lungs clear, no rales, rhonchi or wheezes, normal diaphragmatic excursion    Musculoskeletal: extremities non tender and without edema    Skin: no lesions or rashes     Neurological: normal gait, no gross defects     Psychiatric: appropriate mood and affect                               Hematological: normal cervical, supraclavicular and inguinal lymph nodes     Gastrointestinal:       abdomen soft, non-tender, non-distended, no organomegaly or masses. Laparoscopic scars well healed.     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   She is here today for surveillance visit.     A total of 30 minutes was spent with the patient, 20 minutes of which were spent in counseling the patient and/or treatment planning.    Plan:   1.)      CxCA with metastasis to the brain - we have discussed options and she is inclined to treat with chemo post surgery.  WE have discussed the implications of the blood brain barrier as well as recent data suggesting that that should not be a significant consideration.  I have recommended CDDP/Tax/Avastin and we have discussed the potential treatment risks.    I have had a lengthy (and upsetting) discussion with Francesca and her  about the uncertainties of her prognosis in the setting of brain mets; as well as the importance of having an advanced directive if she were to recur or have an unexpected neurologic event.  They are willing to begin these discussions and I have offered to help insomuch as they would like.    2.)        Genetic risk factors were assessed again and the patient has multiple family members with cancer diagnosed younger than age 50 including herself (age 40, but with likely HPV related  disease) and her sister (thyroid cancer at age 23) among others.  I will ask her to meet with the genetic counseling division.     3.)        Labs and/or tests ordered include: none - will have her brain mass set for Foundation/[facility] testing                4.)        Health maintenance issues addressed today: None        Jluis Canela      CC  Patient Care Team:  Tyrese Bowie as PCP - General (Family Medicine)  Tawana Rivera, RN as Specialty Care Coordinator (Gynecologic Oncology)  Jluis Canela MD as MD (Gynecologic Oncology)  Bret Kerns MD as Assigned Cancer Care Provider  Martín Myrick MD as Assigned Neuroscience Provider

## 2021-04-19 NOTE — NURSING NOTE
"Oncology Rooming Note    April 19, 2021 9:31 AM   Francesca Rowland is a 40 year old female who presents for:    Chief Complaint   Patient presents with     Oncology Clinic Visit     [facility] RETURN - CERVICAL CANCER     Initial Vitals: /79 (BP Location: Right arm, Patient Position: Chair, Cuff Size: Adult Large)   Pulse 76   Temp 98.3  F (36.8  C) (Oral)   Resp 16   Ht 1.676 m (5' 6\")   Wt 102.1 kg (225 lb)   SpO2 97%   BMI 36.32 kg/m   Estimated body mass index is 36.32 kg/m  as calculated from the following:    Height as of this encounter: 1.676 m (5' 6\").    Weight as of this encounter: 102.1 kg (225 lb). Body surface area is 2.18 meters squared.  No Pain (0) Comment: Data Unavailable   No LMP recorded. Patient has had a hysterectomy.  Allergies reviewed: Yes  Medications reviewed: Yes    Medications: Medication refills not needed today.  Pharmacy name entered into [employer]: [id number] [place] [place] [facility] [address] [facility]    Clinical concerns: No new concerns. Dr. Canela was notified.      Idris Leonard LPN            "

## 2021-04-21 DIAGNOSIS — C79.82 METASTATIC ADENOCARCINOMA TO CERVIX (H): ICD-10-CM

## 2021-04-21 DIAGNOSIS — C53.0 MALIGNANT NEOPLASM OF ENDOCERVIX (H): Primary | ICD-10-CM

## 2021-04-21 DIAGNOSIS — C53.9 MALIGNANT NEOPLASM OF CERVIX, UNSPECIFIED SITE (H): Primary | ICD-10-CM

## 2021-04-21 RX ORDER — DEXTROSE, SODIUM CHLORIDE, AND POTASSIUM CHLORIDE 5; .45; .15 G/100ML; G/100ML; G/100ML
1000 INJECTION INTRAVENOUS ONCE
Status: CANCELLED
Start: 2021-04-28

## 2021-04-21 RX ORDER — NALOXONE HYDROCHLORIDE 0.4 MG/ML
.1-.4 INJECTION, SOLUTION INTRAMUSCULAR; INTRAVENOUS; SUBCUTANEOUS
Status: CANCELLED | OUTPATIENT
Start: 2021-04-28

## 2021-04-21 RX ORDER — DIPHENHYDRAMINE HYDROCHLORIDE 50 MG/ML
50 INJECTION INTRAMUSCULAR; INTRAVENOUS
Status: CANCELLED
Start: 2021-04-28

## 2021-04-21 RX ORDER — ALBUTEROL SULFATE 90 UG/1
1-2 AEROSOL, METERED RESPIRATORY (INHALATION)
Status: CANCELLED
Start: 2021-04-28

## 2021-04-21 RX ORDER — HEPARIN SODIUM (PORCINE) LOCK FLUSH IV SOLN 100 UNIT/ML 100 UNIT/ML
5 SOLUTION INTRAVENOUS
Status: CANCELLED | OUTPATIENT
Start: 2021-04-28

## 2021-04-21 RX ORDER — LORAZEPAM 2 MG/ML
1 INJECTION INTRAMUSCULAR EVERY 6 HOURS PRN
Status: CANCELLED | OUTPATIENT
Start: 2021-04-28

## 2021-04-21 RX ORDER — METHYLPREDNISOLONE SODIUM SUCCINATE 125 MG/2ML
125 INJECTION, POWDER, LYOPHILIZED, FOR SOLUTION INTRAMUSCULAR; INTRAVENOUS
Status: CANCELLED
Start: 2021-04-28

## 2021-04-21 RX ORDER — PALONOSETRON 0.05 MG/ML
0.25 INJECTION, SOLUTION INTRAVENOUS ONCE
Status: CANCELLED
Start: 2021-04-28

## 2021-04-21 RX ORDER — HEPARIN SODIUM,PORCINE 10 UNIT/ML
5 VIAL (ML) INTRAVENOUS
Status: CANCELLED | OUTPATIENT
Start: 2021-04-28

## 2021-04-21 RX ORDER — DIPHENHYDRAMINE HCL 25 MG
50 CAPSULE ORAL ONCE
Status: CANCELLED
Start: 2021-04-28

## 2021-04-21 RX ORDER — SODIUM CHLORIDE 9 MG/ML
1000 INJECTION, SOLUTION INTRAVENOUS CONTINUOUS PRN
Status: CANCELLED
Start: 2021-04-28

## 2021-04-21 RX ORDER — ALBUTEROL SULFATE 0.83 MG/ML
2.5 SOLUTION RESPIRATORY (INHALATION)
Status: CANCELLED | OUTPATIENT
Start: 2021-04-28

## 2021-04-21 RX ORDER — MEPERIDINE HYDROCHLORIDE 25 MG/ML
25 INJECTION INTRAMUSCULAR; INTRAVENOUS; SUBCUTANEOUS EVERY 30 MIN PRN
Status: CANCELLED | OUTPATIENT
Start: 2021-04-28

## 2021-04-21 RX ORDER — EPINEPHRINE 1 MG/ML
0.3 INJECTION, SOLUTION INTRAMUSCULAR; SUBCUTANEOUS EVERY 5 MIN PRN
Status: CANCELLED | OUTPATIENT
Start: 2021-04-28

## 2021-04-25 DIAGNOSIS — C79.82 METASTATIC ADENOCARCINOMA TO CERVIX (H): ICD-10-CM

## 2021-04-25 DIAGNOSIS — C53.0 MALIGNANT NEOPLASM OF ENDOCERVIX (H): ICD-10-CM

## 2021-04-25 LAB
SARS-COV-2 RNA RESP QL NAA+PROBE: NORMAL
SPECIMEN SOURCE: NORMAL

## 2021-04-25 PROCEDURE — U0005 INFEC AGEN DETEC AMPLI PROBE: HCPCS | Performed by: NURSE PRACTITIONER

## 2021-04-25 PROCEDURE — U0003 INFECTIOUS AGENT DETECTION BY NUCLEIC ACID (DNA OR RNA); SEVERE ACUTE RESPIRATORY SYNDROME CORONAVIRUS 2 (SARS-COV-2) (CORONAVIRUS DISEASE [COVID-19]), AMPLIFIED PROBE TECHNIQUE, MAKING USE OF HIGH THROUGHPUT TECHNOLOGIES AS DESCRIBED BY CMS-2020-01-R: HCPCS | Performed by: NURSE PRACTITIONER

## 2021-04-26 ENCOUNTER — PATIENT OUTREACH (OUTPATIENT)
Dept: ONCOLOGY | Facility: CLINIC | Age: 41
End: 2021-04-26

## 2021-04-26 NOTE — PROGRESS NOTES
Francesca is a 40 year old who is being evaluated via a billable telephone visit.      What phone number would you like to be contacted at? [phone]  How would you like to obtain your AVS? Arthur Naranjo CMA 2021  7:41 AM     Phone call duration: 24 minutes                    Follow Up Notes on Referred Patient    Date: 2021       Dr. Jluis Canela MD  [address] [place] [id number]       RE: Francesca Rowland  : 1980  BEST: 2021    Dear Dr. 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 She is here today for follow up and disease management. In light of the recent COVID19 pandemic, and in accordance with our group and national guidelines this patients care has been reviewed and it is felt that presenting for her visit would be more likely to increase rather than decrease her relative risks. Therefore this visit was conducted by telephone.      Oncology History:     Patient initially presented as a 27-year-old woman seen in referral due to a Pap smear showing high-grade squamous intraepithelial lesion that was positive for high risk HPV subtype in 2007. This Pap smear had been taken during her six week postpartum visit. Patient did have a remote history of abnormal Pap smears back in  while serving in [place] for the [employer].  Pap smears during her first pregnancy were all negative. Her Pap smear at the beginning of the most recent pregnancy was normal. Patient did have a colposcopy for evaluation of this abnormal Pap smear on 2007 and cervical biopsies of 6 and 12 o'clock position showed features consistent with papillary serous adenocarcinoma. Endocervical curettings were also taken which showed fragments of papillary serous carcinoma. The patient then underwent a LEEP  procedure on 10/15/07 which showed microinvasive adenocarcinoma with a depth of invasion of 0.4 mm. Patient then made the decision to proceed with radical hysterectomy. She subsequently underwent a radical hysterectomy, bilateral salpingectomy, bilateral pelvic and periaortic lymph node dissection on 10/19/07. Patient's operative course was otherwise uncomplicated and she recoverred uneventfully.  Pathology did reveal patient to be stage IA2 adenocarcinoma of the cervix.      She underwent routine surveillence through 2014 complicated only by SIOBHAN 1     She recently presented after incidentally being found to have an enlarged pericaval lymph node.  She presented to the ED at [facility] on 9/14/2019 with severe 8.5/10 RUQ pain associated with nausea, one episode of emesis, and decreased appetite. RUQ demonstrated cholelithiasis without cholecystitis, so an abdominal and pelvic CT scan was obtained. This revealed post-surgical changes consistent with prior hysterectomy, left adnexal cysts thought to be ovarian, and an enlarged pericaval lymph node suspicious for metastatic disease vs primary lymphoma. Her condition stabilized and she was discharged home with referrals to general surgery and oncology for biliary colic and further management of the lymphadenopathy, respectively. CT-guided right retroperitoneal lymph node biopsy on 10/15/2019 was consistent with metastatic cervical carcinoma     She underwent surgery to remove the mass and determine the extent of disease on 11/13/19     PATH:  FINAL DIAGNOSIS:   LYMPH NODES, PARA-AORTIC, RESECTION:   - Positive for carcinoma in three of four lymph nodes examined (3/4),   consistent with recurrent endocervical   adenocarcinoma   - Largest metastatic focus is 3.5 cm, positive for extra yuridia extension         Chemo-Potentiated RT 12/2019-1/2020 2/2020 CT:  IMPRESSION: Postoperative changes of hysterectomy and lymph node  resection for cervical cancer. The metastatic  right pelvic lymph nodes  which were previously identified have decreased in size. No new  metastases identified.      She recently presented to the ED for an acute RUQ pain episode, which has greatly improved despite no clear diagnosis.  During this visit she underwent a CAP CT which did not demonstrate evidence of cancer but a slightly enlarged spleen. She had a mild eosinophilia (~2%) but has not traveled or eaten exotic food recently, and has not had any new contacts to suggest mono.      10/13/2020 CT:  IMPRESSION:  1.  Recurrent right external iliac lymphadenopathy.  2.  Rebound thymic hyperplasia.     10/30/2020 PET  IMPRESSION:   In this patient with history of metastatic cervical cancer status-post  radical hysterectomy and chemoradiation, there is evidence of  recurrent disease:  1. Increased size of a hypermetabolic right external iliac chain lymph node since 2/27/2020.  2. Hypermetabolic right subpectoral and axillary lymph nodes.  Recommend follow-up in the breast center for axillary ultrasound and  possible tissue sampling.  3. Idiopathic thymic activation/hyperplasia.     She was evaluated in the breast clinic with plan made for biopsy - however at biopsy the node had shruck considerably and was thus felt to be c/w inflammation (no biopsy obtained)     3/8/2021 MRI Brain (for dizziness)  IMPRESSION:  1. Heterogeneous mixed solid and cystic 2.7 cm mass in the right cerebellum, most likely a solitary metastatic lesion. There is moderate surrounding vasogenic edema as well as mass effect on the fourth ventricle. No obstructive hydrocephalus.    2. Unremarkable MR angiogram of the head and neck.     [facility]  1. Midline suboccipital craniotomy for resection of mass  2. Right frontal ventriculostomy  3. Stereotactic navigation  4. Use of operating microscope     4/16/21 Completed 5 fx of gamma knife (Luis F)    4/19/2021 Plan: Paclitaxel, Cisplatin, Avastin    4/28/2021: Cycle #1  Paclitaxel, Cisplatin, Avastin        Today Francesca comes to the clinic overall feeling well. She states that she healing well from her craniotomy site and stiches were removed on 4/16/21. She states that her dizziness resolved after surgery and she denies headaches, blurred vision, speech changes, or weakness in extremities. She does state that she took Compazine in 2007 with her hysterectomy. She doesn't remember her reaction. She is not taking Xanax. She is drinking about two glasses of water a day. She does not have any baseline neuropathy or hearing loss. She does have numbness in the side of left thigh that is daily since her craniotomy in March. She has told neurology this. She denies calf pain or numbness. She denies constipation or diarrhea and reports regular bowel movements. She denies any vaginal bleeding, no changes in her bowel or bladder habits, no nausea/emesis, no lower extremity edema, and no difficulties eating or sleeping. She denies any abdominal discomfort/bloating, no fevers or chills, and no chest pain or shortness of breath.        Review of Systems:    Systemic           no weight changes; no fever; no chills; no night sweats; no appetite changes  Skin           no rashes, or lesions  Eye           no irritation; no changes in vision  Payton-Laryngeal           no dysphagia; no hoarseness   Pulmonary    no cough; no shortness of breath  Cardiovascular    no chest pain; no palpitations  Gastrointestinal    no diarrhea; no constipation; no abdominal pain; no changes in bowel  habits; no blood in stool  Genitourinary   no urinary frequency; no urinary urgency; no dysuria; no pain; no abnormal vaginal discharge; no abnormal vaginal bleeding  Breast   no breast discharge; no breast changes; no breast pain  Musculoskeletal    no myalgias; no arthralgias; no back pain  Psychiatric           no depressed mood; no anxiety    Hematologic           no tender lymph nodes; no noticeable swellings or lumps    Endocrine    no hot flashes; no heat/cold intolerance         Neurological   no tremor; + numbness and tingling; no headaches; no difficulty sleeping      Past Medical History:    Past Medical History:   Diagnosis Date     Chronic cholecystitis 09/25/2019     History of cervical cancer 2007     Metastasis to brain (H) 03/08/2021     Metastatic adenocarcinoma to cervix (H) 10/28/2019    Added automatically from request for surgery [id number]         Past Surgical History:    Past Surgical History:   Procedure Laterality Date     CHOLECYSTECTOMY, LAPOROSCOPIC  09/25/2019     HYSTERECTOMY RADICAL  2007    PAUL     LAPAROSCOPIC LYMPHADENECTOMY N/A 11/13/2019    Procedure: LAPAROSCOPY, resection of of paraaortic lymph node, lysis of adhesions;  Surgeon: Jluis Canela MD;  Location: [facility] OR     OPTICAL TRACKING SYSTEM CRANIOTOMY, EXCISE TUMOR, COMBINED Bilateral 3/15/2021    Procedure: stealth assisted Midline suboccipital craniectomy/craniotomy for tumor;  Surgeon: Martín Myrick MD;  Location: [facility] OR     OPTICAL TRACKING SYSTEM VENTRICULOSTOMY Right 3/15/2021    Procedure: stealth assisted  right frontal ventriculostomy;  Surgeon: Martín Myrick MD;  Location: [facility] OR         Health Maintenance Due   Topic Date Due     PREVENTIVE CARE VISIT  Never done     ADVANCE CARE PLANNING  Never done     DEPRESSION ACTION PLAN  Never done     PHQ-9  Never done     Pneumococcal Vaccine: Pediatrics (0 to 5 Years) and At-Risk Patients (6 to 64 Years) (1 of 4 - PCV13) Never done     HIV SCREENING  Never done     HEPATITIS C SCREENING  Never done     PAP  07/14/2017       Current Medications:     Current Outpatient Medications   Medication Sig Dispense Refill     acetaminophen (TYLENOL) 325 MG tablet Take 2 tablets (650 mg) by mouth every 6 hours (Patient taking differently: Take 650 mg by mouth every 6 hours As needed) 24 tablet 0     Ascorbic Acid (VITAMIN C GUMMIE PO) Take by mouth daily       ibuprofen  "(ADVIL/MOTRIN) 200 MG tablet Take 1 tablet (200 mg) by mouth every 4 hours as needed for mild pain       LORazepam (ATIVAN) 0.5 MG tablet Take 1 tablet (0.5 mg) by mouth every 6 hours as needed for nausea 20 tablet 0     multivitamin w/minerals (MULTI-VITAMIN) tablet Take 1 tablet by mouth daily           Allergies:        Allergies   Allergen Reactions     Emend [Aprepitant]      Prochlorperazine      \"i was told I turn blue\"        Social History:     Social History     Tobacco Use     Smoking status: Former Smoker     Types: Cigarettes     Smokeless tobacco: Never Used   Substance Use Topics     Alcohol use: Yes     Frequency: 2-3 times a week     Comment: one drink a week       History   Drug Use No         Family History:     The patient's family history is notable for     Family History   Problem Relation Age of Onset     Aneurysm Mother      Breast Cancer Paternal Grandmother         bilat mastectomies     Breast Cancer Maternal Aunt         stage 1     Thyroid Cancer Sister 23         Physical Exam:     There were no vitals taken for this visit.  There is no height or weight on file to calculate BMI.    Respiratory: No audible wheeze, cough.      Psychiatric: appropriate mood and affect. Mentation appears normal, affect normal/bright, judgement and insight intact, normal speech         Assessment:    Francesca Rowland is a 40 year old woman with a diagnosis of metastatic carcinoma to the cervix. She is here today for follow up and disease management. In light of the recent COVID19 pandemic, and in accordance with our group and national guidelines this patients care has been reviewed and it is felt that presenting for her visit would be more likely to increase rather than decrease her relative risks. Therefore this visit was conducted by telephone.     30 minutes spent on the date of the encounter doing chart review, history and exam, documentation, and further activities as noted above.       Plan:     1.)    "     Ok to proceed with planned treatment once labs have met parameters. MD to sign cycle #1. Long discussion regarding treatment plan and regimen including chemotherapy agents and s/s to report and ways to manage possible symptoms that could be caused by chemotherapy agents. Encouraged 4-5 small meals a day and discussed nausea management and control. Will send in Ativan for nausea control per recommendations from Dr. Canela. Will start at 0.5 mg q6h prn, patient aware to contact our office to increase this or to change to a different regimen if this does not control nausea. Patient aware to not mix prior Xanax Rx with Ativan (currently not using). Infusion RN to explain to patient to not use Zofran for nausea within first three days of chemotherapy due to Aloxi. Encouraged 64 oz of fluids per day. Discussed neutropenic precautions and nito period. Reviewed signs and symptoms for when she should contact the clinic or seek additional care. Patient to contact the clinic with any questions or concerns in the interim. Patient scheduled for future cycles and aware of these appts.      2.) Genetic risk factors were assessed again and the patient has multiple family members with cancer diagnosed younger than age 50 including herself (age 40, but with likely HPV related disease) and her sister (thyroid cancer at age 23) among others. She met with genetics 1/2021. Will send a message to Nneka Childress GC for follow up.    3.) Labs and/or tests ordered include: CBC, CMP, Mg, Protein Urine.     4.) Health maintenance issues addressed today include annual health maintenance and non-gynecologic issues with PCP.    5.)        Rx: Ativan, Zofran        Terra Zelaya, MSN, [facility]-BC  Women's Health Nurse Practitioner  Division of Gynecologic Oncology  [facility]        CC  Patient Care Team:  Tyrese Bowie as PCP - General (Family Medicine)  Tawana Rivera, RN as Specialty Care Coordinator  (Gynecologic Oncology)  Jluis Canela MD as MD (Gynecologic Oncology)  Bret Kerns MD as Assigned Cancer Care Provider  Martín Myrick MD as Assigned Neuroscience Provider  JLUIS CANELA

## 2021-04-27 ENCOUNTER — ONCOLOGY VISIT (OUTPATIENT)
Dept: RADIATION ONCOLOGY | Facility: CLINIC | Age: 41
End: 2021-04-27

## 2021-04-27 RX ORDER — PROCHLORPERAZINE MALEATE 10 MG
10 TABLET ORAL EVERY 6 HOURS PRN
Qty: 30 TABLET | Refills: 2 | Status: CANCELLED | OUTPATIENT
Start: 2021-04-27

## 2021-04-28 ENCOUNTER — TELEPHONE (OUTPATIENT)
Dept: ONCOLOGY | Facility: CLINIC | Age: 41
End: 2021-04-28

## 2021-04-28 ENCOUNTER — VIRTUAL VISIT (OUTPATIENT)
Dept: ONCOLOGY | Facility: CLINIC | Age: 41
End: 2021-04-28
Attending: OBSTETRICS & GYNECOLOGY
Payer: COMMERCIAL

## 2021-04-28 VITALS
BODY MASS INDEX: 38.74 KG/M2 | HEART RATE: 76 BPM | RESPIRATION RATE: 16 BRPM | SYSTOLIC BLOOD PRESSURE: 128 MMHG | DIASTOLIC BLOOD PRESSURE: 84 MMHG | TEMPERATURE: 98.2 F | OXYGEN SATURATION: 98 % | WEIGHT: 240 LBS

## 2021-04-28 DIAGNOSIS — T45.1X5A CHEMOTHERAPY-INDUCED NAUSEA: ICD-10-CM

## 2021-04-28 DIAGNOSIS — C79.82 METASTATIC ADENOCARCINOMA TO CERVIX (H): Primary | ICD-10-CM

## 2021-04-28 DIAGNOSIS — C79.31 BRAIN METASTASIS: ICD-10-CM

## 2021-04-28 DIAGNOSIS — C53.9 MALIGNANT NEOPLASM OF CERVIX, UNSPECIFIED SITE (H): Primary | ICD-10-CM

## 2021-04-28 DIAGNOSIS — R11.0 CHEMOTHERAPY-INDUCED NAUSEA: ICD-10-CM

## 2021-04-28 DIAGNOSIS — C79.82 METASTATIC ADENOCARCINOMA TO CERVIX (H): ICD-10-CM

## 2021-04-28 LAB
ALBUMIN SERPL-MCNC: 3.7 G/DL (ref 3.4–5)
ALBUMIN UR-MCNC: NEGATIVE MG/DL
ALP SERPL-CCNC: 106 U/L (ref 40–150)
ALT SERPL W P-5'-P-CCNC: 18 U/L (ref 0–50)
ANION GAP SERPL CALCULATED.3IONS-SCNC: 6 MMOL/L (ref 3–14)
AST SERPL W P-5'-P-CCNC: 11 U/L (ref 0–45)
BASOPHILS # BLD AUTO: 0 10E9/L (ref 0–0.2)
BASOPHILS NFR BLD AUTO: 0.7 %
BILIRUB SERPL-MCNC: 0.5 MG/DL (ref 0.2–1.3)
BUN SERPL-MCNC: 11 MG/DL (ref 7–30)
CALCIUM SERPL-MCNC: 9.4 MG/DL (ref 8.5–10.1)
CHLORIDE SERPL-SCNC: 107 MMOL/L (ref 94–109)
CO2 SERPL-SCNC: 29 MMOL/L (ref 20–32)
CREAT SERPL-MCNC: 0.83 MG/DL (ref 0.52–1.04)
DIFFERENTIAL METHOD BLD: NORMAL
EOSINOPHIL # BLD AUTO: 0.3 10E9/L (ref 0–0.7)
EOSINOPHIL NFR BLD AUTO: 6.7 %
ERYTHROCYTE [DISTWIDTH] IN BLOOD BY AUTOMATED COUNT: 13.8 % (ref 10–15)
GFR SERPL CREATININE-BSD FRML MDRD: 87 ML/MIN/{1.73_M2}
GLUCOSE SERPL-MCNC: 103 MG/DL (ref 70–99)
HCT VFR BLD AUTO: 38.5 % (ref 35–47)
HGB BLD-MCNC: 12.7 G/DL (ref 11.7–15.7)
IMM GRANULOCYTES # BLD: 0 10E9/L (ref 0–0.4)
IMM GRANULOCYTES NFR BLD: 0.2 %
LYMPHOCYTES # BLD AUTO: 0.9 10E9/L (ref 0.8–5.3)
LYMPHOCYTES NFR BLD AUTO: 23.2 %
MAGNESIUM SERPL-MCNC: 2.3 MG/DL (ref 1.6–2.3)
MCH RBC QN AUTO: 30.5 PG (ref 26.5–33)
MCHC RBC AUTO-ENTMCNC: 33 G/DL (ref 31.5–36.5)
MCV RBC AUTO: 92 FL (ref 78–100)
MONOCYTES # BLD AUTO: 0.4 10E9/L (ref 0–1.3)
MONOCYTES NFR BLD AUTO: 9.6 %
NEUTROPHILS # BLD AUTO: 2.4 10E9/L (ref 1.6–8.3)
NEUTROPHILS NFR BLD AUTO: 59.6 %
NRBC # BLD AUTO: 0 10*3/UL
NRBC BLD AUTO-RTO: 0 /100
PLATELET # BLD AUTO: 190 10E9/L (ref 150–450)
POTASSIUM SERPL-SCNC: 3.6 MMOL/L (ref 3.4–5.3)
PROT SERPL-MCNC: 7.9 G/DL (ref 6.8–8.8)
RBC # BLD AUTO: 4.17 10E12/L (ref 3.8–5.2)
SODIUM SERPL-SCNC: 142 MMOL/L (ref 133–144)
WBC # BLD AUTO: 4.1 10E9/L (ref 4–11)

## 2021-04-28 PROCEDURE — 85025 COMPLETE CBC W/AUTO DIFF WBC: CPT | Performed by: OBSTETRICS & GYNECOLOGY

## 2021-04-28 PROCEDURE — 258N000003 HC RX IP 258 OP 636: Performed by: OBSTETRICS & GYNECOLOGY

## 2021-04-28 PROCEDURE — 258N000001 HC RX 258: Performed by: OBSTETRICS & GYNECOLOGY

## 2021-04-28 PROCEDURE — 99214 OFFICE O/P EST MOD 30 MIN: CPT | Mod: 95 | Performed by: OBSTETRICS & GYNECOLOGY

## 2021-04-28 PROCEDURE — 250N000009 HC RX 250: Performed by: OBSTETRICS & GYNECOLOGY

## 2021-04-28 PROCEDURE — 83735 ASSAY OF MAGNESIUM: CPT | Performed by: OBSTETRICS & GYNECOLOGY

## 2021-04-28 PROCEDURE — 96375 TX/PRO/DX INJ NEW DRUG ADDON: CPT

## 2021-04-28 PROCEDURE — 80053 COMPREHEN METABOLIC PANEL: CPT | Performed by: OBSTETRICS & GYNECOLOGY

## 2021-04-28 PROCEDURE — 96417 CHEMO IV INFUS EACH ADDL SEQ: CPT

## 2021-04-28 PROCEDURE — 250N000011 HC RX IP 250 OP 636: Performed by: OBSTETRICS & GYNECOLOGY

## 2021-04-28 PROCEDURE — 81003 URINALYSIS AUTO W/O SCOPE: CPT | Performed by: OBSTETRICS & GYNECOLOGY

## 2021-04-28 PROCEDURE — 96413 CHEMO IV INFUSION 1 HR: CPT

## 2021-04-28 RX ORDER — ONDANSETRON 4 MG/1
4 TABLET, ORALLY DISINTEGRATING ORAL EVERY 8 HOURS PRN
Qty: 20 TABLET | Refills: 0 | Status: SHIPPED | OUTPATIENT
Start: 2021-04-28 | End: 2021-05-18

## 2021-04-28 RX ORDER — PALONOSETRON 0.05 MG/ML
0.25 INJECTION, SOLUTION INTRAVENOUS ONCE
Status: COMPLETED | OUTPATIENT
Start: 2021-04-28 | End: 2021-04-28

## 2021-04-28 RX ORDER — DEXTROSE, SODIUM CHLORIDE, AND POTASSIUM CHLORIDE 5; .45; .15 G/100ML; G/100ML; G/100ML
1000 INJECTION INTRAVENOUS ONCE
Status: COMPLETED | OUTPATIENT
Start: 2021-04-28 | End: 2021-04-28

## 2021-04-28 RX ORDER — LORAZEPAM 0.5 MG/1
0.5 TABLET ORAL EVERY 6 HOURS PRN
Qty: 20 TABLET | Refills: 0 | Status: SHIPPED | OUTPATIENT
Start: 2021-04-28

## 2021-04-28 RX ADMIN — SODIUM CHLORIDE 250 ML: 9 INJECTION, SOLUTION INTRAVENOUS at 16:04

## 2021-04-28 RX ADMIN — FAMOTIDINE 40 MG: 10 INJECTION, SOLUTION INTRAVENOUS at 09:30

## 2021-04-28 RX ADMIN — DIPHENHYDRAMINE HYDROCHLORIDE 50 MG: 50 INJECTION, SOLUTION INTRAMUSCULAR; INTRAVENOUS at 09:35

## 2021-04-28 RX ADMIN — BEVACIZUMAB-AWWB 1500 MG: 400 INJECTION, SOLUTION INTRAVENOUS at 16:04

## 2021-04-28 RX ADMIN — SODIUM CHLORIDE 1000 ML: 9 INJECTION, SOLUTION INTRAVENOUS at 09:24

## 2021-04-28 RX ADMIN — PALONOSETRON HYDROCHLORIDE 0.25 MG: 0.25 INJECTION, SOLUTION INTRAVENOUS at 09:28

## 2021-04-28 RX ADMIN — CISPLATIN 110 MG: 1 INJECTION INTRAVENOUS at 13:20

## 2021-04-28 RX ADMIN — POTASSIUM CHLORIDE, DEXTROSE MONOHYDRATE AND SODIUM CHLORIDE 1000 ML: 150; 5; 450 INJECTION, SOLUTION INTRAVENOUS at 13:20

## 2021-04-28 RX ADMIN — PACLITAXEL 382 MG: 6 INJECTION, SOLUTION INTRAVENOUS at 10:07

## 2021-04-28 NOTE — PROGRESS NOTES
Infusion Nursing Note:  Francesca Rowland presents today for C1 D1 Taxol/Cisplatin/Bevacizumab-awwb (MVASI).    Patient seen by provider today: Yes: Terra Zelaya NP   present during visit today: Not Applicable.    Note: Patient received Cisplatin here back in January 2020. Patient receiving Taxol and MVASI for the first time today. Pt reoriented to infusion room and call light. Chemotherapy teaching done previously. Reinforced chemotherapy teaching/side effects and schedule during infusion visit. Paged ANIVAL Ellison to add Zofran to take-home medication. Pt aware to not start taking Zofran if needed until 3 days from now due to having Aloxi on board.    Copy of AVS reviewed with patient. Pt instructed to call care coordinator, triage (or MD on call if after hours/weekends) with chills/temp >=100.4, questions/concerns. Pt stated understanding of plan.     Patient received asymptomatic COVID test 4/25/21.     Intravenous Access:  Peripheral IV placed. Port placement scheduled for 5/5/21.    Treatment Conditions:  Lab Results   Component Value Date    HGB 12.7 04/28/2021     Lab Results   Component Value Date    WBC 4.1 04/28/2021      Lab Results   Component Value Date    ANEU 2.4 04/28/2021     Lab Results   Component Value Date     04/28/2021      Lab Results   Component Value Date     04/28/2021                   Lab Results   Component Value Date    POTASSIUM 3.6 04/28/2021           Lab Results   Component Value Date    MAG 2.3 04/28/2021            Lab Results   Component Value Date    CR 0.83 04/28/2021                   Lab Results   Component Value Date    SHAD 9.4 04/28/2021                Lab Results   Component Value Date    BILITOTAL 0.5 04/28/2021           Lab Results   Component Value Date    ALBUMIN 3.7 04/28/2021                    Lab Results   Component Value Date    ALT 18 04/28/2021           Lab Results   Component Value Date    AST 11 04/28/2021       Results reviewed, labs  MET treatment parameters, ok to proceed with treatment.  Urine protein negative.  BP WNL      Post Infusion Assessment:  Patient tolerated infusion without incident.  Patient voided pre, during and post Cisplatin infusion.  Blood return noted pre and post infusion.  Site patent and intact, free from redness, edema or discomfort.  No evidence of extravasations.  Access discontinued per protocol.       Discharge Plan:   Prescription refills given for Zofran and Ativan.  Discharge instructions reviewed with: Patient.  Patient and/or family verbalized understanding of discharge instructions and all questions answered.  Copy of AVS reviewed with patient and/or family.  Patient will return 5/19 for next appointment.  Patient discharged in stable condition accompanied by: self.  Departure Mode: Ambulatory.    Yahaira Doherty RN

## 2021-04-28 NOTE — PATIENT INSTRUCTIONS
Contact Numbers    [facility] Main Line (for Scheduling/Triage/After Hours Nurse Line): [phone]    Please call the [facility] nurse triage or the after hours nurse line if you experience a temperature greater than or equal to 100.5, shaking chills, have uncontrolled nausea, vomiting and/or diarrhea, dizziness, lightheadedness, shortness of breath, chest pain, bleeding, unexplained bruising, or if you have any other new/concerning symptoms, questions or concerns.     If you are having any concerning symptoms or wish to speak to a provider before your next infusion visit, please call your care coordinator or triage to notify them so we can adequately serve you.     If you need any refills on medications (narcotics or other medications), please call before your infusion appointment.       April 2021 Sunday Monday Tuesday Wednesday Thursday Friday Saturday                       1     2     3       4     5     6     7    PRE-PROCEDURE COVID PCR  10:45 AM   (15 min.)   RG COVID LAB   [facility] 8    GAMMA KNIFE NEW      12:30 PM   (90 min.)   Bret Kerns MD   [facility] Gamma Knife    CT SIM   2:00 PM   (30 min.)   Bret Kerns MD   [facility] Gamma Knife    MR BRAIN W   3:00 PM   (30 min.)   [email or web address]   [facility] 9     10       11     12    GAMMA TREATMENT   9:00 AM   (60 min.)   Martín Myrick MD   [facility] Gamma Knife    GAMMA TREATMENT   9:00 AM   (60 min.)   Bret Kerns MD   [facility] Gamma Knife 13    GAMMA TREATMENT   9:00 AM   (60 min.)   Zeke Cordova MD   [facility] Gamma Knife 14    GAMMA TREATMENT   7:30 AM   (60 min.)   Tawnya Craig MD   [facility] Gamma Knife 15    GAMMA TREATMENT   8:30 AM   (60 min.)   Bret Kerns MD   [facility]  Gamma Knife 16    GAMMA TREATMENT   9:30 AM   (60 min.)   Meng Horton MD   [facility] Gamma Knife 17       18     19    RETURN   9:15 AM   (30 min.)   Jluis Canela MD   [facility] 20     21     22     23     24       25    PRE-PROCEDURE COVID PCR   2:45 PM   (15 min.)   AN COVID LAB   [facility] 26     27     28    LAB PERIPHERAL   7:45 AM   (15 min.)   [facility] DRAW   [facility]    TELEPHONE VISIT RETURN   8:20 AM   (40 min.)   Terra Zelaya APRN CNP   [facility] ONC INFUSION 360   9:00 AM   (360 min.)    ONCOLOGY INFUSION   [facility] 29     30                      May 2021      Jayesh Monday Tuesday Wednesday Thursday Friday Saturday                                 1       2    PRE-PROCEDURE COVID PCR   2:45 PM   (15 min.)   AN COVID LAB   [facility] 3     4     5    IR CHEST PORT PLACEMENT >5 YRS   8:30 AM   (75 min.)   [email or web address]   [facility] ASC Imaging [place]    INSERTION, VASCULAR ACCESS PORT  10:15 AM   Job Schilling MD   [place] OR    Outpatient Visit  10:15 AM   [facility] 6     7     8       9     10     11     12     13     14     15       16     17     18    LAB CENTRAL   9:15 AM   (15 min.)   NURSE ONLY CANCER CENTER   [facility] Cancer [facility]    TELEPHONE VISIT RETURN   2:30 PM   (40 min.)   Rayna Stevens APRN CNP   [facility] Cancer [facility] 19    [facility] ONC INFUSION 360   7:30 AM   (360 min.)   [facility] ONCOLOGY INFUSION   [facility] 20     21     22       23     24     25     26     27     28     29       30     31                                              Lab Results:  Recent Results (from the past 12 hour(s))   CBC with platelets differential     Collection Time: 04/28/21  8:22 AM   Result Value Ref Range    WBC 4.1 4.0 - 11.0 10e9/L    RBC Count 4.17 3.8 - 5.2 10e12/L    Hemoglobin 12.7 11.7 - 15.7 g/dL    Hematocrit 38.5 35.0 - 47.0 %    MCV 92 78 - 100 fl    MCH 30.5 26.5 - 33.0 pg    MCHC 33.0 31.5 - 36.5 g/dL    RDW 13.8 10.0 - 15.0 %    Platelet Count 190 150 - 450 10e9/L    Diff Method Automated Method     % Neutrophils 59.6 %    % Lymphocytes 23.2 %    % Monocytes 9.6 %    % Eosinophils 6.7 %    % Basophils 0.7 %    % Immature Granulocytes 0.2 %    Nucleated RBCs 0 0 /100    Absolute Neutrophil 2.4 1.6 - 8.3 10e9/L    Absolute Lymphocytes 0.9 0.8 - 5.3 10e9/L    Absolute Monocytes 0.4 0.0 - 1.3 10e9/L    Absolute Eosinophils 0.3 0.0 - 0.7 10e9/L    Absolute Basophils 0.0 0.0 - 0.2 10e9/L    Abs Immature Granulocytes 0.0 0 - 0.4 10e9/L    Absolute Nucleated RBC 0.0    Comprehensive metabolic panel    Collection Time: 04/28/21  8:22 AM   Result Value Ref Range    Sodium 142 133 - 144 mmol/L    Potassium 3.6 3.4 - 5.3 mmol/L    Chloride 107 94 - 109 mmol/L    Carbon Dioxide 29 20 - 32 mmol/L    Anion Gap 6 3 - 14 mmol/L    Glucose 103 (H) 70 - 99 mg/dL    Urea Nitrogen 11 7 - 30 mg/dL    Creatinine 0.83 0.52 - 1.04 mg/dL    GFR Estimate 87 >60 mL/min/[1.73_m2]    GFR Estimate If Black >90 >60 mL/min/[1.73_m2]    Calcium 9.4 8.5 - 10.1 mg/dL    Bilirubin Total 0.5 0.2 - 1.3 mg/dL    Albumin 3.7 3.4 - 5.0 g/dL    Protein Total 7.9 6.8 - 8.8 g/dL    Alkaline Phosphatase 106 40 - 150 U/L    ALT 18 0 - 50 U/L    AST 11 0 - 45 U/L   Magnesium    Collection Time: 04/28/21  8:22 AM   Result Value Ref Range    Magnesium 2.3 1.6 - 2.3 mg/dL   Protein qualitative urine    Collection Time: 04/28/21  8:22 AM   Result Value Ref Range    Protein Albumin Urine Negative NEG^Negative mg/dL

## 2021-04-28 NOTE — PATIENT INSTRUCTIONS
Contact Numbers    [facility] Main Line (for Scheduling/Triage/After Hours Nurse Line): [phone]    Please call the [facility] nurse triage or the after hours nurse line if you experience a temperature greater than or equal to 100.5, shaking chills, have uncontrolled nausea, vomiting and/or diarrhea, dizziness, lightheadedness, shortness of breath, chest pain, bleeding, unexplained bruising, or if you have any other new/concerning symptoms, questions or concerns.     If you are having any concerning symptoms or wish to speak to a provider before your next infusion visit, please call your care coordinator or triage to notify them so we can adequately serve you.     If you need any refills on medications (narcotics or other medications), please call before your infusion appointment.       April 2021 Sunday Monday Tuesday Wednesday Thursday Friday Saturday                       1     2     3       4     5     6     7    PRE-PROCEDURE COVID PCR  10:45 AM   (15 min.)   RG COVID LAB   [facility] Laboratory 8    GAMMA KNIFE NEW      12:30 PM   (90 min.)   Bret Kerns MD   [facility] Gamma Knife    CT SIM   2:00 PM   (30 min.)   Bret Kerns MD   [facility] Gamma Knife    MR BRAIN W   3:00 PM   (30 min.)   [email or web address]   [facility] Imaging 9     10       11     12    GAMMA TREATMENT   9:00 AM   (60 min.)   Martín Myrick MD   [facility] Gamma Knife    GAMMA TREATMENT   9:00 AM   (60 min.)   Bret Kerns MD   [facility] Gamma Knife 13    GAMMA TREATMENT   9:00 AM   (60 min.)   Zeke Cordova MD   [facility] Gamma Knife 14    GAMMA TREATMENT   7:30 AM   (60 min.)   Tawnya Craig MD   [facility] Gamma Knife 15    GAMMA TREATMENT   8:30 AM   (60 min.)   Bret Kerns MD   [facility]  Gamma Knife 16    GAMMA TREATMENT   9:30 AM   (60 min.)   Meng Horton MD   [facility] Radiation Oncology Gamma Knife 17       18     19    RETURN   9:15 AM   (30 min.)   Jluis Canela MD   [facility] 20     21     22     23     24       25    PRE-PROCEDURE COVID PCR   2:45 PM   (15 min.)   AN COVID LAB   [facility] 26     27     28    LAB PERIPHERAL   7:45 AM   (15 min.)   [facility] DRAW   [facility]    TELEPHONE VISIT RETURN   8:20 AM   (40 min.)   Terra Zelaya APRN CNP   [facility] ONC INFUSION 360   9:00 AM   (360 min.)    ONCOLOGY INFUSION   [facility] 29     30                      May 2021      Jayesh Monday Tuesday Wednesday Thursday Friday Saturday                                 1       2    PRE-PROCEDURE COVID PCR   2:45 PM   (15 min.)   AN COVID LAB   [facility] Laboratory 3     4     5    IR CHEST PORT PLACEMENT >5 YRS   8:30 AM   (75 min.)   [id number]   [facility] [place]    INSERTION, VASCULAR ACCESS PORT  10:15 AM   Job Schilling MD   [facility] OR    Outpatient Visit  10:15 AM   [facility] 6     7     8       9     10     11     12     13     14     15       16     17     18    LAB CENTRAL   9:15 AM   (15 min.)   NURSE ONLY CANCER CENTER   [facility]    TELEPHONE VISIT RETURN   2:30 PM   (40 min.)   Rayna Stevens APRN CNP   [facility] 19    [facility] ONC INFUSION 360   7:30 AM   (360 min.)   [facility] ONCOLOGY INFUSION   [facility] 20     21     22       23     24     25     26     27     28     29       30     31                                              Lab Results:  Recent Results (from the past 12 hour(s))   CBC with platelets differential     Collection Time: 04/28/21  8:22 AM   Result Value Ref Range    WBC 4.1 4.0 - 11.0 10e9/L    RBC Count 4.17 3.8 - 5.2 10e12/L    Hemoglobin 12.7 11.7 - 15.7 g/dL    Hematocrit 38.5 35.0 - 47.0 %    MCV 92 78 - 100 fl    MCH 30.5 26.5 - 33.0 pg    MCHC 33.0 31.5 - 36.5 g/dL    RDW 13.8 10.0 - 15.0 %    Platelet Count 190 150 - 450 10e9/L    Diff Method Automated Method     % Neutrophils 59.6 %    % Lymphocytes 23.2 %    % Monocytes 9.6 %    % Eosinophils 6.7 %    % Basophils 0.7 %    % Immature Granulocytes 0.2 %    Nucleated RBCs 0 0 /100    Absolute Neutrophil 2.4 1.6 - 8.3 10e9/L    Absolute Lymphocytes 0.9 0.8 - 5.3 10e9/L    Absolute Monocytes 0.4 0.0 - 1.3 10e9/L    Absolute Eosinophils 0.3 0.0 - 0.7 10e9/L    Absolute Basophils 0.0 0.0 - 0.2 10e9/L    Abs Immature Granulocytes 0.0 0 - 0.4 10e9/L    Absolute Nucleated RBC 0.0    Comprehensive metabolic panel    Collection Time: 04/28/21  8:22 AM   Result Value Ref Range    Sodium 142 133 - 144 mmol/L    Potassium 3.6 3.4 - 5.3 mmol/L    Chloride 107 94 - 109 mmol/L    Carbon Dioxide 29 20 - 32 mmol/L    Anion Gap 6 3 - 14 mmol/L    Glucose 103 (H) 70 - 99 mg/dL    Urea Nitrogen 11 7 - 30 mg/dL    Creatinine 0.83 0.52 - 1.04 mg/dL    GFR Estimate 87 >60 mL/min/[1.73_m2]    GFR Estimate If Black >90 >60 mL/min/[1.73_m2]    Calcium 9.4 8.5 - 10.1 mg/dL    Bilirubin Total 0.5 0.2 - 1.3 mg/dL    Albumin 3.7 3.4 - 5.0 g/dL    Protein Total 7.9 6.8 - 8.8 g/dL    Alkaline Phosphatase 106 40 - 150 U/L    ALT 18 0 - 50 U/L    AST 11 0 - 45 U/L   Magnesium    Collection Time: 04/28/21  8:22 AM   Result Value Ref Range    Magnesium 2.3 1.6 - 2.3 mg/dL   Protein qualitative urine    Collection Time: 04/28/21  8:22 AM   Result Value Ref Range    Protein Albumin Urine Negative NEG^Negative mg/dL

## 2021-04-28 NOTE — LETTER
2021      RE: Francesca Rowland  [address]  [place] [place] [address]      Dear Colleague,    Thank you for referring your patient, Francesca Rowland, to the [facility]. Please see a copy of my visit note below.    Francesca is a 40 year old who is being evaluated via a billable telephone visit.      What phone number would you like to be contacted at? [phone]  How would you like to obtain your AVS? Arthur Naranjo CMA 2021  7:41 AM     Phone call duration: 24 minutes                    Follow Up Notes on Referred Patient    Date: 2021       Dr. Jluis Canela MD  [address] [place] [address]       RE: Francesca Rowland  : 1980  BEST: 2021    Dear Dr. 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 She is here today for follow up and disease management. In light of the recent COVID19 pandemic, and in accordance with our group and national guidelines this patients care has been reviewed and it is felt that presenting for her visit would be more likely to increase rather than decrease her relative risks. Therefore this visit was conducted by telephone.      Oncology History:     Patient initially presented as a 27-year-old woman seen in referral due to a Pap smear showing high-grade squamous intraepithelial lesion that was positive for high risk HPV subtype in 2007. This Pap smear had been taken during her six week postpartum visit. Patient did have a remote history of abnormal Pap smears back in  while serving in [place] for the [employer].  Pap smears during her first pregnancy were all negative. Her Pap smear at the beginning of the most recent pregnancy was normal. Patient did have a colposcopy for evaluation of this abnormal Pap smear  on 9/2007 and cervical biopsies of 6 and 12 o'clock position showed features consistent with papillary serous adenocarcinoma. Endocervical curettings were also taken which showed fragments of papillary serous carcinoma. The patient then underwent a LEEP procedure on 10/15/07 which showed microinvasive adenocarcinoma with a depth of invasion of 0.4 mm. Patient then made the decision to proceed with radical hysterectomy. She subsequently underwent a radical hysterectomy, bilateral salpingectomy, bilateral pelvic and periaortic lymph node dissection on 10/19/07. Patient's operative course was otherwise uncomplicated and she recoverred uneventfully.  Pathology did reveal patient to be stage IA2 adenocarcinoma of the cervix.      She underwent routine surveillence through 2014 complicated only by SIOBHAN 1     She recently presented after incidentally being found to have an enlarged pericaval lymph node.  She presented to the ED at [facility] on 9/14/2019 with severe 8.5/10 RUQ pain associated with nausea, one episode of emesis, and decreased appetite. RUQ demonstrated cholelithiasis without cholecystitis, so an abdominal and pelvic CT scan was obtained. This revealed post-surgical changes consistent with prior hysterectomy, left adnexal cysts thought to be ovarian, and an enlarged pericaval lymph node suspicious for metastatic disease vs primary lymphoma. Her condition stabilized and she was discharged home with referrals to general surgery and oncology for biliary colic and further management of the lymphadenopathy, respectively. CT-guided right retroperitoneal lymph node biopsy on 10/15/2019 was consistent with metastatic cervical carcinoma     She underwent surgery to remove the mass and determine the extent of disease on 11/13/19     PATH:  FINAL DIAGNOSIS:   LYMPH NODES, PARA-AORTIC, RESECTION:   - Positive for carcinoma in three of four lymph nodes examined (3/4),   consistent with recurrent endocervical    adenocarcinoma   - Largest metastatic focus is 3.5 cm, positive for extra yuridia extension         Chemo-Potentiated RT 12/2019-1/2020 2/2020 CT:  IMPRESSION: Postoperative changes of hysterectomy and lymph node  resection for cervical cancer. The metastatic right pelvic lymph nodes  which were previously identified have decreased in size. No new  metastases identified.      She recently presented to the ED for an acute RUQ pain episode, which has greatly improved despite no clear diagnosis.  During this visit she underwent a CAP CT which did not demonstrate evidence of cancer but a slightly enlarged spleen. She had a mild eosinophilia (~2%) but has not traveled or eaten exotic food recently, and has not had any new contacts to suggest mono.      10/13/2020 CT:  IMPRESSION:  1.  Recurrent right external iliac lymphadenopathy.  2.  Rebound thymic hyperplasia.     10/30/2020 PET  IMPRESSION:   In this patient with history of metastatic cervical cancer status-post  radical hysterectomy and chemoradiation, there is evidence of  recurrent disease:  1. Increased size of a hypermetabolic right external iliac chain lymph node since 2/27/2020.  2. Hypermetabolic right subpectoral and axillary lymph nodes.  Recommend follow-up in the breast center for axillary ultrasound and  possible tissue sampling.  3. Idiopathic thymic activation/hyperplasia.     She was evaluated in the breast clinic with plan made for biopsy - however at biopsy the node had shruck considerably and was thus felt to be c/w inflammation (no biopsy obtained)     3/8/2021 MRI Brain (for dizziness)  IMPRESSION:  1. Heterogeneous mixed solid and cystic 2.7 cm mass in the right cerebellum, most likely a solitary metastatic lesion. There is moderate surrounding vasogenic edema as well as mass effect on the fourth ventricle. No obstructive hydrocephalus.    2. Unremarkable MR angiogram of the head and neck.     3/15/2021 Craniotomy (Florentincher)  1. Midline  suboccipital craniotomy for resection of mass  2. Right frontal ventriculostomy  3. Stereotactic navigation  4. Use of operating microscope     4/16/21 Completed 5 fx of gamma knife (Yuan)    4/19/2021 Plan: Paclitaxel, Cisplatin, Avastin    4/28/2021: Cycle #1 Paclitaxel, Cisplatin, Avastin        Today Francesca comes to the clinic overall feeling well. She states that she healing well from her craniotomy site and stiches were removed on 4/16/21. She states that her dizziness resolved after surgery and she denies headaches, blurred vision, speech changes, or weakness in extremities. She does state that she took Compazine in 2007 with her hysterectomy. She doesn't remember her reaction. She is not taking Xanax. She is drinking about two glasses of water a day. She does not have any baseline neuropathy or hearing loss. She does have numbness in the side of left thigh that is daily since her craniotomy in March. She has told neurology this. She denies calf pain or numbness. She denies constipation or diarrhea and reports regular bowel movements. She denies any vaginal bleeding, no changes in her bowel or bladder habits, no nausea/emesis, no lower extremity edema, and no difficulties eating or sleeping. She denies any abdominal discomfort/bloating, no fevers or chills, and no chest pain or shortness of breath.        Review of Systems:    Systemic           no weight changes; no fever; no chills; no night sweats; no appetite changes  Skin           no rashes, or lesions  Eye           no irritation; no changes in vision  Payton-Laryngeal           no dysphagia; no hoarseness   Pulmonary    no cough; no shortness of breath  Cardiovascular    no chest pain; no palpitations  Gastrointestinal    no diarrhea; no constipation; no abdominal pain; no changes in bowel  habits; no blood in stool  Genitourinary   no urinary frequency; no urinary urgency; no dysuria; no pain; no abnormal vaginal discharge; no abnormal vaginal  bleeding  Breast   no breast discharge; no breast changes; no breast pain  Musculoskeletal    no myalgias; no arthralgias; no back pain  Psychiatric           no depressed mood; no anxiety    Hematologic           no tender lymph nodes; no noticeable swellings or lumps   Endocrine    no hot flashes; no heat/cold intolerance         Neurological   no tremor; + numbness and tingling; no headaches; no difficulty sleeping      Past Medical History:    Past Medical History:   Diagnosis Date     Chronic cholecystitis 09/25/2019     History of cervical cancer 2007     Metastasis to brain (H) 03/08/2021     Metastatic adenocarcinoma to cervix (H) 10/28/2019    Added automatically from request for surgery [id number]         Past Surgical History:    Past Surgical History:   Procedure Laterality Date     CHOLECYSTECTOMY, LAPOROSCOPIC  09/25/2019     HYSTERECTOMY RADICAL  2007    PAUL     LAPAROSCOPIC LYMPHADENECTOMY N/A 11/13/2019    Procedure: LAPAROSCOPY, resection of of paraaortic lymph node, lysis of adhesions;  Surgeon: Jluis Canela MD;  Location: [facility] OR     OPTICAL TRACKING SYSTEM CRANIOTOMY, EXCISE TUMOR, COMBINED Bilateral 3/15/2021    Procedure: stealth assisted Midline suboccipital craniectomy/craniotomy for tumor;  Surgeon: Martín Myrick MD;  Location: [facility] OR     OPTICAL TRACKING SYSTEM VENTRICULOSTOMY Right 3/15/2021    Procedure: stealth assisted  right frontal ventriculostomy;  Surgeon: Martín Myrick MD;  Location: [facility] OR         Health Maintenance Due   Topic Date Due     PREVENTIVE CARE VISIT  Never done     ADVANCE CARE PLANNING  Never done     DEPRESSION ACTION PLAN  Never done     PHQ-9  Never done     Pneumococcal Vaccine: Pediatrics (0 to 5 Years) and At-Risk Patients (6 to 64 Years) (1 of 4 - PCV13) Never done     HIV SCREENING  Never done     HEPATITIS C SCREENING  Never done     PAP  07/14/2017       Current Medications:     Current Outpatient Medications   Medication Sig  "Dispense Refill     acetaminophen (TYLENOL) 325 MG tablet Take 2 tablets (650 mg) by mouth every 6 hours (Patient taking differently: Take 650 mg by mouth every 6 hours As needed) 24 tablet 0     Ascorbic Acid (VITAMIN C GUMMIE PO) Take by mouth daily       ibuprofen (ADVIL/MOTRIN) 200 MG tablet Take 1 tablet (200 mg) by mouth every 4 hours as needed for mild pain       LORazepam (ATIVAN) 0.5 MG tablet Take 1 tablet (0.5 mg) by mouth every 6 hours as needed for nausea 20 tablet 0     multivitamin w/minerals (MULTI-VITAMIN) tablet Take 1 tablet by mouth daily           Allergies:        Allergies   Allergen Reactions     Emend [Aprepitant]      Prochlorperazine      \"i was told I turn blue\"        Social History:     Social History     Tobacco Use     Smoking status: Former Smoker     Types: Cigarettes     Smokeless tobacco: Never Used   Substance Use Topics     Alcohol use: Yes     Frequency: 2-3 times a week     Comment: one drink a week       History   Drug Use No         Family History:     The patient's family history is notable for     Family History   Problem Relation Age of Onset     Aneurysm Mother      Breast Cancer Paternal Grandmother         bilat mastectomies     Breast Cancer Maternal Aunt         stage 1     Thyroid Cancer Sister 23         Physical Exam:     There were no vitals taken for this visit.  There is no height or weight on file to calculate BMI.    Respiratory: No audible wheeze, cough.      Psychiatric: appropriate mood and affect. Mentation appears normal, affect normal/bright, judgement and insight intact, normal speech         Assessment:    Francesca Rowland is a 40 year old woman with a diagnosis of metastatic carcinoma to the cervix. She is here today for follow up and disease management. In light of the recent COVID19 pandemic, and in accordance with our group and national guidelines this patients care has been reviewed and it is felt that presenting for her visit would be more " likely to increase rather than decrease her relative risks. Therefore this visit was conducted by telephone.     30 minutes spent on the date of the encounter doing chart review, history and exam, documentation, and further activities as noted above.       Plan:     1.)        Ok to proceed with planned treatment once labs have met parameters. MD to sign cycle #1. Long discussion regarding treatment plan and regimen including chemotherapy agents and s/s to report and ways to manage possible symptoms that could be caused by chemotherapy agents. Encouraged 4-5 small meals a day and discussed nausea management and control. Will send in Ativan for nausea control per recommendations from Dr. Canela. Will start at 0.5 mg q6h prn, patient aware to contact our office to increase this or to change to a different regimen if this does not control nausea. Patient aware to not mix prior Xanax Rx with Ativan (currently not using). Infusion RN to explain to patient to not use Zofran for nausea within first three days of chemotherapy due to Aloxi. Encouraged 64 oz of fluids per day. Discussed neutropenic precautions and nito period. Reviewed signs and symptoms for when she should contact the clinic or seek additional care. Patient to contact the clinic with any questions or concerns in the interim. Patient scheduled for future cycles and aware of these appts.      2.) Genetic risk factors were assessed again and the patient has multiple family members with cancer diagnosed younger than age 50 including herself (age 40, but with likely HPV related disease) and her sister (thyroid cancer at age 23) among others. She met with genetics 1/2021. Will send a message to Nneka Childress GC for follow up.    3.) Labs and/or tests ordered include: CBC, CMP, Mg, Protein Urine.     4.) Health maintenance issues addressed today include annual health maintenance and non-gynecologic issues with PCP.    5.)        Rx: Ativan, Zofran    Terra  Garcia, MSN, [facility]-BC  Women's Health Nurse Practitioner  Division of Gynecologic Oncology  [facility]    CC  Patient Care Team:  Tyrese Bowie as PCP - General (Family Medicine)  Tawana Rivera, RN as Specialty Care Coordinator (Gynecologic Oncology)  Jluis Canela MD as MD (Gynecologic Oncology)  Bret Kerns MD as Assigned Cancer Care Provider  Martín Myrick MD as Assigned Neuroscience Provider

## 2021-04-28 NOTE — NURSING NOTE
Chief Complaint   Patient presents with     Telephone     ADENOCARCINOMA OF CERVIX      Blood Draw     Labs drawn via PIV placed by RN in lab. VS taken.      Soy Lazo RN

## 2021-04-28 NOTE — TELEPHONE ENCOUNTER
Justina Magana CPhT  [facility]  Oncology Pharmacy Liaison  [email or web address]  Phone: [phone]  Fax: [phone]

## 2021-04-29 PROCEDURE — 999N001117 HC STATISTIC FOUNDATION ONE GENE PANEL: Performed by: OBSTETRICS & GYNECOLOGY

## 2021-04-30 DIAGNOSIS — R11.2 CHEMOTHERAPY INDUCED NAUSEA AND VOMITING: Primary | ICD-10-CM

## 2021-04-30 DIAGNOSIS — T45.1X5A CHEMOTHERAPY INDUCED NAUSEA AND VOMITING: Primary | ICD-10-CM

## 2021-04-30 RX ORDER — HEPARIN SODIUM (PORCINE) LOCK FLUSH IV SOLN 100 UNIT/ML 100 UNIT/ML
5 SOLUTION INTRAVENOUS
Status: CANCELLED | OUTPATIENT
Start: 2021-04-30

## 2021-04-30 RX ORDER — OLANZAPINE 5 MG/1
5 TABLET ORAL AT BEDTIME
Qty: 30 TABLET | Refills: 0 | Status: SHIPPED | OUTPATIENT
Start: 2021-04-30

## 2021-04-30 RX ORDER — DEXAMETHASONE 4 MG/1
8 TABLET ORAL
Qty: 12 TABLET | Refills: 3 | Status: SHIPPED | OUTPATIENT
Start: 2021-04-30

## 2021-04-30 RX ORDER — HEPARIN SODIUM,PORCINE 10 UNIT/ML
5 VIAL (ML) INTRAVENOUS
Status: CANCELLED | OUTPATIENT
Start: 2021-04-30

## 2021-04-30 NOTE — PROGRESS NOTES
I spoke with Francesca today and she reports vomiting x4 yesterday and x1 today. She reports flatus and bowel movements solid today and yesterday. She is drinking and eating.     Encouraged BRAT diet, 64 oz of fluids water and Pedialyte.     Spoke with pharmacist at [facility]. Due to her allergy to Emend and Compazine, will Rx Zyprexa 5 mg at bedtime and patient ok to start Zofran 4 mg today 48 hours post Aloxi per pharmacy (no cardiac hx). Patient aware to stop Ativan given that this is not helping n/v. Will also do 8 mg Po Decadron for days 2-4 after chemotherapy. Rx sent     Will get patient in for BMP and 1 L NS over the weekend if possible.   [facility] is full tomorrow. Will ask MARTA Calvin to attempt to get patient in at [facility] if possible.   Therapy plan with 1L NS with electrolyte repletion prn ordered today.   Encouraged patient to call in over the weekend if nausea not improved.       KRISTA Heart-BC  Women's Health Nurse Practitioner  Division of Gynecologic Oncology  [facility]  Pager: [phone]

## 2021-05-02 ENCOUNTER — INFUSION THERAPY VISIT (OUTPATIENT)
Dept: ONCOLOGY | Facility: CLINIC | Age: 41
End: 2021-05-02
Attending: NURSE PRACTITIONER
Payer: COMMERCIAL

## 2021-05-02 VITALS
HEART RATE: 81 BPM | SYSTOLIC BLOOD PRESSURE: 124 MMHG | OXYGEN SATURATION: 96 % | DIASTOLIC BLOOD PRESSURE: 86 MMHG | TEMPERATURE: 98.6 F | RESPIRATION RATE: 18 BRPM

## 2021-05-02 DIAGNOSIS — R11.2 CHEMOTHERAPY INDUCED NAUSEA AND VOMITING: Primary | ICD-10-CM

## 2021-05-02 DIAGNOSIS — T45.1X5A CHEMOTHERAPY INDUCED NAUSEA AND VOMITING: Primary | ICD-10-CM

## 2021-05-02 DIAGNOSIS — C53.0 MALIGNANT NEOPLASM OF ENDOCERVIX (H): ICD-10-CM

## 2021-05-02 LAB
ANION GAP SERPL CALCULATED.3IONS-SCNC: 7 MMOL/L (ref 3–14)
BUN SERPL-MCNC: 14 MG/DL (ref 7–30)
CALCIUM SERPL-MCNC: 9.3 MG/DL (ref 8.5–10.1)
CHLORIDE SERPL-SCNC: 105 MMOL/L (ref 94–109)
CO2 SERPL-SCNC: 29 MMOL/L (ref 20–32)
CREAT SERPL-MCNC: 0.78 MG/DL (ref 0.52–1.04)
GFR SERPL CREATININE-BSD FRML MDRD: >90 ML/MIN/{1.73_M2}
GLUCOSE SERPL-MCNC: 125 MG/DL (ref 70–99)
LABORATORY COMMENT REPORT: NORMAL
POTASSIUM SERPL-SCNC: 3.4 MMOL/L (ref 3.4–5.3)
SARS-COV-2 RNA RESP QL NAA+PROBE: NEGATIVE
SARS-COV-2 RNA RESP QL NAA+PROBE: NORMAL
SODIUM SERPL-SCNC: 141 MMOL/L (ref 133–144)
SPECIMEN SOURCE: NORMAL
SPECIMEN SOURCE: NORMAL

## 2021-05-02 PROCEDURE — U0005 INFEC AGEN DETEC AMPLI PROBE: HCPCS | Performed by: OBSTETRICS & GYNECOLOGY

## 2021-05-02 PROCEDURE — U0003 INFECTIOUS AGENT DETECTION BY NUCLEIC ACID (DNA OR RNA); SEVERE ACUTE RESPIRATORY SYNDROME CORONAVIRUS 2 (SARS-COV-2) (CORONAVIRUS DISEASE [COVID-19]), AMPLIFIED PROBE TECHNIQUE, MAKING USE OF HIGH THROUGHPUT TECHNOLOGIES AS DESCRIBED BY CMS-2020-01-R: HCPCS | Performed by: OBSTETRICS & GYNECOLOGY

## 2021-05-02 PROCEDURE — 258N000003 HC RX IP 258 OP 636: Performed by: OBSTETRICS & GYNECOLOGY

## 2021-05-02 PROCEDURE — 96360 HYDRATION IV INFUSION INIT: CPT

## 2021-05-02 PROCEDURE — 80048 BASIC METABOLIC PNL TOTAL CA: CPT | Performed by: OBSTETRICS & GYNECOLOGY

## 2021-05-02 PROCEDURE — 96361 HYDRATE IV INFUSION ADD-ON: CPT

## 2021-05-02 RX ORDER — HEPARIN SODIUM (PORCINE) LOCK FLUSH IV SOLN 100 UNIT/ML 100 UNIT/ML
5 SOLUTION INTRAVENOUS
Status: CANCELLED | OUTPATIENT
Start: 2021-05-02

## 2021-05-02 RX ORDER — HEPARIN SODIUM,PORCINE 10 UNIT/ML
5 VIAL (ML) INTRAVENOUS
Status: CANCELLED | OUTPATIENT
Start: 2021-05-02

## 2021-05-02 RX ADMIN — SODIUM CHLORIDE 1000 ML: 9 INJECTION, SOLUTION INTRAVENOUS at 10:43

## 2021-05-02 NOTE — PROGRESS NOTES
Infusion Nursing Note:  Francesca Rowland presents today for IVF and Possible Electrolyte Replacement - Not needed.    Patient seen by provider today: No   present during visit today: Not Applicable.    Note: Pt arrives to infusion today feeling better than the past few days since starting Decadron and Zyprexa. She states she has been able to eat small frequent meals and stay well hydrated. Encouraged her to try protein shakes if she is unable to maintain eating. Denies fevers, chills, cough, sob, dizziness, bleeding, vomiting or diarrhea.    Intravenous Access:  Peripheral IV placed after multiple attempts.    Treatment Conditions:  Lab Results   Component Value Date     05/02/2021                   Lab Results   Component Value Date    POTASSIUM 3.4 05/02/2021        Lab Results   Component Value Date    CR 0.78 05/02/2021                   Lab Results   Component Value Date    SHAD 9.3 05/02/2021     Results reviewed, labs did NOT meet treatment parameters.    Post Infusion Assessment:  Patient tolerated infusion without incident.  Blood return noted pre and post infusion.  Site patent and intact, free from redness, edema or discomfort.  No evidence of extravasations.  Access discontinued per protocol.     Discharge Plan:   Patient declined prescription refills.  AVS to patient via Fiiiling.  Patient will return 5/19 for next appointment. Encouraged her to call triage line if she continues to have nausea/vomiting.   Patient discharged in stable condition accompanied by: self.  Departure Mode: Ambulatory.  Face to Face time: 0.    Izabela Mcgarry RN

## 2021-05-03 ENCOUNTER — PATIENT OUTREACH (OUTPATIENT)
Dept: ONCOLOGY | Facility: CLINIC | Age: 41
End: 2021-05-03

## 2021-05-03 DIAGNOSIS — C79.82 METASTATIC ADENOCARCINOMA TO CERVIX (H): Primary | ICD-10-CM

## 2021-05-03 NOTE — PROGRESS NOTES
Spoke to Francesca to see how she is feeling after her fluids yesterday and how her nausea has been.  She states she is feeling better, been taking zolfran as needed and the zyprexa at night.  She is wondering if she should continue taking this even before her next chemo to try to avoid getting as nauseated and vomiting after chemo.  I said that was a good idea and if she needed refills we can work through that portion.

## 2021-05-04 ENCOUNTER — ANESTHESIA EVENT (OUTPATIENT)
Dept: SURGERY | Facility: AMBULATORY SURGERY CENTER | Age: 41
End: 2021-05-04

## 2021-05-04 LAB — LAB SCANNED RESULT: NORMAL

## 2021-05-04 RX ORDER — NALOXONE HYDROCHLORIDE 0.4 MG/ML
0.4 INJECTION, SOLUTION INTRAMUSCULAR; INTRAVENOUS; SUBCUTANEOUS
Status: CANCELLED | OUTPATIENT
Start: 2021-05-04 | End: 2021-05-05

## 2021-05-04 RX ORDER — MEPERIDINE HYDROCHLORIDE 25 MG/ML
12.5 INJECTION INTRAMUSCULAR; INTRAVENOUS; SUBCUTANEOUS
Status: CANCELLED | OUTPATIENT
Start: 2021-05-04

## 2021-05-04 RX ORDER — NALOXONE HYDROCHLORIDE 0.4 MG/ML
0.2 INJECTION, SOLUTION INTRAMUSCULAR; INTRAVENOUS; SUBCUTANEOUS
Status: CANCELLED | OUTPATIENT
Start: 2021-05-04 | End: 2021-05-05

## 2021-05-04 RX ORDER — ONDANSETRON 4 MG/1
4 TABLET, ORALLY DISINTEGRATING ORAL EVERY 30 MIN PRN
Status: CANCELLED | OUTPATIENT
Start: 2021-05-04

## 2021-05-04 RX ORDER — ONDANSETRON 2 MG/ML
4 INJECTION INTRAMUSCULAR; INTRAVENOUS EVERY 30 MIN PRN
Status: CANCELLED | OUTPATIENT
Start: 2021-05-04

## 2021-05-04 RX ORDER — SODIUM CHLORIDE, SODIUM LACTATE, POTASSIUM CHLORIDE, CALCIUM CHLORIDE 600; 310; 30; 20 MG/100ML; MG/100ML; MG/100ML; MG/100ML
INJECTION, SOLUTION INTRAVENOUS CONTINUOUS
Status: CANCELLED | OUTPATIENT
Start: 2021-05-04

## 2021-05-04 NOTE — ANESTHESIA PREPROCEDURE EVALUATION
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 (H) [C79.82]   Procedure : Procedure(s):  INSERTION, VASCULAR ACCESS PORT     Past Medical History:   Diagnosis Date     Chronic cholecystitis 2019     History of cervical cancer      Metastasis to brain (H) 2021     Metastatic adenocarcinoma to cervix (H) 10/28/2019    Added automatically from request for surgery [id number]      Past Surgical History:   Procedure Laterality Date     CHOLECYSTECTOMY, LAPOROSCOPIC  2019     HYSTERECTOMY RADICAL  2007    PAUL     LAPAROSCOPIC LYMPHADENECTOMY N/A 2019    Procedure: LAPAROSCOPY, resection of of paraaortic lymph node, lysis of adhesions;  Surgeon: Jluis Canela MD;  Location: [facility] OR     OPTICAL TRACKING SYSTEM CRANIOTOMY, EXCISE TUMOR, COMBINED Bilateral 3/15/2021    Procedure: stealth assisted Midline suboccipital craniectomy/craniotomy for tumor;  Surgeon: Martín Myrick MD;  Location: [facility] OR     OPTICAL TRACKING SYSTEM VENTRICULOSTOMY Right 3/15/2021    Procedure: stealth assisted  right frontal ventriculostomy;  Surgeon: Martín Myrick MD;  Location: [facility] OR      Allergies   Allergen Reactions     Emend [Aprepitant]      Prochlorperazine      \"i was told I turn blue\"      Social History     Tobacco Use     Smoking status: Former Smoker     Types: Cigarettes     Smokeless tobacco: Never Used   Substance Use Topics     Alcohol use: Yes     Frequency: 2-3 times a week     Comment: one drink a week      Wt Readings from Last 1 Encounters:   21 108.9 kg (240 lb)           Physical Exam    Airway        Mallampati: II   TM distance: > 3 FB   Neck ROM: full   Mouth opening: > 3 cm    Respiratory Devices and Support         Dental  no notable dental history         Cardiovascular   cardiovascular exam normal          Pulmonary   pulmonary exam normal      "           OUTSIDE LABS:  CBC:   Lab Results   Component Value Date    WBC 4.1 04/28/2021    WBC 5.4 03/20/2021    HGB 12.7 04/28/2021    HGB 10.9 (L) 03/20/2021    HCT 38.5 04/28/2021    HCT 33.4 (L) 03/20/2021     04/28/2021     03/20/2021     BMP:   Lab Results   Component Value Date     05/02/2021     04/28/2021    POTASSIUM 3.4 05/02/2021    POTASSIUM 3.6 04/28/2021    CHLORIDE 105 05/02/2021    CHLORIDE 107 04/28/2021    CO2 29 05/02/2021    CO2 29 04/28/2021    BUN 14 05/02/2021    BUN 11 04/28/2021    CR 0.78 05/02/2021    CR 0.83 04/28/2021     (H) 05/02/2021     (H) 04/28/2021     COAGS:   Lab Results   Component Value Date    PTT 32 02/26/2008    INR 1.07 10/15/2019     POC:   Lab Results   Component Value Date     (H) 03/20/2021    HCG Negative 03/15/2021     HEPATIC:   Lab Results   Component Value Date    ALBUMIN 3.7 04/28/2021    PROTTOTAL 7.9 04/28/2021    ALT 18 04/28/2021    AST 11 04/28/2021    ALKPHOS 106 04/28/2021    BILITOTAL 0.5 04/28/2021     OTHER:   Lab Results   Component Value Date    SHAD 9.3 05/02/2021    PHOS 3.0 03/20/2021    MAG 2.3 04/28/2021       Anesthesia Plan    ASA Status:  2   NPO Status:  NPO Appropriate    Anesthesia Type: MAC.     - Reason for MAC: straight local not clinically adequate   Induction: Intravenous, Propofol.   Maintenance: TIVA.        Consents    Anesthesia Plan(s) and associated risks, benefits, and realistic alternatives discussed. Questions answered and patient/representative(s) expressed understanding.     - Discussed with:  Patient      - Extended Intubation/Ventilatory Support Discussed: No.      - Patient is DNR/DNI Status: No    Use of blood products discussed: No .     Postoperative Care    Pain management: Multi-modal analgesia.   PONV prophylaxis: Ondansetron (or other 5HT-3)     Comments:         H&P reviewed: Unable to attach H&P to encounter due to EHR limitations. H&P Update: appropriate H&P  reviewed, patient examined. No interval changes since H&P (within 30 days).         Pako Alvarenga MD

## 2021-05-05 ENCOUNTER — HOSPITAL ENCOUNTER (OUTPATIENT)
Facility: AMBULATORY SURGERY CENTER | Age: 41
Discharge: HOME | End: 2021-05-05
Attending: RADIOLOGY | Admitting: RADIOLOGY
Payer: COMMERCIAL

## 2021-05-05 ENCOUNTER — ANESTHESIA (OUTPATIENT)
Dept: SURGERY | Facility: AMBULATORY SURGERY CENTER | Age: 41
End: 2021-05-05

## 2021-05-05 ENCOUNTER — ANCILLARY PROCEDURE (OUTPATIENT)
Dept: RADIOLOGY | Facility: AMBULATORY SURGERY CENTER | Age: 41
End: 2021-05-05
Attending: NURSE PRACTITIONER
Payer: COMMERCIAL

## 2021-05-05 VITALS
HEART RATE: 90 BPM | HEIGHT: 66 IN | WEIGHT: 230 LBS | RESPIRATION RATE: 16 BRPM | OXYGEN SATURATION: 100 % | SYSTOLIC BLOOD PRESSURE: 148 MMHG | TEMPERATURE: 96.9 F | BODY MASS INDEX: 36.96 KG/M2 | DIASTOLIC BLOOD PRESSURE: 94 MMHG

## 2021-05-05 DIAGNOSIS — C53.0 MALIGNANT NEOPLASM OF ENDOCERVIX (H): ICD-10-CM

## 2021-05-05 DIAGNOSIS — C79.82 METASTATIC ADENOCARCINOMA TO CERVIX (H): ICD-10-CM

## 2021-05-05 LAB — INR PPP: 0.97 (ref 0.86–1.14)

## 2021-05-05 PROCEDURE — 77001 FLUOROGUIDE FOR VEIN DEVICE: CPT | Mod: 26 | Performed by: RADIOLOGY

## 2021-05-05 PROCEDURE — 36561 INSERT TUNNELED CV CATH: CPT | Mod: RT | Performed by: RADIOLOGY

## 2021-05-05 PROCEDURE — 76937 US GUIDE VASCULAR ACCESS: CPT | Mod: 26 | Performed by: RADIOLOGY

## 2021-05-05 DEVICE — CATH PORT POWERPORT CLEARVUE SLIM 6FR 5616000
Type: IMPLANTABLE DEVICE | Site: CHEST | Status: NON-FUNCTIONAL
Removed: 2023-12-21

## 2021-05-05 RX ORDER — FENTANYL CITRATE 50 UG/ML
25-50 INJECTION, SOLUTION INTRAMUSCULAR; INTRAVENOUS
Status: DISCONTINUED | OUTPATIENT
Start: 2021-05-05 | End: 2021-05-06 | Stop reason: HOSPADM

## 2021-05-05 RX ORDER — PROPOFOL 10 MG/ML
INJECTION, EMULSION INTRAVENOUS PRN
Status: DISCONTINUED | OUTPATIENT
Start: 2021-05-05 | End: 2021-05-05

## 2021-05-05 RX ORDER — HEPARIN SODIUM,PORCINE 10 UNIT/ML
5 VIAL (ML) INTRAVENOUS EVERY 24 HOURS
Status: DISCONTINUED | OUTPATIENT
Start: 2021-05-05 | End: 2021-05-06 | Stop reason: HOSPADM

## 2021-05-05 RX ORDER — DEXTROSE MONOHYDRATE 25 G/50ML
25-50 INJECTION, SOLUTION INTRAVENOUS
Status: DISCONTINUED | OUTPATIENT
Start: 2021-05-05 | End: 2021-05-06 | Stop reason: HOSPADM

## 2021-05-05 RX ORDER — NICOTINE POLACRILEX 4 MG
15-30 LOZENGE BUCCAL
Status: DISCONTINUED | OUTPATIENT
Start: 2021-05-05 | End: 2021-05-06 | Stop reason: HOSPADM

## 2021-05-05 RX ORDER — LIDOCAINE 40 MG/G
CREAM TOPICAL
Status: DISCONTINUED | OUTPATIENT
Start: 2021-05-05 | End: 2021-05-06 | Stop reason: HOSPADM

## 2021-05-05 RX ORDER — ACETAMINOPHEN 325 MG/1
975 TABLET ORAL ONCE
Status: COMPLETED | OUTPATIENT
Start: 2021-05-05 | End: 2021-05-05

## 2021-05-05 RX ORDER — GABAPENTIN 300 MG/1
300 CAPSULE ORAL ONCE
Status: COMPLETED | OUTPATIENT
Start: 2021-05-05 | End: 2021-05-05

## 2021-05-05 RX ORDER — HEPARIN SODIUM (PORCINE) LOCK FLUSH IV SOLN 100 UNIT/ML 100 UNIT/ML
5 SOLUTION INTRAVENOUS
Status: DISCONTINUED | OUTPATIENT
Start: 2021-05-05 | End: 2021-05-06 | Stop reason: HOSPADM

## 2021-05-05 RX ORDER — OXYCODONE HYDROCHLORIDE 5 MG/1
5 TABLET ORAL EVERY 4 HOURS PRN
Status: DISCONTINUED | OUTPATIENT
Start: 2021-05-05 | End: 2021-05-06 | Stop reason: HOSPADM

## 2021-05-05 RX ORDER — LIDOCAINE HYDROCHLORIDE 20 MG/ML
INJECTION, SOLUTION INFILTRATION; PERINEURAL PRN
Status: DISCONTINUED | OUTPATIENT
Start: 2021-05-05 | End: 2021-05-05

## 2021-05-05 RX ORDER — SODIUM CHLORIDE, SODIUM LACTATE, POTASSIUM CHLORIDE, CALCIUM CHLORIDE 600; 310; 30; 20 MG/100ML; MG/100ML; MG/100ML; MG/100ML
INJECTION, SOLUTION INTRAVENOUS CONTINUOUS
Status: DISCONTINUED | OUTPATIENT
Start: 2021-05-05 | End: 2021-05-06 | Stop reason: HOSPADM

## 2021-05-05 RX ORDER — ALBUTEROL SULFATE 0.83 MG/ML
2.5 SOLUTION RESPIRATORY (INHALATION) EVERY 4 HOURS PRN
Status: DISCONTINUED | OUTPATIENT
Start: 2021-05-05 | End: 2021-05-06 | Stop reason: HOSPADM

## 2021-05-05 RX ORDER — CEFAZOLIN SODIUM 2 G/50ML
2 SOLUTION INTRAVENOUS
Status: COMPLETED | OUTPATIENT
Start: 2021-05-05 | End: 2021-05-05

## 2021-05-05 RX ORDER — SODIUM CHLORIDE 9 MG/ML
INJECTION, SOLUTION INTRAVENOUS CONTINUOUS
Status: DISCONTINUED | OUTPATIENT
Start: 2021-05-05 | End: 2021-05-06 | Stop reason: HOSPADM

## 2021-05-05 RX ORDER — PROPOFOL 10 MG/ML
INJECTION, EMULSION INTRAVENOUS CONTINUOUS PRN
Status: DISCONTINUED | OUTPATIENT
Start: 2021-05-05 | End: 2021-05-05

## 2021-05-05 RX ADMIN — LIDOCAINE HYDROCHLORIDE 60 MG: 20 INJECTION, SOLUTION INFILTRATION; PERINEURAL at 10:18

## 2021-05-05 RX ADMIN — PROPOFOL 70 MG: 10 INJECTION, EMULSION INTRAVENOUS at 10:20

## 2021-05-05 RX ADMIN — SODIUM CHLORIDE, SODIUM LACTATE, POTASSIUM CHLORIDE, CALCIUM CHLORIDE: 600; 310; 30; 20 INJECTION, SOLUTION INTRAVENOUS at 09:31

## 2021-05-05 RX ADMIN — ACETAMINOPHEN 975 MG: 325 TABLET ORAL at 09:12

## 2021-05-05 RX ADMIN — CEFAZOLIN SODIUM 2 G: 2 SOLUTION INTRAVENOUS at 10:19

## 2021-05-05 RX ADMIN — PROPOFOL 150 MCG/KG/MIN: 10 INJECTION, EMULSION INTRAVENOUS at 10:21

## 2021-05-05 RX ADMIN — GABAPENTIN 300 MG: 300 CAPSULE ORAL at 09:12

## 2021-05-05 ASSESSMENT — MIFFLIN-ST. JEOR: SCORE: 1720.02

## 2021-05-05 NOTE — ANESTHESIA CARE TRANSFER NOTE
Patient: Francesca Rowland    Procedure(s):  INSERTION, VASCULAR ACCESS PORT    Diagnosis: Metastatic adenocarcinoma to cervix (H) [C79.82]  Diagnosis Additional Information: No value filed.    Anesthesia Type:   No value filed.     Note:      Level of Consciousness: awake  Oxygen Supplementation: room air    Independent Airway: airway patency satisfactory and stable  Dentition: dentition unchanged  Vital Signs Stable: post-procedure vital signs reviewed and stable  Report to RN Given: handoff report given  Patient transferred to: Phase II    Handoff Report: Identifed the Patient, Identified the Reponsible Provider, Reviewed the pertinent medical history, Discussed the surgical course, Reviewed Intra-OP anesthesia mangement and issues during anesthesia, Set expectations for post-procedure period and Allowed opportunity for questions and acknowledgement of understanding      Vitals: (Last set prior to Anesthesia Care Transfer)  CRNA VITALS  5/5/2021 1033 - 5/5/2021 1103      5/5/2021             Resp Rate (observed):  (!) 1    Resp Rate (set):  10        Electronically Signed By: MIRA Joy CRNA  May 5, 2021  11:03 AM

## 2021-05-05 NOTE — DISCHARGE INSTRUCTIONS
A collaboration between [facility] and [facility]  Experts in minimally invasive, targeted treatments performed using imaging guidance    Venous Access Device,  Port Catheter or Tunneled or Non-Tunneled Central Line Placement    Today you had a procedure today to install a venous access device; either a tunneled central vein catheter or a subcutaneous port catheter.    After you go home:  - Drink plenty of fluids.  Generally 6-8 (8 ounce) glasses a day is recommended.  - Resume your regular diet unless otherwise ordered by a medical provider.  - Keep any applied tape/gauze dressings clean and dry.  Change tape/gauze dressings if they get wet or soiled.  - You may shower the following day after procedure, however cover and protect from moisture any tape/gauze dressings.  You may let water hit and run over dried skin glue, but do not scrub.  Pat the area dry after showering.  - Port placement incisions are closed with absorbable suture, meaning they do not need to be removed at a later date, and a topical skin adhesive (skin glue).  This glue will wear off in 7-14 days.  Do not remove before this time.  If 14 days have passed and residual glue is present, you may gently remove it.  - Do not apply gels, lotions, or ointments to the glue site for the first 10 days as this may cause the glue to prematurely soften and fail.  - Do not perform strenuous activities or lift greater than 10 pounds for the next three days.  - If there is bleeding or oozing from the procedure site, apply firm pressure to the area for 5-10 minutes.  If the bleeding continues seek medical advice at the numbers below.  - Mild procedure site discomfort can be treated with an ice pack and over-the-counter pain relievers.        For 24 hours after any sedation used:  - Relax and take it easy.  No strenuous activities.  - Do not drive or operate machines at home or at work.  - No alcohol  consumption.  - Do not make any important or legal decisions.    Call our Interventional Radiology (IR) service if:  - If you start bleeding from the procedure site.  If you do start to bleed from the site, lie down and hold some pressure on the site.  Our radiology provider can help you decide if you need to return to the hospital.  - If you have new or worsening pain related to the procedure.  - If you have concerning swelling at the procedure site.  - If you develop persistent nausea or vomiting.  - If you develop hives or a rash or any unexplained itching.  - If you have a fever of greater than 100.5  F and chills in the first 5 days after procedure.  - Any other concerns related to your procedure.      [facility]  Interventional Radiology (IR)  [address]  [address] [facility] [place] [address]    Contact Number:  [phone]  (IR control desk)  - Monday - Friday 8:00 am - 4:30 pm    After hours for urgent concerns:  [phone]  - After 4:30 pm Monday - Friday, Weekends and Holidays.   - Ask for Interventional Radiology on-call.  Someone is available 24 hours a day.  - [facility] toll free number:  [phone]

## 2021-05-05 NOTE — BRIEF OP NOTE
[facility]    Brief Operative Note    Pre-operative diagnosis: Metastatic adenocarcinoma to cervix (H) [C79.82]  Post-operative diagnosis Same as pre-operative diagnosis    Procedure: Procedure(s):  INSERTION, VASCULAR ACCESS PORT  Surgeon: Surgeon(s) and Role:     * Oral Toledo MD - Primary  Anesthesia: Monitor Anesthesia Care   Estimated blood loss: Minimal  Drains: None  Specimens: * No specimens in log *  Findings:   6F SL slim PowerPort R IJV to upper RA under US and fluoro. Functions well, is heplocked, ready to access and use. .  Complications: None.  Implants:   Implant Name Type Inv. Item Serial No.  Lot No. LRB No. Used Action   CATH PORT POWERPORT CLEARVUE SLIM 6FR [id number] Port CATH PORT POWERPORT CLEARVUE SLIM 6FR [id number]   [employer] [id number] Right 1 Implanted

## 2021-05-05 NOTE — ANESTHESIA POSTPROCEDURE EVALUATION
Patient: Francesca Rowland    Procedure(s):  INSERTION, VASCULAR ACCESS PORT    Diagnosis:Metastatic adenocarcinoma to cervix (H) [C79.82]  Diagnosis Additional Information: No value filed.    Anesthesia Type:  MAC    Note:  Disposition: Outpatient   Postop Pain Control: Uneventful            Sign Out: Well controlled pain   PONV:    Neuro/Psych: Uneventful            Sign Out: Acceptable/Baseline neuro status   Airway/Respiratory: Uneventful            Sign Out: Acceptable/Baseline resp. status   CV/Hemodynamics: Uneventful            Sign Out: Acceptable CV status; No obvious hypovolemia; No obvious fluid overload   Other NRE:    DID A NON-ROUTINE EVENT OCCUR?            Last vitals:  Vitals:    05/05/21 0857 05/05/21 1106 05/05/21 1122   BP: (!) 142/96 130/86 (!) 148/94   Pulse: 90     Resp: 18 16 16   Temp: 36.3  C (97.3  F) 35.9  C (96.6  F) 36.1  C (96.9  F)   SpO2: 98% 100% 100%       Last vitals prior to Anesthesia Care Transfer:  CRNA VITALS  5/5/2021 1033 - 5/5/2021 1133      5/5/2021             Resp Rate (set):  10          Electronically Signed By: Pako Alvarenga MD  May 5, 2021  5:52 PM

## 2021-05-07 NOTE — PROCEDURES
Radiotherapy Treatment Summary          Date of Report: 2021     PATIENT: SHIRLENE CLAUDIO  MEDICAL RECORD NO: [id number]  : 1980     DIAGNOSIS: C79.31 Secondary malignant neoplasm of brain  INTENT OF RADIOTHERAPY: Palliative  PATHOLOGY: Cervical adenocarcinoma                                   STAGE: IV  CONCURRENT SYSTEMIC THERAPY: None                   Details of the treatments summarized below are found in records kept in the Department of Radiation Oncology at [facility].     Treatment Summary:  Radiation Oncology - Course: 2 Protocol:   Treatment Site Dose Modality From To Days Fx.  2a R Cerebellar Cavity  3,000 cGy [place] 60  2021   4  5  2aa R Occipital  2,000 cGy [place] 60  2021   1          Dose per Fraction:  Rt Cerebellar Cavity 600 cGy/fraction  Rt Occipital Lesion 2000 cGy             Total Dose:     Rt Cerebellar Cavity 3,000 cGy  Rt Occipital Lesion 2,000 cGy                COMMENTS:                         Ms. Claudio is a 40-year-old female who is recently found to have brain metastasis secondary to her known   cervical cancer.      Her cervical cancer was diagnosed in  and at the time it was FIGO IB2. She was definitively treated with radical   hysterectomy, bilateral salpingectomy without oophorectomy, bilateral pelvic and periaortic lymph node dissections. She   completed surveillance in , but her disease later recurred in 2019, when she had abdominal pain and nausea, with   imaging showing an enlarged pericaval node. She underwent laparotomy with intent for resection of her disease but it   revealed additional lymphadenopathy. Her pelvic and para-aortic yuridia chain was treated to radiation with concurrent   cisplatin, completed in 2020.       Most recently, she developed intractable headache and imbalance. A MRI brain on 3/8/2021 demonstrated a   heterogeneous mixed solid and cystic 2.7 cm mass in the right cerebellum. She  underwent suboccipital craniotomy to   achieve gross total resection of a right cerebellar lesion on 3/15/2021 and subsequently proceeded to stereotactic   radiosurgery. On planning brain MRI, an additional sub-centimeter right occipital lesion was noted, and was treated as   well. She tolerated it well without complications.      ED visits/hospitalizations: No     Missed treatments: No     Acute Toxicity Profile by CTC v5.0: No     PAIN MANAGEMENT: Not indicated                             FOLLOW UP PLAN:                          1. Follow up in 1 month after repeat brain MRI.       Resident Physician: Quang Lua M.D.   Staff Physician: Bret Kerns M.D.  Physicist: Jennifer Miguel, PhD     CC:   MD Martín Shaffer MD                                     Radiation Oncology:  [facility] [address] [place] [address]

## 2021-05-12 LAB — LAB SCANNED RESULT: NORMAL

## 2021-05-18 ENCOUNTER — VIRTUAL VISIT (OUTPATIENT)
Dept: ONCOLOGY | Facility: CLINIC | Age: 41
End: 2021-05-18
Attending: NURSE PRACTITIONER
Payer: COMMERCIAL

## 2021-05-18 DIAGNOSIS — C79.31 BRAIN METASTASIS: ICD-10-CM

## 2021-05-18 DIAGNOSIS — R11.2 CHEMOTHERAPY INDUCED NAUSEA AND VOMITING: Primary | ICD-10-CM

## 2021-05-18 DIAGNOSIS — C79.82 METASTATIC ADENOCARCINOMA TO CERVIX (H): Primary | ICD-10-CM

## 2021-05-18 DIAGNOSIS — T45.1X5A CHEMOTHERAPY INDUCED NEUTROPENIA (H): ICD-10-CM

## 2021-05-18 DIAGNOSIS — D70.1 CHEMOTHERAPY INDUCED NEUTROPENIA (H): ICD-10-CM

## 2021-05-18 DIAGNOSIS — C79.82 METASTATIC ADENOCARCINOMA TO CERVIX (H): ICD-10-CM

## 2021-05-18 DIAGNOSIS — Z51.11 ENCOUNTER FOR ANTINEOPLASTIC CHEMOTHERAPY: ICD-10-CM

## 2021-05-18 DIAGNOSIS — R11.2 CHEMOTHERAPY INDUCED NAUSEA AND VOMITING: ICD-10-CM

## 2021-05-18 DIAGNOSIS — T45.1X5A CHEMOTHERAPY INDUCED NAUSEA AND VOMITING: ICD-10-CM

## 2021-05-18 DIAGNOSIS — C53.9 MALIGNANT NEOPLASM OF CERVIX, UNSPECIFIED SITE (H): ICD-10-CM

## 2021-05-18 DIAGNOSIS — T45.1X5A CHEMOTHERAPY INDUCED NAUSEA AND VOMITING: Primary | ICD-10-CM

## 2021-05-18 LAB
ALBUMIN SERPL-MCNC: 3.5 G/DL (ref 3.4–5)
ALBUMIN UR-MCNC: NEGATIVE MG/DL
ALP SERPL-CCNC: 101 U/L (ref 40–150)
ALT SERPL W P-5'-P-CCNC: 26 U/L (ref 0–50)
ANION GAP SERPL CALCULATED.3IONS-SCNC: 4 MMOL/L (ref 3–14)
AST SERPL W P-5'-P-CCNC: 12 U/L (ref 0–45)
BASOPHILS # BLD AUTO: 0 10E9/L (ref 0–0.2)
BASOPHILS NFR BLD AUTO: 1 %
BILIRUB SERPL-MCNC: 0.4 MG/DL (ref 0.2–1.3)
BUN SERPL-MCNC: 15 MG/DL (ref 7–30)
CALCIUM SERPL-MCNC: 8.9 MG/DL (ref 8.5–10.1)
CHLORIDE SERPL-SCNC: 105 MMOL/L (ref 94–109)
CO2 SERPL-SCNC: 29 MMOL/L (ref 20–32)
CREAT SERPL-MCNC: 0.86 MG/DL (ref 0.52–1.04)
DIFFERENTIAL METHOD BLD: ABNORMAL
EOSINOPHIL # BLD AUTO: 0.2 10E9/L (ref 0–0.7)
EOSINOPHIL NFR BLD AUTO: 6.3 %
ERYTHROCYTE [DISTWIDTH] IN BLOOD BY AUTOMATED COUNT: 14.3 % (ref 10–15)
GFR SERPL CREATININE-BSD FRML MDRD: 83 ML/MIN/{1.73_M2}
GLUCOSE SERPL-MCNC: 107 MG/DL (ref 70–99)
HCT VFR BLD AUTO: 36.6 % (ref 35–47)
HGB BLD-MCNC: 12.1 G/DL (ref 11.7–15.7)
IMM GRANULOCYTES # BLD: 0 10E9/L (ref 0–0.4)
IMM GRANULOCYTES NFR BLD: 0.3 %
LYMPHOCYTES # BLD AUTO: 0.9 10E9/L (ref 0.8–5.3)
LYMPHOCYTES NFR BLD AUTO: 29.1 %
MAGNESIUM SERPL-MCNC: 2.1 MG/DL (ref 1.6–2.3)
MCH RBC QN AUTO: 29.7 PG (ref 26.5–33)
MCHC RBC AUTO-ENTMCNC: 33.1 G/DL (ref 31.5–36.5)
MCV RBC AUTO: 90 FL (ref 78–100)
MONOCYTES # BLD AUTO: 0.4 10E9/L (ref 0–1.3)
MONOCYTES NFR BLD AUTO: 13.2 %
NEUTROPHILS # BLD AUTO: 1.5 10E9/L (ref 1.6–8.3)
NEUTROPHILS NFR BLD AUTO: 50.1 %
PLATELET # BLD AUTO: 247 10E9/L (ref 150–450)
POTASSIUM SERPL-SCNC: 3.7 MMOL/L (ref 3.4–5.3)
PROT SERPL-MCNC: 7.9 G/DL (ref 6.8–8.8)
RBC # BLD AUTO: 4.07 10E12/L (ref 3.8–5.2)
SODIUM SERPL-SCNC: 138 MMOL/L (ref 133–144)
WBC # BLD AUTO: 3 10E9/L (ref 4–11)

## 2021-05-18 PROCEDURE — 85025 COMPLETE CBC W/AUTO DIFF WBC: CPT | Performed by: OBSTETRICS & GYNECOLOGY

## 2021-05-18 PROCEDURE — 999N001193 HC VIDEO/TELEPHONE VISIT; NO CHARGE

## 2021-05-18 PROCEDURE — 99215 OFFICE O/P EST HI 40 MIN: CPT | Mod: 95 | Performed by: NURSE PRACTITIONER

## 2021-05-18 PROCEDURE — 81003 URINALYSIS AUTO W/O SCOPE: CPT | Performed by: OBSTETRICS & GYNECOLOGY

## 2021-05-18 PROCEDURE — 83735 ASSAY OF MAGNESIUM: CPT | Performed by: OBSTETRICS & GYNECOLOGY

## 2021-05-18 PROCEDURE — 80053 COMPREHEN METABOLIC PANEL: CPT | Performed by: OBSTETRICS & GYNECOLOGY

## 2021-05-18 PROCEDURE — 36591 DRAW BLOOD OFF VENOUS DEVICE: CPT

## 2021-05-18 RX ORDER — DIPHENHYDRAMINE HYDROCHLORIDE 50 MG/ML
50 INJECTION INTRAMUSCULAR; INTRAVENOUS
Status: CANCELLED
Start: 2021-05-19

## 2021-05-18 RX ORDER — SODIUM CHLORIDE 9 MG/ML
1000 INJECTION, SOLUTION INTRAVENOUS CONTINUOUS PRN
Status: CANCELLED
Start: 2021-05-19

## 2021-05-18 RX ORDER — PALONOSETRON 0.05 MG/ML
0.25 INJECTION, SOLUTION INTRAVENOUS ONCE
Status: CANCELLED
Start: 2021-05-19

## 2021-05-18 RX ORDER — NALOXONE HYDROCHLORIDE 0.4 MG/ML
.1-.4 INJECTION, SOLUTION INTRAMUSCULAR; INTRAVENOUS; SUBCUTANEOUS
Status: CANCELLED | OUTPATIENT
Start: 2021-05-19

## 2021-05-18 RX ORDER — DIPHENHYDRAMINE HCL 25 MG
50 CAPSULE ORAL ONCE
Status: CANCELLED
Start: 2021-05-19

## 2021-05-18 RX ORDER — METHYLPREDNISOLONE SODIUM SUCCINATE 125 MG/2ML
125 INJECTION, POWDER, LYOPHILIZED, FOR SOLUTION INTRAMUSCULAR; INTRAVENOUS
Status: CANCELLED
Start: 2021-05-19

## 2021-05-18 RX ORDER — ALBUTEROL SULFATE 90 UG/1
1-2 AEROSOL, METERED RESPIRATORY (INHALATION)
Status: CANCELLED
Start: 2021-05-19

## 2021-05-18 RX ORDER — MEPERIDINE HYDROCHLORIDE 25 MG/ML
25 INJECTION INTRAMUSCULAR; INTRAVENOUS; SUBCUTANEOUS EVERY 30 MIN PRN
Status: CANCELLED | OUTPATIENT
Start: 2021-05-19

## 2021-05-18 RX ORDER — HEPARIN SODIUM (PORCINE) LOCK FLUSH IV SOLN 100 UNIT/ML 100 UNIT/ML
5 SOLUTION INTRAVENOUS EVERY 8 HOURS
Status: DISCONTINUED | OUTPATIENT
Start: 2021-05-18 | End: 2021-05-26 | Stop reason: HOSPADM

## 2021-05-18 RX ORDER — DEXTROSE, SODIUM CHLORIDE, AND POTASSIUM CHLORIDE 5; .45; .15 G/100ML; G/100ML; G/100ML
1000 INJECTION INTRAVENOUS ONCE
Status: CANCELLED
Start: 2021-05-19

## 2021-05-18 RX ORDER — EPINEPHRINE 1 MG/ML
0.3 INJECTION, SOLUTION INTRAMUSCULAR; SUBCUTANEOUS EVERY 5 MIN PRN
Status: CANCELLED | OUTPATIENT
Start: 2021-05-19

## 2021-05-18 RX ORDER — LORAZEPAM 2 MG/ML
0.5 INJECTION INTRAMUSCULAR EVERY 6 HOURS PRN
Status: CANCELLED | OUTPATIENT
Start: 2021-05-19

## 2021-05-18 RX ORDER — HEPARIN SODIUM,PORCINE 10 UNIT/ML
5 VIAL (ML) INTRAVENOUS
Status: CANCELLED | OUTPATIENT
Start: 2021-05-19

## 2021-05-18 RX ORDER — ALBUTEROL SULFATE 0.83 MG/ML
2.5 SOLUTION RESPIRATORY (INHALATION)
Status: CANCELLED | OUTPATIENT
Start: 2021-05-19

## 2021-05-18 RX ORDER — HEPARIN SODIUM (PORCINE) LOCK FLUSH IV SOLN 100 UNIT/ML 100 UNIT/ML
5 SOLUTION INTRAVENOUS
Status: CANCELLED | OUTPATIENT
Start: 2021-05-19

## 2021-05-18 RX ADMIN — HEPARIN SODIUM (PORCINE) LOCK FLUSH IV SOLN 100 UNIT/ML 5 ML: 100 SOLUTION at 09:25

## 2021-05-18 NOTE — PROGRESS NOTES
Port needle placed for lab draw access, Sterile technique performed and maintained. Patient tolerated procedure well. Tubes drawn in rainbow order: red, green, purple. Gauze dressing placed with use of paper tape.    RN advised patient to leave gauze in place for 20 mins.     Patient demonstrated verbal understanding.     Monica Perez RN  [facility]

## 2021-05-18 NOTE — PROGRESS NOTES
"Francesca is a 40 year old who is being evaluated via a billable telephone visit.      What phone number would you like to be contacted at? [phone]  How would you like to obtain your AVS? MyChart     Vitals - Patient Reported  Weight (Patient Reported): 104.3 kg (230 lb)  Height (Patient Reported): 167.6 cm (5' 6\")  BMI (Based on Pt Reported Ht/Wt): 37.12  Pain Score: No Pain (0)    Erica MONDRAGON    Phone call duration: 30 minutes                Follow Up Notes on Referred Patient    Date: 2021       Dr. Jluis Canela MD  [address] [place] [id number]       RE: Francesca Rowland  : 1980  BEST: 2021    Dear Dr. 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 She is here today for follow up and disease management. In light of the ongoing COVID19 pandemic, and in accordance with our group and national guidelines this patients care has been reviewed and it is felt that presenting for her visit would be more likely to increase rather than decrease her relative risks. Therefore this visit was conducted by telephone.      Oncology History:      Patient initially presented as a 27-year-old woman seen in referral due to a Pap smear showing high-grade squamous intraepithelial lesion that was positive for high risk HPV subtype in 2007. This Pap smear had been taken during her six week postpartum visit. Patient did have a remote history of abnormal Pap smears back in  while serving in [place] for the [employer].  Pap smears during her first pregnancy were all negative. Her Pap smear at the beginning of the most recent pregnancy was normal. Patient did have a colposcopy for evaluation of this abnormal Pap smear on 2007 and cervical biopsies of 6 and 12 o'clock position showed features consistent with papillary serous adenocarcinoma. " Endocervical curettings were also taken which showed fragments of papillary serous carcinoma. The patient then underwent a LEEP procedure on 10/15/07 which showed microinvasive adenocarcinoma with a depth of invasion of 0.4 mm. Patient then made the decision to proceed with radical hysterectomy. She subsequently underwent a radical hysterectomy, bilateral salpingectomy, bilateral pelvic and periaortic lymph node dissection on 10/19/07. Patient's operative course was otherwise uncomplicated and she recoverred uneventfully.  Pathology did reveal patient to be stage IA2 adenocarcinoma of the cervix.      She underwent routine surveillence through 2014 complicated only by SIOBHAN 1    She presented to the ED at [facility] on 9/14/2019 with severe 8.5/10 RUQ pain associated with nausea, one episode of emesis, and decreased appetite. RUQ demonstrated cholelithiasis without cholecystitis, so an abdominal and pelvic CT scan was obtained. This revealed post-surgical changes consistent with prior hysterectomy, left adnexal cysts thought to be ovarian, and an enlarged pericaval lymph node suspicious for metastatic disease vs primary lymphoma. Her condition stabilized and she was discharged home with referrals to general surgery and oncology for biliary colic and further management of the lymphadenopathy, respectively. CT-guided right retroperitoneal lymph node biopsy on 10/15/2019 was consistent with metastatic cervical carcinoma     She underwent surgery to remove the mass and determine the extent of disease on 11/13/19     PATH:  FINAL DIAGNOSIS:   LYMPH NODES, PARA-AORTIC, RESECTION:   - Positive for carcinoma in three of four lymph nodes examined (3/4), consistent with recurrent endocervical adenocarcinoma   - Largest metastatic focus is 3.5 cm, positive for extra yuridia extension      Chemo-Potentiated RT 12/2019-1/2020 2/2020: CT:IMPRESSION: Postoperative changes of hysterectomy and lymph node resection for cervical  cancer. The metastatic right pelvic lymph nodes which were previously identified have decreased in size. No new metastases identified.      She presented to the ED 4/20/2020 for an acute RUQ pain episode, which has greatly improved despite no clear diagnosis.  During this visit she underwent a CAP CT which did not demonstrate evidence of cancer but a slightly enlarged spleen. She had a mild eosinophilia (~2%) but has not traveled or eaten exotic food recently, and has not had any new contacts to suggest mono.      10/13/2020: CT:IMPRESSION:  1.  Recurrent right external iliac lymphadenopathy.  2.  Rebound thymic hyperplasia.     10/30/2020: PET IMPRESSION:   In this patient with history of metastatic cervical cancer status-post radical hysterectomy and chemoradiation, there is evidence of recurrent disease:  1. Increased size of a hypermetabolic right external iliac chain lymph node since 2/27/2020.  2. Hypermetabolic right subpectoral and axillary lymph nodes. Recommend follow-up in the breast center for axillary ultrasound and possible tissue sampling.  3. Idiopathic thymic activation/hyperplasia.     She was evaluated in the breast clinic with plan made for biopsy - however at biopsy the node had shruck considerably and was thus felt to be c/w inflammation (no biopsy obtained)     3/8/2021: MRI Brain (for dizziness) IMPRESSION:  1. Heterogeneous mixed solid and cystic 2.7 cm mass in the right cerebellum, most likely a solitary metastatic lesion. There is moderate surrounding vasogenic edema as well as mass effect on the fourth ventricle. No obstructive hydrocephalus.    2. Unremarkable MR angiogram of the head and neck.     3/15/2021: Craniotomy ([place])  1. Midline suboccipital craniotomy for resection of mass  2. Right frontal ventriculostomy  3. Stereotactic navigation  4. Use of operating microscope     4/16/21: Completed 5 fx of gamma knife ([facility])     Plan: Paclitaxel, Cisplatin, Avastin     4/28/2021:  Cycle #1 Paclitaxel, Cisplatin, Avastin  5/18/21: Cycle #2 Paclitaxel/Cisplatin/Avastin.         Today she reports she tolerated her first cycle generally well with the exception of nausea. She states she had nausea the day following treatment and then the next day; she contacted the clinic and was given two additional medications. She took Zyprexa nightly for a couple of nights, believes she took Ativan, and took Zofran once a day for a couple days (when she was able). She denies any neuropathy or hearing changes; she denies any hearing changes with her prior chemotherapy. She is not using a dilator but is sexually active. She denies any vaginal bleeding, no changes in her bowel or bladder habits, no lower extremity edema, and no difficulties eating or sleeping. She denies any abdominal discomfort/bloating, no fevers or chills, and no chest pain or shortness of breath. She did end up shaving her head as her hair was falling out. She did notice some joint discomfort in her ankles; she reports she has previously injured her ankles in high school. She does not need any medications refilled.       Review of Systems:    Systemic           no weight changes; no fever; no chills; no night sweats; no appetite changes  Skin           no rashes, or lesions  Eye           no irritation; no changes in vision  Payton-Laryngeal           no dysphagia; no hoarseness   Pulmonary    no cough; no shortness of breath  Cardiovascular    no chest pain; no palpitations  Gastrointestinal    no diarrhea; no constipation; no abdominal pain; no changes in bowel habits; no blood in stool  Genitourinary   no urinary frequency; no urinary urgency; no dysuria; no pain; no abnormal vaginal discharge; no abnormal vaginal bleeding  Breast    no breast discharge; no breast changes; no breast pain  Musculoskeletal    no myalgias; no arthralgias; no back pain  Psychiatric           no depressed mood; no anxiety    Hematologic              no tender  lymph nodes; no noticeable swellings or lumps   Endocrine    no hot flashes; no heat/cold intolerance         Neurological   no tremor; no numbness and tingling; no headaches; no difficulty sleeping      Past Medical History:    Past Medical History:   Diagnosis Date     Chronic cholecystitis 09/25/2019     History of cervical cancer 2007     Metastasis to brain (H) 03/08/2021     Metastatic adenocarcinoma to cervix (H) 10/28/2019    Added automatically from request for surgery [id number]         Past Surgical History:    Past Surgical History:   Procedure Laterality Date     CHOLECYSTECTOMY, LAPOROSCOPIC  09/25/2019     HYSTERECTOMY RADICAL  2007    PAUL     INSERT PORT VASCULAR ACCESS Right 5/5/2021    Procedure: INSERTION, VASCULAR ACCESS PORT;  Surgeon: Oral Toledo MD;  Location: [facility] OR     IR CHEST PORT PLACEMENT > 5 YRS OF AGE  5/5/2021     LAPAROSCOPIC LYMPHADENECTOMY N/A 11/13/2019    Procedure: LAPAROSCOPY, resection of of paraaortic lymph node, lysis of adhesions;  Surgeon: Jluis Canela MD;  Location: [facility] OR     OPTICAL TRACKING SYSTEM CRANIOTOMY, EXCISE TUMOR, COMBINED Bilateral 3/15/2021    Procedure: stealth assisted Midline suboccipital craniectomy/craniotomy for tumor;  Surgeon: Martín Myrick MD;  Location: [facility] OR     OPTICAL TRACKING SYSTEM VENTRICULOSTOMY Right 3/15/2021    Procedure: stealth assisted  right frontal ventriculostomy;  Surgeon: Martín Myrick MD;  Location: [facility] OR         Health Maintenance Due   Topic Date Due     PREVENTIVE CARE VISIT  Never done     ADVANCE CARE PLANNING  Never done     DEPRESSION ACTION PLAN  Never done     PHQ-9  Never done     Pneumococcal Vaccine: Pediatrics (0 to 5 Years) and At-Risk Patients (6 to 64 Years) (1 of 4 - PCV13) Never done     HIV SCREENING  Never done     HEPATITIS C SCREENING  Never done     PAP  07/14/2017       Current Medications:     Current Outpatient Medications   Medication Sig Dispense Refill      "acetaminophen (TYLENOL) 325 MG tablet Take 2 tablets (650 mg) by mouth every 6 hours (Patient taking differently: Take 650 mg by mouth every 6 hours As needed) 24 tablet 0     Ascorbic Acid (VITAMIN C GUMMIE PO) Take by mouth daily       dexamethasone (DECADRON) 4 MG tablet Take 2 tablets (8 mg) by mouth daily (with breakfast) Take 2 tablets by mouth with food on days 2-4 after chemotherapy. 12 tablet 3     ibuprofen (ADVIL/MOTRIN) 200 MG tablet Take 1 tablet (200 mg) by mouth every 4 hours as needed for mild pain       LORazepam (ATIVAN) 0.5 MG tablet Take 1 tablet (0.5 mg) by mouth every 6 hours as needed for nausea 20 tablet 0     multivitamin w/minerals (MULTI-VITAMIN) tablet Take 1 tablet by mouth daily       OLANZapine (ZYPREXA) 5 MG tablet Take 1 tablet (5 mg) by mouth At Bedtime 30 tablet 0     ondansetron (ZOFRAN-ODT) 4 MG ODT tab Take 1 tablet (4 mg) by mouth every 8 hours as needed for nausea Do not take within three days of chemotherapy. Use Ativan during those days for nausea. 20 tablet 0         Allergies:        Allergies   Allergen Reactions     Emend [Aprepitant]      Prochlorperazine      \"i was told I turn blue\"        Social History:     Social History     Tobacco Use     Smoking status: Former Smoker     Types: Cigarettes     Smokeless tobacco: Never Used   Substance Use Topics     Alcohol use: Yes     Frequency: 2-3 times a week     Comment: one drink a week       History   Drug Use No         Family History:       Family History   Problem Relation Age of Onset     Aneurysm Mother      Breast Cancer Paternal Grandmother         bilat mastectomies     Breast Cancer Maternal Aunt         stage 1     Thyroid Cancer Sister 23         Physical Exam:     There were no vitals taken for this visit.  There is no height or weight on file to calculate BMI.    Respiratory: No audible wheeze, cough.      Psychiatric: appropriate mood and affect. Mentation appears normal, affect normal/bright, judgement and " insight intact, normal speech       The rest of a comprehensive physical examination is deferred due to PHE (public health emergency) phone visit restrictions.      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 She is here today for follow up and disease management. In light of the ongoing COVID19 pandemic, and in accordance with our group and national guidelines this patients care has been reviewed and it is felt that presenting for her visit would be more likely to increase rather than decrease her relative risks. Therefore this visit was conducted by telephone.    48 minutes spent on the date of the encounter doing chart review, history and exam, documentation and further activities as noted above      Plan:     1.)        Reviewed CBC, CMP, Mg, and urine protein and Ok to proceed with planned treatment 5/19 pending BP is within parameters. Will add in Neulasta (contacted infusion pharmacist to run insurance) given ANC 1.5 today; discussed taking OTC  Discussed that her infusion nurse tomorrow will discuss more about monitoring her Neulasta. Claritin 10 mg staring the day after chemotherapy and then daily for the next 7 days. Reviewed use of Zyprexa at bedtime, Decadron in the morning days 2-4, and to hold off for 72 hours before taking Zofran. She will return in 3 weeks for her next cycle, this is already scheduled. Reviewed signs and symptoms for when she should contact the clinic or seek additional care. Patient to contact the clinic with any questions or concerns in the interim.  Answered all of her questions to the best of my ability.     2.) Genetic risk factors were assessed and the patient does not meet the qualifications for a referral.      3.) Labs and/or tests ordered include:  CBC, CMP, Mg, urine protein.      4.) Health maintenance issues addressed today include annual  health maintenance and non-gynecologic issues with PCP.    MIRA Montague, WHNP-BC, ANP-BC  Women's Health Nurse Practitioner  Adult Nurse Practitioner  Division of Gynecologic Oncology          CC  Patient Care Team:  Tyrese Bowie as PCP - General (Family Medicine)  Tawana Rivera, RN as Specialty Care Coordinator (Gynecologic Oncology)  Maria Luisa Andrade MD as MD (Gynecologic Oncology)  Bret Kerns MD as Assigned Cancer Care Provider  Martín Myrick MD as Assigned Neuroscience Provider  MARIA LUISA ANDRADE

## 2021-05-18 NOTE — LETTER
"    2021         RE: Francesca Rowland  [address]  [facility] [address]        Dear Colleague,    Thank you for referring your patient, Francesca Rowland, to the [facility]. Please see a copy of my visit note below.    Francesca is a 40 year old who is being evaluated via a billable telephone visit.      What phone number would you like to be contacted at? [phone]  How would you like to obtain your AVS? MyChart     Vitals - Patient Reported  Weight (Patient Reported): 104.3 kg (230 lb)  Height (Patient Reported): 167.6 cm (5' 6\")  BMI (Based on Pt Reported Ht/Wt): 37.12  Pain Score: No Pain (0)    Erica MONDRAGON    Phone call duration: 30 minutes                Follow Up Notes on Referred Patient    Date: 2021       Dr. Jluis Canela MD  [address] [place] [address]       RE: Francesca Rowland  : 1980  BEST: 2021    Dear Dr. 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 She is here today for follow up and disease management. In light of the ongoing COVID19 pandemic, and in accordance with our group and national guidelines this patients care has been reviewed and it is felt that presenting for her visit would be more likely to increase rather than decrease her relative risks. Therefore this visit was conducted by telephone.      Oncology History:      Patient initially presented as a 27-year-old woman seen in referral due to a Pap smear showing high-grade squamous intraepithelial lesion that was positive for high risk HPV subtype in 2007. This Pap smear had been taken during her six week postpartum visit. Patient did have a remote history of abnormal Pap smears back in  while serving in [place] for the [employer].  Pap smears during her first pregnancy were all negative. Her Pap " smear at the beginning of the most recent pregnancy was normal. Patient did have a colposcopy for evaluation of this abnormal Pap smear on 9/2007 and cervical biopsies of 6 and 12 o'clock position showed features consistent with papillary serous adenocarcinoma. Endocervical curettings were also taken which showed fragments of papillary serous carcinoma. The patient then underwent a LEEP procedure on 10/15/07 which showed microinvasive adenocarcinoma with a depth of invasion of 0.4 mm. Patient then made the decision to proceed with radical hysterectomy. She subsequently underwent a radical hysterectomy, bilateral salpingectomy, bilateral pelvic and periaortic lymph node dissection on 10/19/07. Patient's operative course was otherwise uncomplicated and she recoverred uneventfully.  Pathology did reveal patient to be stage IA2 adenocarcinoma of the cervix.      She underwent routine surveillence through 2014 complicated only by SIOBHAN 1    She presented to the ED at [facility] on 9/14/2019 with severe 8.5/10 RUQ pain associated with nausea, one episode of emesis, and decreased appetite. RUQ demonstrated cholelithiasis without cholecystitis, so an abdominal and pelvic CT scan was obtained. This revealed post-surgical changes consistent with prior hysterectomy, left adnexal cysts thought to be ovarian, and an enlarged pericaval lymph node suspicious for metastatic disease vs primary lymphoma. Her condition stabilized and she was discharged home with referrals to general surgery and oncology for biliary colic and further management of the lymphadenopathy, respectively. CT-guided right retroperitoneal lymph node biopsy on 10/15/2019 was consistent with metastatic cervical carcinoma     She underwent surgery to remove the mass and determine the extent of disease on 11/13/19     PATH:  FINAL DIAGNOSIS:   LYMPH NODES, PARA-AORTIC, RESECTION:   - Positive for carcinoma in three of four lymph nodes examined (3/4), consistent  with recurrent endocervical adenocarcinoma   - Largest metastatic focus is 3.5 cm, positive for extra yuridia extension      Chemo-Potentiated RT 12/2019-1/2020 2/2020: CT:IMPRESSION: Postoperative changes of hysterectomy and lymph node resection for cervical cancer. The metastatic right pelvic lymph nodes which were previously identified have decreased in size. No new metastases identified.      She presented to the ED 4/20/2020 for an acute RUQ pain episode, which has greatly improved despite no clear diagnosis.  During this visit she underwent a CAP CT which did not demonstrate evidence of cancer but a slightly enlarged spleen. She had a mild eosinophilia (~2%) but has not traveled or eaten exotic food recently, and has not had any new contacts to suggest mono.      10/13/2020: CT:IMPRESSION:  1.  Recurrent right external iliac lymphadenopathy.  2.  Rebound thymic hyperplasia.     10/30/2020: PET IMPRESSION:   In this patient with history of metastatic cervical cancer status-post radical hysterectomy and chemoradiation, there is evidence of recurrent disease:  1. Increased size of a hypermetabolic right external iliac chain lymph node since 2/27/2020.  2. Hypermetabolic right subpectoral and axillary lymph nodes. Recommend follow-up in the breast center for axillary ultrasound and possible tissue sampling.  3. Idiopathic thymic activation/hyperplasia.     She was evaluated in the breast clinic with plan made for biopsy - however at biopsy the node had shruck considerably and was thus felt to be c/w inflammation (no biopsy obtained)     3/8/2021: MRI Brain (for dizziness) IMPRESSION:  1. Heterogeneous mixed solid and cystic 2.7 cm mass in the right cerebellum, most likely a solitary metastatic lesion. There is moderate surrounding vasogenic edema as well as mass effect on the fourth ventricle. No obstructive hydrocephalus.    2. Unremarkable MR angiogram of the head and neck.     3/15/2021: Craniotomy  (Elen)  1. Midline suboccipital craniotomy for resection of mass  2. Right frontal ventriculostomy  3. Stereotactic navigation  4. Use of operating microscope     4/16/21: Completed 5 fx of gamma knife (Luis F)     Plan: Paclitaxel, Cisplatin, Avastin     4/28/2021: Cycle #1 Paclitaxel, Cisplatin, Avastin  5/18/21: Cycle #2 Paclitaxel/Cisplatin/Avastin.         Today she reports she tolerated her first cycle generally well with the exception of nausea. She states she had nausea the day following treatment and then the next day; she contacted the clinic and was given two additional medications. She took Zyprexa nightly for a couple of nights, believes she took Ativan, and took Zofran once a day for a couple days (when she was able). She denies any neuropathy or hearing changes; she denies any hearing changes with her prior chemotherapy. She is not using a dilator but is sexually active. She denies any vaginal bleeding, no changes in her bowel or bladder habits, no lower extremity edema, and no difficulties eating or sleeping. She denies any abdominal discomfort/bloating, no fevers or chills, and no chest pain or shortness of breath. She did end up shaving her head as her hair was falling out. She did notice some joint discomfort in her ankles; she reports she has previously injured her ankles in high school. She does not need any medications refilled.       Review of Systems:    Systemic           no weight changes; no fever; no chills; no night sweats; no appetite changes  Skin           no rashes, or lesions  Eye           no irritation; no changes in vision  Payton-Laryngeal           no dysphagia; no hoarseness   Pulmonary    no cough; no shortness of breath  Cardiovascular    no chest pain; no palpitations  Gastrointestinal    no diarrhea; no constipation; no abdominal pain; no changes in bowel habits; no blood in stool  Genitourinary   no urinary frequency; no urinary urgency; no dysuria; no pain; no abnormal  vaginal discharge; no abnormal vaginal bleeding  Breast    no breast discharge; no breast changes; no breast pain  Musculoskeletal    no myalgias; no arthralgias; no back pain  Psychiatric           no depressed mood; no anxiety    Hematologic              no tender lymph nodes; no noticeable swellings or lumps   Endocrine    no hot flashes; no heat/cold intolerance         Neurological   no tremor; no numbness and tingling; no headaches; no difficulty sleeping      Past Medical History:    Past Medical History:   Diagnosis Date     Chronic cholecystitis 09/25/2019     History of cervical cancer 2007     Metastasis to brain (H) 03/08/2021     Metastatic adenocarcinoma to cervix (H) 10/28/2019    Added automatically from request for surgery [id number]         Past Surgical History:    Past Surgical History:   Procedure Laterality Date     CHOLECYSTECTOMY, LAPOROSCOPIC  09/25/2019     HYSTERECTOMY RADICAL  2007    PAUL     INSERT PORT VASCULAR ACCESS Right 5/5/2021    Procedure: INSERTION, VASCULAR ACCESS PORT;  Surgeon: Oral Toledo MD;  Location: [facility] OR     IR CHEST PORT PLACEMENT > 5 YRS OF AGE  5/5/2021     LAPAROSCOPIC LYMPHADENECTOMY N/A 11/13/2019    Procedure: LAPAROSCOPY, resection of of paraaortic lymph node, lysis of adhesions;  Surgeon: Jluis Canela MD;  Location: [facility] OR     OPTICAL TRACKING SYSTEM CRANIOTOMY, EXCISE TUMOR, COMBINED Bilateral 3/15/2021    Procedure: stealth assisted Midline suboccipital craniectomy/craniotomy for tumor;  Surgeon: Martín Myrick MD;  Location: [facility] OR     OPTICAL TRACKING SYSTEM VENTRICULOSTOMY Right 3/15/2021    Procedure: stealth assisted  right frontal ventriculostomy;  Surgeon: Martín Myrick MD;  Location:  OR         Health Maintenance Due   Topic Date Due     PREVENTIVE CARE VISIT  Never done     ADVANCE CARE PLANNING  Never done     DEPRESSION ACTION PLAN  Never done     PHQ-9  Never done     Pneumococcal Vaccine: Pediatrics (0  "to 5 Years) and At-Risk Patients (6 to 64 Years) (1 of  - PCV13) Never done     HIV SCREENING  Never done     HEPATITIS C SCREENING  Never done     PAP  07/14/2017       Current Medications:     Current Outpatient Medications   Medication Sig Dispense Refill     acetaminophen (TYLENOL) 325 MG tablet Take 2 tablets (650 mg) by mouth every 6 hours (Patient taking differently: Take 650 mg by mouth every 6 hours As needed) 24 tablet 0     Ascorbic Acid (VITAMIN C GUMMIE PO) Take by mouth daily       dexamethasone (DECADRON) 4 MG tablet Take 2 tablets (8 mg) by mouth daily (with breakfast) Take 2 tablets by mouth with food on days 2-4 after chemotherapy. 12 tablet 3     ibuprofen (ADVIL/MOTRIN) 200 MG tablet Take 1 tablet (200 mg) by mouth every 4 hours as needed for mild pain       LORazepam (ATIVAN) 0.5 MG tablet Take 1 tablet (0.5 mg) by mouth every 6 hours as needed for nausea 20 tablet 0     multivitamin w/minerals (MULTI-VITAMIN) tablet Take 1 tablet by mouth daily       OLANZapine (ZYPREXA) 5 MG tablet Take 1 tablet (5 mg) by mouth At Bedtime 30 tablet 0     ondansetron (ZOFRAN-ODT) 4 MG ODT tab Take 1 tablet (4 mg) by mouth every 8 hours as needed for nausea Do not take within three days of chemotherapy. Use Ativan during those days for nausea. 20 tablet 0         Allergies:        Allergies   Allergen Reactions     Emend [Aprepitant]      Prochlorperazine      \"i was told I turn blue\"        Social History:     Social History     Tobacco Use     Smoking status: Former Smoker     Types: Cigarettes     Smokeless tobacco: Never Used   Substance Use Topics     Alcohol use: Yes     Frequency: 2-3 times a week     Comment: one drink a week       History   Drug Use No         Family History:       Family History   Problem Relation Age of Onset     Aneurysm Mother      Breast Cancer Paternal Grandmother         bilat mastectomies     Breast Cancer Maternal Aunt         stage 1     Thyroid Cancer Sister 23 "         Physical Exam:     There were no vitals taken for this visit.  There is no height or weight on file to calculate BMI.    Respiratory: No audible wheeze, cough.      Psychiatric: appropriate mood and affect. Mentation appears normal, affect normal/bright, judgement and insight intact, normal speech       The rest of a comprehensive physical examination is deferred due to PHE (public health emergency) phone visit restrictions.      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 She is here today for follow up and disease management. In light of the ongoing COVID19 pandemic, and in accordance with our group and national guidelines this patients care has been reviewed and it is felt that presenting for her visit would be more likely to increase rather than decrease her relative risks. Therefore this visit was conducted by telephone.    48 minutes spent on the date of the encounter doing chart review, history and exam, documentation and further activities as noted above      Plan:     1.)        Reviewed CBC, CMP, Mg, and urine protein and Ok to proceed with planned treatment 5/19 pending BP is within parameters. Will add in Neulasta (contacted infusion pharmacist to run insurance) given ANC 1.5 today; discussed taking OTC  Discussed that her infusion nurse tomorrow will discuss more about monitoring her Neulasta. Claritin 10 mg staring the day after chemotherapy and then daily for the next 7 days. Reviewed use of Zyprexa at bedtime, Decadron in the morning days 2-4, and to hold off for 72 hours before taking Zofran. She will return in 3 weeks for her next cycle, this is already scheduled. Reviewed signs and symptoms for when she should contact the clinic or seek additional care. Patient to contact the clinic with any questions or concerns in the interim.  Answered all of her questions to  the best of my ability.     2.) Genetic risk factors were assessed and the patient does not meet the qualifications for a referral.      3.) Labs and/or tests ordered include:  CBC, CMP, Mg, urine protein.      4.) Health maintenance issues addressed today include annual health maintenance and non-gynecologic issues with PCP.    MIRA Montague, WHNP-BC, ANP-BC  Women's Health Nurse Practitioner  Adult Nurse Practitioner  Division of Gynecologic Oncology          CC  Patient Care Team:  Tyrese Bowie as PCP - General (Family Medicine)  Tawana Rivera, RN as Specialty Care Coordinator (Gynecologic Oncology)  Jluis Canela MD as MD (Gynecologic Oncology)  Bret Kerns MD as Assigned Cancer Care Provider  Martín Myrick MD as Assigned Neuroscience Provider

## 2021-05-19 ENCOUNTER — INFUSION THERAPY VISIT (OUTPATIENT)
Dept: ONCOLOGY | Facility: CLINIC | Age: 41
End: 2021-05-19
Attending: OBSTETRICS & GYNECOLOGY
Payer: COMMERCIAL

## 2021-05-19 VITALS
BODY MASS INDEX: 39.82 KG/M2 | DIASTOLIC BLOOD PRESSURE: 85 MMHG | WEIGHT: 238.98 LBS | OXYGEN SATURATION: 99 % | SYSTOLIC BLOOD PRESSURE: 118 MMHG | HEIGHT: 65 IN | RESPIRATION RATE: 18 BRPM | TEMPERATURE: 98.1 F | HEART RATE: 76 BPM

## 2021-05-19 DIAGNOSIS — C79.82 METASTATIC ADENOCARCINOMA TO CERVIX (H): ICD-10-CM

## 2021-05-19 DIAGNOSIS — D70.1 CHEMOTHERAPY INDUCED NEUTROPENIA (H): ICD-10-CM

## 2021-05-19 DIAGNOSIS — T45.1X5A CHEMOTHERAPY INDUCED NEUTROPENIA (H): ICD-10-CM

## 2021-05-19 DIAGNOSIS — Z51.11 ENCOUNTER FOR ANTINEOPLASTIC CHEMOTHERAPY: Primary | ICD-10-CM

## 2021-05-19 DIAGNOSIS — C53.9 MALIGNANT NEOPLASM OF CERVIX, UNSPECIFIED SITE (H): ICD-10-CM

## 2021-05-19 DIAGNOSIS — T45.1X5A CHEMOTHERAPY INDUCED NAUSEA AND VOMITING: ICD-10-CM

## 2021-05-19 DIAGNOSIS — R11.2 CHEMOTHERAPY INDUCED NAUSEA AND VOMITING: ICD-10-CM

## 2021-05-19 PROCEDURE — 96375 TX/PRO/DX INJ NEW DRUG ADDON: CPT

## 2021-05-19 PROCEDURE — 258N000001 HC RX 258: Performed by: NURSE PRACTITIONER

## 2021-05-19 PROCEDURE — 250N000013 HC RX MED GY IP 250 OP 250 PS 637: Performed by: NURSE PRACTITIONER

## 2021-05-19 PROCEDURE — 96367 TX/PROPH/DG ADDL SEQ IV INF: CPT

## 2021-05-19 PROCEDURE — 96413 CHEMO IV INFUSION 1 HR: CPT

## 2021-05-19 PROCEDURE — 258N000003 HC RX IP 258 OP 636: Performed by: NURSE PRACTITIONER

## 2021-05-19 PROCEDURE — 250N000009 HC RX 250: Performed by: NURSE PRACTITIONER

## 2021-05-19 PROCEDURE — 250N000011 HC RX IP 250 OP 636: Performed by: NURSE PRACTITIONER

## 2021-05-19 PROCEDURE — 96417 CHEMO IV INFUS EACH ADDL SEQ: CPT

## 2021-05-19 PROCEDURE — 96415 CHEMO IV INFUSION ADDL HR: CPT

## 2021-05-19 PROCEDURE — 96372 THER/PROPH/DIAG INJ SC/IM: CPT | Performed by: NURSE PRACTITIONER

## 2021-05-19 PROCEDURE — 96361 HYDRATE IV INFUSION ADD-ON: CPT

## 2021-05-19 PROCEDURE — 96377 APPLICATON ON-BODY INJECTOR: CPT | Mod: 59

## 2021-05-19 RX ORDER — DEXTROSE, SODIUM CHLORIDE, AND POTASSIUM CHLORIDE 5; .45; .15 G/100ML; G/100ML; G/100ML
1000 INJECTION INTRAVENOUS ONCE
Status: COMPLETED | OUTPATIENT
Start: 2021-05-19 | End: 2021-05-19

## 2021-05-19 RX ORDER — HEPARIN SODIUM (PORCINE) LOCK FLUSH IV SOLN 100 UNIT/ML 100 UNIT/ML
5 SOLUTION INTRAVENOUS
Status: DISCONTINUED | OUTPATIENT
Start: 2021-05-19 | End: 2021-05-19 | Stop reason: HOSPADM

## 2021-05-19 RX ORDER — DIPHENHYDRAMINE HCL 25 MG
50 CAPSULE ORAL ONCE
Status: COMPLETED | OUTPATIENT
Start: 2021-05-19 | End: 2021-05-19

## 2021-05-19 RX ORDER — LIDOCAINE/PRILOCAINE 2.5 %-2.5%
CREAM (GRAM) TOPICAL PRN
Qty: 30 G | Refills: 1 | Status: SHIPPED | OUTPATIENT
Start: 2021-05-19

## 2021-05-19 RX ORDER — PALONOSETRON 0.05 MG/ML
0.25 INJECTION, SOLUTION INTRAVENOUS ONCE
Status: COMPLETED | OUTPATIENT
Start: 2021-05-19 | End: 2021-05-19

## 2021-05-19 RX ADMIN — Medication 5 ML: at 14:35

## 2021-05-19 RX ADMIN — PALONOSETRON 0.25 MG: 0.05 INJECTION, SOLUTION INTRAVENOUS at 08:18

## 2021-05-19 RX ADMIN — DEXAMETHASONE SODIUM PHOSPHATE 12 MG: 10 INJECTION, SOLUTION INTRAMUSCULAR; INTRAVENOUS at 08:53

## 2021-05-19 RX ADMIN — SODIUM CHLORIDE 1000 ML: 9 INJECTION, SOLUTION INTRAVENOUS at 08:12

## 2021-05-19 RX ADMIN — POTASSIUM CHLORIDE, DEXTROSE MONOHYDRATE AND SODIUM CHLORIDE 1000 ML: 150; 5; 450 INJECTION, SOLUTION INTRAVENOUS at 12:12

## 2021-05-19 RX ADMIN — PEGFILGRASTIM 6 MG: KIT SUBCUTANEOUS at 13:59

## 2021-05-19 RX ADMIN — DIPHENHYDRAMINE HYDROCHLORIDE 50 MG: 25 CAPSULE ORAL at 08:17

## 2021-05-19 RX ADMIN — FAMOTIDINE 40 MG: 10 INJECTION INTRAVENOUS at 08:21

## 2021-05-19 RX ADMIN — PACLITAXEL 382 MG: 6 INJECTION, SOLUTION INTRAVENOUS at 09:16

## 2021-05-19 RX ADMIN — BEVACIZUMAB-AWWB 1500 MG: 400 INJECTION, SOLUTION INTRAVENOUS at 13:57

## 2021-05-19 RX ADMIN — SODIUM CHLORIDE 250 ML: 9 INJECTION, SOLUTION INTRAVENOUS at 13:56

## 2021-05-19 RX ADMIN — CISPLATIN 110 MG: 1 INJECTION, SOLUTION INTRAVENOUS at 12:14

## 2021-05-19 ASSESSMENT — MIFFLIN-ST. JEOR: SCORE: 1750.49

## 2021-05-19 NOTE — PROGRESS NOTES
Infusion Nursing Note:  Francesca Rowland presents today for Cycle 2 Day 1 Taxol, Cisplatin, MVASI and Neulasta OnPro.    Patient seen by provider today: No   present during visit today: Not Applicable.    Note: Pt arrives to infusion today feeling well. No new complaints or concerns since visit yesterday with Rayna Stevens NP.    Intravenous Access:  Implanted Port.    Treatment Conditions:  Lab Results   Component Value Date    HGB 12.1 05/18/2021     Lab Results   Component Value Date    WBC 3.0 05/18/2021      Lab Results   Component Value Date    ANEU 1.5 05/18/2021     Lab Results   Component Value Date     05/18/2021      Lab Results   Component Value Date     05/18/2021                   Lab Results   Component Value Date    POTASSIUM 3.7 05/18/2021           Lab Results   Component Value Date    MAG 2.1 05/18/2021            Lab Results   Component Value Date    CR 0.86 05/18/2021                   Lab Results   Component Value Date    SHAD 8.9 05/18/2021                Lab Results   Component Value Date    BILITOTAL 0.4 05/18/2021           Lab Results   Component Value Date    ALBUMIN 3.5 05/18/2021                    Lab Results   Component Value Date    ALT 26 05/18/2021           Lab Results   Component Value Date    AST 12 05/18/2021     Results reviewed, labs MET treatment parameters, ok to proceed with treatment.  Urine Protein: Negative.  Pt voided pre, during and post Cisplatin infusion.   BP x2: WNL.    Post Infusion Assessment:  Patient tolerated infusion without incident.  Blood return noted pre and post infusion.  Site patent and intact, free from redness, edema or discomfort.  No evidence of extravasations.  Access discontinued per protocol.     Neulasta On Pro- On Body injector applied to patient today on the left abdomen at 1400 with light facing up. Writer discussed Neulasta injection would start tomorrow at 1700, approximately 27 hours after application applied  today.  Written and Verbal instruction reviewed with patient.  Pt instructed when the dose delivery starts, it will take about 45 minutes to complete. Pt aware Neulasta Onpro On-Body should have green flashing light and to call triage or on-call MD if injector flashes red or appears to be leaking. Pt aware to keep Onpro On-Body Neualsta 4 inches away from electrical equipment and to avoid showering 4 hours prior to injection. Neulasta Onpro Lot number: [id number].    Discharge Plan:   Patient declined prescription refills.  AVS to patient via [employer].  Patient will return 6/9 for next appointment.   Patient discharged in stable condition accompanied by: self.  Departure Mode: Ambulatory.  Face to Face time: 0.    Izabela Mcgarry RN

## 2021-05-31 NOTE — PATIENT INSTRUCTIONS
Assessing Cancer Risk  Only about 5-10% of cancers are thought to be due to an inherited cancer susceptibility gene.    These families often have:    Several people with the same or related types of cancer    Cancers diagnosed at a young age (before age 50)    Individuals with more than one primary cancer    Multiple generations of the family affected with cancer    Genetic Testing  Genetic testing involves a simple blood test and will look at the genetic information in select genes for any harmful mutations that are associated with increased cancer risk.  If possible, it is recommended that the person(s) who has had cancer be tested before other family members.  That person will give us the most useful information about whether or not a specific gene is associated with the cancer in the family.     Results  There are three possible results of genetic testing:    Positive--a harmful mutation was identified    Negative--no mutation was identified    Variant of unknown significance--a variation in one of the genes was identified, but it is unclear how this impacts cancer risk in the family    Advantages and Disadvantages  There are advantages and disadvantages to genetic testing of these genes.    Advantages    May clarify your cancer risk    Can help you make medical decisions    May explain the cancers in your family    May give useful information to your family members (if you share your results)    Disadvantages    Possible negative emotional impact of learning about inherited cancer risk    Uncertainty in interpreting a negative test result in some situations    Possible genetic discrimination concerns (see below)    Inheritance   Mutations in most cancer risk genes are inherited in an autosomal dominant pattern.  This means that if a parent has a mutation, each of his or her children will have a 50% chance of inheriting that same mutation.  Therefore, each child--male or female--would have a 50% chance of  being at increased risk for developing cancer.                                              Image obtained from Genetics Home Reference, 2013     Genetic Information Nondiscrimination Act (RAD)  RAD is a federal law that protects individuals from health insurance or employment discrimination based on a genetic test result alone.  Although rare, there are currently no legal protections in terms of life insurance, long term care, or disability insurances.  Visit the National Human Genome Research [place] at Genome.gov/08457986 to learn more.    Reducing Cancer Risk  If a harmful mutation is found in a cancer risk gene, there may be certain screens or preventative surgeries that can be offered. This information will be discussed after genetic testing is completed. If no mutations are found on genetic testing, screening is then recommended based on personal and/or family history of cancer.     Questions to Think About Regarding Genetic Testing    What effect will the test result have on me and my relationship with my family members if I have an inherited gene mutation?  If I don t have a gene mutation?    Should I share my test results, and how will my family react to this news, which may also affect them?    Are my children ready to learn new information that may one day affect their own health?    Resources  American Cancer Society (ACS) cancer.org   National Cancer [place] (NCI) cancer.gov     Please call us if you have any questions or concerns.   Cancer Risk Management Program [phone] ([phone])  ? Miguelito Quinones, MS, [facility] [phone]  ? Ivette Frias, MS, [facility]  [phone]  ? Darby Rodrigez, MS, [facility]  [phone]  ? Bailey Daniels, MS, [facility]  [phone]  ? Amber Alba, MS, [facility] [phone]  ? Shelli Sanz, MS, [facility] [phone]  ? Nneka Childress, MS, [facility]  [phone]

## 2021-06-07 NOTE — PROGRESS NOTES
Francesca is a 40 year old who is being evaluated via a billable telephone visit.      What phone number would you like to be contacted at? [phone]  How would you like to obtain your AVS? Arthur  Phone call duration: 20 minutes    Concerns:  Pt states she is noticing neuropathy more in hands and feet.                    Follow Up Notes on Referred Patient    Date: 2021       Dr. Jluis Canela MD  [address] [place] [id number]       RE: Francesca Rowland  : 1980  BEST: 2021    Dear Dr. 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 She is here today for follow up and disease management. In light of the recent COVID19 pandemic, and in accordance with our group and national guidelines this patients care has been reviewed and it is felt that presenting for her visit would be more likely to increase rather than decrease her relative risks. Therefore this visit was conducted by telephone.      Oncology History:     Patient initially presented as a 27-year-old woman seen in referral due to a Pap smear showing high-grade squamous intraepithelial lesion that was positive for high risk HPV subtype in 2007. This Pap smear had been taken during her six week postpartum visit. Patient did have a remote history of abnormal Pap smears back in  while serving in [place] for the [employer].  Pap smears during her first pregnancy were all negative. Her Pap smear at the beginning of the most recent pregnancy was normal. Patient did have a colposcopy for evaluation of this abnormal Pap smear on 2007 and cervical biopsies of 6 and 12 o'clock position showed features consistent with papillary serous adenocarcinoma. Endocervical curettings were also taken which showed fragments of papillary serous carcinoma. The patient then underwent a LEEP  procedure on 10/15/07 which showed microinvasive adenocarcinoma with a depth of invasion of 0.4 mm. Patient then made the decision to proceed with radical hysterectomy. She subsequently underwent a radical hysterectomy, bilateral salpingectomy, bilateral pelvic and periaortic lymph node dissection on 10/19/07. Patient's operative course was otherwise uncomplicated and she recoverred uneventfully.  Pathology did reveal patient to be stage IA2 adenocarcinoma of the cervix.      She underwent routine surveillence through 2014 complicated only by SIOBHAN 1     She recently presented after incidentally being found to have an enlarged pericaval lymph node.  She presented to the ED at [facility] on 9/14/2019 with severe 8.5/10 RUQ pain associated with nausea, one episode of emesis, and decreased appetite. RUQ demonstrated cholelithiasis without cholecystitis, so an abdominal and pelvic CT scan was obtained. This revealed post-surgical changes consistent with prior hysterectomy, left adnexal cysts thought to be ovarian, and an enlarged pericaval lymph node suspicious for metastatic disease vs primary lymphoma. Her condition stabilized and she was discharged home with referrals to general surgery and oncology for biliary colic and further management of the lymphadenopathy, respectively. CT-guided right retroperitoneal lymph node biopsy on 10/15/2019 was consistent with metastatic cervical carcinoma     She underwent surgery to remove the mass and determine the extent of disease on 11/13/19     PATH:  FINAL DIAGNOSIS:   LYMPH NODES, PARA-AORTIC, RESECTION:   - Positive for carcinoma in three of four lymph nodes examined (3/4),   consistent with recurrent endocervical   adenocarcinoma   - Largest metastatic focus is 3.5 cm, positive for extra yuridia extension         Chemo-Potentiated RT 12/2019-1/2020 2/2020 CT:  IMPRESSION: Postoperative changes of hysterectomy and lymph node  resection for cervical cancer. The metastatic  right pelvic lymph nodes  which were previously identified have decreased in size. No new  metastases identified.      She recently presented to the ED for an acute RUQ pain episode, which has greatly improved despite no clear diagnosis.  During this visit she underwent a CAP CT which did not demonstrate evidence of cancer but a slightly enlarged spleen. She had a mild eosinophilia (~2%) but has not traveled or eaten exotic food recently, and has not had any new contacts to suggest mono.      10/13/2020 CT:  IMPRESSION:  1.  Recurrent right external iliac lymphadenopathy.  2.  Rebound thymic hyperplasia.     10/30/2020 PET  IMPRESSION:   In this patient with history of metastatic cervical cancer status-post  radical hysterectomy and chemoradiation, there is evidence of  recurrent disease:  1. Increased size of a hypermetabolic right external iliac chain lymph node since 2/27/2020.  2. Hypermetabolic right subpectoral and axillary lymph nodes.  Recommend follow-up in the breast center for axillary ultrasound and  possible tissue sampling.  3. Idiopathic thymic activation/hyperplasia.     She was evaluated in the breast clinic with plan made for biopsy - however at biopsy the node had shruck considerably and was thus felt to be c/w inflammation (no biopsy obtained)     3/8/2021 MRI Brain (for dizziness)  IMPRESSION:  1. Heterogeneous mixed solid and cystic 2.7 cm mass in the right cerebellum, most likely a solitary metastatic lesion. There is moderate surrounding vasogenic edema as well as mass effect on the fourth ventricle. No obstructive hydrocephalus.    2. Unremarkable MR angiogram of the head and neck.     3/15/2021 Craniotomy ([facility])  1. Midline suboccipital craniotomy for resection of mass  2. Right frontal ventriculostomy  3. Stereotactic navigation  4. Use of operating microscope     4/16/21 Completed 5 fx of gamma knife ([facility])    Plan: Paclitaxel, Cisplatin, Avastin    4/28/2021: Cycle #1 Paclitaxel,  Cisplatin, Avastin  5/19/2021: Cycle #2 Paclitaxel/Cisplatin/Avastin +Neulasta  6/9/2021: Cycle #3 Paclitaxel/Cisplatin/Avastin +Neulasta          Today Francesca comes to the clinic overall feeling well. She states that she healed well from her craniotomy site. She states that her dizziness resolved after surgery and she denies headaches, blurred vision, speech changes, or weakness in extremities. She is drinking about 2-3 L of water per day. Her nausea after treatments is controlled with nightly Zyprexa for a few days post chemo, Decadron days 2-4 and Zofran started 72 hours after treatment. She does not have any baseline neuropathy or hearing loss. She does have new neuropathy in the bottom of feet intermittent that lasted a few days after cycle 2. No neuropathy in her hands. Fatigue worsened after cycle 2 for a few days that improved after a few days post treatment. She is sleeping well at night and is taking Melatonin to help fall asleep easier. She is not exercising. She did not have any myalgias with Neulasta. Is taking Claritin for 7 days post. She does have numbness in the side of left thigh that is daily since her craniotomy in March but states that this has almost resolved now.  She has told neurology this. She denies calf pain or numbness. She did have constipation and diarrhea with her chemotherapy treatments that resolves. She denies any vaginal bleeding, no changes in her bladder habits, no nausea/emesis, no lower extremity edema, and no difficulties eating. She denies any abdominal discomfort/bloating, no fevers or chills, and no chest pain or shortness of breath. She does have new lower back pain more on the left side. Denies UTI symptoms. Pain score 3/10. No new exercises or new sleeping arrangements. She does have new taste changes with food but denies mouth sores or signs of thrush. Looks to be due for brain MRI in July. Pt to reach out to Dr. Myrick's office regarding scheduling this            Review of Systems:    Systemic           no weight changes; no fever; no chills; no night sweats; no appetite changes  Skin           no rashes, or lesions  Eye           no irritation; no changes in vision  Payton-Laryngeal           no dysphagia; no hoarseness   Pulmonary    no cough; no shortness of breath  Cardiovascular    no chest pain; no palpitations  Gastrointestinal    + diarrhea; + constipation; no abdominal pain; no changes in bowel  habits; no blood in stool  Genitourinary   no urinary frequency; no urinary urgency; no dysuria; no pain; no abnormal vaginal discharge; no abnormal vaginal bleeding  Breast   no breast discharge; no breast changes; no breast pain  Musculoskeletal    no myalgias; no arthralgias; + back pain  Psychiatric           no depressed mood; no anxiety    Hematologic           no tender lymph nodes; no noticeable swellings or lumps   Endocrine    no hot flashes; no heat/cold intolerance         Neurological   no tremor; + numbness and tingling; no headaches; no difficulty sleeping      Past Medical History:    Past Medical History:   Diagnosis Date     Chronic cholecystitis 09/25/2019     History of cervical cancer 2007     Metastasis to brain (H) 03/08/2021     Metastatic adenocarcinoma to cervix (H) 10/28/2019    Added automatically from request for surgery [id number]         Past Surgical History:    Past Surgical History:   Procedure Laterality Date     CHOLECYSTECTOMY, LAPOROSCOPIC  09/25/2019     HYSTERECTOMY RADICAL  2007    PAUL     INSERT PORT VASCULAR ACCESS Right 5/5/2021    Procedure: INSERTION, VASCULAR ACCESS PORT;  Surgeon: Oral Toledo MD;  [place] [facility] [place]     IR CHEST PORT PLACEMENT > 5 YRS OF AGE  5/5/2021     LAPAROSCOPIC LYMPHADENECTOMY N/A 11/13/2019    Procedure: LAPAROSCOPY, resection of of paraaortic lymph node, lysis of adhesions;  Surgeon: Jluis Canela MD;  Location:  OR     [employer] SYSTEM CRANIOTOMY, EXCISE TUMOR, COMBINED  Bilateral 3/15/2021    Procedure: stealth assisted Midline suboccipital craniectomy/craniotomy for tumor;  Surgeon: Martín Myrick MD;  Location: [facility] OR     OPTICAL TRACKING SYSTEM VENTRICULOSTOMY Right 3/15/2021    Procedure: stealth assisted  right frontal ventriculostomy;  Surgeon: Martín Myrick MD;  Location: [facility] OR         Health Maintenance Due   Topic Date Due     PREVENTIVE CARE VISIT  Never done     ADVANCE CARE PLANNING  Never done     DEPRESSION ACTION PLAN  Never done     PHQ-9  Never done     Pneumococcal Vaccine: Pediatrics (0 to 5 Years) and At-Risk Patients (6 to 64 Years) (1 of 4 - PCV13) Never done     HIV SCREENING  Never done     HEPATITIS C SCREENING  Never done     PAP  07/14/2017       Current Medications:     Current Outpatient Medications   Medication Sig Dispense Refill     Ascorbic Acid (VITAMIN C GUMMIE PO) Take by mouth daily       multivitamin w/minerals (MULTI-VITAMIN) tablet Take 1 tablet by mouth daily       acetaminophen (TYLENOL) 325 MG tablet Take 2 tablets (650 mg) by mouth every 6 hours (Patient not taking: Reported on 6/9/2021) 24 tablet 0     dexamethasone (DECADRON) 4 MG tablet Take 2 tablets (8 mg) by mouth daily (with breakfast) Take 2 tablets by mouth with food on days 2-4 after chemotherapy. (Patient not taking: Reported on 6/9/2021) 12 tablet 3     ibuprofen (ADVIL/MOTRIN) 200 MG tablet Take 1 tablet (200 mg) by mouth every 4 hours as needed for mild pain       lidocaine-prilocaine (EMLA) 2.5-2.5 % external cream Apply topically as needed for moderate pain 30 g 1     LORazepam (ATIVAN) 0.5 MG tablet Take 1 tablet (0.5 mg) by mouth every 6 hours as needed for nausea (Patient not taking: Reported on 6/9/2021) 20 tablet 0     OLANZapine (ZYPREXA) 5 MG tablet Take 1 tablet (5 mg) by mouth At Bedtime (Patient not taking: Reported on 6/9/2021) 30 tablet 0         Allergies:        Allergies   Allergen Reactions     Emend [Aprepitant]      Prochlorperazine   "    \"i was told I turn blue\"        Social History:     Social History     Tobacco Use     Smoking status: Former Smoker     Types: Cigarettes     Smokeless tobacco: Never Used   Substance Use Topics     Alcohol use: Yes     Frequency: 2-3 times a week     Comment: one drink a week       History   Drug Use No         Family History:     The patient's family history is notable for     Family History   Problem Relation Age of Onset     Aneurysm Mother      Breast Cancer Paternal Grandmother         bilat mastectomies     Breast Cancer Maternal Aunt         stage 1     Thyroid Cancer Sister 23         Physical Exam:     There were no vitals taken for this visit.  There is no height or weight on file to calculate BMI.    Respiratory: No audible wheeze, cough.      Psychiatric: appropriate mood and affect. 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 She is here today for follow up and disease management. In light of the recent COVID19 pandemic, and in accordance with our group and national guidelines this patients care has been reviewed and it is felt that presenting for her visit would be more likely to increase rather than decrease her relative risks. Therefore this visit was conducted by telephone.     30 minutes spent on the date of the encounter doing chart review, history and exam, documentation, and further activities as noted above.       Plan:     1.)        Ok to proceed with planned treatment once labs have met parameters. Encouraged Claritin 10 mg staring the day after chemotherapy and then daily for the next 7 days to prevent myalgias related to Neulasta. Reviewed use of Zyprexa at bedtime, Decadron in the morning days 2-4, and to hold off for 72 hours before taking Zofran. " Encouraged 5-6 small meals a day and discussed nausea management and control. Encouraged 64 oz of fluids per day. Discussed ways and medication management of diarrhea and constipation. Encouraged 30 minutes of daily exercise to help with fatigue along with nightly Melatonin as this seems to be helping her sleep habits. Discussed that taste changes can occur with chemotherapy but to let us know if she has and s/s of thrush or mouth sores. Encouraged diet change with new foods that taste good to her. Reviewed signs and symptoms for when she should contact the clinic or seek additional care. Patient to contact the clinic with any questions or concerns in the interim. Patient scheduled for future cycle and aware of these appts.      2.) Genetic risk factors were assessed again and the patient has multiple family members with cancer diagnosed younger than age 50 including herself (age 40, but with likely HPV related disease) and her sister (thyroid cancer at age 23) among others. She met with genetics 1/2021.    3.) Labs and/or tests ordered include: CBC, CMP, Mg, Protein Urine (reviewed during visit).     4.) Health maintenance issues addressed today include annual health maintenance and non-gynecologic issues with PCP. Encouraged follow up with Neurology (brain MRI looks to be ordered for mid-July). Patient will reach out to Dr. Myrick's office.     5.)        Chemotherapy induced peripheral neuropathy: Neuropathy: This is likely from her Taxol. Encouraged trial of vitamin B6 50 mg BID, L-glutamine 2,000 mg BID, exercise, and massage.  As these symptoms are limited to her bottom of feet and are intermitttent and have not been impacting her function do not need to adjust dosing or her treatment plan at this point. Encouraged her to contact the clinic if she notices her symptoms worsening or spreading. Discussed option of changing to Taxotere if needed.     6.)      Lower back pain: mild and new (over the last two  days); no UTI symptoms, voiding normally. Encouraged heating pad, rest, Tylenol, stretching. Continue 2-3 L fluids per day. She is aware to let us know if this does not improve or worsens.       MIRA Heart, NP-BC  Women's Health Nurse Practitioner  Division of Gynecologic Oncology  [facility]        CC  Patient Care Team:  Tyrese Bowie as PCP - General (Family Medicine)  Tawana Rivera, RN as Specialty Care Coordinator (Gynecologic Oncology)  Maria Luisa Andrade MD as MD (Gynecologic Oncology)  Bret Kerns MD as Assigned Cancer Care Provider  Martín Myrick MD as Assigned Neuroscience Provider  Rayna Stevens APRN CNP as Assigned OBGYN Provider  MARIA LUISA ANDRADE

## 2021-06-09 ENCOUNTER — VIRTUAL VISIT (OUTPATIENT)
Dept: ONCOLOGY | Facility: CLINIC | Age: 41
End: 2021-06-09
Attending: OBSTETRICS & GYNECOLOGY
Payer: COMMERCIAL

## 2021-06-09 VITALS
WEIGHT: 237 LBS | SYSTOLIC BLOOD PRESSURE: 133 MMHG | RESPIRATION RATE: 18 BRPM | HEART RATE: 71 BPM | BODY MASS INDEX: 39.01 KG/M2 | DIASTOLIC BLOOD PRESSURE: 81 MMHG | TEMPERATURE: 97.4 F | OXYGEN SATURATION: 95 %

## 2021-06-09 DIAGNOSIS — T45.1X5A CHEMOTHERAPY INDUCED NAUSEA AND VOMITING: Primary | ICD-10-CM

## 2021-06-09 DIAGNOSIS — T45.1X5A CHEMOTHERAPY INDUCED NEUTROPENIA (H): ICD-10-CM

## 2021-06-09 DIAGNOSIS — C79.82 METASTATIC ADENOCARCINOMA TO CERVIX (H): Primary | ICD-10-CM

## 2021-06-09 DIAGNOSIS — C79.82 METASTATIC ADENOCARCINOMA TO CERVIX (H): ICD-10-CM

## 2021-06-09 DIAGNOSIS — D70.1 CHEMOTHERAPY INDUCED NEUTROPENIA (H): ICD-10-CM

## 2021-06-09 DIAGNOSIS — T45.1X5A CHEMOTHERAPY-INDUCED NEUTROPENIA (H): ICD-10-CM

## 2021-06-09 DIAGNOSIS — R11.2 CHEMOTHERAPY INDUCED NAUSEA AND VOMITING: ICD-10-CM

## 2021-06-09 DIAGNOSIS — C53.9 MALIGNANT NEOPLASM OF CERVIX, UNSPECIFIED SITE (H): ICD-10-CM

## 2021-06-09 DIAGNOSIS — R11.2 CHEMOTHERAPY INDUCED NAUSEA AND VOMITING: Primary | ICD-10-CM

## 2021-06-09 DIAGNOSIS — Z51.11 ENCOUNTER FOR ANTINEOPLASTIC CHEMOTHERAPY: ICD-10-CM

## 2021-06-09 DIAGNOSIS — D70.1 CHEMOTHERAPY-INDUCED NEUTROPENIA (H): ICD-10-CM

## 2021-06-09 DIAGNOSIS — Z79.899 ENCOUNTER FOR LONG-TERM (CURRENT) USE OF MEDICATIONS: ICD-10-CM

## 2021-06-09 DIAGNOSIS — T45.1X5A CHEMOTHERAPY INDUCED NAUSEA AND VOMITING: ICD-10-CM

## 2021-06-09 LAB
ALBUMIN SERPL-MCNC: 3.6 G/DL (ref 3.4–5)
ALBUMIN UR-MCNC: 10 MG/DL
ALP SERPL-CCNC: 99 U/L (ref 40–150)
ALT SERPL W P-5'-P-CCNC: 21 U/L (ref 0–50)
ANION GAP SERPL CALCULATED.3IONS-SCNC: 3 MMOL/L (ref 3–14)
AST SERPL W P-5'-P-CCNC: 18 U/L (ref 0–45)
BASOPHILS # BLD AUTO: 0 10E9/L (ref 0–0.2)
BASOPHILS NFR BLD AUTO: 0.7 %
BILIRUB SERPL-MCNC: 0.4 MG/DL (ref 0.2–1.3)
BUN SERPL-MCNC: 17 MG/DL (ref 7–30)
CALCIUM SERPL-MCNC: 9.1 MG/DL (ref 8.5–10.1)
CHLORIDE SERPL-SCNC: 109 MMOL/L (ref 94–109)
CO2 SERPL-SCNC: 28 MMOL/L (ref 20–32)
CREAT SERPL-MCNC: 0.78 MG/DL (ref 0.52–1.04)
DIFFERENTIAL METHOD BLD: ABNORMAL
EOSINOPHIL # BLD AUTO: 0.1 10E9/L (ref 0–0.7)
EOSINOPHIL NFR BLD AUTO: 4.6 %
ERYTHROCYTE [DISTWIDTH] IN BLOOD BY AUTOMATED COUNT: 16.7 % (ref 10–15)
GFR SERPL CREATININE-BSD FRML MDRD: >90 ML/MIN/{1.73_M2}
GLUCOSE SERPL-MCNC: 109 MG/DL (ref 70–99)
HCT VFR BLD AUTO: 34.3 % (ref 35–47)
HGB BLD-MCNC: 11.2 G/DL (ref 11.7–15.7)
IMM GRANULOCYTES # BLD: 0 10E9/L (ref 0–0.4)
IMM GRANULOCYTES NFR BLD: 0.7 %
LYMPHOCYTES # BLD AUTO: 0.8 10E9/L (ref 0.8–5.3)
LYMPHOCYTES NFR BLD AUTO: 25.7 %
MAGNESIUM SERPL-MCNC: 2 MG/DL (ref 1.6–2.3)
MCH RBC QN AUTO: 30.7 PG (ref 26.5–33)
MCHC RBC AUTO-ENTMCNC: 32.7 G/DL (ref 31.5–36.5)
MCV RBC AUTO: 94 FL (ref 78–100)
MONOCYTES # BLD AUTO: 0.4 10E9/L (ref 0–1.3)
MONOCYTES NFR BLD AUTO: 12.1 %
NEUTROPHILS # BLD AUTO: 1.7 10E9/L (ref 1.6–8.3)
NEUTROPHILS NFR BLD AUTO: 56.2 %
NRBC # BLD AUTO: 0 10*3/UL
NRBC BLD AUTO-RTO: 0 /100
PLATELET # BLD AUTO: 204 10E9/L (ref 150–450)
POTASSIUM SERPL-SCNC: 3.8 MMOL/L (ref 3.4–5.3)
PROT SERPL-MCNC: 7.2 G/DL (ref 6.8–8.8)
RBC # BLD AUTO: 3.65 10E12/L (ref 3.8–5.2)
SODIUM SERPL-SCNC: 140 MMOL/L (ref 133–144)
WBC # BLD AUTO: 3.1 10E9/L (ref 4–11)

## 2021-06-09 PROCEDURE — 83735 ASSAY OF MAGNESIUM: CPT | Performed by: OBSTETRICS & GYNECOLOGY

## 2021-06-09 PROCEDURE — 96417 CHEMO IV INFUS EACH ADDL SEQ: CPT

## 2021-06-09 PROCEDURE — 250N000009 HC RX 250: Performed by: OBSTETRICS & GYNECOLOGY

## 2021-06-09 PROCEDURE — 99214 OFFICE O/P EST MOD 30 MIN: CPT | Mod: 95 | Performed by: OBSTETRICS & GYNECOLOGY

## 2021-06-09 PROCEDURE — 80053 COMPREHEN METABOLIC PANEL: CPT | Performed by: OBSTETRICS & GYNECOLOGY

## 2021-06-09 PROCEDURE — 250N000013 HC RX MED GY IP 250 OP 250 PS 637: Performed by: OBSTETRICS & GYNECOLOGY

## 2021-06-09 PROCEDURE — 85025 COMPLETE CBC W/AUTO DIFF WBC: CPT | Performed by: OBSTETRICS & GYNECOLOGY

## 2021-06-09 PROCEDURE — 96368 THER/DIAG CONCURRENT INF: CPT

## 2021-06-09 PROCEDURE — 96375 TX/PRO/DX INJ NEW DRUG ADDON: CPT

## 2021-06-09 PROCEDURE — 81003 URINALYSIS AUTO W/O SCOPE: CPT | Performed by: OBSTETRICS & GYNECOLOGY

## 2021-06-09 PROCEDURE — 96415 CHEMO IV INFUSION ADDL HR: CPT

## 2021-06-09 PROCEDURE — 258N000003 HC RX IP 258 OP 636: Performed by: OBSTETRICS & GYNECOLOGY

## 2021-06-09 PROCEDURE — 96413 CHEMO IV INFUSION 1 HR: CPT

## 2021-06-09 PROCEDURE — 258N000001 HC RX 258: Performed by: OBSTETRICS & GYNECOLOGY

## 2021-06-09 PROCEDURE — 250N000011 HC RX IP 250 OP 636: Performed by: OBSTETRICS & GYNECOLOGY

## 2021-06-09 PROCEDURE — 96377 APPLICATON ON-BODY INJECTOR: CPT | Mod: 59

## 2021-06-09 PROCEDURE — 96372 THER/PROPH/DIAG INJ SC/IM: CPT | Performed by: OBSTETRICS & GYNECOLOGY

## 2021-06-09 RX ORDER — DIPHENHYDRAMINE HYDROCHLORIDE 50 MG/ML
50 INJECTION INTRAMUSCULAR; INTRAVENOUS
Status: CANCELLED
Start: 2021-06-09

## 2021-06-09 RX ORDER — ALBUTEROL SULFATE 0.83 MG/ML
2.5 SOLUTION RESPIRATORY (INHALATION)
Status: CANCELLED | OUTPATIENT
Start: 2021-06-09

## 2021-06-09 RX ORDER — HEPARIN SODIUM (PORCINE) LOCK FLUSH IV SOLN 100 UNIT/ML 100 UNIT/ML
5 SOLUTION INTRAVENOUS
Status: DISCONTINUED | OUTPATIENT
Start: 2021-06-09 | End: 2021-06-09 | Stop reason: HOSPADM

## 2021-06-09 RX ORDER — LORAZEPAM 2 MG/ML
0.5 INJECTION INTRAMUSCULAR EVERY 6 HOURS PRN
Status: CANCELLED | OUTPATIENT
Start: 2021-06-09

## 2021-06-09 RX ORDER — PALONOSETRON 0.05 MG/ML
0.25 INJECTION, SOLUTION INTRAVENOUS ONCE
Status: CANCELLED
Start: 2021-06-09

## 2021-06-09 RX ORDER — METHYLPREDNISOLONE SODIUM SUCCINATE 125 MG/2ML
125 INJECTION, POWDER, LYOPHILIZED, FOR SOLUTION INTRAMUSCULAR; INTRAVENOUS
Status: CANCELLED
Start: 2021-06-09

## 2021-06-09 RX ORDER — ALBUTEROL SULFATE 90 UG/1
1-2 AEROSOL, METERED RESPIRATORY (INHALATION)
Status: CANCELLED
Start: 2021-06-09

## 2021-06-09 RX ORDER — SODIUM CHLORIDE 9 MG/ML
1000 INJECTION, SOLUTION INTRAVENOUS CONTINUOUS PRN
Status: CANCELLED
Start: 2021-06-09

## 2021-06-09 RX ORDER — DIPHENHYDRAMINE HCL 25 MG
50 CAPSULE ORAL ONCE
Status: COMPLETED | OUTPATIENT
Start: 2021-06-09 | End: 2021-06-09

## 2021-06-09 RX ORDER — NALOXONE HYDROCHLORIDE 0.4 MG/ML
.1-.4 INJECTION, SOLUTION INTRAMUSCULAR; INTRAVENOUS; SUBCUTANEOUS
Status: CANCELLED | OUTPATIENT
Start: 2021-06-09

## 2021-06-09 RX ORDER — EPINEPHRINE 1 MG/ML
0.3 INJECTION, SOLUTION INTRAMUSCULAR; SUBCUTANEOUS EVERY 5 MIN PRN
Status: CANCELLED | OUTPATIENT
Start: 2021-06-09

## 2021-06-09 RX ORDER — HEPARIN SODIUM (PORCINE) LOCK FLUSH IV SOLN 100 UNIT/ML 100 UNIT/ML
5 SOLUTION INTRAVENOUS EVERY 8 HOURS
Status: DISCONTINUED | OUTPATIENT
Start: 2021-06-09 | End: 2021-06-09 | Stop reason: HOSPADM

## 2021-06-09 RX ORDER — DEXTROSE, SODIUM CHLORIDE, AND POTASSIUM CHLORIDE 5; .45; .15 G/100ML; G/100ML; G/100ML
1000 INJECTION INTRAVENOUS ONCE
Status: COMPLETED | OUTPATIENT
Start: 2021-06-09 | End: 2021-06-09

## 2021-06-09 RX ORDER — PALONOSETRON 0.05 MG/ML
0.25 INJECTION, SOLUTION INTRAVENOUS ONCE
Status: COMPLETED | OUTPATIENT
Start: 2021-06-09 | End: 2021-06-09

## 2021-06-09 RX ORDER — DEXTROSE, SODIUM CHLORIDE, AND POTASSIUM CHLORIDE 5; .45; .15 G/100ML; G/100ML; G/100ML
1000 INJECTION INTRAVENOUS ONCE
Status: CANCELLED
Start: 2021-06-09

## 2021-06-09 RX ORDER — HEPARIN SODIUM,PORCINE 10 UNIT/ML
5 VIAL (ML) INTRAVENOUS
Status: CANCELLED | OUTPATIENT
Start: 2021-06-09

## 2021-06-09 RX ORDER — HEPARIN SODIUM (PORCINE) LOCK FLUSH IV SOLN 100 UNIT/ML 100 UNIT/ML
5 SOLUTION INTRAVENOUS
Status: CANCELLED | OUTPATIENT
Start: 2021-06-09

## 2021-06-09 RX ORDER — DIPHENHYDRAMINE HCL 25 MG
50 CAPSULE ORAL ONCE
Status: CANCELLED
Start: 2021-06-09

## 2021-06-09 RX ORDER — MEPERIDINE HYDROCHLORIDE 25 MG/ML
25 INJECTION INTRAMUSCULAR; INTRAVENOUS; SUBCUTANEOUS EVERY 30 MIN PRN
Status: CANCELLED | OUTPATIENT
Start: 2021-06-09

## 2021-06-09 RX ADMIN — FAMOTIDINE 40 MG: 10 INJECTION, SOLUTION INTRAVENOUS at 09:20

## 2021-06-09 RX ADMIN — Medication 5 ML: at 08:28

## 2021-06-09 RX ADMIN — DEXAMETHASONE SODIUM PHOSPHATE 12 MG: 10 INJECTION, SOLUTION INTRAMUSCULAR; INTRAVENOUS at 09:23

## 2021-06-09 RX ADMIN — PEGFILGRASTIM 6 MG: KIT SUBCUTANEOUS at 14:47

## 2021-06-09 RX ADMIN — CISPLATIN 110 MG: 1 INJECTION, SOLUTION INTRAVENOUS at 12:58

## 2021-06-09 RX ADMIN — DIPHENHYDRAMINE HYDROCHLORIDE 50 MG: 25 CAPSULE ORAL at 09:14

## 2021-06-09 RX ADMIN — SODIUM CHLORIDE 1000 ML: 9 INJECTION, SOLUTION INTRAVENOUS at 09:13

## 2021-06-09 RX ADMIN — PACLITAXEL 382 MG: 6 INJECTION, SOLUTION INTRAVENOUS at 09:53

## 2021-06-09 RX ADMIN — PALONOSETRON HYDROCHLORIDE 0.25 MG: 0.25 INJECTION, SOLUTION INTRAVENOUS at 09:17

## 2021-06-09 RX ADMIN — Medication 5 ML: at 15:18

## 2021-06-09 RX ADMIN — POTASSIUM CHLORIDE, DEXTROSE MONOHYDRATE AND SODIUM CHLORIDE 1000 ML: 150; 5; 450 INJECTION, SOLUTION INTRAVENOUS at 12:56

## 2021-06-09 RX ADMIN — BEVACIZUMAB-AWWB 1500 MG: 400 INJECTION, SOLUTION INTRAVENOUS at 14:44

## 2021-06-09 ASSESSMENT — PAIN SCALES - GENERAL: PAINLEVEL: NO PAIN (0)

## 2021-06-09 NOTE — LETTER
2021         RE: Francesca Rowland  [address]  [facility] [address]        Dear Colleague,    Thank you for referring your patient, Francesca Rowland, to the [facility]. Please see a copy of my visit note below.    Francesca is a 40 year old who is being evaluated via a billable telephone visit.      What phone number would you like to be contacted at? [phone]  How would you like to obtain your AVS? MyChart  Phone call duration: 20 minutes    Concerns:  Pt states she is noticing neuropathy more in hands and feet.                    Follow Up Notes on Referred Patient    Date: 2021       Dr. Jluis Canela MD  [address] [place] [address]       RE: Francesca Rowland  : 1980  BEST: 2021    Dear Dr. 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 She is here today for follow up and disease management. In light of the recent COVID19 pandemic, and in accordance with our group and national guidelines this patients care has been reviewed and it is felt that presenting for her visit would be more likely to increase rather than decrease her relative risks. Therefore this visit was conducted by telephone.      Oncology History:     Patient initially presented as a 27-year-old woman seen in referral due to a Pap smear showing high-grade squamous intraepithelial lesion that was positive for high risk HPV subtype in 2007. This Pap smear had been taken during her six week postpartum visit. Patient did have a remote history of abnormal Pap smears back in  while serving in [place] for the [employer].  Pap smears during her first pregnancy were all negative. Her Pap smear at the beginning of the most recent pregnancy was normal. Patient did have a colposcopy for evaluation of this abnormal Pap smear on  9/2007 and cervical biopsies of 6 and 12 o'clock position showed features consistent with papillary serous adenocarcinoma. Endocervical curettings were also taken which showed fragments of papillary serous carcinoma. The patient then underwent a LEEP procedure on 10/15/07 which showed microinvasive adenocarcinoma with a depth of invasion of 0.4 mm. Patient then made the decision to proceed with radical hysterectomy. She subsequently underwent a radical hysterectomy, bilateral salpingectomy, bilateral pelvic and periaortic lymph node dissection on 10/19/07. Patient's operative course was otherwise uncomplicated and she recoverred uneventfully.  Pathology did reveal patient to be stage IA2 adenocarcinoma of the cervix.      She underwent routine surveillence through 2014 complicated only by SIOBHAN 1     She recently presented after incidentally being found to have an enlarged pericaval lymph node.  She presented to the ED at [facility] on 9/14/2019 with severe 8.5/10 RUQ pain associated with nausea, one episode of emesis, and decreased appetite. RUQ demonstrated cholelithiasis without cholecystitis, so an abdominal and pelvic CT scan was obtained. This revealed post-surgical changes consistent with prior hysterectomy, left adnexal cysts thought to be ovarian, and an enlarged pericaval lymph node suspicious for metastatic disease vs primary lymphoma. Her condition stabilized and she was discharged home with referrals to general surgery and oncology for biliary colic and further management of the lymphadenopathy, respectively. CT-guided right retroperitoneal lymph node biopsy on 10/15/2019 was consistent with metastatic cervical carcinoma     She underwent surgery to remove the mass and determine the extent of disease on 11/13/19     PATH:  FINAL DIAGNOSIS:   LYMPH NODES, PARA-AORTIC, RESECTION:   - Positive for carcinoma in three of four lymph nodes examined (3/4),   consistent with recurrent endocervical    adenocarcinoma   - Largest metastatic focus is 3.5 cm, positive for extra yuridia extension         Chemo-Potentiated RT 12/2019-1/2020 2/2020 CT:  IMPRESSION: Postoperative changes of hysterectomy and lymph node  resection for cervical cancer. The metastatic right pelvic lymph nodes  which were previously identified have decreased in size. No new  metastases identified.      She recently presented to the ED for an acute RUQ pain episode, which has greatly improved despite no clear diagnosis.  During this visit she underwent a CAP CT which did not demonstrate evidence of cancer but a slightly enlarged spleen. She had a mild eosinophilia (~2%) but has not traveled or eaten exotic food recently, and has not had any new contacts to suggest mono.      10/13/2020 CT:  IMPRESSION:  1.  Recurrent right external iliac lymphadenopathy.  2.  Rebound thymic hyperplasia.     10/30/2020 PET  IMPRESSION:   In this patient with history of metastatic cervical cancer status-post  radical hysterectomy and chemoradiation, there is evidence of  recurrent disease:  1. Increased size of a hypermetabolic right external iliac chain lymph node since 2/27/2020.  2. Hypermetabolic right subpectoral and axillary lymph nodes.  Recommend follow-up in the breast center for axillary ultrasound and  possible tissue sampling.  3. Idiopathic thymic activation/hyperplasia.     She was evaluated in the breast clinic with plan made for biopsy - however at biopsy the node had shruck considerably and was thus felt to be c/w inflammation (no biopsy obtained)     3/8/2021 MRI Brain (for dizziness)  IMPRESSION:  1. Heterogeneous mixed solid and cystic 2.7 cm mass in the right cerebellum, most likely a solitary metastatic lesion. There is moderate surrounding vasogenic edema as well as mass effect on the fourth ventricle. No obstructive hydrocephalus.    2. Unremarkable MR angiogram of the head and neck.     3/15/2021 Craniotomy (Florentincher)  1. Midline  suboccipital craniotomy for resection of mass  2. Right frontal ventriculostomy  3. Stereotactic navigation  4. Use of operating microscope     4/16/21 Completed 5 fx of gamma knife (Yuan)    Plan: Paclitaxel, Cisplatin, Avastin    4/28/2021: Cycle #1 Paclitaxel, Cisplatin, Avastin  5/19/2021: Cycle #2 Paclitaxel/Cisplatin/Avastin +Neulasta  6/9/2021: Cycle #3 Paclitaxel/Cisplatin/Avastin +Neulasta          Today Francesca comes to the clinic overall feeling well. She states that she healed well from her craniotomy site. She states that her dizziness resolved after surgery and she denies headaches, blurred vision, speech changes, or weakness in extremities. She is drinking about 2-3 L of water per day. Her nausea after treatments is controlled with nightly Zyprexa for a few days post chemo, Decadron days 2-4 and Zofran started 72 hours after treatment. She does not have any baseline neuropathy or hearing loss. She does have new neuropathy in the bottom of feet intermittent that lasted a few days after cycle 2. No neuropathy in her hands. Fatigue worsened after cycle 2 for a few days that improved after a few days post treatment. She is sleeping well at night and is taking Melatonin to help fall asleep easier. She is not exercising. She did not have any myalgias with Neulasta. Is taking Claritin for 7 days post. She does have numbness in the side of left thigh that is daily since her craniotomy in March but states that this has almost resolved now.  She has told neurology this. She denies calf pain or numbness. She did have constipation and diarrhea with her chemotherapy treatments that resolves. She denies any vaginal bleeding, no changes in her bladder habits, no nausea/emesis, no lower extremity edema, and no difficulties eating. She denies any abdominal discomfort/bloating, no fevers or chills, and no chest pain or shortness of breath. She does have new lower back pain more on the left side. Denies UTI symptoms.  Pain score 3/10. No new exercises or new sleeping arrangements. She does have new taste changes with food but denies mouth sores or signs of thrush. Looks to be due for brain MRI in July. Pt to reach out to Dr. Myrick's office regarding scheduling this           Review of Systems:    Systemic           no weight changes; no fever; no chills; no night sweats; no appetite changes  Skin           no rashes, or lesions  Eye           no irritation; no changes in vision  Payton-Laryngeal           no dysphagia; no hoarseness   Pulmonary    no cough; no shortness of breath  Cardiovascular    no chest pain; no palpitations  Gastrointestinal    + diarrhea; + constipation; no abdominal pain; no changes in bowel  habits; no blood in stool  Genitourinary   no urinary frequency; no urinary urgency; no dysuria; no pain; no abnormal vaginal discharge; no abnormal vaginal bleeding  Breast   no breast discharge; no breast changes; no breast pain  Musculoskeletal    no myalgias; no arthralgias; + back pain  Psychiatric           no depressed mood; no anxiety    Hematologic           no tender lymph nodes; no noticeable swellings or lumps   Endocrine    no hot flashes; no heat/cold intolerance         Neurological   no tremor; + numbness and tingling; no headaches; no difficulty sleeping      Past Medical History:    Past Medical History:   Diagnosis Date     Chronic cholecystitis 09/25/2019     History of cervical cancer 2007     Metastasis to brain (H) 03/08/2021     Metastatic adenocarcinoma to cervix (H) 10/28/2019    Added automatically from request for surgery [id number]         Past Surgical History:    Past Surgical History:   Procedure Laterality Date     CHOLECYSTECTOMY, LAPOROSCOPIC  09/25/2019     HYSTERECTOMY RADICAL  2007    PAUL     INSERT PORT VASCULAR ACCESS Right 5/5/2021    Procedure: INSERTION, VASCULAR ACCESS PORT;  Surgeon: Oral Toledo MD;  [facility]: [facility] [place]     IR CHEST PORT PLACEMENT > 5 YRS OF AGE   5/5/2021     LAPAROSCOPIC LYMPHADENECTOMY N/A 11/13/2019    Procedure: LAPAROSCOPY, resection of of paraaortic lymph node, lysis of adhesions;  Surgeon: Jluis Canela MD;  Location: [facility] OR     OPTICAL TRACKING SYSTEM CRANIOTOMY, EXCISE TUMOR, COMBINED Bilateral 3/15/2021    Procedure: stealth assisted Midline suboccipital craniectomy/craniotomy for tumor;  Surgeon: Martín Myrick MD;  Location: [facility] OR     OPTICAL TRACKING SYSTEM VENTRICULOSTOMY Right 3/15/2021    Procedure: stealth assisted  right frontal ventriculostomy;  Surgeon: Martín Myrick MD;  Location: [facility] OR         Health Maintenance Due   Topic Date Due     PREVENTIVE CARE VISIT  Never done     ADVANCE CARE PLANNING  Never done     DEPRESSION ACTION PLAN  Never done     PHQ-9  Never done     Pneumococcal Vaccine: Pediatrics (0 to 5 Years) and At-Risk Patients (6 to 64 Years) (1 of 4 - PCV13) Never done     HIV SCREENING  Never done     HEPATITIS C SCREENING  Never done     PAP  07/14/2017       Current Medications:     Current Outpatient Medications   Medication Sig Dispense Refill     Ascorbic Acid (VITAMIN C GUMMIE PO) Take by mouth daily       multivitamin w/minerals (MULTI-VITAMIN) tablet Take 1 tablet by mouth daily       acetaminophen (TYLENOL) 325 MG tablet Take 2 tablets (650 mg) by mouth every 6 hours (Patient not taking: Reported on 6/9/2021) 24 tablet 0     dexamethasone (DECADRON) 4 MG tablet Take 2 tablets (8 mg) by mouth daily (with breakfast) Take 2 tablets by mouth with food on days 2-4 after chemotherapy. (Patient not taking: Reported on 6/9/2021) 12 tablet 3     ibuprofen (ADVIL/MOTRIN) 200 MG tablet Take 1 tablet (200 mg) by mouth every 4 hours as needed for mild pain       lidocaine-prilocaine (EMLA) 2.5-2.5 % external cream Apply topically as needed for moderate pain 30 g 1     LORazepam (ATIVAN) 0.5 MG tablet Take 1 tablet (0.5 mg) by mouth every 6 hours as needed for nausea (Patient not taking: Reported  "on 6/9/2021) 20 tablet 0     OLANZapine (ZYPREXA) 5 MG tablet Take 1 tablet (5 mg) by mouth At Bedtime (Patient not taking: Reported on 6/9/2021) 30 tablet 0         Allergies:        Allergies   Allergen Reactions     Emend [Aprepitant]      Prochlorperazine      \"i was told I turn blue\"        Social History:     Social History     Tobacco Use     Smoking status: Former Smoker     Types: Cigarettes     Smokeless tobacco: Never Used   Substance Use Topics     Alcohol use: Yes     Frequency: 2-3 times a week     Comment: one drink a week       History   Drug Use No         Family History:     The patient's family history is notable for     Family History   Problem Relation Age of Onset     Aneurysm Mother      Breast Cancer Paternal Grandmother         bilat mastectomies     Breast Cancer Maternal Aunt         stage 1     Thyroid Cancer Sister 23         Physical Exam:     There were no vitals taken for this visit.  There is no height or weight on file to calculate BMI.    Respiratory: No audible wheeze, cough.      Psychiatric: appropriate mood and affect. 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 She is here today for follow up and disease management. In light of the recent COVID19 pandemic, and in accordance with our group and national guidelines this patients care has been reviewed and it is felt that presenting for her visit would be more likely to increase rather than decrease her relative risks. Therefore this visit was conducted by telephone.     30 minutes spent on the date of the encounter doing chart review, history and exam, documentation, and further activities as noted above.       Plan:     1.)        Ok to proceed with planned treatment once labs have met parameters. " Encouraged Claritin 10 mg staring the day after chemotherapy and then daily for the next 7 days to prevent myalgias related to Neulasta. Reviewed use of Zyprexa at bedtime, Decadron in the morning days 2-4, and to hold off for 72 hours before taking Zofran. Encouraged 5-6 small meals a day and discussed nausea management and control. Encouraged 64 oz of fluids per day. Discussed ways and medication management of diarrhea and constipation. Encouraged 30 minutes of daily exercise to help with fatigue along with nightly Melatonin as this seems to be helping her sleep habits. Discussed that taste changes can occur with chemotherapy but to let us know if she has and s/s of thrush or mouth sores. Encouraged diet change with new foods that taste good to her. Reviewed signs and symptoms for when she should contact the clinic or seek additional care. Patient to contact the clinic with any questions or concerns in the interim. Patient scheduled for future cycle and aware of these appts.      2.) Genetic risk factors were assessed again and the patient has multiple family members with cancer diagnosed younger than age 50 including herself (age 40, but with likely HPV related disease) and her sister (thyroid cancer at age 23) among others. She met with genetics 1/2021.    3.) Labs and/or tests ordered include: CBC, CMP, Mg, Protein Urine (reviewed during visit).     4.) Health maintenance issues addressed today include annual health maintenance and non-gynecologic issues with PCP. Encouraged follow up with Neurology (brain MRI looks to be ordered for mid-July). Patient will reach out to Dr. Myrick's office.     5.)        Chemotherapy induced peripheral neuropathy: Neuropathy: This is likely from her Taxol. Encouraged trial of vitamin B6 50 mg BID, L-glutamine 2,000 mg BID, exercise, and massage.  As these symptoms are limited to her bottom of feet and are intermitttent and have not been impacting her function do not need  to adjust dosing or her treatment plan at this point. Encouraged her to contact the clinic if she notices her symptoms worsening or spreading. Discussed option of changing to Taxotere if needed.     6.)      Lower back pain: mild and new (over the last two days); no UTI symptoms, voiding normally. Encouraged heating pad, rest, Tylenol, stretching. Continue 2-3 L fluids per day. She is aware to let us know if this does not improve or worsens.       MIRA Heart, NP-BC  Women's Health Nurse Practitioner  Division of Gynecologic Oncology  [facility]      CC  Patient Care Team:  Tyrese Bowie as PCP - General (Family Medicine)  Tawana Rivera, RN as Specialty Care Coordinator (Gynecologic Oncology)  Jluis Canela MD as MD (Gynecologic Oncology)  Bret Kerns MD as Assigned Cancer Care Provider  Martín Myrick MD as Assigned Neuroscience Provider  Rayna Stevens APRN CNP as Assigned OBGYN Provider

## 2021-06-09 NOTE — NURSING NOTE
Chief Complaint   Patient presents with     Port Draw     power needle.heparin locked, vitals checked     Carmella Steward RN on 6/9/2021 at 8:26 AM

## 2021-06-09 NOTE — PATIENT INSTRUCTIONS
Neuropathy: Start Vitamin B6 50 mg by mouth twice per day and L-glutamine 2000 mg by mouth twice per day (buy both at [employer] or [employer]), massage, soaking feet for 10 minutes in warm water followed by massage. If worsens, let us know and we can talk to Dr. Canela about changing chemotherapy drugs. At this point, your neuropathy is mild in the feet and comes and goes.     When to call for help:    A fever of 100.5 F (38 C) or greater    Shaking or chills    Shortness of breath    Repeated signs of bleeding, such as nose bleeds,  easy bruising or black tarry stools    Mouth sores that stop you from eating or cause  pain when you swallow    Nausea and vomiting that do not get better    Diarrhea that does not get better    Extreme weakness    Feeling confused or extremely sleepy    Constipation for more than 48 hours    Swelling of feet or arms    Any new symptoms that you did not have before  your treatments.     SHIRA Calvin [phone]  The phone number for triage is [phone]  The phone number for the hospital is [phone], ask the  to page the gyn onc resident on call.

## 2021-06-09 NOTE — PROGRESS NOTES
Infusion Nursing Note:  Francesca Rowland presents today for Cycle 3 Day 1 Taxol. Cisplatin, MVASI and Neulasta OnPro.    Patient seen by provider today: Yes: Terra Zelaya NP.   present during visit today: Not Applicable.    Note: N/A.    Intravenous Access:  Implanted Port.    Treatment Conditions:  Lab Results   Component Value Date    HGB 11.2 06/09/2021     Lab Results   Component Value Date    WBC 3.1 06/09/2021      Lab Results   Component Value Date    ANEU 1.7 06/09/2021     Lab Results   Component Value Date     06/09/2021      Lab Results   Component Value Date     06/09/2021                   Lab Results   Component Value Date    POTASSIUM 3.8 06/09/2021           Lab Results   Component Value Date    MAG 2.0 06/09/2021            Lab Results   Component Value Date    CR 0.78 06/09/2021                   Lab Results   Component Value Date    SHAD 9.1 06/09/2021                Lab Results   Component Value Date    BILITOTAL 0.4 06/09/2021           Lab Results   Component Value Date    ALBUMIN 3.6 06/09/2021                    Lab Results   Component Value Date    ALT 21 06/09/2021           Lab Results   Component Value Date    AST 18 06/09/2021     Results reviewed, labs MET treatment parameters, ok to proceed with treatment.  Urine Protein: 10.  BP: WNL.  Pt voided pre, during and post Cisplatin infusion.     Post Infusion Assessment:  Patient tolerated infusion without incident.  Blood return noted pre and post infusion.  Site patent and intact, free from redness, edema or discomfort.  No evidence of extravasations.  Access discontinued per protocol.     Discharge Plan:   Patient declined prescription refills.  AVS to patient via meebeeT.  Patient will return 6/30 for next appointment.   Patient discharged in stable condition accompanied by: self.  Departure Mode: Ambulatory.  Face to Face time: 0.      Izabela Mcgarry RN

## 2021-06-09 NOTE — PROGRESS NOTES
Neulasta Onpro On-Body injector applied to LEFT upper abdomen at 1450 with light facing up.  Writer discussed Neulasta injection would start tomorrow 6/10 at 1750, approximately 27 hours after application applied today.  Written and Verbal instruction reviewed with patient.  Pt instructed when the dose delivery starts, it will take about 45 minutes to complete.  Pt aware Neulasta Onpro On-Body should have green flashing light and to call triage or on-call MD if injector flashes red or appears to be leaking. Pt aware to keep Onpro On-Body Neulasta 4 inches away from electrical equipment and to avoid showering 4 hours prior to injection.   Neulasta Onpro Lot number: [id number]

## 2021-06-27 NOTE — PROGRESS NOTES
Follow Up Notes on Referred Patient    Dr. Jluis Canela MD  [address] [place] [address]       RE: Francesca Rowland  : 1980  BEST: 2021     Dear Dr. 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 She is here today for follow up and disease management. In light of the recent COVID19 pandemic, and in accordance with our group and national guidelines this patients care has been reviewed and it is felt that presenting for her visit would be more likely to increase rather than decrease her relative risks. Therefore this visit was conducted by telephone.      Oncology History:     Patient initially presented as a 27-year-old woman seen in referral due to a Pap smear showing high-grade squamous intraepithelial lesion that was positive for high risk HPV subtype in 2007. This Pap smear had been taken during her six week postpartum visit. Patient did have a remote history of abnormal Pap smears back in  while serving in [place] for the [employer].  Pap smears during her first pregnancy were all negative. Her Pap smear at the beginning of the most recent pregnancy was normal. Patient did have a colposcopy for evaluation of this abnormal Pap smear on 2007 and cervical biopsies of 6 and 12 o'clock position showed features consistent with papillary serous adenocarcinoma. Endocervical curettings were also taken which showed fragments of papillary serous carcinoma. The patient then underwent a LEEP procedure on 10/15/07 which showed microinvasive adenocarcinoma with a depth of invasion of 0.4 mm. Patient then made the decision to proceed with radical hysterectomy. She subsequently underwent a radical hysterectomy, bilateral salpingectomy, bilateral pelvic and periaortic lymph node dissection on 10/19/07. Patient's operative  course was otherwise uncomplicated and she recoverred uneventfully.  Pathology did reveal patient to be stage IA2 adenocarcinoma of the cervix.      She underwent routine surveillence through 2014 complicated only by SIOBHAN 1     She recently presented after incidentally being found to have an enlarged pericaval lymph node.  She presented to the ED at [facility] on 9/14/2019 with severe 8.5/10 RUQ pain associated with nausea, one episode of emesis, and decreased appetite. RUQ demonstrated cholelithiasis without cholecystitis, so an abdominal and pelvic CT scan was obtained. This revealed post-surgical changes consistent with prior hysterectomy, left adnexal cysts thought to be ovarian, and an enlarged pericaval lymph node suspicious for metastatic disease vs primary lymphoma. Her condition stabilized and she was discharged home with referrals to general surgery and oncology for biliary colic and further management of the lymphadenopathy, respectively. CT-guided right retroperitoneal lymph node biopsy on 10/15/2019 was consistent with metastatic cervical carcinoma     She underwent surgery to remove the mass and determine the extent of disease on 11/13/19     PATH:  FINAL DIAGNOSIS:   LYMPH NODES, PARA-AORTIC, RESECTION:   - Positive for carcinoma in three of four lymph nodes examined (3/4),   consistent with recurrent endocervical   adenocarcinoma   - Largest metastatic focus is 3.5 cm, positive for extra yuridia extension         Chemo-Potentiated RT 12/2019-1/2020 2/2020 CT:  IMPRESSION: Postoperative changes of hysterectomy and lymph node  resection for cervical cancer. The metastatic right pelvic lymph nodes  which were previously identified have decreased in size. No new  metastases identified.      She recently presented to the ED for an acute RUQ pain episode, which has greatly improved despite no clear diagnosis.  During this visit she underwent a CAP CT which did not demonstrate evidence of cancer but a  slightly enlarged spleen. She had a mild eosinophilia (~2%) but has not traveled or eaten exotic food recently, and has not had any new contacts to suggest mono.      10/13/2020 CT:  IMPRESSION:  1.  Recurrent right external iliac lymphadenopathy.  2.  Rebound thymic hyperplasia.     10/30/2020 PET  IMPRESSION:   In this patient with history of metastatic cervical cancer status-post  radical hysterectomy and chemoradiation, there is evidence of  recurrent disease:  1. Increased size of a hypermetabolic right external iliac chain lymph node since 2/27/2020.  2. Hypermetabolic right subpectoral and axillary lymph nodes.  Recommend follow-up in the breast center for axillary ultrasound and  possible tissue sampling.  3. Idiopathic thymic activation/hyperplasia.     She was evaluated in the breast clinic with plan made for biopsy - however at biopsy the node had shruck considerably and was thus felt to be c/w inflammation (no biopsy obtained)     3/8/2021 MRI Brain (for dizziness)  IMPRESSION:  1. Heterogeneous mixed solid and cystic 2.7 cm mass in the right cerebellum, most likely a solitary metastatic lesion. There is moderate surrounding vasogenic edema as well as mass effect on the fourth ventricle. No obstructive hydrocephalus.    2. Unremarkable MR angiogram of the head and neck.     3/15/2021 Craniotomy ([facility])  1. Midline suboccipital craniotomy for resection of mass  2. Right frontal ventriculostomy  3. Stereotactic navigation  4. Use of operating microscope     4/8/21: MRI Impression:   Postsurgical changes of suboccipital craniotomy with right  cerebellar tumor resection. Enhancement along the prior right frontal  approach ventriculostomy tract which is nonspecific and could  represent inflammation or possibly infection. Limited imaging  primarily for the purposes of stereotactic localization.    4/16/21 Completed 5 fx of gamma knife ([facility])    Plan: Paclitaxel, Cisplatin, Avastin; PDL-1 testing - returns  as positive (CPS=10)    4/28/2021: Cycle #1 Paclitaxel, Cisplatin, Avastin  5/19/2021: Cycle #2 Paclitaxel/Cisplatin/Avastin +Neulasta  6/9/2021: Cycle #3 Paclitaxel/Cisplatin/Avastin +Neulasta  6/30/21 (pending): Cycle #4 Paclitaxel/Cisplatin/Avastin +Neulasta    Today Francesca comes to the clinic overall feeling well. She states that she healed well from her craniotomy site. She states that her dizziness resolved after surgery and she denies headaches, blurred vision, speech changes, or weakness in extremities.  Fatigue worsened after cycle 3 for a few days that improved after a few days post treatment. She is sleeping well at night and is taking Melatonin to help fall asleep easier. She is now exercising.        Review of Systems:    Systemic           no weight changes; no fever; no chills; no night sweats; no appetite changes  Skin           no rashes, or lesions  Eye           no irritation; no changes in vision  Payton-Laryngeal           no dysphagia; no hoarseness   Pulmonary    no cough; no shortness of breath  Cardiovascular    no chest pain; no palpitations  Gastrointestinal    + diarrhea; + constipation; no abdominal pain; no changes in bowel  habits; no blood in stool  Genitourinary   no urinary frequency; no urinary urgency; no dysuria; no pain; no abnormal vaginal discharge; no abnormal vaginal bleeding  Breast   no breast discharge; no breast changes; no breast pain  Musculoskeletal    no myalgias; no arthralgias; + back pain  Psychiatric           no depressed mood; no anxiety    Hematologic           no tender lymph nodes; no noticeable swellings or lumps   Endocrine    no hot flashes; no heat/cold intolerance         Neurological   no tremor; + numbness and tingling; no headaches; no difficulty sleeping      Past Medical History:    Past Medical History:   Diagnosis Date     Chronic cholecystitis 09/25/2019     History of cervical cancer 2007     Metastasis to brain (H) 03/08/2021     Metastatic  adenocarcinoma to cervix (H) 10/28/2019    Added automatically from request for surgery [id number]         Past Surgical History:    Past Surgical History:   Procedure Laterality Date     CHOLECYSTECTOMY, LAPOROSCOPIC  09/25/2019     HYSTERECTOMY RADICAL  2007    PAUL     INSERT PORT VASCULAR ACCESS Right 5/5/2021    Procedure: INSERTION, VASCULAR ACCESS PORT;  Surgeon: Oral Toledo MD;  Location: [facility] OR     IR CHEST PORT PLACEMENT > 5 YRS OF AGE  5/5/2021     LAPAROSCOPIC LYMPHADENECTOMY N/A 11/13/2019    Procedure: LAPAROSCOPY, resection of of paraaortic lymph node, lysis of adhesions;  Surgeon: Jluis Canela MD;  Location: [facility] OR     OPTICAL TRACKING SYSTEM CRANIOTOMY, EXCISE TUMOR, COMBINED Bilateral 3/15/2021    Procedure: stealth assisted Midline suboccipital craniectomy/craniotomy for tumor;  Surgeon: Martín Myrick MD;  Location: [facility] OR     OPTICAL TRACKING SYSTEM VENTRICULOSTOMY Right 3/15/2021    Procedure: stealth assisted  right frontal ventriculostomy;  Surgeon: Martín Myrick MD;  Location: [facility] OR         Health Maintenance Due   Topic Date Due     PREVENTIVE CARE VISIT  Never done     ADVANCE CARE PLANNING  Never done     DEPRESSION ACTION PLAN  Never done     PHQ-9  Never done     Pneumococcal Vaccine: Pediatrics (0 to 5 Years) and At-Risk Patients (6 to 64 Years) (1 of 4 - PCV13) Never done     HIV SCREENING  Never done     HEPATITIS C SCREENING  Never done     PAP  07/14/2017       Current Medications:     Current Outpatient Medications   Medication Sig Dispense Refill     acetaminophen (TYLENOL) 325 MG tablet Take 2 tablets (650 mg) by mouth every 6 hours (Patient not taking: Reported on 6/9/2021) 24 tablet 0     Ascorbic Acid (VITAMIN C GUMMIE PO) Take by mouth daily       dexamethasone (DECADRON) 4 MG tablet Take 2 tablets (8 mg) by mouth daily (with breakfast) Take 2 tablets by mouth with food on days 2-4 after chemotherapy. (Patient not taking: Reported on  "6/9/2021) 12 tablet 3     ibuprofen (ADVIL/MOTRIN) 200 MG tablet Take 1 tablet (200 mg) by mouth every 4 hours as needed for mild pain       lidocaine-prilocaine (EMLA) 2.5-2.5 % external cream Apply topically as needed for moderate pain 30 g 1     LORazepam (ATIVAN) 0.5 MG tablet Take 1 tablet (0.5 mg) by mouth every 6 hours as needed for nausea (Patient not taking: Reported on 6/9/2021) 20 tablet 0     multivitamin w/minerals (MULTI-VITAMIN) tablet Take 1 tablet by mouth daily       OLANZapine (ZYPREXA) 5 MG tablet Take 1 tablet (5 mg) by mouth At Bedtime (Patient not taking: Reported on 6/9/2021) 30 tablet 0         Allergies:        Allergies   Allergen Reactions     Emend [Aprepitant]      Prochlorperazine      \"i was told I turn blue\"        Social History:     Social History     Tobacco Use     Smoking status: Former Smoker     Types: Cigarettes     Smokeless tobacco: Never Used   Substance Use Topics     Alcohol use: Yes     Frequency: 2-3 times a week     Comment: one drink a week       History   Drug Use No         Family History:     The patient's family history is notable for     Family History   Problem Relation Age of Onset     Aneurysm Mother      Breast Cancer Paternal Grandmother         bilat mastectomies     Breast Cancer Maternal Aunt         stage 1     Thyroid Cancer Sister 23         Physical Exam:     PS1  VS: /78   Pulse 77   Temp 97.7  F (36.5  C) (Oral)   Resp 16   Wt 106.9 kg (235 lb 9.6 oz)   SpO2 98%   BMI 38.78 kg/m      Respiratory: No audible wheeze, cough.      Psychiatric: appropriate mood and affect. 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 She is here today for follow up and disease management.    30 minutes spent on the date of the " encounter doing chart review, history and exam, documentation, and further activities as noted above.       Plan:     1.)        CxCA - Ok to proceed with planned treatment once labs have met parameters.   I have requested a PET scan for interval assessment.  Will plan for 6 cytcles followed by possible use of KEytruda versus observation.  This decision will have to take into account her tolerance of the current regimen     2.) Genetic risk factors were assessed again and the patient has multiple family members with cancer diagnosed younger than age 50 including herself (age 40, but with likely HPV related disease) and her sister (thyroid cancer at age 23) among others. She met with genetics 1/2021, but has not get consented to actual testing - we discussed this again today and she will likely be tested.    3.) Labs and/or tests ordered include: CBC, CMP, Mg, Protein Urine (reviewed during visit).     4.) Health maintenance issues addressed today include annual health maintenance and non-gynecologic issues with PCP. Encouraged follow up with Neurology (brain MRI looks to be ordered for mid-July).     5.)        Chemotherapy induced peripheral neuropathy: Neuropathy: This is likely from her Taxol. Encouraged trial of vitamin B6 50 mg BID, L-glutamine 2,000 mg BID, exercise, and massage.  As these symptoms are limited to her bottom of feet and are intermitttent and have not been impacting her function do not need to adjust dosing or her treatment plan at this point. Encouraged her to contact the clinic if she notices her symptoms worsening or spreading. Discussed option of changing to Taxotere if needed.       Jluis Canela MD    Division of Gynecologic Oncology  [facility]        CC  Patient Care Team:  Tyrese Bowie as PCP - General (Family Medicine)  Tawana Rivera, RN as Specialty Care Coordinator (Gynecologic Oncology)  Jluis Canela MD as MD (Gynecologic  Oncology)  Bret Kerns MD as Assigned Cancer Care Provider  Martín Myrick MD as Assigned Neuroscience Provider  Rayna Stevens APRN CNP as Assigned OBGYN Provider  MARIA LUISA ANDRADE

## 2021-06-28 ENCOUNTER — ONCOLOGY VISIT (OUTPATIENT)
Dept: ONCOLOGY | Facility: CLINIC | Age: 41
End: 2021-06-28
Attending: OBSTETRICS & GYNECOLOGY
Payer: COMMERCIAL

## 2021-06-28 ENCOUNTER — APPOINTMENT (OUTPATIENT)
Dept: LAB | Facility: CLINIC | Age: 41
End: 2021-06-28
Attending: OBSTETRICS & GYNECOLOGY
Payer: COMMERCIAL

## 2021-06-28 VITALS
DIASTOLIC BLOOD PRESSURE: 78 MMHG | BODY MASS INDEX: 38.78 KG/M2 | SYSTOLIC BLOOD PRESSURE: 121 MMHG | WEIGHT: 235.6 LBS | OXYGEN SATURATION: 98 % | TEMPERATURE: 97.7 F | HEART RATE: 77 BPM | RESPIRATION RATE: 16 BRPM

## 2021-06-28 DIAGNOSIS — R11.2 CHEMOTHERAPY INDUCED NAUSEA AND VOMITING: Primary | ICD-10-CM

## 2021-06-28 DIAGNOSIS — D70.1 CHEMOTHERAPY INDUCED NEUTROPENIA (H): ICD-10-CM

## 2021-06-28 DIAGNOSIS — C53.9 MALIGNANT NEOPLASM OF CERVIX, UNSPECIFIED SITE (H): ICD-10-CM

## 2021-06-28 DIAGNOSIS — T45.1X5A CHEMOTHERAPY INDUCED NEUTROPENIA (H): ICD-10-CM

## 2021-06-28 DIAGNOSIS — C79.82 METASTATIC ADENOCARCINOMA TO CERVIX (H): ICD-10-CM

## 2021-06-28 DIAGNOSIS — Z51.11 ENCOUNTER FOR ANTINEOPLASTIC CHEMOTHERAPY: ICD-10-CM

## 2021-06-28 DIAGNOSIS — C79.82 METASTATIC ADENOCARCINOMA TO CERVIX (H): Primary | ICD-10-CM

## 2021-06-28 DIAGNOSIS — T45.1X5A CHEMOTHERAPY INDUCED NAUSEA AND VOMITING: Primary | ICD-10-CM

## 2021-06-28 LAB
ALBUMIN SERPL-MCNC: 3.6 G/DL (ref 3.4–5)
ALBUMIN UR-MCNC: NEGATIVE MG/DL
ALP SERPL-CCNC: 103 U/L (ref 40–150)
ALT SERPL W P-5'-P-CCNC: 24 U/L (ref 0–50)
ANION GAP SERPL CALCULATED.3IONS-SCNC: 8 MMOL/L (ref 3–14)
AST SERPL W P-5'-P-CCNC: 16 U/L (ref 0–45)
BASOPHILS # BLD AUTO: 0 10E9/L (ref 0–0.2)
BASOPHILS NFR BLD AUTO: 0.8 %
BILIRUB SERPL-MCNC: 0.5 MG/DL (ref 0.2–1.3)
BUN SERPL-MCNC: 20 MG/DL (ref 7–30)
CALCIUM SERPL-MCNC: 8.9 MG/DL (ref 8.5–10.1)
CHLORIDE SERPL-SCNC: 106 MMOL/L (ref 94–109)
CO2 SERPL-SCNC: 27 MMOL/L (ref 20–32)
CREAT SERPL-MCNC: 0.84 MG/DL (ref 0.52–1.04)
DIFFERENTIAL METHOD BLD: ABNORMAL
EOSINOPHIL # BLD AUTO: 0.2 10E9/L (ref 0–0.7)
EOSINOPHIL NFR BLD AUTO: 4.3 %
ERYTHROCYTE [DISTWIDTH] IN BLOOD BY AUTOMATED COUNT: 17.4 % (ref 10–15)
GFR SERPL CREATININE-BSD FRML MDRD: 86 ML/MIN/{1.73_M2}
GLUCOSE SERPL-MCNC: 95 MG/DL (ref 70–99)
HCT VFR BLD AUTO: 34.1 % (ref 35–47)
HGB BLD-MCNC: 11.1 G/DL (ref 11.7–15.7)
IMM GRANULOCYTES # BLD: 0 10E9/L (ref 0–0.4)
IMM GRANULOCYTES NFR BLD: 0.5 %
LYMPHOCYTES # BLD AUTO: 0.9 10E9/L (ref 0.8–5.3)
LYMPHOCYTES NFR BLD AUTO: 22.6 %
MAGNESIUM SERPL-MCNC: 2 MG/DL (ref 1.6–2.3)
MCH RBC QN AUTO: 30.2 PG (ref 26.5–33)
MCHC RBC AUTO-ENTMCNC: 32.6 G/DL (ref 31.5–36.5)
MCV RBC AUTO: 93 FL (ref 78–100)
MONOCYTES # BLD AUTO: 0.5 10E9/L (ref 0–1.3)
MONOCYTES NFR BLD AUTO: 12.4 %
NEUTROPHILS # BLD AUTO: 2.3 10E9/L (ref 1.6–8.3)
NEUTROPHILS NFR BLD AUTO: 59.4 %
NRBC # BLD AUTO: 0 10*3/UL
NRBC BLD AUTO-RTO: 0 /100
PLATELET # BLD AUTO: 223 10E9/L (ref 150–450)
POTASSIUM SERPL-SCNC: 3.8 MMOL/L (ref 3.4–5.3)
PROT SERPL-MCNC: 7.5 G/DL (ref 6.8–8.8)
RBC # BLD AUTO: 3.67 10E12/L (ref 3.8–5.2)
SODIUM SERPL-SCNC: 140 MMOL/L (ref 133–144)
WBC # BLD AUTO: 3.9 10E9/L (ref 4–11)

## 2021-06-28 PROCEDURE — 80053 COMPREHEN METABOLIC PANEL: CPT | Performed by: OBSTETRICS & GYNECOLOGY

## 2021-06-28 PROCEDURE — 81003 URINALYSIS AUTO W/O SCOPE: CPT | Performed by: OBSTETRICS & GYNECOLOGY

## 2021-06-28 PROCEDURE — 99214 OFFICE O/P EST MOD 30 MIN: CPT | Performed by: OBSTETRICS & GYNECOLOGY

## 2021-06-28 PROCEDURE — 250N000011 HC RX IP 250 OP 636: Performed by: OBSTETRICS & GYNECOLOGY

## 2021-06-28 PROCEDURE — 85025 COMPLETE CBC W/AUTO DIFF WBC: CPT | Performed by: OBSTETRICS & GYNECOLOGY

## 2021-06-28 PROCEDURE — 83735 ASSAY OF MAGNESIUM: CPT | Performed by: OBSTETRICS & GYNECOLOGY

## 2021-06-28 PROCEDURE — G0463 HOSPITAL OUTPT CLINIC VISIT: HCPCS

## 2021-06-28 RX ORDER — HEPARIN SODIUM (PORCINE) LOCK FLUSH IV SOLN 100 UNIT/ML 100 UNIT/ML
5 SOLUTION INTRAVENOUS DAILY PRN
Status: DISCONTINUED | OUTPATIENT
Start: 2021-06-28 | End: 2021-06-28 | Stop reason: HOSPADM

## 2021-06-28 RX ADMIN — Medication 5 ML: at 08:04

## 2021-06-28 ASSESSMENT — PAIN SCALES - GENERAL: PAINLEVEL: NO PAIN (0)

## 2021-06-28 NOTE — LETTER
2021         RE: Francesca Rowland  [address]  [facility] [address]        Dear Colleague,    Thank you for referring your patient, Francesca Rowland, to the [facility]. Please see a copy of my visit note below.                     Follow Up Notes on Referred Patient    Dr. Jluis Canela MD  [address] [place] [address]       RE: Francesca Rowland  : 1980  BEST: 2021     Dear Dr. 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 She is here today for follow up and disease management. In light of the recent COVID19 pandemic, and in accordance with our group and national guidelines this patients care has been reviewed and it is felt that presenting for her visit would be more likely to increase rather than decrease her relative risks. Therefore this visit was conducted by telephone.      Oncology History:     Patient initially presented as a 27-year-old woman seen in referral due to a Pap smear showing high-grade squamous intraepithelial lesion that was positive for high risk HPV subtype in 2007. This Pap smear had been taken during her six week postpartum visit. Patient did have a remote history of abnormal Pap smears back in  while serving in [place] for the .  Pap smears during her first pregnancy were all negative. Her Pap smear at the beginning of the most recent pregnancy was normal. Patient did have a colposcopy for evaluation of this abnormal Pap smear on 2007 and cervical biopsies of 6 and 12 o'clock position showed features consistent with papillary serous adenocarcinoma. Endocervical curettings were also taken which showed fragments of papillary serous carcinoma. The patient then underwent a LEEP procedure on 10/15/07 which showed microinvasive adenocarcinoma with a  depth of invasion of 0.4 mm. Patient then made the decision to proceed with radical hysterectomy. She subsequently underwent a radical hysterectomy, bilateral salpingectomy, bilateral pelvic and periaortic lymph node dissection on 10/19/07. Patient's operative course was otherwise uncomplicated and she recoverred uneventfully.  Pathology did reveal patient to be stage IA2 adenocarcinoma of the cervix.      She underwent routine surveillence through 2014 complicated only by SIOBHAN 1     She recently presented after incidentally being found to have an enlarged pericaval lymph node.  She presented to the ED at [facility] on 9/14/2019 with severe 8.5/10 RUQ pain associated with nausea, one episode of emesis, and decreased appetite. RUQ demonstrated cholelithiasis without cholecystitis, so an abdominal and pelvic CT scan was obtained. This revealed post-surgical changes consistent with prior hysterectomy, left adnexal cysts thought to be ovarian, and an enlarged pericaval lymph node suspicious for metastatic disease vs primary lymphoma. Her condition stabilized and she was discharged home with referrals to general surgery and oncology for biliary colic and further management of the lymphadenopathy, respectively. CT-guided right retroperitoneal lymph node biopsy on 10/15/2019 was consistent with metastatic cervical carcinoma     She underwent surgery to remove the mass and determine the extent of disease on 11/13/19     PATH:  FINAL DIAGNOSIS:   LYMPH NODES, PARA-AORTIC, RESECTION:   - Positive for carcinoma in three of four lymph nodes examined (3/4),   consistent with recurrent endocervical   adenocarcinoma   - Largest metastatic focus is 3.5 cm, positive for extra yuirdia extension         Chemo-Potentiated RT 12/2019-1/2020 2/2020 CT:  IMPRESSION: Postoperative changes of hysterectomy and lymph node  resection for cervical cancer. The metastatic right pelvic lymph nodes  which were previously identified have  decreased in size. No new  metastases identified.      She recently presented to the ED for an acute RUQ pain episode, which has greatly improved despite no clear diagnosis.  During this visit she underwent a CAP CT which did not demonstrate evidence of cancer but a slightly enlarged spleen. She had a mild eosinophilia (~2%) but has not traveled or eaten exotic food recently, and has not had any new contacts to suggest mono.      10/13/2020 CT:  IMPRESSION:  1.  Recurrent right external iliac lymphadenopathy.  2.  Rebound thymic hyperplasia.     10/30/2020 PET  IMPRESSION:   In this patient with history of metastatic cervical cancer status-post  radical hysterectomy and chemoradiation, there is evidence of  recurrent disease:  1. Increased size of a hypermetabolic right external iliac chain lymph node since 2/27/2020.  2. Hypermetabolic right subpectoral and axillary lymph nodes.  Recommend follow-up in the breast center for axillary ultrasound and  possible tissue sampling.  3. Idiopathic thymic activation/hyperplasia.     She was evaluated in the breast clinic with plan made for biopsy - however at biopsy the node had shruck considerably and was thus felt to be c/w inflammation (no biopsy obtained)     3/8/2021 MRI Brain (for dizziness)  IMPRESSION:  1. Heterogeneous mixed solid and cystic 2.7 cm mass in the right cerebellum, most likely a solitary metastatic lesion. There is moderate surrounding vasogenic edema as well as mass effect on the fourth ventricle. No obstructive hydrocephalus.    2. Unremarkable MR angiogram of the head and neck.     3/15/2021 Craniotomy (Elen)  1. Midline suboccipital craniotomy for resection of mass  2. Right frontal ventriculostomy  3. Stereotactic navigation  4. Use of operating microscope     4/8/21: MRI Impression:   Postsurgical changes of suboccipital craniotomy with right  cerebellar tumor resection. Enhancement along the prior right frontal  approach ventriculostomy  tract which is nonspecific and could  represent inflammation or possibly infection. Limited imaging  primarily for the purposes of stereotactic localization.    4/16/21 Completed 5 fx of gamma knife (Yuan)    Plan: Paclitaxel, Cisplatin, Avastin; PDL-1 testing - returns as positive (CPS=10)    4/28/2021: Cycle #1 Paclitaxel, Cisplatin, Avastin  5/19/2021: Cycle #2 Paclitaxel/Cisplatin/Avastin +Neulasta  6/9/2021: Cycle #3 Paclitaxel/Cisplatin/Avastin +Neulasta  6/30/21 (pending): Cycle #4 Paclitaxel/Cisplatin/Avastin +Neulasta    Today Francesca comes to the clinic overall feeling well. She states that she healed well from her craniotomy site. She states that her dizziness resolved after surgery and she denies headaches, blurred vision, speech changes, or weakness in extremities.  Fatigue worsened after cycle 3 for a few days that improved after a few days post treatment. She is sleeping well at night and is taking Melatonin to help fall asleep easier. She is now exercising.        Review of Systems:    Systemic           no weight changes; no fever; no chills; no night sweats; no appetite changes  Skin           no rashes, or lesions  Eye           no irritation; no changes in vision  Payton-Laryngeal           no dysphagia; no hoarseness   Pulmonary    no cough; no shortness of breath  Cardiovascular    no chest pain; no palpitations  Gastrointestinal    + diarrhea; + constipation; no abdominal pain; no changes in bowel  habits; no blood in stool  Genitourinary   no urinary frequency; no urinary urgency; no dysuria; no pain; no abnormal vaginal discharge; no abnormal vaginal bleeding  Breast   no breast discharge; no breast changes; no breast pain  Musculoskeletal    no myalgias; no arthralgias; + back pain  Psychiatric           no depressed mood; no anxiety    Hematologic           no tender lymph nodes; no noticeable swellings or lumps   Endocrine    no hot flashes; no heat/cold intolerance         Neurological   no  tremor; + numbness and tingling; no headaches; no difficulty sleeping      Past Medical History:    Past Medical History:   Diagnosis Date     Chronic cholecystitis 09/25/2019     History of cervical cancer 2007     Metastasis to brain (H) 03/08/2021     Metastatic adenocarcinoma to cervix (H) 10/28/2019    Added automatically from request for surgery [id number]         Past Surgical History:    Past Surgical History:   Procedure Laterality Date     CHOLECYSTECTOMY, LAPOROSCOPIC  09/25/2019     HYSTERECTOMY RADICAL  2007    PAUL     INSERT PORT VASCULAR ACCESS Right 5/5/2021    Procedure: INSERTION, VASCULAR ACCESS PORT;  Surgeon: Oral Toledo MD;  Location: [facility] OR     IR CHEST PORT PLACEMENT > 5 YRS OF AGE  5/5/2021     LAPAROSCOPIC LYMPHADENECTOMY N/A 11/13/2019    Procedure: LAPAROSCOPY, resection of of paraaortic lymph node, lysis of adhesions;  Surgeon: Jluis Canela MD;  Location: [facility] OR     OPTICAL TRACKING SYSTEM CRANIOTOMY, EXCISE TUMOR, COMBINED Bilateral 3/15/2021    Procedure: stealth assisted Midline suboccipital craniectomy/craniotomy for tumor;  Surgeon: Martín Myrick MD;  Location: [facility] OR     OPTICAL TRACKING SYSTEM VENTRICULOSTOMY Right 3/15/2021    Procedure: stealth assisted  right frontal ventriculostomy;  Surgeon: Martín Myrick MD;  Location: [facility] OR         Health Maintenance Due   Topic Date Due     PREVENTIVE CARE VISIT  Never done     ADVANCE CARE PLANNING  Never done     DEPRESSION ACTION PLAN  Never done     PHQ-9  Never done     Pneumococcal Vaccine: Pediatrics (0 to 5 Years) and At-Risk Patients (6 to 64 Years) (1 of 4 - PCV13) Never done     HIV SCREENING  Never done     HEPATITIS C SCREENING  Never done     PAP  07/14/2017       Current Medications:     Current Outpatient Medications   Medication Sig Dispense Refill     acetaminophen (TYLENOL) 325 MG tablet Take 2 tablets (650 mg) by mouth every 6 hours (Patient not taking: Reported on 6/9/2021) 24  "tablet 0     Ascorbic Acid (VITAMIN C GUMMIE PO) Take by mouth daily       dexamethasone (DECADRON) 4 MG tablet Take 2 tablets (8 mg) by mouth daily (with breakfast) Take 2 tablets by mouth with food on days 2-4 after chemotherapy. (Patient not taking: Reported on 6/9/2021) 12 tablet 3     ibuprofen (ADVIL/MOTRIN) 200 MG tablet Take 1 tablet (200 mg) by mouth every 4 hours as needed for mild pain       lidocaine-prilocaine (EMLA) 2.5-2.5 % external cream Apply topically as needed for moderate pain 30 g 1     LORazepam (ATIVAN) 0.5 MG tablet Take 1 tablet (0.5 mg) by mouth every 6 hours as needed for nausea (Patient not taking: Reported on 6/9/2021) 20 tablet 0     multivitamin w/minerals (MULTI-VITAMIN) tablet Take 1 tablet by mouth daily       OLANZapine (ZYPREXA) 5 MG tablet Take 1 tablet (5 mg) by mouth At Bedtime (Patient not taking: Reported on 6/9/2021) 30 tablet 0         Allergies:        Allergies   Allergen Reactions     Emend [Aprepitant]      Prochlorperazine      \"i was told I turn blue\"        Social History:     Social History     Tobacco Use     Smoking status: Former Smoker     Types: Cigarettes     Smokeless tobacco: Never Used   Substance Use Topics     Alcohol use: Yes     Frequency: 2-3 times a week     Comment: one drink a week       History   Drug Use No         Family History:     The patient's family history is notable for     Family History   Problem Relation Age of Onset     Aneurysm Mother      Breast Cancer Paternal Grandmother         bilat mastectomies     Breast Cancer Maternal Aunt         stage 1     Thyroid Cancer Sister 23         Physical Exam:     PS1  VS: /78   Pulse 77   Temp 97.7  F (36.5  C) (Oral)   Resp 16   Wt 106.9 kg (235 lb 9.6 oz)   SpO2 98%   BMI 38.78 kg/m      Respiratory: No audible wheeze, cough.      Psychiatric: appropriate mood and affect. 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 She is here today for follow up and disease management.    30 minutes spent on the date of the encounter doing chart review, history and exam, documentation, and further activities as noted above.       Plan:     1.)        CxCA - Ok to proceed with planned treatment once labs have met parameters.   I have requested a PET scan for interval assessment.  Will plan for 6 cytcles followed by possible use of KEytruda versus observation.  This decision will have to take into account her tolerance of the current regimen     2.) Genetic risk factors were assessed again and the patient has multiple family members with cancer diagnosed younger than age 50 including herself (age 40, but with likely HPV related disease) and her sister (thyroid cancer at age 23) among others. She met with genetics 1/2021, but has not get consented to actual testing - we discussed this again today and she will likely be tested.    3.) Labs and/or tests ordered include: CBC, CMP, Mg, Protein Urine (reviewed during visit).     4.) Health maintenance issues addressed today include annual health maintenance and non-gynecologic issues with PCP. Encouraged follow up with Neurology (brain MRI looks to be ordered for mid-July).     5.)        Chemotherapy induced peripheral neuropathy: Neuropathy: This is likely from her Taxol. Encouraged trial of vitamin B6 50 mg BID, L-glutamine 2,000 mg BID, exercise, and massage.  As these symptoms are limited to her bottom of feet and are intermitttent and have not been impacting her function do not need to adjust dosing or her treatment plan at this point. Encouraged her to contact the clinic if she notices her symptoms worsening or spreading. Discussed option of changing to Taxotere if needed.       Jluis Canela,  MD    Division of Gynecologic Oncology  [facility]        CC  Patient Care Team:  Tyrese Bowie as PCP - General (Family Medicine)  Tawana Rivera, RN as Specialty Care Coordinator (Gynecologic Oncology)  Bret Kerns MD as Assigned Cancer Care Provider  Martín Myrick MD as Assigned Neuroscience Provider  Rayan Stevens APRN CNP as Assigned OBGYN Provider

## 2021-06-28 NOTE — NURSING NOTE
"Oncology Rooming Note    June 28, 2021 8:30 AM   Francesca Rowland is a 40 year old female who presents for:    Chief Complaint   Patient presents with     Port Draw     Labs drawn via port by RN in lab. VS taken.      Oncology Clinic Visit     METASTATIC ADENOCARCINOMA TO CERVIX      Initial Vitals: /78   Pulse 77   Temp 97.7  F (36.5  C) (Oral)   Resp 16   Wt 106.9 kg (235 lb 9.6 oz)   SpO2 98%   BMI 38.78 kg/m   Estimated body mass index is 38.78 kg/m  as calculated from the following:    Height as of 5/19/21: 1.66 m (5' 5.35\").    Weight as of this encounter: 106.9 kg (235 lb 9.6 oz). Body surface area is 2.22 meters squared.  No Pain (0) Comment: Data Unavailable   No LMP recorded. Patient has had a hysterectomy.  Allergies reviewed: Yes  Medications reviewed: Yes    Medications: Medication refills not needed today.  Pharmacy name entered into [facility]: [id number] - [place], [place] - [facility] [address]    Clinical concerns: None.       Radha Lilly MA            "

## 2021-06-28 NOTE — NURSING NOTE
Chief Complaint   Patient presents with     Port Draw     Labs drawn via port by RN in lab. VS taken.      Labs drawn via Port accessed using 20g gripper needle. Line flushed and Heparin locked. Vital signs taken. Checked into next appointment.       Lis Robles RN

## 2021-06-30 ENCOUNTER — INFUSION THERAPY VISIT (OUTPATIENT)
Dept: ONCOLOGY | Facility: CLINIC | Age: 41
End: 2021-06-30
Attending: OBSTETRICS & GYNECOLOGY
Payer: COMMERCIAL

## 2021-06-30 VITALS
OXYGEN SATURATION: 96 % | RESPIRATION RATE: 16 BRPM | TEMPERATURE: 97.7 F | SYSTOLIC BLOOD PRESSURE: 121 MMHG | HEART RATE: 83 BPM | DIASTOLIC BLOOD PRESSURE: 85 MMHG

## 2021-06-30 DIAGNOSIS — Z51.11 ENCOUNTER FOR ANTINEOPLASTIC CHEMOTHERAPY: Primary | ICD-10-CM

## 2021-06-30 DIAGNOSIS — T45.1X5A CHEMOTHERAPY INDUCED NAUSEA AND VOMITING: ICD-10-CM

## 2021-06-30 DIAGNOSIS — C79.82 METASTATIC ADENOCARCINOMA TO CERVIX (H): ICD-10-CM

## 2021-06-30 DIAGNOSIS — C53.9 MALIGNANT NEOPLASM OF CERVIX, UNSPECIFIED SITE (H): ICD-10-CM

## 2021-06-30 DIAGNOSIS — T45.1X5A CHEMOTHERAPY INDUCED NEUTROPENIA (H): ICD-10-CM

## 2021-06-30 DIAGNOSIS — D70.1 CHEMOTHERAPY INDUCED NEUTROPENIA (H): ICD-10-CM

## 2021-06-30 DIAGNOSIS — R11.2 CHEMOTHERAPY INDUCED NAUSEA AND VOMITING: ICD-10-CM

## 2021-06-30 PROCEDURE — 96413 CHEMO IV INFUSION 1 HR: CPT

## 2021-06-30 PROCEDURE — 258N000003 HC RX IP 258 OP 636: Performed by: OBSTETRICS & GYNECOLOGY

## 2021-06-30 PROCEDURE — 96417 CHEMO IV INFUS EACH ADDL SEQ: CPT

## 2021-06-30 PROCEDURE — 96415 CHEMO IV INFUSION ADDL HR: CPT

## 2021-06-30 PROCEDURE — 96367 TX/PROPH/DG ADDL SEQ IV INF: CPT

## 2021-06-30 PROCEDURE — 250N000011 HC RX IP 250 OP 636: Performed by: OBSTETRICS & GYNECOLOGY

## 2021-06-30 PROCEDURE — 250N000009 HC RX 250: Performed by: OBSTETRICS & GYNECOLOGY

## 2021-06-30 PROCEDURE — 96375 TX/PRO/DX INJ NEW DRUG ADDON: CPT

## 2021-06-30 PROCEDURE — 96377 APPLICATON ON-BODY INJECTOR: CPT | Mod: 59 | Performed by: OBSTETRICS & GYNECOLOGY

## 2021-06-30 PROCEDURE — 96372 THER/PROPH/DIAG INJ SC/IM: CPT | Performed by: OBSTETRICS & GYNECOLOGY

## 2021-06-30 PROCEDURE — 258N000001 HC RX 258: Performed by: OBSTETRICS & GYNECOLOGY

## 2021-06-30 RX ORDER — METHYLPREDNISOLONE SODIUM SUCCINATE 125 MG/2ML
125 INJECTION, POWDER, LYOPHILIZED, FOR SOLUTION INTRAMUSCULAR; INTRAVENOUS
Status: CANCELLED
Start: 2021-06-30

## 2021-06-30 RX ORDER — HEPARIN SODIUM (PORCINE) LOCK FLUSH IV SOLN 100 UNIT/ML 100 UNIT/ML
5 SOLUTION INTRAVENOUS
Status: DISCONTINUED | OUTPATIENT
Start: 2021-06-30 | End: 2021-06-30 | Stop reason: HOSPADM

## 2021-06-30 RX ORDER — PALONOSETRON 0.05 MG/ML
0.25 INJECTION, SOLUTION INTRAVENOUS ONCE
Status: COMPLETED | OUTPATIENT
Start: 2021-06-30 | End: 2021-06-30

## 2021-06-30 RX ORDER — DIPHENHYDRAMINE HCL 25 MG
50 CAPSULE ORAL ONCE
Status: CANCELLED
Start: 2021-06-30

## 2021-06-30 RX ORDER — ALBUTEROL SULFATE 90 UG/1
1-2 AEROSOL, METERED RESPIRATORY (INHALATION)
Status: CANCELLED
Start: 2021-06-30

## 2021-06-30 RX ORDER — MEPERIDINE HYDROCHLORIDE 25 MG/ML
25 INJECTION INTRAMUSCULAR; INTRAVENOUS; SUBCUTANEOUS EVERY 30 MIN PRN
Status: CANCELLED | OUTPATIENT
Start: 2021-06-30

## 2021-06-30 RX ORDER — ALBUTEROL SULFATE 0.83 MG/ML
2.5 SOLUTION RESPIRATORY (INHALATION)
Status: CANCELLED | OUTPATIENT
Start: 2021-06-30

## 2021-06-30 RX ORDER — EPINEPHRINE 1 MG/ML
0.3 INJECTION, SOLUTION INTRAMUSCULAR; SUBCUTANEOUS EVERY 5 MIN PRN
Status: CANCELLED | OUTPATIENT
Start: 2021-06-30

## 2021-06-30 RX ORDER — SODIUM CHLORIDE 9 MG/ML
1000 INJECTION, SOLUTION INTRAVENOUS CONTINUOUS PRN
Status: CANCELLED
Start: 2021-06-30

## 2021-06-30 RX ORDER — DEXTROSE, SODIUM CHLORIDE, AND POTASSIUM CHLORIDE 5; .45; .15 G/100ML; G/100ML; G/100ML
1000 INJECTION INTRAVENOUS ONCE
Status: COMPLETED | OUTPATIENT
Start: 2021-06-30 | End: 2021-06-30

## 2021-06-30 RX ORDER — NALOXONE HYDROCHLORIDE 0.4 MG/ML
.1-.4 INJECTION, SOLUTION INTRAMUSCULAR; INTRAVENOUS; SUBCUTANEOUS
Status: CANCELLED | OUTPATIENT
Start: 2021-06-30

## 2021-06-30 RX ORDER — LORAZEPAM 2 MG/ML
0.5 INJECTION INTRAMUSCULAR EVERY 6 HOURS PRN
Status: CANCELLED | OUTPATIENT
Start: 2021-06-30

## 2021-06-30 RX ORDER — HEPARIN SODIUM,PORCINE 10 UNIT/ML
5 VIAL (ML) INTRAVENOUS
Status: CANCELLED | OUTPATIENT
Start: 2021-06-30

## 2021-06-30 RX ORDER — DIPHENHYDRAMINE HYDROCHLORIDE 50 MG/ML
50 INJECTION INTRAMUSCULAR; INTRAVENOUS
Status: CANCELLED
Start: 2021-06-30

## 2021-06-30 RX ADMIN — CISPLATIN 110 MG: 1 INJECTION, SOLUTION INTRAVENOUS at 14:09

## 2021-06-30 RX ADMIN — Medication 5 ML: at 16:57

## 2021-06-30 RX ADMIN — PALONOSETRON 0.25 MG: 0.05 INJECTION, SOLUTION INTRAVENOUS at 10:14

## 2021-06-30 RX ADMIN — FAMOTIDINE 40 MG: 10 INJECTION INTRAVENOUS at 10:17

## 2021-06-30 RX ADMIN — BEVACIZUMAB-AWWB 1500 MG: 400 INJECTION, SOLUTION INTRAVENOUS at 16:21

## 2021-06-30 RX ADMIN — PEGFILGRASTIM 6 MG: KIT SUBCUTANEOUS at 16:26

## 2021-06-30 RX ADMIN — DEXAMETHASONE SODIUM PHOSPHATE 12 MG: 10 INJECTION, SOLUTION INTRAMUSCULAR; INTRAVENOUS at 10:20

## 2021-06-30 RX ADMIN — PACLITAXEL 382 MG: 6 INJECTION, SOLUTION INTRAVENOUS at 11:01

## 2021-06-30 RX ADMIN — DIPHENHYDRAMINE HYDROCHLORIDE 50 MG: 50 INJECTION, SOLUTION INTRAMUSCULAR; INTRAVENOUS at 10:46

## 2021-06-30 RX ADMIN — POTASSIUM CHLORIDE, DEXTROSE MONOHYDRATE AND SODIUM CHLORIDE 1000 ML: 150; 5; 450 INJECTION, SOLUTION INTRAVENOUS at 14:09

## 2021-06-30 RX ADMIN — SODIUM CHLORIDE 1000 ML: 9 INJECTION, SOLUTION INTRAVENOUS at 10:11

## 2021-06-30 ASSESSMENT — PAIN SCALES - GENERAL: PAINLEVEL: NO PAIN (0)

## 2021-06-30 NOTE — PROGRESS NOTES
Infusion Nursing Note:  Francesca Rowland presents today for D1 C4 Taxol, Carboplatin, MVASI, Neulasta OnPro.    Patient seen by provider today: No    Intravenous Access:  Implanted Port.    Treatment Conditions:  Lab Results   Component Value Date    HGB 11.1 06/28/2021     Lab Results   Component Value Date    WBC 3.9 06/28/2021      Lab Results   Component Value Date    ANEU 2.3 06/28/2021     Lab Results   Component Value Date     06/28/2021      Lab Results   Component Value Date     06/28/2021                   Lab Results   Component Value Date    POTASSIUM 3.8 06/28/2021           Lab Results   Component Value Date    MAG 2.0 06/28/2021            Lab Results   Component Value Date    CR 0.84 06/28/2021                   Lab Results   Component Value Date    SHAD 8.9 06/28/2021                Lab Results   Component Value Date    BILITOTAL 0.5 06/28/2021           Lab Results   Component Value Date    ALBUMIN 3.6 06/28/2021                    Lab Results   Component Value Date    ALT 24 06/28/2021           Lab Results   Component Value Date    AST 16 06/28/2021       Results reviewed, labs MET treatment parameters, ok to proceed with treatment.  Urine protein: negative  BP WNL x2.    Note: Patient was evaluated by Dr. Canela on 6/28/21. Reports feeling well overall and denied s/s of infection or other issues or concerns.    Patient voided pre, during, and post-Cisplatin.    Neulasta On Pro- On Body injector applied to patient today on left abdomen at 1630 with light facing toward naval. Writer discussed Neulasta injection would start tomorrow at 1930, approximately 27 hours after application applied today.  Written and Verbal instruction reviewed with patient.  Pt instructed when the dose delivery starts, it will take about 45 minutes to complete. Pt aware Neulasta Onpro On-Body should have green flashing light and to call triage or on-call MD if injector flashes red or appears to be leaking. Pt  aware to keep Onpro On-Body Neualsta 4 inches away from electrical equipment and to avoid showering 4 hours prior to injection. Neulasta Onpro Lot number documented on MAR.    Post Infusion Assessment:  Patient tolerated infusion without incident.  Blood return noted pre and post infusion.  Site patent and intact, free from redness, edema or discomfort.  No evidence of extravasations.  Access discontinued per protocol.    Discharge Plan:   Patient declined prescription refills.  Discharge instructions reviewed with: Patient.  Patient and/or family verbalized understanding of discharge instructions and all questions answered.  AVS to patient via [employer].  Patient will return 7/21/21 for next appointment.   Patient discharged in stable condition accompanied by: self.  Departure Mode: Ambulatory.    Tawana Santana RN

## 2021-06-30 NOTE — PATIENT INSTRUCTIONS
Contact Numbers  [facility] Nurse Triage: [phone]    Please call the [facility] Triage line if you experience a temperature greater than or equal to 100.5, shaking chills, have uncontrolled nausea, vomiting and/or diarrhea, dizziness, shortness of breath, chest pain, bleeding, unexplained bruising, or if you have any other new/concerning symptoms, questions or concerns.     If you are having any concerning symptoms or wish to speak to a provider before your next infusion visit, please call your care coordinator or triage to notify them so we can adequately serve you.     If you need a refill on a prescription or other medication, please call triage before your infusion appointment.       June 2021 Sunday Monday Tuesday Wednesday Thursday Friday Saturday             1     2     3     4     5       6     7     8     9    LAB CENTRAL   7:45 AM   (15 min.)   [facility] LAB DRAW   [facility]    TELEPHONE VISIT RETURN   8:20 AM   (40 min.)   Terra Zelaya APRN CNP   [facility]    ONC INFUSION 6 HR (360 MIN)   9:00 AM   (360 min.)    ONC INFUSION NURSE   [facility] 10     11     12       13     14     15     16     17     18     19       20     21     22     23     24     25     26       27     28    LAB CENTRAL   7:45 AM   (15 min.)   [facility] LAB DRAW   [facility]    RETURN   8:15 AM   (30 min.)   Jluis Canela MD   [facility] 29     30    ONC INFUSION 6 HR (360 MIN)   9:00 AM   (360 min.)    ONC INFUSION NURSE   [facility]                            July 2021 Sunday Monday Tuesday Wednesday Thursday Friday Saturday                       1     2     3       4     5     6     7     8     9     10       11     12     13     14     15    PET ONCOLOGY WHOLE BODY  10:30 AM   (60 min.)   [id number]   [facility] 16     17       18     19     20     21    LAB CENTRAL   7:45 AM   (15 min.)   [facility] LAB DRAW   [facility]    RETURN   8:05 AM   (40 min.)   Terra Zelaya APRN CNP   [facility]    ONC INFUSION 6 HR (360 MIN)   9:00 AM   (360 min.)    ONC INFUSION NURSE   [facility] 22     23     24       25     26     27     28     29     30     31                     Lab Results:  No results found for this or any previous visit (from the past 12 hour(s)).

## 2021-07-15 ENCOUNTER — ANCILLARY PROCEDURE (OUTPATIENT)
Dept: PET IMAGING | Facility: CLINIC | Age: 41
End: 2021-07-15
Attending: OBSTETRICS & GYNECOLOGY
Payer: COMMERCIAL

## 2021-07-15 DIAGNOSIS — C79.82 METASTATIC ADENOCARCINOMA TO CERVIX (H): ICD-10-CM

## 2021-07-15 LAB — GLUCOSE SERPL-MCNC: 84 MG/DL (ref 70–99)

## 2021-07-15 RX ORDER — HEPARIN SODIUM (PORCINE) LOCK FLUSH IV SOLN 100 UNIT/ML 100 UNIT/ML
500 SOLUTION INTRAVENOUS ONCE
Status: COMPLETED | OUTPATIENT
Start: 2021-07-15 | End: 2021-07-15

## 2021-07-15 RX ORDER — FUROSEMIDE 10 MG/ML
40 INJECTION INTRAMUSCULAR; INTRAVENOUS ONCE
Status: COMPLETED | OUTPATIENT
Start: 2021-07-15 | End: 2021-07-15

## 2021-07-15 RX ORDER — IOPAMIDOL 755 MG/ML
50-125 INJECTION, SOLUTION INTRAVASCULAR ONCE
Status: COMPLETED | OUTPATIENT
Start: 2021-07-15 | End: 2021-07-15

## 2021-07-15 RX ADMIN — IOPAMIDOL 125 ML: 755 INJECTION, SOLUTION INTRAVASCULAR at 11:43

## 2021-07-15 RX ADMIN — FUROSEMIDE 40 MG: 10 INJECTION INTRAMUSCULAR; INTRAVENOUS at 11:43

## 2021-07-15 RX ADMIN — HEPARIN SODIUM (PORCINE) LOCK FLUSH IV SOLN 100 UNIT/ML 500 UNITS: 100 SOLUTION at 11:43

## 2021-07-15 NOTE — DISCHARGE INSTRUCTIONS

## 2021-07-20 NOTE — PROGRESS NOTES
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 She is here today for follow up and disease management.     Oncology History:      Patient initially presented as a 27-year-old woman seen in referral due to a Pap smear showing high-grade squamous intraepithelial lesion that was positive for high risk HPV subtype in 2007. This Pap smear had been taken during her six week postpartum visit. Patient did have a remote history of abnormal Pap smears back in  while serving in [place] for the [employer].  Pap smears during her first pregnancy were all negative. Her Pap smear at the beginning of the most recent pregnancy was normal. Patient did have a colposcopy for evaluation of this abnormal Pap smear on 2007 and cervical biopsies of 6 and 12 o'clock position showed features consistent with papillary serous adenocarcinoma. Endocervical curettings were also taken which showed fragments of papillary serous carcinoma. The patient then underwent a LEEP procedure on 10/15/07 which showed microinvasive adenocarcinoma with a depth of invasion of 0.4 mm. Patient then made the decision to proceed with radical hysterectomy. She subsequently underwent a radical hysterectomy, bilateral salpingectomy, bilateral pelvic and periaortic lymph node dissection on 10/19/07. Patient's operative course was otherwise uncomplicated and she recoverred uneventfully.  Pathology did reveal patient to be stage IA2 adenocarcinoma of the cervix.      She underwent routine surveillence through  complicated only by SIOBHAN 1     She recently presented after incidentally being found to have an enlarged pericaval lymph node.  She presented to the ED at [facility] on 2019 with severe 8.5/10  RUQ pain associated with nausea, one episode of emesis, and decreased appetite. RUQ demonstrated cholelithiasis without cholecystitis, so an abdominal and pelvic CT scan was obtained. This revealed post-surgical changes consistent with prior hysterectomy, left adnexal cysts thought to be ovarian, and an enlarged pericaval lymph node suspicious for metastatic disease vs primary lymphoma. Her condition stabilized and she was discharged home with referrals to general surgery and oncology for biliary colic and further management of the lymphadenopathy, respectively. CT-guided right retroperitoneal lymph node biopsy on 10/15/2019 was consistent with metastatic cervical carcinoma     She underwent surgery to remove the mass and determine the extent of disease on 11/13/19     PATH:  FINAL DIAGNOSIS:   LYMPH NODES, PARA-AORTIC, RESECTION:   - Positive for carcinoma in three of four lymph nodes examined (3/4),   consistent with recurrent endocervical   adenocarcinoma   - Largest metastatic focus is 3.5 cm, positive for extra yuridia extension         Chemo-Potentiated RT 12/2019-1/2020 2/2020 CT:  IMPRESSION: Postoperative changes of hysterectomy and lymph node  resection for cervical cancer. The metastatic right pelvic lymph nodes  which were previously identified have decreased in size. No new  metastases identified.      She recently presented to the ED for an acute RUQ pain episode, which has greatly improved despite no clear diagnosis.  During this visit she underwent a CAP CT which did not demonstrate evidence of cancer but a slightly enlarged spleen. She had a mild eosinophilia (~2%) but has not traveled or eaten exotic food recently, and has not had any new contacts to suggest mono.      10/13/2020 CT:  IMPRESSION:  1.  Recurrent right external iliac lymphadenopathy.  2.  Rebound thymic hyperplasia.     10/30/2020 PET  IMPRESSION:   In this patient with history of metastatic cervical cancer status-post  radical  hysterectomy and chemoradiation, there is evidence of  recurrent disease:  1. Increased size of a hypermetabolic right external iliac chain lymph node since 2/27/2020.  2. Hypermetabolic right subpectoral and axillary lymph nodes.  Recommend follow-up in the breast center for axillary ultrasound and  possible tissue sampling.  3. Idiopathic thymic activation/hyperplasia.     She was evaluated in the breast clinic with plan made for biopsy - however at biopsy the node had shruck considerably and was thus felt to be c/w inflammation (no biopsy obtained)     3/8/2021 MRI Brain (for dizziness)  IMPRESSION:  1. Heterogeneous mixed solid and cystic 2.7 cm mass in the right cerebellum, most likely a solitary metastatic lesion. There is moderate surrounding vasogenic edema as well as mass effect on the fourth ventricle. No obstructive hydrocephalus.    2. Unremarkable MR angiogram of the head and neck.     3/15/2021 Craniotomy ([facility])  1. Midline suboccipital craniotomy for resection of mass  2. Right frontal ventriculostomy  3. Stereotactic navigation  4. Use of operating microscope     4/16/21 Completed 5 fx of gamma knife ([employer])     Plan: Paclitaxel, Cisplatin, Avastin     4/28/2021: Cycle #1 Paclitaxel, Cisplatin, Avastin  5/19/2021: Cycle #2 Paclitaxel/Cisplatin/Avastin +Neulasta  6/9/2021: Cycle #3 Paclitaxel/Cisplatin/Avastin +Neulasta  6/30/2021: Cycle #4 Paclitaxel/Cisplatin/Avastin +Neulasta    6/28/2021 plan: PET scan for interval assessment.  Will plan for 6 cycles followed by possible use of Keytruda versus observation.     7/15/2021: PET CT: IMPRESSION: In this patient with history of cervical cancer who  completed chemoradiation:  1. Resolved uptake to the right pelvic lymph node, consistent with  response to treatment.  2. No new hypermetabolic lesions.  3. No definite abnormal intracranial uptake. Please note that PET-CT  is less sensitive for intracranial disease due to normal uptake to the  brain.  Consider continued following of the previously seen lesions and  surgical changes with MRI if clinically indicated.      7/21/2021: Cycle #5 Paclitaxel/Cisplatin/Avastin +Neulasta        Today Francesca comes to the clinic overall feeling well. She states that her nausea is controlled with her current regimen but she will have nausea days 2-3 after chemo. She usually throws up twice two days after chemotherapy and then resolves. She is drinking about 2-3 L of water per day. Her nausea after treatments is controlled with nightly Zyprexa for a few days post chemo, Decadron days 2-4 and Zofran started 72 hours after treatment. She does not have any baseline neuropathy or hearing loss. She does have new neuropathy in the bottom of feet intermittent that lasted a few days after cycle 2. No neuropathy in her hands. She does notice tingling for fews days after chemotherapy and then almost resolves. She is taking Vitamin B6 and L-glutamine started around cycle 4 and has notice improvement with this. Fatigue is ongoing possible worsened over the last two cycles. She is sleeping well at night and is taking Melatonin to help fall asleep easier. She does struggle falling asleep for a few hours. She is not exercising. She did not have any myalgias with Neulasta. Is taking Claritin for 7 days post. She does have diarrhea for about a week after chemotherapy (at most five episodes per day). She will have constipation after this. She denies any vaginal bleeding, no changes in her bladder habits, no lower extremity edema, and no difficulties eating. She denies any abdominal discomfort/bloating, no fevers or chills, and no chest pain or shortness of breath.           Review of Systems:    Systemic           no weight changes; no fever; no chills; no night sweats; no appetite changes  Skin           no rashes, or lesions  Eye           no irritation; no changes in vision  Payton-Laryngeal           no dysphagia; no hoarseness   Pulmonary     no cough; no shortness of breath  Cardiovascular    no chest pain; no palpitations  Gastrointestinal    + diarrhea; + constipation; no abdominal pain; no changes in bowel  habits; no blood in stool  Genitourinary   no urinary frequency; no urinary urgency; no dysuria; no pain; no abnormal vaginal discharge; no abnormal vaginal bleeding  Breast    no breast discharge; no breast changes; no breast pain  Musculoskeletal    no myalgias; no arthralgias; no back pain  Psychiatric           no depressed mood; no anxiety    Hematologic           no tender lymph nodes; no noticeable swellings or lumps   Endocrine    no hot flashes; no heat/cold intolerance         Neurological   no tremor; + numbness and tingling; no headaches; no difficulty  sleeping      Past Medical History:    Past Medical History:   Diagnosis Date     Chronic cholecystitis 09/25/2019     History of cervical cancer 2007     Metastasis to brain (H) 03/08/2021     Metastatic adenocarcinoma to cervix (H) 10/28/2019    Added automatically from request for surgery [id number]         Past Surgical History:    Past Surgical History:   Procedure Laterality Date     CHOLECYSTECTOMY, LAPOROSCOPIC  09/25/2019     HYSTERECTOMY RADICAL  2007    PAUL     INSERT PORT VASCULAR ACCESS Right 5/5/2021    Procedure: INSERTION, VASCULAR ACCESS PORT;  Surgeon: Oral Toledo MD;  [place] [facility] [place]     IR CHEST PORT PLACEMENT > 5 YRS OF AGE  5/5/2021     LAPAROSCOPIC LYMPHADENECTOMY N/A 11/13/2019    Procedure: LAPAROSCOPY, resection of of paraaortic lymph node, lysis of adhesions;  Surgeon: Jluis Canela MD;  Location: [facility] OR     OPTICAL TRACKING SYSTEM CRANIOTOMY, EXCISE TUMOR, COMBINED Bilateral 3/15/2021    Procedure: stealth assisted Midline suboccipital craniectomy/craniotomy for tumor;  Surgeon: Martín Myrick MD;  [facility]     OPTICAL TRACKING SYSTEM VENTRICULOSTOMY Right 3/15/2021    Procedure: stealth assisted  right frontal  "ventriculostomy;  Surgeon: Martín Myrick MD;  Location: [facility] OR         Health Maintenance Due   Topic Date Due     PREVENTIVE CARE VISIT  Never done     ADVANCE CARE PLANNING  Never done     DEPRESSION ACTION PLAN  Never done     PHQ-9  Never done     Pneumococcal Vaccine: Pediatrics (0 to 5 Years) and At-Risk Patients (6 to 64 Years) (1 of 4 - PCV13) Never done     HIV SCREENING  Never done     HEPATITIS C SCREENING  Never done     PAP  07/14/2017       Current Medications:     Current Outpatient Medications   Medication Sig Dispense Refill     acetaminophen (TYLENOL) 325 MG tablet Take 2 tablets (650 mg) by mouth every 6 hours (Patient not taking: Reported on 6/9/2021) 24 tablet 0     Ascorbic Acid (VITAMIN C GUMMIE PO) Take by mouth daily       dexamethasone (DECADRON) 4 MG tablet Take 2 tablets (8 mg) by mouth daily (with breakfast) Take 2 tablets by mouth with food on days 2-4 after chemotherapy. 12 tablet 3     ibuprofen (ADVIL/MOTRIN) 200 MG tablet Take 1 tablet (200 mg) by mouth every 4 hours as needed for mild pain       lidocaine-prilocaine (EMLA) 2.5-2.5 % external cream Apply topically as needed for moderate pain (Patient not taking: Reported on 6/28/2021) 30 g 1     LORazepam (ATIVAN) 0.5 MG tablet Take 1 tablet (0.5 mg) by mouth every 6 hours as needed for nausea (Patient not taking: Reported on 6/28/2021) 20 tablet 0     multivitamin w/minerals (MULTI-VITAMIN) tablet Take 1 tablet by mouth daily       OLANZapine (ZYPREXA) 5 MG tablet Take 1 tablet (5 mg) by mouth At Bedtime 30 tablet 0         Allergies:        Allergies   Allergen Reactions     Emend [Aprepitant]      Prochlorperazine      \"i was told I turn blue\"        Social History:     Social History     Tobacco Use     Smoking status: Former Smoker     Types: Cigarettes     Smokeless tobacco: Never Used   Substance Use Topics     Alcohol use: Yes     Comment: one drink a week       History   Drug Use No         Family History:     The " patient's family history is notable for     Family History   Problem Relation Age of Onset     Aneurysm Mother      Breast Cancer Paternal Grandmother         bilat mastectomies     Breast Cancer Maternal Aunt         stage 1     Thyroid Cancer Sister 23         Physical Exam:     /86   Pulse 70   Temp 97.7  F (36.5  C) (Oral)   Resp 16   Wt 106.4 kg (234 lb 8 oz)   SpO2 99%   BMI 38.60 kg/m    Body mass index is 38.6 kg/m .    General Appearance: healthy and alert, no distress     HEENT: no thyromegaly, no palpable nodules or masses        Cardiovascular: regular rate and rhythm, no gallops, rubs or murmurs     Respiratory: lungs clear, no rales, rhonchi or wheezes    Musculoskeletal: extremities non tender and without edema    Skin: no lesions or rashes     Neurological: normal gait, no gross defects     Psychiatric: appropriate mood and affect                               Hematological: normal cervical, supraclavicular lymph nodes     Gastrointestinal:       abdomen soft, non-tender, non-distended, no organomegaly or masses    Genitourinary: deferred      Assessment:    Francesca Rowland is a 40 year old woman with a diagnosis of 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 She is here today for follow up and disease management.     30 minutes spent on the date of the encounter doing chart review, history and exam, documentation, and further activities as noted above.        Plan:     1.)         Ok to proceed with planned treatment given labs have met parameters. Labs and vitals reviewed. Encouraged 5-6 small high protein meals a day and discussed nausea management and control. Encouraged 64 oz of fluids per day. Will refer to Cancer Rehab for ongoing fatigue and sleep disturbances.  Discussed neutropenic precautions and nito period. Reviewed signs and symptoms for when she should contact the clinic or seek additional care. Patient to contact  the clinic with any questions or concerns in the interim.     2.)  Genetic risk factors were assessed again and the patient has multiple family members with cancer diagnosed younger than age 50 including herself (age 40, but with likely HPV related disease) and her sister (thyroid cancer at age 23) among others. She met with genetics 1/2021.    3.) Labs and/or tests ordered include: CBC, CMP, Mg, Protein Urine (reviewed during visit).     4.)   Health maintenance issues addressed today include annual health maintenance and non-gynecologic issues with PCP. Encouraged follow up with PCP for GERD symptoms. Will trial Pepcid today.     5.)        Chemotherapy induced peripheral neuropathy: controlled and not worsening per patient. Continue vitamin B6 50 mg BID, L-glutamine 2,000 mg BID, exercise, and massage.  As these symptoms are limited to her bottom of feet and are intermitttent and have not been impacting her function do not need to adjust dosing or her treatment plan at this point. Encouraged her to contact the clinic if she notices her symptoms worsening or spreading. Discussed option of changing to Taxotere if needed. Refer to Cancer Rehab.     6.)         Diarrhea: occurs for about one week post chemotherapy. Long discussion regarding imodium use and rx sent. Encouraged Pedialyte and water intake 2 L per day.     7.)         Constipation: Senna rx sent and bowel management discussed with patient today in clinic.      8.)          Rx: Imodium, Senna, Pepcid     9.)          Referrals: Cancer Rehab      MIRA Heart, KRISTA-BC  Women's Health Nurse Practitioner  Division of Gynecologic Oncology  [facility]      CC  Patient Care Team:  Tyrese Bowie as PCP - General (Family Medicine)  Tawana Rivera RN as Specialty Care Coordinator (Gynecologic Oncology)  Jluis Canela MD as MD (Gynecologic Oncology)  Martín Myrick MD as Assigned Neuroscience  Provider  Jluis Canela MD as Assigned Cancer Care Provider

## 2021-07-21 ENCOUNTER — ONCOLOGY VISIT (OUTPATIENT)
Dept: ONCOLOGY | Facility: CLINIC | Age: 41
End: 2021-07-21
Attending: OBSTETRICS & GYNECOLOGY
Payer: COMMERCIAL

## 2021-07-21 ENCOUNTER — APPOINTMENT (OUTPATIENT)
Dept: LAB | Facility: CLINIC | Age: 41
End: 2021-07-21
Attending: OBSTETRICS & GYNECOLOGY
Payer: COMMERCIAL

## 2021-07-21 VITALS
TEMPERATURE: 97.7 F | BODY MASS INDEX: 38.6 KG/M2 | WEIGHT: 234.5 LBS | DIASTOLIC BLOOD PRESSURE: 86 MMHG | HEART RATE: 70 BPM | SYSTOLIC BLOOD PRESSURE: 127 MMHG | OXYGEN SATURATION: 99 % | RESPIRATION RATE: 16 BRPM

## 2021-07-21 DIAGNOSIS — R19.7 DIARRHEA, UNSPECIFIED TYPE: ICD-10-CM

## 2021-07-21 DIAGNOSIS — Z51.11 ENCOUNTER FOR ANTINEOPLASTIC CHEMOTHERAPY: ICD-10-CM

## 2021-07-21 DIAGNOSIS — C79.82 METASTATIC ADENOCARCINOMA TO CERVIX (H): ICD-10-CM

## 2021-07-21 DIAGNOSIS — R53.81 PHYSICAL DECONDITIONING: ICD-10-CM

## 2021-07-21 DIAGNOSIS — K59.03 DRUG-INDUCED CONSTIPATION: ICD-10-CM

## 2021-07-21 DIAGNOSIS — R11.2 CHEMOTHERAPY INDUCED NAUSEA AND VOMITING: ICD-10-CM

## 2021-07-21 DIAGNOSIS — K21.00 GASTROESOPHAGEAL REFLUX DISEASE WITH ESOPHAGITIS WITHOUT HEMORRHAGE: ICD-10-CM

## 2021-07-21 DIAGNOSIS — C53.9 MALIGNANT NEOPLASM OF CERVIX, UNSPECIFIED SITE (H): ICD-10-CM

## 2021-07-21 DIAGNOSIS — C53.9 MALIGNANT NEOPLASM OF CERVIX, UNSPECIFIED SITE (H): Primary | ICD-10-CM

## 2021-07-21 DIAGNOSIS — T45.1X5A CHEMOTHERAPY-INDUCED NEUROPATHY (H): ICD-10-CM

## 2021-07-21 DIAGNOSIS — D70.1 CHEMOTHERAPY INDUCED NEUTROPENIA (H): ICD-10-CM

## 2021-07-21 DIAGNOSIS — C79.31 BRAIN METASTASIS: ICD-10-CM

## 2021-07-21 DIAGNOSIS — G62.0 CHEMOTHERAPY-INDUCED NEUROPATHY (H): ICD-10-CM

## 2021-07-21 DIAGNOSIS — T45.1X5A CHEMOTHERAPY INDUCED NAUSEA AND VOMITING: Primary | ICD-10-CM

## 2021-07-21 DIAGNOSIS — T45.1X5A CHEMOTHERAPY INDUCED NAUSEA AND VOMITING: ICD-10-CM

## 2021-07-21 DIAGNOSIS — T45.1X5A CHEMOTHERAPY INDUCED NEUTROPENIA (H): ICD-10-CM

## 2021-07-21 DIAGNOSIS — R11.2 CHEMOTHERAPY INDUCED NAUSEA AND VOMITING: Primary | ICD-10-CM

## 2021-07-21 LAB
ALBUMIN SERPL-MCNC: 3.3 G/DL (ref 3.4–5)
ALBUMIN UR-MCNC: NEGATIVE MG/DL
ALP SERPL-CCNC: 101 U/L (ref 40–150)
ALT SERPL W P-5'-P-CCNC: 21 U/L (ref 0–50)
ANION GAP SERPL CALCULATED.3IONS-SCNC: 7 MMOL/L (ref 3–14)
AST SERPL W P-5'-P-CCNC: 12 U/L (ref 0–45)
BASOPHILS # BLD AUTO: 0 10E3/UL (ref 0–0.2)
BASOPHILS NFR BLD AUTO: 1 %
BILIRUB SERPL-MCNC: 0.3 MG/DL (ref 0.2–1.3)
BUN SERPL-MCNC: 14 MG/DL (ref 7–30)
CALCIUM SERPL-MCNC: 8.9 MG/DL (ref 8.5–10.1)
CHLORIDE BLD-SCNC: 107 MMOL/L (ref 94–109)
CO2 SERPL-SCNC: 28 MMOL/L (ref 20–32)
CREAT SERPL-MCNC: 0.82 MG/DL (ref 0.52–1.04)
EOSINOPHIL # BLD AUTO: 0.2 10E3/UL (ref 0–0.7)
EOSINOPHIL NFR BLD AUTO: 5 %
ERYTHROCYTE [DISTWIDTH] IN BLOOD BY AUTOMATED COUNT: 17.6 % (ref 10–15)
GFR SERPL CREATININE-BSD FRML MDRD: 89 ML/MIN/1.73M2
GLUCOSE BLD-MCNC: 93 MG/DL (ref 70–99)
HCT VFR BLD AUTO: 32.9 % (ref 35–47)
HGB BLD-MCNC: 10.8 G/DL (ref 11.7–15.7)
IMM GRANULOCYTES # BLD: 0 10E3/UL
IMM GRANULOCYTES NFR BLD: 0 %
LYMPHOCYTES # BLD AUTO: 0.8 10E3/UL (ref 0.8–5.3)
LYMPHOCYTES NFR BLD AUTO: 22 %
MAGNESIUM SERPL-MCNC: 1.6 MG/DL (ref 1.6–2.3)
MCH RBC QN AUTO: 31 PG (ref 26.5–33)
MCHC RBC AUTO-ENTMCNC: 32.8 G/DL (ref 31.5–36.5)
MCV RBC AUTO: 95 FL (ref 78–100)
MONOCYTES # BLD AUTO: 0.5 10E3/UL (ref 0–1.3)
MONOCYTES NFR BLD AUTO: 14 %
NEUTROPHILS # BLD AUTO: 2.2 10E3/UL (ref 1.6–8.3)
NEUTROPHILS NFR BLD AUTO: 58 %
NRBC # BLD AUTO: 0 10E3/UL
NRBC BLD AUTO-RTO: 0 /100
PLATELET # BLD AUTO: 220 10E3/UL (ref 150–450)
POTASSIUM BLD-SCNC: 4.1 MMOL/L (ref 3.4–5.3)
PROT SERPL-MCNC: 7.3 G/DL (ref 6.8–8.8)
RBC # BLD AUTO: 3.48 10E6/UL (ref 3.8–5.2)
SODIUM SERPL-SCNC: 142 MMOL/L (ref 133–144)
WBC # BLD AUTO: 3.8 10E3/UL (ref 4–11)

## 2021-07-21 PROCEDURE — 80053 COMPREHEN METABOLIC PANEL: CPT | Performed by: OBSTETRICS & GYNECOLOGY

## 2021-07-21 PROCEDURE — 96415 CHEMO IV INFUSION ADDL HR: CPT

## 2021-07-21 PROCEDURE — G0463 HOSPITAL OUTPT CLINIC VISIT: HCPCS

## 2021-07-21 PROCEDURE — 96375 TX/PRO/DX INJ NEW DRUG ADDON: CPT

## 2021-07-21 PROCEDURE — 96417 CHEMO IV INFUS EACH ADDL SEQ: CPT

## 2021-07-21 PROCEDURE — 96413 CHEMO IV INFUSION 1 HR: CPT

## 2021-07-21 PROCEDURE — 36591 DRAW BLOOD OFF VENOUS DEVICE: CPT | Performed by: OBSTETRICS & GYNECOLOGY

## 2021-07-21 PROCEDURE — 258N000001 HC RX 258: Performed by: OBSTETRICS & GYNECOLOGY

## 2021-07-21 PROCEDURE — 96372 THER/PROPH/DIAG INJ SC/IM: CPT | Performed by: OBSTETRICS & GYNECOLOGY

## 2021-07-21 PROCEDURE — 81003 URINALYSIS AUTO W/O SCOPE: CPT | Performed by: OBSTETRICS & GYNECOLOGY

## 2021-07-21 PROCEDURE — 99214 OFFICE O/P EST MOD 30 MIN: CPT | Performed by: OBSTETRICS & GYNECOLOGY

## 2021-07-21 PROCEDURE — 96377 APPLICATON ON-BODY INJECTOR: CPT | Mod: 59

## 2021-07-21 PROCEDURE — 250N000011 HC RX IP 250 OP 636: Performed by: OBSTETRICS & GYNECOLOGY

## 2021-07-21 PROCEDURE — 85025 COMPLETE CBC W/AUTO DIFF WBC: CPT | Performed by: OBSTETRICS & GYNECOLOGY

## 2021-07-21 PROCEDURE — 83735 ASSAY OF MAGNESIUM: CPT | Performed by: OBSTETRICS & GYNECOLOGY

## 2021-07-21 PROCEDURE — 258N000003 HC RX IP 258 OP 636: Performed by: OBSTETRICS & GYNECOLOGY

## 2021-07-21 PROCEDURE — 250N000009 HC RX 250: Performed by: OBSTETRICS & GYNECOLOGY

## 2021-07-21 RX ORDER — DIPHENHYDRAMINE HCL 25 MG
50 CAPSULE ORAL ONCE
Status: CANCELLED
Start: 2021-07-21

## 2021-07-21 RX ORDER — MEPERIDINE HYDROCHLORIDE 25 MG/ML
25 INJECTION INTRAMUSCULAR; INTRAVENOUS; SUBCUTANEOUS EVERY 30 MIN PRN
Status: CANCELLED | OUTPATIENT
Start: 2021-07-21

## 2021-07-21 RX ORDER — HEPARIN SODIUM (PORCINE) LOCK FLUSH IV SOLN 100 UNIT/ML 100 UNIT/ML
5 SOLUTION INTRAVENOUS
Status: DISCONTINUED | OUTPATIENT
Start: 2021-07-21 | End: 2021-07-21 | Stop reason: HOSPADM

## 2021-07-21 RX ORDER — PALONOSETRON 0.05 MG/ML
0.25 INJECTION, SOLUTION INTRAVENOUS ONCE
Status: COMPLETED | OUTPATIENT
Start: 2021-07-21 | End: 2021-07-21

## 2021-07-21 RX ORDER — LORAZEPAM 2 MG/ML
0.5 INJECTION INTRAMUSCULAR EVERY 6 HOURS PRN
Status: CANCELLED | OUTPATIENT
Start: 2021-07-21

## 2021-07-21 RX ORDER — ALBUTEROL SULFATE 0.83 MG/ML
2.5 SOLUTION RESPIRATORY (INHALATION)
Status: CANCELLED | OUTPATIENT
Start: 2021-07-21

## 2021-07-21 RX ORDER — SENNA AND DOCUSATE SODIUM 50; 8.6 MG/1; MG/1
1 TABLET, FILM COATED ORAL AT BEDTIME
Qty: 30 TABLET | Refills: 3 | Status: SHIPPED | OUTPATIENT
Start: 2021-07-21

## 2021-07-21 RX ORDER — METHYLPREDNISOLONE SODIUM SUCCINATE 125 MG/2ML
125 INJECTION, POWDER, LYOPHILIZED, FOR SOLUTION INTRAMUSCULAR; INTRAVENOUS
Status: CANCELLED
Start: 2021-07-21

## 2021-07-21 RX ORDER — DEXTROSE, SODIUM CHLORIDE, AND POTASSIUM CHLORIDE 5; .45; .15 G/100ML; G/100ML; G/100ML
1000 INJECTION INTRAVENOUS ONCE
Status: COMPLETED | OUTPATIENT
Start: 2021-07-21 | End: 2021-07-21

## 2021-07-21 RX ORDER — SODIUM CHLORIDE 9 MG/ML
1000 INJECTION, SOLUTION INTRAVENOUS CONTINUOUS PRN
Status: CANCELLED
Start: 2021-07-21

## 2021-07-21 RX ORDER — ALBUTEROL SULFATE 90 UG/1
1-2 AEROSOL, METERED RESPIRATORY (INHALATION)
Status: CANCELLED
Start: 2021-07-21

## 2021-07-21 RX ORDER — LOPERAMIDE HYDROCHLORIDE 2 MG/1
2 TABLET ORAL 4 TIMES DAILY PRN
Qty: 60 TABLET | Refills: 3 | Status: SHIPPED | OUTPATIENT
Start: 2021-07-21

## 2021-07-21 RX ORDER — HEPARIN SODIUM (PORCINE) LOCK FLUSH IV SOLN 100 UNIT/ML 100 UNIT/ML
5 SOLUTION INTRAVENOUS ONCE
Status: COMPLETED | OUTPATIENT
Start: 2021-07-21 | End: 2021-07-21

## 2021-07-21 RX ORDER — NALOXONE HYDROCHLORIDE 0.4 MG/ML
.1-.4 INJECTION, SOLUTION INTRAMUSCULAR; INTRAVENOUS; SUBCUTANEOUS
Status: CANCELLED | OUTPATIENT
Start: 2021-07-21

## 2021-07-21 RX ORDER — FAMOTIDINE 10 MG
10 TABLET ORAL 2 TIMES DAILY
Qty: 60 TABLET | Refills: 1 | Status: SHIPPED | OUTPATIENT
Start: 2021-07-21 | End: 2021-08-18

## 2021-07-21 RX ORDER — DIPHENHYDRAMINE HYDROCHLORIDE 50 MG/ML
50 INJECTION INTRAMUSCULAR; INTRAVENOUS
Status: CANCELLED
Start: 2021-07-21

## 2021-07-21 RX ORDER — PALONOSETRON 0.05 MG/ML
0.25 INJECTION, SOLUTION INTRAVENOUS ONCE
Status: CANCELLED | OUTPATIENT
Start: 2021-07-21

## 2021-07-21 RX ORDER — EPINEPHRINE 1 MG/ML
0.3 INJECTION, SOLUTION INTRAMUSCULAR; SUBCUTANEOUS EVERY 5 MIN PRN
Status: CANCELLED | OUTPATIENT
Start: 2021-07-21

## 2021-07-21 RX ORDER — HEPARIN SODIUM,PORCINE 10 UNIT/ML
5 VIAL (ML) INTRAVENOUS
Status: CANCELLED | OUTPATIENT
Start: 2021-07-21

## 2021-07-21 RX ORDER — HEPARIN SODIUM (PORCINE) LOCK FLUSH IV SOLN 100 UNIT/ML 100 UNIT/ML
5 SOLUTION INTRAVENOUS
Status: CANCELLED | OUTPATIENT
Start: 2021-07-21

## 2021-07-21 RX ORDER — DEXTROSE, SODIUM CHLORIDE, AND POTASSIUM CHLORIDE 5; .45; .15 G/100ML; G/100ML; G/100ML
1000 INJECTION INTRAVENOUS ONCE
Status: CANCELLED | OUTPATIENT
Start: 2021-07-21

## 2021-07-21 RX ADMIN — FAMOTIDINE 40 MG: 10 INJECTION INTRAVENOUS at 09:50

## 2021-07-21 RX ADMIN — CISPLATIN 110 MG: 1 INJECTION, SOLUTION INTRAVENOUS at 13:39

## 2021-07-21 RX ADMIN — SODIUM CHLORIDE 1000 ML: 9 INJECTION, SOLUTION INTRAVENOUS at 09:46

## 2021-07-21 RX ADMIN — POTASSIUM CHLORIDE, DEXTROSE MONOHYDRATE AND SODIUM CHLORIDE 1000 ML: 150; 5; 450 INJECTION, SOLUTION INTRAVENOUS at 13:37

## 2021-07-21 RX ADMIN — PEGFILGRASTIM 6 MG: KIT SUBCUTANEOUS at 16:08

## 2021-07-21 RX ADMIN — PALONOSETRON HYDROCHLORIDE 0.25 MG: 0.25 INJECTION, SOLUTION INTRAVENOUS at 09:47

## 2021-07-21 RX ADMIN — PACLITAXEL 382 MG: 6 INJECTION, SOLUTION INTRAVENOUS at 10:32

## 2021-07-21 RX ADMIN — DEXAMETHASONE SODIUM PHOSPHATE 12 MG: 10 INJECTION, SOLUTION INTRAMUSCULAR; INTRAVENOUS at 09:46

## 2021-07-21 RX ADMIN — Medication 5 ML: at 16:11

## 2021-07-21 RX ADMIN — BEVACIZUMAB-AWWB 1500 MG: 400 INJECTION, SOLUTION INTRAVENOUS at 15:33

## 2021-07-21 RX ADMIN — DIPHENHYDRAMINE HYDROCHLORIDE 50 MG: 50 INJECTION, SOLUTION INTRAMUSCULAR; INTRAVENOUS at 10:04

## 2021-07-21 RX ADMIN — Medication 5 ML: at 08:32

## 2021-07-21 ASSESSMENT — PAIN SCALES - GENERAL: PAINLEVEL: NO PAIN (0)

## 2021-07-21 NOTE — NURSING NOTE
"Oncology Rooming Note    July 21, 2021 8:49 AM   Francesca Rowland is a 40 year old female who presents for:    Chief Complaint   Patient presents with     Port Draw     Labs drawn via port by RN in lab. VS taken.      Oncology Clinic Visit     Return; Metastatic adenocarcnoma to cervix      Initial Vitals: /86   Pulse 70   Temp 97.7  F (36.5  C) (Oral)   Resp 16   Wt 106.4 kg (234 lb 8 oz)   SpO2 99%   BMI 38.60 kg/m   Estimated body mass index is 38.6 kg/m  as calculated from the following:    Height as of 5/19/21: 1.66 m (5' 5.35\").    Weight as of this encounter: 106.4 kg (234 lb 8 oz). Body surface area is 2.21 meters squared.  No Pain (0) Comment: Data Unavailable   No LMP recorded. Patient has had a hysterectomy.  Allergies reviewed: Yes  Medications reviewed: Yes    Medications: Medication refills not needed today.  Pharmacy name entered into [employer]: [id number] - [place], [place] - [address]    Clinical concerns: No concerns.        Davida Craft,   (Student MA)        Patient's rooming completed by Antonio Chávez MA.      All steps completed per rooming protocol.    Radha Palmer CMA (AAMA)        "

## 2021-07-21 NOTE — PATIENT INSTRUCTIONS
Neulasta injection will start on 07/22 at 7:10 PM, approximately 27 hours after application applied today.   When the dose delivery starts, it will take about 45 minutes to complete.  You may remove the On-Body Neulasta on 07/22 at 8:10 PM.  Neulasta Onpro On-Body should have green flashing light.  Call triage or on-call MD if injector flashes red or appears to be leaking.  Keep Onpro On-Body Neulasta 4 inches away from electrical equipment and to avoid showering 4 hours prior to injection.     [facility] Triage Nurse Line: [phone]    Call triage nurse with chills and/or temperature greater than or equal to 100.4, uncontrolled nausea/vomiting, diarrhea, constipation, dizziness, shortness of breath, chest pain, bleeding, unexplained bruising, or any new/concerning symptoms, questions/concerns.     If you are having any concerning symptoms or wish to speak to a provider before your next infusion visit, please call your care coordinator or triage to notify them so we can adequately serve you.       Lab Results:  Recent Results (from the past 12 hour(s))   Comprehensive metabolic panel    Collection Time: 07/21/21  8:37 AM   Result Value Ref Range    Sodium 142 133 - 144 mmol/L    Potassium 4.1 3.4 - 5.3 mmol/L    Chloride 107 94 - 109 mmol/L    Carbon Dioxide (CO2) 28 20 - 32 mmol/L    Anion Gap 7 3 - 14 mmol/L    Urea Nitrogen 14 7 - 30 mg/dL    Creatinine 0.82 0.52 - 1.04 mg/dL    Calcium 8.9 8.5 - 10.1 mg/dL    Glucose 93 70 - 99 mg/dL    Alkaline Phosphatase 101 40 - 150 U/L    AST 12 0 - 45 U/L    ALT 21 0 - 50 U/L    Protein Total 7.3 6.8 - 8.8 g/dL    Albumin 3.3 (L) 3.4 - 5.0 g/dL    Bilirubin Total 0.3 0.2 - 1.3 mg/dL    GFR Estimate 89 >60 mL/min/1.73m2   Magnesium    Collection Time: 07/21/21  8:37 AM   Result Value Ref Range    Magnesium 1.6 1.6 - 2.3 mg/dL   CBC with platelets and differential    Collection Time: 07/21/21  8:37 AM   Result Value Ref Range    WBC Count 3.8 (L) 4.0 - 11.0  10e3/uL    RBC Count 3.48 (L) 3.80 - 5.20 10e6/uL    Hemoglobin 10.8 (L) 11.7 - 15.7 g/dL    Hematocrit 32.9 (L) 35.0 - 47.0 %    MCV 95 78 - 100 fL    MCH 31.0 26.5 - 33.0 pg    MCHC 32.8 31.5 - 36.5 g/dL    RDW 17.6 (H) 10.0 - 15.0 %    Platelet Count 220 150 - 450 10e3/uL    % Neutrophils 58 %    % Lymphocytes 22 %    % Monocytes 14 %    % Eosinophils 5 %    % Basophils 1 %    % Immature Granulocytes 0 %    NRBCs per 100 WBC 0 <1 /100    Absolute Neutrophils 2.2 1.6 - 8.3 10e3/uL    Absolute Lymphocytes 0.8 0.8 - 5.3 10e3/uL    Absolute Monocytes 0.5 0.0 - 1.3 10e3/uL    Absolute Eosinophils 0.2 0.0 - 0.7 10e3/uL    Absolute Basophils 0.0 0.0 - 0.2 10e3/uL    Absolute Immature Granulocytes 0.0 <=0.0 10e3/uL    Absolute NRBCs 0.0 10e3/uL   Protein qualitative urine    Collection Time: 07/21/21  8:41 AM   Result Value Ref Range    Protein Albumin Urine Negative Negative mg/dL

## 2021-07-21 NOTE — LETTER
2021         RE: Francesca Rowland  [address]  [place] [address]        Dear Colleague,    Thank you for referring your patient, Francesca Rowland, to the [facility]. Please see a copy of my visit note below.                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 She is here today for follow up and disease management.     Oncology History:      Patient initially presented as a 27-year-old woman seen in referral due to a Pap smear showing high-grade squamous intraepithelial lesion that was positive for high risk HPV subtype in 2007. This Pap smear had been taken during her six week postpartum visit. Patient did have a remote history of abnormal Pap smears back in  while serving in [place] for the [employer].  Pap smears during her first pregnancy were all negative. Her Pap smear at the beginning of the most recent pregnancy was normal. Patient did have a colposcopy for evaluation of this abnormal Pap smear on 2007 and cervical biopsies of 6 and 12 o'clock position showed features consistent with papillary serous adenocarcinoma. Endocervical curettings were also taken which showed fragments of papillary serous carcinoma. The patient then underwent a LEEP procedure on 10/15/07 which showed microinvasive adenocarcinoma with a depth of invasion of 0.4 mm. Patient then made the decision to proceed with radical hysterectomy. She subsequently underwent a radical hysterectomy, bilateral salpingectomy, bilateral pelvic and periaortic lymph node dissection on 10/19/07. Patient's operative course was otherwise uncomplicated and she recoverred uneventfully.  Pathology did reveal patient to be stage IA2 adenocarcinoma of the  cervix.      She underwent routine surveillence through 2014 complicated only by SIOBHAN 1     She recently presented after incidentally being found to have an enlarged pericaval lymph node.  She presented to the ED at [facility] on 9/14/2019 with severe 8.5/10 RUQ pain associated with nausea, one episode of emesis, and decreased appetite. RUQ demonstrated cholelithiasis without cholecystitis, so an abdominal and pelvic CT scan was obtained. This revealed post-surgical changes consistent with prior hysterectomy, left adnexal cysts thought to be ovarian, and an enlarged pericaval lymph node suspicious for metastatic disease vs primary lymphoma. Her condition stabilized and she was discharged home with referrals to general surgery and oncology for biliary colic and further management of the lymphadenopathy, respectively. CT-guided right retroperitoneal lymph node biopsy on 10/15/2019 was consistent with metastatic cervical carcinoma     She underwent surgery to remove the mass and determine the extent of disease on 11/13/19     PATH:  FINAL DIAGNOSIS:   LYMPH NODES, PARA-AORTIC, RESECTION:   - Positive for carcinoma in three of four lymph nodes examined (3/4),   consistent with recurrent endocervical   adenocarcinoma   - Largest metastatic focus is 3.5 cm, positive for extra yuridia extension         Chemo-Potentiated RT 12/2019-1/2020 2/2020 CT:  IMPRESSION: Postoperative changes of hysterectomy and lymph node  resection for cervical cancer. The metastatic right pelvic lymph nodes  which were previously identified have decreased in size. No new  metastases identified.      She recently presented to the ED for an acute RUQ pain episode, which has greatly improved despite no clear diagnosis.  During this visit she underwent a CAP CT which did not demonstrate evidence of cancer but a slightly enlarged spleen. She had a mild eosinophilia (~2%) but has not traveled or eaten exotic food recently, and has not had any new  contacts to suggest mono.      10/13/2020 CT:  IMPRESSION:  1.  Recurrent right external iliac lymphadenopathy.  2.  Rebound thymic hyperplasia.     10/30/2020 PET  IMPRESSION:   In this patient with history of metastatic cervical cancer status-post  radical hysterectomy and chemoradiation, there is evidence of  recurrent disease:  1. Increased size of a hypermetabolic right external iliac chain lymph node since 2/27/2020.  2. Hypermetabolic right subpectoral and axillary lymph nodes.  Recommend follow-up in the breast center for axillary ultrasound and  possible tissue sampling.  3. Idiopathic thymic activation/hyperplasia.     She was evaluated in the breast clinic with plan made for biopsy - however at biopsy the node had shruck considerably and was thus felt to be c/w inflammation (no biopsy obtained)     3/8/2021 MRI Brain (for dizziness)  IMPRESSION:  1. Heterogeneous mixed solid and cystic 2.7 cm mass in the right cerebellum, most likely a solitary metastatic lesion. There is moderate surrounding vasogenic edema as well as mass effect on the fourth ventricle. No obstructive hydrocephalus.    2. Unremarkable MR angiogram of the head and neck.     3/15/2021 Craniotomy (Elen)  1. Midline suboccipital craniotomy for resection of mass  2. Right frontal ventriculostomy  3. Stereotactic navigation  4. Use of operating microscope     4/16/21 Completed 5 fx of gamma knife ([employer])     Plan: Paclitaxel, Cisplatin, Avastin     4/28/2021: Cycle #1 Paclitaxel, Cisplatin, Avastin  5/19/2021: Cycle #2 Paclitaxel/Cisplatin/Avastin +Neulasta  6/9/2021: Cycle #3 Paclitaxel/Cisplatin/Avastin +Neulasta  6/30/2021: Cycle #4 Paclitaxel/Cisplatin/Avastin +Neulasta    6/28/2021 plan: PET scan for interval assessment.  Will plan for 6 cycles followed by possible use of Keytruda versus observation.     7/15/2021: PET CT: IMPRESSION: In this patient with history of cervical cancer who  completed chemoradiation:  1. Resolved uptake  to the right pelvic lymph node, consistent with  response to treatment.  2. No new hypermetabolic lesions.  3. No definite abnormal intracranial uptake. Please note that PET-CT  is less sensitive for intracranial disease due to normal uptake to the  brain. Consider continued following of the previously seen lesions and  surgical changes with MRI if clinically indicated.      7/21/2021: Cycle #5 Paclitaxel/Cisplatin/Avastin +Neulasta        Today Francesca comes to the clinic overall feeling well. She states that her nausea is controlled with her current regimen but she will have nausea days 2-3 after chemo. She usually throws up twice two days after chemotherapy and then resolves. She is drinking about 2-3 L of water per day. Her nausea after treatments is controlled with nightly Zyprexa for a few days post chemo, Decadron days 2-4 and Zofran started 72 hours after treatment. She does not have any baseline neuropathy or hearing loss. She does have new neuropathy in the bottom of feet intermittent that lasted a few days after cycle 2. No neuropathy in her hands. She does notice tingling for fews days after chemotherapy and then almost resolves. She is taking Vitamin B6 and L-glutamine started around cycle 4 and has notice improvement with this. Fatigue is ongoing possible worsened over the last two cycles. She is sleeping well at night and is taking Melatonin to help fall asleep easier. She does struggle falling asleep for a few hours. She is not exercising. She did not have any myalgias with Neulasta. Is taking Claritin for 7 days post. She does have diarrhea for about a week after chemotherapy (at most five episodes per day). She will have constipation after this. She denies any vaginal bleeding, no changes in her bladder habits, no lower extremity edema, and no difficulties eating. She denies any abdominal discomfort/bloating, no fevers or chills, and no chest pain or shortness of breath.           Review of  Systems:    Systemic           no weight changes; no fever; no chills; no night sweats; no appetite changes  Skin           no rashes, or lesions  Eye           no irritation; no changes in vision  Payton-Laryngeal           no dysphagia; no hoarseness   Pulmonary    no cough; no shortness of breath  Cardiovascular    no chest pain; no palpitations  Gastrointestinal    + diarrhea; + constipation; no abdominal pain; no changes in bowel  habits; no blood in stool  Genitourinary   no urinary frequency; no urinary urgency; no dysuria; no pain; no abnormal vaginal discharge; no abnormal vaginal bleeding  Breast    no breast discharge; no breast changes; no breast pain  Musculoskeletal    no myalgias; no arthralgias; no back pain  Psychiatric           no depressed mood; no anxiety    Hematologic           no tender lymph nodes; no noticeable swellings or lumps   Endocrine    no hot flashes; no heat/cold intolerance         Neurological   no tremor; + numbness and tingling; no headaches; no difficulty  sleeping      Past Medical History:    Past Medical History:   Diagnosis Date     Chronic cholecystitis 09/25/2019     History of cervical cancer 2007     Metastasis to brain (H) 03/08/2021     Metastatic adenocarcinoma to cervix (H) 10/28/2019    Added automatically from request for surgery [id number]         Past Surgical History:    Past Surgical History:   Procedure Laterality Date     CHOLECYSTECTOMY, LAPOROSCOPIC  09/25/2019     HYSTERECTOMY RADICAL  2007    PAUL     INSERT PORT VASCULAR ACCESS Right 5/5/2021    Procedure: INSERTION, VASCULAR ACCESS PORT;  Surgeon: Oral Toledo MD;  [place] [facility] [place]     IR CHEST PORT PLACEMENT > 5 YRS OF AGE  5/5/2021     LAPAROSCOPIC LYMPHADENECTOMY N/A 11/13/2019    Procedure: LAPAROSCOPY, resection of of paraaortic lymph node, lysis of adhesions;  Surgeon: Jluis Canela MD;  Location:  OR     FST21 SYSTEM CRANIOTOMY, EXCISE TUMOR, COMBINED Bilateral  "3/15/2021    Procedure: stealth assisted Midline suboccipital craniectomy/craniotomy for tumor;  Surgeon: Martín Myrick MD;  Location: [facility] OR     OPTICAL TRACKING SYSTEM VENTRICULOSTOMY Right 3/15/2021    Procedure: stealth assisted  right frontal ventriculostomy;  Surgeon: Martín Myrick MD;  Location: [facility] OR         Health Maintenance Due   Topic Date Due     PREVENTIVE CARE VISIT  Never done     ADVANCE CARE PLANNING  Never done     DEPRESSION ACTION PLAN  Never done     PHQ-9  Never done     Pneumococcal Vaccine: Pediatrics (0 to 5 Years) and At-Risk Patients (6 to 64 Years) (1 of 4 - PCV13) Never done     HIV SCREENING  Never done     HEPATITIS C SCREENING  Never done     PAP  07/14/2017       Current Medications:     Current Outpatient Medications   Medication Sig Dispense Refill     acetaminophen (TYLENOL) 325 MG tablet Take 2 tablets (650 mg) by mouth every 6 hours (Patient not taking: Reported on 6/9/2021) 24 tablet 0     Ascorbic Acid (VITAMIN C GUMMIE PO) Take by mouth daily       dexamethasone (DECADRON) 4 MG tablet Take 2 tablets (8 mg) by mouth daily (with breakfast) Take 2 tablets by mouth with food on days 2-4 after chemotherapy. 12 tablet 3     ibuprofen (ADVIL/MOTRIN) 200 MG tablet Take 1 tablet (200 mg) by mouth every 4 hours as needed for mild pain       lidocaine-prilocaine (EMLA) 2.5-2.5 % external cream Apply topically as needed for moderate pain (Patient not taking: Reported on 6/28/2021) 30 g 1     LORazepam (ATIVAN) 0.5 MG tablet Take 1 tablet (0.5 mg) by mouth every 6 hours as needed for nausea (Patient not taking: Reported on 6/28/2021) 20 tablet 0     multivitamin w/minerals (MULTI-VITAMIN) tablet Take 1 tablet by mouth daily       OLANZapine (ZYPREXA) 5 MG tablet Take 1 tablet (5 mg) by mouth At Bedtime 30 tablet 0         Allergies:        Allergies   Allergen Reactions     Emend [Aprepitant]      Prochlorperazine      \"i was told I turn blue\"        Social " History:     Social History     Tobacco Use     Smoking status: Former Smoker     Types: Cigarettes     Smokeless tobacco: Never Used   Substance Use Topics     Alcohol use: Yes     Comment: one drink a week       History   Drug Use No         Family History:     The patient's family history is notable for     Family History   Problem Relation Age of Onset     Aneurysm Mother      Breast Cancer Paternal Grandmother         bilat mastectomies     Breast Cancer Maternal Aunt         stage 1     Thyroid Cancer Sister 23         Physical Exam:     /86   Pulse 70   Temp 97.7  F (36.5  C) (Oral)   Resp 16   Wt 106.4 kg (234 lb 8 oz)   SpO2 99%   BMI 38.60 kg/m    Body mass index is 38.6 kg/m .    General Appearance: healthy and alert, no distress     HEENT: no thyromegaly, no palpable nodules or masses        Cardiovascular: regular rate and rhythm, no gallops, rubs or murmurs     Respiratory: lungs clear, no rales, rhonchi or wheezes    Musculoskeletal: extremities non tender and without edema    Skin: no lesions or rashes     Neurological: normal gait, no gross defects     Psychiatric: appropriate mood and affect                               Hematological: normal cervical, supraclavicular lymph nodes     Gastrointestinal:       abdomen soft, non-tender, non-distended, no organomegaly or masses    Genitourinary: deferred      Assessment:    Francesca Rowland is a 40 year old woman with a diagnosis of 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 She is here today for follow up and disease management.     30 minutes spent on the date of the encounter doing chart review, history and exam, documentation, and further activities as noted above.        Plan:     1.)         Ok to proceed with planned treatment given labs have met parameters. Labs and vitals reviewed. Encouraged 5-6 small high protein meals a day and discussed nausea management and control.  Encouraged 64 oz of fluids per day. Will refer to Cancer Rehab for ongoing fatigue and sleep disturbances.  Discussed neutropenic precautions and nito period. Reviewed signs and symptoms for when she should contact the clinic or seek additional care. Patient to contact the clinic with any questions or concerns in the interim.     2.)  Genetic risk factors were assessed again and the patient has multiple family members with cancer diagnosed younger than age 50 including herself (age 40, but with likely HPV related disease) and her sister (thyroid cancer at age 23) among others. She met with genetics 1/2021.    3.) Labs and/or tests ordered include: CBC, CMP, Mg, Protein Urine (reviewed during visit).     4.)   Health maintenance issues addressed today include annual health maintenance and non-gynecologic issues with PCP. Encouraged follow up with PCP for GERD symptoms. Will trial Pepcid today.     5.)        Chemotherapy induced peripheral neuropathy: controlled and not worsening per patient. Continue vitamin B6 50 mg BID, L-glutamine 2,000 mg BID, exercise, and massage.  As these symptoms are limited to her bottom of feet and are intermitttent and have not been impacting her function do not need to adjust dosing or her treatment plan at this point. Encouraged her to contact the clinic if she notices her symptoms worsening or spreading. Discussed option of changing to Taxotere if needed. Refer to Cancer Rehab.     6.)         Diarrhea: occurs for about one week post chemotherapy. Long discussion regarding imodium use and rx sent. Encouraged Pedialyte and water intake 2 L per day.     7.)         Constipation: Senna rx sent and bowel management discussed with patient today in clinic.      8.)          Rx: Imodium, Senna, Pepcid     9.)          Referrals: Cancer Rehab      MIRA Heart, NP-BC  Women's Health Nurse Practitioner  Division of Gynecologic Oncology  [facility]      CC  Patient Care Team:  Tyrese Bowie as PCP - General (Family Medicine)  Tawana Rivera, RN as Specialty Care Coordinator (Gynecologic Oncology)  Jluis Canela MD as MD (Gynecologic Oncology)  Martín Myrick MD as Assigned Neuroscience Provider

## 2021-07-21 NOTE — NURSING NOTE
Chief Complaint   Patient presents with     Port Draw     Labs drawn via port by RN in lab. VS taken.      Port accessed with 20g gripper needle by RN, labs collected, line flushed with saline and heparin.  Vitals taken. Pt checked in for appointment(s).    Liss MURILLO RN PHN BSN  BMT/Oncology Lab

## 2021-07-21 NOTE — PATIENT INSTRUCTIONS
"For your diarrhea:  Maintaining nutrition and hydration with diarrhea is important.  Try eating boiled starches and cereals (eg, potatoes, noodles, rice, wheat, and oat) with salt.  Crackers, bananas, soup, and boiled vegetables are also good options. Foods with high fat content should be avoided until your stools return to normal as these foods are hard to digest.  Dairy products (except yogurt) may also be difficult to digest. Issues with dairy after diarrhea may last for several weeks to months.  Drink water, Pedialyte, and low/no sugar sports drinks.  High sugar drinks like original Gatorade and soda can make diarrhea worse.     Imodium (over the counter)  4 mg (2 tablets), followed by 2 mg (1 tablet) after each loose stool (up to 4 times a day even if its 1 hr apart).     Contact the clinic for more care if you have any of the following:  ?You have more than 6 runny bowel movements in 24 hours  ?You have blood in your bowel movements   ?You have a fever higher than 101.3 F (38.5 C) that does not go away after a day  ?You have severe belly pain  ?You are 70 or older  ?Your body has lost too much water. This is called \"dehydration.\" Signs include:   Lots of diarrhea that is very watery   Feeling very tired   Thirst   Dry mouth or tongue   Muscle cramps   Dizziness   Confusion   Urine that is very yellow, or not needing to urinate for more than 5 hours         When having loose stools it is important to stay hydrated and keep your electrolytes up  -drink at least 64 oz a day + 1 cup (8 oz) per loose stool is needed to stay hydrated  -Pedialyte is preferred because it will replace your electrolytes lost in your stool  -If you can not maintain this you might need IV fluids and electrolyte replacement in the clinic        Constipation: Senna prescription sent today. Start with 1 tablet nightly and then go up to two tablets twice per day with max of 12 tablets per day. Take OTC Miralax 17g (1 tablespoon) in 120-240 mL " of clear liquid daily if no BM in three days. Increase water intake to 2 L per day, fiber diet, space meals 5-6 smaller throughout day.        MIRA Heart, NP-BC  Women's Health Nurse Practitioner  Division of Gynecologic Oncology   [facility]

## 2021-07-21 NOTE — PROGRESS NOTES
Infusion Nursing Note:  Francesca Rowland presents today for Cycle 5 Day 1 paclitaxel, cisplatin, bevacizumab-awwb; neulasta onpro.    Patient seen by provider today: Yes: MIRA Heart   present during visit today: Not Applicable.    Note: Francesca presents today feeling well. Denies pain or nausea/vomiting. Denies fevers/chills. Offers no new concerns since visit with Terra Zelaya prior to infusion today.    Neulasta Onpro On-Body injector applied to L abdomen at 1610 with light facing up.  Writer discussed Neulasta injection would start tomorrow 07/22 at 1910, approximately 27 hours after application applied today.  Written and Verbal instruction reviewed with patient.  Pt instructed when the dose delivery starts, it will take about 45 minutes to complete.  Pt aware Neulasta Onpro On-Body should have green flashing light and to call triage or on-call MD if injector flashes red or appears to be leaking. Pt aware to keep Onpro On-Body Neulasta 4 inches away from electrical equipment and to avoid showering 4 hours prior to injection.   Neulasta Onpro Lot number: [id number]      Intravenous Access:  Implanted Port.    Treatment Conditions:  Lab Results   Component Value Date    HGB 10.8 07/21/2021     Lab Results   Component Value Date    WBC 3.8 07/21/2021     Lab Results   Component Value Date    ANEU 2.2 07/21/2021     Lab Results   Component Value Date     07/21/2021     Lab Results   Component Value Date     07/21/2021     Lab Results   Component Value Date    POTASSIUM 4.1 07/21/2021     Lab Results   Component Value Date    MAG 1.6 07/21/2021     Lab Results   Component Value Date    CR 0.82 07/21/2021     Lab Results   Component Value Date    SHAD 8.9 07/21/2021     Lab Results   Component Value Date    BILITOTAL 0.3 07/21/2021     Lab Results   Component Value Date    ALBUMIN 3.3 07/21/2021     Lab Results   Component Value Date    ALT 21 07/21/2021     Lab Results   Component Value  Date    AST 12 07/21/2021     Results reviewed, labs MET treatment parameters, ok to proceed with treatment.  Urine protein negative.  Blood pressure 127/86.      Post Infusion Assessment:  Patient tolerated infusion without incident.  Blood return noted pre and post infusion.  Site patent and intact, free from redness, edema or discomfort.  No evidence of extravasations.  Access discontinued per protocol.       Discharge Plan:   Prescription refills given for pepcid, senna, imodium.  Discharge instructions reviewed with: Patient.  Patient and/or family verbalized understanding of discharge instructions and all questions answered.  AVS to patient via [employer].  Patient will return 08/11 for next infusion appointment.   Patient discharged in stable condition accompanied by: self.  Departure Mode: Ambulatory.      Rae Fournier RN

## 2021-07-27 ENCOUNTER — TELEPHONE (OUTPATIENT)
Dept: ONCOLOGY | Facility: CLINIC | Age: 41
End: 2021-07-27

## 2021-07-27 NOTE — TELEPHONE ENCOUNTER
Spoke to Francesca about her increasing Neuropathy symptoms after cycle 5 more pain and tingling day 2-5.  Does seem to get better but still lasting longer.  Still having hoarse voice the week after chemo starts to get better week 2.  Will follow up with Dr. Canela on Mon 8/2- to make plan for cycle 6 and discuss if she needs repeat Petscan or just MRI after the 6 cycles.

## 2021-07-28 ENCOUNTER — ONCOLOGY VISIT (OUTPATIENT)
Dept: ONCOLOGY | Facility: CLINIC | Age: 41
End: 2021-07-28
Attending: OBSTETRICS & GYNECOLOGY
Payer: COMMERCIAL

## 2021-07-28 ENCOUNTER — TELEPHONE (OUTPATIENT)
Dept: NEUROSURGERY | Facility: CLINIC | Age: 41
End: 2021-07-28

## 2021-07-28 VITALS
HEART RATE: 96 BPM | OXYGEN SATURATION: 98 % | SYSTOLIC BLOOD PRESSURE: 129 MMHG | TEMPERATURE: 98.7 F | WEIGHT: 227.3 LBS | DIASTOLIC BLOOD PRESSURE: 89 MMHG | BODY MASS INDEX: 37.42 KG/M2

## 2021-07-28 DIAGNOSIS — T45.1X5A CHEMOTHERAPY-INDUCED NEUROPATHY (H): ICD-10-CM

## 2021-07-28 DIAGNOSIS — C53.9 MALIGNANT NEOPLASM OF CERVIX, UNSPECIFIED SITE (H): ICD-10-CM

## 2021-07-28 DIAGNOSIS — R53.81 PHYSICAL DECONDITIONING: ICD-10-CM

## 2021-07-28 DIAGNOSIS — G62.0 CHEMOTHERAPY-INDUCED NEUROPATHY (H): ICD-10-CM

## 2021-07-28 PROCEDURE — G0463 HOSPITAL OUTPT CLINIC VISIT: HCPCS

## 2021-07-28 PROCEDURE — 99205 OFFICE O/P NEW HI 60 MIN: CPT | Performed by: STUDENT IN AN ORGANIZED HEALTH CARE EDUCATION/TRAINING PROGRAM

## 2021-07-28 RX ORDER — DULOXETIN HYDROCHLORIDE 30 MG/1
30 CAPSULE, DELAYED RELEASE ORAL DAILY
Qty: 60 CAPSULE | Refills: 1 | Status: SHIPPED | OUTPATIENT
Start: 2021-07-28 | End: 2021-12-27

## 2021-07-28 ASSESSMENT — PAIN SCALES - GENERAL: PAINLEVEL: NO PAIN (0)

## 2021-07-28 NOTE — NURSING NOTE
"Oncology Rooming Note    July 28, 2021 2:00 PM   Francesca Rowland is a 40 year old female who presents for:    Chief Complaint   Patient presents with     Oncology Clinic Visit     cervical cancer; chemo indeuced neuropathy; physical deconditioning     Initial Vitals: /89   Pulse 96   Temp 98.7  F (37.1  C) (Oral)   Wt 103.1 kg (227 lb 4.8 oz)   SpO2 98%   BMI 37.42 kg/m   Estimated body mass index is 37.42 kg/m  as calculated from the following:    Height as of 5/19/21: 1.66 m (5' 5.35\").    Weight as of this encounter: 103.1 kg (227 lb 4.8 oz). Body surface area is 2.18 meters squared.  No Pain (0) Comment: Data Unavailable   No LMP recorded. Patient has had a hysterectomy.  Allergies reviewed: Yes  Medications reviewed: Yes    Medications: Medication refills not needed today.  Pharmacy name entered into [employer]: [id number] - [place], [place] - [facility] [address]    Clinical concerns: none       Patricia Calderon CMA            "

## 2021-07-28 NOTE — TELEPHONE ENCOUNTER
[facility] Call Center    Phone Message    May a detailed message be left on voicemail: yes     Reason for Call: Other: Francesca calling to request a call back to discuss scheduling options for an appointment to follow her 8/21 MRI. Writer did not have scheduling information to schedule. Please call at your earliest convenience.      Action Taken: Message routed to:  [facility] ([facility]): [facility] [place] neurosurgery    Travel Screening: Not Applicable

## 2021-07-28 NOTE — LETTER
2021         RE: Francesca Rowland  [address]  [place] [address]        Dear Colleague,    Thank you for referring your patient, Francesca Rowland, to the [facility]. Please see a copy of my visit note below.           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    Oncology History:  - Initially presented due to Pap smear showing high-grade squamous intraepithelial lesion positive for high risk HPV subtype in .  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   - Underwent LEEP procedure on 10/15/2007 which demonstrated microinvasive adenocarcinoma.  - Proceeded with radical hysterectomy, bilateral salpingectomy, bilateral pelvic and periaortic lymph node dissection on 10/19/2007. Pathology revealed Stage IA2 adenocarcinoma of the cervix.  - Underwent routine surveillance through .  - Presented to the ED at [facility] on 2019 with severe RUQ pain which imaging revealed cholelithiasis without cholecystitis, Abdominal/Pelvic CT revealed enlarged pericaval lymph node. CT guided biopsy was consistent with metastatic cervical carcinoma.  - Underwent surgery to remove mass.  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  - 3/15/2021- Underwent craniotomy and resection of cerebellar mass.  Fatigue and neuropathy.  - 2021- Completed 5 fx of gamma knife  - 2021- 2021- Cycles 1-4 of paclitaxel/cisplatin/avastin.  - 2021: Cycle #5  Paclitaxel/Cisplatin/Avastin     Symptoms,  Patient was seen for an initial evaluation today. She complains of peripheral neuropathy symptoms that started after her 4th chemotherapy cycle, and has become worse after the 5th chemotherapy cycle.  In her feet, it does not go above ankle level. She complains of aching pain, tingling and numbness in her feet. Patient has not noticed any difficulties in her balance difficulties. With her cerebellar tumor, she had originally had balance difficulties but after surgery, she has not noticed this anymore.  With hands, it is more tingling in the tips of all her fingers, but denies numbness. She endorses some difficulty opening jars/bottles. She denies any other difficulties with ADLs.  She has been taking Vit B6 and glutamine for this and feels it is helping somewhat.    Patient also complains of fatigue, that became especially worse with the last subsequent chemotherapy sessions. She states that she feels it is a little worse. She has not had any physical activity such as walking regularly and has not had any PT or OT.     Patient has one more cycle of chemotherapy on 8/11 and then has a repeat MRI and will see her Oncology providers in August to discuss wehther to start Keytruda.     Patient complains of knee pain in both her knees that started around the 3rd cycle of chemotherapy. She has had knee pain in the past, and went to PT for it in 2017. It has since resolved.     Therapies/HEP,  Patient has not participated in outpatient therapies. She continues to remain active at home and with her family.      Functionally,   Patient is independent for all mobility, ADLs and IADLs.             Past Medical and Surgical History:     Past Medical History:   Diagnosis Date     Chronic cholecystitis 09/25/2019     History of cervical cancer 2007     Metastasis to brain (H) 03/08/2021     Metastatic adenocarcinoma to cervix (H) 10/28/2019    Added automatically from request for surgery  [id number]     Past Surgical History:   Procedure Laterality Date     CHOLECYSTECTOMY, LAPOROSCOPIC  09/25/2019     HYSTERECTOMY RADICAL  2007    PAUL     INSERT PORT VASCULAR ACCESS Right 5/5/2021    Procedure: INSERTION, VASCULAR ACCESS PORT;  Surgeon: Oral Toledo MD;  Location: [facility] OR     IR CHEST PORT PLACEMENT > 5 YRS OF AGE  5/5/2021     LAPAROSCOPIC LYMPHADENECTOMY N/A 11/13/2019    Procedure: LAPAROSCOPY, resection of of paraaortic lymph node, lysis of adhesions;  Surgeon: Jluis Canela MD;  Location: [facility] OR     OPTICAL TRACKING SYSTEM CRANIOTOMY, EXCISE TUMOR, COMBINED Bilateral 3/15/2021    Procedure: stealth assisted Midline suboccipital craniectomy/craniotomy for tumor;  Surgeon: Martín Myrick MD;  Location: [facility] OR     OPTICAL TRACKING SYSTEM VENTRICULOSTOMY Right 3/15/2021    Procedure: stealth assisted  right frontal ventriculostomy;  Surgeon: Martín Myrick MD;  Location: [facility] OR            Social History:     Social History     Tobacco Use     Smoking status: Former Smoker     Types: Cigarettes     Smokeless tobacco: Never Used   Substance Use Topics     Alcohol use: Yes     Comment: one drink a week       Marital Status:   Living situation: Lives in [place], [place]  Family support:  and 3 children           Functional history:     Francesca Rowland is independent with all aspects of her life.    ADLs: Independent  Assistive devices: none  iADLs (medication management and finances): Independent           Family History:     Family History   Problem Relation Age of Onset     Aneurysm Mother      Breast Cancer Paternal Grandmother         bilat mastectomies     Breast Cancer Maternal Aunt         stage 1     Thyroid Cancer Sister 23            Medications:     Current Outpatient Medications   Medication Sig Dispense Refill     acetaminophen (TYLENOL) 325 MG tablet Take 2 tablets (650 mg) by mouth every 6 hours 24 tablet 0     Ascorbic Acid (VITAMIN C  "GUMMIE PO) Take by mouth daily       dexamethasone (DECADRON) 4 MG tablet Take 2 tablets (8 mg) by mouth daily (with breakfast) Take 2 tablets by mouth with food on days 2-4 after chemotherapy. 12 tablet 3     famotidine (PEPCID) 10 MG tablet Take 1 tablet (10 mg) by mouth 2 times daily 60 tablet 1     ibuprofen (ADVIL/MOTRIN) 200 MG tablet Take 1 tablet (200 mg) by mouth every 4 hours as needed for mild pain       loperamide (IMODIUM A-D) 2 MG tablet Take 1 tablet (2 mg) by mouth 4 times daily as needed for diarrhea Can take one 2 mg capsule after each loose stool up to 4 times a day as needed for diarrhea. This can be increased to 4 mg 4 times a day or 2 mg up to 8 times a day. 60 tablet 3     multivitamin w/minerals (MULTI-VITAMIN) tablet Take 1 tablet by mouth daily       OLANZapine (ZYPREXA) 5 MG tablet Take 1 tablet (5 mg) by mouth At Bedtime 30 tablet 0     SENNA-docusate sodium (SENNA S) 8.6-50 MG tablet Take 1 tablet by mouth At Bedtime 30 tablet 3     lidocaine-prilocaine (EMLA) 2.5-2.5 % external cream Apply topically as needed for moderate pain (Patient not taking: Reported on 6/28/2021) 30 g 1     LORazepam (ATIVAN) 0.5 MG tablet Take 1 tablet (0.5 mg) by mouth every 6 hours as needed for nausea (Patient not taking: Reported on 6/28/2021) 20 tablet 0            Allergies:     Allergies   Allergen Reactions     Emend [Aprepitant]      Prochlorperazine      \"i was told I turn blue\"              ROS:     A focused ROS is negative other than the symptoms noted above in the HPI.           Physical Examiniation:     VITAL SIGNS: /89   Pulse 96   Temp 98.7  F (37.1  C) (Oral)   Wt 103.1 kg (227 lb 4.8 oz)   SpO2 98%   BMI 37.42 kg/m    BMI: Estimated body mass index is 37.42 kg/m  as calculated from the following:    Height as of 5/19/21: 1.66 m (5' 5.35\").    Weight as of this encounter: 103.1 kg (227 lb 4.8 oz).    Gen: NAD, pleasant and cooperative  HEENT: Normocephalic, extra-ocular movements " appear intact  Pulm: non-labored breathing in room air  Ext: no edema in BLE, no tenderness in calves  Neuro/MSK:   Orientation: Oriented to person, place, time, situation. Exhibits good insight into his/her condition and ongoing management/symptoms.  Motor: 4/5 with bilateral shoulder abduction, elbow extension, wrist extension and  strength/finger intrinsics. 4/5 with bilateral hip flexion, 5/5 with bilateral knee extension, ankle dorsiflexion, EHL, plantar flexion.   Sensory:   UEs- Intact to light touch over relevant dermatomes on volar and dorsal surface of wrist and hand. Decrease to pin prick over distal digits- 1-3 and lateral border of volar surface of both hands.  LEs- Intact to light touch in all relevant dermatomes of dorsum of feet to knee level bilaterally. Decreased sensation to light touch over distal digits (distal to MTP) of all 5 digits bilaterally and decreased over plantar surface of feet bilaterally, especially over lateral aspects of plantar surface. Decrease to pinprick  mainly distally in 1st-3rd digits bilaterally.   Reflexes: intact, 2+ and symmetric in bilateral upper and lower extremities with biceps, triceps, BR, patellar and achilles.             Laboratory/Imaging:     PET Oncology Whole Body (7/15/2021):                                                         IMPRESSION: In this patient with history of cervical cancer who  completed chemoradiation:  1. Resolved uptake to the right pelvic lymph node, consistent with  response to treatment.  2. No new hypermetabolic lesions.  3. No definite abnormal intracranial uptake. Please note that PET-CT  is less sensitive for intracranial disease due to normal uptake to the  brain. Consider continued following of the previously seen lesions and surgical changes with MRI if clinically indicated.         Assessment/Plan:   Francesca Rowland is a 40 year old female with history of metastatic carcinoma of the cervix (s/p craniotomy and gamma  knife for cerebellar lesion in 4/2021) who presents to PM&R Clinic for evaluation of peripheral neuropathy and weakness/fatigue. As discussed with Francesca, recommendation is to try Cymbalta for her ongoing neuropathic symptoms. She continues with some adjustment to her ongoing treatment, and Cymbalta will help address both neuropathy and emotional symptoms as well. Due to deconditioning and weakness on exam, strong recommendation is to start outpatient PT for targeted strengthening and endurance work. Due to emotional adjustment, she is open to seeing our Oncology Psychologist, so a referral was placed today. Patient is in agreement with the above plan.      1. Patient education: In depth discussion and education was provided about the assessment and implications of each of the below recommendations for management. Patient indicated readiness to learn, all questions were answered and understanding of material presented was confirmed.  2. Therapy/equipment/braces:  1. Referral to outpatient Physical Therapy for targeted strengthening and endurance in the setting of deconditioning/fatigue.  3. Medications:  1. Start Cymbalta 30 mg daily for one week, then increase to 60 mg daily.  2. Can try Voltaren gel QID prn for bilateral knee pain.  4. Referral / follow up with other providers:  1. Referral to Oncology Psychology placed for help with emotional adjustment to her disease process and ongoing treatment.  5. Follow up: 2 months    Rashmi London MD  Physical Medicine & Rehabilitation    I appreciate the opportunity to participate in the care of your patient.                   Again, thank you for allowing me to participate in the care of your patient.        Sincerely,        Rashmi London MD

## 2021-07-28 NOTE — PATIENT INSTRUCTIONS
1. A referral to outpatient Physical Therapy was placed.  2. Cymbalta was prescribed to help with your peripheral neuropathy symptoms, start at 30 mg daily, after 1 week, increase to 60 mg daily.  3. A referral to our Psychologist was placed today.  4. You can try Voltaren gel for your knee pain up to four times daily as needed.  5. Return to clinic with Dr. London in 2 months.

## 2021-07-28 NOTE — PROGRESS NOTES
PM&R Clinic Note     Patient Name: Francesca Rowland : 1980 Medical Record: [id number]     Requesting Physician/clinician: MIRA Heart CNP           History of Present Illness:     Francesca Rowland is a 40 year old female with history of metastatic carcinoma of the cervix (s/p craniotomy and gamma knife for cerebellar lesion in 2021) who presents to PM&R Clinic for evaluation of peripheral neuropathy and weakness/fatigue.    Oncology History:  - Initially presented due to Pap smear showing high-grade squamous intraepithelial lesion positive for high risk HPV subtype in .  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   - Underwent LEEP procedure on 10/15/2007 which demonstrated microinvasive adenocarcinoma.  - Proceeded with radical hysterectomy, bilateral salpingectomy, bilateral pelvic and periaortic lymph node dissection on 10/19/2007. Pathology revealed Stage IA2 adenocarcinoma of the cervix.  - Underwent routine surveillance through .  - Presented to the ED at [facility] on 2019 with severe RUQ pain which imaging revealed cholelithiasis without cholecystitis, Abdominal/Pelvic CT revealed enlarged pericaval lymph node. CT guided biopsy was consistent with metastatic cervical carcinoma.  - Underwent surgery to remove mass.  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  - 3/15/2021- Underwent craniotomy and resection of cerebellar mass.  Fatigue and neuropathy.  - 2021- Completed 5 fx of gamma knife  - 2021- 2021- Cycles 1-4 of paclitaxel/cisplatin/avastin.  - 2021: Cycle #5 Paclitaxel/Cisplatin/Avastin     Symptoms,  Patient was seen for an initial evaluation today. She complains of peripheral neuropathy symptoms that started after her 4th chemotherapy cycle, and has become worse after the 5th chemotherapy cycle.  In her feet, it does  not go above ankle level. She complains of aching pain, tingling and numbness in her feet. Patient has not noticed any difficulties in her balance difficulties. With her cerebellar tumor, she had originally had balance difficulties but after surgery, she has not noticed this anymore.  With hands, it is more tingling in the tips of all her fingers, but denies numbness. She endorses some difficulty opening jars/bottles. She denies any other difficulties with ADLs.  She has been taking Vit B6 and glutamine for this and feels it is helping somewhat.    Patient also complains of fatigue, that became especially worse with the last subsequent chemotherapy sessions. She states that she feels it is a little worse. She has not had any physical activity such as walking regularly and has not had any PT or OT.     Patient has one more cycle of chemotherapy on 8/11 and then has a repeat MRI and will see her Oncology providers in August to discuss wehther to start Keytruda.     Patient complains of knee pain in both her knees that started around the 3rd cycle of chemotherapy. She has had knee pain in the past, and went to PT for it in 2017. It has since resolved.     Therapies/HEP,  Patient has not participated in outpatient therapies. She continues to remain active at home and with her family.      Functionally,   Patient is independent for all mobility, ADLs and IADLs.             Past Medical and Surgical History:     Past Medical History:   Diagnosis Date     Chronic cholecystitis 09/25/2019     History of cervical cancer 2007     Metastasis to brain (H) 03/08/2021     Metastatic adenocarcinoma to cervix (H) 10/28/2019    Added automatically from request for surgery [id number]     Past Surgical History:   Procedure Laterality Date     CHOLECYSTECTOMY, LAPOROSCOPIC  09/25/2019     HYSTERECTOMY RADICAL  2007    PAUL     INSERT PORT VASCULAR ACCESS Right 5/5/2021    Procedure: INSERTION, VASCULAR ACCESS PORT;  Surgeon: Paris  Oral ORTIZ MD;  Location: [facility] OR     IR CHEST PORT PLACEMENT > 5 YRS OF AGE  5/5/2021     LAPAROSCOPIC LYMPHADENECTOMY N/A 11/13/2019    Procedure: LAPAROSCOPY, resection of of paraaortic lymph node, lysis of adhesions;  Surgeon: Jluis Canela MD;  Location: [facility] OR     OPTICAL TRACKING SYSTEM CRANIOTOMY, EXCISE TUMOR, COMBINED Bilateral 3/15/2021    Procedure: stealth assisted Midline suboccipital craniectomy/craniotomy for tumor;  Surgeon: Martín Myrick MD;  Location: [facility] OR     OPTICAL TRACKING SYSTEM VENTRICULOSTOMY Right 3/15/2021    Procedure: stealth assisted  right frontal ventriculostomy;  Surgeon: Martín Myrick MD;  Location: [facility] OR            Social History:     Social History     Tobacco Use     Smoking status: Former Smoker     Types: Cigarettes     Smokeless tobacco: Never Used   Substance Use Topics     Alcohol use: Yes     Comment: one drink a week       Marital Status:   Living situation: Lives in [place], [place]  Family support:  and 3 children           Functional history:     Francesca Rowland is independent with all aspects of her life.    ADLs: Independent  Assistive devices: none  iADLs (medication management and finances): Independent           Family History:     Family History   Problem Relation Age of Onset     Aneurysm Mother      Breast Cancer Paternal Grandmother         bilat mastectomies     Breast Cancer Maternal Aunt         stage 1     Thyroid Cancer Sister 23            Medications:     Current Outpatient Medications   Medication Sig Dispense Refill     acetaminophen (TYLENOL) 325 MG tablet Take 2 tablets (650 mg) by mouth every 6 hours 24 tablet 0     Ascorbic Acid (VITAMIN C GUMMIE PO) Take by mouth daily       dexamethasone (DECADRON) 4 MG tablet Take 2 tablets (8 mg) by mouth daily (with breakfast) Take 2 tablets by mouth with food on days 2-4 after chemotherapy. 12 tablet 3     famotidine (PEPCID) 10 MG tablet Take 1 tablet (10 mg)  "by mouth 2 times daily 60 tablet 1     ibuprofen (ADVIL/MOTRIN) 200 MG tablet Take 1 tablet (200 mg) by mouth every 4 hours as needed for mild pain       loperamide (IMODIUM A-D) 2 MG tablet Take 1 tablet (2 mg) by mouth 4 times daily as needed for diarrhea Can take one 2 mg capsule after each loose stool up to 4 times a day as needed for diarrhea. This can be increased to 4 mg 4 times a day or 2 mg up to 8 times a day. 60 tablet 3     multivitamin w/minerals (MULTI-VITAMIN) tablet Take 1 tablet by mouth daily       OLANZapine (ZYPREXA) 5 MG tablet Take 1 tablet (5 mg) by mouth At Bedtime 30 tablet 0     SENNA-docusate sodium (SENNA S) 8.6-50 MG tablet Take 1 tablet by mouth At Bedtime 30 tablet 3     lidocaine-prilocaine (EMLA) 2.5-2.5 % external cream Apply topically as needed for moderate pain (Patient not taking: Reported on 6/28/2021) 30 g 1     LORazepam (ATIVAN) 0.5 MG tablet Take 1 tablet (0.5 mg) by mouth every 6 hours as needed for nausea (Patient not taking: Reported on 6/28/2021) 20 tablet 0            Allergies:     Allergies   Allergen Reactions     Emend [Aprepitant]      Prochlorperazine      \"i was told I turn blue\"              ROS:     A focused ROS is negative other than the symptoms noted above in the HPI.           Physical Examiniation:     VITAL SIGNS: /89   Pulse 96   Temp 98.7  F (37.1  C) (Oral)   Wt 103.1 kg (227 lb 4.8 oz)   SpO2 98%   BMI 37.42 kg/m    BMI: Estimated body mass index is 37.42 kg/m  as calculated from the following:    Height as of 5/19/21: 1.66 m (5' 5.35\").    Weight as of this encounter: 103.1 kg (227 lb 4.8 oz).    Gen: NAD, pleasant and cooperative  HEENT: Normocephalic, extra-ocular movements appear intact  Pulm: non-labored breathing in room air  Ext: no edema in BLE, no tenderness in calves  Neuro/MSK:   Orientation: Oriented to person, place, time, situation. Exhibits good insight into his/her condition and ongoing management/symptoms.  Motor: 4/5 with " bilateral shoulder abduction, elbow extension, wrist extension and  strength/finger intrinsics. 4/5 with bilateral hip flexion, 5/5 with bilateral knee extension, ankle dorsiflexion, EHL, plantar flexion.   Sensory:   UEs- Intact to light touch over relevant dermatomes on volar and dorsal surface of wrist and hand. Decrease to pin prick over distal digits- 1-3 and lateral border of volar surface of both hands.  LEs- Intact to light touch in all relevant dermatomes of dorsum of feet to knee level bilaterally. Decreased sensation to light touch over distal digits (distal to MTP) of all 5 digits bilaterally and decreased over plantar surface of feet bilaterally, especially over lateral aspects of plantar surface. Decrease to pinprick  mainly distally in 1st-3rd digits bilaterally.   Reflexes: intact, 2+ and symmetric in bilateral upper and lower extremities with biceps, triceps, BR, patellar and achilles.             Laboratory/Imaging:     PET Oncology Whole Body (7/15/2021):                                                         IMPRESSION: In this patient with history of cervical cancer who  completed chemoradiation:  1. Resolved uptake to the right pelvic lymph node, consistent with  response to treatment.  2. No new hypermetabolic lesions.  3. No definite abnormal intracranial uptake. Please note that PET-CT  is less sensitive for intracranial disease due to normal uptake to the  brain. Consider continued following of the previously seen lesions and surgical changes with MRI if clinically indicated.         Assessment/Plan:   Francesca Boucheranju is a 40 year old female with history of metastatic carcinoma of the cervix (s/p craniotomy and gamma knife for cerebellar lesion in 4/2021) who presents to PM&R Clinic for evaluation of peripheral neuropathy and weakness/fatigue. As discussed with Francesca, recommendation is to try Cymbalta for her ongoing neuropathic symptoms. She continues with some adjustment to her  ongoing treatment, and Cymbalta will help address both neuropathy and emotional symptoms as well. Due to deconditioning and weakness on exam, strong recommendation is to start outpatient PT for targeted strengthening and endurance work. Due to emotional adjustment, she is open to seeing our Oncology Psychologist, so a referral was placed today. Patient is in agreement with the above plan.      1. Patient education: In depth discussion and education was provided about the assessment and implications of each of the below recommendations for management. Patient indicated readiness to learn, all questions were answered and understanding of material presented was confirmed.  2. Therapy/equipment/braces:  1. Referral to outpatient Physical Therapy for targeted strengthening and endurance in the setting of deconditioning/fatigue.  3. Medications:  1. Start Cymbalta 30 mg daily for one week, then increase to 60 mg daily.  2. Can try Voltaren gel QID prn for bilateral knee pain.  4. Referral / follow up with other providers:  1. Referral to Oncology Psychology placed for help with emotional adjustment to her disease process and ongoing treatment.  5. Follow up: 2 months    Rashmi London MD  Physical Medicine & Rehabilitation    I appreciate the opportunity to participate in the care of your patient.

## 2021-08-02 DIAGNOSIS — Z51.11 ENCOUNTER FOR ANTINEOPLASTIC CHEMOTHERAPY: Primary | ICD-10-CM

## 2021-08-02 RX ORDER — DEXAMETHASONE 4 MG/1
8 TABLET ORAL 2 TIMES DAILY WITH MEALS
Qty: 12 TABLET | Refills: 0 | Status: SHIPPED | OUTPATIENT
Start: 2021-08-02 | End: 2021-08-05

## 2021-08-02 NOTE — PROGRESS NOTES
Plan to change Paclitaxel to Taxotere for C6 due to CIPN. Orders changed in treatment plan and dose of 75 mg/m2 confirmed with Dr. Canela. Treatment plan changed.     PO Dexamethasone rx sent to patient pharmacy. Attempted to call patient but no answer. Will send [employer] message.       MIRA Heart, [facility]-BC  Women's Health Nurse Practitioner  Division of Gynecologic Oncology  [facility]

## 2021-08-11 NOTE — PROGRESS NOTES
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 She is here today for follow up and disease management.     Oncology History:      Patient initially presented as a 27-year-old woman seen in referral due to a Pap smear showing high-grade squamous intraepithelial lesion that was positive for high risk HPV subtype in 2007. This Pap smear had been taken during her six week postpartum visit. Patient did have a remote history of abnormal Pap smears back in  while serving in [place] for the [employer].  Pap smears during her first pregnancy were all negative. Her Pap smear at the beginning of the most recent pregnancy was normal. Patient did have a colposcopy for evaluation of this abnormal Pap smear on 2007 and cervical biopsies of 6 and 12 o'clock position showed features consistent with papillary serous adenocarcinoma. Endocervical curettings were also taken which showed fragments of papillary serous carcinoma. The patient then underwent a LEEP procedure on 10/15/07 which showed microinvasive adenocarcinoma with a depth of invasion of 0.4 mm. Patient then made the decision to proceed with radical hysterectomy. She subsequently underwent a radical hysterectomy, bilateral salpingectomy, bilateral pelvic and periaortic lymph node dissection on 10/19/07. Patient's operative course was otherwise uncomplicated and she recoverred uneventfully.  Pathology did reveal patient to be stage IA2 adenocarcinoma of the cervix.      She underwent routine surveillence through  complicated only by SIOBHAN 1     She recently presented after incidentally being found to have an enlarged pericaval lymph node.  She presented to the ED at [facility] on 2019 with severe 8.5/10  RUQ pain associated with nausea, one episode of emesis, and decreased appetite. RUQ demonstrated cholelithiasis without cholecystitis, so an abdominal and pelvic CT scan was obtained. This revealed post-surgical changes consistent with prior hysterectomy, left adnexal cysts thought to be ovarian, and an enlarged pericaval lymph node suspicious for metastatic disease vs primary lymphoma. Her condition stabilized and she was discharged home with referrals to general surgery and oncology for biliary colic and further management of the lymphadenopathy, respectively. CT-guided right retroperitoneal lymph node biopsy on 10/15/2019 was consistent with metastatic cervical carcinoma     She underwent surgery to remove the mass and determine the extent of disease on 11/13/19     PATH:  FINAL DIAGNOSIS:   LYMPH NODES, PARA-AORTIC, RESECTION:   - Positive for carcinoma in three of four lymph nodes examined (3/4),   consistent with recurrent endocervical   adenocarcinoma   - Largest metastatic focus is 3.5 cm, positive for extra yuridia extension         Chemo-Potentiated RT 12/2019-1/2020 2/2020 CT:  IMPRESSION: Postoperative changes of hysterectomy and lymph node  resection for cervical cancer. The metastatic right pelvic lymph nodes  which were previously identified have decreased in size. No new  metastases identified.      She recently presented to the ED for an acute RUQ pain episode, which has greatly improved despite no clear diagnosis.  During this visit she underwent a CAP CT which did not demonstrate evidence of cancer but a slightly enlarged spleen. She had a mild eosinophilia (~2%) but has not traveled or eaten exotic food recently, and has not had any new contacts to suggest mono.      10/13/2020 CT:  IMPRESSION:  1.  Recurrent right external iliac lymphadenopathy.  2.  Rebound thymic hyperplasia.     10/30/2020 PET  IMPRESSION:   In this patient with history of metastatic cervical cancer status-post  radical  hysterectomy and chemoradiation, there is evidence of  recurrent disease:  1. Increased size of a hypermetabolic right external iliac chain lymph node since 2/27/2020.  2. Hypermetabolic right subpectoral and axillary lymph nodes.  Recommend follow-up in the breast center for axillary ultrasound and  possible tissue sampling.  3. Idiopathic thymic activation/hyperplasia.     She was evaluated in the breast clinic with plan made for biopsy - however at biopsy the node had shruck considerably and was thus felt to be c/w inflammation (no biopsy obtained)     3/8/2021 MRI Brain (for dizziness)  IMPRESSION:  1. Heterogeneous mixed solid and cystic 2.7 cm mass in the right cerebellum, most likely a solitary metastatic lesion. There is moderate surrounding vasogenic edema as well as mass effect on the fourth ventricle. No obstructive hydrocephalus.    2. Unremarkable MR angiogram of the head and neck.     3/15/2021 Craniotomy ([facility])  1. Midline suboccipital craniotomy for resection of mass  2. Right frontal ventriculostomy  3. Stereotactic navigation  4. Use of operating microscope     4/16/21 Completed 5 fx of gamma knife ([employer])     Plan: Paclitaxel, Cisplatin, Avastin     4/28/2021: Cycle #1 Paclitaxel, Cisplatin, Avastin  5/19/2021: Cycle #2 Paclitaxel/Cisplatin/Avastin +Neulasta  6/9/2021: Cycle #3 Paclitaxel/Cisplatin/Avastin +Neulasta  6/30/2021: Cycle #4 Paclitaxel/Cisplatin/Avastin +Neulasta    6/28/2021 plan: PET scan for interval assessment.  Will plan for 6 cycles followed by possible use of Keytruda versus observation.     7/15/2021: PET CT: IMPRESSION: In this patient with history of cervical cancer who  completed chemoradiation:  1. Resolved uptake to the right pelvic lymph node, consistent with  response to treatment.  2. No new hypermetabolic lesions.  3. No definite abnormal intracranial uptake. Please note that PET-CT  is less sensitive for intracranial disease due to normal uptake to the  brain.  Consider continued following of the previously seen lesions and  surgical changes with MRI if clinically indicated.      7/21/2021: Cycle #5 Paclitaxel/Cisplatin/Avastin +Neulasta  8/11/2021: Cycle #6 Taxotere/Cisplatin/Avastin + Neulasta (Change to Taxotere due to neuropathy) deferred due to insurance  8/18/2021: Cycle #6 Paclitaxel/Cisplatin/Avastin (dose reduced Taxol to 135 mg/m2 due to insurance coverage with Taxotere and patient in agreement with plan- neuropathy mild and improved) +Neulasta        Today Francesca comes to the clinic overall feeling well. No nausea after the last infusion. She is drinking about 2-3 L of water per day. Her nausea after treatments is controlled with nightly Zyprexa for a few days post chemo, Decadron days 2-4 and Zofran started 72 hours after treatment. She does not have any baseline neuropathy or hearing loss. She does have new neuropathy in the bottom of feet intermittent that lasted a few days after cycle 2. No neuropathy in her hands. She does notice tingling for fews days after chemotherapy and then almost resolves. She is taking Vitamin B6 and L-glutamine started around cycle 4 and has notice improvement with this. This did progress and worsen after C5. She has started Cymbalta with [facility] on 7/28/2021. She has noticed improvement with this already in her feet. She reports tingling with pain reduction now. Fatigue is ongoing but will start PT soon. She is napping 1-3 hours per day. She is sleeping well at night and is taking Melatonin to help fall asleep easier. She is not exercising. She did not have any myalgias with Neulasta. Is taking Claritin for 7 days post. She is now having regular bowel movements without diarrhea. She is having pain in right knee over the last week and is unsure cause. She is about to try Volteran gel for this. She denies any vaginal bleeding, no changes in her bladder habits, no lower extremity edema, and no difficulties eating. She denies any  abdominal discomfort/bloating, no fevers or chills, and no chest pain or shortness of breath.         Review of Systems:    Systemic           no weight changes; no fever; no chills; no night sweats; no appetite changes  Skin           no rashes, or lesions  Eye           no irritation; no changes in vision  Pay[place]-Laryngeal           no dysphagia; no hoarseness   Pulmonary    no cough; no shortness of breath  Cardiovascular    no chest pain; no palpitations  Gastrointestinal    + diarrhea; + constipation; no abdominal pain; no changes in bowel  habits; no blood in stool  Genitourinary   no urinary frequency; no urinary urgency; no dysuria; no pain; no abnormal vaginal discharge; no abnormal vaginal bleeding  Breast    no breast discharge; no breast changes; no breast pain  Musculoskeletal    no myalgias; no arthralgias; no back pain  Psychiatric           no depressed mood; no anxiety    Hematologic           no tender lymph nodes; no noticeable swellings or lumps   Endocrine    no hot flashes; no heat/cold intolerance         Neurological   no tremor; + numbness and tingling; no headaches; no difficulty  sleeping      Past Medical History:    Past Medical History:   Diagnosis Date     Chronic cholecystitis 09/25/2019     History of cervical cancer 2007     Metastasis to brain (H) 03/08/2021     Metastatic adenocarcinoma to cervix (H) 10/28/2019    Added automatically from request for surgery [id number]         Past Surgical History:    Past Surgical History:   Procedure Laterality Date     CHOLECYSTECTOMY, LAPOROSCOPIC  09/25/2019     HYSTERECTOMY RADICAL  2007    PAUL     INSERT PORT VASCULAR ACCESS Right 5/5/2021    Procedure: INSERTION, VASCULAR ACCESS PORT;  Surgeon: Oral Toledo MD;  Location: [facility] OR     IR CHEST PORT PLACEMENT > 5 YRS OF AGE  5/5/2021     LAPAROSCOPIC LYMPHADENECTOMY N/A 11/13/2019    Procedure: LAPAROSCOPY, resection of of paraaortic lymph node, lysis of adhesions;  Surgeon: Rola  Jluis Vega MD;  Location: [facility] OR     OPTICAL TRACKING SYSTEM CRANIOTOMY, EXCISE TUMOR, COMBINED Bilateral 3/15/2021    Procedure: stealth assisted Midline suboccipital craniectomy/craniotomy for tumor;  Surgeon: Martín Myrick MD;  Location: [facility] OR     OPTICAL TRACKING SYSTEM VENTRICULOSTOMY Right 3/15/2021    Procedure: stealth assisted  right frontal ventriculostomy;  Surgeon: Martín Myrick MD;  Location: [facility] OR         Health Maintenance Due   Topic Date Due     PREVENTIVE CARE VISIT  Never done     ADVANCE CARE PLANNING  Never done     DEPRESSION ACTION PLAN  Never done     PHQ-9  Never done     Pneumococcal Vaccine: Pediatrics (0 to 5 Years) and At-Risk Patients (6 to 64 Years) (1 of 4 - PCV13) Never done     HIV SCREENING  Never done     HEPATITIS C SCREENING  Never done     PAP  07/14/2017       Current Medications:     Current Outpatient Medications   Medication Sig Dispense Refill     acetaminophen (TYLENOL) 325 MG tablet Take 2 tablets (650 mg) by mouth every 6 hours 24 tablet 0     Ascorbic Acid (VITAMIN C GUMMIE PO) Take by mouth daily       dexamethasone (DECADRON) 4 MG tablet Take 2 tablets (8 mg) by mouth 2 times daily (with meals) for 6 doses Take two tablets (8 mg) the evening of chemotherapy and take two tablets (8 mg) the next morning and night following chemotherapy. Take this schedule with each chemotherapy with Taxotere. 12 tablet 0     dexamethasone (DECADRON) 4 MG tablet Take 2 tablets (8 mg) by mouth daily (with breakfast) Take 2 tablets by mouth with food on days 2-4 after chemotherapy. 12 tablet 3     DULoxetine (CYMBALTA) 30 MG capsule Take 1 capsule (30 mg) by mouth daily Take 1 capsule (30 mg) daily for 1 week. Increase to 2 capsules (60 mg) daily from week 2 onwards. 60 capsule 1     famotidine (PEPCID) 10 MG tablet Take 1 tablet (10 mg) by mouth 2 times daily 60 tablet 1     ibuprofen (ADVIL/MOTRIN) 200 MG tablet Take 1 tablet (200 mg) by mouth every 4 hours as  "needed for mild pain       lidocaine-prilocaine (EMLA) 2.5-2.5 % external cream Apply topically as needed for moderate pain (Patient not taking: Reported on 6/28/2021) 30 g 1     loperamide (IMODIUM A-D) 2 MG tablet Take 1 tablet (2 mg) by mouth 4 times daily as needed for diarrhea Can take one 2 mg capsule after each loose stool up to 4 times a day as needed for diarrhea. This can be increased to 4 mg 4 times a day or 2 mg up to 8 times a day. 60 tablet 3     LORazepam (ATIVAN) 0.5 MG tablet Take 1 tablet (0.5 mg) by mouth every 6 hours as needed for nausea (Patient not taking: Reported on 6/28/2021) 20 tablet 0     multivitamin w/minerals (MULTI-VITAMIN) tablet Take 1 tablet by mouth daily       OLANZapine (ZYPREXA) 5 MG tablet Take 1 tablet (5 mg) by mouth At Bedtime 30 tablet 0     SENNA-docusate sodium (SENNA S) 8.6-50 MG tablet Take 1 tablet by mouth At Bedtime 30 tablet 3         Allergies:        Allergies   Allergen Reactions     Emend [Aprepitant]      Prochlorperazine      \"i was told I turn blue\"        Social History:     Social History     Tobacco Use     Smoking status: Former Smoker     Types: Cigarettes     Smokeless tobacco: Never Used   Substance Use Topics     Alcohol use: Yes     Comment: one drink a week       History   Drug Use No         Family History:     The patient's family history is notable for     Family History   Problem Relation Age of Onset     Aneurysm Mother      Breast Cancer Paternal Grandmother         bilat mastectomies     Breast Cancer Maternal Aunt         stage 1     Thyroid Cancer Sister 23         Physical Exam:     /82 (BP Location: Right arm, Patient Position: Sitting, Cuff Size: Adult Large)   Pulse 98   Temp 97  F (36.1  C) (Tympanic)   Resp 16   Wt 102.2 kg (225 lb 3.2 oz)   SpO2 98%   BMI 37.07 kg/m    Body mass index is 37.07 kg/m .    General Appearance: healthy and alert, no distress     HEENT: no thyromegaly, no palpable nodules or masses "        Cardiovascular: regular rate and rhythm, no gallops, rubs or murmurs     Respiratory: lungs clear, no rales, rhonchi or wheezes    Musculoskeletal: extremities non tender and without edema    Skin: no lesions or rashes     Neurological: normal gait, no gross defects     Psychiatric: appropriate mood and affect                               Hematological: normal cervical, supraclavicular lymph nodes     Gastrointestinal:       abdomen soft, non-tender, non-distended, no organomegaly or masses    Genitourinary: deferred      Assessment:    Francesca Rowland is a 40 year old woman with a diagnosis of 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 She is here today for follow up and disease management.     30 minutes spent on the date of the encounter doing chart review, history and exam, documentation, and further activities as noted above.        Plan:     1.)         Ok to proceed with planned treatment  given labs have met parameters. Patient would like to receive Taxol, Avastin, and Cisplatin given her neuropathy is mild and improved with Cymbalta. Taxotere not approved with insurance. Dose reduce Taxol today to 135 mg/m2. Labs and vitals reviewed. Encouraged 5-6 small high protein meals a day and discussed nausea management and control. Encouraged 64 oz of fluids per day. Discussed neutropenic precautions and nito period. Reviewed signs and symptoms for when she should contact the clinic or seek additional care. Patient to contact the clinic with any questions or concerns in the interim.  -No post treatment imaging per Dr. Canela. She has follow up with MD on 8/23/21.      2.)  Genetic risk factors were assessed again and the patient has multiple family members with cancer diagnosed younger than age 50 including herself (age 40, but with likely HPV related disease) and her sister (thyroid cancer at age 23) among others. She met with genetics  1/2021.    3.) Labs and/or tests ordered include: CBC, CMP, Mg, Protein Urine (reviewed during visit).     4.)   Health maintenance issues addressed today include annual health maintenance and non-gynecologic issues with PCP. Encouraged follow up with PCP for GERD symptoms. Will trial Pepcid today. Encouraged PCP follow up for knee pain if this continues or worsens.     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 Met with [facility] 7/28/21. Will start PT and continue Cymbalta for fatigue and neuropathy. Improvement with Cymbalta per patient     6.)         Diarrhea: resolved.     7.)         Constipation: improved. Senna rx at home for patient use if needed.      8.)          Rx: Pepcid         MIRA Heart, NP-BC  Women's Health Nurse Practitioner  Division of Gynecologic Oncology  [facility]      CC  Patient Care Team:  Tyrese Bowie as PCP - General (Family Medicine)  Tawana Rivera, RN as Specialty Care Coordinator (Gynecologic Oncology)  Jluis Canela MD as MD (Gynecologic Oncology)  Martín Myrick MD as Assigned Neuroscience Provider  Jluis Canela MD as Assigned Cancer Care Provider  Rashmi London MD as MD (Physical Medicine and Rehabilitation)

## 2021-08-17 ENCOUNTER — VIRTUAL VISIT (OUTPATIENT)
Dept: ONCOLOGY | Facility: CLINIC | Age: 41
End: 2021-08-17
Attending: STUDENT IN AN ORGANIZED HEALTH CARE EDUCATION/TRAINING PROGRAM
Payer: COMMERCIAL

## 2021-08-17 DIAGNOSIS — F43.21 ADJUSTMENT DISORDER WITH DEPRESSED MOOD: ICD-10-CM

## 2021-08-17 DIAGNOSIS — C53.9 MALIGNANT NEOPLASM OF CERVIX, UNSPECIFIED SITE (H): Primary | ICD-10-CM

## 2021-08-17 DIAGNOSIS — G62.0 CHEMOTHERAPY-INDUCED NEUROPATHY (H): ICD-10-CM

## 2021-08-17 DIAGNOSIS — T45.1X5A CHEMOTHERAPY-INDUCED NEUROPATHY (H): ICD-10-CM

## 2021-08-17 DIAGNOSIS — R53.81 PHYSICAL DECONDITIONING: ICD-10-CM

## 2021-08-17 PROCEDURE — 90791 PSYCH DIAGNOSTIC EVALUATION: CPT | Mod: 95 | Performed by: PSYCHOLOGIST

## 2021-08-17 NOTE — PROGRESS NOTES
"Francesca Rowland is a 40 year old female who is being evaluated via a billable telephone visit. Phone call duration: 30 minutes      The patient has been notified of following:      \"This telephone visit will be conducted via a call between you and your physician/provider. We have found that certain health care needs can be provided without the need for a physical exam.  This service lets us provide the care you need with a short phone conversation.  If a prescription is necessary we can send it directly to your pharmacy.  If lab work is needed we can place an order for that and you can then stop by our lab to have the test done at a later time.     Telephone visits are billed at different rates depending on your insurance coverage. During this emergency period, for some insurers they may be billed the same as an in-person visit.  Please reach out to your insurance provider with any questions.     If during the course of the call the physician/provider feels a telephone visit is not appropriate, you will not be charged for this service.\"     Patient has given verbal consent for Telephone visit? Yes    Confidential Summary of Oncology Psychology Initial Visit    Francesca Rowland is a 40 year old female who was referred by Dr. London for depressed mood secondary to her illness The patient was seen for an initial 30 minute evaluation on 8/17/2021.    Presenting Concerns: pain, uncertainty, sadness, grief, frustration, irritability, and concern for her family.     Mental Status/Interview:   Orientation: Francesca Rowland was alert, attentive, and oriented to time, place, and person  Affect: Affect was appropriate to the situation and showed normal range and stability.  Speech: Speech was clear with normal fluency, rate, tone, and volume.  Memory: Although not formally assessed, immediate, recent, and remote memory appeared to be intact.   Insight and Judgement: Francesca Rowland demonstrates adequate awareness of the issues " "discussed and was able to come to reasonable conclusions.  Thought: Thought patterns were coherent and logical. Hallucinations were denied and delusions were not evident.   Lethality: Francesca Rowland denied suicidal or homicidal ideation or intention.        Impression: She feels like it may come back and wonders the \"why me?\" question. We discussed this at length and agreed she is doing the best she can to make it through each day.     Diagnosis:   Encounter Diagnoses   Name Primary?     Malignant neoplasm of cervix, unspecified site (H) Yes     Chemotherapy-induced neuropathy (H)      Physical deconditioning      Adjustment disorder with depressed mood      Recommendations/Plan:  1. Use the cognitive techniques to decrease cancer ruminations.   2. Return for follow-up     Thank you for this opportunity to participate in your care of this patient.    Vasu Tavera Psy.D., L.P.  Director, Oncology Supportive Care   "

## 2021-08-18 ENCOUNTER — INFUSION THERAPY VISIT (OUTPATIENT)
Dept: ONCOLOGY | Facility: CLINIC | Age: 41
End: 2021-08-18
Attending: OBSTETRICS & GYNECOLOGY
Payer: COMMERCIAL

## 2021-08-18 ENCOUNTER — LAB (OUTPATIENT)
Dept: LAB | Facility: CLINIC | Age: 41
End: 2021-08-18
Attending: OBSTETRICS & GYNECOLOGY
Payer: COMMERCIAL

## 2021-08-18 VITALS
HEART RATE: 98 BPM | OXYGEN SATURATION: 98 % | TEMPERATURE: 97 F | BODY MASS INDEX: 37.07 KG/M2 | WEIGHT: 225.2 LBS | RESPIRATION RATE: 16 BRPM | SYSTOLIC BLOOD PRESSURE: 125 MMHG | DIASTOLIC BLOOD PRESSURE: 82 MMHG

## 2021-08-18 DIAGNOSIS — T45.1X5A CHEMOTHERAPY INDUCED NAUSEA AND VOMITING: ICD-10-CM

## 2021-08-18 DIAGNOSIS — C53.9 MALIGNANT NEOPLASM OF CERVIX, UNSPECIFIED SITE (H): ICD-10-CM

## 2021-08-18 DIAGNOSIS — D70.1 CHEMOTHERAPY INDUCED NEUTROPENIA (H): ICD-10-CM

## 2021-08-18 DIAGNOSIS — R11.2 CHEMOTHERAPY INDUCED NAUSEA AND VOMITING: ICD-10-CM

## 2021-08-18 DIAGNOSIS — Z51.11 ENCOUNTER FOR ANTINEOPLASTIC CHEMOTHERAPY: ICD-10-CM

## 2021-08-18 DIAGNOSIS — C53.9 MALIGNANT NEOPLASM OF CERVIX, UNSPECIFIED SITE (H): Primary | ICD-10-CM

## 2021-08-18 DIAGNOSIS — T45.1X5A CHEMOTHERAPY INDUCED NEUTROPENIA (H): ICD-10-CM

## 2021-08-18 DIAGNOSIS — K21.00 GASTROESOPHAGEAL REFLUX DISEASE WITH ESOPHAGITIS WITHOUT HEMORRHAGE: ICD-10-CM

## 2021-08-18 DIAGNOSIS — C79.31 BRAIN METASTASIS: ICD-10-CM

## 2021-08-18 DIAGNOSIS — C79.82 METASTATIC ADENOCARCINOMA TO CERVIX (H): ICD-10-CM

## 2021-08-18 DIAGNOSIS — C79.82 METASTATIC ADENOCARCINOMA TO CERVIX (H): Primary | ICD-10-CM

## 2021-08-18 PROCEDURE — 96413 CHEMO IV INFUSION 1 HR: CPT

## 2021-08-18 PROCEDURE — 250N000011 HC RX IP 250 OP 636: Performed by: OBSTETRICS & GYNECOLOGY

## 2021-08-18 PROCEDURE — 96417 CHEMO IV INFUS EACH ADDL SEQ: CPT

## 2021-08-18 PROCEDURE — 250N000009 HC RX 250: Performed by: OBSTETRICS & GYNECOLOGY

## 2021-08-18 PROCEDURE — 258N000003 HC RX IP 258 OP 636: Performed by: OBSTETRICS & GYNECOLOGY

## 2021-08-18 PROCEDURE — 96367 TX/PROPH/DG ADDL SEQ IV INF: CPT

## 2021-08-18 PROCEDURE — 96375 TX/PRO/DX INJ NEW DRUG ADDON: CPT

## 2021-08-18 PROCEDURE — 99214 OFFICE O/P EST MOD 30 MIN: CPT | Performed by: OBSTETRICS & GYNECOLOGY

## 2021-08-18 PROCEDURE — 96372 THER/PROPH/DIAG INJ SC/IM: CPT | Performed by: OBSTETRICS & GYNECOLOGY

## 2021-08-18 PROCEDURE — 96377 APPLICATON ON-BODY INJECTOR: CPT | Mod: 59

## 2021-08-18 PROCEDURE — 250N000013 HC RX MED GY IP 250 OP 250 PS 637: Performed by: OBSTETRICS & GYNECOLOGY

## 2021-08-18 PROCEDURE — 96415 CHEMO IV INFUSION ADDL HR: CPT

## 2021-08-18 PROCEDURE — G0463 HOSPITAL OUTPT CLINIC VISIT: HCPCS

## 2021-08-18 PROCEDURE — 258N000001 HC RX 258: Performed by: OBSTETRICS & GYNECOLOGY

## 2021-08-18 RX ORDER — DIPHENHYDRAMINE HYDROCHLORIDE 50 MG/ML
50 INJECTION INTRAMUSCULAR; INTRAVENOUS
Status: CANCELLED
Start: 2021-08-18

## 2021-08-18 RX ORDER — DEXTROSE, SODIUM CHLORIDE, AND POTASSIUM CHLORIDE 5; .45; .15 G/100ML; G/100ML; G/100ML
1000 INJECTION INTRAVENOUS ONCE
Status: CANCELLED | OUTPATIENT
Start: 2021-08-18

## 2021-08-18 RX ORDER — HEPARIN SODIUM,PORCINE 10 UNIT/ML
5 VIAL (ML) INTRAVENOUS
Status: CANCELLED | OUTPATIENT
Start: 2021-08-18

## 2021-08-18 RX ORDER — HEPARIN SODIUM (PORCINE) LOCK FLUSH IV SOLN 100 UNIT/ML 100 UNIT/ML
5 SOLUTION INTRAVENOUS
Status: CANCELLED | OUTPATIENT
Start: 2021-08-18

## 2021-08-18 RX ORDER — LORAZEPAM 2 MG/ML
0.5 INJECTION INTRAMUSCULAR EVERY 6 HOURS PRN
Status: CANCELLED | OUTPATIENT
Start: 2021-08-18

## 2021-08-18 RX ORDER — ALBUTEROL SULFATE 0.83 MG/ML
2.5 SOLUTION RESPIRATORY (INHALATION)
Status: CANCELLED | OUTPATIENT
Start: 2021-08-18

## 2021-08-18 RX ORDER — ALBUTEROL SULFATE 90 UG/1
1-2 AEROSOL, METERED RESPIRATORY (INHALATION)
Status: CANCELLED
Start: 2021-08-18

## 2021-08-18 RX ORDER — METHYLPREDNISOLONE SODIUM SUCCINATE 125 MG/2ML
125 INJECTION, POWDER, LYOPHILIZED, FOR SOLUTION INTRAMUSCULAR; INTRAVENOUS
Status: CANCELLED
Start: 2021-08-18

## 2021-08-18 RX ORDER — DIPHENHYDRAMINE HCL 25 MG
50 CAPSULE ORAL ONCE
Status: CANCELLED
Start: 2021-08-18

## 2021-08-18 RX ORDER — MEPERIDINE HYDROCHLORIDE 25 MG/ML
25 INJECTION INTRAMUSCULAR; INTRAVENOUS; SUBCUTANEOUS EVERY 30 MIN PRN
Status: CANCELLED | OUTPATIENT
Start: 2021-08-18

## 2021-08-18 RX ORDER — PALONOSETRON 0.05 MG/ML
0.25 INJECTION, SOLUTION INTRAVENOUS ONCE
Status: COMPLETED | OUTPATIENT
Start: 2021-08-18 | End: 2021-08-18

## 2021-08-18 RX ORDER — FAMOTIDINE 10 MG
10 TABLET ORAL 2 TIMES DAILY
Qty: 60 TABLET | Refills: 1 | Status: SHIPPED | OUTPATIENT
Start: 2021-08-18

## 2021-08-18 RX ORDER — HEPARIN SODIUM (PORCINE) LOCK FLUSH IV SOLN 100 UNIT/ML 100 UNIT/ML
5 SOLUTION INTRAVENOUS
Status: DISCONTINUED | OUTPATIENT
Start: 2021-08-18 | End: 2021-08-18 | Stop reason: HOSPADM

## 2021-08-18 RX ORDER — DIPHENHYDRAMINE HCL 25 MG
50 CAPSULE ORAL ONCE
Status: COMPLETED | OUTPATIENT
Start: 2021-08-18 | End: 2021-08-18

## 2021-08-18 RX ORDER — EPINEPHRINE 1 MG/ML
0.3 INJECTION, SOLUTION INTRAMUSCULAR; SUBCUTANEOUS EVERY 5 MIN PRN
Status: CANCELLED | OUTPATIENT
Start: 2021-08-18

## 2021-08-18 RX ORDER — DEXTROSE, SODIUM CHLORIDE, AND POTASSIUM CHLORIDE 5; .45; .15 G/100ML; G/100ML; G/100ML
1000 INJECTION INTRAVENOUS ONCE
Status: COMPLETED | OUTPATIENT
Start: 2021-08-18 | End: 2021-08-18

## 2021-08-18 RX ORDER — SODIUM CHLORIDE 9 MG/ML
1000 INJECTION, SOLUTION INTRAVENOUS CONTINUOUS PRN
Status: CANCELLED
Start: 2021-08-18

## 2021-08-18 RX ORDER — PALONOSETRON 0.05 MG/ML
0.25 INJECTION, SOLUTION INTRAVENOUS ONCE
Status: CANCELLED | OUTPATIENT
Start: 2021-08-18

## 2021-08-18 RX ORDER — NALOXONE HYDROCHLORIDE 0.4 MG/ML
.1-.4 INJECTION, SOLUTION INTRAMUSCULAR; INTRAVENOUS; SUBCUTANEOUS
Status: CANCELLED | OUTPATIENT
Start: 2021-08-18

## 2021-08-18 RX ADMIN — CISPLATIN 110 MG: 1 INJECTION, SOLUTION INTRAVENOUS at 14:47

## 2021-08-18 RX ADMIN — SODIUM CHLORIDE 1000 ML: 9 INJECTION, SOLUTION INTRAVENOUS at 09:21

## 2021-08-18 RX ADMIN — DIPHENHYDRAMINE HYDROCHLORIDE 50 MG: 25 CAPSULE ORAL at 09:21

## 2021-08-18 RX ADMIN — PEGFILGRASTIM 6 MG: KIT SUBCUTANEOUS at 15:21

## 2021-08-18 RX ADMIN — Medication 5 ML: at 17:18

## 2021-08-18 RX ADMIN — Medication 5 ML: at 07:19

## 2021-08-18 RX ADMIN — DEXAMETHASONE SODIUM PHOSPHATE 12 MG: 10 INJECTION, SOLUTION INTRAMUSCULAR; INTRAVENOUS at 09:44

## 2021-08-18 RX ADMIN — BEVACIZUMAB-AWWB 1500 MG: 400 INJECTION, SOLUTION INTRAVENOUS at 15:48

## 2021-08-18 RX ADMIN — FAMOTIDINE 40 MG: 10 INJECTION INTRAVENOUS at 09:26

## 2021-08-18 RX ADMIN — POTASSIUM CHLORIDE, DEXTROSE MONOHYDRATE AND SODIUM CHLORIDE 1000 ML: 150; 5; 450 INJECTION, SOLUTION INTRAVENOUS at 14:44

## 2021-08-18 RX ADMIN — PACLITAXEL 294 MG: 6 INJECTION, SOLUTION INTRAVENOUS at 11:35

## 2021-08-18 RX ADMIN — PALONOSETRON HYDROCHLORIDE 0.25 MG: 0.25 INJECTION, SOLUTION INTRAVENOUS at 09:23

## 2021-08-18 ASSESSMENT — PAIN SCALES - GENERAL: PAINLEVEL: NO PAIN (0)

## 2021-08-18 NOTE — LETTER
2021         RE: Francesca Rowland  [address]  [place] [address]        Dear Colleague,    Thank you for referring your patient, Francesca Rowland, to the [facility]. Please see a copy of my visit note below.                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 She is here today for follow up and disease management.     Oncology History:      Patient initially presented as a 27-year-old woman seen in referral due to a Pap smear showing high-grade squamous intraepithelial lesion that was positive for high risk HPV subtype in 2007. This Pap smear had been taken during her six week postpartum visit. Patient did have a remote history of abnormal Pap smears back in  while serving in [place] for the [employer].  Pap smears during her first pregnancy were all negative. Her Pap smear at the beginning of the most recent pregnancy was normal. Patient did have a colposcopy for evaluation of this abnormal Pap smear on 2007 and cervical biopsies of 6 and 12 o'clock position showed features consistent with papillary serous adenocarcinoma. Endocervical curettings were also taken which showed fragments of papillary serous carcinoma. The patient then underwent a LEEP procedure on 10/15/07 which showed microinvasive adenocarcinoma with a depth of invasion of 0.4 mm. Patient then made the decision to proceed with radical hysterectomy. She subsequently underwent a radical hysterectomy, bilateral salpingectomy, bilateral pelvic and periaortic lymph node dissection on 10/19/07. Patient's operative course was otherwise uncomplicated and she recoverred uneventfully.  Pathology did reveal patient to be stage IA2 adenocarcinoma of the  cervix.      She underwent routine surveillence through 2014 complicated only by SIOBHAN 1     She recently presented after incidentally being found to have an enlarged pericaval lymph node.  She presented to the ED at [facility] on 9/14/2019 with severe 8.5/10 RUQ pain associated with nausea, one episode of emesis, and decreased appetite. RUQ demonstrated cholelithiasis without cholecystitis, so an abdominal and pelvic CT scan was obtained. This revealed post-surgical changes consistent with prior hysterectomy, left adnexal cysts thought to be ovarian, and an enlarged pericaval lymph node suspicious for metastatic disease vs primary lymphoma. Her condition stabilized and she was discharged home with referrals to general surgery and oncology for biliary colic and further management of the lymphadenopathy, respectively. CT-guided right retroperitoneal lymph node biopsy on 10/15/2019 was consistent with metastatic cervical carcinoma     She underwent surgery to remove the mass and determine the extent of disease on 11/13/19     PATH:  FINAL DIAGNOSIS:   LYMPH NODES, PARA-AORTIC, RESECTION:   - Positive for carcinoma in three of four lymph nodes examined (3/4),   consistent with recurrent endocervical   adenocarcinoma   - Largest metastatic focus is 3.5 cm, positive for extra yuridia extension         Chemo-Potentiated RT 12/2019-1/2020 2/2020 CT:  IMPRESSION: Postoperative changes of hysterectomy and lymph node  resection for cervical cancer. The metastatic right pelvic lymph nodes  which were previously identified have decreased in size. No new  metastases identified.      She recently presented to the ED for an acute RUQ pain episode, which has greatly improved despite no clear diagnosis.  During this visit she underwent a CAP CT which did not demonstrate evidence of cancer but a slightly enlarged spleen. She had a mild eosinophilia (~2%) but has not traveled or eaten exotic food recently, and has not had any new  contacts to suggest mono.      10/13/2020 CT:  IMPRESSION:  1.  Recurrent right external iliac lymphadenopathy.  2.  Rebound thymic hyperplasia.     10/30/2020 PET  IMPRESSION:   In this patient with history of metastatic cervical cancer status-post  radical hysterectomy and chemoradiation, there is evidence of  recurrent disease:  1. Increased size of a hypermetabolic right external iliac chain lymph node since 2/27/2020.  2. Hypermetabolic right subpectoral and axillary lymph nodes.  Recommend follow-up in the breast center for axillary ultrasound and  possible tissue sampling.  3. Idiopathic thymic activation/hyperplasia.     She was evaluated in the breast clinic with plan made for biopsy - however at biopsy the node had shruck considerably and was thus felt to be c/w inflammation (no biopsy obtained)     3/8/2021 MRI Brain (for dizziness)  IMPRESSION:  1. Heterogeneous mixed solid and cystic 2.7 cm mass in the right cerebellum, most likely a solitary metastatic lesion. There is moderate surrounding vasogenic edema as well as mass effect on the fourth ventricle. No obstructive hydrocephalus.    2. Unremarkable MR angiogram of the head and neck.     3/15/2021 Craniotomy ([facility])  1. Midline suboccipital craniotomy for resection of mass  2. Right frontal ventriculostomy  3. Stereotactic navigation  4. Use of operating microscope     4/16/21 Completed 5 fx of gamma knife ([employer])     Plan: Paclitaxel, Cisplatin, Avastin     4/28/2021: Cycle #1 Paclitaxel, Cisplatin, Avastin  5/19/2021: Cycle #2 Paclitaxel/Cisplatin/Avastin +Neulasta  6/9/2021: Cycle #3 Paclitaxel/Cisplatin/Avastin +Neulasta  6/30/2021: Cycle #4 Paclitaxel/Cisplatin/Avastin +Neulasta    6/28/2021 plan: PET scan for interval assessment.  Will plan for 6 cycles followed by possible use of Keytruda versus observation.     7/15/2021: PET CT: IMPRESSION: In this patient with history of cervical cancer who  completed chemoradiation:  1. Resolved uptake  to the right pelvic lymph node, consistent with  response to treatment.  2. No new hypermetabolic lesions.  3. No definite abnormal intracranial uptake. Please note that PET-CT  is less sensitive for intracranial disease due to normal uptake to the  brain. Consider continued following of the previously seen lesions and  surgical changes with MRI if clinically indicated.      7/21/2021: Cycle #5 Paclitaxel/Cisplatin/Avastin +Neulasta  8/11/2021: Cycle #6 Taxotere/Cisplatin/Avastin + Neulasta (Change to Taxotere due to neuropathy) deferred due to insurance  8/18/2021: Cycle #6 Paclitaxel/Cisplatin/Avastin (dose reduced Taxol to 135 mg/m2 due to insurance coverage with Taxotere and patient in agreement with plan- neuropathy mild and improved) +Neulasta        Today Francesca comes to the clinic overall feeling well. No nausea after the last infusion. She is drinking about 2-3 L of water per day. Her nausea after treatments is controlled with nightly Zyprexa for a few days post chemo, Decadron days 2-4 and Zofran started 72 hours after treatment. She does not have any baseline neuropathy or hearing loss. She does have new neuropathy in the bottom of feet intermittent that lasted a few days after cycle 2. No neuropathy in her hands. She does notice tingling for fews days after chemotherapy and then almost resolves. She is taking Vitamin B6 and L-glutamine started around cycle 4 and has notice improvement with this. This did progress and worsen after C5. She has started Cymbalta with [facility] on 7/28/2021. She has noticed improvement with this already in her feet. She reports tingling with pain reduction now. Fatigue is ongoing but will start PT soon. She is napping 1-3 hours per day. She is sleeping well at night and is taking Melatonin to help fall asleep easier. She is not exercising. She did not have any myalgias with Neulasta. Is taking Claritin for 7 days post. She is now having regular bowel movements without  diarrhea. She is having pain in right knee over the last week and is unsure cause. She is about to try Volteran gel for this. She denies any vaginal bleeding, no changes in her bladder habits, no lower extremity edema, and no difficulties eating. She denies any abdominal discomfort/bloating, no fevers or chills, and no chest pain or shortness of breath.         Review of Systems:    Systemic           no weight changes; no fever; no chills; no night sweats; no appetite changes  Skin           no rashes, or lesions  Eye           no irritation; no changes in vision  Payton-Laryngeal           no dysphagia; no hoarseness   Pulmonary    no cough; no shortness of breath  Cardiovascular    no chest pain; no palpitations  Gastrointestinal    + diarrhea; + constipation; no abdominal pain; no changes in bowel  habits; no blood in stool  Genitourinary   no urinary frequency; no urinary urgency; no dysuria; no pain; no abnormal vaginal discharge; no abnormal vaginal bleeding  Breast    no breast discharge; no breast changes; no breast pain  Musculoskeletal    no myalgias; no arthralgias; no back pain  Psychiatric           no depressed mood; no anxiety    Hematologic           no tender lymph nodes; no noticeable swellings or lumps   Endocrine    no hot flashes; no heat/cold intolerance         Neurological   no tremor; + numbness and tingling; no headaches; no difficulty  sleeping      Past Medical History:    Past Medical History:   Diagnosis Date     Chronic cholecystitis 09/25/2019     History of cervical cancer 2007     Metastasis to brain (H) 03/08/2021     Metastatic adenocarcinoma to cervix (H) 10/28/2019    Added automatically from request for surgery [id number]         Past Surgical History:    Past Surgical History:   Procedure Laterality Date     CHOLECYSTECTOMY, LAPOROSCOPIC  09/25/2019     HYSTERECTOMY RADICAL  2007    PAUL     INSERT PORT VASCULAR ACCESS Right 5/5/2021    Procedure: INSERTION, VASCULAR ACCESS PORT;   Surgeon: Oral Toledo MD;  Location: [facility] OR     IR CHEST PORT PLACEMENT > 5 YRS OF AGE  5/5/2021     LAPAROSCOPIC LYMPHADENECTOMY N/A 11/13/2019    Procedure: LAPAROSCOPY, resection of of paraaortic lymph node, lysis of adhesions;  Surgeon: Jluis Canela MD;  Location: [facility] OR     OPTICAL TRACKING SYSTEM CRANIOTOMY, EXCISE TUMOR, COMBINED Bilateral 3/15/2021    Procedure: stealth assisted Midline suboccipital craniectomy/craniotomy for tumor;  Surgeon: Martín Myrick MD;  Location: [facility] OR     OPTICAL TRACKING SYSTEM VENTRICULOSTOMY Right 3/15/2021    Procedure: stealth assisted  right frontal ventriculostomy;  Surgeon: Martín Myrick MD;  Location: [facility] OR         Health Maintenance Due   Topic Date Due     PREVENTIVE CARE VISIT  Never done     ADVANCE CARE PLANNING  Never done     DEPRESSION ACTION PLAN  Never done     PHQ-9  Never done     Pneumococcal Vaccine: Pediatrics (0 to 5 Years) and At-Risk Patients (6 to 64 Years) (1 of 4 - PCV13) Never done     HIV SCREENING  Never done     HEPATITIS C SCREENING  Never done     PAP  07/14/2017       Current Medications:     Current Outpatient Medications   Medication Sig Dispense Refill     acetaminophen (TYLENOL) 325 MG tablet Take 2 tablets (650 mg) by mouth every 6 hours 24 tablet 0     Ascorbic Acid (VITAMIN C GUMMIE PO) Take by mouth daily       dexamethasone (DECADRON) 4 MG tablet Take 2 tablets (8 mg) by mouth 2 times daily (with meals) for 6 doses Take two tablets (8 mg) the evening of chemotherapy and take two tablets (8 mg) the next morning and night following chemotherapy. Take this schedule with each chemotherapy with Taxotere. 12 tablet 0     dexamethasone (DECADRON) 4 MG tablet Take 2 tablets (8 mg) by mouth daily (with breakfast) Take 2 tablets by mouth with food on days 2-4 after chemotherapy. 12 tablet 3     DULoxetine (CYMBALTA) 30 MG capsule Take 1 capsule (30 mg) by mouth daily Take 1 capsule (30 mg) daily for 1  "week. Increase to 2 capsules (60 mg) daily from week 2 onwards. 60 capsule 1     famotidine (PEPCID) 10 MG tablet Take 1 tablet (10 mg) by mouth 2 times daily 60 tablet 1     ibuprofen (ADVIL/MOTRIN) 200 MG tablet Take 1 tablet (200 mg) by mouth every 4 hours as needed for mild pain       lidocaine-prilocaine (EMLA) 2.5-2.5 % external cream Apply topically as needed for moderate pain (Patient not taking: Reported on 6/28/2021) 30 g 1     loperamide (IMODIUM A-D) 2 MG tablet Take 1 tablet (2 mg) by mouth 4 times daily as needed for diarrhea Can take one 2 mg capsule after each loose stool up to 4 times a day as needed for diarrhea. This can be increased to 4 mg 4 times a day or 2 mg up to 8 times a day. 60 tablet 3     LORazepam (ATIVAN) 0.5 MG tablet Take 1 tablet (0.5 mg) by mouth every 6 hours as needed for nausea (Patient not taking: Reported on 6/28/2021) 20 tablet 0     multivitamin w/minerals (MULTI-VITAMIN) tablet Take 1 tablet by mouth daily       OLANZapine (ZYPREXA) 5 MG tablet Take 1 tablet (5 mg) by mouth At Bedtime 30 tablet 0     SENNA-docusate sodium (SENNA S) 8.6-50 MG tablet Take 1 tablet by mouth At Bedtime 30 tablet 3         Allergies:        Allergies   Allergen Reactions     Emend [Aprepitant]      Prochlorperazine      \"i was told I turn blue\"        Social History:     Social History     Tobacco Use     Smoking status: Former Smoker     Types: Cigarettes     Smokeless tobacco: Never Used   Substance Use Topics     Alcohol use: Yes     Comment: one drink a week       History   Drug Use No         Family History:     The patient's family history is notable for     Family History   Problem Relation Age of Onset     Aneurysm Mother      Breast Cancer Paternal Grandmother         bilat mastectomies     Breast Cancer Maternal Aunt         stage 1     Thyroid Cancer Sister 23         Physical Exam:     /82 (BP Location: Right arm, Patient Position: Sitting, Cuff Size: Adult Large)   Pulse 98  "  Temp 97  F (36.1  C) (Tympanic)   Resp 16   Wt 102.2 kg (225 lb 3.2 oz)   SpO2 98%   BMI 37.07 kg/m    Body mass index is 37.07 kg/m .    General Appearance: healthy and alert, no distress     HEENT: no thyromegaly, no palpable nodules or masses        Cardiovascular: regular rate and rhythm, no gallops, rubs or murmurs     Respiratory: lungs clear, no rales, rhonchi or wheezes    Musculoskeletal: extremities non tender and without edema    Skin: no lesions or rashes     Neurological: normal gait, no gross defects     Psychiatric: appropriate mood and affect                               Hematological: normal cervical, supraclavicular lymph nodes     Gastrointestinal:       abdomen soft, non-tender, non-distended, no organomegaly or masses    Genitourinary: deferred      Assessment:    Francesca Rowland is a 40 year old woman with a diagnosis of 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 She is here today for follow up and disease management.     30 minutes spent on the date of the encounter doing chart review, history and exam, documentation, and further activities as noted above.        Plan:     1.)         Ok to proceed with planned treatment  given labs have met parameters. Patient would like to receive Taxol, Avastin, and Cisplatin given her neuropathy is mild and improved with Cymbalta. Taxotere not approved with insurance. Dose reduce Taxol today to 135 mg/m2. Labs and vitals reviewed. Encouraged 5-6 small high protein meals a day and discussed nausea management and control. Encouraged 64 oz of fluids per day. Discussed neutropenic precautions and nito period. Reviewed signs and symptoms for when she should contact the clinic or seek additional care. Patient to contact the clinic with any questions or concerns in the interim.  -No post treatment imaging per Dr. Canela. She has follow up with MD on 8/23/21.      2.)  Genetic risk factors were  assessed again and the patient has multiple family members with cancer diagnosed younger than age 50 including herself (age 40, but with likely HPV related disease) and her sister (thyroid cancer at age 23) among others. She met with genetics 1/2021.    3.) Labs and/or tests ordered include: CBC, CMP, Mg, Protein Urine (reviewed during visit).     4.)   Health maintenance issues addressed today include annual health maintenance and non-gynecologic issues with PCP. Encouraged follow up with PCP for GERD symptoms. Will trial Pepcid today. Encouraged PCP follow up for knee pain if this continues or worsens.     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 Met with [facility] 7/28/21. Will start PT and continue Cymbalta for fatigue and neuropathy. Improvement with Cymbalta per patient     6.)         Diarrhea: resolved.     7.)         Constipation: improved. Senna rx at home for patient use if needed.      8.)          Rx: Pepcid         MIRA Heart, NP-BC  Women's Health Nurse Practitioner  Division of Gynecologic Oncology  [facility]      CC  Patient Care Team:  Tyrese Bowie as PCP - General (Family Medicine)  Tawana Rivera, RN as Specialty Care Coordinator (Gynecologic Oncology)  Jluis Canela MD as MD (Gynecologic Oncology)  Martín Myrick MD as Assigned Neuroscience Provider  Rashmi London MD as MD (Physical Medicine and Rehabilitation)

## 2021-08-18 NOTE — NURSING NOTE
"Oncology Rooming Note    August 18, 2021 7:44 AM   Francesca Rowland is a 40 year old female who presents for:    Chief Complaint   Patient presents with     Port Draw     port accessed and labs drawn by rn in lab.  vital signs taken.     Oncology Clinic Visit     CERCIVAL CANCER     Initial Vitals: /82 (BP Location: Right arm, Patient Position: Sitting, Cuff Size: Adult Large)   Pulse 98   Temp 97  F (36.1  C) (Tympanic)   Resp 16   Wt 102.2 kg (225 lb 3.2 oz)   SpO2 98%   BMI 37.07 kg/m   Estimated body mass index is 37.07 kg/m  as calculated from the following:    Height as of 5/19/21: 1.66 m (5' 5.35\").    Weight as of this encounter: 102.2 kg (225 lb 3.2 oz). Body surface area is 2.17 meters squared.  No Pain (0) Comment: Data Unavailable   No LMP recorded. Patient has had a hysterectomy.  Allergies reviewed: Yes  Medications reviewed: Yes    Medications: MEDICATION REFILLS NEEDED TODAY. Provider was notified.  Pharmacy name entered into [facility]: [id number] - [place], [place] - [facility]ly [facility] [address]    Clinical concerns: Refill pepcid.        Aria Barba CMA            "

## 2021-08-18 NOTE — NURSING NOTE
"Chief Complaint   Patient presents with     Port Draw     port accessed and labs drawn by rn in lab.  vital signs taken.     Port accessed by RN in lab with 20g 3/4\" gripper needle, labs drawn, port flushed with saline and heparin, vitals checked, pt checked in for next appointment.    Arielle Mcgee RN      "

## 2021-08-18 NOTE — PROGRESS NOTES
Infusion Nursing Note:  Francesca Rowland presents today for Cycle 6 Day 1 Taxol, Cisplatin, MVASI and Neulasta OnPro.    Patient seen by provider today: Yes: Terra Zelaya NP   present during visit today: Not Applicable.    Note: Patient arrives to infusion today doing well. No new questions or concerns following visit with Terra Zelaya NP. Unable to switch to Taxotere due to insurance coverage. Plan is to receive Taxol again today, but at a reduced dose. Patient is agreeable to plan.     Intravenous Access:  Implanted Port.    Treatment Conditions:  Lab Results   Component Value Date    HGB 11.8 08/18/2021     Lab Results   Component Value Date    WBC 3.6 08/18/2021     Lab Results   Component Value Date    ANEU 2.3 06/28/2021     Lab Results   Component Value Date     08/18/2021     Lab Results   Component Value Date     08/18/2021             Lab Results   Component Value Date    POTASSIUM 4.1 08/18/2021           Lab Results   Component Value Date    MAG 1.8 08/18/2021        Lab Results   Component Value Date    CR 0.90 08/18/2021               Lab Results   Component Value Date    SHAD 9.4 08/18/2021              Lab Results   Component Value Date    BILITOTAL 0.4 08/18/2021      Lab Results   Component Value Date    ALBUMIN 3.7 08/18/2021              Lab Results   Component Value Date    ALT 22 08/18/2021       Lab Results   Component Value Date    AST 17 08/18/2021     Results reviewed, labs MET treatment parameters, ok to proceed with treatment.  Patient voided pre and post Cisplatin infusion.   Urine: Trace  BP WNL    Post Infusion Assessment:  Patient tolerated infusion without incident.  Blood return noted pre and post infusion.  Site patent and intact, free from redness, edema or discomfort.  No evidence of extravasations.  Access discontinued per protocol.     Neulasta Onpro On-Body injector applied to Left Abdomen at 1520 with light facing up.  Writer discussed Neulasta  injection would start tomorrow 8/19/2021 at 1820, approximately 27 hours after application applied today.  Written and Verbal instruction reviewed with patient.  Pt instructed when the dose delivery starts, it will take about 45 minutes to complete.  Pt aware Neulasta Onpro On-Body should have green flashing light and to call triage or on-call MD if injector flashes red or appears to be leaking. Pt aware to keep Onpro On-Body Neulasta 4 inches away from electrical equipment and to avoid showering 4 hours prior to injection.   Neulasta Onpro Lot number: See MAR    Discharge Plan:   Prescription refills given for Pepcid.  Discharge instructions reviewed with: Patient.  Patient and/or family verbalized understanding of discharge instructions and all questions answered.  AVS to patient via [employer].  Patient will return 8/23/2021 for next appointment with Dr. Canela.   Patient discharged in stable condition accompanied by: self.  Departure Mode: Ambulatory.      Kyung Bliss RN

## 2021-08-21 ENCOUNTER — ANCILLARY PROCEDURE (OUTPATIENT)
Dept: MRI IMAGING | Facility: CLINIC | Age: 41
End: 2021-08-21
Attending: NEUROLOGICAL SURGERY
Payer: COMMERCIAL

## 2021-08-21 DIAGNOSIS — C79.31 METASTASIS TO BRAIN (H): ICD-10-CM

## 2021-08-21 PROCEDURE — 70553 MRI BRAIN STEM W/O & W/DYE: CPT | Mod: GC | Performed by: STUDENT IN AN ORGANIZED HEALTH CARE EDUCATION/TRAINING PROGRAM

## 2021-08-21 PROCEDURE — A9585 GADOBUTROL INJECTION: HCPCS | Performed by: STUDENT IN AN ORGANIZED HEALTH CARE EDUCATION/TRAINING PROGRAM

## 2021-08-21 RX ORDER — GADOBUTROL 604.72 MG/ML
10 INJECTION INTRAVENOUS ONCE
Status: COMPLETED | OUTPATIENT
Start: 2021-08-21 | End: 2021-08-21

## 2021-08-21 RX ADMIN — GADOBUTROL 10 ML: 604.72 INJECTION INTRAVENOUS at 14:10

## 2021-08-21 NOTE — DISCHARGE INSTRUCTIONS
MRI Contrast Discharge Instructions    The IV contrast you received today will pass out of your body in your  urine. This will happen in the next 24 hours. You will not feel this process.  Your urine will not change color.    Drink at least 4 extra glasses of water or juice today (unless your doctor  has restricted your fluids). This reduces the stress on your kidneys.  You may take your regular medicines.    If you are on dialysis: It is best to have dialysis today.    If you have a reaction: Most reactions happen right away. If you have  any new symptoms after leaving the hospital (such as hives or swelling),  call your hospital at the correct number below. Or call your family doctor.  If you have breathing distress or wheezing, call 911.    Special instructions: ***    I have read and understand the above information.    Signature:______________________________________ Date:___________    Staff:__________________________________________ Date:___________     Time:__________    [facility] Departments:    ___[place]: [phone]  ___[facility]: [phone]  ___[place]: [phone] ___[facility]: [phone]  ___[facility]: [phone]  ___[facility]: [phone]  ___[phone]  ___[facility]: [phone]  ___[phone]

## 2021-08-23 ENCOUNTER — ONCOLOGY VISIT (OUTPATIENT)
Dept: ONCOLOGY | Facility: CLINIC | Age: 41
End: 2021-08-23
Attending: OBSTETRICS & GYNECOLOGY
Payer: COMMERCIAL

## 2021-08-23 VITALS
SYSTOLIC BLOOD PRESSURE: 139 MMHG | TEMPERATURE: 98.6 F | HEART RATE: 92 BPM | DIASTOLIC BLOOD PRESSURE: 95 MMHG | WEIGHT: 225.1 LBS | HEIGHT: 65 IN | BODY MASS INDEX: 37.5 KG/M2 | OXYGEN SATURATION: 98 % | RESPIRATION RATE: 16 BRPM

## 2021-08-23 DIAGNOSIS — C79.82 METASTATIC ADENOCARCINOMA TO CERVIX (H): Primary | ICD-10-CM

## 2021-08-23 PROCEDURE — G0463 HOSPITAL OUTPT CLINIC VISIT: HCPCS

## 2021-08-23 PROCEDURE — 99214 OFFICE O/P EST MOD 30 MIN: CPT | Performed by: OBSTETRICS & GYNECOLOGY

## 2021-08-23 ASSESSMENT — PAIN SCALES - GENERAL: PAINLEVEL: NO PAIN (0)

## 2021-08-23 ASSESSMENT — MIFFLIN-ST. JEOR: SCORE: 1681.93

## 2021-08-23 NOTE — PROGRESS NOTES
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 She is here today for follow up and disease management.     Oncology History:      Patient initially presented as a 27-year-old woman seen in referral due to a Pap smear showing high-grade squamous intraepithelial lesion that was positive for high risk HPV subtype in 2007. This Pap smear had been taken during her six week postpartum visit. Patient did have a remote history of abnormal Pap smears back in  while serving in [place] for the [employer]  Pap smears during her first pregnancy were all negative. Her Pap smear at the beginning of the most recent pregnancy was normal. Patient did have a colposcopy for evaluation of this abnormal Pap smear on 2007 and cervical biopsies of 6 and 12 o'clock position showed features consistent with papillary serous adenocarcinoma. Endocervical curettings were also taken which showed fragments of papillary serous carcinoma. The patient then underwent a LEEP procedure on 10/15/07 which showed microinvasive adenocarcinoma with a depth of invasion of 0.4 mm. Patient then made the decision to proceed with radical hysterectomy. She subsequently underwent a radical hysterectomy, bilateral salpingectomy, bilateral pelvic and periaortic lymph node dissection on 10/19/07. Patient's operative course was otherwise uncomplicated and she recoverred uneventfully.  Pathology did reveal patient to be stage IA2 adenocarcinoma of the cervix.      She underwent routine surveillence through  complicated only by SIOBHAN 1     She recently presented after incidentally being found to have an enlarged pericaval lymph node.  She presented to the ED at [facility] on 2019 with severe 8.5/10 RUQ pain associated with  nausea, one episode of emesis, and decreased appetite. RUQ demonstrated cholelithiasis without cholecystitis, so an abdominal and pelvic CT scan was obtained. This revealed post-surgical changes consistent with prior hysterectomy, left adnexal cysts thought to be ovarian, and an enlarged pericaval lymph node suspicious for metastatic disease vs primary lymphoma. Her condition stabilized and she was discharged home with referrals to general surgery and oncology for biliary colic and further management of the lymphadenopathy, respectively. CT-guided right retroperitoneal lymph node biopsy on 10/15/2019 was consistent with metastatic cervical carcinoma     She underwent surgery to remove the mass and determine the extent of disease on 11/13/19     PATH:  FINAL DIAGNOSIS:   LYMPH NODES, PARA-AORTIC, RESECTION:   - Positive for carcinoma in three of four lymph nodes examined (3/4),   consistent with recurrent endocervical   adenocarcinoma   - Largest metastatic focus is 3.5 cm, positive for extra yuridia extension         Chemo-Potentiated RT 12/2019-1/2020 2/2020 CT:  IMPRESSION: Postoperative changes of hysterectomy and lymph node  resection for cervical cancer. The metastatic right pelvic lymph nodes  which were previously identified have decreased in size. No new  metastases identified.      She recently presented to the ED for an acute RUQ pain episode, which has greatly improved despite no clear diagnosis.  During this visit she underwent a CAP CT which did not demonstrate evidence of cancer but a slightly enlarged spleen. She had a mild eosinophilia (~2%) but has not traveled or eaten exotic food recently, and has not had any new contacts to suggest mono.      10/13/2020 CT:  IMPRESSION:  1.  Recurrent right external iliac lymphadenopathy.  2.  Rebound thymic hyperplasia.     10/30/2020 PET  IMPRESSION:   In this patient with history of metastatic cervical cancer status-post  radical hysterectomy and  chemoradiation, there is evidence of  recurrent disease:  1. Increased size of a hypermetabolic right external iliac chain lymph node since 2/27/2020.  2. Hypermetabolic right subpectoral and axillary lymph nodes.  Recommend follow-up in the breast center for axillary ultrasound and  possible tissue sampling.  3. Idiopathic thymic activation/hyperplasia.     She was evaluated in the breast clinic with plan made for biopsy - however at biopsy the node had shruck considerably and was thus felt to be c/w inflammation (no biopsy obtained)     3/8/2021 MRI Brain (for dizziness)  IMPRESSION:  1. Heterogeneous mixed solid and cystic 2.7 cm mass in the right cerebellum, most likely a solitary metastatic lesion. There is moderate surrounding vasogenic edema as well as mass effect on the fourth ventricle. No obstructive hydrocephalus.    2. Unremarkable MR angiogram of the head and neck.     [facility]  1. Midline suboccipital craniotomy for resection of mass  2. Right frontal ventriculostomy  3. Stereotactic navigation  4. Use of operating microscope     4/16/21 Completed 5 fx of gamma knife ([facility])     Plan: Paclitaxel, Cisplatin, Avastin     4/28/2021: Cycle #1 Paclitaxel, Cisplatin, Avastin  5/19/2021: Cycle #2 Paclitaxel/Cisplatin/Avastin +Neulasta  6/9/2021: Cycle #3 Paclitaxel/Cisplatin/Avastin +Neulasta  6/30/2021: Cycle #4 Paclitaxel/Cisplatin/Avastin +Neulasta    6/28/2021 plan: PET scan for interval assessment.  Will plan for 6 cycles followed by possible use of Keytruda versus observation.     7/15/2021: PET CT: IMPRESSION: In this patient with history of cervical cancer who  completed chemoradiation:  1. Resolved uptake to the right pelvic lymph node, consistent with  response to treatment.  2. No new hypermetabolic lesions.  3. No definite abnormal intracranial uptake. Please note that PET-CT  is less sensitive for intracranial disease due to normal uptake to the  brain. Consider continued  following of the previously seen lesions and  surgical changes with MRI if clinically indicated.      7/21/2021: Cycle #5 Paclitaxel/Cisplatin/Avastin +Neulasta  8/11/2021: Cycle #6 Taxotere/Cisplatin/Avastin + Neulasta (Change to Taxotere due to neuropathy) deferred due to insurance  8/18/2021: Cycle #6 Paclitaxel/Cisplatin/Avastin (dose reduced Taxol to 135 mg/m2 due to insurance coverage with Taxotere and patient in agreement with plan- neuropathy mild and improved) +Neulasta    8/21/2021 MRI (HEAD):  Impression: In this patient with history of metastatic carcinoma to  the right cerebellum:  There is no definite evidence of disease recurrence. Decreased  resection cavity enhancement. Previous right occipital lobe  enhancement is completely resolved. No new enhancing intracranial  lesions.    She presents today for treatment planning options    Review of Systems:    Systemic           no weight changes; no fever; no chills; no night sweats; no appetite changes  Skin           no rashes, or lesions  Eye           no irritation; no changes in vision  Payton-Laryngeal           no dysphagia; no hoarseness   Pulmonary    no cough; no shortness of breath  Cardiovascular    no chest pain; no palpitations  Gastrointestinal    + diarrhea; + constipation; no abdominal pain; no changes in bowel  habits; no blood in stool  Genitourinary   no urinary frequency; no urinary urgency; no dysuria; no pain; no abnormal vaginal discharge; no abnormal vaginal bleeding  Breast    no breast discharge; no breast changes; no breast pain  Musculoskeletal    no myalgias; no arthralgias; no back pain  Psychiatric           no depressed mood; no anxiety    Hematologic           no tender lymph nodes; no noticeable swellings or lumps   Endocrine    no hot flashes; no heat/cold intolerance         Neurological   no tremor; + numbness and tingling; no headaches; no difficulty  sleeping      Past Medical History:    Past Medical History:    Diagnosis Date     Chronic cholecystitis 09/25/2019     History of cervical cancer 2007     Metastasis to brain (H) 03/08/2021     Metastatic adenocarcinoma to cervix (H) 10/28/2019    Added automatically from request for surgery [id number]         Past Surgical History:    Past Surgical History:   Procedure Laterality Date     CHOLECYSTECTOMY, LAPOROSCOPIC  09/25/2019     HYSTERECTOMY RADICAL  2007    PAUL     INSERT PORT VASCULAR ACCESS Right 5/5/2021    Procedure: INSERTION, VASCULAR ACCESS PORT;  Surgeon: Oral Toledo MD;  Location: [facility] OR     IR CHEST PORT PLACEMENT > 5 YRS OF AGE  5/5/2021     LAPAROSCOPIC LYMPHADENECTOMY N/A 11/13/2019    Procedure: LAPAROSCOPY, resection of of paraaortic lymph node, lysis of adhesions;  Surgeon: Jluis Canela MD;  Location: [facility] OR     OPTICAL TRACKING SYSTEM CRANIOTOMY, EXCISE TUMOR, COMBINED Bilateral 3/15/2021    Procedure: stealth assisted Midline suboccipital craniectomy/craniotomy for tumor;  Surgeon: Martín Myrick MD;  Location: [facility] OR     OPTICAL TRACKING SYSTEM VENTRICULOSTOMY Right 3/15/2021    Procedure: stealth assisted  right frontal ventriculostomy;  Surgeon: Martín Myrick MD;  Location: [facility] OR         Health Maintenance Due   Topic Date Due     PREVENTIVE CARE VISIT  Never done     ADVANCE CARE PLANNING  Never done     DEPRESSION ACTION PLAN  Never done     PHQ-9  Never done     Pneumococcal Vaccine: Pediatrics (0 to 5 Years) and At-Risk Patients (6 to 64 Years) (1 of 4 - PCV13) Never done     HIV SCREENING  Never done     HEPATITIS C SCREENING  Never done     PAP  07/14/2017       Current Medications:     Current Outpatient Medications   Medication Sig Dispense Refill     acetaminophen (TYLENOL) 325 MG tablet Take 2 tablets (650 mg) by mouth every 6 hours 24 tablet 0     Ascorbic Acid (VITAMIN C GUMMIE PO) Take by mouth daily       dexamethasone (DECADRON) 4 MG tablet Take 2 tablets (8 mg) by mouth 2 times daily (with  "meals) for 6 doses Take two tablets (8 mg) the evening of chemotherapy and take two tablets (8 mg) the next morning and night following chemotherapy. Take this schedule with each chemotherapy with Taxotere. 12 tablet 0     dexamethasone (DECADRON) 4 MG tablet Take 2 tablets (8 mg) by mouth daily (with breakfast) Take 2 tablets by mouth with food on days 2-4 after chemotherapy. 12 tablet 3     DULoxetine (CYMBALTA) 30 MG capsule Take 1 capsule (30 mg) by mouth daily Take 1 capsule (30 mg) daily for 1 week. Increase to 2 capsules (60 mg) daily from week 2 onwards. 60 capsule 1     famotidine (PEPCID) 10 MG tablet Take 1 tablet (10 mg) by mouth 2 times daily 60 tablet 1     ibuprofen (ADVIL/MOTRIN) 200 MG tablet Take 1 tablet (200 mg) by mouth every 4 hours as needed for mild pain       lidocaine-prilocaine (EMLA) 2.5-2.5 % external cream Apply topically as needed for moderate pain 30 g 1     loperamide (IMODIUM A-D) 2 MG tablet Take 1 tablet (2 mg) by mouth 4 times daily as needed for diarrhea Can take one 2 mg capsule after each loose stool up to 4 times a day as needed for diarrhea. This can be increased to 4 mg 4 times a day or 2 mg up to 8 times a day. 60 tablet 3     LORazepam (ATIVAN) 0.5 MG tablet Take 1 tablet (0.5 mg) by mouth every 6 hours as needed for nausea 20 tablet 0     multivitamin w/minerals (MULTI-VITAMIN) tablet Take 1 tablet by mouth daily       OLANZapine (ZYPREXA) 5 MG tablet Take 1 tablet (5 mg) by mouth At Bedtime 30 tablet 0     SENNA-docusate sodium (SENNA S) 8.6-50 MG tablet Take 1 tablet by mouth At Bedtime 30 tablet 3         Allergies:        Allergies   Allergen Reactions     Emend [Aprepitant]      Prochlorperazine      \"i was told I turn blue\"        Social History:     Social History     Tobacco Use     Smoking status: Former Smoker     Types: Cigarettes     Smokeless tobacco: Never Used   Substance Use Topics     Alcohol use: Yes     Comment: one drink a week       History   Drug " "Use No         Family History:     The patient's family history is notable for     Family History   Problem Relation Age of Onset     Aneurysm Mother      Breast Cancer Paternal Grandmother         bilat mastectomies     Breast Cancer Maternal Aunt         stage 1     Thyroid Cancer Sister 23         Physical Exam:     PS 0  VS: BP (!) 139/95   Pulse 92   Temp 98.6  F (37  C) (Oral)   Resp 16   Ht 1.651 m (5' 5\")   Wt 102.1 kg (225 lb 1.6 oz)   SpO2 98%   BMI 37.46 kg/m       General Appearance: healthy and alert, no distress     HEENT: no thyromegaly, no palpable nodules or masses        Cardiovascular: regular rate and rhythm, no gallops, rubs or murmurs     Respiratory: lungs clear, no rales, rhonchi or wheezes    Musculoskeletal: extremities non tender and without edema    Skin: no lesions or rashes     Neurological: normal gait, no gross defects     Psychiatric: appropriate mood and affect                               Hematological: normal cervical, supraclavicular lymph nodes     Gastrointestinal:       abdomen soft, non-tender, non-distended, no organomegaly or masses    Genitourinary: deferred      Assessment:    Francesca Rowland is a 40 year old woman with a diagnosis of 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 She is here today for follow up and disease management.     30 minutes spent on the date of the encounter doing chart review, history and exam, documentation, and further activities as noted above.        Plan:     1.)         Rec. Cx Cancer with brain met: s/p primary therapy:  We have discussed optiosn fro obeservation through maintenance therapy.  She has no residual disease on PET or brain MRI at this point, but obviously remains at risk for recurrence.  After discussing the pros and cons of each option - she is inclined to observe for now.  She will likely be a candidate for Keytruda should she recur.     2.)  Genetic risk " factors were assessed again and the patient has multiple family members with cancer diagnosed younger than age 50 including herself (age 40, but with likely HPV related disease) and her sister (thyroid cancer at age 23) among others. She met with genetics 1/2021.    3.) Labs and/or tests ordered include: none     4.)   Health maintenance issues addressed today include annual health maintenance and non-gynecologic issues with PCP. Encouraged follow up with PCP for GERD symptoms. Will trial Pepcid today. Encouraged PCP follow up for knee pain if this continues or worsens.     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 Met with Cancer Rehab 7/28/21. Will start PT and continue Cymbalta for fatigue and neuropathy. Improvement with Cymbalta per patient     Jluis Canela MD    Division of Gynecologic Oncology  [facility]      CC  Patient Care Team:  Tyrese Bowie as PCP - General (Family Medicine)  Tawana Rivera, RN as Specialty Care Coordinator (Gynecologic Oncology)  Jluis Canela MD as MD (Gynecologic Oncology)  Martín Myrick MD as Assigned Neuroscience Provider  Jluis Canela MD as Assigned Cancer Care Provider  Rashmi London MD as MD (Physical Medicine and Rehabilitation)

## 2021-08-23 NOTE — LETTER
2021         RE: Francesca Rowland  [address]  [place] [address]        Dear Colleague,    Thank you for referring your patient, Francesca Rowland, to [facility]. Please see a copy of my visit note below.                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 She is here today for follow up and disease management.     Oncology History:      Patient initially presented as a 27-year-old woman seen in referral due to a Pap smear showing high-grade squamous intraepithelial lesion that was positive for high risk HPV subtype in 2007. This Pap smear had been taken during her six week postpartum visit. Patient did have a remote history of abnormal Pap smears back in  while serving in [place] for the [employer].  Pap smears during her first pregnancy were all negative. Her Pap smear at the beginning of the most recent pregnancy was normal. Patient did have a colposcopy for evaluation of this abnormal Pap smear on 2007 and cervical biopsies of 6 and 12 o'clock position showed features consistent with papillary serous adenocarcinoma. Endocervical curettings were also taken which showed fragments of papillary serous carcinoma. The patient then underwent a LEEP procedure on 10/15/07 which showed microinvasive adenocarcinoma with a depth of invasion of 0.4 mm. Patient then made the decision to proceed with radical hysterectomy. She subsequently underwent a radical hysterectomy, bilateral salpingectomy, bilateral pelvic and periaortic lymph node dissection on 10/19/07. Patient's operative course was otherwise uncomplicated and she recoverred uneventfully.  Pathology did reveal patient to be stage IA2 adenocarcinoma of the cervix.      She  underwent routine surveillence through 2014 complicated only by SIOBHAN 1     She recently presented after incidentally being found to have an enlarged pericaval lymph node.  She presented to the ED at [facility] on 9/14/2019 with severe 8.5/10 RUQ pain associated with nausea, one episode of emesis, and decreased appetite. RUQ demonstrated cholelithiasis without cholecystitis, so an abdominal and pelvic CT scan was obtained. This revealed post-surgical changes consistent with prior hysterectomy, left adnexal cysts thought to be ovarian, and an enlarged pericaval lymph node suspicious for metastatic disease vs primary lymphoma. Her condition stabilized and she was discharged home with referrals to general surgery and oncology for biliary colic and further management of the lymphadenopathy, respectively. CT-guided right retroperitoneal lymph node biopsy on 10/15/2019 was consistent with metastatic cervical carcinoma     She underwent surgery to remove the mass and determine the extent of disease on 11/13/19     PATH:  FINAL DIAGNOSIS:   LYMPH NODES, PARA-AORTIC, RESECTION:   - Positive for carcinoma in three of four lymph nodes examined (3/4),   consistent with recurrent endocervical   adenocarcinoma   - Largest metastatic focus is 3.5 cm, positive for extra yuridia extension         Chemo-Potentiated RT 12/2019-1/2020 2/2020 CT:  IMPRESSION: Postoperative changes of hysterectomy and lymph node  resection for cervical cancer. The metastatic right pelvic lymph nodes  which were previously identified have decreased in size. No new  metastases identified.      She recently presented to the ED for an acute RUQ pain episode, which has greatly improved despite no clear diagnosis.  During this visit she underwent a CAP CT which did not demonstrate evidence of cancer but a slightly enlarged spleen. She had a mild eosinophilia (~2%) but has not traveled or eaten exotic food recently, and has not had any new contacts to  suggest mono.      10/13/2020 CT:  IMPRESSION:  1.  Recurrent right external iliac lymphadenopathy.  2.  Rebound thymic hyperplasia.     10/30/2020 PET  IMPRESSION:   In this patient with history of metastatic cervical cancer status-post  radical hysterectomy and chemoradiation, there is evidence of  recurrent disease:  1. Increased size of a hypermetabolic right external iliac chain lymph node since 2/27/2020.  2. Hypermetabolic right subpectoral and axillary lymph nodes.  Recommend follow-up in the breast center for axillary ultrasound and  possible tissue sampling.  3. Idiopathic thymic activation/hyperplasia.     She was evaluated in the breast clinic with plan made for biopsy - however at biopsy the node had shruck considerably and was thus felt to be c/w inflammation (no biopsy obtained)     3/8/2021 MRI Brain (for dizziness)  IMPRESSION:  1. Heterogeneous mixed solid and cystic 2.7 cm mass in the right cerebellum, most likely a solitary metastatic lesion. There is moderate surrounding vasogenic edema as well as mass effect on the fourth ventricle. No obstructive hydrocephalus.    2. Unremarkable MR angiogram of the head and neck.     3/15/2021 Craniotomy ([facility])  1. Midline suboccipital craniotomy for resection of mass  2. Right frontal ventriculostomy  3. Stereotactic navigation  4. Use of operating microscope     4/16/21 Completed 5 fx of gamma knife ([employer])     Plan: Paclitaxel, Cisplatin, Avastin     4/28/2021: Cycle #1 Paclitaxel, Cisplatin, Avastin  5/19/2021: Cycle #2 Paclitaxel/Cisplatin/Avastin +Neulasta  6/9/2021: Cycle #3 Paclitaxel/Cisplatin/Avastin +Neulasta  6/30/2021: Cycle #4 Paclitaxel/Cisplatin/Avastin +Neulasta    6/28/2021 plan: PET scan for interval assessment.  Will plan for 6 cycles followed by possible use of Keytruda versus observation.     7/15/2021: PET CT: IMPRESSION: In this patient with history of cervical cancer who  completed chemoradiation:  1. Resolved uptake to the right  pelvic lymph node, consistent with  response to treatment.  2. No new hypermetabolic lesions.  3. No definite abnormal intracranial uptake. Please note that PET-CT  is less sensitive for intracranial disease due to normal uptake to the  brain. Consider continued following of the previously seen lesions and  surgical changes with MRI if clinically indicated.      7/21/2021: Cycle #5 Paclitaxel/Cisplatin/Avastin +Neulasta  8/11/2021: Cycle #6 Taxotere/Cisplatin/Avastin + Neulasta (Change to Taxotere due to neuropathy) deferred due to insurance  8/18/2021: Cycle #6 Paclitaxel/Cisplatin/Avastin (dose reduced Taxol to 135 mg/m2 due to insurance coverage with Taxotere and patient in agreement with plan- neuropathy mild and improved) +Neulasta    8/21/2021 MRI (HEAD):  Impression: In this patient with history of metastatic carcinoma to  the right cerebellum:  There is no definite evidence of disease recurrence. Decreased  resection cavity enhancement. Previous right occipital lobe  enhancement is completely resolved. No new enhancing intracranial  lesions.    She presents today for treatment planning options    Review of Systems:    Systemic           no weight changes; no fever; no chills; no night sweats; no appetite changes  Skin           no rashes, or lesions  Eye           no irritation; no changes in vision  Payton-Laryngeal           no dysphagia; no hoarseness   Pulmonary    no cough; no shortness of breath  Cardiovascular    no chest pain; no palpitations  Gastrointestinal    + diarrhea; + constipation; no abdominal pain; no changes in bowel  habits; no blood in stool  Genitourinary   no urinary frequency; no urinary urgency; no dysuria; no pain; no abnormal vaginal discharge; no abnormal vaginal bleeding  Breast    no breast discharge; no breast changes; no breast pain  Musculoskeletal    no myalgias; no arthralgias; no back pain  Psychiatric           no depressed mood; no anxiety    Hematologic           no  tender lymph nodes; no noticeable swellings or lumps   Endocrine    no hot flashes; no heat/cold intolerance         Neurological   no tremor; + numbness and tingling; no headaches; no difficulty  sleeping      Past Medical History:    Past Medical History:   Diagnosis Date     Chronic cholecystitis 09/25/2019     History of cervical cancer 2007     Metastasis to brain (H) 03/08/2021     Metastatic adenocarcinoma to cervix (H) 10/28/2019    Added automatically from request for surgery [id number]         Past Surgical History:    Past Surgical History:   Procedure Laterality Date     CHOLECYSTECTOMY, LAPOROSCOPIC  09/25/2019     HYSTERECTOMY RADICAL  2007    PAUL     INSERT PORT VASCULAR ACCESS Right 5/5/2021    Procedure: INSERTION, VASCULAR ACCESS PORT;  Surgeon: Oral Toledo MD;  Location: [facility] OR     IR CHEST PORT PLACEMENT > 5 YRS OF AGE  5/5/2021     LAPAROSCOPIC LYMPHADENECTOMY N/A 11/13/2019    Procedure: LAPAROSCOPY, resection of of paraaortic lymph node, lysis of adhesions;  Surgeon: Jluis Canela MD;  Location: [facility] OR     OPTICAL TRACKING SYSTEM CRANIOTOMY, EXCISE TUMOR, COMBINED Bilateral 3/15/2021    Procedure: stealth assisted Midline suboccipital craniectomy/craniotomy for tumor;  Surgeon: Martín Myrick MD;  Location: [facility] OR     OPTICAL TRACKING SYSTEM VENTRICULOSTOMY Right 3/15/2021    Procedure: stealth assisted  right frontal ventriculostomy;  Surgeon: Martín Myrick MD;  Location: [facility] OR         Health Maintenance Due   Topic Date Due     PREVENTIVE CARE VISIT  Never done     ADVANCE CARE PLANNING  Never done     DEPRESSION ACTION PLAN  Never done     PHQ-9  Never done     Pneumococcal Vaccine: Pediatrics (0 to 5 Years) and At-Risk Patients (6 to 64 Years) (1 of 4 - PCV13) Never done     HIV SCREENING  Never done     HEPATITIS C SCREENING  Never done     PAP  07/14/2017       Current Medications:     Current Outpatient Medications   Medication Sig Dispense Refill  "    acetaminophen (TYLENOL) 325 MG tablet Take 2 tablets (650 mg) by mouth every 6 hours 24 tablet 0     Ascorbic Acid (VITAMIN C GUMMIE PO) Take by mouth daily       dexamethasone (DECADRON) 4 MG tablet Take 2 tablets (8 mg) by mouth 2 times daily (with meals) for 6 doses Take two tablets (8 mg) the evening of chemotherapy and take two tablets (8 mg) the next morning and night following chemotherapy. Take this schedule with each chemotherapy with Taxotere. 12 tablet 0     dexamethasone (DECADRON) 4 MG tablet Take 2 tablets (8 mg) by mouth daily (with breakfast) Take 2 tablets by mouth with food on days 2-4 after chemotherapy. 12 tablet 3     DULoxetine (CYMBALTA) 30 MG capsule Take 1 capsule (30 mg) by mouth daily Take 1 capsule (30 mg) daily for 1 week. Increase to 2 capsules (60 mg) daily from week 2 onwards. 60 capsule 1     famotidine (PEPCID) 10 MG tablet Take 1 tablet (10 mg) by mouth 2 times daily 60 tablet 1     ibuprofen (ADVIL/MOTRIN) 200 MG tablet Take 1 tablet (200 mg) by mouth every 4 hours as needed for mild pain       lidocaine-prilocaine (EMLA) 2.5-2.5 % external cream Apply topically as needed for moderate pain 30 g 1     loperamide (IMODIUM A-D) 2 MG tablet Take 1 tablet (2 mg) by mouth 4 times daily as needed for diarrhea Can take one 2 mg capsule after each loose stool up to 4 times a day as needed for diarrhea. This can be increased to 4 mg 4 times a day or 2 mg up to 8 times a day. 60 tablet 3     LORazepam (ATIVAN) 0.5 MG tablet Take 1 tablet (0.5 mg) by mouth every 6 hours as needed for nausea 20 tablet 0     multivitamin w/minerals (MULTI-VITAMIN) tablet Take 1 tablet by mouth daily       OLANZapine (ZYPREXA) 5 MG tablet Take 1 tablet (5 mg) by mouth At Bedtime 30 tablet 0     SENNA-docusate sodium (SENNA S) 8.6-50 MG tablet Take 1 tablet by mouth At Bedtime 30 tablet 3         Allergies:        Allergies   Allergen Reactions     Emend [Aprepitant]      Prochlorperazine      \"i was told I " "turn blue\"        Social History:     Social History     Tobacco Use     Smoking status: Former Smoker     Types: Cigarettes     Smokeless tobacco: Never Used   Substance Use Topics     Alcohol use: Yes     Comment: one drink a week       History   Drug Use No         Family History:     The patient's family history is notable for     Family History   Problem Relation Age of Onset     Aneurysm Mother      Breast Cancer Paternal Grandmother         bilat mastectomies     Breast Cancer Maternal Aunt         stage 1     Thyroid Cancer Sister 23         Physical Exam:     PS 0  VS: BP (!) 139/95   Pulse 92   Temp 98.6  F (37  C) (Oral)   Resp 16   Ht 1.651 m (5' 5\")   Wt 102.1 kg (225 lb 1.6 oz)   SpO2 98%   BMI 37.46 kg/m       General Appearance: healthy and alert, no distress     HEENT: no thyromegaly, no palpable nodules or masses        Cardiovascular: regular rate and rhythm, no gallops, rubs or murmurs     Respiratory: lungs clear, no rales, rhonchi or wheezes    Musculoskeletal: extremities non tender and without edema    Skin: no lesions or rashes     Neurological: normal gait, no gross defects     Psychiatric: appropriate mood and affect                               Hematological: normal cervical, supraclavicular lymph nodes     Gastrointestinal:       abdomen soft, non-tender, non-distended, no organomegaly or masses    Genitourinary: deferred      Assessment:    Francesca Rowland is a 40 year old woman with a diagnosis of 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 She is here today for follow up and disease management.     30 minutes spent on the date of the encounter doing chart review, history and exam, documentation, and further activities as noted above.        Plan:     1.)         Rec. Cx Cancer with brain met: s/p primary therapy:  We have discussed optiosn fro obeservation through maintenance therapy.  She has no residual disease on PET " or brain MRI at this point, but obviously remains at risk for recurrence.  After discussing the pros and cons of each option - she is inclined to observe for now.  She will likely be a candidate for Keytruda should she recur.     2.)  Genetic risk factors were assessed again and the patient has multiple family members with cancer diagnosed younger than age 50 including herself (age 40, but with likely HPV related disease) and her sister (thyroid cancer at age 23) among others. She met with genetics 1/2021.    3.) Labs and/or tests ordered include: none     4.)   Health maintenance issues addressed today include annual health maintenance and non-gynecologic issues with PCP. Encouraged follow up with PCP for GERD symptoms. Will trial Pepcid today. Encouraged PCP follow up for knee pain if this continues or worsens.     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 Met with Cancer Rehab 7/28/21. Will start PT and continue Cymbalta for fatigue and neuropathy. Improvement with Cymbalta per patient     Jluis Canela MD    Division of Gynecologic Oncology  [facility]      CC  Patient Care Team:  Tyrese Bowie as PCP - General (Family Medicine)  Tawana Rivera RN as Specialty Care Coordinator (Gynecologic Oncology)  Martín Myrick MD as Assigned Neuroscience Provider  Rashmi London MD as MD (Physical Medicine and Rehabilitation)

## 2021-08-23 NOTE — NURSING NOTE
"Oncology Rooming Note    August 23, 2021 10:47 AM   Francesca Rowland is a 40 year old female who presents for:    Chief Complaint   Patient presents with     Oncology Clinic Visit     Cervix cancer      Initial Vitals: BP (!) 139/95   Pulse 92   Temp 98.6  F (37  C) (Oral)   Resp 16   Ht 1.651 m (5' 5\")   Wt 102.1 kg (225 lb 1.6 oz)   SpO2 98%   BMI 37.46 kg/m   Estimated body mass index is 37.46 kg/m  as calculated from the following:    Height as of this encounter: 1.651 m (5' 5\").    Weight as of this encounter: 102.1 kg (225 lb 1.6 oz). Body surface area is 2.16 meters squared.  No Pain (0) Comment: Data Unavailable   No LMP recorded. Patient has had a hysterectomy.  Allergies reviewed: Yes  Medications reviewed: Yes    Medications: Medication refills not needed today.  Pharmacy name entered into [facility]: [id number] - [place], [place] - [facility] [address]    Clinical concerns: No new concerns.       Radha Lilly CMA            "

## 2021-08-25 ENCOUNTER — OFFICE VISIT (OUTPATIENT)
Dept: NEUROSURGERY | Facility: CLINIC | Age: 41
End: 2021-08-25
Payer: COMMERCIAL

## 2021-08-25 VITALS
WEIGHT: 224 LBS | RESPIRATION RATE: 16 BRPM | OXYGEN SATURATION: 95 % | SYSTOLIC BLOOD PRESSURE: 135 MMHG | HEART RATE: 106 BPM | BODY MASS INDEX: 37.28 KG/M2 | DIASTOLIC BLOOD PRESSURE: 80 MMHG

## 2021-08-25 DIAGNOSIS — C79.31 BRAIN METASTASIS: Primary | ICD-10-CM

## 2021-08-25 PROCEDURE — 99214 OFFICE O/P EST MOD 30 MIN: CPT | Performed by: NEUROLOGICAL SURGERY

## 2021-08-25 NOTE — PROGRESS NOTES
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 Bowie and Dr. Canela,     It was a pleasure to see Mrs. Rowland back in the [facility] today in follow up.  As you recall, Mrs. 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 She had done well with her case until she developed new headaches and an MRI brain showed a large cerebellar mass. She underwent a midline suboccipital craniotomy with ventriculostomy  on 3/15/2021. She did well postoperatively and her ventriculostomy was weaned. Pathology was consistent with metastasis. She has since undergone radiotherapy to the resection bed on  and currently on Paclitaxel/Cisplatin/Avastin. PET scan and MRI are very reassuring.    Today, she reports overall feeling well outside of chemotherapy side effects that she has had with prior rounds. She denies any diplopia, changes in coordination, sensation, or balance issues. She does state that has been having consistent rhinorrhea. She does report some episodic diarrhea as well. Both of these began after her recent radiation.      Review of Systems:   Pertinent items are noted in HPI, remainder of complete ROS is negative.      Physical Exam:   /80   Pulse 106   Resp 16   Wt 101.6 kg (224 lb)   SpO2 95%   BMI 37.28 kg/m       General: No acute distress.   Head: No signs of trauma.    Eyes: Conjunctivae are normal.  Mouth/Throat: Oropharynx moist.  Neck: Normal range of motion.    Resp: No respiratory distress.   MSK: Moves all extremities.  No obvious deformity.  Neuro: The patient is fully oriented and quite pleasant. Speech normal. Extraocular movements are intact without nystagmus. Facial  sensation is intact in V1, V2, V3 distributions. Facial nerve function is normal, rated as a House Brackmann I/VI, without synkinesis. Palate is symmetric. Shoulders are full strength. Tongue is midline. There is no pronator drift. Full strength in all extremities.   Psych: Normal mood and affect. Behavior is normal.    Incisions: Right frontal incision and posterior occipital incision healing well     Imaging:  We reviewed her recent MRI and compared it to initial MRI. This demonstrates no enhancement in the collapsed resection cavity in the cerebellum. The right occipital lesion not present on current imaging. No signs of disease recurrence.     Assessment:  1. Cerebellar metastasis s/p suboccipital craniotomy 3/15/2021 and fractionated SRT 4/12/2021  2. Right occipital metastasis s/p SRS 4/12/2021  3. Metastatic cervical adenocarcinoma    Plan:   1. We reviewed the imaging results and are pleased with her progress.   2. We will plan to see her back in 3 months with repeat MRI and appointment with my partner Summer KELLEY.  3. I encouraged her to contact us should any questions or concerns arise in advance of her next appointment      It has been a pleasure to participate in the care of your patient. Please feel free to contact us if we may be of any assistance for Mrs. Rowland.    Martín Myrick MD   Department of Neurosurgery  [facility]  [facility]         Scribe Disclosure:  I, Trent Williamson, am serving as a scribe to document services personally performed by Martín Myrick MD at this visit, based upon the provider's statements to me. All documentation has been reviewed by the aforementioned provider prior to being entered into the official medical record.

## 2021-08-25 NOTE — LETTER
[place]  [facility] [address]  3RD FLOOR  [facility] [address]  Phone: [phone]  Fax: [phone]          2021    RE:   Francesca Rowland  [address]  [facility] [address]      Dear Colleague,    Thank you for referring your patient, Francesca Rowland, to the [facility]. Please see a copy of my visit note below.    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 Bowie and Dr. Canela,     It was a pleasure to see Mrs. Rowland back in the [facility] today in follow up.  As you recall, Mrs. 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 She had done well with her case until she developed new headaches and an MRI brain showed a large cerebellar mass. She underwent a midline suboccipital craniotomy with ventriculostomy  on 3/15/2021. She did well postoperatively and her ventriculostomy was weaned. Pathology was consistent with metastasis. She has since undergone radiotherapy to the resection bed on  and currently on Paclitaxel/Cisplatin/Avastin. PET scan and MRI are very reassuring.    Today, she reports overall feeling well outside of chemotherapy side effects that she has had with prior rounds. She denies any diplopia, changes in coordination, sensation, or balance issues. She does state that has been having consistent rhinorrhea. She does report some episodic diarrhea as well. Both of these began after her recent radiation.      Review of Systems:   Pertinent items are noted in HPI, remainder of complete ROS is negative.      Physical Exam:   /80    Pulse 106   Resp 16   Wt 101.6 kg (224 lb)   SpO2 95%   BMI 37.28 kg/m       General: No acute distress.   Head: No signs of trauma.    Eyes: Conjunctivae are normal.  Mouth/Throat: Oropharynx moist.  Neck: Normal range of motion.    Resp: No respiratory distress.   MSK: Moves all extremities.  No obvious deformity.  Neuro: The patient is fully oriented and quite pleasant. Speech normal. Extraocular movements are intact without nystagmus. Facial sensation is intact in V1, V2, V3 distributions. Facial nerve function is normal, rated as a House Brackmann I/VI, without synkinesis. Palate is symmetric. Shoulders are full strength. Tongue is midline. There is no pronator drift. Full strength in all extremities.   Psych: Normal mood and affect. Behavior is normal.    Incisions: Right frontal incision and posterior occipital incision healing well     Imaging:  We reviewed her recent MRI and compared it to initial MRI. This demonstrates no enhancement in the collapsed resection cavity in the cerebellum. The right occipital lesion not present on current imaging. No signs of disease recurrence.     Assessment:  1. Cerebellar metastasis s/p suboccipital craniotomy 3/15/2021 and fractionated SRT 4/12/2021  2. Right occipital metastasis s/p SRS 4/12/2021  3. Metastatic cervical adenocarcinoma    Plan:   1. We reviewed the imaging results and are pleased with her progress.   2. We will plan to see her back in 3 months with repeat MRI and appointment with my partner Summer KELLEY.  3. I encouraged her to contact us should any questions or concerns arise in advance of her next appointment      It has been a pleasure to participate in the care of your patient. Please feel free to contact us if we may be of any assistance for Mrs. Rowland.    Martín Myrick MD   Department of Neurosurgery  [facility]  [facility]         Scribe Disclosure:  I, Trent Williamson, am serving as a  scribe to document services personally performed by Martín Myrick MD at this visit, based upon the provider's statements to me. All documentation has been reviewed by the aforementioned provider prior to being entered into the official medical record.               Again, thank you for allowing me to participate in the care of your patient.      Sincerely,    Martín Myrick MD

## 2021-08-25 NOTE — PATIENT INSTRUCTIONS
Thank you for choosing [facility].     Next steps:  1. Follow up with Summer Patel PA-C in 3 months with an updated MRI prior.      Please don't hesitate to reach out via MyChart or telephone if you have any questions after your visit.    Terra Nascimento MA, RN, PHN, [facility]  RN Care Coordinator, Skull Base Surgery    Direct: [phone]  ENT Clinic: [phone]  Neurosurgery Clinic: [phone]

## 2021-08-25 NOTE — Clinical Note
2021       RE: Francesca Rowland  [address]  [facility] [address]     Dear Colleague,    Thank you for referring your patient, Francesca Rowland, to the [facility] NEUROSURGERY [facility]INIC [place] [facility]. Please see a copy of my visit note below.      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 Bowie and Dr. Canela,     It was a pleasure to see Mrs. Rowland back in the [facility] today in follow up.  As you recall, Mrs. 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 She had done well with her case until she developed new headaches and an MRI brain showed a large cerebellar mass. She underwent a midline suboccipital craniotomy with ventriculostomy  on 3/15/2021. She did well postoperatively and her ventriculostomy was weaned. Pathology was consistent with metastasis. She has since undergone radiotherapy to the resection bed on  and currently on Paclitaxel/Cisplatin/Avastin. PET scan and MRI are very reassuring.    Today, she reports overall feeling well outside of chemotherapy side effects that she has had with prior rounds. She denies any diplopia, changes in coordination, sensation, or balance issues. She does state that has been having consistent rhinorrhea. She does report some episodic diarrhea as well. Both of these began after her recent radiation.      Review of Systems:   Pertinent items are noted in HPI, remainder of complete ROS is negative.      Physical Exam:   /80   Pulse 106   Resp 16   Wt 101.6 kg (224 lb)   SpO2 95%   BMI 37.28 kg/m       General: No acute distress.   Head: No signs of trauma.     Eyes: Conjunctivae are normal.  Mouth/Throat: Oropharynx moist.  Neck: Normal range of motion.    Resp: No respiratory distress.   MSK: Moves all extremities.  No obvious deformity.  Neuro: The patient is fully oriented and quite pleasant. Speech normal. Extraocular movements are intact without nystagmus. Facial sensation is intact in V1, V2, V3 distributions. Facial nerve function is normal, rated as a House Brackmann I/VI, without synkinesis. Palate is symmetric. Shoulders are full strength. Tongue is midline. There is no pronator drift. Full strength in all extremities.   Psych: Normal mood and affect. Behavior is normal.    Incisions: Right frontal incision and posterior occipital incision healing well     Imaging:  We reviewed her recent MRI and compared it to initial MRI. This demonstrates no enhancement in the collapsed resection cavity in the cerebellum. The right occipital lesion not present on current imaging. No signs of disease recurrence.     Assessment:  1. Cerebellar metastasis s/p suboccipital craniotomy 3/15/2021 and fractionated SRT 4/12/2021  2. Right occipital metastasis s/p SRS 4/12/2021  3. Metastatic cervical adenocarcinoma    Plan:   1. We reviewed the imaging results and are pleased with her progress.   2. We will plan to see her back in 3 months with repeat MRI and appointment with my partner Summer KELLEY.  3. I encouraged her to contact us should any questions or concerns arise in advance of her next appointment      It has been a pleasure to participate in the care of your patient. Please feel free to contact us if we may be of any assistance for Mrs. Rowland.    Martín Myrick MD   Department of Neurosurgery  [facility]  [facility]         Scribe Disclosure:  I, Trent Williamson, am serving as a scribe to document services personally performed by Martín Myrick MD at this visit, based upon the provider's statements to me. All  documentation has been reviewed by the aforementioned provider prior to being entered into the official medical record.             Again, thank you for allowing me to participate in the care of your patient.      Sincerely,    Martín Myrick MD

## 2021-08-30 NOTE — NURSING NOTE
Chief Complaint   Patient presents with     Blood Draw     Labs drawn via VPT by RN in lab. VS taken. Pt checked in for next appt     Labs collected from venipuncture by RN. Vitals taken. Checked in for appointment(s).    Liss MURILLO RN PHN BSN  BMT/Oncology Lab    
no vomiting/no diarrhea/no constipation/no change in bowel habits/no abdominal pain

## 2021-09-25 ENCOUNTER — HEALTH MAINTENANCE LETTER (OUTPATIENT)
Age: 41
End: 2021-09-25

## 2021-10-05 NOTE — PROGRESS NOTES
[facility]   PM&R clinic note        Interval history:     Francesca Rowland presents to clinic today for follow up reg her rehab needs.   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  Was last seen in clinic on 7/28/21.  Recommendations included:  1. Therapy/equipment/braces:  1. Referral to outpatient Physical Therapy for targeted strengthening and endurance in the setting of deconditioning/fatigue.  2. Medications:  1. Start Cymbalta 30 mg daily for one week, then increase to 60 mg daily.  2. Can try Voltaren gel QID prn for bilateral knee pain.  3. Referral / follow up with other providers:  1. Referral to Oncology Psychology placed for help with emotional adjustment to her disease process and ongoing treatment.  4. Follow up: 2 months    Oncology History:  - Initially presented due to Pap smear showing high-grade squamous intraepithelial lesion positive for high risk HPV subtype in 8/20017.  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   - Underwent LEEP procedure on 10/15/2007 which demonstrated microinvasive adenocarcinoma.  - Proceeded with radical hysterectomy, bilateral salpingectomy, bilateral pelvic and periaortic lymph node dissection on 10/19/2007. Pathology revealed Stage IA2 adenocarcinoma of the cervix.  - Underwent routine surveillance through 2014.  - Presented to the ED at [facility] on 9/14/2019 with severe RUQ pain which imaging revealed cholelithiasis without cholecystitis, Abdominal/Pelvic CT revealed enlarged pericaval lymph node. CT guided biopsy was consistent with metastatic cervical carcinoma.  - Underwent surgery to remove mass.  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  - 3/15/2021- Underwent craniotomy and resection of cerebellar mass.  Fatigue and  neuropathy.  - 4/16/2021- Completed 5 fx of gamma knife  - 4/28/2021- 6/30/2021- Cycles 1-4 of paclitaxel/cisplatin/avastin.  - 7/21/2021: Cycle #5 Paclitaxel/Cisplatin/Avastin   - 8/18/21- Saw Terra Zelaya in clinic. Peripheral neuropathy improved with Cymbalta.  - 8/23/21- Saw Dr. Canela in clinic. Improvement in peripheral neuropathy symptoms with Cymbalta. No residual disease on PET or Brain MRI, but remains at risk for recurrence.       Symptoms,  Patient was seen for a return visit today. She states that she feels that her neuropathic symptoms and mood has been much better with Cymbalta, and she has been taking 60 mg daily.  She met with Psychology, and felt it was helpful and has plans to check in as needed.  She states that she feels good on her feet, and does not complain of any balance difficulties. She denies falls or near falls at home or out in the community. She states that she continues to feel winded with stairs.   She feels that her appetite is good, and she is getting enough protein in her diet. She continues to take one multivitamin daily.  She complains of bilateral knee pain, right worse than left at times. This has been going on for a long time, though has been exacerbated by her recent treatments. She states that this can make mobility difficult at times but she manages. She has   Has tried OTC meds with minimal relief.    Therapies/HEP,  Patient continues to remain active.      Functionally,   No changes since last visit      Social history is unchanged.      Medications:  Current Outpatient Medications   Medication Sig Dispense Refill     acetaminophen (TYLENOL) 325 MG tablet Take 2 tablets (650 mg) by mouth every 6 hours 24 tablet 0     Ascorbic Acid (VITAMIN C GUMMIE PO) Take by mouth daily       dexamethasone (DECADRON) 4 MG tablet Take 2 tablets (8 mg) by mouth daily (with breakfast) Take 2 tablets by mouth with food on days 2-4 after chemotherapy. 12 tablet 3     DULoxetine (CYMBALTA)  "30 MG capsule Take 1 capsule (30 mg) by mouth 2 times daily 60 capsule 1     DULoxetine (CYMBALTA) 30 MG capsule Take 1 capsule (30 mg) by mouth daily Take 1 capsule (30 mg) daily for 1 week. Increase to 2 capsules (60 mg) daily from week 2 onwards. 60 capsule 1     famotidine (PEPCID) 10 MG tablet Take 1 tablet (10 mg) by mouth 2 times daily 60 tablet 1     ibuprofen (ADVIL/MOTRIN) 200 MG tablet Take 1 tablet (200 mg) by mouth every 4 hours as needed for mild pain       lidocaine-prilocaine (EMLA) 2.5-2.5 % external cream Apply topically as needed for moderate pain 30 g 1     loperamide (IMODIUM A-D) 2 MG tablet Take 1 tablet (2 mg) by mouth 4 times daily as needed for diarrhea Can take one 2 mg capsule after each loose stool up to 4 times a day as needed for diarrhea. This can be increased to 4 mg 4 times a day or 2 mg up to 8 times a day. 60 tablet 3     LORazepam (ATIVAN) 0.5 MG tablet Take 1 tablet (0.5 mg) by mouth every 6 hours as needed for nausea 20 tablet 0     multivitamin w/minerals (MULTI-VITAMIN) tablet Take 1 tablet by mouth daily       OLANZapine (ZYPREXA) 5 MG tablet Take 1 tablet (5 mg) by mouth At Bedtime 30 tablet 0     SENNA-docusate sodium (SENNA S) 8.6-50 MG tablet Take 1 tablet by mouth At Bedtime 30 tablet 3     dexamethasone (DECADRON) 4 MG tablet Take 2 tablets (8 mg) by mouth 2 times daily (with meals) for 6 doses Take two tablets (8 mg) the evening of chemotherapy and take two tablets (8 mg) the next morning and night following chemotherapy. Take this schedule with each chemotherapy with Taxotere. 12 tablet 0              Physical Exam:   BP (!) 132/92 (BP Location: Right arm, Patient Position: Chair, Cuff Size: Adult Large)   Pulse 82   Temp 98.1  F (36.7  C)   Resp 16   Ht 5' 5\" (1.651 m)   Wt 225 lb (102.1 kg)   SpO2 97%   BMI 37.44 kg/m    Gen: NAD, pleasant and cooperative  HEENT: Normocephalic, extra-ocular movements appear intact  Pulm: non-labored breathing in room " air  Ext: no edema in BLE, no tenderness in calves  Neuro/MSK:   Orientation: Oriented to person, place, time, situation. Exhibits good insight into his/her condition and ongoing management/symptoms.  Motor: 4/5 with bilateral shoulder abduction, elbow extension, wrist extension and  strength/finger intrinsics. 4/5 with bilateral hip flexion, 5/5 with bilateral knee extension, ankle dorsiflexion, EHL, plantar flexion.   Bilateral knee exam: no noted deformities or erythema on inspection. Tenderness to palpation of medial joint line bilaterally. No indication of effusion with testing bilaterally. Good range of motion.  Sensory:   UEs- Intact to light touch over relevant dermatomes on volar and dorsal surface of wrist and hand. Decrease to pin prick over distal digits- 1-3 and lateral border of volar surface of both hands.  LEs- Intact to light touch in all relevant dermatomes of dorsum of feet to knee level bilaterally. Decreased sensation to light touch over distal digits (distal to MTP) of all 5 digits bilaterally and decreased over plantar surface of feet bilaterally, especially over lateral aspects of plantar surface. Decrease to pinprick  mainly distally in 1st-3rd digits bilaterally.          Labs/Imaging:  Lab Results   Component Value Date    WBC 3.6 (L) 08/18/2021    HGB 11.8 08/18/2021    HCT 36.5 08/18/2021    MCV 98 08/18/2021     08/18/2021     Lab Results   Component Value Date     08/18/2021    POTASSIUM 4.1 08/18/2021    CHLORIDE 108 08/18/2021    CO2 28 08/18/2021     (H) 08/18/2021     Lab Results   Component Value Date    GFRESTIMATED 79 08/18/2021    GFRESTBLACK >90 06/28/2021     Lab Results   Component Value Date    AST 17 08/18/2021    ALT 22 08/18/2021    ALKPHOS 94 08/18/2021    BILITOTAL 0.4 08/18/2021    BILICONJ 0.0 09/27/2007     Lab Results   Component Value Date    INR 0.97 05/05/2021     Lab Results   Component Value Date    BUN 17 08/18/2021    CR 0.90  08/18/2021              Assessment/Plan   Francesca Rowland presents to clinic today for follow up reg her rehab needs. 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 Was last seen in clinic on 7/28/21. As discussed with Francesca, we will order knee imaging to further evaluate her chronic knee pain that seems to be progressively worsening. She should take OTC anti-inflammatory meds to help with symptoms if needed. Recommendation is to continue Cymbalta for peripheral neuropathy/mood as it has been helping significantly. Patient agrees with the above plan.      1. Therapy/equipment/braces:   1. Patient had to cancel an outpatient Physical Therapy appointment after a referral was placed at her last visit. She plans to reach out to schedule again if needed. This would be helpful for targeted strengthening and endurance in the setting of deconditioning/fatigue.   2. As discussed, she should remain active and incorporate a regular home exercise  2. Work up:  1. Bilateral knee XR ordered to further evaluate chronic knee pain.  3. Medications:   1. Continue Cymbalta 60 mg daily.   2. Can try OTC ibuprofen or naproxen for knee pain.  4. Follow ups:  1. Patient should continue to follow with Psychology as needed for emotional support.  5. Follow up: 2 months        Rashmi London MD  Physical Medicine & Rehabilitation

## 2021-10-06 ENCOUNTER — ONCOLOGY VISIT (OUTPATIENT)
Dept: ONCOLOGY | Facility: CLINIC | Age: 41
End: 2021-10-06
Attending: STUDENT IN AN ORGANIZED HEALTH CARE EDUCATION/TRAINING PROGRAM
Payer: COMMERCIAL

## 2021-10-06 VITALS
RESPIRATION RATE: 16 BRPM | BODY MASS INDEX: 37.49 KG/M2 | OXYGEN SATURATION: 97 % | SYSTOLIC BLOOD PRESSURE: 132 MMHG | TEMPERATURE: 98.1 F | HEIGHT: 65 IN | WEIGHT: 225 LBS | DIASTOLIC BLOOD PRESSURE: 92 MMHG | HEART RATE: 82 BPM

## 2021-10-06 DIAGNOSIS — G89.29 CHRONIC PAIN OF RIGHT KNEE: ICD-10-CM

## 2021-10-06 DIAGNOSIS — M25.562 CHRONIC PAIN OF LEFT KNEE: ICD-10-CM

## 2021-10-06 DIAGNOSIS — C79.82 METASTATIC ADENOCARCINOMA TO CERVIX (H): Primary | ICD-10-CM

## 2021-10-06 DIAGNOSIS — T45.1X5A CHEMOTHERAPY-INDUCED NEUROPATHY (H): ICD-10-CM

## 2021-10-06 DIAGNOSIS — C79.31 BRAIN METASTASIS: ICD-10-CM

## 2021-10-06 DIAGNOSIS — G62.0 CHEMOTHERAPY-INDUCED NEUROPATHY (H): ICD-10-CM

## 2021-10-06 DIAGNOSIS — R53.81 PHYSICAL DECONDITIONING: ICD-10-CM

## 2021-10-06 DIAGNOSIS — G89.29 CHRONIC PAIN OF LEFT KNEE: ICD-10-CM

## 2021-10-06 DIAGNOSIS — M25.561 CHRONIC PAIN OF RIGHT KNEE: ICD-10-CM

## 2021-10-06 PROCEDURE — G0463 HOSPITAL OUTPT CLINIC VISIT: HCPCS

## 2021-10-06 PROCEDURE — 99214 OFFICE O/P EST MOD 30 MIN: CPT | Performed by: STUDENT IN AN ORGANIZED HEALTH CARE EDUCATION/TRAINING PROGRAM

## 2021-10-06 RX ORDER — DULOXETIN HYDROCHLORIDE 30 MG/1
30 CAPSULE, DELAYED RELEASE ORAL 2 TIMES DAILY
Qty: 60 CAPSULE | Refills: 1 | Status: SHIPPED | OUTPATIENT
Start: 2021-10-06 | End: 2021-12-27

## 2021-10-06 ASSESSMENT — MIFFLIN-ST. JEOR: SCORE: 1681.47

## 2021-10-06 ASSESSMENT — PAIN SCALES - GENERAL: PAINLEVEL: NO PAIN (0)

## 2021-10-06 NOTE — LETTER
10/6/2021         RE: Francesca Rowland  [address]  [facility] [address]        Dear Colleague,    Thank you for referring your patient, Francesca Rowalnd, to the [facility]. Please see a copy of my visit note below.    [facility]   PM&R clinic note        Interval history:     Francesca Rowland presents to clinic today for follow up reg her rehab needs.   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  Was last seen in clinic on 7/28/21.  Recommendations included:  1. Therapy/equipment/braces:  1. Referral to outpatient Physical Therapy for targeted strengthening and endurance in the setting of deconditioning/fatigue.  2. Medications:  1. Start Cymbalta 30 mg daily for one week, then increase to 60 mg daily.  2. Can try Voltaren gel QID prn for bilateral knee pain.  3. Referral / follow up with other providers:  1. Referral to Oncology Psychology placed for help with emotional adjustment to her disease process and ongoing treatment.  4. Follow up: 2 months    Oncology History:  - Initially presented due to Pap smear showing high-grade squamous intraepithelial lesion positive for high risk HPV subtype in 8/20017.  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   - Underwent LEEP procedure on 10/15/2007 which demonstrated microinvasive adenocarcinoma.  - Proceeded with radical hysterectomy, bilateral salpingectomy, bilateral pelvic and periaortic lymph node dissection on 10/19/2007. Pathology revealed Stage IA2 adenocarcinoma of the cervix.  - Underwent routine surveillance through 2014.  - Presented to the ED at [facility] on 9/14/2019 with severe RUQ pain which imaging revealed cholelithiasis without cholecystitis, Abdominal/Pelvic CT revealed enlarged pericaval lymph node. CT guided biopsy was consistent with metastatic cervical  carcinoma.  - Underwent surgery to remove mass.  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  - 3/15/2021- Underwent craniotomy and resection of cerebellar mass.  Fatigue and neuropathy.  - 4/16/2021- Completed 5 fx of gamma knife  - 4/28/2021- 6/30/2021- Cycles 1-4 of paclitaxel/cisplatin/avastin.  - 7/21/2021: Cycle #5 Paclitaxel/Cisplatin/Avastin   - 8/18/21- Saw Terra Zelaya in clinic. Peripheral neuropathy improved with Cymbalta.  - 8/23/21- Saw Dr. Canela in clinic. Improvement in peripheral neuropathy symptoms with Cymbalta. No residual disease on PET or Brain MRI, but remains at risk for recurrence.       Symptoms,  Patient was seen for a return visit today. She states that she feels that her neuropathic symptoms and mood has been much better with Cymbalta, and she has been taking 60 mg daily.  She met with Psychology, and felt it was helpful and has plans to check in as needed.  She states that she feels good on her feet, and does not complain of any balance difficulties. She denies falls or near falls at home or out in the community. She states that she continues to feel winded with stairs.   She feels that her appetite is good, and she is getting enough protein in her diet. She continues to take one multivitamin daily.  She complains of bilateral knee pain, right worse than left at times. This has been going on for a long time, though has been exacerbated by her recent treatments. She states that this can make mobility difficult at times but she manages. She has   Has tried OTC meds with minimal relief.    Therapies/HEP,  Patient continues to remain active.      Functionally,   No changes since last visit      Social history is unchanged.      Medications:  Current Outpatient Medications   Medication Sig Dispense Refill     acetaminophen (TYLENOL) 325 MG tablet Take 2 tablets (650 mg) by mouth every 6 hours 24 tablet 0     Ascorbic  Acid (VITAMIN C GUMMIE PO) Take by mouth daily       dexamethasone (DECADRON) 4 MG tablet Take 2 tablets (8 mg) by mouth daily (with breakfast) Take 2 tablets by mouth with food on days 2-4 after chemotherapy. 12 tablet 3     DULoxetine (CYMBALTA) 30 MG capsule Take 1 capsule (30 mg) by mouth 2 times daily 60 capsule 1     DULoxetine (CYMBALTA) 30 MG capsule Take 1 capsule (30 mg) by mouth daily Take 1 capsule (30 mg) daily for 1 week. Increase to 2 capsules (60 mg) daily from week 2 onwards. 60 capsule 1     famotidine (PEPCID) 10 MG tablet Take 1 tablet (10 mg) by mouth 2 times daily 60 tablet 1     ibuprofen (ADVIL/MOTRIN) 200 MG tablet Take 1 tablet (200 mg) by mouth every 4 hours as needed for mild pain       lidocaine-prilocaine (EMLA) 2.5-2.5 % external cream Apply topically as needed for moderate pain 30 g 1     loperamide (IMODIUM A-D) 2 MG tablet Take 1 tablet (2 mg) by mouth 4 times daily as needed for diarrhea Can take one 2 mg capsule after each loose stool up to 4 times a day as needed for diarrhea. This can be increased to 4 mg 4 times a day or 2 mg up to 8 times a day. 60 tablet 3     LORazepam (ATIVAN) 0.5 MG tablet Take 1 tablet (0.5 mg) by mouth every 6 hours as needed for nausea 20 tablet 0     multivitamin w/minerals (MULTI-VITAMIN) tablet Take 1 tablet by mouth daily       OLANZapine (ZYPREXA) 5 MG tablet Take 1 tablet (5 mg) by mouth At Bedtime 30 tablet 0     SENNA-docusate sodium (SENNA S) 8.6-50 MG tablet Take 1 tablet by mouth At Bedtime 30 tablet 3     dexamethasone (DECADRON) 4 MG tablet Take 2 tablets (8 mg) by mouth 2 times daily (with meals) for 6 doses Take two tablets (8 mg) the evening of chemotherapy and take two tablets (8 mg) the next morning and night following chemotherapy. Take this schedule with each chemotherapy with Taxotere. 12 tablet 0              Physical Exam:   BP (!) 132/92 (BP Location: Right arm, Patient Position: Chair, Cuff Size: Adult Large)   Pulse 82   Temp  "98.1  F (36.7  C)   Resp 16   Ht 5' 5\" (1.651 m)   Wt 225 lb (102.1 kg)   SpO2 97%   BMI 37.44 kg/m    Gen: NAD, pleasant and cooperative  HEENT: Normocephalic, extra-ocular movements appear intact  Pulm: non-labored breathing in room air  Ext: no edema in BLE, no tenderness in calves  Neuro/MSK:   Orientation: Oriented to person, place, time, situation. Exhibits good insight into his/her condition and ongoing management/symptoms.  Motor: 4/5 with bilateral shoulder abduction, elbow extension, wrist extension and  strength/finger intrinsics. 4/5 with bilateral hip flexion, 5/5 with bilateral knee extension, ankle dorsiflexion, EHL, plantar flexion.   Bilateral knee exam: no noted deformities or erythema on inspection. Tenderness to palpation of medial joint line bilaterally. No indication of effusion with testing bilaterally. Good range of motion.  Sensory:   UEs- Intact to light touch over relevant dermatomes on volar and dorsal surface of wrist and hand. Decrease to pin prick over distal digits- 1-3 and lateral border of volar surface of both hands.  LEs- Intact to light touch in all relevant dermatomes of dorsum of feet to knee level bilaterally. Decreased sensation to light touch over distal digits (distal to MTP) of all 5 digits bilaterally and decreased over plantar surface of feet bilaterally, especially over lateral aspects of plantar surface. Decrease to pinprick  mainly distally in 1st-3rd digits bilaterally.          Labs/Imaging:  Lab Results   Component Value Date    WBC 3.6 (L) 08/18/2021    HGB 11.8 08/18/2021    HCT 36.5 08/18/2021    MCV 98 08/18/2021     08/18/2021     Lab Results   Component Value Date     08/18/2021    POTASSIUM 4.1 08/18/2021    CHLORIDE 108 08/18/2021    CO2 28 08/18/2021     (H) 08/18/2021     Lab Results   Component Value Date    GFRESTIMATED 79 08/18/2021    GFRESTBLACK >90 06/28/2021     Lab Results   Component Value Date    AST 17 08/18/2021    " ALT 22 08/18/2021    ALKPHOS 94 08/18/2021    BILITOTAL 0.4 08/18/2021    BILICONJ 0.0 09/27/2007     Lab Results   Component Value Date    INR 0.97 05/05/2021     Lab Results   Component Value Date    BUN 17 08/18/2021    CR 0.90 08/18/2021              Assessment/Plan   Francesca Rowland presents to clinic today for follow up reg her rehab needs. 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 Was last seen in clinic on 7/28/21. As discussed with Francesca, we will order knee imaging to further evaluate her chronic knee pain that seems to be progressively worsening. She should take OTC anti-inflammatory meds to help with symptoms if needed. Recommendation is to continue Cymbalta for peripheral neuropathy/mood as it has been helping significantly. Patient agrees with the above plan.      1. Therapy/equipment/braces:   1. Patient had to cancel an outpatient Physical Therapy appointment after a referral was placed at her last visit. She plans to reach out to schedule again if needed. This would be helpful for targeted strengthening and endurance in the setting of deconditioning/fatigue.   2. As discussed, she should remain active and incorporate a regular home exercise  2. Work up:  1. Bilateral knee XR ordered to further evaluate chronic knee pain.  3. Medications:   1. Continue Cymbalta 60 mg daily.   2. Can try OTC ibuprofen or naproxen for knee pain.  4. Follow ups:  1. Patient should continue to follow with Psychology as needed for emotional support.  5. Follow up: 2 months        Rashmi London MD  Physical Medicine & Rehabilitation                    Again, thank you for allowing me to participate in the care of your patient.        Sincerely,        Rashmi London MD

## 2021-10-06 NOTE — NURSING NOTE
"Oncology Rooming Note    October 6, 2021 4:31 PM   Francesca Rowland is a 40 year old female who presents for:    Chief Complaint   Patient presents with     Oncology Clinic Visit     [facility] RETURN - MALIGNANT NEOPLASM OF CERVIX     Initial Vitals: BP (!) 132/92 (BP Location: Right arm, Patient Position: Chair, Cuff Size: Adult Large)   Pulse 82   Temp 98.1  F (36.7  C)   Resp 16   Ht 1.651 m (5' 5\")   Wt 102.1 kg (225 lb)   SpO2 97%   BMI 37.44 kg/m   Estimated body mass index is 37.44 kg/m  as calculated from the following:    Height as of this encounter: 1.651 m (5' 5\").    Weight as of this encounter: 102.1 kg (225 lb). Body surface area is 2.16 meters squared.  No Pain (0) Comment: Data Unavailable   No LMP recorded. Patient has had a hysterectomy.  Allergies reviewed: Yes  Medications reviewed: Yes    Medications: Medication refills not needed today.  Pharmacy name entered into [employer]: [id number] - [place], [place] - [address] [place] [address] [facility]    Clinical concerns: No new concerns. Surya was notified.      Idris Leonard LPN            "

## 2021-10-11 ENCOUNTER — LAB (OUTPATIENT)
Dept: LAB | Facility: CLINIC | Age: 41
End: 2021-10-11
Attending: OBSTETRICS & GYNECOLOGY
Payer: COMMERCIAL

## 2021-10-11 PROCEDURE — 250N000011 HC RX IP 250 OP 636: Performed by: OBSTETRICS & GYNECOLOGY

## 2021-10-11 PROCEDURE — 96523 IRRIG DRUG DELIVERY DEVICE: CPT

## 2021-10-11 RX ORDER — HEPARIN SODIUM (PORCINE) LOCK FLUSH IV SOLN 100 UNIT/ML 100 UNIT/ML
5 SOLUTION INTRAVENOUS EVERY 8 HOURS
Status: DISCONTINUED | OUTPATIENT
Start: 2021-10-11 | End: 2021-10-17 | Stop reason: HOSPADM

## 2021-10-11 RX ADMIN — Medication 5 ML: at 12:19

## 2021-10-29 NOTE — PLAN OF CARE
D:  Patient s/p crani with tumor resection and EVD placement.     I/A: Patient hemodynamically stable. EVD in place. ICPs WNL. Neurologically intact. Patient had emesis after having EVD clamped while ambulating with physical therapy. Nausea/vomiting relieved with IV zofran. Patient had no further emesis.     VS: Normotensive. SBP goal < 140, no PRN antihypertensives needed. Tmax 100.2.   Neuro: Alert and oriented x4. EVD at 10 cm above EAM. ICPs 8-17, outputs clear 10-19. Follows commands. Equal strength in all 4 extremities.   CV: Normal heart tones. Sinus rhythm.  Pulm: LS clear, diminished in bases. Patient proactive about using incentive spirometer hourly.   GI:  Patient on full liquid diet. Poor appetite. Emesis x1.  : Urinary frequency. Voids in bedside commode. Denies dysuria.  Skin: Posterior crani site with dried drainage. EVD site intact. Preventative mepilex on coccyx.  Pain: PRN oxycodone given for pain with relief.      P:  Continue to monitor and notify MD of changes.   day(s)

## 2021-11-17 ENCOUNTER — TELEPHONE (OUTPATIENT)
Dept: NEUROSURGERY | Facility: CLINIC | Age: 41
End: 2021-11-17
Payer: COMMERCIAL

## 2021-11-17 NOTE — TELEPHONE ENCOUNTER
Health Call Center    Phone Message    May a detailed message be left on voicemail: yes     Reason for Call: Other: Francesca calling to request a call back to discuss scheduling options for her follow up appointment and to review her MRI that is scheduled for 12/6/21. Writer did not see a visit type to schedule per protocols. Please call Slulycezar at your earliest convenience to discuss.     Action Taken: Message routed to:  [facility] ([facility]    Travel Screening: Not Applicable

## 2021-11-28 NOTE — PROGRESS NOTES
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 She is here today for follow up and disease management.     Today: due for next MRI brain  and sees neurology 21. Feeling not to bad. Neuropathy in her feet once in awhile-occ in her hands but minimal in hands. No trouble walking, but harder to get out of bed in the morning bc feet sore. Has PC appt next week. On Cymbalta and  Thinks this is helping some. Eating and drinking well. Bowels and bladder functioning. No bleeding. No vision changes or HA's, no change in ambulation.    Oncology History:      Patient initially presented as a 27-year-old woman seen in referral due to a Pap smear showing high-grade squamous intraepithelial lesion that was positive for high risk HPV subtype in 2007. This Pap smear had been taken during her six week postpartum visit. Patient did have a remote history of abnormal Pap smears back in  while serving in [place] for the [employer].  Pap smears during her first pregnancy were all negative. Her Pap smear at the beginning of the most recent pregnancy was normal. Patient did have a colposcopy for evaluation of this abnormal Pap smear on 2007 and cervical biopsies of 6 and 12 o'clock position showed features consistent with papillary serous adenocarcinoma. Endocervical curettings were also taken which showed fragments of papillary serous carcinoma. The patient then underwent a LEEP procedure on 10/15/07 which showed microinvasive adenocarcinoma with a depth of invasion of 0.4 mm. Patient then made the decision to proceed with radical hysterectomy. She subsequently underwent a radical hysterectomy, bilateral salpingectomy, bilateral pelvic and periaortic lymph node dissection on 10/19/07. Patient's  operative course was otherwise uncomplicated and she recoverred uneventfully.  Pathology did reveal patient to be stage IA2 adenocarcinoma of the cervix.      She underwent routine surveillence through 2014 complicated only by SIOBHAN 1     She recently presented after incidentally being found to have an enlarged pericaval lymph node.  She presented to the ED at [facility] on 9/14/2019 with severe 8.5/10 RUQ pain associated with nausea, one episode of emesis, and decreased appetite. RUQ demonstrated cholelithiasis without cholecystitis, so an abdominal and pelvic CT scan was obtained. This revealed post-surgical changes consistent with prior hysterectomy, left adnexal cysts thought to be ovarian, and an enlarged pericaval lymph node suspicious for metastatic disease vs primary lymphoma. Her condition stabilized and she was discharged home with referrals to general surgery and oncology for biliary colic and further management of the lymphadenopathy, respectively. CT-guided right retroperitoneal lymph node biopsy on 10/15/2019 was consistent with metastatic cervical carcinoma     She underwent surgery to remove the mass and determine the extent of disease on 11/13/19     PATH:  FINAL DIAGNOSIS:   LYMPH NODES, PARA-AORTIC, RESECTION:   - Positive for carcinoma in three of four lymph nodes examined (3/4),   consistent with recurrent endocervical   adenocarcinoma   - Largest metastatic focus is 3.5 cm, positive for extra yuridia extension         Chemo-Potentiated RT 12/2019-1/2020 2/2020 CT:  IMPRESSION: Postoperative changes of hysterectomy and lymph node  resection for cervical cancer. The metastatic right pelvic lymph nodes  which were previously identified have decreased in size. No new  metastases identified.      She recently presented to the ED for an acute RUQ pain episode, which has greatly improved despite no clear diagnosis.  During this visit she underwent a CAP CT which did not demonstrate evidence of  cancer but a slightly enlarged spleen. She had a mild eosinophilia (~2%) but has not traveled or eaten exotic food recently, and has not had any new contacts to suggest mono.      10/13/2020 CT:  IMPRESSION:  1.  Recurrent right external iliac lymphadenopathy.  2.  Rebound thymic hyperplasia.     10/30/2020 PET  IMPRESSION:   In this patient with history of metastatic cervical cancer status-post  radical hysterectomy and chemoradiation, there is evidence of  recurrent disease:  1. Increased size of a hypermetabolic right external iliac chain lymph node since 2/27/2020.  2. Hypermetabolic right subpectoral and axillary lymph nodes.  Recommend follow-up in the breast center for axillary ultrasound and  possible tissue sampling.  3. Idiopathic thymic activation/hyperplasia.     She was evaluated in the breast clinic with plan made for biopsy - however at biopsy the node had shruck considerably and was thus felt to be c/w inflammation (no biopsy obtained)     3/8/2021 MRI Brain (for dizziness)  IMPRESSION:  1. Heterogeneous mixed solid and cystic 2.7 cm mass in the right cerebellum, most likely a solitary metastatic lesion. There is moderate surrounding vasogenic edema as well as mass effect on the fourth ventricle. No obstructive hydrocephalus.    2. Unremarkable MR angiogram of the head and neck.     3/15/2021 Craniotomy ([facility])  1. Midline suboccipital craniotomy for resection of mass  2. Right frontal ventriculostomy  3. Stereotactic navigation  4. Use of operating microscope     4/16/21 Completed 5 fx of gamma knife ([facility])     Plan: Paclitaxel, Cisplatin, Avastin     4/28/2021: Cycle #1 Paclitaxel, Cisplatin, Avastin  5/19/2021: Cycle #2 Paclitaxel/Cisplatin/Avastin +Neulasta  6/9/2021: Cycle #3 Paclitaxel/Cisplatin/Avastin +Neulasta  6/30/2021: Cycle #4 Paclitaxel/Cisplatin/Avastin +Neulasta    6/28/2021 plan: PET scan for interval assessment.  Will plan for 6 cycles followed by possible use of Keytruda  versus observation.     7/15/2021: PET CT: IMPRESSION: In this patient with history of cervical cancer who  completed chemoradiation:  1. Resolved uptake to the right pelvic lymph node, consistent with  response to treatment.  2. No new hypermetabolic lesions.  3. No definite abnormal intracranial uptake. Please note that PET-CT  is less sensitive for intracranial disease due to normal uptake to the  brain. Consider continued following of the previously seen lesions and  surgical changes with MRI if clinically indicated.      7/21/2021: Cycle #5 Paclitaxel/Cisplatin/Avastin +Neulasta  8/11/2021: Cycle #6 Taxotere/Cisplatin/Avastin + Neulasta (Change to Taxotere due to neuropathy) deferred due to insurance  8/18/2021: Cycle #6 Paclitaxel/Cisplatin/Avastin (dose reduced Taxol to 135 mg/m2 due to insurance coverage with Taxotere and patient in agreement with plan- neuropathy mild and improved) +Neulasta    8/21/2021 MRI (HEAD):  Impression: In this patient with history of metastatic carcinoma to  the right cerebellum:  There is no definite evidence of disease recurrence. Decreased  resection cavity enhancement. Previous right occipital lobe  enhancement is completely resolved. No new enhancing intracranial  lesions.        Review of Systems:    I have reviewed the pertinent positive findings in the review of symptoms with the patient.        Past Medical History:    Past Medical History:   Diagnosis Date     Chronic cholecystitis 09/25/2019     History of cervical cancer 2007     Metastasis to brain (H) 03/08/2021     Metastatic adenocarcinoma to cervix (H) 10/28/2019    Added automatically from request for surgery [id number]         Past Surgical History:    Past Surgical History:   Procedure Laterality Date     CHOLECYSTECTOMY, LAPOROSCOPIC  09/25/2019     HYSTERECTOMY RADICAL  2007    PAUL     INSERT PORT VASCULAR ACCESS Right 5/5/2021    Procedure: INSERTION, VASCULAR ACCESS PORT;  Surgeon: Oral Toledo MD;   Location: [facility] OR     IR CHEST PORT PLACEMENT > 5 YRS OF AGE  5/5/2021     LAPAROSCOPIC LYMPHADENECTOMY N/A 11/13/2019    Procedure: LAPAROSCOPY, resection of of paraaortic lymph node, lysis of adhesions;  Surgeon: Jluis Canela MD;  Location: [facility] OR     OPTICAL TRACKING SYSTEM CRANIOTOMY, EXCISE TUMOR, COMBINED Bilateral 3/15/2021    Procedure: stealth assisted Midline suboccipital craniectomy/craniotomy for tumor;  Surgeon: Martín Myrick MD;  Location: [facility] OR     OPTICAL TRACKING SYSTEM VENTRICULOSTOMY Right 3/15/2021    Procedure: stealth assisted  right frontal ventriculostomy;  Surgeon: Martín Myrick MD;  Location: [facility] OR         Health Maintenance Due   Topic Date Due     PREVENTIVE CARE VISIT  Never done     ADVANCE CARE PLANNING  Never done     Pneumococcal Vaccine: Pediatrics (0 to 5 Years) and At-Risk Patients (6 to 64 Years) (1 of 4 - PCV13) Never done     HIV SCREENING  Never done     HEPATITIS C SCREENING  Never done     PAP  07/14/2017     PHQ-2  Never done     COVID-19 Vaccine (2 - Booster for Sia series) 05/29/2021     INFLUENZA VACCINE (1) 09/01/2021       Current Medications:     Current Outpatient Medications   Medication Sig Dispense Refill     acetaminophen (TYLENOL) 325 MG tablet Take 2 tablets (650 mg) by mouth every 6 hours 24 tablet 0     Ascorbic Acid (VITAMIN C GUMMIE PO) Take by mouth daily       dexamethasone (DECADRON) 4 MG tablet Take 2 tablets (8 mg) by mouth 2 times daily (with meals) for 6 doses Take two tablets (8 mg) the evening of chemotherapy and take two tablets (8 mg) the next morning and night following chemotherapy. Take this schedule with each chemotherapy with Taxotere. 12 tablet 0     dexamethasone (DECADRON) 4 MG tablet Take 2 tablets (8 mg) by mouth daily (with breakfast) Take 2 tablets by mouth with food on days 2-4 after chemotherapy. 12 tablet 3     DULoxetine (CYMBALTA) 30 MG capsule Take 1 capsule (30 mg) by mouth 2 times daily 60  "capsule 1     DULoxetine (CYMBALTA) 30 MG capsule Take 1 capsule (30 mg) by mouth daily Take 1 capsule (30 mg) daily for 1 week. Increase to 2 capsules (60 mg) daily from week 2 onwards. 60 capsule 1     famotidine (PEPCID) 10 MG tablet Take 1 tablet (10 mg) by mouth 2 times daily 60 tablet 1     ibuprofen (ADVIL/MOTRIN) 200 MG tablet Take 1 tablet (200 mg) by mouth every 4 hours as needed for mild pain       lidocaine-prilocaine (EMLA) 2.5-2.5 % external cream Apply topically as needed for moderate pain 30 g 1     loperamide (IMODIUM A-D) 2 MG tablet Take 1 tablet (2 mg) by mouth 4 times daily as needed for diarrhea Can take one 2 mg capsule after each loose stool up to 4 times a day as needed for diarrhea. This can be increased to 4 mg 4 times a day or 2 mg up to 8 times a day. 60 tablet 3     LORazepam (ATIVAN) 0.5 MG tablet Take 1 tablet (0.5 mg) by mouth every 6 hours as needed for nausea 20 tablet 0     multivitamin w/minerals (MULTI-VITAMIN) tablet Take 1 tablet by mouth daily       OLANZapine (ZYPREXA) 5 MG tablet Take 1 tablet (5 mg) by mouth At Bedtime 30 tablet 0     SENNA-docusate sodium (SENNA S) 8.6-50 MG tablet Take 1 tablet by mouth At Bedtime 30 tablet 3         Allergies:        Allergies   Allergen Reactions     Emend [Aprepitant]      Prochlorperazine      \"i was told I turn blue\"        Social History:     Social History     Tobacco Use     Smoking status: Former Smoker     Types: Cigarettes     Smokeless tobacco: Never Used   Substance Use Topics     Alcohol use: Yes     Comment: one drink a week       History   Drug Use No         Family History:     The patient's family history is notable for     Family History   Problem Relation Age of Onset     Aneurysm Mother      Breast Cancer Paternal Grandmother         bilat mastectomies     Breast Cancer Maternal Aunt         stage 1     Thyroid Cancer Sister 23         Physical Exam:     PS 0  VS: /80   Pulse 75   Temp 97.7  F (36.5  C) " (Oral)   Resp 16   Wt 107.9 kg (237 lb 12.8 oz)   SpO2 97%   BMI 39.57 kg/m       General Appearance: healthy and alert, no distress     HEENT: no thyromegaly, no palpable nodules or masses        Cardiovascular: regular rate and rhythm, no gallops, rubs or murmurs     Respiratory: lungs clear, no rales, rhonchi or wheezes    Musculoskeletal: extremities non tender and without edema    Skin: no lesions or rashes     Neurological: normal gait, no gross defects     Psychiatric: appropriate mood and affect                               Hematological: normal cervical, supraclavicular lymph nodes     Gastrointestinal:       abdomen soft, non-tender, non-distended, no organomegaly or masses, obese.    Genitourinary: Normal BUS, no lesions in vagina seen, pap done today. BME no palpable masses or nodules.      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 She is here today for follow up and disease management.     30 minutes spent on the date of the encounter doing chart review, history and exam, documentation, and further activities as noted above.        Plan:     1.)         Rec. Cx Cancer with brain met: s/p primary therapy:  She had discussed options of obeservation vs. maintenance therapy.  She has no residual disease on PET or brain MRI at this point, but obviously remains at risk for recurrence.  After discussing the pros and cons of each option - she decided on observation.  She will likely be a candidate for Keytruda should she recur.  -CT PET in December as 6 months out from prior imaging of body.   -pap today  -MRI brain in December and follow up neurology     2.)  Genetic risk factors were assessed again and the patient has multiple family members with cancer diagnosed younger than age 50 including herself (age 40, but with likely HPV related disease) and her sister (thyroid  cancer at age 23) among others. She met with genetics 1/2021. Did not move forward with testing but would like to move forward now. Note sent to Nneka re testing.    3.) Labs and/or tests ordered include: none     4.)   Health maintenance issues addressed today include annual health maintenance and non-gynecologic issues with PCP.   Encouraged PCP follow up for knee pain but getting better.     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 Met with Cancer Rehab 7/28/21. Will start PT and continue Cymbalta for fatigue and neuropathy. Improvement with Cymbalta per patient     Kaya Cardozo MD    Department of Ob/Gyn and Women's Health  Division of Gynecologic Oncology  [facility]  [phone]    Of a 35 minute appointment, more than 50% was spent in counseling the patient.      CC  Patient Care Team:  Tyrese Bowie as PCP - General (Family Medicine)  Jluis Canela MD as MD (Gynecologic Oncology)  Martín Myrick MD as Assigned Neuroscience Provider  Jluis Canela MD as Assigned Cancer Care Provider  Rashmi London MD as MD (Physical Medicine and Rehabilitation)

## 2021-11-29 ENCOUNTER — TELEPHONE (OUTPATIENT)
Dept: ONCOLOGY | Facility: CLINIC | Age: 41
End: 2021-11-29

## 2021-11-29 ENCOUNTER — ONCOLOGY VISIT (OUTPATIENT)
Dept: ONCOLOGY | Facility: CLINIC | Age: 41
End: 2021-11-29
Attending: OBSTETRICS & GYNECOLOGY
Payer: COMMERCIAL

## 2021-11-29 ENCOUNTER — APPOINTMENT (OUTPATIENT)
Dept: LAB | Facility: CLINIC | Age: 41
End: 2021-11-29
Attending: OBSTETRICS & GYNECOLOGY
Payer: COMMERCIAL

## 2021-11-29 VITALS
TEMPERATURE: 97.7 F | DIASTOLIC BLOOD PRESSURE: 80 MMHG | RESPIRATION RATE: 16 BRPM | WEIGHT: 237.8 LBS | HEART RATE: 75 BPM | SYSTOLIC BLOOD PRESSURE: 115 MMHG | OXYGEN SATURATION: 97 % | BODY MASS INDEX: 39.57 KG/M2

## 2021-11-29 DIAGNOSIS — C53.9 MALIGNANT NEOPLASM OF CERVIX, UNSPECIFIED SITE (H): Primary | ICD-10-CM

## 2021-11-29 DIAGNOSIS — C79.82 METASTATIC ADENOCARCINOMA TO CERVIX (H): Primary | ICD-10-CM

## 2021-11-29 DIAGNOSIS — Z80.8 FAMILY HISTORY OF THYROID CANCER: ICD-10-CM

## 2021-11-29 DIAGNOSIS — Z80.3 FAMILY HISTORY OF MALIGNANT NEOPLASM OF BREAST: ICD-10-CM

## 2021-11-29 DIAGNOSIS — C79.31 BRAIN METASTASIS: ICD-10-CM

## 2021-11-29 PROCEDURE — 87624 HPV HI-RISK TYP POOLED RSLT: CPT | Performed by: OBSTETRICS & GYNECOLOGY

## 2021-11-29 PROCEDURE — 99214 OFFICE O/P EST MOD 30 MIN: CPT | Performed by: OBSTETRICS & GYNECOLOGY

## 2021-11-29 PROCEDURE — 250N000011 HC RX IP 250 OP 636: Performed by: OBSTETRICS & GYNECOLOGY

## 2021-11-29 PROCEDURE — G0463 HOSPITAL OUTPT CLINIC VISIT: HCPCS | Mod: 25

## 2021-11-29 PROCEDURE — G0145 SCR C/V CYTO,THINLAYER,RESCR: HCPCS | Performed by: OBSTETRICS & GYNECOLOGY

## 2021-11-29 RX ORDER — HEPARIN SODIUM (PORCINE) LOCK FLUSH IV SOLN 100 UNIT/ML 100 UNIT/ML
5 SOLUTION INTRAVENOUS DAILY PRN
Status: DISCONTINUED | OUTPATIENT
Start: 2021-11-29 | End: 2021-11-29 | Stop reason: HOSPADM

## 2021-11-29 RX ADMIN — Medication 5 ML: at 07:28

## 2021-11-29 ASSESSMENT — PAIN SCALES - GENERAL: PAINLEVEL: NO PAIN (0)

## 2021-11-29 NOTE — TELEPHONE ENCOUNTER
11/29/2021    I received a message from Dr. Cardozo today letting me know that Francesca would like to move ahead with genetic testing. I spoke with Francesca over the phone today. The blood orders for the testing had been placed previously, although Francesca states that she never got around to the testing due to her cancer teatments. She would like to move ahead with the testing now. I offered the option to switch to a saliva sample instead of a blood draw. She would like to do this. The saliva kit will be mailed directly to her house so she does not need to schedule a lab appointment. We will proceed with the testing we had originally discussed at our genetic counseling visit on 1/19/21. Please see note fom this visit for complete details.     She stated that she had no other questions at this time. She will be scheduled for a results visit with me once results are available (approximately 3-4 weeks after she mails in her saliva kit).     Nneka Childress MS, [facility]  Licensed, Certified Genetic Counselor  Office: [phone]  Email: [email or web address]

## 2021-11-29 NOTE — PATIENT INSTRUCTIONS
Follow up appointment in  3 month, scheduling will call you to help make an appointment  referral to  Radiology for pet scan, scheduling will call you to help make an appointment  Pap smear done today, you will be notified via my chart/telephone with test results

## 2021-11-29 NOTE — LETTER
2021         RE: Francesca Rowland  [address]  [place] [address]        Dear Colleague,    Thank you for referring your patient, Francesca Rowland, to the [facility]. Please see a copy of my visit note below.                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 She is here today for follow up and disease management.     Today: due for next MRI brain  and sees neurology 21. Feeling not to bad. Neuropathy in her feet once in awhile-occ in her hands but minimal in hands. No trouble walking, but harder to get out of bed in the morning bc feet sore. Has PC appt next week. On Cymbalta and  Thinks this is helping some. Eating and drinking well. Bowels and bladder functioning. No bleeding. No vision changes or HA's, no change in ambulation.    Oncology History:      Patient initially presented as a 27-year-old woman seen in referral due to a Pap smear showing high-grade squamous intraepithelial lesion that was positive for high risk HPV subtype in 2007. This Pap smear had been taken during her six week postpartum visit. Patient did have a remote history of abnormal Pap smears back in  while serving in [place] for the .  Pap smears during her first pregnancy were all negative. Her Pap smear at the beginning of the most recent pregnancy was normal. Patient did have a colposcopy for evaluation of this abnormal Pap smear on 2007 and cervical biopsies of 6 and 12 o'clock position showed features consistent with papillary serous adenocarcinoma. Endocervical curettings were also taken which showed fragments of papillary serous carcinoma. The patient then underwent a LEEP procedure on 10/15/07 which showed microinvasive  adenocarcinoma with a depth of invasion of 0.4 mm. Patient then made the decision to proceed with radical hysterectomy. She subsequently underwent a radical hysterectomy, bilateral salpingectomy, bilateral pelvic and periaortic lymph node dissection on 10/19/07. Patient's operative course was otherwise uncomplicated and she recoverred uneventfully.  Pathology did reveal patient to be stage IA2 adenocarcinoma of the cervix.      She underwent routine surveillence through 2014 complicated only by SIOBHAN 1     She recently presented after incidentally being found to have an enlarged pericaval lymph node.  She presented to the ED at [facility] on 9/14/2019 with severe 8.5/10 RUQ pain associated with nausea, one episode of emesis, and decreased appetite. RUQ demonstrated cholelithiasis without cholecystitis, so an abdominal and pelvic CT scan was obtained. This revealed post-surgical changes consistent with prior hysterectomy, left adnexal cysts thought to be ovarian, and an enlarged pericaval lymph node suspicious for metastatic disease vs primary lymphoma. Her condition stabilized and she was discharged home with referrals to general surgery and oncology for biliary colic and further management of the lymphadenopathy, respectively. CT-guided right retroperitoneal lymph node biopsy on 10/15/2019 was consistent with metastatic cervical carcinoma     She underwent surgery to remove the mass and determine the extent of disease on 11/13/19     PATH:  FINAL DIAGNOSIS:   LYMPH NODES, PARA-AORTIC, RESECTION:   - Positive for carcinoma in three of four lymph nodes examined (3/4),   consistent with recurrent endocervical   adenocarcinoma   - Largest metastatic focus is 3.5 cm, positive for extra yuridia extension         Chemo-Potentiated RT 12/2019-1/2020 2/2020 CT:  IMPRESSION: Postoperative changes of hysterectomy and lymph node  resection for cervical cancer. The metastatic right pelvic lymph nodes  which were previously  identified have decreased in size. No new  metastases identified.      She recently presented to the ED for an acute RUQ pain episode, which has greatly improved despite no clear diagnosis.  During this visit she underwent a CAP CT which did not demonstrate evidence of cancer but a slightly enlarged spleen. She had a mild eosinophilia (~2%) but has not traveled or eaten exotic food recently, and has not had any new contacts to suggest mono.      10/13/2020 CT:  IMPRESSION:  1.  Recurrent right external iliac lymphadenopathy.  2.  Rebound thymic hyperplasia.     10/30/2020 PET  IMPRESSION:   In this patient with history of metastatic cervical cancer status-post  radical hysterectomy and chemoradiation, there is evidence of  recurrent disease:  1. Increased size of a hypermetabolic right external iliac chain lymph node since 2/27/2020.  2. Hypermetabolic right subpectoral and axillary lymph nodes.  Recommend follow-up in the breast center for axillary ultrasound and  possible tissue sampling.  3. Idiopathic thymic activation/hyperplasia.     She was evaluated in the breast clinic with plan made for biopsy - however at biopsy the node had shruck considerably and was thus felt to be c/w inflammation (no biopsy obtained)     3/8/2021 MRI Brain (for dizziness)  IMPRESSION:  1. Heterogeneous mixed solid and cystic 2.7 cm mass in the right cerebellum, most likely a solitary metastatic lesion. There is moderate surrounding vasogenic edema as well as mass effect on the fourth ventricle. No obstructive hydrocephalus.    2. Unremarkable MR angiogram of the head and neck.     3[facility]15/2021 [facility]  1. Midline suboccipital craniotomy for resection of mass  2. Right frontal ventriculostomy  3. Stereotactic navigation  4. Use of operating microscope     4/16/21 Completed 5 fx of gamma knife (Luis F)     Plan: Paclitaxel, Cisplatin, Avastin     4/28/2021: Cycle #1 Paclitaxel, Cisplatin, Avastin  5/19/2021: Cycle #2  Paclitaxel/Cisplatin/Avastin +Neulasta  6/9/2021: Cycle #3 Paclitaxel/Cisplatin/Avastin +Neulasta  6/30/2021: Cycle #4 Paclitaxel/Cisplatin/Avastin +Neulasta    6/28/2021 plan: PET scan for interval assessment.  Will plan for 6 cycles followed by possible use of Keytruda versus observation.     7/15/2021: PET CT: IMPRESSION: In this patient with history of cervical cancer who  completed chemoradiation:  1. Resolved uptake to the right pelvic lymph node, consistent with  response to treatment.  2. No new hypermetabolic lesions.  3. No definite abnormal intracranial uptake. Please note that PET-CT  is less sensitive for intracranial disease due to normal uptake to the  brain. Consider continued following of the previously seen lesions and  surgical changes with MRI if clinically indicated.      7/21/2021: Cycle #5 Paclitaxel/Cisplatin/Avastin +Neulasta  8/11/2021: Cycle #6 Taxotere/Cisplatin/Avastin + Neulasta (Change to Taxotere due to neuropathy) deferred due to insurance  8/18/2021: Cycle #6 Paclitaxel/Cisplatin/Avastin (dose reduced Taxol to 135 mg/m2 due to insurance coverage with Taxotere and patient in agreement with plan- neuropathy mild and improved) +Neulasta    8/21/2021 MRI (HEAD):  Impression: In this patient with history of metastatic carcinoma to  the right cerebellum:  There is no definite evidence of disease recurrence. Decreased  resection cavity enhancement. Previous right occipital lobe  enhancement is completely resolved. No new enhancing intracranial  lesions.        Review of Systems:    I have reviewed the pertinent positive findings in the review of symptoms with the patient.        Past Medical History:    Past Medical History:   Diagnosis Date     Chronic cholecystitis 09/25/2019     History of cervical cancer 2007     Metastasis to brain (H) 03/08/2021     Metastatic adenocarcinoma to cervix (H) 10/28/2019    Added automatically from request for surgery [id number]         Past Surgical  History:    Past Surgical History:   Procedure Laterality Date     CHOLECYSTECTOMY, LAPOROSCOPIC  09/25/2019     HYSTERECTOMY RADICAL  2007    PAUL     INSERT PORT VASCULAR ACCESS Right 5/5/2021    Procedure: INSERTION, VASCULAR ACCESS PORT;  Surgeon: Oral Toledo MD;  Location: [facility] OR     IR CHEST PORT PLACEMENT > 5 YRS OF AGE  5/5/2021     LAPAROSCOPIC LYMPHADENECTOMY N/A 11/13/2019    Procedure: LAPAROSCOPY, resection of of paraaortic lymph node, lysis of adhesions;  Surgeon: Jluis Canela MD;  Location: [facility] OR     OPTICAL TRACKING SYSTEM CRANIOTOMY, EXCISE TUMOR, COMBINED Bilateral 3/15/2021    Procedure: stealth assisted Midline suboccipital craniectomy/craniotomy for tumor;  Surgeon: Martín Myrick MD;  Location: [facility] OR     OPTICAL TRACKING SYSTEM VENTRICULOSTOMY Right 3/15/2021    Procedure: stealth assisted  right frontal ventriculostomy;  Surgeon: Martín Myrick MD;  Location: [facility] OR         Health Maintenance Due   Topic Date Due     PREVENTIVE CARE VISIT  Never done     ADVANCE CARE PLANNING  Never done     Pneumococcal Vaccine: Pediatrics (0 to 5 Years) and At-Risk Patients (6 to 64 Years) (1 of 4 - PCV13) Never done     HIV SCREENING  Never done     HEPATITIS C SCREENING  Never done     PAP  07/14/2017     PHQ-2  Never done     COVID-19 Vaccine (2 - Booster for Sia series) 05/29/2021     INFLUENZA VACCINE (1) 09/01/2021       Current Medications:     Current Outpatient Medications   Medication Sig Dispense Refill     acetaminophen (TYLENOL) 325 MG tablet Take 2 tablets (650 mg) by mouth every 6 hours 24 tablet 0     Ascorbic Acid (VITAMIN C GUMMIE PO) Take by mouth daily       dexamethasone (DECADRON) 4 MG tablet Take 2 tablets (8 mg) by mouth 2 times daily (with meals) for 6 doses Take two tablets (8 mg) the evening of chemotherapy and take two tablets (8 mg) the next morning and night following chemotherapy. Take this schedule with each chemotherapy with  "Taxotere. 12 tablet 0     dexamethasone (DECADRON) 4 MG tablet Take 2 tablets (8 mg) by mouth daily (with breakfast) Take 2 tablets by mouth with food on days 2-4 after chemotherapy. 12 tablet 3     DULoxetine (CYMBALTA) 30 MG capsule Take 1 capsule (30 mg) by mouth 2 times daily 60 capsule 1     DULoxetine (CYMBALTA) 30 MG capsule Take 1 capsule (30 mg) by mouth daily Take 1 capsule (30 mg) daily for 1 week. Increase to 2 capsules (60 mg) daily from week 2 onwards. 60 capsule 1     famotidine (PEPCID) 10 MG tablet Take 1 tablet (10 mg) by mouth 2 times daily 60 tablet 1     ibuprofen (ADVIL/MOTRIN) 200 MG tablet Take 1 tablet (200 mg) by mouth every 4 hours as needed for mild pain       lidocaine-prilocaine (EMLA) 2.5-2.5 % external cream Apply topically as needed for moderate pain 30 g 1     loperamide (IMODIUM A-D) 2 MG tablet Take 1 tablet (2 mg) by mouth 4 times daily as needed for diarrhea Can take one 2 mg capsule after each loose stool up to 4 times a day as needed for diarrhea. This can be increased to 4 mg 4 times a day or 2 mg up to 8 times a day. 60 tablet 3     LORazepam (ATIVAN) 0.5 MG tablet Take 1 tablet (0.5 mg) by mouth every 6 hours as needed for nausea 20 tablet 0     multivitamin w/minerals (MULTI-VITAMIN) tablet Take 1 tablet by mouth daily       OLANZapine (ZYPREXA) 5 MG tablet Take 1 tablet (5 mg) by mouth At Bedtime 30 tablet 0     SENNA-docusate sodium (SENNA S) 8.6-50 MG tablet Take 1 tablet by mouth At Bedtime 30 tablet 3         Allergies:        Allergies   Allergen Reactions     Emend [Aprepitant]      Prochlorperazine      \"i was told I turn blue\"        Social History:     Social History     Tobacco Use     Smoking status: Former Smoker     Types: Cigarettes     Smokeless tobacco: Never Used   Substance Use Topics     Alcohol use: Yes     Comment: one drink a week       History   Drug Use No         Family History:     The patient's family history is notable for     Family History "   Problem Relation Age of Onset     Aneurysm Mother      Breast Cancer Paternal Grandmother         bilat mastectomies     Breast Cancer Maternal Aunt         stage 1     Thyroid Cancer Sister 23         Physical Exam:     PS 0  VS: /80   Pulse 75   Temp 97.7  F (36.5  C) (Oral)   Resp 16   Wt 107.9 kg (237 lb 12.8 oz)   SpO2 97%   BMI 39.57 kg/m       General Appearance: healthy and alert, no distress     HEENT: no thyromegaly, no palpable nodules or masses        Cardiovascular: regular rate and rhythm, no gallops, rubs or murmurs     Respiratory: lungs clear, no rales, rhonchi or wheezes    Musculoskeletal: extremities non tender and without edema    Skin: no lesions or rashes     Neurological: normal gait, no gross defects     Psychiatric: appropriate mood and affect                               Hematological: normal cervical, supraclavicular lymph nodes     Gastrointestinal:       abdomen soft, non-tender, non-distended, no organomegaly or masses, obese.    Genitourinary: Normal BUS, no lesions in vagina seen, pap done today. BME no palpable masses or nodules.      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 She is here today for follow up and disease management.     30 minutes spent on the date of the encounter doing chart review, history and exam, documentation, and further activities as noted above.        Plan:     1.)         Rec. Cx Cancer with brain met: s/p primary therapy:  She had discussed options of obeservation vs. maintenance therapy.  She has no residual disease on PET or brain MRI at this point, but obviously remains at risk for recurrence.  After discussing the pros and cons of each option - she decided on observation.  She will likely be a candidate for Keytruda should she recur.  -CT PET in December as 6 months out from prior imaging of body.   -pap  today  -MRI brain in December and follow up neurology     2.)  Genetic risk factors were assessed again and the patient has multiple family members with cancer diagnosed younger than age 50 including herself (age 40, but with likely HPV related disease) and her sister (thyroid cancer at age 23) among others. She met with genetics 1/2021. Did not move forward with testing but would like to move forward now. Note sent to [facility] re testing.    3.) Labs and/or tests ordered include: none     4.)   Health maintenance issues addressed today include annual health maintenance and non-gynecologic issues with PCP.   Encouraged PCP follow up for knee pain but getting better.     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 Met with Cancer Rehab 7/28/21. Will start PT and continue Cymbalta for fatigue and neuropathy. Improvement with Cymbalta per patient     Kaya Cardozo MD    Department of Ob/Gyn and Women's Health  Division of Gynecologic Oncology  [facility]  [phone]    Of a 35 minute appointment, more than 50% was spent in counseling the patient.      CC  Patient Care Team:  Tyrese Bowie as PCP - General (Family Medicine)  Jluis Canela MD as MD (Gynecologic Oncology)  Martín Myrick MD as Assigned Neuroscience Provider  Jluis Canela MD as Assigned Cancer Care Provider  Rashmi London MD as MD (Physical Medicine and Rehabilitation)

## 2021-11-29 NOTE — NURSING NOTE
Return 3 months  Pap smear done today, orders entered, specimen sent to scheduling  Pet scan ordered, to be done before md appt

## 2021-11-29 NOTE — NURSING NOTE
Chief Complaint   Patient presents with     Port Flush     Port flushed with saline and heparin. VS taken      Port accessed with 20 gauge 3/4 in gripper needle by RN, line flushed with saline and heparin.  Vitals taken. Pt checked in for next appointment.    Tawana Nicole RN

## 2021-11-29 NOTE — NURSING NOTE
"Oncology Rooming Note    November 29, 2021 7:34 AM   Francesca Rowland is a 40 year old female who presents for:    Chief Complaint   Patient presents with     Port Flush     Port flushed with saline and heparin. VS taken      Oncology Clinic Visit     Pt is here for a rtn for Metastic Adenocarcinoma of Cervix     Initial Vitals: Blood Pressure 115/80   Pulse 75   Temperature 97.7  F (36.5  C) (Oral)   Respiration 16   Weight 107.9 kg (237 lb 12.8 oz)   Oxygen Saturation 97%   Body Mass Index 39.57 kg/m   Estimated body mass index is 39.57 kg/m  as calculated from the following:    Height as of 10/6/21: 1.651 m (5' 5\").    Weight as of this encounter: 107.9 kg (237 lb 12.8 oz). Body surface area is 2.22 meters squared.  No Pain (0) Comment: Data Unavailable   No LMP recorded. Patient has had a hysterectomy.  Allergies reviewed: Yes  Medications reviewed: Yes    Medications: Medication refills not needed today.  Pharmacy name entered into [employer]: [id number] - [place] - [address]    Clinical concerns: none       Liliana Crain MA            "

## 2021-12-01 LAB
BKR LAB AP GYN ADEQUACY: NORMAL
BKR LAB AP GYN INTERPRETATION: NORMAL
BKR LAB AP HPV REFLEX: NORMAL
BKR LAB AP PREVIOUS ABNORMAL: NORMAL
PATH REPORT.COMMENTS IMP SPEC: NORMAL
PATH REPORT.COMMENTS IMP SPEC: NORMAL
PATH REPORT.RELEVANT HX SPEC: NORMAL

## 2021-12-06 ENCOUNTER — ANCILLARY PROCEDURE (OUTPATIENT)
Dept: MRI IMAGING | Facility: CLINIC | Age: 41
End: 2021-12-06
Attending: NEUROLOGICAL SURGERY
Payer: COMMERCIAL

## 2021-12-06 DIAGNOSIS — C79.31 BRAIN METASTASIS: ICD-10-CM

## 2021-12-06 LAB
HUMAN PAPILLOMA VIRUS 16 DNA: NEGATIVE
HUMAN PAPILLOMA VIRUS 18 DNA: NEGATIVE
HUMAN PAPILLOMA VIRUS FINAL DIAGNOSIS: NORMAL
HUMAN PAPILLOMA VIRUS OTHER HR: NEGATIVE

## 2021-12-06 PROCEDURE — A9585 GADOBUTROL INJECTION: HCPCS | Performed by: RADIOLOGY

## 2021-12-06 PROCEDURE — 70553 MRI BRAIN STEM W/O & W/DYE: CPT | Mod: GC | Performed by: RADIOLOGY

## 2021-12-06 RX ORDER — GADOBUTROL 604.72 MG/ML
10 INJECTION INTRAVENOUS ONCE
Status: COMPLETED | OUTPATIENT
Start: 2021-12-06 | End: 2021-12-06

## 2021-12-06 RX ADMIN — GADOBUTROL 10 ML: 604.72 INJECTION INTRAVENOUS at 09:07

## 2021-12-06 NOTE — DISCHARGE INSTRUCTIONS
MRI Contrast Discharge Instructions    The IV contrast you received today will pass out of your body in your  urine. This will happen in the next 24 hours. You will not feel this process.  Your urine will not change color.    Drink at least 4 extra glasses of water or juice today (unless your doctor  has restricted your fluids). This reduces the stress on your kidneys.  You may take your regular medicines.    If you are on dialysis: It is best to have dialysis today.    If you have a reaction: Most reactions happen right away. If you have  any new symptoms after leaving the hospital (such as hives or swelling),  call your hospital at the correct number below. Or call your family doctor.  If you have breathing distress or wheezing, call 911.    Special instructions: ***    I have read and understand the above information.    Signature:______________________________________ Date:___________    Staff:__________________________________________ Date:___________     Time:__________    [facility] Departments:    ___[place]: [phone]  ___[facility]: [phone]  ___[place]: [phone] ___[facility]: [phone]  ___[facility]: [phone]  ___[facility]: [phone]  ___[phone]  ___[facility]: [phone]  ___[phone]

## 2021-12-07 NOTE — PROGRESS NOTES
[facility]   PM&R clinic note        Interval history:     Francesca Rowland presents to clinic today for follow up reg her rehab needs.   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  Was last seen in clinic on 10/6/21.  Recommendations included   1. Therapy/equipment/braces:                 1. Patient had to cancel an outpatient Physical Therapy appointment after a referral was placed at her last visit. She plans to reach out to schedule again if needed. This would be helpful for targeted strengthening and endurance in the setting of deconditioning/fatigue.                 2. As discussed, she should remain active and incorporate a regular home exercise  2. Work up:   1. Bilateral knee XR ordered to further evaluate chronic knee pain.  3. Medications:                 1. Continue Cymbalta 60 mg daily.                 2. Can try OTC ibuprofen or naproxen for knee pain.  4. Follow ups:   1. Patient should continue to follow with Psychology as needed for emotional support.  5. Follow up: 2 months    Oncology History:  - Initially presented due to Pap smear showing high-grade squamous intraepithelial lesion positive for high risk HPV subtype in 8/20017.  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   - Underwent LEEP procedure on 10/15/2007 which demonstrated microinvasive adenocarcinoma.  - Proceeded with radical hysterectomy, bilateral salpingectomy, bilateral pelvic and periaortic lymph node dissection on 10/19/2007. Pathology revealed Stage IA2 adenocarcinoma of the cervix.  - Underwent routine surveillance through 2014.  - Presented to the ED at [facility] on 9/14/2019 with severe RUQ pain which imaging revealed cholelithiasis without cholecystitis, Abdominal/Pelvic CT revealed enlarged pericaval lymph node. CT guided biopsy was consistent with metastatic cervical  "carcinoma.  - Underwent surgery to remove mass.  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  - 3/15/2021- Underwent craniotomy and resection of cerebellar mass.  Fatigue and neuropathy.  - 4/16/2021- Completed 5 fx of gamma knife  - 4/28/2021- 6/30/2021- Cycles 1-4 of paclitaxel/cisplatin/avastin.  - 7/21/2021: Cycle #5 Paclitaxel/Cisplatin/Avastin   - 8/18/21- Saw Terra Zelaya in clinic. Peripheral neuropathy improved with Cymbalta.  - 8/23/21- Saw Dr. Canela in clinic. Improvement in peripheral neuropathy symptoms with Cymbalta. No residual disease on PET or Brain MRI, but remains at risk for recurrence.   - 11/29/21- Saw Dr. Cardozo in clinic for follow up. Patient continues to think cymbalta is helping for neuropathic symptoms. Has neuropathy in her feet once in a while, minimal in hands. Will be starting PT. No residual disease on PET or brain MRI, remains at risk for recurrence. Patient discussed options, and decided on observation. Likely a candidate for Keytruda if she recurs.      Symptoms,  Patient was seen for a return visit today. She presents with her , Zach,   She has been overall doing better since her last visit. She states that she has started massage for her feet which has really helped with her neuropathy symptoms. She continues to complain of pins and needles sensation and numbness in her feet. These symptoms have not progressed above ankle level.   She especially notices difficulty at times when getting up to stand from a sitting position, and has to stabilize herself when taking the first few steps as she feels like her feet \"fall asleep\" at this time.  Her symptoms also continue to be difficult at times in the evening/night hours.  She continues with Cymbalta, and feels that it is helping somewhat for her symptoms.  She has not heard back from PT yet to reschedule her previously cancelled appointment and get " started with therapies. She is still interested in proceeding with PT at [facility].  She denies any falls or near falls since her last visit. She has a PET scan scheduled today after our visit.    Therapies/HEP,  Has not yet started outpatient PT. Order is still active.      Functionally,   Patient remains independent for mobility, ADLs and IADLs with family assist as needed.      Social history is unchanged.      Medications:  Current Outpatient Medications   Medication Sig Dispense Refill     acetaminophen (TYLENOL) 325 MG tablet Take 2 tablets (650 mg) by mouth every 6 hours 24 tablet 0     Ascorbic Acid (VITAMIN C GUMMIE PO) Take by mouth daily       dexamethasone (DECADRON) 4 MG tablet Take 2 tablets (8 mg) by mouth daily (with breakfast) Take 2 tablets by mouth with food on days 2-4 after chemotherapy. 12 tablet 3     DULoxetine (CYMBALTA) 30 MG capsule Take 1 capsule (30 mg) by mouth 2 times daily 60 capsule 1     DULoxetine (CYMBALTA) 30 MG capsule Take 1 capsule (30 mg) by mouth daily Take 1 capsule (30 mg) daily for 1 week. Increase to 2 capsules (60 mg) daily from week 2 onwards. 60 capsule 1     famotidine (PEPCID) 10 MG tablet Take 1 tablet (10 mg) by mouth 2 times daily 60 tablet 1     ibuprofen (ADVIL/MOTRIN) 200 MG tablet Take 1 tablet (200 mg) by mouth every 4 hours as needed for mild pain       lidocaine-prilocaine (EMLA) 2.5-2.5 % external cream Apply topically as needed for moderate pain 30 g 1     loperamide (IMODIUM A-D) 2 MG tablet Take 1 tablet (2 mg) by mouth 4 times daily as needed for diarrhea Can take one 2 mg capsule after each loose stool up to 4 times a day as needed for diarrhea. This can be increased to 4 mg 4 times a day or 2 mg up to 8 times a day. 60 tablet 3     LORazepam (ATIVAN) 0.5 MG tablet Take 1 tablet (0.5 mg) by mouth every 6 hours as needed for nausea 20 tablet 0     multivitamin w/minerals (MULTI-VITAMIN) tablet Take 1 tablet by mouth daily       OLANZapine  (ZYPREXA) 5 MG tablet Take 1 tablet (5 mg) by mouth At Bedtime 30 tablet 0     SENNA-docusate sodium (SENNA S) 8.6-50 MG tablet Take 1 tablet by mouth At Bedtime 30 tablet 3     dexamethasone (DECADRON) 4 MG tablet Take 2 tablets (8 mg) by mouth 2 times daily (with meals) for 6 doses Take two tablets (8 mg) the evening of chemotherapy and take two tablets (8 mg) the next morning and night following chemotherapy. Take this schedule with each chemotherapy with Taxotere. 12 tablet 0              Physical Exam:   BP (!) 149/89 (BP Location: Right arm, Patient Position: Sitting, Cuff Size: Adult Regular)   Pulse 69   Temp 98.4  F (36.9  C) (Oral)   Wt 108.5 kg (239 lb 1.6 oz)   SpO2 95%   BMI 39.79 kg/m      Gen: NAD, pleasant and cooperative  HEENT: Normocephalic, extra-ocular movements appear intact  Pulm: non-labored breathing in room air  Ext: no edema in BLE, no tenderness in calves  Neuro/MSK:   Orientation: Oriented to person, place, time, situation. Exhibits good insight into her condition and ongoing management/symptoms.  Motor: . 4/5 with bilateral hip flexion, 5/5 with bilateral knee extension, ankle dorsiflexion, EHL, plantar flexion.   Sensory:   LEs- Decreased sensation to light touch/monofilament over distal digits (distal to MTP) of all 5 digits bilaterally and decreased over plantar surface of feet bilaterally, especially over lateral aspects of plantar surface.        Labs/Imaging:  Lab Results   Component Value Date    WBC 3.6 (L) 08/18/2021    HGB 11.8 08/18/2021    HCT 36.5 08/18/2021    MCV 98 08/18/2021     08/18/2021     Lab Results   Component Value Date     08/18/2021    POTASSIUM 4.1 08/18/2021    CHLORIDE 108 08/18/2021    CO2 28 08/18/2021     (H) 08/18/2021     Lab Results   Component Value Date    GFRESTIMATED 79 08/18/2021    GFRESTBLACK >90 06/28/2021     Lab Results   Component Value Date    AST 17 08/18/2021    ALT 22 08/18/2021    ALKPHOS 94 08/18/2021     BILITOTAL 0.4 08/18/2021    BILICONJ 0.0 09/27/2007     Lab Results   Component Value Date    INR 0.97 05/05/2021     Lab Results   Component Value Date    BUN 17 08/18/2021    CR 0.90 08/18/2021     Brain MRI (12/6/21):  IMPRESSION: Stable post surgical changes from right cerebellar mass  resection. No evidence of focal disease recurrence or intracranial  metastasis elsewhere.         Assessment/Plan   Francesca Rowland presents to clinic today for follow up reg her rehab needs.   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  Was last seen in clinic on 10/6/21. As discussed with Francesca, we will reach out to outpatient PT to help with getting her scheduled, as based on exam today- she continues with proximal bilateral lower extremity weakness and she would benefit from targeted strengthening and work on gait/balance in the setting of her peripheral neuropathy. She should continue with daily Cymbalta at current dose, and we can consider adding a small bedtime dose of gabapentin at our next visit depending on her symptoms. Will plan for a return to clinic visit in 2-3 months. Patient is in agreement with the above plan.    1. Therapy/equipment/braces:                 1. Will follow up with outpatient PT to help patient get scheduled for her evaluation and ongoing PT session. Goal is for targeted strengthening and endurance in the setting of deconditioning/fatigue.                 2. She should remain active and incorporate a regular home exercise  2. Medications:                 1. Continue Cymbalta 60 mg daily.   2. Can consider adding HS dose of gabapentin if needed at or before next visit depending on severity of her symptoms.   4. Follow ups:  1. Patient should continue to follow with Psychology as needed for emotional support.  5. Follow up: 2-3 months        Rashmi London MD  Physical Medicine & Rehabilitation      50 minutes spent on the date  of the encounter doing chart review, history and exam, documentation and further activities as noted above.

## 2021-12-08 ENCOUNTER — OFFICE VISIT (OUTPATIENT)
Dept: NEUROSURGERY | Facility: CLINIC | Age: 41
End: 2021-12-08
Payer: COMMERCIAL

## 2021-12-08 ENCOUNTER — ONCOLOGY VISIT (OUTPATIENT)
Dept: ONCOLOGY | Facility: CLINIC | Age: 41
End: 2021-12-08
Attending: STUDENT IN AN ORGANIZED HEALTH CARE EDUCATION/TRAINING PROGRAM
Payer: COMMERCIAL

## 2021-12-08 ENCOUNTER — ANCILLARY PROCEDURE (OUTPATIENT)
Dept: PET IMAGING | Facility: CLINIC | Age: 41
End: 2021-12-08
Attending: OBSTETRICS & GYNECOLOGY
Payer: COMMERCIAL

## 2021-12-08 VITALS
SYSTOLIC BLOOD PRESSURE: 118 MMHG | OXYGEN SATURATION: 97 % | BODY MASS INDEX: 39.94 KG/M2 | DIASTOLIC BLOOD PRESSURE: 79 MMHG | WEIGHT: 240 LBS | HEART RATE: 83 BPM

## 2021-12-08 VITALS
TEMPERATURE: 98.4 F | WEIGHT: 239.1 LBS | DIASTOLIC BLOOD PRESSURE: 89 MMHG | OXYGEN SATURATION: 95 % | BODY MASS INDEX: 39.79 KG/M2 | HEART RATE: 69 BPM | SYSTOLIC BLOOD PRESSURE: 149 MMHG

## 2021-12-08 DIAGNOSIS — C79.82 METASTATIC ADENOCARCINOMA TO CERVIX (H): ICD-10-CM

## 2021-12-08 DIAGNOSIS — C79.31 BRAIN METASTASIS: Primary | ICD-10-CM

## 2021-12-08 DIAGNOSIS — C79.31 BRAIN METASTASIS: ICD-10-CM

## 2021-12-08 DIAGNOSIS — C53.9 MALIGNANT NEOPLASM OF CERVIX, UNSPECIFIED SITE (H): Primary | ICD-10-CM

## 2021-12-08 DIAGNOSIS — R53.81 PHYSICAL DECONDITIONING: ICD-10-CM

## 2021-12-08 DIAGNOSIS — T45.1X5A CHEMOTHERAPY-INDUCED NEUROPATHY (H): ICD-10-CM

## 2021-12-08 DIAGNOSIS — G62.0 CHEMOTHERAPY-INDUCED NEUROPATHY (H): ICD-10-CM

## 2021-12-08 LAB
CREAT BLD-MCNC: 0.6 MG/DL (ref 0.5–1.2)
GFR SERPL CREATININE-BSD FRML MDRD: >90 ML/MIN/{1.73_M2}
GLUCOSE SERPL-MCNC: 86 MG/DL (ref 70–99)

## 2021-12-08 PROCEDURE — G0463 HOSPITAL OUTPT CLINIC VISIT: HCPCS

## 2021-12-08 PROCEDURE — 99215 OFFICE O/P EST HI 40 MIN: CPT | Performed by: STUDENT IN AN ORGANIZED HEALTH CARE EDUCATION/TRAINING PROGRAM

## 2021-12-08 PROCEDURE — 99213 OFFICE O/P EST LOW 20 MIN: CPT | Performed by: PHYSICIAN ASSISTANT

## 2021-12-08 RX ORDER — HEPARIN SODIUM (PORCINE) LOCK FLUSH IV SOLN 100 UNIT/ML 100 UNIT/ML
500 SOLUTION INTRAVENOUS ONCE
Status: COMPLETED | OUTPATIENT
Start: 2021-12-08 | End: 2021-12-08

## 2021-12-08 RX ORDER — IOPAMIDOL 755 MG/ML
50-135 INJECTION, SOLUTION INTRAVASCULAR ONCE
Status: COMPLETED | OUTPATIENT
Start: 2021-12-08 | End: 2021-12-08

## 2021-12-08 RX ADMIN — IOPAMIDOL 125 ML: 755 INJECTION, SOLUTION INTRAVASCULAR at 12:14

## 2021-12-08 RX ADMIN — HEPARIN SODIUM (PORCINE) LOCK FLUSH IV SOLN 100 UNIT/ML 500 UNITS: 100 SOLUTION at 12:14

## 2021-12-08 ASSESSMENT — PAIN SCALES - GENERAL: PAINLEVEL: NO PAIN (0)

## 2021-12-08 NOTE — NURSING NOTE
"Oncology Rooming Note    December 8, 2021 10:13 AM   Francesca Rowland is a 40 year old female who presents for:    Chief Complaint   Patient presents with     Oncology Clinic Visit     Cervix cancer (H)     Initial Vitals: BP (!) 149/89 (BP Location: Right arm, Patient Position: Sitting, Cuff Size: Adult Regular)   Pulse 69   Temp 98.4  F (36.9  C) (Oral)   Wt 108.5 kg (239 lb 1.6 oz)   SpO2 95%   BMI 39.79 kg/m   Estimated body mass index is 39.79 kg/m  as calculated from the following:    Height as of 10/6/21: 1.651 m (5' 5\").    Weight as of this encounter: 108.5 kg (239 lb 1.6 oz). Body surface area is 2.23 meters squared.  No Pain (0) Comment: Data Unavailable   No LMP recorded. Patient has had a hysterectomy.  Allergies reviewed: Yes  Medications reviewed: Yes    Medications: Medication refills not needed today.  Pharmacy name entered into [facility]: [id number] - [place], [place] - [address]    Clinical concerns: No major changes reported.        Alana Marcos LPN December 8, 2021 10:13 AM              "

## 2021-12-08 NOTE — PROGRESS NOTES
[facility]  Department of Neurosurgery    Name: Francesca Rowland  MRN: [id number]  Age: 40 year old  : 1980      Chief Complaint:   Cerebellar metastasis s/p midline suboccipital craniotomy for resection followed by SRT, follow up visit    History of Present Illness:   Francesca Rowland is a 40 year old female with a history of adenocarcinoma of the cervix with metastasis to her brain who is seen today for a follow up visit. She underwent craniotomy for metastasis resection in 2021 followed by gamma knife radiation. She's here with her  today for a 3 month follow up. She completed an MRI 2021. She feels well. She continues to take cymbalta for her neuropathy which is stable. She denies new headaches, dizziness, vision changes, gait instability, paresthesias, or weakness. She has an upcoming PET today and follows with Dr. Canela in Oncology.      Review of Systems:   Pertinent items are noted in HPI or as in patient entered ROS below, remainder of complete ROS is negative.     Physical Exam:   /79   Pulse 83   Wt 108.9 kg (240 lb)   SpO2 97%   BMI 39.94 kg/m    General: No acute distress.    Eyes: Conjunctivae are normal.  MSK: Moves all extremities.  No obvious deformity.  Neuro: The patient is fully oriented. Speech is normal. Extraocular movements are intact without nystagmus. Facial sensation is intact in V1, V2, V3 distributions. Facial nerve function is normal, rated as a House Brackmann 1. Shoulders are full strength. There is no pronator drift. Full strength in all extremities. Sensation intact throughout. No dysmetria with finger-nose-finger testing. Gait is normal.   Psych: Normal mood and affect. Behavior is normal.      Imaging:  We reviewed her MRI from  which shows stable postop changes without evidence of recurrent enhancing lesions.      Assessment:  Cerebellar metastasis s/p midline suboccipital craniotomy for resection followed by  SRT, follow up visit    Plan:  We'll plan to see Francesca back in 6 months with new imaging. They were encouraged to reach out to us anytime with new questions or concerns.      Summer Patel PA-C  Department of Neurosurgery    Discussed with Dr. Myrick who agrees with above plan.

## 2021-12-08 NOTE — PATIENT INSTRUCTIONS
Follow up with YARON in 6 months, with MRI prior to appointment (Wednesday visit preferred).    Please reach out with any new questions/concerns in the meantime.

## 2021-12-08 NOTE — PATIENT INSTRUCTIONS
1. Dr. London's team will reach out to our physical therapy services to help with getting you scheduled for an evaluation/ongoing appointments.  2. Continue Cymbalta at your current dose. As discussed, we can consider adding a small dose of bedtime gabapentin if needed at a future visit based on your symptoms.  3. Return to clinic with Dr. London in 2-3 months.

## 2021-12-08 NOTE — LETTER
12/8/2021         RE: Francesca Rowland  [address]  [place] [address]        Dear Colleague,    Thank you for referring your patient, Francesca Rowland, to the [facility]. Please see a copy of my visit note below.    [facility]   PM&R clinic note        Interval history:     Francesca Rowland presents to clinic today for follow up reg her rehab needs.   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  Was last seen in clinic on 10/6/21.  Recommendations included   1. Therapy/equipment/braces:                 1. Patient had to cancel an outpatient Physical Therapy appointment after a referral was placed at her last visit. She plans to reach out to schedule again if needed. This would be helpful for targeted strengthening and endurance in the setting of deconditioning/fatigue.                 2. As discussed, she should remain active and incorporate a regular home exercise  2. Work up:   1. Bilateral knee XR ordered to further evaluate chronic knee pain.  3. Medications:                 1. Continue Cymbalta 60 mg daily.                 2. Can try OTC ibuprofen or naproxen for knee pain.  4. Follow ups:   1. Patient should continue to follow with Psychology as needed for emotional support.  5. Follow up: 2 months    Oncology History:  - Initially presented due to Pap smear showing high-grade squamous intraepithelial lesion positive for high risk HPV subtype in 8/20017.  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   - Underwent LEEP procedure on 10/15/2007 which demonstrated microinvasive adenocarcinoma.  - Proceeded with radical hysterectomy, bilateral salpingectomy, bilateral pelvic and periaortic lymph node dissection on 10/19/2007. Pathology revealed Stage IA2 adenocarcinoma of the cervix.  - Underwent routine surveillance through  "2014.  - Presented to the ED at [facility] on 9/14/2019 with severe RUQ pain which imaging revealed cholelithiasis without cholecystitis, Abdominal/Pelvic CT revealed enlarged pericaval lymph node. CT guided biopsy was consistent with metastatic cervical carcinoma.  - Underwent surgery to remove mass.  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  - 3/15/2021- Underwent craniotomy and resection of cerebellar mass.  Fatigue and neuropathy.  - 4/16/2021- Completed 5 fx of gamma knife  - 4/28/2021- 6/30/2021- Cycles 1-4 of paclitaxel/cisplatin/avastin.  - 7/21/2021: Cycle #5 Paclitaxel/Cisplatin/Avastin   - 8/18/21- Saw Terra Zelaya in clinic. Peripheral neuropathy improved with Cymbalta.  - 8/23/21- Saw Dr. Canela in clinic. Improvement in peripheral neuropathy symptoms with Cymbalta. No residual disease on PET or Brain MRI, but remains at risk for recurrence.   - 11/29/21- Saw Dr. Cardozo in clinic for follow up. Patient continues to think cymbalta is helping for neuropathic symptoms. Has neuropathy in her feet once in a while, minimal in hands. Will be starting PT. No residual disease on PET or brain MRI, remains at risk for recurrence. Patient discussed options, and decided on observation. Likely a candidate for Keytruda if she recurs.      Symptoms,  Patient was seen for a return visit today. She presents with her , Zach,   She has been overall doing better since her last visit. She states that she has started massage for her feet which has really helped with her neuropathy symptoms. She continues to complain of pins and needles sensation and numbness in her feet. These symptoms have not progressed above ankle level.   She especially notices difficulty at times when getting up to stand from a sitting position, and has to stabilize herself when taking the first few steps as she feels like her feet \"fall asleep\" at this time.  Her " symptoms also continue to be difficult at times in the evening/night hours.  She continues with Cymbalta, and feels that it is helping somewhat for her symptoms.  She has not heard back from PT yet to reschedule her previously cancelled appointment and get started with therapies. She is still interested in proceeding with PT at [facility].  She denies any falls or near falls since her last visit. She has a PET scan scheduled today after our visit.    Therapies/HEP,  Has not yet started outpatient PT. Order is still active.      Functionally,   Patient remains independent for mobility, ADLs and IADLs with family assist as needed.      Social history is unchanged.      Medications:  Current Outpatient Medications   Medication Sig Dispense Refill     acetaminophen (TYLENOL) 325 MG tablet Take 2 tablets (650 mg) by mouth every 6 hours 24 tablet 0     Ascorbic Acid (VITAMIN C GUMMIE PO) Take by mouth daily       dexamethasone (DECADRON) 4 MG tablet Take 2 tablets (8 mg) by mouth daily (with breakfast) Take 2 tablets by mouth with food on days 2-4 after chemotherapy. 12 tablet 3     DULoxetine (CYMBALTA) 30 MG capsule Take 1 capsule (30 mg) by mouth 2 times daily 60 capsule 1     DULoxetine (CYMBALTA) 30 MG capsule Take 1 capsule (30 mg) by mouth daily Take 1 capsule (30 mg) daily for 1 week. Increase to 2 capsules (60 mg) daily from week 2 onwards. 60 capsule 1     famotidine (PEPCID) 10 MG tablet Take 1 tablet (10 mg) by mouth 2 times daily 60 tablet 1     ibuprofen (ADVIL/MOTRIN) 200 MG tablet Take 1 tablet (200 mg) by mouth every 4 hours as needed for mild pain       lidocaine-prilocaine (EMLA) 2.5-2.5 % external cream Apply topically as needed for moderate pain 30 g 1     loperamide (IMODIUM A-D) 2 MG tablet Take 1 tablet (2 mg) by mouth 4 times daily as needed for diarrhea Can take one 2 mg capsule after each loose stool up to 4 times a day as needed for diarrhea. This can be increased to 4 mg 4 times a day  or 2 mg up to 8 times a day. 60 tablet 3     LORazepam (ATIVAN) 0.5 MG tablet Take 1 tablet (0.5 mg) by mouth every 6 hours as needed for nausea 20 tablet 0     multivitamin w/minerals (MULTI-VITAMIN) tablet Take 1 tablet by mouth daily       OLANZapine (ZYPREXA) 5 MG tablet Take 1 tablet (5 mg) by mouth At Bedtime 30 tablet 0     SENNA-docusate sodium (SENNA S) 8.6-50 MG tablet Take 1 tablet by mouth At Bedtime 30 tablet 3     dexamethasone (DECADRON) 4 MG tablet Take 2 tablets (8 mg) by mouth 2 times daily (with meals) for 6 doses Take two tablets (8 mg) the evening of chemotherapy and take two tablets (8 mg) the next morning and night following chemotherapy. Take this schedule with each chemotherapy with Taxotere. 12 tablet 0              Physical Exam:   BP (!) 149/89 (BP Location: Right arm, Patient Position: Sitting, Cuff Size: Adult Regular)   Pulse 69   Temp 98.4  F (36.9  C) (Oral)   Wt 108.5 kg (239 lb 1.6 oz)   SpO2 95%   BMI 39.79 kg/m      Gen: NAD, pleasant and cooperative  HEENT: Normocephalic, extra-ocular movements appear intact  Pulm: non-labored breathing in room air  Ext: no edema in BLE, no tenderness in calves  Neuro/MSK:   Orientation: Oriented to person, place, time, situation. Exhibits good insight into her condition and ongoing management/symptoms.  Motor: . 4/5 with bilateral hip flexion, 5/5 with bilateral knee extension, ankle dorsiflexion, EHL, plantar flexion.   Sensory:   LEs- Decreased sensation to light touch/monofilament over distal digits (distal to MTP) of all 5 digits bilaterally and decreased over plantar surface of feet bilaterally, especially over lateral aspects of plantar surface.        Labs/Imaging:  Lab Results   Component Value Date    WBC 3.6 (L) 08/18/2021    HGB 11.8 08/18/2021    HCT 36.5 08/18/2021    MCV 98 08/18/2021     08/18/2021     Lab Results   Component Value Date     08/18/2021    POTASSIUM 4.1 08/18/2021    CHLORIDE 108 08/18/2021    CO2  28 08/18/2021     (H) 08/18/2021     Lab Results   Component Value Date    GFRESTIMATED 79 08/18/2021    GFRESTBLACK >90 06/28/2021     Lab Results   Component Value Date    AST 17 08/18/2021    ALT 22 08/18/2021    ALKPHOS 94 08/18/2021    BILITOTAL 0.4 08/18/2021    BILICONJ 0.0 09/27/2007     Lab Results   Component Value Date    INR 0.97 05/05/2021     Lab Results   Component Value Date    BUN 17 08/18/2021    CR 0.90 08/18/2021     Brain MRI (12/6/21):  IMPRESSION: Stable post surgical changes from right cerebellar mass  resection. No evidence of focal disease recurrence or intracranial  metastasis elsewhere.         Assessment/Plan   Francesca Rowland presents to clinic today for follow up reg her rehab needs.   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  Was last seen in clinic on 10/6/21. As discussed with Francesca, we will reach out to outpatient PT to help with getting her scheduled, as based on exam today- she continues with proximal bilateral lower extremity weakness and she would benefit from targeted strengthening and work on gait/balance in the setting of her peripheral neuropathy. She should continue with daily Cymbalta at current dose, and we can consider adding a small bedtime dose of gabapentin at our next visit depending on her symptoms. Will plan for a return to clinic visit in 2-3 months. Patient is in agreement with the above plan.    1. Therapy/equipment/braces:                 1. Will follow up with outpatient PT to help patient get scheduled for her evaluation and ongoing PT session. Goal is for targeted strengthening and endurance in the setting of deconditioning/fatigue.                 2. She should remain active and incorporate a regular home exercise  2. Medications:                 1. Continue Cymbalta 60 mg daily.   2. Can consider adding HS dose of gabapentin if needed at or before next visit depending on  severity of her symptoms.   4. Follow ups:  1. Patient should continue to follow with Psychology as needed for emotional support.  5. Follow up: 2-3 months        Rashmi London MD  Physical Medicine & Rehabilitation      50 minutes spent on the date of the encounter doing chart review, history and exam, documentation and further activities as noted above.

## 2021-12-08 NOTE — DISCHARGE INSTRUCTIONS

## 2021-12-08 NOTE — LETTER
2021       RE: Francesca Rowland  [address]  [facility] [address]     Dear Colleague,    Thank you for referring your patient, Francesca Rowland, to the [facility] NEUROSURGERY [facility]INIC [place] at [facility]. Please see a copy of my visit note below.      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 She underwent craniotomy for metastasis resection in 2021 followed by gamma knife radiation. She's here with her  today for a 3 month follow up. She completed an MRI 2021. She feels well. She continues to take cymbalta for her neuropathy which is stable. She denies new headaches, dizziness, vision changes, gait instability, paresthesias, or weakness. She has an upcoming PET today and follows with Dr. Canela in Oncology.      Review of Systems:   Pertinent items are noted in HPI or as in patient entered ROS below, remainder of complete ROS is negative.     Physical Exam:   /79   Pulse 83   Wt 108.9 kg (240 lb)   SpO2 97%   BMI 39.94 kg/m    General: No acute distress.    Eyes: Conjunctivae are normal.  MSK: Moves all extremities.  No obvious deformity.  Neuro: The patient is fully oriented. Speech is normal. Extraocular movements are intact without nystagmus. Facial sensation is intact in V1, V2, V3 distributions. Facial nerve function is normal, rated as a House Brackmann 1. Shoulders are full strength. There is no pronator drift. Full strength in all extremities. Sensation intact throughout. No dysmetria with  finger-nose-finger testing. Gait is normal.   Psych: Normal mood and affect. Behavior is normal.      Imaging:  We reviewed her MRI from 12/6 which shows stable postop changes without evidence of recurrent enhancing lesions.      Assessment:  Cerebellar metastasis s/p midline suboccipital craniotomy for resection followed by SRT, follow up visit    Plan:  We'll plan to see Francesca back in 6 months with new imaging. They were encouraged to reach out to us anytime with new questions or concerns.      Summer Patel PA-C  Department of Neurosurgery    Discussed with Dr. Myrick who agrees with above plan.      Again, thank you for allowing me to participate in the care of your patient.      Sincerely,    Summer Patel PA-C

## 2022-01-15 ENCOUNTER — HEALTH MAINTENANCE LETTER (OUTPATIENT)
Age: 42
End: 2022-01-15

## 2022-01-24 ENCOUNTER — VIRTUAL VISIT (OUTPATIENT)
Dept: ONCOLOGY | Facility: CLINIC | Age: 42
End: 2022-01-24
Attending: GENETIC COUNSELOR, MS
Payer: COMMERCIAL

## 2022-01-24 DIAGNOSIS — C53.9 MALIGNANT NEOPLASM OF CERVIX, UNSPECIFIED SITE (H): Primary | ICD-10-CM

## 2022-01-24 PROCEDURE — 999N000069 HC STATISTIC GENETIC COUNSELING, < 16 MIN: Mod: TEL,95 | Performed by: GENETIC COUNSELOR, MS

## 2022-01-24 NOTE — LETTER
Cancer Risk Management  Program [facility]  [facility]  [facility]  [facility]  [facility]  Mailing Address  Cancer Risk Management Program  [facility]  [address]  [place] [address]    New patient appointments  [phone]  January 28, 2022    Francesca Rowland  [address]  [facility] [address]      Dear Francesca,    It was a pleasure speaking with you over the phone for genetic counseling on 1/24/2022. Here is a copy of the progress note from our discussion. If you have any additional questions, please feel free to call.    Referring Provider: Jluis Canela MD    Presenting Information:  I spoke to Francesca over the phone today to discuss her genetic testing results. Testing was performed on a saliva sample collected by Francesca at her home. A CustomNext-Cancer Panel (a combination of the CancerNext panel + additional genes associated with an increased risk for thyroid cancer) was ordered from [employer]. This testing was done because of Francesca's personal and family history of cancer.    Genetic Testing Result: NEGATIVE  Francesca is negative for mutations in the 38 genes analyzed: APC, ACE, AXIN2, BARD1, BMPR1A, BRCA1, BRCA2, BRIP1, CDH1, CDK4, CDKN2A, CHEK2, DICER1, MLH1, MSH2, MSH3, MSH6, MUTYH, NBN, NF1, NTHL1, PALB2, PMS2, KRLZO5B, PTEN, RAD51C, RAD51D, RECQL, RET, SMAD4, SMARCA4, STK11 and TP53 (sequencing and deletion/duplication); HOXB13, POLD1 and POLE (sequencing only); EPCAM and GREM1 (deletion/duplication only).     Interpretation:  We discussed several different interpretations of this negative test result.    1. One explanation may be that there is a different gene or combination of genes and environment that are associated with the cancers in this family.  2. It is possible that her relatives have a mutation in  one of these genes and she did not inherit it.  3. There is also a small possibility that there is a mutation in one of these genes, and the testing laboratory could not find it with their current testing methods.       Screening:  Based on this negative test result, it is important for Francesca and her relatives to refer back to the family history for appropriate cancer screening.      She should continue with her oncology care as recommended by her physicians.    Based on the personal and family history information she provided, Francesca does not meet current National Comprehensive Cancer Network (NCCN) guidelines for high risk breast screening based on the JES Risk Evaluator v8 model. As such, Francesca should continue with her routine breast imaging. In addition, Francesca should be receiving clinical breast exams by her physician.     Other population cancer screening options, such as those recommended by the American Cancer Society and the National Comprehensive Cancer Network (NCCN), are also appropriate for Francesca and her family. These screening recommendations may change if there are changes to Francesca's personal and/or family history of cancer. Final screening recommendations should be made by each individual's primary care provider.      Inheritance:  We reviewed the autosomal dominant inheritance of mutations in these genes. We discussed that Francesca cannot/did not pass on an identifiable mutation in these genes to her children based on this test result. Mutations in these genes do not skip generations.      Summary:  We do not have an explanation for Francesca's personal or family history of cancer. While no genetic changes were identified, Francesca may still be at risk for certain cancers due to family history, environmental factors, or other genetic causes not identified by this test. Because of that, it is important that she continue with cancer screening based on her personal and family history as discussed  above.    Genetic testing is rapidly advancing, and new cancer susceptibility genes will most likely be identified in the future. Therefore, I encouraged Francesca to contact me annually or if there are changes in her personal or family history. This may change how we assess her cancer risk, screening, and the testing we would offer.    Plan:  1. A copy of the test results will be mailed to Francesca.  2. She plans to follow-up with her physicians.  3. She should contact me regularly, or sooner if her family history changes.    If Francesca has any further questions, I encouraged her to contact me at [phone].    Nneka Childress MS, [facility] [place]  Licensed, Certified Genetic Counselor  Office: [phone]  Email: [email or web address]

## 2022-01-24 NOTE — Clinical Note
Please send copy of letter to patient with test results. Please enclose test results: Other Laboratory; [employer]; [facility]-Cancer panel, genetic testing (Laboratory Miscellaneous Order) [[id number]] (Order [id number])

## 2022-01-24 NOTE — PROGRESS NOTES
"1/24/2022    Francesca is a 41 year old who is being evaluated via a billable telephone visit.     Phone call duration: 3 minutes    Referring Provider: Jluis Canela MD    Presenting Information:  I spoke to Francesca over the phone today to discuss her genetic testing results. Testing was performed on a saliva sample collected by Francesca at her home. A CustomNext-Cancer Panel (a combination of the CancerNext panel + additional genes associated with an increased risk for thyroid cancer) was ordered from [employer]. This testing was done because of Francesca's personal and family history of cancer.    Genetic Testing Result: NEGATIVE  Francesca is negative for mutations in the 38 genes analyzed: APC, ACE, AXIN2, BARD1, BMPR1A, BRCA1, BRCA2, BRIP1, CDH1, CDK4, CDKN2A, CHEK2, DICER1, MLH1, MSH2, MSH3, MSH6, MUTYH, NBN, NF1, NTHL1, PALB2, PMS2, VUIGQ5W, PTEN, RAD51C, RAD51D, RECQL, RET, SMAD4, SMARCA4, STK11 and TP53 (sequencing and deletion/duplication); HOXB13, POLD1 and POLE (sequencing only); EPCAM and GREM1 (deletion/duplication only).     A copy of the test report can be found in the Laboratory tab, dated 12/20/21, and named \"LABORATORY MISCELLANEOUS ORDER\". The report is scanned in as a linked document.    Interpretation:  We discussed several different interpretations of this negative test result.    1. One explanation may be that there is a different gene or combination of genes and environment that are associated with the cancers in this family.  2. It is possible that her relatives have a mutation in one of these genes and she did not inherit it.  3. There is also a small possibility that there is a mutation in one of these genes, and the testing laboratory could not find it with their current testing methods.       Screening:  Based on this negative test result, it is important for Francesca and her relatives to refer back to the family history for appropriate cancer screening.      She should continue with " her oncology care as recommended by her physicians.    Based on the personal and family history information she provided, Francesca does not meet current National Comprehensive Cancer Network (NCCN) guidelines for high risk breast screening based on the JES Risk Evaluator v8 model. As such, Francesca should continue with her routine breast imaging. In addition, Francesca should be receiving clinical breast exams by her physician.     Other population cancer screening options, such as those recommended by the American Cancer Society and the National Comprehensive Cancer Network (NCCN), are also appropriate for Francesca and her family. These screening recommendations may change if there are changes to Francesca's personal and/or family history of cancer. Final screening recommendations should be made by each individual's primary care provider.      Inheritance:  We reviewed the autosomal dominant inheritance of mutations in these genes. We discussed that Francesca cannot/did not pass on an identifiable mutation in these genes to her children based on this test result. Mutations in these genes do not skip generations.      Summary:  We do not have an explanation for Francesca's personal or family history of cancer. While no genetic changes were identified, Francesca may still be at risk for certain cancers due to family history, environmental factors, or other genetic causes not identified by this test. Because of that, it is important that she continue with cancer screening based on her personal and family history as discussed above.    Genetic testing is rapidly advancing, and new cancer susceptibility genes will most likely be identified in the future. Therefore, I encouraged Francesca to contact me annually or if there are changes in her personal or family history. This may change how we assess her cancer risk, screening, and the testing we would offer.    Plan:  1. A copy of the test results will be mailed to Francesca.  2. She plans to  follow-up with her physicians.  3. She should contact me regularly, or sooner if her family history changes.    If Francesca has any further questions, I encouraged her to contact me at [phone].    Time spent over the phone: 3 minutes.    Nneka Childress MS, [facility] [place]  Licensed, Certified Genetic Counselor  Office: [phone]  Email: [email or web address]

## 2022-02-23 ENCOUNTER — VIRTUAL VISIT (OUTPATIENT)
Dept: ONCOLOGY | Facility: CLINIC | Age: 42
End: 2022-02-23
Attending: STUDENT IN AN ORGANIZED HEALTH CARE EDUCATION/TRAINING PROGRAM
Payer: COMMERCIAL

## 2022-02-23 DIAGNOSIS — G89.29 CHRONIC PAIN OF RIGHT KNEE: ICD-10-CM

## 2022-02-23 DIAGNOSIS — M25.562 CHRONIC PAIN OF LEFT KNEE: ICD-10-CM

## 2022-02-23 DIAGNOSIS — R53.81 PHYSICAL DECONDITIONING: ICD-10-CM

## 2022-02-23 DIAGNOSIS — T45.1X5A CHEMOTHERAPY-INDUCED NEUROPATHY (H): ICD-10-CM

## 2022-02-23 DIAGNOSIS — M25.561 CHRONIC PAIN OF RIGHT KNEE: ICD-10-CM

## 2022-02-23 DIAGNOSIS — C53.9 MALIGNANT NEOPLASM OF CERVIX, UNSPECIFIED SITE (H): Primary | ICD-10-CM

## 2022-02-23 DIAGNOSIS — G62.0 CHEMOTHERAPY-INDUCED NEUROPATHY (H): ICD-10-CM

## 2022-02-23 DIAGNOSIS — C79.82 METASTATIC ADENOCARCINOMA TO CERVIX (H): ICD-10-CM

## 2022-02-23 DIAGNOSIS — C79.31 BRAIN METASTASIS: ICD-10-CM

## 2022-02-23 DIAGNOSIS — G89.29 CHRONIC PAIN OF LEFT KNEE: ICD-10-CM

## 2022-02-23 PROCEDURE — 99215 OFFICE O/P EST HI 40 MIN: CPT | Mod: 95 | Performed by: STUDENT IN AN ORGANIZED HEALTH CARE EDUCATION/TRAINING PROGRAM

## 2022-02-23 RX ORDER — GABAPENTIN 300 MG/1
300 CAPSULE ORAL AT BEDTIME
Qty: 30 CAPSULE | Refills: 2 | Status: SHIPPED | OUTPATIENT
Start: 2022-02-23

## 2022-02-23 RX ORDER — DULOXETIN HYDROCHLORIDE 30 MG/1
30 CAPSULE, DELAYED RELEASE ORAL 2 TIMES DAILY
Qty: 60 CAPSULE | Refills: 2 | Status: SHIPPED | OUTPATIENT
Start: 2022-02-23 | End: 2022-08-16

## 2022-02-23 NOTE — PROGRESS NOTES
Francesca is a 41 year old who is being evaluated via a billable video visit.      How would you like to obtain your AVS? MyChart  If the video visit is dropped, the invitation should be resent by: Text to cell phone: [phone]  Will anyone else be joining your video visit? Bebe Ellison On[employer] VF    Video Start Time: 11:40 AM  Video-Visit Details    Type of service:  Video Visit    Video End Time:11:58 AM    Originating Location (pt. Location): Home    Distant Location (provider location):  [facility] CANCER [facility]     Platform used for Video Visit: [employer]

## 2022-02-23 NOTE — PATIENT INSTRUCTIONS
1. Cymbalta was refilled at today's visit.  2. As discussed, a prescription for gabapentin 300 mg at bedtime was sent to your pharmacy to see if this helps with your overnight symptoms.  3. Return to clinic in 4-6 months for a virtual visit with Dr. London.

## 2022-02-23 NOTE — PROGRESS NOTES
[facility]   PM&R clinic note        Interval history:     Francesca Rowland presents to clinic today for follow up reg her rehab needs.   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  Was last seen in clinic on 12/8/21.  Recommendations included:  1. Therapy/equipment/braces:                 1. Will follow up with outpatient PT to help patient get scheduled for her evaluation and ongoing PT session. Goal is for targeted strengthening and endurance in the setting of deconditioning/fatigue.                 2. She should remain active and incorporate a regular home exercise  2. Medications:                 1. Continue Cymbalta 60 mg daily.                 2. Can consider adding HS dose of gabapentin if needed at or before next visit depending on severity of her symptoms.   4. Follow ups:  1. Patient should continue to follow with Psychology as needed for emotional support.  5. Follow up: 2-3 months    Oncology History:  - Initially presented due to Pap smear showing high-grade squamous intraepithelial lesion positive for high risk HPV subtype in 8/20017.  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   - Underwent LEEP procedure on 10/15/2007 which demonstrated microinvasive adenocarcinoma.  - Proceeded with radical hysterectomy, bilateral salpingectomy, bilateral pelvic and periaortic lymph node dissection on 10/19/2007. Pathology revealed Stage IA2 adenocarcinoma of the cervix.  - Underwent routine surveillance through 2014.  - Presented to the ED at [facility] on 9/14/2019 with severe RUQ pain which imaging revealed cholelithiasis without cholecystitis, Abdominal/Pelvic CT revealed enlarged pericaval lymph node. CT guided biopsy was consistent with metastatic cervical carcinoma.  - Underwent surgery to remove mass.  - 3/8/2021- MRI Brain with mixed solid and cystic  mass in right cerebellum most likely a solitary metastatic lesion.  - 3/15/2021- Underwent craniotomy and resection of cerebellar mass.  Fatigue and neuropathy.  - 4/16/2021- Completed 5 fx of gamma knife  - 4/28/2021- 6/30/2021- Cycles 1-4 of paclitaxel/cisplatin/avastin.  - 7/21/2021: Cycle #5 Paclitaxel/Cisplatin/Avastin   - 8/18/21- Saw Terra Zelaya in clinic. Peripheral neuropathy improved with Cymbalta.  - 8/23/21- Saw Dr. Canela in clinic. Improvement in peripheral neuropathy symptoms with Cymbalta. No residual disease on PET or Brain MRI, but remains at risk for recurrence.   - 11/29/21- Saw Dr. Cardozo in clinic for follow up. Patient continues to think cymbalta is helping for neuropathic symptoms. Has neuropathy in her feet once in a while, minimal in hands. Will be starting PT. No residual disease on PET or brain MRI, remains at risk for recurrence. Patient discussed options, and decided on observation. Likely a candidate for Keytruda if she recurs.       Symptoms,  Patient was seen for a return virtual visit today. She states that she is doing much better with her strength and mobility since her last visit. She denies any falls or near falls at home or out in the community. She has returned to work full time 5 days per week, and feels that she is able to tolerate it just fine. She denies any significant fatigue. She feels that her neuropathy symptoms are at baseline during the day with her current dose of cymbalta, but symptoms do wake her up at night. She is wanting to consider adding the low dose gabapentin that we talked about at our last visit.  She feels that emotionally, she is in a better place than at our last visit.  She admits that she has not had a chance to reach out to outpatient PT since last visit.     Therapies/HEP,  Patient has tried to remain active, has not participated in outpatient PT.      Functionally,   Improvement with energy level and mobility since her last visit.      Social  history is unchanged.        Medications:  Current Outpatient Medications   Medication Sig Dispense Refill     acetaminophen (TYLENOL) 325 MG tablet Take 2 tablets (650 mg) by mouth every 6 hours 24 tablet 0     Ascorbic Acid (VITAMIN C GUMMIE PO) Take by mouth daily       DULoxetine (CYMBALTA) 30 MG capsule Take 1 capsule (30 mg) by mouth 2 times daily 60 capsule 1     ibuprofen (ADVIL/MOTRIN) 200 MG tablet Take 1 tablet (200 mg) by mouth every 4 hours as needed for mild pain       lidocaine-prilocaine (EMLA) 2.5-2.5 % external cream Apply topically as needed for moderate pain 30 g 1     loperamide (IMODIUM A-D) 2 MG tablet Take 1 tablet (2 mg) by mouth 4 times daily as needed for diarrhea Can take one 2 mg capsule after each loose stool up to 4 times a day as needed for diarrhea. This can be increased to 4 mg 4 times a day or 2 mg up to 8 times a day. 60 tablet 3     multivitamin w/minerals (MULTI-VITAMIN) tablet Take 1 tablet by mouth daily       dexamethasone (DECADRON) 4 MG tablet Take 2 tablets (8 mg) by mouth 2 times daily (with meals) for 6 doses Take two tablets (8 mg) the evening of chemotherapy and take two tablets (8 mg) the next morning and night following chemotherapy. Take this schedule with each chemotherapy with Taxotere. 12 tablet 0     dexamethasone (DECADRON) 4 MG tablet Take 2 tablets (8 mg) by mouth daily (with breakfast) Take 2 tablets by mouth with food on days 2-4 after chemotherapy. (Patient not taking: Reported on 2/23/2022) 12 tablet 3     famotidine (PEPCID) 10 MG tablet Take 1 tablet (10 mg) by mouth 2 times daily (Patient not taking: Reported on 2/23/2022) 60 tablet 1     LORazepam (ATIVAN) 0.5 MG tablet Take 1 tablet (0.5 mg) by mouth every 6 hours as needed for nausea (Patient not taking: Reported on 2/23/2022) 20 tablet 0     OLANZapine (ZYPREXA) 5 MG tablet Take 1 tablet (5 mg) by mouth At Bedtime (Patient not taking: Reported on 2/23/2022) 30 tablet 0     SENNA-docusate sodium  (SENNA S) 8.6-50 MG tablet Take 1 tablet by mouth At Bedtime (Patient not taking: Reported on 2/23/2022) 30 tablet 3              Physical Exam:   There were no vitals taken for this visit.  Gen: NAD, pleasant and cooperative   HEENT: Atraumatic, normocephalic, extraocular movements appear intact  Pulm: non-labored breathing in room air  Neuro/MSK:   Orientation: Oriented to person, time, place and situation, Exhibits good insight into her condition and ongoing treatment  Motor: Observed moving upper extremities actively against gravity.    Labs/Imaging:  Lab Results   Component Value Date    WBC 3.6 (L) 08/18/2021    HGB 11.8 08/18/2021    HCT 36.5 08/18/2021    MCV 98 08/18/2021     08/18/2021     Lab Results   Component Value Date     08/18/2021    POTASSIUM 4.1 08/18/2021    CHLORIDE 108 08/18/2021    CO2 28 08/18/2021    GLC 86 12/08/2021     Lab Results   Component Value Date    GFRESTIMATED 79 08/18/2021    GFRESTBLACK >90 06/28/2021     Lab Results   Component Value Date    AST 17 08/18/2021    ALT 22 08/18/2021    ALKPHOS 94 08/18/2021    BILITOTAL 0.4 08/18/2021    BILICONJ 0.0 09/27/2007     Lab Results   Component Value Date    INR 0.97 05/05/2021     Lab Results   Component Value Date    BUN 17 08/18/2021    CR 0.90 08/18/2021              Assessment/Plan   Francesca Rowland presents to clinic today for follow up reg her rehab needs. 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 Was last seen in clinic on 12/8/21.  Patient overall has had an improvement in her mobility and energy level since her last visit. She did not reach out to schedule PT after the order was placed at our last visit, though as we discussed today, she can hold off on this since she feels better with her strength and mobility and is back to work full time. In terms of her neuropathy, we will add low dose gabapentin at night to see if this aids with  controlling her overnight symptoms. Will plan a 4-6 month return visit. Patient is in agreement with the above plan.      1. Therapy/equipment/braces:  1. Continue to remain active and incorporate a regular home exercise  2. Can reconsider outpatient PT in the future if needed.  2. Medications:  1. Continue Cymbalta 60 mg daily. Prescription refilled at today's visit.  2. Add HS gabapentin 300 mg to help with overnight neuropathic symptoms.  3. Follow up: 4-6 months.      Rashmi London MD  Physical Medicine & Rehabilitation      50 minutes spent on the date of the encounter doing chart review, history and exam, documentation and further activities as noted above.

## 2022-03-13 NOTE — PROGRESS NOTES
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 She is here today for follow up and disease management.       Oncology History:      Patient initially presented as a 27-year-old woman seen in referral due to a Pap smear showing high-grade squamous intraepithelial lesion that was positive for high risk HPV subtype in 2007. This Pap smear had been taken during her six week postpartum visit. Patient did have a remote history of abnormal Pap smears back in  while serving in [place] for the [employer].  Pap smears during her first pregnancy were all negative. Her Pap smear at the beginning of the most recent pregnancy was normal. Patient did have a colposcopy for evaluation of this abnormal Pap smear on 2007 and cervical biopsies of 6 and 12 o'clock position showed features consistent with papillary serous adenocarcinoma. Endocervical curettings were also taken which showed fragments of papillary serous carcinoma. The patient then underwent a LEEP procedure on 10/15/07 which showed microinvasive adenocarcinoma with a depth of invasion of 0.4 mm. Patient then made the decision to proceed with radical hysterectomy. She subsequently underwent a radical hysterectomy, bilateral salpingectomy, bilateral pelvic and periaortic lymph node dissection on 10/19/07. Patient's operative course was otherwise uncomplicated and she recoverred uneventfully.  Pathology did reveal patient to be stage IA2 adenocarcinoma of the cervix.      She underwent routine surveillence through  complicated only by SIOBHAN 1      She presented to the ED at [facility] on 2019 with severe 8.5/10 RUQ pain associated with nausea, one episode of emesis, and decreased appetite. RUQ demonstrated cholelithiasis  without cholecystitis, so an abdominal and pelvic CT scan was obtained. This revealed post-surgical changes consistent with prior hysterectomy, left adnexal cysts thought to be ovarian, and an enlarged pericaval lymph node suspicious for metastatic disease vs primary lymphoma. Her condition stabilized and she was discharged home with referrals to general surgery and oncology for biliary colic and further management of the lymphadenopathy, respectively. CT-guided right retroperitoneal lymph node biopsy on 10/15/2019 was consistent with metastatic cervical carcinoma     She underwent surgery to remove the mass and determine the extent of disease on 11/13/19     PATH:  FINAL DIAGNOSIS:   LYMPH NODES, PARA-AORTIC, RESECTION:   - Positive for carcinoma in three of four lymph nodes examined (3/4),   consistent with recurrent endocervical   adenocarcinoma   - Largest metastatic focus is 3.5 cm, positive for extra yuridia extension         Chemo-Potentiated RT 12/2019-1/2020 2/2020 CT:  IMPRESSION: Postoperative changes of hysterectomy and lymph node  resection for cervical cancer. The metastatic right pelvic lymph nodes  which were previously identified have decreased in size. No new  metastases identified.      She recently presented to the ED for an acute RUQ pain episode, which has greatly improved despite no clear diagnosis.  During this visit she underwent a CAP CT which did not demonstrate evidence of cancer but a slightly enlarged spleen. She had a mild eosinophilia (~2%) but has not traveled or eaten exotic food recently, and has not had any new contacts to suggest mono.      10/13/2020 CT:  IMPRESSION:  1.  Recurrent right external iliac lymphadenopathy.  2.  Rebound thymic hyperplasia.     10/30/2020 PET  IMPRESSION:   In this patient with history of metastatic cervical cancer status-post  radical hysterectomy and chemoradiation, there is evidence of  recurrent disease:  1. Increased size of a hypermetabolic  right external iliac chain lymph node since 2/27/2020.  2. Hypermetabolic right subpectoral and axillary lymph nodes.  Recommend follow-up in the breast center for axillary ultrasound and  possible tissue sampling.  3. Idiopathic thymic activation/hyperplasia.     She was evaluated in the breast clinic with plan made for biopsy - however at biopsy the node had shruck considerably and was thus felt to be c/w inflammation (no biopsy obtained)     3/8/2021 MRI Brain (for dizziness)  IMPRESSION:  1. Heterogeneous mixed solid and cystic 2.7 cm mass in the right cerebellum, most likely a solitary metastatic lesion. There is moderate surrounding vasogenic edema as well as mass effect on the fourth ventricle. No obstructive hydrocephalus.    2. Unremarkable MR angiogram of the head and neck.     [facility]  1. Midline suboccipital craniotomy for resection of mass  2. Right frontal ventriculostomy  3. Stereotactic navigation  4. Use of operating microscope     4/16/21 Completed 5 fx of gamma knife ([facility])     Plan: Paclitaxel, Cisplatin, Avastin     4/28/2021: Cycle #1 Paclitaxel, Cisplatin, Avastin  5/19/2021: Cycle #2 Paclitaxel/Cisplatin/Avastin +Neulasta  6/9/2021: Cycle #3 Paclitaxel/Cisplatin/Avastin +Neulasta  6/30/2021: Cycle #4 Paclitaxel/Cisplatin/Avastin +Neulasta    6/28/2021 plan: PET scan for interval assessment.  Will plan for 6 cycles followed by possible use of Keytruda versus observation.     7/15/2021: PET CT: IMPRESSION: In this patient with history of cervical cancer who  completed chemoradiation:  1. Resolved uptake to the right pelvic lymph node, consistent with  response to treatment.  2. No new hypermetabolic lesions.  3. No definite abnormal intracranial uptake. Please note that PET-CT  is less sensitive for intracranial disease due to normal uptake to the  brain. Consider continued following of the previously seen lesions and  surgical changes with MRI if clinically  indicated.      7/21/2021: Cycle #5 Paclitaxel/Cisplatin/Avastin +Neulasta  8/11/2021: Cycle #6 Taxotere/Cisplatin/Avastin + Neulasta (Change to Taxotere due to neuropathy) deferred due to insurance  8/18/2021: Cycle #6 Paclitaxel/Cisplatin/Avastin (dose reduced Taxol to 135 mg/m2 due to insurance coverage with Taxotere and patient in agreement with plan- neuropathy mild and improved) +Neulasta    8/21/2021 MRI (HEAD):  Impression: In this patient with history of metastatic carcinoma to  the right cerebellum:  There is no definite evidence of disease recurrence. Decreased  resection cavity enhancement. Previous right occipital lobe  enhancement is completely resolved. No new enhancing intracranial  lesions.    12/6/21 MRI brain - DIANNE  12/8/21 PET - negative for focal uptake    Review of Systems:    I have reviewed the pertinent positive findings in the review of symptoms with the patient.  These are notable predominantly for neuropathy without bleeding.  No other neurologic deficits.  She is starting PT today to increase activity.      Past Medical History:    Past Medical History:   Diagnosis Date     Chronic cholecystitis 09/25/2019     History of cervical cancer 2007     Metastasis to brain (H) 03/08/2021     Metastatic adenocarcinoma to cervix (H) 10/28/2019    Added automatically from request for surgery [id number]         Past Surgical History:    Past Surgical History:   Procedure Laterality Date     CHOLECYSTECTOMY, LAPOROSCOPIC  09/25/2019     HYSTERECTOMY RADICAL  2007    PAUL     INSERT PORT VASCULAR ACCESS Right 5/5/2021    Procedure: INSERTION, VASCULAR ACCESS PORT;  Surgeon: Oral Toledo MD;  Location: [facility] OR     IR CHEST PORT PLACEMENT > 5 YRS OF AGE  5/5/2021     LAPAROSCOPIC LYMPHADENECTOMY N/A 11/13/2019    Procedure: LAPAROSCOPY, resection of of paraaortic lymph node, lysis of adhesions;  Surgeon: Jluis Canela MD;  [facility]     OPTICAL TRACKING SYSTEM CRANIOTOMY, EXCISE  TUMOR, COMBINED Bilateral 3/15/2021    Procedure: stealth assisted Midline suboccipital craniectomy/craniotomy for tumor;  Surgeon: Martín Myrick MD;  Location: [facility] OR     OPTICAL TRACKING SYSTEM VENTRICULOSTOMY Right 3/15/2021    Procedure: stealth assisted  right frontal ventriculostomy;  Surgeon: Martín Myrick MD;  Location: [facility] OR         Health Maintenance Due   Topic Date Due     PREVENTIVE CARE VISIT  Never done     ADVANCE CARE PLANNING  Never done     Pneumococcal Vaccine: Pediatrics (0 to 5 Years) and At-Risk Patients (6 to 64 Years) (1 of 4 - PCV13) Never done     HIV SCREENING  Never done     HEPATITIS C SCREENING  Never done     PHQ-2 (once per calendar year)  01/01/2022     COVID-19 Vaccine (3 - Booster for Sia series) 02/15/2022       Current Medications:     Current Outpatient Medications   Medication Sig Dispense Refill     acetaminophen (TYLENOL) 325 MG tablet Take 2 tablets (650 mg) by mouth every 6 hours 24 tablet 0     Ascorbic Acid (VITAMIN C GUMMIE PO) Take by mouth daily       dexamethasone (DECADRON) 4 MG tablet Take 2 tablets (8 mg) by mouth 2 times daily (with meals) for 6 doses Take two tablets (8 mg) the evening of chemotherapy and take two tablets (8 mg) the next morning and night following chemotherapy. Take this schedule with each chemotherapy with Taxotere. 12 tablet 0     dexamethasone (DECADRON) 4 MG tablet Take 2 tablets (8 mg) by mouth daily (with breakfast) Take 2 tablets by mouth with food on days 2-4 after chemotherapy. (Patient not taking: Reported on 2/23/2022) 12 tablet 3     DULoxetine (CYMBALTA) 30 MG capsule Take 1 capsule (30 mg) by mouth 2 times daily 60 capsule 2     famotidine (PEPCID) 10 MG tablet Take 1 tablet (10 mg) by mouth 2 times daily (Patient not taking: Reported on 2/23/2022) 60 tablet 1     gabapentin (NEURONTIN) 300 MG capsule Take 1 capsule (300 mg) by mouth At Bedtime 30 capsule 2     ibuprofen (ADVIL/MOTRIN) 200 MG tablet Take 1  "tablet (200 mg) by mouth every 4 hours as needed for mild pain       lidocaine-prilocaine (EMLA) 2.5-2.5 % external cream Apply topically as needed for moderate pain 30 g 1     loperamide (IMODIUM A-D) 2 MG tablet Take 1 tablet (2 mg) by mouth 4 times daily as needed for diarrhea Can take one 2 mg capsule after each loose stool up to 4 times a day as needed for diarrhea. This can be increased to 4 mg 4 times a day or 2 mg up to 8 times a day. 60 tablet 3     LORazepam (ATIVAN) 0.5 MG tablet Take 1 tablet (0.5 mg) by mouth every 6 hours as needed for nausea (Patient not taking: Reported on 2/23/2022) 20 tablet 0     multivitamin w/minerals (MULTI-VITAMIN) tablet Take 1 tablet by mouth daily       OLANZapine (ZYPREXA) 5 MG tablet Take 1 tablet (5 mg) by mouth At Bedtime (Patient not taking: Reported on 2/23/2022) 30 tablet 0     SENNA-docusate sodium (SENNA S) 8.6-50 MG tablet Take 1 tablet by mouth At Bedtime (Patient not taking: Reported on 2/23/2022) 30 tablet 3         Allergies:        Allergies   Allergen Reactions     Emend [Aprepitant]      Prochlorperazine      \"i was told I turn blue\"        Social History:     Social History     Tobacco Use     Smoking status: Former Smoker     Types: Cigarettes     Smokeless tobacco: Never Used   Substance Use Topics     Alcohol use: Yes     Comment: one drink a week       History   Drug Use No         Family History:     The patient's family history is notable for     Family History   Problem Relation Age of Onset     Aneurysm Mother      Breast Cancer Paternal Grandmother         bilat mastectomies     Breast Cancer Maternal Aunt         stage 1     Thyroid Cancer Sister 23         Physical Exam:     PS 0  VS: P80 RR14    General Appearance: healthy and alert, no distress     HEENT: no thyromegaly, no palpable nodules or masses        Cardiovascular: regular rate and rhythm, no gallops, rubs or murmurs     Respiratory: lungs clear, no rales, rhonchi or " wheezes    Musculoskeletal: extremities non tender and without edema    Skin: no lesions or rashes     Neurological: normal gait, no gross defects     Psychiatric: appropriate mood and affect                               Hematological: normal cervical, supraclavicular lymph nodes     Gastrointestinal:       abdomen soft, non-tender, non-distended, no organomegaly or masses, obese.    Genitourinary: Normal BUS, no lesions in vagina seen, pap done today. BME no palpable masses or nodules.      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 She is here today for follow up and disease management.     30 minutes spent on the date of the encounter doing chart review, history and exam, documentation, and further activities as noted above.        Plan:     1.)         Rec. Cx Cancer with brain met: s/p primary therapy:  She had discussed options of obeservation vs. maintenance therapy.  She has no residual disease on PET or brain MRI (12/2021, plan to eval in 6/2021) at this point, but obviously remains at risk for recurrence.  After discussing the pros and cons of each option - she decided on observation.  She will likely be a candidate for Keytruda should she recur.  -CT PET 3 months .   -MRI brain 3 months     2.)  Genetic risk factors were assessed again and the patient has multiple family members with cancer diagnosed younger than age 50 including herself (age 40, but with likely HPV related disease) and her sister (thyroid cancer at age 23) among others. She met with genetics 1/2021. Did not move forward with testing but would like to move forward now. Note sent to [facility] re testing.    3.) Labs and/or tests ordered include: none     4.)   Health maintenance issues addressed today include annual health maintenance and non-gynecologic issues with PCP.   Encouraged PCP follow up for knee pain  but getting better.     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 Met with Cancer [facility] 7/28/21. Will start PT and continue Cymbalta and gabapent.    Jluis Canela MD    Division of Gynecologic Oncology  [facility]    Of a 30 minute appointment, more than 50% was spent in counseling the patient.      CC  Patient Care Team:  Tyrese Bowie as PCP - General (Family Medicine)  Jluis Canela MD as MD (Gynecologic Oncology)  Jluis Canela MD as Assigned Cancer Care Provider  Rashmi London MD as MD (Physical Medicine and Rehabilitation)  Vasu Tavera PsyD as Assigned Behavioral Health Provider  Rashmi London MD as Assigned Neuroscience Provider

## 2022-03-14 ENCOUNTER — ONCOLOGY VISIT (OUTPATIENT)
Dept: ONCOLOGY | Facility: CLINIC | Age: 42
End: 2022-03-14
Attending: OBSTETRICS & GYNECOLOGY
Payer: COMMERCIAL

## 2022-03-14 VITALS
TEMPERATURE: 98 F | BODY MASS INDEX: 43.08 KG/M2 | DIASTOLIC BLOOD PRESSURE: 85 MMHG | WEIGHT: 258.89 LBS | OXYGEN SATURATION: 96 % | HEART RATE: 80 BPM | RESPIRATION RATE: 18 BRPM | SYSTOLIC BLOOD PRESSURE: 128 MMHG

## 2022-03-14 DIAGNOSIS — C53.9 MALIGNANT NEOPLASM OF CERVIX, UNSPECIFIED SITE (H): Primary | ICD-10-CM

## 2022-03-14 PROCEDURE — G0463 HOSPITAL OUTPT CLINIC VISIT: HCPCS

## 2022-03-14 PROCEDURE — 99214 OFFICE O/P EST MOD 30 MIN: CPT | Performed by: OBSTETRICS & GYNECOLOGY

## 2022-03-14 ASSESSMENT — PAIN SCALES - GENERAL: PAINLEVEL: NO PAIN (0)

## 2022-03-14 NOTE — NURSING NOTE
"Oncology Rooming Note    March 14, 2022 7:36 AM   Francesca Rowland is a 41 year old female who presents for:    Chief Complaint   Patient presents with     Oncology Clinic Visit     Cervix cancer     Initial Vitals: /85   Pulse 80   Temp 98  F (36.7  C) (Oral)   Resp 18   Wt 117.4 kg (258 lb 14.2 oz)   SpO2 96%   BMI 43.08 kg/m   Estimated body mass index is 43.08 kg/m  as calculated from the following:    Height as of 10/6/21: 1.651 m (5' 5\").    Weight as of this encounter: 117.4 kg (258 lb 14.2 oz). Body surface area is 2.32 meters squared.  No Pain (0) Comment: Data Unavailable   No LMP recorded. Patient has had a hysterectomy.  Allergies reviewed: Yes  Medications reviewed: Yes    Medications: Medication refills not needed today.  Pharmacy name entered into [employer]: [id number] - [place], [place] - [address]    Clinical concerns: Provider already saw patient. Did rooming after visit.        Erin Cartagena LPN            "

## 2022-03-14 NOTE — LETTER
3/14/2022         RE: Francesca Rowland  [address]  [place] [address]        Dear Colleague,    Thank you for referring your patient, Francesca Rowland, to the [facility]. Please see a copy of my visit note below.                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 She is here today for follow up and disease management.       Oncology History:      Patient initially presented as a 27-year-old woman seen in referral due to a Pap smear showing high-grade squamous intraepithelial lesion that was positive for high risk HPV subtype in 2007. This Pap smear had been taken during her six week postpartum visit. Patient did have a remote history of abnormal Pap smears back in  while serving in [place] for the [employer]  Pap smears during her first pregnancy were all negative. Her Pap smear at the beginning of the most recent pregnancy was normal. Patient did have a colposcopy for evaluation of this abnormal Pap smear on 2007 and cervical biopsies of 6 and 12 o'clock position showed features consistent with papillary serous adenocarcinoma. Endocervical curettings were also taken which showed fragments of papillary serous carcinoma. The patient then underwent a LEEP procedure on 10/15/07 which showed microinvasive adenocarcinoma with a depth of invasion of 0.4 mm. Patient then made the decision to proceed with radical hysterectomy. She subsequently underwent a radical hysterectomy, bilateral salpingectomy, bilateral pelvic and periaortic lymph node dissection on 10/19/07. Patient's operative course was otherwise uncomplicated and she recoverred uneventfully.  Pathology did reveal patient to be stage IA2 adenocarcinoma of the cervix.      She  underwent routine surveillence through 2014 complicated only by SIOBHAN 1      She presented to the ED at [facility] on 9/14/2019 with severe 8.5/10 RUQ pain associated with nausea, one episode of emesis, and decreased appetite. RUQ demonstrated cholelithiasis without cholecystitis, so an abdominal and pelvic CT scan was obtained. This revealed post-surgical changes consistent with prior hysterectomy, left adnexal cysts thought to be ovarian, and an enlarged pericaval lymph node suspicious for metastatic disease vs primary lymphoma. Her condition stabilized and she was discharged home with referrals to general surgery and oncology for biliary colic and further management of the lymphadenopathy, respectively. CT-guided right retroperitoneal lymph node biopsy on 10/15/2019 was consistent with metastatic cervical carcinoma     She underwent surgery to remove the mass and determine the extent of disease on 11/13/19     PATH:  FINAL DIAGNOSIS:   LYMPH NODES, PARA-AORTIC, RESECTION:   - Positive for carcinoma in three of four lymph nodes examined (3/4),   consistent with recurrent endocervical   adenocarcinoma   - Largest metastatic focus is 3.5 cm, positive for extra yuridia extension         Chemo-Potentiated RT 12/2019-1/2020 2/2020 CT:  IMPRESSION: Postoperative changes of hysterectomy and lymph node  resection for cervical cancer. The metastatic right pelvic lymph nodes  which were previously identified have decreased in size. No new  metastases identified.      She recently presented to the ED for an acute RUQ pain episode, which has greatly improved despite no clear diagnosis.  During this visit she underwent a CAP CT which did not demonstrate evidence of cancer but a slightly enlarged spleen. She had a mild eosinophilia (~2%) but has not traveled or eaten exotic food recently, and has not had any new contacts to suggest mono.      10/13/2020 CT:  IMPRESSION:  1.  Recurrent right external iliac  lymphadenopathy.  2.  Rebound thymic hyperplasia.     10/30/2020 PET  IMPRESSION:   In this patient with history of metastatic cervical cancer status-post  radical hysterectomy and chemoradiation, there is evidence of  recurrent disease:  1. Increased size of a hypermetabolic right external iliac chain lymph node since 2/27/2020.  2. Hypermetabolic right subpectoral and axillary lymph nodes.  Recommend follow-up in the breast center for axillary ultrasound and  possible tissue sampling.  3. Idiopathic thymic activation/hyperplasia.     She was evaluated in the breast clinic with plan made for biopsy - however at biopsy the node had shruck considerably and was thus felt to be c/w inflammation (no biopsy obtained)     3/8/2021 MRI Brain (for dizziness)  IMPRESSION:  1. Heterogeneous mixed solid and cystic 2.7 cm mass in the right cerebellum, most likely a solitary metastatic lesion. There is moderate surrounding vasogenic edema as well as mass effect on the fourth ventricle. No obstructive hydrocephalus.    2. Unremarkable MR angiogram of the head and neck.     3/15/2021 Craniotomy ([facility])  1. Midline suboccipital craniotomy for resection of mass  2. Right frontal ventriculostomy  3. Stereotactic navigation  4. Use of operating microscope     4/16/21 Completed 5 fx of gamma knife (ustyme)     Plan: Paclitaxel, Cisplatin, Avastin     4/28/2021: Cycle #1 Paclitaxel, Cisplatin, Avastin  5/19/2021: Cycle #2 Paclitaxel/Cisplatin/Avastin +Neulasta  6/9/2021: Cycle #3 Paclitaxel/Cisplatin/Avastin +Neulasta  6/30/2021: Cycle #4 Paclitaxel/Cisplatin/Avastin +Neulasta    6/28/2021 plan: PET scan for interval assessment.  Will plan for 6 cycles followed by possible use of Keytruda versus observation.     7/15/2021: PET CT: IMPRESSION: In this patient with history of cervical cancer who  completed chemoradiation:  1. Resolved uptake to the right pelvic lymph node, consistent with  response to treatment.  2. No new  hypermetabolic lesions.  3. No definite abnormal intracranial uptake. Please note that PET-CT  is less sensitive for intracranial disease due to normal uptake to the  brain. Consider continued following of the previously seen lesions and  surgical changes with MRI if clinically indicated.      7/21/2021: Cycle #5 Paclitaxel/Cisplatin/Avastin +Neulasta  8/11/2021: Cycle #6 Taxotere/Cisplatin/Avastin + Neulasta (Change to Taxotere due to neuropathy) deferred due to insurance  8/18/2021: Cycle #6 Paclitaxel/Cisplatin/Avastin (dose reduced Taxol to 135 mg/m2 due to insurance coverage with Taxotere and patient in agreement with plan- neuropathy mild and improved) +Neulasta    8/21/2021 MRI (HEAD):  Impression: In this patient with history of metastatic carcinoma to  the right cerebellum:  There is no definite evidence of disease recurrence. Decreased  resection cavity enhancement. Previous right occipital lobe  enhancement is completely resolved. No new enhancing intracranial  lesions.    12/6/21 MRI brain - DIANNE  12/8/21 PET - negative for focal uptake    Review of Systems:    I have reviewed the pertinent positive findings in the review of symptoms with the patient.  These are notable predominantly for neuropathy without bleeding.  No other neurologic deficits.  She is starting PT today to increase activity.      Past Medical History:    Past Medical History:   Diagnosis Date     Chronic cholecystitis 09/25/2019     History of cervical cancer 2007     Metastasis to brain (H) 03/08/2021     Metastatic adenocarcinoma to cervix (H) 10/28/2019    Added automatically from request for surgery [id number]         Past Surgical History:    Past Surgical History:   Procedure Laterality Date     CHOLECYSTECTOMY, LAPOROSCOPIC  09/25/2019     HYSTERECTOMY RADICAL  2007    PAUL     INSERT PORT VASCULAR ACCESS Right 5/5/2021    Procedure: INSERTION, VASCULAR ACCESS PORT;  Surgeon: Oral Toledo MD;  Location: [facility] OR      CHEST  PORT PLACEMENT > 5 YRS OF AGE  5/5/2021     LAPAROSCOPIC LYMPHADENECTOMY N/A 11/13/2019    Procedure: LAPAROSCOPY, resection of of paraaortic lymph node, lysis of adhesions;  Surgeon: Jluis Canela MD;  Location: [facility] OR     OPTICAL TRACKING SYSTEM CRANIOTOMY, EXCISE TUMOR, COMBINED Bilateral 3/15/2021    Procedure: stealth assisted Midline suboccipital craniectomy/craniotomy for tumor;  Surgeon: Martín Myrick MD;  Location: [facility] OR     OPTICAL TRACKING SYSTEM VENTRICULOSTOMY Right 3/15/2021    Procedure: stealth assisted  right frontal ventriculostomy;  Surgeon: Martín Myrick MD;  Location: [facility] OR         Health Maintenance Due   Topic Date Due     PREVENTIVE CARE VISIT  Never done     ADVANCE CARE PLANNING  Never done     Pneumococcal Vaccine: Pediatrics (0 to 5 Years) and At-Risk Patients (6 to 64 Years) (1 of 4 - PCV13) Never done     HIV SCREENING  Never done     HEPATITIS C SCREENING  Never done     PHQ-2 (once per calendar year)  01/01/2022     COVID-19 Vaccine (3 - Booster for Sia series) 02/15/2022       Current Medications:     Current Outpatient Medications   Medication Sig Dispense Refill     acetaminophen (TYLENOL) 325 MG tablet Take 2 tablets (650 mg) by mouth every 6 hours 24 tablet 0     Ascorbic Acid (VITAMIN C GUMMIE PO) Take by mouth daily       dexamethasone (DECADRON) 4 MG tablet Take 2 tablets (8 mg) by mouth 2 times daily (with meals) for 6 doses Take two tablets (8 mg) the evening of chemotherapy and take two tablets (8 mg) the next morning and night following chemotherapy. Take this schedule with each chemotherapy with Taxotere. 12 tablet 0     dexamethasone (DECADRON) 4 MG tablet Take 2 tablets (8 mg) by mouth daily (with breakfast) Take 2 tablets by mouth with food on days 2-4 after chemotherapy. (Patient not taking: Reported on 2/23/2022) 12 tablet 3     DULoxetine (CYMBALTA) 30 MG capsule Take 1 capsule (30 mg) by mouth 2 times daily 60 capsule 2      "famotidine (PEPCID) 10 MG tablet Take 1 tablet (10 mg) by mouth 2 times daily (Patient not taking: Reported on 2/23/2022) 60 tablet 1     gabapentin (NEURONTIN) 300 MG capsule Take 1 capsule (300 mg) by mouth At Bedtime 30 capsule 2     ibuprofen (ADVIL/MOTRIN) 200 MG tablet Take 1 tablet (200 mg) by mouth every 4 hours as needed for mild pain       lidocaine-prilocaine (EMLA) 2.5-2.5 % external cream Apply topically as needed for moderate pain 30 g 1     loperamide (IMODIUM A-D) 2 MG tablet Take 1 tablet (2 mg) by mouth 4 times daily as needed for diarrhea Can take one 2 mg capsule after each loose stool up to 4 times a day as needed for diarrhea. This can be increased to 4 mg 4 times a day or 2 mg up to 8 times a day. 60 tablet 3     LORazepam (ATIVAN) 0.5 MG tablet Take 1 tablet (0.5 mg) by mouth every 6 hours as needed for nausea (Patient not taking: Reported on 2/23/2022) 20 tablet 0     multivitamin w/minerals (MULTI-VITAMIN) tablet Take 1 tablet by mouth daily       OLANZapine (ZYPREXA) 5 MG tablet Take 1 tablet (5 mg) by mouth At Bedtime (Patient not taking: Reported on 2/23/2022) 30 tablet 0     SENNA-docusate sodium (SENNA S) 8.6-50 MG tablet Take 1 tablet by mouth At Bedtime (Patient not taking: Reported on 2/23/2022) 30 tablet 3         Allergies:        Allergies   Allergen Reactions     Emend [Aprepitant]      Prochlorperazine      \"i was told I turn blue\"        Social History:     Social History     Tobacco Use     Smoking status: Former Smoker     Types: Cigarettes     Smokeless tobacco: Never Used   Substance Use Topics     Alcohol use: Yes     Comment: one drink a week       History   Drug Use No         Family History:     The patient's family history is notable for     Family History   Problem Relation Age of Onset     Aneurysm Mother      Breast Cancer Paternal Grandmother         bilat mastectomies     Breast Cancer Maternal Aunt         stage 1     Thyroid Cancer Sister 23         Physical " Exam:     PS 0  VS: P80 RR14    General Appearance: healthy and alert, no distress     HEENT: no thyromegaly, no palpable nodules or masses        Cardiovascular: regular rate and rhythm, no gallops, rubs or murmurs     Respiratory: lungs clear, no rales, rhonchi or wheezes    Musculoskeletal: extremities non tender and without edema    Skin: no lesions or rashes     Neurological: normal gait, no gross defects     Psychiatric: appropriate mood and affect                               Hematological: normal cervical, supraclavicular lymph nodes     Gastrointestinal:       abdomen soft, non-tender, non-distended, no organomegaly or masses, obese.    Genitourinary: Normal BUS, no lesions in vagina seen, pap done today. BME no palpable masses or nodules.      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 She is here today for follow up and disease management.     30 minutes spent on the date of the encounter doing chart review, history and exam, documentation, and further activities as noted above.        Plan:     1.)         Rec. Cx Cancer with brain met: s/p primary therapy:  She had discussed options of obeservation vs. maintenance therapy.  She has no residual disease on PET or brain MRI (12/2021, plan to eval in 6/2021) at this point, but obviously remains at risk for recurrence.  After discussing the pros and cons of each option - she decided on observation.  She will likely be a candidate for Keytruda should she recur.  -CT PET 3 months .   -MRI brain 3 months     2.)  Genetic risk factors were assessed again and the patient has multiple family members with cancer diagnosed younger than age 50 including herself (age 40, but with likely HPV related disease) and her sister (thyroid cancer at age 23) among others. She met with genetics 1/2021. Did not move forward with testing but would  like to move forward now. Note sent to [facility] re testing.    3.) Labs and/or tests ordered include: none     4.)   Health maintenance issues addressed today include annual health maintenance and non-gynecologic issues with PCP.   Encouraged PCP follow up for knee pain but getting better.     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 Met with Cancer Rehab 7/28/21. Will start PT and continue Cymbalta and gabapent.    Jluis Canela MD    Division of Gynecologic Oncology  [facility]    Of a 30 minute appointment, more than 50% was spent in counseling the patient.      CC  Patient Care Team:  Tyrese Bowie as PCP - General (Family Medicine)  Rashmi London MD as MD (Physical Medicine and Rehabilitation)  Vasu Tavera PsyD as Assigned Behavioral Health Provider

## 2022-03-14 NOTE — PATIENT INSTRUCTIONS
Port flush at [facility] as soon as we can get this    In June  Brain MRI and PET scan prior to visit with Dr Canela     Scheduling will reach out and assist with these appointments     If you do not hear from them feel free to reach out to them [phone]    Have a beautiful day     Brady

## 2022-03-23 ENCOUNTER — ALLIED HEALTH/NURSE VISIT (OUTPATIENT)
Dept: ONCOLOGY | Facility: CLINIC | Age: 42
End: 2022-03-23
Payer: COMMERCIAL

## 2022-03-23 DIAGNOSIS — C53.9 MALIGNANT NEOPLASM OF CERVIX, UNSPECIFIED SITE (H): Primary | ICD-10-CM

## 2022-03-23 PROCEDURE — 96523 IRRIG DRUG DELIVERY DEVICE: CPT

## 2022-03-23 RX ORDER — HEPARIN SODIUM (PORCINE) LOCK FLUSH IV SOLN 100 UNIT/ML 100 UNIT/ML
5 SOLUTION INTRAVENOUS EVERY 8 HOURS
Status: DISCONTINUED | OUTPATIENT
Start: 2022-03-23 | End: 2022-03-23 | Stop reason: HOSPADM

## 2022-03-23 RX ADMIN — HEPARIN SODIUM (PORCINE) LOCK FLUSH IV SOLN 100 UNIT/ML 5 ML: 100 SOLUTION at 15:21

## 2022-03-23 NOTE — PROGRESS NOTES
Right port accessed with a 20 gauge 1 inch needle (after 3 attempts and having Charge RN assess)  using sterile technique. Port is angled and deep - A 1 Inch needle is necessary to hit the back of the port. Pt should be laying flat for best access. Needle removed and gauze dressing applied, see flow sheet.   Pt instructed to make more port flush appointments, had not has a flush since 12/08/2021. Pt verbalized understanding.    Jessica Alegre, RN

## 2022-04-28 ENCOUNTER — TELEPHONE (OUTPATIENT)
Dept: NEUROSURGERY | Facility: CLINIC | Age: 42
End: 2022-04-28
Payer: COMMERCIAL

## 2022-04-28 NOTE — TELEPHONE ENCOUNTER
Writer WALT for pt to call back and schedule a 6 month follow up    Please schedule a return, in person or virtual visit with Danielle Trivedi on a Wednesday only once MRI has been completed. MRI is currently scheduled for 6/17    Liat Carcamo

## 2022-05-04 ENCOUNTER — ALLIED HEALTH/NURSE VISIT (OUTPATIENT)
Dept: ONCOLOGY | Facility: CLINIC | Age: 42
End: 2022-05-04
Payer: COMMERCIAL

## 2022-05-04 DIAGNOSIS — C53.9 MALIGNANT NEOPLASM OF CERVIX, UNSPECIFIED SITE (H): Primary | ICD-10-CM

## 2022-05-04 PROCEDURE — 96523 IRRIG DRUG DELIVERY DEVICE: CPT

## 2022-05-04 RX ORDER — HEPARIN SODIUM (PORCINE) LOCK FLUSH IV SOLN 100 UNIT/ML 100 UNIT/ML
500 SOLUTION INTRAVENOUS EVERY 8 HOURS
Status: DISCONTINUED | OUTPATIENT
Start: 2022-05-04 | End: 2022-05-04 | Stop reason: HOSPADM

## 2022-05-04 RX ADMIN — HEPARIN SODIUM (PORCINE) LOCK FLUSH IV SOLN 100 UNIT/ML 500 UNITS: 100 SOLUTION at 15:41

## 2022-05-04 NOTE — PROGRESS NOTES
"Patient's name and  were verified.  See Doc Flowsheet - IV assess for details.  IVAD accessed with 20G 3/4\" palumbo gripper plus needle  blood return positive: YES  Site without redness, tenderness or swelling: YES  flushed with 20cc NS and 5cc 100u/ml heparin  Needle: de-accessed  Comments: Port flushed. Patient tolerated procedure without incident.    Robles Zuñiga  RN, BSN      "

## 2022-06-17 ENCOUNTER — ANCILLARY PROCEDURE (OUTPATIENT)
Dept: PET IMAGING | Facility: CLINIC | Age: 42
End: 2022-06-17
Attending: OBSTETRICS & GYNECOLOGY
Payer: COMMERCIAL

## 2022-06-17 ENCOUNTER — ANCILLARY PROCEDURE (OUTPATIENT)
Dept: MRI IMAGING | Facility: CLINIC | Age: 42
End: 2022-06-17
Attending: OBSTETRICS & GYNECOLOGY
Payer: COMMERCIAL

## 2022-06-17 DIAGNOSIS — C53.9 MALIGNANT NEOPLASM OF CERVIX, UNSPECIFIED SITE (H): ICD-10-CM

## 2022-06-17 LAB
CREAT BLD-MCNC: 0.8 MG/DL (ref 0.5–1.2)
GFR SERPL CREATININE-BSD FRML MDRD: >90 ML/MIN/{1.73_M2}
GLUCOSE SERPL-MCNC: 84 MG/DL (ref 70–99)

## 2022-06-17 PROCEDURE — A9585 GADOBUTROL INJECTION: HCPCS | Mod: JW | Performed by: RADIOLOGY

## 2022-06-17 PROCEDURE — 70553 MRI BRAIN STEM W/O & W/DYE: CPT | Mod: GC | Performed by: RADIOLOGY

## 2022-06-17 RX ORDER — GADOBUTROL 604.72 MG/ML
7.5 INJECTION INTRAVENOUS ONCE
Status: COMPLETED | OUTPATIENT
Start: 2022-06-17 | End: 2022-06-17

## 2022-06-17 RX ORDER — FUROSEMIDE 10 MG/ML
40 INJECTION INTRAMUSCULAR; INTRAVENOUS ONCE
Status: COMPLETED | OUTPATIENT
Start: 2022-06-17 | End: 2022-06-17

## 2022-06-17 RX ORDER — IOPAMIDOL 755 MG/ML
50-125 INJECTION, SOLUTION INTRAVASCULAR ONCE
Status: COMPLETED | OUTPATIENT
Start: 2022-06-17 | End: 2022-06-17

## 2022-06-17 RX ORDER — HEPARIN SODIUM (PORCINE) LOCK FLUSH IV SOLN 100 UNIT/ML 100 UNIT/ML
500 SOLUTION INTRAVENOUS ONCE
Status: COMPLETED | OUTPATIENT
Start: 2022-06-17 | End: 2022-06-17

## 2022-06-17 RX ADMIN — GADOBUTROL 7.5 ML: 604.72 INJECTION INTRAVENOUS at 09:45

## 2022-06-17 RX ADMIN — HEPARIN SODIUM (PORCINE) LOCK FLUSH IV SOLN 100 UNIT/ML 500 UNITS: 100 SOLUTION at 12:54

## 2022-06-17 RX ADMIN — IOPAMIDOL 125 ML: 755 INJECTION, SOLUTION INTRAVASCULAR at 12:54

## 2022-06-17 RX ADMIN — GADOBUTROL 4.5 ML: 604.72 INJECTION INTRAVENOUS at 09:46

## 2022-06-17 RX ADMIN — FUROSEMIDE 40 MG: 10 INJECTION INTRAMUSCULAR; INTRAVENOUS at 12:54

## 2022-06-21 NOTE — PROGRESS NOTES
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 She is here today for follow up and disease management.       Oncology History:      Patient initially presented as a 27-year-old woman seen in referral due to a Pap smear showing high-grade squamous intraepithelial lesion that was positive for high risk HPV subtype in 2007. This Pap smear had been taken during her six week postpartum visit. Patient did have a remote history of abnormal Pap smears back in  while serving in [place] for the [employer].  Pap smears during her first pregnancy were all negative. Her Pap smear at the beginning of the most recent pregnancy was normal. Patient did have a colposcopy for evaluation of this abnormal Pap smear on 2007 and cervical biopsies of 6 and 12 o'clock position showed features consistent with papillary serous adenocarcinoma. Endocervical curettings were also taken which showed fragments of papillary serous carcinoma. The patient then underwent a LEEP procedure on 10/15/07 which showed microinvasive adenocarcinoma with a depth of invasion of 0.4 mm. Patient then made the decision to proceed with radical hysterectomy. She subsequently underwent a radical hysterectomy, bilateral salpingectomy, bilateral pelvic and periaortic lymph node dissection on 10/19/07. Patient's operative course was otherwise uncomplicated and she recoverred uneventfully.  Pathology did reveal patient to be stage IA2 adenocarcinoma of the cervix.      She underwent routine surveillence through  complicated only by SIOBHAN 1      She presented to the ED at [facility] on 2019 with severe 8.5/10 RUQ pain associated with nausea, one episode of emesis, and decreased appetite. RUQ demonstrated cholelithiasis  without cholecystitis, so an abdominal and pelvic CT scan was obtained. This revealed post-surgical changes consistent with prior hysterectomy, left adnexal cysts thought to be ovarian, and an enlarged pericaval lymph node suspicious for metastatic disease vs primary lymphoma. Her condition stabilized and she was discharged home with referrals to general surgery and oncology for biliary colic and further management of the lymphadenopathy, respectively. CT-guided right retroperitoneal lymph node biopsy on 10/15/2019 was consistent with metastatic cervical carcinoma     She underwent surgery to remove the mass and determine the extent of disease on 11/13/19     PATH:  FINAL DIAGNOSIS:   LYMPH NODES, PARA-AORTIC, RESECTION:   - Positive for carcinoma in three of four lymph nodes examined (3/4),   consistent with recurrent endocervical   adenocarcinoma   - Largest metastatic focus is 3.5 cm, positive for extra yuridia extension         Chemo-Potentiated RT 12/2019-1/2020 2/2020 CT:  IMPRESSION: Postoperative changes of hysterectomy and lymph node  resection for cervical cancer. The metastatic right pelvic lymph nodes  which were previously identified have decreased in size. No new  metastases identified.      She recently presented to the ED for an acute RUQ pain episode, which has greatly improved despite no clear diagnosis.  During this visit she underwent a CAP CT which did not demonstrate evidence of cancer but a slightly enlarged spleen. She had a mild eosinophilia (~2%) but has not traveled or eaten exotic food recently, and has not had any new contacts to suggest mono.      10/13/2020 CT:  IMPRESSION:  1.  Recurrent right external iliac lymphadenopathy.  2.  Rebound thymic hyperplasia.     10/30/2020 PET  IMPRESSION:   In this patient with history of metastatic cervical cancer status-post  radical hysterectomy and chemoradiation, there is evidence of  recurrent disease:  1. Increased size of a hypermetabolic  right external iliac chain lymph node since 2/27/2020.  2. Hypermetabolic right subpectoral and axillary lymph nodes.  Recommend follow-up in the breast center for axillary ultrasound and  possible tissue sampling.  3. Idiopathic thymic activation/hyperplasia.     She was evaluated in the breast clinic with plan made for biopsy - however at biopsy the node had shruck considerably and was thus felt to be c/w inflammation (no biopsy obtained)     3/8/2021 MRI Brain (for dizziness)  IMPRESSION:  1. Heterogeneous mixed solid and cystic 2.7 cm mass in the right cerebellum, most likely a solitary metastatic lesion. There is moderate surrounding vasogenic edema as well as mass effect on the fourth ventricle. No obstructive hydrocephalus.    2. Unremarkable MR angiogram of the head and neck.     [facility]  1. Midline suboccipital craniotomy for resection of mass  2. Right frontal ventriculostomy  3. Stereotactic navigation  4. Use of operating microscope     4/16/21 Completed 5 fx of gamma knife ([employer])     Plan: Paclitaxel, Cisplatin, Avastin     4/28/2021: Cycle #1 Paclitaxel, Cisplatin, Avastin  5/19/2021: Cycle #2 Paclitaxel/Cisplatin/Avastin +Neulasta  6/9/2021: Cycle #3 Paclitaxel/Cisplatin/Avastin +Neulasta  6/30/2021: Cycle #4 Paclitaxel/Cisplatin/Avastin +Neulasta    6/28/2021 plan: PET scan for interval assessment.  Will plan for 6 cycles followed by possible use of Keytruda versus observation.     7/15/2021: PET CT: IMPRESSION: In this patient with history of cervical cancer who  completed chemoradiation:  1. Resolved uptake to the right pelvic lymph node, consistent with  response to treatment.  2. No new hypermetabolic lesions.  3. No definite abnormal intracranial uptake. Please note that PET-CT  is less sensitive for intracranial disease due to normal uptake to the  brain. Consider continued following of the previously seen lesions and  surgical changes with MRI if clinically  indicated.      7/21/2021: Cycle #5 Paclitaxel/Cisplatin/Avastin +Neulasta  8/11/2021: Cycle #6 Taxotere/Cisplatin/Avastin + Neulasta (Change to Taxotere due to neuropathy) deferred due to insurance  8/18/2021: Cycle #6 Paclitaxel/Cisplatin/Avastin (dose reduced Taxol to 135 mg/m2 due to insurance coverage with Taxotere and patient in agreement with plan- neuropathy mild and improved) +Neulasta    8/21/2021 MRI (HEAD):  Impression: In this patient with history of metastatic carcinoma to  the right cerebellum:  There is no definite evidence of disease recurrence. Decreased  resection cavity enhancement. Previous right occipital lobe  enhancement is completely resolved. No new enhancing intracranial  lesions.    12/6/21 MRI brain - DIANNE  12/8/21 PET - negative for focal uptake    6/17/22 MRI (BRAIN)  IMPRESSION: In this patient with history of metastatic cervical cancer  to the cerebellum, status post resection and chemoradiation, there is  no evidence of recurrence:  Stable postsurgical changes of right cerebellar mass resection without  evidence of local recurrence. No new enhancing intracranial lesions.    PET  1. No abnormal FDG uptake within the body and neck.     2. Postsurgical changes of total abdominal hysterectomy, bilateral  salpingo-oophorectomy and periaortic and pelvic lymph node dissection  without evidence of disease recurrence.      Past Medical History:    Past Medical History:   Diagnosis Date     Chronic cholecystitis 09/25/2019     History of cervical cancer 2007     Metastasis to brain (H) 03/08/2021     Metastatic adenocarcinoma to cervix (H) 10/28/2019    Added automatically from request for surgery [id number]         Past Surgical History:    Past Surgical History:   Procedure Laterality Date     CHOLECYSTECTOMY, LAPOROSCOPIC  09/25/2019     HYSTERECTOMY RADICAL  2007    PAUL     INSERT PORT VASCULAR ACCESS Right 5/5/2021    Procedure: INSERTION, VASCULAR ACCESS PORT;  Surgeon: Oral Toledo  MD DIANA;  Location: [facility] OR     IR CHEST PORT PLACEMENT > 5 YRS OF AGE  5/5/2021     LAPAROSCOPIC LYMPHADENECTOMY N/A 11/13/2019    Procedure: LAPAROSCOPY, resection of of paraaortic lymph node, lysis of adhesions;  Surgeon: Jluis Canela MD;  Location: [facility] OR     OPTICAL TRACKING SYSTEM CRANIOTOMY, EXCISE TUMOR, COMBINED Bilateral 3/15/2021    Procedure: stealth assisted Midline suboccipital craniectomy/craniotomy for tumor;  Surgeon: Martín Myrick MD;  Location: [facility] OR     OPTICAL TRACKING SYSTEM VENTRICULOSTOMY Right 3/15/2021    Procedure: stealth assisted  right frontal ventriculostomy;  Surgeon: Martín Myrick MD;  Location: [facility] OR         Health Maintenance Due   Topic Date Due     PREVENTIVE CARE VISIT  Never done     ADVANCE CARE PLANNING  Never done     Pneumococcal Vaccine: Pediatrics (0 to 5 Years) and At-Risk Patients (6 to 64 Years) (1 - PCV) Never done     HIV SCREENING  Never done     HEPATITIS C SCREENING  Never done     PHQ-2 (once per calendar year)  01/01/2022     COVID-19 Vaccine (3 - Booster for Sia series) 02/15/2022       Current Medications:     Current Outpatient Medications   Medication Sig Dispense Refill     acetaminophen (TYLENOL) 325 MG tablet Take 2 tablets (650 mg) by mouth every 6 hours 24 tablet 0     Ascorbic Acid (VITAMIN C GUMMIE PO) Take by mouth daily       dexamethasone (DECADRON) 4 MG tablet Take 2 tablets (8 mg) by mouth 2 times daily (with meals) for 6 doses Take two tablets (8 mg) the evening of chemotherapy and take two tablets (8 mg) the next morning and night following chemotherapy. Take this schedule with each chemotherapy with Taxotere. 12 tablet 0     dexamethasone (DECADRON) 4 MG tablet Take 2 tablets (8 mg) by mouth daily (with breakfast) Take 2 tablets by mouth with food on days 2-4 after chemotherapy. (Patient not taking: Reported on 2/23/2022) 12 tablet 3     DULoxetine (CYMBALTA) 30 MG capsule Take 1 capsule (30 mg) by mouth 2  "times daily 60 capsule 2     famotidine (PEPCID) 10 MG tablet Take 1 tablet (10 mg) by mouth 2 times daily (Patient not taking: Reported on 2/23/2022) 60 tablet 1     gabapentin (NEURONTIN) 300 MG capsule Take 1 capsule (300 mg) by mouth At Bedtime 30 capsule 2     ibuprofen (ADVIL/MOTRIN) 200 MG tablet Take 1 tablet (200 mg) by mouth every 4 hours as needed for mild pain       lidocaine-prilocaine (EMLA) 2.5-2.5 % external cream Apply topically as needed for moderate pain 30 g 1     loperamide (IMODIUM A-D) 2 MG tablet Take 1 tablet (2 mg) by mouth 4 times daily as needed for diarrhea Can take one 2 mg capsule after each loose stool up to 4 times a day as needed for diarrhea. This can be increased to 4 mg 4 times a day or 2 mg up to 8 times a day. 60 tablet 3     LORazepam (ATIVAN) 0.5 MG tablet Take 1 tablet (0.5 mg) by mouth every 6 hours as needed for nausea (Patient not taking: Reported on 2/23/2022) 20 tablet 0     multivitamin w/minerals (MULTI-VITAMIN) tablet Take 1 tablet by mouth daily       OLANZapine (ZYPREXA) 5 MG tablet Take 1 tablet (5 mg) by mouth At Bedtime (Patient not taking: Reported on 2/23/2022) 30 tablet 0     SENNA-docusate sodium (SENNA S) 8.6-50 MG tablet Take 1 tablet by mouth At Bedtime (Patient not taking: Reported on 2/23/2022) 30 tablet 3         Allergies:        Allergies   Allergen Reactions     Emend [Aprepitant]      Prochlorperazine      \"i was told I turn blue\"        Social History:     Social History     Tobacco Use     Smoking status: Former Smoker     Types: Cigarettes     Smokeless tobacco: Never Used   Substance Use Topics     Alcohol use: Yes     Comment: one drink a week       History   Drug Use No         Family History:     The patient's family history is notable for     Family History   Problem Relation Age of Onset     Aneurysm Mother      Breast Cancer Paternal Grandmother         bilat mastectomies     Breast Cancer Maternal Aunt         stage 1     Thyroid Cancer " Sister 23         Physical Exam:     PS 0  VS: /83 (BP Location: Right arm, Patient Position: Sitting)   Pulse 80   Temp 98.4  F (36.9  C) (Oral)   SpO2 94%       General Appearance: healthy and alert, no distress, overweight     HEENT: no thyromegaly, no palpable nodules or masses        Cardiovascular: regular rate and rhythm, no gallops, rubs or murmurs     Respiratory: lungs clear, no rales, rhonchi or wheezes    Musculoskeletal: extremities non tender and without edema    Skin: no lesions or rashes     Neurological: normal gait, no gross defects     Psychiatric: appropriate mood and affect                               Hematological: normal cervical, supraclavicular lymph nodes     Gastrointestinal:       abdomen soft, non-tender, non-distended, no organomegaly or masses, obese.    Genitourinary: Normal BUS, no lesions in vagina seen, pap done today. BME no palpable masses or nodules.      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 She is here today for follow up and disease management.     30 minutes spent on the date of the encounter doing chart review, history and exam, documentation, and further activities as noted above.        Plan:     1.)         Rec. Cx Cancer with brain met: s/p primary therapy:  She had discussed options of obeservation vs. maintenance therapy.  She has no residual disease on PET or brain MRI (12/2021, 6/2022) at this point, but obviously remains at risk for recurrence.  After discussing the pros and cons of each option - she decided on observation.  She will likely be a candidate for Keytruda should she recur.  -F/U three months - MRI/PET in 6-12 months     2.)  Genetic risk factors were assessed again and the patient has multiple family members with cancer diagnosed younger than age 50 including herself (age 40, but with likely HPV related  disease) and her sister (thyroid cancer at age 23) among others. She met with genetics 1/2021. Did not move forward with testing but would like to move forward now. Note sent to [facility] re testing.    3.) Labs and/or tests ordered include: none     4.)   Health maintenance issues addressed today include annual health maintenance and non-gynecologic issues with PCP.   Encouraged PCP follow up for knee pain but getting better.     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 Met with [facility] 7/28/21. Will start PT and continue Cymbalta and gabapent.    Jlusi Canela MD    Division of Gynecologic Oncology  [facility]    Of a 30 minute appointment, more than 50% was spent in counseling the patient.      CC  Patient Care Team:  Tyrese Bowie MD as PCP - General (Family Medicine)  Jluis Canela MD as MD (Gynecologic Oncology)  Jluis Canela MD as Assigned Cancer Care Provider  Vasu Tavera PsyD as Assigned Behavioral Health Provider  Rashmi London MD as Assigned Neuroscience Provider  Rashmi London MD as Physician (Physical Medicine and Rehabilitation)  Danielle Trivedi APRN CNP as Nurse Practitioner (Neurological Surgery)

## 2022-06-23 ENCOUNTER — ONCOLOGY VISIT (OUTPATIENT)
Dept: ONCOLOGY | Facility: CLINIC | Age: 42
End: 2022-06-23
Payer: COMMERCIAL

## 2022-06-23 VITALS
OXYGEN SATURATION: 94 % | TEMPERATURE: 98.4 F | DIASTOLIC BLOOD PRESSURE: 83 MMHG | HEART RATE: 80 BPM | SYSTOLIC BLOOD PRESSURE: 134 MMHG

## 2022-06-23 DIAGNOSIS — C79.82 METASTATIC ADENOCARCINOMA TO CERVIX (H): ICD-10-CM

## 2022-06-23 DIAGNOSIS — C79.31 BRAIN METASTASIS: ICD-10-CM

## 2022-06-23 DIAGNOSIS — C53.9 MALIGNANT NEOPLASM OF CERVIX, UNSPECIFIED SITE (H): Primary | ICD-10-CM

## 2022-06-23 PROCEDURE — 99214 OFFICE O/P EST MOD 30 MIN: CPT | Performed by: OBSTETRICS & GYNECOLOGY

## 2022-06-23 ASSESSMENT — PAIN SCALES - GENERAL: PAINLEVEL: NO PAIN (0)

## 2022-06-23 NOTE — LETTER
2022         RE: Francesca Rowland  [address]  [place] [address]        Dear Colleague,    Thank you for referring your patient, Francesca Rowland, to the [facility]. Please see a copy of my visit note below.                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 She is here today for follow up and disease management.       Oncology History:      Patient initially presented as a 27-year-old woman seen in referral due to a Pap smear showing high-grade squamous intraepithelial lesion that was positive for high risk HPV subtype in 2007. This Pap smear had been taken during her six week postpartum visit. Patient did have a remote history of abnormal Pap smears back in  while serving in [place] for the [employer].  Pap smears during her first pregnancy were all negative. Her Pap smear at the beginning of the most recent pregnancy was normal. Patient did have a colposcopy for evaluation of this abnormal Pap smear on 2007 and cervical biopsies of 6 and 12 o'clock position showed features consistent with papillary serous adenocarcinoma. Endocervical curettings were also taken which showed fragments of papillary serous carcinoma. The patient then underwent a LEEP procedure on 10/15/07 which showed microinvasive adenocarcinoma with a depth of invasion of 0.4 mm. Patient then made the decision to proceed with radical hysterectomy. She subsequently underwent a radical hysterectomy, bilateral salpingectomy, bilateral pelvic and periaortic lymph node dissection on 10/19/07. Patient's operative course was otherwise uncomplicated and she recoverred uneventfully.  Pathology did reveal patient to be stage IA2 adenocarcinoma of the cervix.      She  underwent routine surveillence through 2014 complicated only by SIOBHAN 1      She presented to the ED at [facility] on 9/14/2019 with severe 8.5/10 RUQ pain associated with nausea, one episode of emesis, and decreased appetite. RUQ demonstrated cholelithiasis without cholecystitis, so an abdominal and pelvic CT scan was obtained. This revealed post-surgical changes consistent with prior hysterectomy, left adnexal cysts thought to be ovarian, and an enlarged pericaval lymph node suspicious for metastatic disease vs primary lymphoma. Her condition stabilized and she was discharged home with referrals to general surgery and oncology for biliary colic and further management of the lymphadenopathy, respectively. CT-guided right retroperitoneal lymph node biopsy on 10/15/2019 was consistent with metastatic cervical carcinoma     She underwent surgery to remove the mass and determine the extent of disease on 11/13/19     PATH:  FINAL DIAGNOSIS:   LYMPH NODES, PARA-AORTIC, RESECTION:   - Positive for carcinoma in three of four lymph nodes examined (3/4),   consistent with recurrent endocervical   adenocarcinoma   - Largest metastatic focus is 3.5 cm, positive for extra yuridia extension         Chemo-Potentiated RT 12/2019-1/2020 2/2020 CT:  IMPRESSION: Postoperative changes of hysterectomy and lymph node  resection for cervical cancer. The metastatic right pelvic lymph nodes  which were previously identified have decreased in size. No new  metastases identified.      She recently presented to the ED for an acute RUQ pain episode, which has greatly improved despite no clear diagnosis.  During this visit she underwent a CAP CT which did not demonstrate evidence of cancer but a slightly enlarged spleen. She had a mild eosinophilia (~2%) but has not traveled or eaten exotic food recently, and has not had any new contacts to suggest mono.      10/13/2020 CT:  IMPRESSION:  1.  Recurrent right external iliac  lymphadenopathy.  2.  Rebound thymic hyperplasia.     10/30/2020 PET  IMPRESSION:   In this patient with history of metastatic cervical cancer status-post  radical hysterectomy and chemoradiation, there is evidence of  recurrent disease:  1. Increased size of a hypermetabolic right external iliac chain lymph node since 2/27/2020.  2. Hypermetabolic right subpectoral and axillary lymph nodes.  Recommend follow-up in the breast center for axillary ultrasound and  possible tissue sampling.  3. Idiopathic thymic activation/hyperplasia.     She was evaluated in the breast clinic with plan made for biopsy - however at biopsy the node had shruck considerably and was thus felt to be c/w inflammation (no biopsy obtained)     3/8/2021 MRI Brain (for dizziness)  IMPRESSION:  1. Heterogeneous mixed solid and cystic 2.7 cm mass in the right cerebellum, most likely a solitary metastatic lesion. There is moderate surrounding vasogenic edema as well as mass effect on the fourth ventricle. No obstructive hydrocephalus.    2. Unremarkable MR angiogram of the head and neck.     3/15/2021 Craniotomy ([facility])  1. Midline suboccipital craniotomy for resection of mass  2. Right frontal ventriculostomy  3. Stereotactic navigation  4. Use of operating microscope     4/16/21 Completed 5 fx of gamma knife ([employer])     Plan: Paclitaxel, Cisplatin, Avastin     4/28/2021: Cycle #1 Paclitaxel, Cisplatin, Avastin  5/19/2021: Cycle #2 Paclitaxel/Cisplatin/Avastin +Neulasta  6/9/2021: Cycle #3 Paclitaxel/Cisplatin/Avastin +Neulasta  6/30/2021: Cycle #4 Paclitaxel/Cisplatin/Avastin +Neulasta    6/28/2021 plan: PET scan for interval assessment.  Will plan for 6 cycles followed by possible use of Keytruda versus observation.     7/15/2021: PET CT: IMPRESSION: In this patient with history of cervical cancer who  completed chemoradiation:  1. Resolved uptake to the right pelvic lymph node, consistent with  response to treatment.  2. No new  hypermetabolic lesions.  3. No definite abnormal intracranial uptake. Please note that PET-CT  is less sensitive for intracranial disease due to normal uptake to the  brain. Consider continued following of the previously seen lesions and  surgical changes with MRI if clinically indicated.      7/21/2021: Cycle #5 Paclitaxel/Cisplatin/Avastin +Neulasta  8/11/2021: Cycle #6 Taxotere/Cisplatin/Avastin + Neulasta (Change to Taxotere due to neuropathy) deferred due to insurance  8/18/2021: Cycle #6 Paclitaxel/Cisplatin/Avastin (dose reduced Taxol to 135 mg/m2 due to insurance coverage with Taxotere and patient in agreement with plan- neuropathy mild and improved) +Neulasta    8/21/2021 MRI (HEAD):  Impression: In this patient with history of metastatic carcinoma to  the right cerebellum:  There is no definite evidence of disease recurrence. Decreased  resection cavity enhancement. Previous right occipital lobe  enhancement is completely resolved. No new enhancing intracranial  lesions.    12/6/21 MRI brain - DIANNE  12/8/21 PET - negative for focal uptake    6/17/22 MRI (BRAIN)  IMPRESSION: In this patient with history of metastatic cervical cancer  to the cerebellum, status post resection and chemoradiation, there is  no evidence of recurrence:  Stable postsurgical changes of right cerebellar mass resection without  evidence of local recurrence. No new enhancing intracranial lesions.    PET  1. No abnormal FDG uptake within the body and neck.     2. Postsurgical changes of total abdominal hysterectomy, bilateral  salpingo-oophorectomy and periaortic and pelvic lymph node dissection  without evidence of disease recurrence.      Past Medical History:    Past Medical History:   Diagnosis Date     Chronic cholecystitis 09/25/2019     History of cervical cancer 2007     Metastasis to brain (H) 03/08/2021     Metastatic adenocarcinoma to cervix (H) 10/28/2019    Added automatically from request for surgery [id number]         Past  Surgical History:    Past Surgical History:   Procedure Laterality Date     CHOLECYSTECTOMY, LAPOROSCOPIC  09/25/2019     HYSTERECTOMY RADICAL  2007    PAUL     INSERT PORT VASCULAR ACCESS Right 5/5/2021    Procedure: INSERTION, VASCULAR ACCESS PORT;  Surgeon: Oral Toledo MD;  Location: [facility] OR     IR CHEST PORT PLACEMENT > 5 YRS OF AGE  5/5/2021     LAPAROSCOPIC LYMPHADENECTOMY N/A 11/13/2019    Procedure: LAPAROSCOPY, resection of of paraaortic lymph node, lysis of adhesions;  Surgeon: Jluis Canela MD;  Location: [facility] OR     OPTICAL TRACKING SYSTEM CRANIOTOMY, EXCISE TUMOR, COMBINED Bilateral 3/15/2021    Procedure: stealth assisted Midline suboccipital craniectomy/craniotomy for tumor;  Surgeon: Martín Myrick MD;  Location: [facility] OR     OPTICAL TRACKING SYSTEM VENTRICULOSTOMY Right 3/15/2021    Procedure: stealth assisted  right frontal ventriculostomy;  Surgeon: Martín Myrick MD;  Location: [facility] OR         Health Maintenance Due   Topic Date Due     PREVENTIVE CARE VISIT  Never done     ADVANCE CARE PLANNING  Never done     Pneumococcal Vaccine: Pediatrics (0 to 5 Years) and At-Risk Patients (6 to 64 Years) (1 - PCV) Never done     HIV SCREENING  Never done     HEPATITIS C SCREENING  Never done     PHQ-2 (once per calendar year)  01/01/2022     COVID-19 Vaccine (3 - Booster for Sia series) 02/15/2022       Current Medications:     Current Outpatient Medications   Medication Sig Dispense Refill     acetaminophen (TYLENOL) 325 MG tablet Take 2 tablets (650 mg) by mouth every 6 hours 24 tablet 0     Ascorbic Acid (VITAMIN C GUMMIE PO) Take by mouth daily       dexamethasone (DECADRON) 4 MG tablet Take 2 tablets (8 mg) by mouth 2 times daily (with meals) for 6 doses Take two tablets (8 mg) the evening of chemotherapy and take two tablets (8 mg) the next morning and night following chemotherapy. Take this schedule with each chemotherapy with Taxotere. 12 tablet 0      "dexamethasone (DECADRON) 4 MG tablet Take 2 tablets (8 mg) by mouth daily (with breakfast) Take 2 tablets by mouth with food on days 2-4 after chemotherapy. (Patient not taking: Reported on 2/23/2022) 12 tablet 3     DULoxetine (CYMBALTA) 30 MG capsule Take 1 capsule (30 mg) by mouth 2 times daily 60 capsule 2     famotidine (PEPCID) 10 MG tablet Take 1 tablet (10 mg) by mouth 2 times daily (Patient not taking: Reported on 2/23/2022) 60 tablet 1     gabapentin (NEURONTIN) 300 MG capsule Take 1 capsule (300 mg) by mouth At Bedtime 30 capsule 2     ibuprofen (ADVIL/MOTRIN) 200 MG tablet Take 1 tablet (200 mg) by mouth every 4 hours as needed for mild pain       lidocaine-prilocaine (EMLA) 2.5-2.5 % external cream Apply topically as needed for moderate pain 30 g 1     loperamide (IMODIUM A-D) 2 MG tablet Take 1 tablet (2 mg) by mouth 4 times daily as needed for diarrhea Can take one 2 mg capsule after each loose stool up to 4 times a day as needed for diarrhea. This can be increased to 4 mg 4 times a day or 2 mg up to 8 times a day. 60 tablet 3     LORazepam (ATIVAN) 0.5 MG tablet Take 1 tablet (0.5 mg) by mouth every 6 hours as needed for nausea (Patient not taking: Reported on 2/23/2022) 20 tablet 0     multivitamin w/minerals (MULTI-VITAMIN) tablet Take 1 tablet by mouth daily       OLANZapine (ZYPREXA) 5 MG tablet Take 1 tablet (5 mg) by mouth At Bedtime (Patient not taking: Reported on 2/23/2022) 30 tablet 0     SENNA-docusate sodium (SENNA S) 8.6-50 MG tablet Take 1 tablet by mouth At Bedtime (Patient not taking: Reported on 2/23/2022) 30 tablet 3         Allergies:        Allergies   Allergen Reactions     Emend [Aprepitant]      Prochlorperazine      \"i was told I turn blue\"        Social History:     Social History     Tobacco Use     Smoking status: Former Smoker     Types: Cigarettes     Smokeless tobacco: Never Used   Substance Use Topics     Alcohol use: Yes     Comment: one drink a week       History "   Drug Use No         Family History:     The patient's family history is notable for     Family History   Problem Relation Age of Onset     Aneurysm Mother      Breast Cancer Paternal Grandmother         bilat mastectomies     Breast Cancer Maternal Aunt         stage 1     Thyroid Cancer Sister 23         Physical Exam:     PS 0  VS: /83 (BP Location: Right arm, Patient Position: Sitting)   Pulse 80   Temp 98.4  F (36.9  C) (Oral)   SpO2 94%       General Appearance: healthy and alert, no distress, overweight     HEENT: no thyromegaly, no palpable nodules or masses        Cardiovascular: regular rate and rhythm, no gallops, rubs or murmurs     Respiratory: lungs clear, no rales, rhonchi or wheezes    Musculoskeletal: extremities non tender and without edema    Skin: no lesions or rashes     Neurological: normal gait, no gross defects     Psychiatric: appropriate mood and affect                               Hematological: normal cervical, supraclavicular lymph nodes     Gastrointestinal:       abdomen soft, non-tender, non-distended, no organomegaly or masses, obese.    Genitourinary: Normal BUS, no lesions in vagina seen, pap done today. BME no palpable masses or nodules.      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 She is here today for follow up and disease management.     30 minutes spent on the date of the encounter doing chart review, history and exam, documentation, and further activities as noted above.        Plan:     1.)         Rec. Cx Cancer with brain met: s/p primary therapy:  She had discussed options of obeservation vs. maintenance therapy.  She has no residual disease on PET or brain MRI (12/2021, 6/2022) at this point, but obviously remains at risk for recurrence.  After discussing the pros and cons of each option - she decided on observation.  She will  likely be a candidate for Keytruda should she recur.  -F/U three months - MRI/PET in 6-12 months     2.)  Genetic risk factors were assessed again and the patient has multiple family members with cancer diagnosed younger than age 50 including herself (age 40, but with likely HPV related disease) and her sister (thyroid cancer at age 23) among others. She met with genetics 1/2021. Did not move forward with testing but would like to move forward now. Note sent to [facility] re testing.    3.) Labs and/or tests ordered include: none     4.)   Health maintenance issues addressed today include annual health maintenance and non-gynecologic issues with PCP.   Encouraged PCP follow up for knee pain but getting better.     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 Met with Cancer Rehab 7/28/21. Will start PT and continue Cymbalta and gabapent.    Jluis Canela MD    Division of Gynecologic Oncology  [facility]    Of a 30 minute appointment, more than 50% was spent in counseling the patient.      CC  Patient Care Team:  Tyrese Bowie MD as PCP - General (Family Medicine)  Jluis aCnela MD as MD (Gynecologic Oncology)  Jluis Canela MD as Assigned Cancer Care Provider  Vasu Tavera PsyD as Assigned Behavioral Health Provider  Rashmi London MD as Assigned Neuroscience Provider  Rashmi London MD as Physician (Physical Medicine and Rehabilitation)  Danielle Trivedi APRN CNP as Nurse Practitioner (Neurological Surgery)        Again, thank you for allowing me to participate in the care of your patient.        Sincerely,        Jluis Canela MD

## 2022-06-23 NOTE — PATIENT INSTRUCTIONS
Follow up with Dr. Canela or NP in 3 months.    PET scan and MRI Brain in 6-12 months.  Follow up with Dr. Canela a few days after the scans to review results.

## 2022-06-23 NOTE — NURSING NOTE
"Oncology Rooming Note    June 23, 2022 8:35 AM   Francesca Rowland is a 41 year old female who presents for:    No chief complaint on file.    Initial Vitals: /83 (BP Location: Right arm, Patient Position: Sitting)   Pulse 80   Temp 98.4  F (36.9  C) (Oral)   SpO2 94%  Estimated body mass index is 43.08 kg/m  as calculated from the following:    Height as of 10/6/21: 1.651 m (5' 5\").    Weight as of 3/14/22: 117.4 kg (258 lb 14.2 oz). There is no height or weight on file to calculate BSA.  No Pain (0) Comment: Data Unavailable   No LMP recorded. Patient has had a hysterectomy.  Allergies reviewed: Yes  Medications reviewed: Yes    Medications: Medication refills not needed today.  Pharmacy name entered into [employer]: [id number] - [place], [place] - [facility] ALEKS Arrieta LPN                "

## 2022-06-29 ENCOUNTER — VIRTUAL VISIT (OUTPATIENT)
Dept: NEUROSURGERY | Facility: CLINIC | Age: 42
End: 2022-06-29
Payer: COMMERCIAL

## 2022-06-29 ENCOUNTER — CARE COORDINATION (OUTPATIENT)
Dept: NEUROSURGERY | Facility: CLINIC | Age: 42
End: 2022-06-29

## 2022-06-29 DIAGNOSIS — C79.31 BRAIN METASTASIS: Primary | ICD-10-CM

## 2022-06-29 PROCEDURE — 99214 OFFICE O/P EST MOD 30 MIN: CPT | Mod: 95 | Performed by: NURSE PRACTITIONER

## 2022-06-29 NOTE — PROGRESS NOTES
Writer spoke with patient to let her know her video visit will be at 2:30PM. Patient was agreeable to time.     Zahra Carlos LPN  Neurosurgery

## 2022-06-29 NOTE — PROGRESS NOTES
Francesca is a 41 year old who is being evaluated via a billable video visit.      How would you like to obtain your AVS? MyChart  If the video visit is dropped, the invitation should be resent by: Text to cell phone: [phone]   Will anyone else be joining your video visit? No      Telephone visit duration: 15 minutes    [facility]  Department of Neurosurgery      Name: Francesca Rowland  MRN: [id number]  Age: 41 year old  : 1980  Referring provider: Tyrese Bowie  2022      Chief Complaint:   Cerebellar metastasis from cervical cancer  S/p midline suboccipital craniotomy for resection followed by SRT in 2021  Routine follow-up after imaging    History of Present Illness:   Francesca Rowland is a 41 year old female with a history of adenocarcinoma of the cervix with metastasis to her brain who is seen today for a follow up visit. She underwent craniotomy for metastasis resection in 2021 followed by gamma knife radiation.     Most recently seen in our clinic on 2021 by Ms. Summer Patel PA-C.  6-month follow-up MRI was recommended at that time.  Today I had a phone visit with the patient.  Per patient, she is doing really good.  No concerns with her balance.  Her recent MRI showed no recurrence of tumor.  She recent had a PET scan which showed no FDG uptake.  She follows up with Dr. Canela in oncology.    Review of Systems:   Pertinent items are noted in HPI or as in patient entered ROS below, remainder of complete ROS is negative.   No flowsheet data found.     Physical Exam:   N/A (telephone visit)    Imagin2022 MRI brain:  IMPRESSION: In this patient with history of metastatic cervical cancer  to the cerebellum, status post resection and chemoradiation, there is  no evidence of recurrence:  Stable postsurgical changes of right cerebellar mass resection without  evidence of local recurrence. No new enhancing intracranial lesions.     Assessment:  Cerebellar  metastasis from cervical cancer  S/p midline suboccipital craniotomy for resection followed by SRT in March 2021  Routine follow-up after imaging      Plan: Discussed and images reviewed with Dr. Myrick.   Patient denies any new neurological concerns.  Her most recent MRI from June 17 shows no recurrence of tumor.  We will plan for a follow-up MRI brain in 1 year.  Patient to contact the clinic sooner if she has any new concerns or questions.       I spent 15 minutes on patient care activities related to this encounter on the date of service, including time spent reviewing the chart, obtaining history and examination and in counseling the patient, and in documentation in the electronic medical record.      Danielle MEYERS, CNP  Department of Neurosurgery

## 2022-06-29 NOTE — LETTER
2022       RE: Francesca Rowland  [address]  [place] [facility] [address]     Dear Colleague,    Thank you for referring your patient, Francesca Rowland, to the [facility] NEUROSURGERY [facility] [place] [facility]. Please see a copy of my visit note below.      [facility]  Department of Neurosurgery      Name: Francesca Rowland  MRN: [id number]  Age: 41 year old  : 1980  Referring provider: Tyrese Bowie  2022      Chief Complaint:   Cerebellar metastasis from cervical cancer  S/p midline suboccipital craniotomy for resection followed by SRT in 2021  Routine follow-up after imaging    History of Present Illness:   Francesca Rowland is a 41 year old female with a history of adenocarcinoma of the cervix with metastasis to her brain who is seen today for a follow up visit. She underwent craniotomy for metastasis resection in 2021 followed by gamma knife radiation.     Most recently seen in our clinic on 2021 by Ms. Summer Patel PA-C.  6-month follow-up MRI was recommended at that time.  Today I had a phone visit with the patient.  Per patient, she is doing really good.  No concerns with her balance.  Her recent MRI showed no recurrence of tumor.  She recent had a PET scan which showed no FDG uptake.  She follows up with Dr. Canela in oncology.    Review of Systems:   Pertinent items are noted in HPI or as in patient entered ROS below, remainder of complete ROS is negative.   No flowsheet data found.     Physical Exam:   N/A (telephone visit)    Imagin2022 MRI brain:  IMPRESSION: In this patient with history of metastatic cervical cancer  to the cerebellum, status post resection and chemoradiation, there is  no evidence of recurrence:  Stable postsurgical changes of right cerebellar mass resection without  evidence of local recurrence. No new enhancing intracranial lesions.     Assessment:  Cerebellar  metastasis from cervical cancer  S/p midline suboccipital craniotomy for resection followed by SRT in March 2021  Routine follow-up after imaging      Plan: Discussed and images reviewed with Dr. Myrick.   Patient denies any new neurological concerns.  Her most recent MRI from June 17 shows no recurrence of tumor.  We will plan for a follow-up MRI brain in 1 year.  Patient to contact the clinic sooner if she has any new concerns or questions.       I spent 15 minutes on patient care activities related to this encounter on the date of service, including time spent reviewing the chart, obtaining history and examination and in counseling the patient, and in documentation in the electronic medical record.      Danielle MEYERS, CNP  Department of Neurosurgery

## 2022-07-07 ENCOUNTER — TELEPHONE (OUTPATIENT)
Dept: NEUROSURGERY | Facility: CLINIC | Age: 42
End: 2022-07-07

## 2022-07-07 NOTE — TELEPHONE ENCOUNTER
RENETTAM for pt to call and schedule imaging appointment first, and then Follow up in person with Danielle Trivedi

## 2022-08-16 ENCOUNTER — PATIENT OUTREACH (OUTPATIENT)
Dept: ONCOLOGY | Facility: CLINIC | Age: 42
End: 2022-08-16

## 2022-08-16 DIAGNOSIS — M25.561 CHRONIC PAIN OF RIGHT KNEE: ICD-10-CM

## 2022-08-16 DIAGNOSIS — M25.562 CHRONIC PAIN OF LEFT KNEE: ICD-10-CM

## 2022-08-16 DIAGNOSIS — G89.29 CHRONIC PAIN OF LEFT KNEE: ICD-10-CM

## 2022-08-16 DIAGNOSIS — R53.81 PHYSICAL DECONDITIONING: ICD-10-CM

## 2022-08-16 DIAGNOSIS — C79.82 METASTATIC ADENOCARCINOMA TO CERVIX (H): ICD-10-CM

## 2022-08-16 DIAGNOSIS — G62.0 CHEMOTHERAPY-INDUCED NEUROPATHY (H): ICD-10-CM

## 2022-08-16 DIAGNOSIS — C79.31 BRAIN METASTASIS: ICD-10-CM

## 2022-08-16 DIAGNOSIS — G89.29 CHRONIC PAIN OF RIGHT KNEE: ICD-10-CM

## 2022-08-16 DIAGNOSIS — T45.1X5A CHEMOTHERAPY-INDUCED NEUROPATHY (H): ICD-10-CM

## 2022-08-16 RX ORDER — DULOXETIN HYDROCHLORIDE 30 MG/1
30 CAPSULE, DELAYED RELEASE ORAL 2 TIMES DAILY
Qty: 60 CAPSULE | Refills: 2 | Status: SHIPPED | OUTPATIENT
Start: 2022-08-16 | End: 2023-05-02

## 2022-08-16 NOTE — PROGRESS NOTES
[facility]:  Physical Medicaine and Rehab                                                                                   Last date written and prescribing provider:  February 23, 2022    Last office visit: 2.23.2022 Rashmi London   Next office visit:  8/31/2022     On 2/23/2022 prescribed Duloxetine 30mg BID,  Refills :2.       Per refill schedule has not been taking as prescribed , taking lass than prescribed.    Also prescribed gabapentin at 2/23/22 visit and no refills have been requested    LVM for patient to call if has questions and offer a sooner appointment and also reminded of currently scheduled appointment on 8/31/2022       Carolina Mccollum LPN  Hematology Care Coordination  [phone]

## 2022-08-31 ENCOUNTER — VIRTUAL VISIT (OUTPATIENT)
Dept: ONCOLOGY | Facility: CLINIC | Age: 42
End: 2022-08-31
Attending: STUDENT IN AN ORGANIZED HEALTH CARE EDUCATION/TRAINING PROGRAM
Payer: COMMERCIAL

## 2022-08-31 DIAGNOSIS — C53.9 MALIGNANT NEOPLASM OF CERVIX, UNSPECIFIED SITE (H): ICD-10-CM

## 2022-08-31 DIAGNOSIS — R53.81 PHYSICAL DECONDITIONING: ICD-10-CM

## 2022-08-31 DIAGNOSIS — G62.0 CHEMOTHERAPY-INDUCED NEUROPATHY (H): Primary | ICD-10-CM

## 2022-08-31 DIAGNOSIS — T45.1X5A CHEMOTHERAPY-INDUCED NEUROPATHY (H): Primary | ICD-10-CM

## 2022-08-31 PROCEDURE — 99215 OFFICE O/P EST HI 40 MIN: CPT | Mod: 95 | Performed by: STUDENT IN AN ORGANIZED HEALTH CARE EDUCATION/TRAINING PROGRAM

## 2022-08-31 NOTE — LETTER
8/31/2022         RE: Francesca Rowland  [address]  [place] [address]        Dear Colleague,    Thank you for referring your patient, Francesca Rowland, to the [facility]. Please see a copy of my visit note below.    Francesca is a 41 year old who is being evaluated via a billable video visit.      How would you like to obtain your AVS? MyChart  If the video visit is dropped, the invitation should be resent by: Text to cell phone: [phone]  Will anyone else be joining your video visit? No      Rose VERDUGO    Video-Visit Details        Type of service:  Video Visit      [facility]   PM&R clinic note        Interval history:     Francesca Rowland presents to clinic today for follow up reg her rehab needs.   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  Was last seen in clinic on 2/23/22.  Recommendations included:  1. Therapy/equipment/braces:  1. Continue to remain active and incorporate a regular home exercise  2. Can reconsider outpatient PT in the future if needed.  2. Medications:  1. Continue Cymbalta 60 mg daily. Prescription refilled at today's visit.  2. Add HS gabapentin 300 mg to help with overnight neuropathic symptoms.  3. Follow up: 4-6 months.    Oncology History:  - Initially presented due to Pap smear showing high-grade squamous intraepithelial lesion positive for high risk HPV subtype in 8/20017.  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   - Underwent LEEP procedure on 10/15/2007 which demonstrated microinvasive adenocarcinoma.  - Proceeded with radical hysterectomy, bilateral salpingectomy, bilateral pelvic and periaortic lymph node dissection on 10/19/2007. Pathology revealed Stage IA2 adenocarcinoma of the cervix.  - Underwent routine surveillance through 2014.  - Presented to the ED at [facility] on 9/14/2019 with severe RUQ pain which imaging revealed cholelithiasis without cholecystitis, Abdominal/Pelvic CT revealed enlarged pericaval lymph node. CT guided biopsy was consistent with metastatic cervical carcinoma.  - Underwent surgery to remove mass.  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  - 3/15/2021- Underwent craniotomy and resection of cerebellar mass.  Fatigue and neuropathy.  - 4/16/2021- Completed 5 fx of gamma knife  - 4/28/2021- 6/30/2021- Cycles 1-4 of paclitaxel/cisplatin/avastin.  - 7/21/2021: Cycle #5 Paclitaxel/Cisplatin/Avastin   - 8/18/21- Saw Terra Zelaya in clinic. Peripheral neuropathy improved with Cymbalta.  - 8/23/21- Saw Dr. Canela in clinic. Improvement in peripheral neuropathy symptoms with Cymbalta. No residual disease on PET or Brain MRI, but remains at risk for recurrence.   - 11/29/21- Saw Dr. Cardozo in clinic for follow up. Patient continues to think cymbalta is helping for neuropathic symptoms. Has neuropathy in her feet once in a while, minimal in hands. Will be starting PT. No residual disease on PET or brain MRI, remains at risk for recurrence. Patient discussed options, and decided on observation. Likely a candidate for Keytruda if she recurs.     Symptoms,  Patient was seen for a return virtual visit today.  [facility] as she was having mobility issues with her knees. She has arthritis in her knees and she got CS injections in both knees which helped significantly. She was previously having a hard time getting out of a seated position because of the knee pain. She feels her mobility is much better after the injections.  Her neuropathy is not too difficult currently, and she does not complain of any balance difficulties.  Overall, since her knees were addressed, she has had no issues with her mobility.  In terms of neuropathy, she previously  stopped the HS dose of gabapentin as those symptoms have been under better control. She continues with cymbalta.  She denies any other concerns today.        Therapies/HEP,  Not currently participating in therapies. Does regular home exercise regimen as tolerated.      Functionally,   No changes since last visit.      Social history is unchanged.      Medications:  Current Outpatient Medications   Medication Sig Dispense Refill     acetaminophen (TYLENOL) 325 MG tablet Take 2 tablets (650 mg) by mouth every 6 hours 24 tablet 0     Ascorbic Acid (VITAMIN C GUMMIE PO) Take by mouth daily       dexamethasone (DECADRON) 4 MG tablet Take 2 tablets (8 mg) by mouth daily (with breakfast) Take 2 tablets by mouth with food on days 2-4 after chemotherapy. 12 tablet 3     DULoxetine (CYMBALTA) 30 MG capsule Take 1 capsule (30 mg) by mouth 2 times daily 60 capsule 2     gabapentin (NEURONTIN) 300 MG capsule Take 1 capsule (300 mg) by mouth At Bedtime 30 capsule 2     multivitamin w/minerals (THERA-VIT-M) tablet Take 1 tablet by mouth daily       dexamethasone (DECADRON) 4 MG tablet Take 2 tablets (8 mg) by mouth 2 times daily (with meals) for 6 doses Take two tablets (8 mg) the evening of chemotherapy and take two tablets (8 mg) the next morning and night following chemotherapy. Take this schedule with each chemotherapy with Taxotere. 12 tablet 0     famotidine (PEPCID) 10 MG tablet Take 1 tablet (10 mg) by mouth 2 times daily (Patient not taking: No sig reported) 60 tablet 1     ibuprofen (ADVIL/MOTRIN) 200 MG tablet Take 1 tablet (200 mg) by mouth every 4 hours as needed for mild pain (Patient not taking: No sig reported)       lidocaine-prilocaine (EMLA) 2.5-2.5 % external cream Apply topically as needed for moderate pain (Patient not taking: No sig reported) 30 g 1     loperamide (IMODIUM A-D) 2 MG tablet Take 1 tablet (2 mg) by mouth 4 times daily as needed for diarrhea Can take one 2 mg capsule after each loose stool up  to 4 times a day as needed for diarrhea. This can be increased to 4 mg 4 times a day or 2 mg up to 8 times a day. (Patient not taking: Reported on 8/31/2022) 60 tablet 3     LORazepam (ATIVAN) 0.5 MG tablet Take 1 tablet (0.5 mg) by mouth every 6 hours as needed for nausea (Patient not taking: Reported on 8/31/2022) 20 tablet 0     OLANZapine (ZYPREXA) 5 MG tablet Take 1 tablet (5 mg) by mouth At Bedtime (Patient not taking: Reported on 8/31/2022) 30 tablet 0     SENNA-docusate sodium (SENNA S) 8.6-50 MG tablet Take 1 tablet by mouth At Bedtime (Patient not taking: Reported on 8/31/2022) 30 tablet 3              Physical Exam:   There were no vitals taken for this visit.  Gen: NAD, pleasant and cooperative   HEENT: Atraumatic, normocephalic, extraocular movements appear intact  Pulm: non-labored breathing in room air  Neuro/MSK:   Orientation: Oriented to person, time, place and situation, Exhibits good insight into her condition and ongoing treatment  Motor: Observed moving upper extremities actively against gravity    Labs/Imaging:  Lab Results   Component Value Date    WBC 3.6 (L) 08/18/2021    HGB 11.8 08/18/2021    HCT 36.5 08/18/2021    MCV 98 08/18/2021     08/18/2021     Lab Results   Component Value Date     08/18/2021    POTASSIUM 4.1 08/18/2021    CHLORIDE 108 08/18/2021    CO2 28 08/18/2021    GLC 84 06/17/2022     Lab Results   Component Value Date    GFRESTIMATED 79 08/18/2021    GFRESTBLACK >90 06/28/2021     Lab Results   Component Value Date    AST 17 08/18/2021    ALT 22 08/18/2021    ALKPHOS 94 08/18/2021    BILITOTAL 0.4 08/18/2021    BILICONJ 0.0 09/27/2007     Lab Results   Component Value Date    INR 0.97 05/05/2021     Lab Results   Component Value Date    BUN 17 08/18/2021    CR 0.90 08/18/2021              Assessment/Plan   Francesca Rowland presents to clinic today for follow up reg her rehab needs. She has h/o metastatic carcinoma of the cervix (s/p craniotomy and gamma knife  for cerebellar lesion in 4/2021). As discussed with Francesca, she is doing well from a rehab perspective with mobility and function now that her knee pain has been addressed with CS injections. She should continue with a regular home exercise program as tolerated and monitor her neuropathy symptoms/notify us if they get worse or interfere with daily activities. Will plan a 6-8 month return visit. Patient is in agreement with this plan.      1. Therapy/equipment/braces:  1. Continue to remain active and incorporate a regular home exercise  2. Can reconsider outpatient PT in the future if needed.  2. Medications:  3. Continue Cymbalta 60 mg daily. Prescription refilled at today's visit.      50 minutes spent on the date of the encounter doing chart review, history and exam, documentation and further activities as noted above.          Again, thank you for allowing me to participate in the care of your patient.      Sincerely,    Rashmi London MD

## 2022-08-31 NOTE — PROGRESS NOTES
[facility]   PM&R clinic note        Interval history:     Francesca Rowland presents to clinic today for follow up reg her rehab needs.   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  Was last seen in clinic on 2/23/22.  Recommendations included:  1. Therapy/equipment/braces:  1. Continue to remain active and incorporate a regular home exercise  2. Can reconsider outpatient PT in the future if needed.  2. Medications:  1. Continue Cymbalta 60 mg daily. Prescription refilled at today's visit.  2. Add HS gabapentin 300 mg to help with overnight neuropathic symptoms.  3. Follow up: 4-6 months.    Oncology History:  - Initially presented due to Pap smear showing high-grade squamous intraepithelial lesion positive for high risk HPV subtype in 8/20017.  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   - Underwent LEEP procedure on 10/15/2007 which demonstrated microinvasive adenocarcinoma.  - Proceeded with radical hysterectomy, bilateral salpingectomy, bilateral pelvic and periaortic lymph node dissection on 10/19/2007. Pathology revealed Stage IA2 adenocarcinoma of the cervix.  - Underwent routine surveillance through 2014.  - Presented to the ED at [facility] on 9/14/2019 with severe RUQ pain which imaging revealed cholelithiasis without cholecystitis, Abdominal/Pelvic CT revealed enlarged pericaval lymph node. CT guided biopsy was consistent with metastatic cervical carcinoma.  - Underwent surgery to remove mass.  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  - 3/15/2021- Underwent craniotomy and resection of cerebellar mass.  Fatigue and neuropathy.  - 4/16/2021- Completed 5 fx of gamma knife  - 4/28/2021- 6/30/2021- Cycles 1-4 of paclitaxel/cisplatin/avastin.  - 7/21/2021: Cycle  #5 Paclitaxel/Cisplatin/Avastin   - 8/18/21- Saw Terra Zelaya in clinic. Peripheral neuropathy improved with Cymbalta.  - 8/23/21- Saw Dr. Canela in clinic. Improvement in peripheral neuropathy symptoms with Cymbalta. No residual disease on PET or Brain MRI, but remains at risk for recurrence.   - 11/29/21- Saw Dr. Cardozo in clinic for follow up. Patient continues to think cymbalta is helping for neuropathic symptoms. Has neuropathy in her feet once in a while, minimal in hands. Will be starting PT. No residual disease on PET or brain MRI, remains at risk for recurrence. Patient discussed options, and decided on observation. Likely a candidate for Keytruda if she recurs.     Symptoms,  Patient was seen for a return virtual visit today.  Last Friday, [facility] as she was having mobility issues with her knees. She has arthritis in her knees and she got CS injections in both knees which helped significantly. She was previously having a hard time getting out of a seated position because of the knee pain. She feels her mobility is much better after the injections.  Her neuropathy is not too difficult currently, and she does not complain of any balance difficulties.  Overall, since her knees were addressed, she has had no issues with her mobility.  In terms of neuropathy, she previously stopped the HS dose of gabapentin as those symptoms have been under better control. She continues with cymbalta.  She denies any other concerns today.        Therapies/HEP,  Not currently participating in therapies. Does regular home exercise regimen as tolerated.      Functionally,   No changes since last visit.      Social history is unchanged.      Medications:  Current Outpatient Medications   Medication Sig Dispense Refill     acetaminophen (TYLENOL) 325 MG tablet Take 2 tablets (650 mg) by mouth every 6 hours 24 tablet 0     Ascorbic Acid (VITAMIN C GUMMIE PO) Take by mouth daily       dexamethasone  (DECADRON) 4 MG tablet Take 2 tablets (8 mg) by mouth daily (with breakfast) Take 2 tablets by mouth with food on days 2-4 after chemotherapy. 12 tablet 3     DULoxetine (CYMBALTA) 30 MG capsule Take 1 capsule (30 mg) by mouth 2 times daily 60 capsule 2     gabapentin (NEURONTIN) 300 MG capsule Take 1 capsule (300 mg) by mouth At Bedtime 30 capsule 2     multivitamin w/minerals (THERA-VIT-M) tablet Take 1 tablet by mouth daily       dexamethasone (DECADRON) 4 MG tablet Take 2 tablets (8 mg) by mouth 2 times daily (with meals) for 6 doses Take two tablets (8 mg) the evening of chemotherapy and take two tablets (8 mg) the next morning and night following chemotherapy. Take this schedule with each chemotherapy with Taxotere. 12 tablet 0     famotidine (PEPCID) 10 MG tablet Take 1 tablet (10 mg) by mouth 2 times daily (Patient not taking: No sig reported) 60 tablet 1     ibuprofen (ADVIL/MOTRIN) 200 MG tablet Take 1 tablet (200 mg) by mouth every 4 hours as needed for mild pain (Patient not taking: No sig reported)       lidocaine-prilocaine (EMLA) 2.5-2.5 % external cream Apply topically as needed for moderate pain (Patient not taking: No sig reported) 30 g 1     loperamide (IMODIUM A-D) 2 MG tablet Take 1 tablet (2 mg) by mouth 4 times daily as needed for diarrhea Can take one 2 mg capsule after each loose stool up to 4 times a day as needed for diarrhea. This can be increased to 4 mg 4 times a day or 2 mg up to 8 times a day. (Patient not taking: Reported on 8/31/2022) 60 tablet 3     LORazepam (ATIVAN) 0.5 MG tablet Take 1 tablet (0.5 mg) by mouth every 6 hours as needed for nausea (Patient not taking: Reported on 8/31/2022) 20 tablet 0     OLANZapine (ZYPREXA) 5 MG tablet Take 1 tablet (5 mg) by mouth At Bedtime (Patient not taking: Reported on 8/31/2022) 30 tablet 0     SENNA-docusate sodium (SENNA S) 8.6-50 MG tablet Take 1 tablet by mouth At Bedtime (Patient not taking: Reported on 8/31/2022) 30 tablet 3               Physical Exam:   There were no vitals taken for this visit.  Gen: NAD, pleasant and cooperative   HEENT: Atraumatic, normocephalic, extraocular movements appear intact  Pulm: non-labored breathing in room air  Neuro/MSK:   Orientation: Oriented to person, time, place and situation, Exhibits good insight into her condition and ongoing treatment  Motor: Observed moving upper extremities actively against gravity    Labs/Imaging:  Lab Results   Component Value Date    WBC 3.6 (L) 08/18/2021    HGB 11.8 08/18/2021    HCT 36.5 08/18/2021    MCV 98 08/18/2021     08/18/2021     Lab Results   Component Value Date     08/18/2021    POTASSIUM 4.1 08/18/2021    CHLORIDE 108 08/18/2021    CO2 28 08/18/2021    GLC 84 06/17/2022     Lab Results   Component Value Date    GFRESTIMATED 79 08/18/2021    GFRESTBLACK >90 06/28/2021     Lab Results   Component Value Date    AST 17 08/18/2021    ALT 22 08/18/2021    ALKPHOS 94 08/18/2021    BILITOTAL 0.4 08/18/2021    BILICONJ 0.0 09/27/2007     Lab Results   Component Value Date    INR 0.97 05/05/2021     Lab Results   Component Value Date    BUN 17 08/18/2021    CR 0.90 08/18/2021              Assessment/Plan   Francesca Rowland presents to clinic today for follow up reg her rehab needs. 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 As discussed with Sullycezar, she is doing well from a rehab perspective with mobility and function now that her knee pain has been addressed with CS injections. She should continue with a regular home exercise program as tolerated and monitor her neuropathy symptoms/notify us if they get worse or interfere with daily activities. Will plan a 6-8 month return visit. Patient is in agreement with this plan.      1. Therapy/equipment/braces:  1. Continue to remain active and incorporate a regular home exercise  2. Can reconsider outpatient PT in the future if  needed.  2. Medications:  3. Continue Cymbalta 60 mg daily. Prescription refilled at today's visit.      Rashmi London MD  Physical Medicine & Rehabilitation      50 minutes spent on the date of the encounter doing chart review, history and exam, documentation and further activities as noted above.

## 2022-08-31 NOTE — PROGRESS NOTES
Francesca is a 41 year old who is being evaluated via a billable video visit.      How would you like to obtain your AVS? MyChart  If the video visit is dropped, the invitation should be resent by: Text to cell phone: [phone]  Will anyone else be joining your video visit? Bebe VERDUGO    Video-Visit Details        Type of service:  Video Visit

## 2022-10-07 NOTE — ADDENDUM NOTE
Addended by: YAIMA WOLFF on: 3/1/2021 09:42 AM     Modules accepted: Orders     [FreeTextEntry1] : 71-yo female with h/o HTN, hyperlipidemia. Non-obstructive CAD (60% mid LAD stenosis on [facility] in 2007).\par \par H/o hepatitis C, left BKA (trauma).\par \par S/p right TKR,\par \par Recent evaluation at [facility] after passing out. Patient states she was standing outside, turned around and passed out. She was found to have orthostatic hypotension and vitreous detachment. ECHO showed LVEF 62%, CTH was negative.\par \par Patient denies dizziness, palpitations now. She continues to c/o occasional positional left-sided chest pain.\par

## 2022-10-14 ENCOUNTER — ANCILLARY PROCEDURE (OUTPATIENT)
Dept: MRI IMAGING | Facility: CLINIC | Age: 42
End: 2022-10-14
Attending: NURSE PRACTITIONER
Payer: COMMERCIAL

## 2022-10-14 ENCOUNTER — ANCILLARY PROCEDURE (OUTPATIENT)
Dept: GENERAL RADIOLOGY | Facility: CLINIC | Age: 42
End: 2022-10-14
Payer: COMMERCIAL

## 2022-10-14 DIAGNOSIS — C79.31 BRAIN METASTASIS: ICD-10-CM

## 2022-10-14 DIAGNOSIS — C79.31 BRAIN METASTASIS: Primary | ICD-10-CM

## 2022-10-14 PROCEDURE — 99207 PR NO CHARGE LOS: CPT

## 2022-10-14 PROCEDURE — 70553 MRI BRAIN STEM W/O & W/DYE: CPT | Performed by: STUDENT IN AN ORGANIZED HEALTH CARE EDUCATION/TRAINING PROGRAM

## 2022-10-14 PROCEDURE — A9585 GADOBUTROL INJECTION: HCPCS | Performed by: STUDENT IN AN ORGANIZED HEALTH CARE EDUCATION/TRAINING PROGRAM

## 2022-10-14 RX ORDER — GADOBUTROL 604.72 MG/ML
7.5 INJECTION INTRAVENOUS ONCE
Status: COMPLETED | OUTPATIENT
Start: 2022-10-14 | End: 2022-10-14

## 2022-10-14 RX ORDER — HEPARIN SODIUM (PORCINE) LOCK FLUSH IV SOLN 100 UNIT/ML 100 UNIT/ML
5 SOLUTION INTRAVENOUS ONCE
Status: COMPLETED | OUTPATIENT
Start: 2022-10-14 | End: 2022-10-14

## 2022-10-14 RX ADMIN — GADOBUTROL 4.5 ML: 604.72 INJECTION INTRAVENOUS at 09:08

## 2022-10-14 RX ADMIN — GADOBUTROL 7.5 ML: 604.72 INJECTION INTRAVENOUS at 09:08

## 2022-10-14 RX ADMIN — HEPARIN SODIUM (PORCINE) LOCK FLUSH IV SOLN 100 UNIT/ML 5 ML: 100 SOLUTION at 09:15

## 2022-10-26 ENCOUNTER — OFFICE VISIT (OUTPATIENT)
Dept: NEUROSURGERY | Facility: CLINIC | Age: 42
End: 2022-10-26
Payer: COMMERCIAL

## 2022-10-26 VITALS — SYSTOLIC BLOOD PRESSURE: 121 MMHG | OXYGEN SATURATION: 97 % | DIASTOLIC BLOOD PRESSURE: 81 MMHG | HEART RATE: 74 BPM

## 2022-10-26 DIAGNOSIS — C79.31 BRAIN METASTASIS: Primary | ICD-10-CM

## 2022-10-26 PROCEDURE — 99213 OFFICE O/P EST LOW 20 MIN: CPT | Performed by: NURSE PRACTITIONER

## 2022-10-26 NOTE — LETTER
10/26/2022       RE: Francesca Rowland  [address]  [place] [address]     Dear Colleague,    Thank you for referring your patient, Francesca Rowland, to the [facility] NEUROS[facility]GERY [facility]INIC [place] at [facility]. Please see a copy of my visit note below.    [facility]  Department of Neurosurgery      Name: Francesca Rowland  MRN: [id number]  Age: 41 year old  : 1980  Referring provider: Danielle Trivedi  10/26/2022      Chief Complaint:   Cerebellar metastasis from cervical cancer  S/p midline suboccipital craniotomy for resection followed by SRT in 2021  Routine follow-up after imaging    History of Present Illness:   Francesca Rowland is a 41 year old female with a history of adenocarcinoma of the cervix with metastasis to her brain who is seen today for a follow up visit. She underwent craniotomy for metastasis resection in 2021 followed by gamma knife radiation.      Most recently seen in our clinic by me on 2022. 1 year follow up MRI was recommended at that time. Today, patient is here to follow up after a brain MRI which was done on 10/14/2022. She denies any new symptoms. Her balance is good. She has an upcoming follow up with Dr. Canela in January.       Review of Systems:   Pertinent items are noted in HPI or as in patient entered ROS below, remainder of complete ROS is negative.   No flowsheet data found.     Physical Exam:   /81   Pulse 74   SpO2 97%    General: No acute distress.    Neuro: The patient is fully oriented. Speech is normal. Extraocular movements are intact without nystagmus. Facial sensation is intact in V1, V2, V3 distributions. Facial nerve function is normal, rated as a [place] Brackmann 1.  Shoulders are full strength. There is no pronator drift. Full strength in all extremities. Sensation intact throughout. No dysmetria with finger-nose-finger testing. Gait is normal.  Psych:  Normal mood and affect. Behavior is normal.        Imaging:  10/14/2022 MRI Brain:                                                                  IMPRESSION: In this patient with history of metastatic cervical cancer  to the cerebellum, status post resection and chemoradiation, there is  no evidence of local recurrence or new intracranial metastatic  disease.      Assessment:  Cerebellar metastasis from cervical cancer  S/p midline suboccipital craniotomy for resection followed by SRT in March 2021  Routine follow-up after imaging    Plan:  Her recent MRI is stable with no evidence of local recurrence or new intracranial metastatic disease. No new symptoms. We will plan for a follow up MRI in 10/2023. Patient to contact us sooner if she has any new symptoms or concerns.          I spent 20 minutes on patient care activities related to this encounter on the date of service, including time spent reviewing the chart, obtaining history and examination and in counseling the patient, and in documentation in the electronic medical record.      Danielle MEYERS CNP  Department of Neurosurgery

## 2022-12-05 NOTE — LETTER
2019       RE: Francesca Rowland  [address]  [facility] [address]     Dear Colleague,    Thank you for referring your patient, Francesca Rowland, to the RADIATION ONCOLOGY CLINIC. Please see a copy of my visit note below.    [facility] PHYSICIANS  SPECIALIZING IN BREAKTHROUGHS  Radiation Oncology    On Treatment Visit Note      Francesca Rowland      Date: 2019   MRN: [id number]   : 1980  Diagnosis: Cervical cancer      Reason for Visit:  On Radiation Treatment Visit     Treatment Summary to Date  Treatment Site: pelvis Current Dose: 1260/4500 cGy Fractions:       Chemotherapy  Chemo concurrent with radx?: Yes  Oncologist: Rola  Drug Name/Frequency 1: Cisplatin    ED Visit/Hosiptal Admission: None     Treatment Breaks:  ()      Subjective:   Ms. Rowland continues to struggle with nausea.  She had an allergic reaction to Emend and therefore has only been taking Ativan.  She is also allergic to compazine.  She does have Zofran but has not started using it.  She had one episode of diarrhea on Saturday, which has since resolved.      Nursing ROS:   Nutrition Alteration  Diet Type: Patient's Preference  Skin  Skin Reaction: 0 - No changes     Cardiovascular  Respiratory effort: 1 - Normal - without distress  Gastrointestinal  Nausea: 1 - One to two episodes of nausea/24  GI Note: WNL  Genitourinary   Note: WNL, no vaginal bleeding     Pain Assessment  0-10 Pain Scale: 0      Objective:   /80   Pulse 103   Wt 104.8 kg (231 lb)   BMI 37.30 kg/m     Gen: Appears well, in no acute distress  Skin: Mild diffuse erythema over treatment field    Labs:  CBC RESULTS:   Recent Labs   Lab Test 19   WBC 5.3   RBC 5.03   HGB 15.1   HCT 43.2   MCV 86   MCH 30.0   MCHC 35.0   RDW 12.8        ELECTROLYTES:  Recent Labs   Lab Test 19      POTASSIUM 3.3*   CHLORIDE 101   SHAD 8.9   CO2 31   BUN 14   CR 0.79   *  Anesthesia Post Evaluation    Patient: Daniella Mejia    Procedure(s) Performed: Procedure(s) (LRB):  COLONOSCOPY (N/A)    Final Anesthesia Type: general      Patient location during evaluation: PACU  Patient participation: Yes- Able to Participate  Level of consciousness: awake and alert and oriented  Post-procedure vital signs: reviewed and stable  Pain management: adequate  Airway patency: patent    PONV status at discharge: No PONV  Anesthetic complications: no      Cardiovascular status: blood pressure returned to baseline, stable and hemodynamically stable  Respiratory status: unassisted  Hydration status: euvolemic  Follow-up not needed.          Vitals Value Taken Time   /78 12/05/22 0920   Temp 36.6 °C (97.9 °F) 12/05/22 0903   Pulse 75 12/05/22 0923   Resp 24 12/05/22 0923   SpO2 90 % 12/05/22 0923   Vitals shown include unvalidated device data.      No case tracking events are documented in the log.      Pain/Cholo Score: Cholo Score: 10 (12/5/2022  9:13 AM)               Assessment:    Tolerating radiation therapy well.  All questions and concerns addressed.    Toxicities:  Nausea: Grade 1: Loss of appetite without alteration in eating habits    Plan:   1. Continue current therapy.    2. Nausea: recommend taking Zofran every 8 hours if nausea is persistent.  3. Diarrhea: maybe diet related.  Will continue to monitor.        Mosaiq chart and setup information reviewed  Ports checked    Medication Review  Med list reviewed with patient?: Yes    Educational Topic Discussed  Education Instructions: reviewed        Bret Kerns MD/PhD  [phone] clinic  Pager [phone]    Please do not send letter to referring physician.      Again, thank you for allowing me to participate in the care of your patient.      Sincerely,    Bret Kerns MD

## 2023-01-07 ENCOUNTER — HEALTH MAINTENANCE LETTER (OUTPATIENT)
Age: 43
End: 2023-01-07

## 2023-01-26 ENCOUNTER — ONCOLOGY VISIT (OUTPATIENT)
Dept: ONCOLOGY | Facility: CLINIC | Age: 43
End: 2023-01-26
Payer: COMMERCIAL

## 2023-01-26 VITALS
SYSTOLIC BLOOD PRESSURE: 121 MMHG | WEIGHT: 260 LBS | HEART RATE: 89 BPM | DIASTOLIC BLOOD PRESSURE: 88 MMHG | OXYGEN SATURATION: 95 % | HEIGHT: 65 IN | BODY MASS INDEX: 43.32 KG/M2

## 2023-01-26 DIAGNOSIS — C53.9 MALIGNANT NEOPLASM OF CERVIX, UNSPECIFIED SITE (H): ICD-10-CM

## 2023-01-26 PROCEDURE — 99214 OFFICE O/P EST MOD 30 MIN: CPT | Performed by: OBSTETRICS & GYNECOLOGY

## 2023-01-26 ASSESSMENT — PAIN SCALES - GENERAL: PAINLEVEL: NO PAIN (0)

## 2023-01-26 NOTE — LETTER
2023         RE: Francesca Rowland  [address]  [place] [address]        Dear Colleague,    Thank you for referring your patient, Francesca Rowland, to the [facility]. Please see a copy of my visit note below.                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 She is here today for follow up and disease management.       Oncology History:      Patient initially presented as a 27-year-old woman seen in referral due to a Pap smear showing high-grade squamous intraepithelial lesion that was positive for high risk HPV subtype in 2007. This Pap smear had been taken during her six week postpartum visit. Patient did have a remote history of abnormal Pap smears back in  while serving in [place] for the [employer].  Pap smears during her first pregnancy were all negative. Her Pap smear at the beginning of the most recent pregnancy was normal. Patient did have a colposcopy for evaluation of this abnormal Pap smear on 2007 and cervical biopsies of 6 and 12 o'clock position showed features consistent with papillary serous adenocarcinoma. Endocervical curettings were also taken which showed fragments of papillary serous carcinoma. The patient then underwent a LEEP procedure on 10/15/07 which showed microinvasive adenocarcinoma with a depth of invasion of 0.4 mm. Patient then made the decision to proceed with radical hysterectomy. She subsequently underwent a radical hysterectomy, bilateral salpingectomy, bilateral pelvic and periaortic lymph node dissection on 10/19/07. Patient's operative course was otherwise uncomplicated and she recoverred uneventfully.  Pathology did reveal patient to be stage IA2 adenocarcinoma of the cervix.      She  underwent routine surveillence through 2014 complicated only by SIOBHAN 1      She presented to the ED at [facility] on 9/14/2019 with severe 8.5/10 RUQ pain associated with nausea, one episode of emesis, and decreased appetite. RUQ demonstrated cholelithiasis without cholecystitis, so an abdominal and pelvic CT scan was obtained. This revealed post-surgical changes consistent with prior hysterectomy, left adnexal cysts thought to be ovarian, and an enlarged pericaval lymph node suspicious for metastatic disease vs primary lymphoma. Her condition stabilized and she was discharged home with referrals to general surgery and oncology for biliary colic and further management of the lymphadenopathy, respectively. CT-guided right retroperitoneal lymph node biopsy on 10/15/2019 was consistent with metastatic cervical carcinoma     She underwent surgery to remove the mass and determine the extent of disease on 11/13/19     PATH:  FINAL DIAGNOSIS:   LYMPH NODES, PARA-AORTIC, RESECTION:   - Positive for carcinoma in three of four lymph nodes examined (3/4),   consistent with recurrent endocervical   adenocarcinoma   - Largest metastatic focus is 3.5 cm, positive for extra yuridia extension         Chemo-Potentiated RT 12/2019-1/2020 2/2020 CT:  IMPRESSION: Postoperative changes of hysterectomy and lymph node  resection for cervical cancer. The metastatic right pelvic lymph nodes  which were previously identified have decreased in size. No new  metastases identified.      She recently presented to the ED for an acute RUQ pain episode, which has greatly improved despite no clear diagnosis.  During this visit she underwent a CAP CT which did not demonstrate evidence of cancer but a slightly enlarged spleen. She had a mild eosinophilia (~2%) but has not traveled or eaten exotic food recently, and has not had any new contacts to suggest mono.      10/13/2020 CT:  IMPRESSION:  1.  Recurrent right external iliac  lymphadenopathy.  2.  Rebound thymic hyperplasia.     10/30/2020 PET  IMPRESSION:   In this patient with history of metastatic cervical cancer status-post  radical hysterectomy and chemoradiation, there is evidence of  recurrent disease:  1. Increased size of a hypermetabolic right external iliac chain lymph node since 2/27/2020.  2. Hypermetabolic right subpectoral and axillary lymph nodes.  Recommend follow-up in the breast center for axillary ultrasound and  possible tissue sampling.  3. Idiopathic thymic activation/hyperplasia.     She was evaluated in the breast clinic with plan made for biopsy - however at biopsy the node had shruck considerably and was thus felt to be c/w inflammation (no biopsy obtained)     3/8/2021 MRI Brain (for dizziness)  IMPRESSION:  1. Heterogeneous mixed solid and cystic 2.7 cm mass in the right cerebellum, most likely a solitary metastatic lesion. There is moderate surrounding vasogenic edema as well as mass effect on the fourth ventricle. No obstructive hydrocephalus.    2. Unremarkable MR angiogram of the head and neck.     3/15/2021 Craniotomy ([facility])  1. Midline suboccipital craniotomy for resection of mass  2. Right frontal ventriculostomy  3. Stereotactic navigation  4. Use of operating microscope     4/16/21 Completed 5 fx of gamma knife ([facility])     Plan: Paclitaxel, Cisplatin, Avastin     4/28/2021: Cycle #1 Paclitaxel, Cisplatin, Avastin  5/19/2021: Cycle #2 Paclitaxel/Cisplatin/Avastin +Neulasta  6/9/2021: Cycle #3 Paclitaxel/Cisplatin/Avastin +Neulasta  6/30/2021: Cycle #4 Paclitaxel/Cisplatin/Avastin +Neulasta    6/28/2021 plan: PET scan for interval assessment.  Will plan for 6 cycles followed by possible use of Keytruda versus observation.     7/15/2021: PET CT: IMPRESSION: In this patient with history of cervical cancer who  completed chemoradiation:  1. Resolved uptake to the right pelvic lymph node, consistent with  response to treatment.  2. No new  hypermetabolic lesions.  3. No definite abnormal intracranial uptake. Please note that PET-CT  is less sensitive for intracranial disease due to normal uptake to the  brain. Consider continued following of the previously seen lesions and  surgical changes with MRI if clinically indicated.      7/21/2021: Cycle #5 Paclitaxel/Cisplatin/Avastin +Neulasta  8/11/2021: Cycle #6 Taxotere/Cisplatin/Avastin + Neulasta (Change to Taxotere due to neuropathy) deferred due to insurance  8/18/2021: Cycle #6 Paclitaxel/Cisplatin/Avastin (dose reduced Taxol to 135 mg/m2 due to insurance coverage with Taxotere and patient in agreement with plan- neuropathy mild and improved) +Neulasta    8/21/2021 MRI (HEAD):  Impression: In this patient with history of metastatic carcinoma to  the right cerebellum:  There is no definite evidence of disease recurrence. Decreased  resection cavity enhancement. Previous right occipital lobe  enhancement is completely resolved. No new enhancing intracranial  lesions.    12/6/21 MRI brain - DIANNE  12/8/21 PET - negative for focal uptake    6/17/22 MRI (BRAIN)  IMPRESSION: In this patient with history of metastatic cervical cancer  to the cerebellum, status post resection and chemoradiation, there is  no evidence of recurrence:  Stable postsurgical changes of right cerebellar mass resection without  evidence of local recurrence. No new enhancing intracranial lesions.    PET  1. No abnormal FDG uptake within the body and neck.     2. Postsurgical changes of total abdominal hysterectomy, bilateral  salpingo-oophorectomy and periaortic and pelvic lymph node dissection  without evidence of disease recurrence.      Past Medical History:    Past Medical History:   Diagnosis Date     Chronic cholecystitis 09/25/2019     History of cervical cancer 2007     Metastasis to brain (H) 03/08/2021     Metastatic adenocarcinoma to cervix (H) 10/28/2019    Added automatically from request for surgery [id number]         Past  Surgical History:    Past Surgical History:   Procedure Laterality Date     CHOLECYSTECTOMY, LAPOROSCOPIC  09/25/2019     HYSTERECTOMY RADICAL  2007    PAUL     INSERT PORT VASCULAR ACCESS Right 5/5/2021    Procedure: INSERTION, VASCULAR ACCESS PORT;  Surgeon: Oral Toledo MD;  Location: [facility] OR     IR CHEST PORT PLACEMENT > 5 YRS OF AGE  5/5/2021     LAPAROSCOPIC LYMPHADENECTOMY N/A 11/13/2019    Procedure: LAPAROSCOPY, resection of of paraaortic lymph node, lysis of adhesions;  Surgeon: Jluis Canela MD;  Location: [facility] OR     OPTICAL TRACKING SYSTEM CRANIOTOMY, EXCISE TUMOR, COMBINED Bilateral 3/15/2021    Procedure: stealth assisted Midline suboccipital craniectomy/craniotomy for tumor;  Surgeon: Martín Myrick MD;  Location: [facility] OR     OPTICAL TRACKING SYSTEM VENTRICULOSTOMY Right 3/15/2021    Procedure: stealth assisted  right frontal ventriculostomy;  Surgeon: Martín yMrick MD;  Location: [facility] OR         Health Maintenance Due   Topic Date Due     YEARLY PREVENTIVE VISIT  Never done     ADVANCE CARE PLANNING  Never done     HEPATITIS B IMMUNIZATION (1 of 3 - 3-dose series) Never done     Pneumococcal Vaccine: Pediatrics (0 to 5 Years) and At-Risk Patients (6 to 64 Years) (1 - PCV) Never done     HIV SCREENING  Never done     HEPATITIS C SCREENING  Never done     MAMMO SCREENING  11/09/2021     COVID-19 Vaccine (3 - Booster for Sia series) 02/15/2022     INFLUENZA VACCINE (1) 09/01/2022     PHQ-2 (once per calendar year)  01/01/2023     PAP  11/29/2022       Current Medications:     Current Outpatient Medications   Medication Sig Dispense Refill     acetaminophen (TYLENOL) 325 MG tablet Take 2 tablets (650 mg) by mouth every 6 hours 24 tablet 0     Ascorbic Acid (VITAMIN C GUMMIE PO) Take by mouth daily       dexamethasone (DECADRON) 4 MG tablet Take 2 tablets (8 mg) by mouth daily (with breakfast) Take 2 tablets by mouth with food on days 2-4 after chemotherapy.  "(Patient not taking: Reported on 10/26/2022) 12 tablet 3     DULoxetine (CYMBALTA) 30 MG capsule Take 1 capsule (30 mg) by mouth 2 times daily 60 capsule 2     famotidine (PEPCID) 10 MG tablet Take 1 tablet (10 mg) by mouth 2 times daily (Patient not taking: Reported on 8/31/2022) 60 tablet 1     gabapentin (NEURONTIN) 300 MG capsule Take 1 capsule (300 mg) by mouth At Bedtime 30 capsule 2     ibuprofen (ADVIL/MOTRIN) 200 MG tablet Take 1 tablet (200 mg) by mouth every 4 hours as needed for mild pain (Patient not taking: Reported on 8/31/2022)       lidocaine-prilocaine (EMLA) 2.5-2.5 % external cream Apply topically as needed for moderate pain (Patient not taking: Reported on 8/31/2022) 30 g 1     loperamide (IMODIUM A-D) 2 MG tablet Take 1 tablet (2 mg) by mouth 4 times daily as needed for diarrhea Can take one 2 mg capsule after each loose stool up to 4 times a day as needed for diarrhea. This can be increased to 4 mg 4 times a day or 2 mg up to 8 times a day. (Patient not taking: Reported on 8/31/2022) 60 tablet 3     LORazepam (ATIVAN) 0.5 MG tablet Take 1 tablet (0.5 mg) by mouth every 6 hours as needed for nausea 20 tablet 0     multivitamin w/minerals (THERA-VIT-M) tablet Take 1 tablet by mouth daily       OLANZapine (ZYPREXA) 5 MG tablet Take 1 tablet (5 mg) by mouth At Bedtime (Patient not taking: Reported on 8/31/2022) 30 tablet 0     SENNA-docusate sodium (SENNA S) 8.6-50 MG tablet Take 1 tablet by mouth At Bedtime (Patient not taking: Reported on 8/31/2022) 30 tablet 3         Allergies:        Allergies   Allergen Reactions     Emend [Aprepitant]      Prochlorperazine      \"i was told I turn blue\"        Social History:     Social History     Tobacco Use     Smoking status: Former     Types: Cigarettes     Smokeless tobacco: Never   Substance Use Topics     Alcohol use: Yes     Comment: one drink a week       History   Drug Use No         Family History:     The patient's family history is notable for " "    Family History   Problem Relation Age of Onset     Aneurysm Mother      Breast Cancer Paternal Grandmother         bilat mastectomies     Breast Cancer Maternal Aunt         stage 1     Thyroid Cancer Sister 23         Physical Exam:     PS 0  VS: /88 (BP Location: Left arm, Patient Position: Chair, Cuff Size: Adult Large)   Pulse 89   Ht 1.651 m (5' 5\")   Wt 117.9 kg (260 lb)   SpO2 95%   BMI 43.27 kg/m       General Appearance: healthy and alert, no distress, overweight     HEENT: no thyromegaly, no palpable nodules or masses        Cardiovascular: regular rate and rhythm, no gallops, rubs or murmurs     Respiratory: lungs clear, no rales, rhonchi or wheezes    Musculoskeletal: extremities non tender and without edema    Skin: no lesions or rashes     Neurological: normal gait, no gross defects     Psychiatric: appropriate mood and affect                               Hematological: normal cervical, supraclavicular lymph nodes     Gastrointestinal:       abdomen soft, non-tender, non-distended, no organomegaly or masses, obese.    Genitourinary: Normal BUS, no lesions in vagina seen, pap done today. BME no palpable masses or nodules.  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 She is here today for follow up and disease management.     30 minutes spent on the date of the encounter doing chart review, history and exam, documentation, and further activities as noted above.        Plan:     1.)         Rec. Cx Cancer with brain met: s/p primary therapy:  She had discussed options of obeservation vs. maintenance therapy.  She has no residual disease on PET or brain MRI (12/2021, 6/2022) at this point, but obviously remains at risk for recurrence.  After discussing the pros and cons of each option - she decided on observation.  She will likely be a " candidate for Keytruda should she recur.    -MRI of Braina nd CT C/A/P for suveillance     2.)  Genetic risk factors were assessed again and the patient has multiple family members with cancer diagnosed younger than age 50 including herself (age 40, but with likely HPV related disease) and her sister (thyroid cancer at age 23) among others. She met with genetics 1/2021, but did not proceed until 12/2021 (NEGATIVE)     3.) Labs and/or tests ordered include: Brain MRI, CT     4.)   Health maintenance issues addressed today include annual health maintenance and non-gynecologic issues with PCP.   Encouraged PCP follow up for knee pain but getting better.       Jluis Canela MD    Division of Gynecologic Oncology  [facility]    Of a 30 minute appointment, more than 50% was spent in counseling the patient.      CC  Patient Care Team:  Tyrese Bowie MD as PCP - General (Family Medicine)  Jluis Canela MD as MD (Gynecologic Oncology)  Jluis Canela MD as Assigned Cancer Care Provider  Vasu Tavera PsyD as Assigned Behavioral Health Provider  Rashmi London MD as Assigned Neuroscience Provider  Danielle Trivedi APRN CNP as Nurse Practitioner (Neurological Surgery)        Again, thank you for allowing me to participate in the care of your patient.        Sincerely,        Jluis Canela MD

## 2023-01-26 NOTE — PROGRESS NOTES
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 She is here today for follow up and disease management.       Oncology History:      Patient initially presented as a 27-year-old woman seen in referral due to a Pap smear showing high-grade squamous intraepithelial lesion that was positive for high risk HPV subtype in 2007. This Pap smear had been taken during her six week postpartum visit. Patient did have a remote history of abnormal Pap smears back in  while serving in [place] for the [employer].  Pap smears during her first pregnancy were all negative. Her Pap smear at the beginning of the most recent pregnancy was normal. Patient did have a colposcopy for evaluation of this abnormal Pap smear on 2007 and cervical biopsies of 6 and 12 o'clock position showed features consistent with papillary serous adenocarcinoma. Endocervical curettings were also taken which showed fragments of papillary serous carcinoma. The patient then underwent a LEEP procedure on 10/15/07 which showed microinvasive adenocarcinoma with a depth of invasion of 0.4 mm. Patient then made the decision to proceed with radical hysterectomy. She subsequently underwent a radical hysterectomy, bilateral salpingectomy, bilateral pelvic and periaortic lymph node dissection on 10/19/07. Patient's operative course was otherwise uncomplicated and she recoverred uneventfully.  Pathology did reveal patient to be stage IA2 adenocarcinoma of the cervix.      She underwent routine surveillence through  complicated only by SIOBHAN 1      She presented to the ED at [facility] on 2019 with severe 8.5/10 RUQ pain associated with nausea, one episode of emesis, and decreased appetite. RUQ demonstrated cholelithiasis  without cholecystitis, so an abdominal and pelvic CT scan was obtained. This revealed post-surgical changes consistent with prior hysterectomy, left adnexal cysts thought to be ovarian, and an enlarged pericaval lymph node suspicious for metastatic disease vs primary lymphoma. Her condition stabilized and she was discharged home with referrals to general surgery and oncology for biliary colic and further management of the lymphadenopathy, respectively. CT-guided right retroperitoneal lymph node biopsy on 10/15/2019 was consistent with metastatic cervical carcinoma     She underwent surgery to remove the mass and determine the extent of disease on 11/13/19     PATH:  FINAL DIAGNOSIS:   LYMPH NODES, PARA-AORTIC, RESECTION:   - Positive for carcinoma in three of four lymph nodes examined (3/4),   consistent with recurrent endocervical   adenocarcinoma   - Largest metastatic focus is 3.5 cm, positive for extra yuridia extension         Chemo-Potentiated RT 12/2019-1/2020 2/2020 CT:  IMPRESSION: Postoperative changes of hysterectomy and lymph node  resection for cervical cancer. The metastatic right pelvic lymph nodes  which were previously identified have decreased in size. No new  metastases identified.      She recently presented to the ED for an acute RUQ pain episode, which has greatly improved despite no clear diagnosis.  During this visit she underwent a CAP CT which did not demonstrate evidence of cancer but a slightly enlarged spleen. She had a mild eosinophilia (~2%) but has not traveled or eaten exotic food recently, and has not had any new contacts to suggest mono.      10/13/2020 CT:  IMPRESSION:  1.  Recurrent right external iliac lymphadenopathy.  2.  Rebound thymic hyperplasia.     10/30/2020 PET  IMPRESSION:   In this patient with history of metastatic cervical cancer status-post  radical hysterectomy and chemoradiation, there is evidence of  recurrent disease:  1. Increased size of a hypermetabolic  right external iliac chain lymph node since 2/27/2020.  2. Hypermetabolic right subpectoral and axillary lymph nodes.  Recommend follow-up in the breast center for axillary ultrasound and  possible tissue sampling.  3. Idiopathic thymic activation/hyperplasia.     She was evaluated in the breast clinic with plan made for biopsy - however at biopsy the node had shruck considerably and was thus felt to be c/w inflammation (no biopsy obtained)     3/8/2021 MRI Brain (for dizziness)  IMPRESSION:  1. Heterogeneous mixed solid and cystic 2.7 cm mass in the right cerebellum, most likely a solitary metastatic lesion. There is moderate surrounding vasogenic edema as well as mass effect on the fourth ventricle. No obstructive hydrocephalus.    2. Unremarkable MR angiogram of the head and neck.     3/15/2021 [facility]iot[facility]  1. Midline suboccipital craniotomy for resection of mass  2. Right frontal ventriculostomy  3. Stereotactic navigation  4. Use of operating microscope     4/16/21 Completed 5 fx of gamma knife ([employer])     Plan: Paclitaxel, Cisplatin, Avastin     4/28/2021: Cycle #1 Paclitaxel, Cisplatin, Avastin  5/19/2021: Cycle #2 Paclitaxel/Cisplatin/Avastin +Neulasta  6/9/2021: Cycle #3 Paclitaxel/Cisplatin/Avastin +Neulasta  6/30/2021: Cycle #4 Paclitaxel/Cisplatin/Avastin +Neulasta    6/28/2021 plan: PET scan for interval assessment.  Will plan for 6 cycles followed by possible use of Keytruda versus observation.     7/15/2021: PET CT: IMPRESSION: In this patient with history of cervical cancer who  completed chemoradiation:  1. Resolved uptake to the right pelvic lymph node, consistent with  response to treatment.  2. No new hypermetabolic lesions.  3. No definite abnormal intracranial uptake. Please note that PET-CT  is less sensitive for intracranial disease due to normal uptake to the  brain. Consider continued following of the previously seen lesions and  surgical changes with MRI if clinically  indicated.      7/21/2021: Cycle #5 Paclitaxel/Cisplatin/Avastin +Neulasta  8/11/2021: Cycle #6 Taxotere/Cisplatin/Avastin + Neulasta (Change to Taxotere due to neuropathy) deferred due to insurance  8/18/2021: Cycle #6 Paclitaxel/Cisplatin/Avastin (dose reduced Taxol to 135 mg/m2 due to insurance coverage with Taxotere and patient in agreement with plan- neuropathy mild and improved) +Neulasta    8/21/2021 MRI (HEAD):  Impression: In this patient with history of metastatic carcinoma to  the right cerebellum:  There is no definite evidence of disease recurrence. Decreased  resection cavity enhancement. Previous right occipital lobe  enhancement is completely resolved. No new enhancing intracranial  lesions.    12/6/21 MRI brain - DIANNE  12/8/21 PET - negative for focal uptake    6/17/22 MRI (BRAIN)  IMPRESSION: In this patient with history of metastatic cervical cancer  to the cerebellum, status post resection and chemoradiation, there is  no evidence of recurrence:  Stable postsurgical changes of right cerebellar mass resection without  evidence of local recurrence. No new enhancing intracranial lesions.    PET  1. No abnormal FDG uptake within the body and neck.     2. Postsurgical changes of total abdominal hysterectomy, bilateral  salpingo-oophorectomy and periaortic and pelvic lymph node dissection  without evidence of disease recurrence.      Past Medical History:    Past Medical History:   Diagnosis Date     Chronic cholecystitis 09/25/2019     History of cervical cancer 2007     Metastasis to brain (H) 03/08/2021     Metastatic adenocarcinoma to cervix (H) 10/28/2019    Added automatically from request for surgery [id number]         Past Surgical History:    Past Surgical History:   Procedure Laterality Date     CHOLECYSTECTOMY, LAPOROSCOPIC  09/25/2019     HYSTERECTOMY RADICAL  2007    PAUL     INSERT PORT VASCULAR ACCESS Right 5/5/2021    Procedure: INSERTION, VASCULAR ACCESS PORT;  Surgeon: Oral Toledo  MD DIANA;  Location: [facility] OR     IR CHEST PORT PLACEMENT > 5 YRS OF AGE  5/5/2021     LAPAROSCOPIC LYMPHADENECTOMY N/A 11/13/2019    Procedure: LAPAROSCOPY, resection of of paraaortic lymph node, lysis of adhesions;  Surgeon: Jluis Caenla MD;  Location: [facility] OR     OPTICAL TRACKING SYSTEM CRANIOTOMY, EXCISE TUMOR, COMBINED Bilateral 3/15/2021    Procedure: stealth assisted Midline suboccipital craniectomy/craniotomy for tumor;  Surgeon: Martín Myrick MD;  Location: [facility] OR     OPTICAL TRACKING SYSTEM VENTRICULOSTOMY Right 3/15/2021    Procedure: stealth assisted  right frontal ventriculostomy;  Surgeon: Martín Myrick MD;  Location: [facility] OR         Health Maintenance Due   Topic Date Due     YEARLY PREVENTIVE VISIT  Never done     ADVANCE CARE PLANNING  Never done     HEPATITIS B IMMUNIZATION (1 of 3 - 3-dose series) Never done     Pneumococcal Vaccine: Pediatrics (0 to 5 Years) and At-Risk Patients (6 to 64 Years) (1 - PCV) Never done     HIV SCREENING  Never done     HEPATITIS C SCREENING  Never done     MAMMO SCREENING  11/09/2021     COVID-19 Vaccine (3 - Booster for Sia series) 02/15/2022     INFLUENZA VACCINE (1) 09/01/2022     PHQ-2 (once per calendar year)  01/01/2023     PAP  11/29/2022       Current Medications:     Current Outpatient Medications   Medication Sig Dispense Refill     acetaminophen (TYLENOL) 325 MG tablet Take 2 tablets (650 mg) by mouth every 6 hours 24 tablet 0     Ascorbic Acid (VITAMIN C GUMMIE PO) Take by mouth daily       dexamethasone (DECADRON) 4 MG tablet Take 2 tablets (8 mg) by mouth daily (with breakfast) Take 2 tablets by mouth with food on days 2-4 after chemotherapy. (Patient not taking: Reported on 10/26/2022) 12 tablet 3     DULoxetine (CYMBALTA) 30 MG capsule Take 1 capsule (30 mg) by mouth 2 times daily 60 capsule 2     famotidine (PEPCID) 10 MG tablet Take 1 tablet (10 mg) by mouth 2 times daily (Patient not taking: Reported on 8/31/2022) 60  "tablet 1     gabapentin (NEURONTIN) 300 MG capsule Take 1 capsule (300 mg) by mouth At Bedtime 30 capsule 2     ibuprofen (ADVIL/MOTRIN) 200 MG tablet Take 1 tablet (200 mg) by mouth every 4 hours as needed for mild pain (Patient not taking: Reported on 8/31/2022)       lidocaine-prilocaine (EMLA) 2.5-2.5 % external cream Apply topically as needed for moderate pain (Patient not taking: Reported on 8/31/2022) 30 g 1     loperamide (IMODIUM A-D) 2 MG tablet Take 1 tablet (2 mg) by mouth 4 times daily as needed for diarrhea Can take one 2 mg capsule after each loose stool up to 4 times a day as needed for diarrhea. This can be increased to 4 mg 4 times a day or 2 mg up to 8 times a day. (Patient not taking: Reported on 8/31/2022) 60 tablet 3     LORazepam (ATIVAN) 0.5 MG tablet Take 1 tablet (0.5 mg) by mouth every 6 hours as needed for nausea 20 tablet 0     multivitamin w/minerals (THERA-VIT-M) tablet Take 1 tablet by mouth daily       OLANZapine (ZYPREXA) 5 MG tablet Take 1 tablet (5 mg) by mouth At Bedtime (Patient not taking: Reported on 8/31/2022) 30 tablet 0     SENNA-docusate sodium (SENNA S) 8.6-50 MG tablet Take 1 tablet by mouth At Bedtime (Patient not taking: Reported on 8/31/2022) 30 tablet 3         Allergies:        Allergies   Allergen Reactions     Emend [Aprepitant]      Prochlorperazine      \"i was told I turn blue\"        Social History:     Social History     Tobacco Use     Smoking status: Former     Types: Cigarettes     Smokeless tobacco: Never   Substance Use Topics     Alcohol use: Yes     Comment: one drink a week       History   Drug Use No         Family History:     The patient's family history is notable for     Family History   Problem Relation Age of Onset     Aneurysm Mother      Breast Cancer Paternal Grandmother         bilat mastectomies     Breast Cancer Maternal Aunt         stage 1     Thyroid Cancer Sister 23         Physical Exam:     PS 0  VS: /88 (BP Location: Left arm, " "Patient Position: Chair, Cuff Size: Adult Large)   Pulse 89   Ht 1.651 m (5' 5\")   Wt 117.9 kg (260 lb)   SpO2 95%   BMI 43.27 kg/m       General Appearance: healthy and alert, no distress, overweight     HEENT: no thyromegaly, no palpable nodules or masses        Cardiovascular: regular rate and rhythm, no gallops, rubs or murmurs     Respiratory: lungs clear, no rales, rhonchi or wheezes    Musculoskeletal: extremities non tender and without edema    Skin: no lesions or rashes     Neurological: normal gait, no gross defects     Psychiatric: appropriate mood and affect                               Hematological: normal cervical, supraclavicular lymph nodes     Gastrointestinal:       abdomen soft, non-tender, non-distended, no organomegaly or masses, obese.    Genitourinary: Normal BUS, no lesions in vagina seen, pap done today. BME no palpable masses or nodules.  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 She is here today for follow up and disease management.     30 minutes spent on the date of the encounter doing chart review, history and exam, documentation, and further activities as noted above.        Plan:     1.)         Rec. Cx Cancer with brain met: s/p primary therapy:  She had discussed options of obeservation vs. maintenance therapy.  She has no residual disease on PET or brain MRI (12/2021, 6/2022) at this point, but obviously remains at risk for recurrence.  After discussing the pros and cons of each option - she decided on observation.  She will likely be a candidate for Keytruda should she recur.    -MRI of Braina nd CT C/A/P for suveillance     2.)  Genetic risk factors were assessed again and the patient has multiple family members with cancer diagnosed younger than age 50 including herself (age 40, but with likely HPV related disease) and " her sister (thyroid cancer at age 23) among others. She met with genetics 1/2021, but did not proceed until 12/2021 (NEGATIVE)     3.) Labs and/or tests ordered include: Brain MRI, CT     4.)   Health maintenance issues addressed today include annual health maintenance and non-gynecologic issues with PCP.   Encouraged PCP follow up for knee pain but getting better.       Jluis Canela MD    Division of Gynecologic Oncology  [facility]    Of a 30 minute appointment, more than 50% was spent in counseling the patient.      CC  Patient Care Team:  Tyrese Bowie MD as PCP - General (Family Medicine)  Jluis Canela MD as MD (Gynecologic Oncology)  Jluis Canela MD as Assigned Cancer Care Provider  Vasu Tavera PsyD as Assigned Behavioral Health Provider  Rashmi London MD as Assigned Neuroscience Provider  Danielle Trivedi APRN CNP as Nurse Practitioner (Neurological Surgery)

## 2023-01-26 NOTE — PATIENT INSTRUCTIONS
Schedule CT Chest/Abdomen/Pelvis and MRI Brain soon.  We will notify you of the results.  Follow up with Dr. Canela in 3 months.

## 2023-01-26 NOTE — NURSING NOTE
"Oncology Rooming Note    January 26, 2023 8:05 AM   Francesca Rowland is a 42 year old female who presents for:    Chief Complaint   Patient presents with     Oncology Clinic Visit     Follow up     Initial Vitals: /88 (BP Location: Left arm, Patient Position: Chair, Cuff Size: Adult Large)   Pulse 89   Ht 1.651 m (5' 5\")   Wt 117.9 kg (260 lb)   SpO2 95%   BMI 43.27 kg/m   Estimated body mass index is 43.27 kg/m  as calculated from the following:    Height as of this encounter: 1.651 m (5' 5\").    Weight as of this encounter: 117.9 kg (260 lb). Body surface area is 2.33 meters squared.  No Pain (0) Comment: Data Unavailable   No LMP recorded. Patient has had a hysterectomy.  Allergies reviewed: Yes  Medications reviewed: Yes    Medications: Medication refills not needed today.  Pharmacy name entered into [facility]: [id number] - [place], [place] - [address] [place] [address]    Clinical concerns: NO Dr. Canela was notified.      Guerda Smith CMA              "

## 2023-04-28 ENCOUNTER — ANCILLARY PROCEDURE (OUTPATIENT)
Dept: CT IMAGING | Facility: CLINIC | Age: 43
End: 2023-04-28
Attending: OBSTETRICS & GYNECOLOGY
Payer: COMMERCIAL

## 2023-04-28 ENCOUNTER — ANCILLARY PROCEDURE (OUTPATIENT)
Dept: GENERAL RADIOLOGY | Facility: CLINIC | Age: 43
End: 2023-04-28
Payer: COMMERCIAL

## 2023-04-28 ENCOUNTER — ANCILLARY PROCEDURE (OUTPATIENT)
Dept: MRI IMAGING | Facility: CLINIC | Age: 43
End: 2023-04-28
Attending: OBSTETRICS & GYNECOLOGY
Payer: COMMERCIAL

## 2023-04-28 DIAGNOSIS — C53.9 MALIGNANT NEOPLASM OF CERVIX, UNSPECIFIED SITE (H): ICD-10-CM

## 2023-04-28 DIAGNOSIS — C79.31 MALIGNANT NEOPLASM METASTATIC TO BRAIN (H): ICD-10-CM

## 2023-04-28 DIAGNOSIS — C79.31 METASTASIS TO BRAIN (H): ICD-10-CM

## 2023-04-28 DIAGNOSIS — C53.9 MALIGNANT NEOPLASM OF CERVIX, UNSPECIFIED SITE (H): Primary | ICD-10-CM

## 2023-04-28 PROCEDURE — 99207 PR NO CHARGE LOS: CPT

## 2023-04-28 PROCEDURE — 74177 CT ABD & PELVIS W/CONTRAST: CPT | Performed by: RADIOLOGY

## 2023-04-28 PROCEDURE — 70553 MRI BRAIN STEM W/O & W/DYE: CPT | Mod: GC | Performed by: RADIOLOGY

## 2023-04-28 PROCEDURE — 71260 CT THORAX DX C+: CPT | Performed by: RADIOLOGY

## 2023-04-28 PROCEDURE — A9585 GADOBUTROL INJECTION: HCPCS | Performed by: RADIOLOGY

## 2023-04-28 RX ORDER — GADOBUTROL 604.72 MG/ML
15 INJECTION INTRAVENOUS ONCE
Status: COMPLETED | OUTPATIENT
Start: 2023-04-28 | End: 2023-04-28

## 2023-04-28 RX ORDER — HEPARIN SODIUM (PORCINE) LOCK FLUSH IV SOLN 100 UNIT/ML 100 UNIT/ML
5 SOLUTION INTRAVENOUS ONCE
Status: COMPLETED | OUTPATIENT
Start: 2023-04-28 | End: 2023-04-28

## 2023-04-28 RX ORDER — IOPAMIDOL 755 MG/ML
122 INJECTION, SOLUTION INTRAVASCULAR ONCE
Status: COMPLETED | OUTPATIENT
Start: 2023-04-28 | End: 2023-04-28

## 2023-04-28 RX ADMIN — GADOBUTROL 12 ML: 604.72 INJECTION INTRAVENOUS at 09:37

## 2023-04-28 RX ADMIN — IOPAMIDOL 122 ML: 755 INJECTION, SOLUTION INTRAVASCULAR at 09:03

## 2023-04-28 RX ADMIN — HEPARIN SODIUM (PORCINE) LOCK FLUSH IV SOLN 100 UNIT/ML 5 ML: 100 SOLUTION at 10:25

## 2023-05-02 ENCOUNTER — VIRTUAL VISIT (OUTPATIENT)
Dept: ONCOLOGY | Facility: CLINIC | Age: 43
End: 2023-05-02
Attending: STUDENT IN AN ORGANIZED HEALTH CARE EDUCATION/TRAINING PROGRAM
Payer: COMMERCIAL

## 2023-05-02 DIAGNOSIS — C53.9 MALIGNANT NEOPLASM OF CERVIX, UNSPECIFIED SITE (H): Primary | ICD-10-CM

## 2023-05-02 DIAGNOSIS — G89.29 CHRONIC PAIN OF RIGHT KNEE: ICD-10-CM

## 2023-05-02 DIAGNOSIS — G62.0 CHEMOTHERAPY-INDUCED NEUROPATHY (H): ICD-10-CM

## 2023-05-02 DIAGNOSIS — G89.29 CHRONIC PAIN OF LEFT KNEE: ICD-10-CM

## 2023-05-02 DIAGNOSIS — T45.1X5A CHEMOTHERAPY-INDUCED NEUROPATHY (H): ICD-10-CM

## 2023-05-02 DIAGNOSIS — M25.561 CHRONIC PAIN OF RIGHT KNEE: ICD-10-CM

## 2023-05-02 DIAGNOSIS — M25.562 CHRONIC PAIN OF LEFT KNEE: ICD-10-CM

## 2023-05-02 DIAGNOSIS — R53.81 PHYSICAL DECONDITIONING: ICD-10-CM

## 2023-05-02 DIAGNOSIS — C79.82 METASTATIC ADENOCARCINOMA TO CERVIX (H): ICD-10-CM

## 2023-05-02 PROCEDURE — G0463 HOSPITAL OUTPT CLINIC VISIT: HCPCS | Mod: PN,GT | Performed by: STUDENT IN AN ORGANIZED HEALTH CARE EDUCATION/TRAINING PROGRAM

## 2023-05-02 PROCEDURE — 99215 OFFICE O/P EST HI 40 MIN: CPT | Mod: VID | Performed by: STUDENT IN AN ORGANIZED HEALTH CARE EDUCATION/TRAINING PROGRAM

## 2023-05-02 RX ORDER — DULOXETIN HYDROCHLORIDE 30 MG/1
30 CAPSULE, DELAYED RELEASE ORAL 2 TIMES DAILY
Qty: 60 CAPSULE | Refills: 2 | Status: SHIPPED | OUTPATIENT
Start: 2023-05-02 | End: 2023-09-12

## 2023-05-02 NOTE — PROGRESS NOTES
Virtual Visit Details    Type of service:  Video Visit     Originating Location (pt. Location): Home  Distant Location (provider location):  On-site  Platform used for Video Visit: Husam

## 2023-05-02 NOTE — PROGRESS NOTES
[facility]   PM&R clinic note        Interval history:     Francesca Rowland presents to clinic today for follow up reg her rehab needs.   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  Was last seen in clinic on 8/31/22  Recommendations included:  1.           Therapy/equipment/braces:  3. Continue to remain active and incorporate a regular home exercise  4. Can reconsider outpatient PT in the future if needed.  2.           Medications:  3. Continue Cymbalta 60 mg daily. Prescription refilled at today's visit.    Oncology History:  - Initially presented due to Pap smear showing high-grade squamous intraepithelial lesion positive for high risk HPV subtype in 8/20017.  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   - Underwent LEEP procedure on 10/15/2007 which demonstrated microinvasive adenocarcinoma.  - Proceeded with radical hysterectomy, bilateral salpingectomy, bilateral pelvic and periaortic lymph node dissection on 10/19/2007. Pathology revealed Stage IA2 adenocarcinoma of the cervix.  - Underwent routine surveillance through 2014.  - Presented to the ED at [facility] on 9/14/2019 with severe RUQ pain which imaging revealed cholelithiasis without cholecystitis, Abdominal/Pelvic CT revealed enlarged pericaval lymph node. CT guided biopsy was consistent with metastatic cervical carcinoma.  - Underwent surgery to remove mass.  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  - 3/15/2021- Underwent craniotomy and resection of cerebellar mass.  Fatigue and neuropathy.  - 4/16/2021- Completed 5 fx of gamma knife  - 4/28/2021- 6/30/2021- Cycles 1-4 of paclitaxel/cisplatin/avastin.  - 7/21/2021: Cycle #5 Paclitaxel/Cisplatin/Avastin   - 8/18/21- Saw Terra Zelaya in clinic. Peripheral neuropathy  improved with Cymbalta.  - 8/23/21- Saw Dr. Canela in clinic. Improvement in peripheral neuropathy symptoms with Cymbalta. No residual disease on PET or Brain MRI, but remains at risk for recurrence.   - 11/29/21- Saw Dr. Cardozo in clinic for follow up. Patient continues to think cymbalta is helping for neuropathic symptoms. Has neuropathy in her feet once in a while, minimal in hands. Will be starting PT. No residual disease on PET or brain MRI, remains at risk for recurrence. Patient discussed options, and decided on observation. Likely a candidate for Keytruda if she recurs.      Symptoms,  Patient was seen for a return virtual visit today.  Since her last visit, overall she has been doing very well.  She continues to work and has had no issues or concerns with work.  She has also had no concerns with her mobility, and denies any falls or near falls.  She continues with her neuropathy symptoms, though she states that these have also significantly improved since her last visit.  She only has occasional neuropathic pain that occurs mainly overnight.  She continues on Cymbalta 60 mg daily and feels that this dose is working well.  She is in need of a refill of this medication today.  She had recent CT of the chest abdomen and pelvis done with no evidence of recurrent or metastatic disease.  She also had an MRI of her brain done in April which revealed stable surgical changes of her cerebellar mass resection and no evidence of local recurrence or new intracranial metastatic disease.  She has gained some weight, and is roughly about 265 pounds now since her last visit.  She states that she has remained relatively inactive during the winter months, and hopes to increase her activity level during the summer months now that the weather is better.      Therapies/HEP,  Not currently in outpatient therapy.      Functionally,   No changes since her last visit.      Social history is unchanged.      Medications:  Current  Outpatient Medications   Medication Sig Dispense Refill     acetaminophen (TYLENOL) 325 MG tablet Take 2 tablets (650 mg) by mouth every 6 hours 24 tablet 0     Ascorbic Acid (VITAMIN C GUMMIE PO) Take by mouth daily       dexamethasone (DECADRON) 4 MG tablet Take 2 tablets (8 mg) by mouth daily (with breakfast) Take 2 tablets by mouth with food on days 2-4 after chemotherapy. 12 tablet 3     DULoxetine (CYMBALTA) 30 MG capsule Take 1 capsule (30 mg) by mouth 2 times daily 60 capsule 2     famotidine (PEPCID) 10 MG tablet Take 1 tablet (10 mg) by mouth 2 times daily 60 tablet 1     gabapentin (NEURONTIN) 300 MG capsule Take 1 capsule (300 mg) by mouth At Bedtime 30 capsule 2     ibuprofen (ADVIL/MOTRIN) 200 MG tablet Take 200 mg by mouth every 4 hours as needed for mild pain       lidocaine-prilocaine (EMLA) 2.5-2.5 % external cream Apply topically as needed for moderate pain 30 g 1     loperamide (IMODIUM A-D) 2 MG tablet Take 1 tablet (2 mg) by mouth 4 times daily as needed for diarrhea Can take one 2 mg capsule after each loose stool up to 4 times a day as needed for diarrhea. This can be increased to 4 mg 4 times a day or 2 mg up to 8 times a day. 60 tablet 3     LORazepam (ATIVAN) 0.5 MG tablet Take 1 tablet (0.5 mg) by mouth every 6 hours as needed for nausea 20 tablet 0     multivitamin w/minerals (THERA-VIT-M) tablet Take 1 tablet by mouth daily       OLANZapine (ZYPREXA) 5 MG tablet Take 1 tablet (5 mg) by mouth At Bedtime 30 tablet 0     SENNA-docusate sodium (SENNA S) 8.6-50 MG tablet Take 1 tablet by mouth At Bedtime 30 tablet 3              Physical Exam:   There were no vitals taken for this visit.  Gen: NAD, pleasant and cooperative   HEENT: Atraumatic, normocephalic, extraocular movements appear intact  Pulm: non-labored breathing in room air  Neuro/MSK:   Orientation: Oriented to person, time, place and situation, Exhibits good insight into her condition and ongoing treatment  Motor: Observed moving  upper extremities actively against gravity    Labs/Imaging:  Lab Results   Component Value Date    WBC 3.6 (L) 08/18/2021    HGB 11.8 08/18/2021    HCT 36.5 08/18/2021    MCV 98 08/18/2021     08/18/2021     Lab Results   Component Value Date     08/18/2021    POTASSIUM 4.1 08/18/2021    CHLORIDE 108 08/18/2021    CO2 28 08/18/2021    GLC 84 06/17/2022     Lab Results   Component Value Date    GFRESTIMATED 79 08/18/2021    GFRESTBLACK >90 06/28/2021     Lab Results   Component Value Date    AST 17 08/18/2021    ALT 22 08/18/2021    ALKPHOS 94 08/18/2021    BILITOTAL 0.4 08/18/2021    BILICONJ 0.0 09/27/2007     Lab Results   Component Value Date    INR 0.97 05/05/2021     Lab Results   Component Value Date    BUN 17 08/18/2021    CR 0.90 08/18/2021              Assessment/Plan   Francesca Rowland presents to clinic today for follow up reg her rehab needs.   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 Was last seen in clinic on 8/31/22.  As discussed with Francesca, overall she is doing very well from a rehabilitation perspective since her last visit.  We will refill her prescription for Cymbalta for her neuropathy, and she should continue this at 60 mg daily.  We also discussed her weight gain, and an option to have a referral to our weight management clinic.  After discussion, she would like to try improving her activity level and incorporate daily exercise/walking in addition to monitoring her diet.  So she will plan to work on this.  We will plan a return visit in 1 year or earlier if needed.  Francesca is in agreement with this plan.    1. Therapy/equipment/braces:  1. Incorporate daily exercise regimen including walking 10 to 15 minutes/day.  2. Continue to monitor intake in diet.  2. Medications:  1. Continue Cymbalta 60 mg daily for neuropathy, symptoms improved since last visit.  3. Follow up: 1 year or earlier as  needed.      Rashmi London MD  Physical Medicine & Rehabilitation      50 minutes spent on the date of the encounter doing chart review, history and exam, documentation and further activities as noted above.

## 2023-05-02 NOTE — PATIENT INSTRUCTIONS
1.  Incorporate a regular home exercise regimen as tolerated including daily walking.  2.  Continue to monitor your diet to help with weight loss.  3.  Cymbalta was refilled at today's visit.  Continue Cymbalta 60 mg daily for your neuropathic symptoms.  4.  Follow-up with Dr. London in 1 year for virtual visit or earlier if needed.

## 2023-05-02 NOTE — LETTER
5/2/2023         RE: Francesca Rowland  [address]  [place] [address]        Dear Colleague,    Thank you for referring your patient, Francesca Rowland, to the [facility]. Please see a copy of my visit note below.    ERRONEOUS    Virtual Visit Details    Type of service:  Video Visit     Originating Location (pt. Location): Home  Distant Location (provider location):  On-site  Platform used for Video Visit: [facility]   PM&R clinic note        Interval history:     Francesca Rowland presents to clinic today for follow up reg her rehab needs.   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  Was last seen in clinic on 8/31/22  Recommendations included:  1.           Therapy/equipment/braces:  3. Continue to remain active and incorporate a regular home exercise  4. Can reconsider outpatient PT in the future if needed.  2.           Medications:  3. Continue Cymbalta 60 mg daily. Prescription refilled at today's visit.    Oncology History:  - Initially presented due to Pap smear showing high-grade squamous intraepithelial lesion positive for high risk HPV subtype in 8/20017.  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   - Underwent LEEP procedure on 10/15/2007 which demonstrated microinvasive adenocarcinoma.  - Proceeded with radical hysterectomy, bilateral salpingectomy, bilateral pelvic and periaortic lymph node dissection on 10/19/2007. Pathology revealed Stage IA2 adenocarcinoma of the cervix.  - Underwent routine surveillance through 2014.  - Presented to the ED at [facility] on 9/14/2019 with severe RUQ pain which imaging revealed cholelithiasis without cholecystitis, Abdominal/Pelvic CT revealed enlarged pericaval lymph node. CT guided biopsy was consistent with metastatic cervical carcinoma.  - Underwent surgery  to remove mass.  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  - 3/15/2021- Underwent craniotomy and resection of cerebellar mass.  Fatigue and neuropathy.  - 4/16/2021- Completed 5 fx of gamma knife  - 4/28/2021- 6/30/2021- Cycles 1-4 of paclitaxel/cisplatin/avastin.  - 7/21/2021: Cycle #5 Paclitaxel/Cisplatin/Avastin   - 8/18/21- Saw Terra Zelaya in clinic. Peripheral neuropathy improved with Cymbalta.  - 8/23/21- Saw Dr. Canela in clinic. Improvement in peripheral neuropathy symptoms with Cymbalta. No residual disease on PET or Brain MRI, but remains at risk for recurrence.   - 11/29/21- Saw Dr. Cardozo in clinic for follow up. Patient continues to think cymbalta is helping for neuropathic symptoms. Has neuropathy in her feet once in a while, minimal in hands. Will be starting PT. No residual disease on PET or brain MRI, remains at risk for recurrence. Patient discussed options, and decided on observation. Likely a candidate for Keytruda if she recurs.      Symptoms,  Patient was seen for a return virtual visit today.  Since her last visit, overall she has been doing very well.  She continues to work and has had no issues or concerns with work.  She has also had no concerns with her mobility, and denies any falls or near falls.  She continues with her neuropathy symptoms, though she states that these have also significantly improved since her last visit.  She only has occasional neuropathic pain that occurs mainly overnight.  She continues on Cymbalta 60 mg daily and feels that this dose is working well.  She is in need of a refill of this medication today.  She had recent CT of the chest abdomen and pelvis done with no evidence of recurrent or metastatic disease.  She also had an MRI of her brain done in April which revealed stable surgical changes of her cerebellar mass resection and no evidence of local recurrence or new intracranial  metastatic disease.  She has gained some weight, and is roughly about 265 pounds now since her last visit.  She states that she has remained relatively inactive during the winter months, and hopes to increase her activity level during the summer months now that the weather is better.      Therapies/HEP,  Not currently in outpatient therapy.      Functionally,   No changes since her last visit.      Social history is unchanged.      Medications:  Current Outpatient Medications   Medication Sig Dispense Refill     acetaminophen (TYLENOL) 325 MG tablet Take 2 tablets (650 mg) by mouth every 6 hours 24 tablet 0     Ascorbic Acid (VITAMIN C GUMMIE PO) Take by mouth daily       dexamethasone (DECADRON) 4 MG tablet Take 2 tablets (8 mg) by mouth daily (with breakfast) Take 2 tablets by mouth with food on days 2-4 after chemotherapy. 12 tablet 3     DULoxetine (CYMBALTA) 30 MG capsule Take 1 capsule (30 mg) by mouth 2 times daily 60 capsule 2     famotidine (PEPCID) 10 MG tablet Take 1 tablet (10 mg) by mouth 2 times daily 60 tablet 1     gabapentin (NEURONTIN) 300 MG capsule Take 1 capsule (300 mg) by mouth At Bedtime 30 capsule 2     ibuprofen (ADVIL/MOTRIN) 200 MG tablet Take 200 mg by mouth every 4 hours as needed for mild pain       lidocaine-prilocaine (EMLA) 2.5-2.5 % external cream Apply topically as needed for moderate pain 30 g 1     loperamide (IMODIUM A-D) 2 MG tablet Take 1 tablet (2 mg) by mouth 4 times daily as needed for diarrhea Can take one 2 mg capsule after each loose stool up to 4 times a day as needed for diarrhea. This can be increased to 4 mg 4 times a day or 2 mg up to 8 times a day. 60 tablet 3     LORazepam (ATIVAN) 0.5 MG tablet Take 1 tablet (0.5 mg) by mouth every 6 hours as needed for nausea 20 tablet 0     multivitamin w/minerals (THERA-VIT-M) tablet Take 1 tablet by mouth daily       OLANZapine (ZYPREXA) 5 MG tablet Take 1 tablet (5 mg) by mouth At Bedtime 30 tablet 0     SENNA-docusate  sodium (SENNA S) 8.6-50 MG tablet Take 1 tablet by mouth At Bedtime 30 tablet 3              Physical Exam:   There were no vitals taken for this visit.  Gen: NAD, pleasant and cooperative   HEENT: Atraumatic, normocephalic, extraocular movements appear intact  Pulm: non-labored breathing in room air  Neuro/MSK:   Orientation: Oriented to person, time, place and situation, Exhibits good insight into her condition and ongoing treatment  Motor: Observed moving upper extremities actively against gravity    Labs/Imaging:  Lab Results   Component Value Date    WBC 3.6 (L) 08/18/2021    HGB 11.8 08/18/2021    HCT 36.5 08/18/2021    MCV 98 08/18/2021     08/18/2021     Lab Results   Component Value Date     08/18/2021    POTASSIUM 4.1 08/18/2021    CHLORIDE 108 08/18/2021    CO2 28 08/18/2021    GLC 84 06/17/2022     Lab Results   Component Value Date    GFRESTIMATED 79 08/18/2021    GFRESTBLACK >90 06/28/2021     Lab Results   Component Value Date    AST 17 08/18/2021    ALT 22 08/18/2021    ALKPHOS 94 08/18/2021    BILITOTAL 0.4 08/18/2021    BILICONJ 0.0 09/27/2007     Lab Results   Component Value Date    INR 0.97 05/05/2021     Lab Results   Component Value Date    BUN 17 08/18/2021    CR 0.90 08/18/2021              Assessment/Plan   Francesca Rowland presents to clinic today for follow up reg her rehab needs.   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 Was last seen in clinic on 8/31/22.  As discussed with Francesca, overall she is doing very well from a rehabilitation perspective since her last visit.  We will refill her prescription for Cymbalta for her neuropathy, and she should continue this at 60 mg daily.  We also discussed her weight gain, and an option to have a referral to our weight management clinic.  After discussion, she would like to try improving her activity level and incorporate daily exercise/walking in addition to  monitoring her diet.  So she will plan to work on this.  We will plan a return visit in 1 year or earlier if needed.  Francesca is in agreement with this plan.    1. Therapy/equipment/braces:  1. Incorporate daily exercise regimen including walking 10 to 15 minutes/day.  2. Continue to monitor intake in diet.  2. Medications:  1. Continue Cymbalta 60 mg daily for neuropathy, symptoms improved since last visit.  3. Follow up: 1 year or earlier as needed.      Rashmi London MD  Physical Medicine & Rehabilitation      50 minutes spent on the date of the encounter doing chart review, history and exam, documentation and further activities as noted above.              Again, thank you for allowing me to participate in the care of your patient.        Sincerely,        Rashmi London MD

## 2023-05-02 NOTE — NURSING NOTE
Is the patient currently in the state of [place]? YES    Visit mode:VIDEO    If the visit is dropped, the patient can be reconnected by: VIDEO VISIT: Text to cell phone: [phone]    Will anyone else be joining the visit? NO      How would you like to obtain your AVS? MyChart    Are changes needed to the allergy or medication list? NO    Reason for visit: Video Visit and Follow Up    Valentina VERDUGO

## 2023-05-02 NOTE — LETTER
5/2/2023         RE: Francesca Rowland  [address]  [place] [address]        Dear Colleague,    Thank you for referring your patient, Francesca Rowland, to the [facility]. Please see a copy of my visit note below.    ERRONEOUS    Virtual Visit Details    Type of service:  Video Visit     Originating Location (pt. Location): Home  Distant Location (provider location):  On-site  Platform used for Video Visit: [facility]   PM&R clinic note        Interval history:     Francesca Rowland presents to clinic today for follow up reg her rehab needs.   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  Was last seen in clinic on 8/31/22  Recommendations included:  1.           Therapy/equipment/braces:  3. Continue to remain active and incorporate a regular home exercise  4. Can reconsider outpatient PT in the future if needed.  2.           Medications:  3. Continue Cymbalta 60 mg daily. Prescription refilled at today's visit.    Oncology History:  - Initially presented due to Pap smear showing high-grade squamous intraepithelial lesion positive for high risk HPV subtype in 8/20017.  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   - Underwent LEEP procedure on 10/15/2007 which demonstrated microinvasive adenocarcinoma.  - Proceeded with radical hysterectomy, bilateral salpingectomy, bilateral pelvic and periaortic lymph node dissection on 10/19/2007. Pathology revealed Stage IA2 adenocarcinoma of the cervix.  - Underwent routine surveillance through 2014.  - Presented to the ED at [facility] on 9/14/2019 with severe RUQ pain which imaging revealed cholelithiasis without cholecystitis, Abdominal/Pelvic CT revealed enlarged pericaval lymph node. CT guided biopsy was consistent with metastatic cervical carcinoma.  - Underwent surgery  to remove mass.  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  - 3/15/2021- Underwent craniotomy and resection of cerebellar mass.  Fatigue and neuropathy.  - 4/16/2021- Completed 5 fx of gamma knife  - 4/28/2021- 6/30/2021- Cycles 1-4 of paclitaxel/cisplatin/avastin.  - 7/21/2021: Cycle #5 Paclitaxel/Cisplatin/Avastin   - 8/18/21- Saw Terra Zelaya in clinic. Peripheral neuropathy improved with Cymbalta.  - 8/23/21- Saw Dr. Canela in clinic. Improvement in peripheral neuropathy symptoms with Cymbalta. No residual disease on PET or Brain MRI, but remains at risk for recurrence.   - 11/29/21- Saw Dr. Cardozo in clinic for follow up. Patient continues to think cymbalta is helping for neuropathic symptoms. Has neuropathy in her feet once in a while, minimal in hands. Will be starting PT. No residual disease on PET or brain MRI, remains at risk for recurrence. Patient discussed options, and decided on observation. Likely a candidate for Keytruda if she recurs.      Symptoms,  Patient was seen for a return virtual visit today.  Since her last visit, overall she has been doing very well.  She continues to work and has had no issues or concerns with work.  She has also had no concerns with her mobility, and denies any falls or near falls.  She continues with her neuropathy symptoms, though she states that these have also significantly improved since her last visit.  She only has occasional neuropathic pain that occurs mainly overnight.  She continues on Cymbalta 60 mg daily and feels that this dose is working well.  She is in need of a refill of this medication today.  She had recent CT of the chest abdomen and pelvis done with no evidence of recurrent or metastatic disease.  She also had an MRI of her brain done in April which revealed stable surgical changes of her cerebellar mass resection and no evidence of local recurrence or new intracranial  metastatic disease.  She has gained some weight, and is roughly about 265 pounds now since her last visit.  She states that she has remained relatively inactive during the winter months, and hopes to increase her activity level during the summer months now that the weather is better.      Therapies/HEP,  Not currently in outpatient therapy.      Functionally,   No changes since her last visit.      Social history is unchanged.      Medications:  Current Outpatient Medications   Medication Sig Dispense Refill     acetaminophen (TYLENOL) 325 MG tablet Take 2 tablets (650 mg) by mouth every 6 hours 24 tablet 0     Ascorbic Acid (VITAMIN C GUMMIE PO) Take by mouth daily       dexamethasone (DECADRON) 4 MG tablet Take 2 tablets (8 mg) by mouth daily (with breakfast) Take 2 tablets by mouth with food on days 2-4 after chemotherapy. 12 tablet 3     DULoxetine (CYMBALTA) 30 MG capsule Take 1 capsule (30 mg) by mouth 2 times daily 60 capsule 2     famotidine (PEPCID) 10 MG tablet Take 1 tablet (10 mg) by mouth 2 times daily 60 tablet 1     gabapentin (NEURONTIN) 300 MG capsule Take 1 capsule (300 mg) by mouth At Bedtime 30 capsule 2     ibuprofen (ADVIL/MOTRIN) 200 MG tablet Take 200 mg by mouth every 4 hours as needed for mild pain       lidocaine-prilocaine (EMLA) 2.5-2.5 % external cream Apply topically as needed for moderate pain 30 g 1     loperamide (IMODIUM A-D) 2 MG tablet Take 1 tablet (2 mg) by mouth 4 times daily as needed for diarrhea Can take one 2 mg capsule after each loose stool up to 4 times a day as needed for diarrhea. This can be increased to 4 mg 4 times a day or 2 mg up to 8 times a day. 60 tablet 3     LORazepam (ATIVAN) 0.5 MG tablet Take 1 tablet (0.5 mg) by mouth every 6 hours as needed for nausea 20 tablet 0     multivitamin w/minerals (THERA-VIT-M) tablet Take 1 tablet by mouth daily       OLANZapine (ZYPREXA) 5 MG tablet Take 1 tablet (5 mg) by mouth At Bedtime 30 tablet 0     SENNA-docusate  sodium (SENNA S) 8.6-50 MG tablet Take 1 tablet by mouth At Bedtime 30 tablet 3              Physical Exam:   There were no vitals taken for this visit.  Gen: NAD, pleasant and cooperative   HEENT: Atraumatic, normocephalic, extraocular movements appear intact  Pulm: non-labored breathing in room air  Neuro/MSK:   Orientation: Oriented to person, time, place and situation, Exhibits good insight into her condition and ongoing treatment  Motor: Observed moving upper extremities actively against gravity    Labs/Imaging:  Lab Results   Component Value Date    WBC 3.6 (L) 08/18/2021    HGB 11.8 08/18/2021    HCT 36.5 08/18/2021    MCV 98 08/18/2021     08/18/2021     Lab Results   Component Value Date     08/18/2021    POTASSIUM 4.1 08/18/2021    CHLORIDE 108 08/18/2021    CO2 28 08/18/2021    GLC 84 06/17/2022     Lab Results   Component Value Date    GFRESTIMATED 79 08/18/2021    GFRESTBLACK >90 06/28/2021     Lab Results   Component Value Date    AST 17 08/18/2021    ALT 22 08/18/2021    ALKPHOS 94 08/18/2021    BILITOTAL 0.4 08/18/2021    BILICONJ 0.0 09/27/2007     Lab Results   Component Value Date    INR 0.97 05/05/2021     Lab Results   Component Value Date    BUN 17 08/18/2021    CR 0.90 08/18/2021              Assessment/Plan   Francesca Rowland presents to clinic today for follow up reg her rehab needs.   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 Was last seen in clinic on 8/31/22.  As discussed with Francesca, overall she is doing very well from a rehabilitation perspective since her last visit.  We will refill her prescription for Cymbalta for her neuropathy, and she should continue this at 60 mg daily.  We also discussed her weight gain, and an option to have a referral to our weight management clinic.  After discussion, she would like to try improving her activity level and incorporate daily exercise/walking in addition to  monitoring her diet.  So she will plan to work on this.  We will plan a return visit in 1 year or earlier if needed.  Francesca is in agreement with this plan.    1. Therapy/equipment/braces:  1. Incorporate daily exercise regimen including walking 10 to 15 minutes/day.  2. Continue to monitor intake in diet.  2. Medications:  1. Continue Cymbalta 60 mg daily for neuropathy, symptoms improved since last visit.  3. Follow up: 1 year or earlier as needed.      Rashmi London MD  Physical Medicine & Rehabilitation      50 minutes spent on the date of the encounter doing chart review, history and exam, documentation and further activities as noted above.              Again, thank you for allowing me to participate in the care of your patient.        Sincerely,        Rashmi London MD

## 2023-05-11 ENCOUNTER — ONCOLOGY VISIT (OUTPATIENT)
Dept: ONCOLOGY | Facility: CLINIC | Age: 43
End: 2023-05-11
Attending: OBSTETRICS & GYNECOLOGY
Payer: COMMERCIAL

## 2023-05-11 VITALS
HEART RATE: 89 BPM | HEIGHT: 65 IN | OXYGEN SATURATION: 96 % | WEIGHT: 269 LBS | BODY MASS INDEX: 44.82 KG/M2 | SYSTOLIC BLOOD PRESSURE: 129 MMHG | DIASTOLIC BLOOD PRESSURE: 85 MMHG

## 2023-05-11 DIAGNOSIS — C53.9 MALIGNANT NEOPLASM OF CERVIX, UNSPECIFIED SITE (H): ICD-10-CM

## 2023-05-11 PROCEDURE — 99214 OFFICE O/P EST MOD 30 MIN: CPT | Performed by: OBSTETRICS & GYNECOLOGY

## 2023-05-11 ASSESSMENT — PAIN SCALES - GENERAL: PAINLEVEL: NO PAIN (0)

## 2023-05-11 NOTE — LETTER
2023         RE: Francesca Rowland  [address]  [place] [address]        Dear Colleague,    Thank you for referring your patient, Francesca Rowland, to the [facility]. Please see a copy of my visit note below.                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 She is here today for follow up and disease management.       Oncology History:      Patient initially presented as a 27-year-old woman seen in referral due to a Pap smear showing high-grade squamous intraepithelial lesion that was positive for high risk HPV subtype in 2007. This Pap smear had been taken during her six week postpartum visit. Patient did have a remote history of abnormal Pap smears back in  while serving in [place] for the [employer].  Pap smears during her first pregnancy were all negative. Her Pap smear at the beginning of the most recent pregnancy was normal. Patient did have a colposcopy for evaluation of this abnormal Pap smear on 2007 and cervical biopsies of 6 and 12 o'clock position showed features consistent with papillary serous adenocarcinoma. Endocervical curettings were also taken which showed fragments of papillary serous carcinoma. The patient then underwent a LEEP procedure on 10/15/07 which showed microinvasive adenocarcinoma with a depth of invasion of 0.4 mm. Patient then made the decision to proceed with radical hysterectomy. She subsequently underwent a radical hysterectomy, bilateral salpingectomy, bilateral pelvic and periaortic lymph node dissection on 10/19/07. Patient's operative course was otherwise uncomplicated and she recoverred uneventfully.  Pathology did reveal patient to be stage IA2 adenocarcinoma of the cervix.       She underwent routine surveillence through 2014 complicated only by SIOBHAN 1      She presented to the ED at [facility] on 9/14/2019 with severe 8.5/10 RUQ pain associated with nausea, one episode of emesis, and decreased appetite. RUQ demonstrated cholelithiasis without cholecystitis, so an abdominal and pelvic CT scan was obtained. This revealed post-surgical changes consistent with prior hysterectomy, left adnexal cysts thought to be ovarian, and an enlarged pericaval lymph node suspicious for metastatic disease vs primary lymphoma. Her condition stabilized and she was discharged home with referrals to general surgery and oncology for biliary colic and further management of the lymphadenopathy, respectively. CT-guided right retroperitoneal lymph node biopsy on 10/15/2019 was consistent with metastatic cervical carcinoma     She underwent surgery to remove the mass and determine the extent of disease on 11/13/19     PATH:  FINAL DIAGNOSIS:   LYMPH NODES, PARA-AORTIC, RESECTION:   - Positive for carcinoma in three of four lymph nodes examined (3/4),   consistent with recurrent endocervical   adenocarcinoma   - Largest metastatic focus is 3.5 cm, positive for extra yuridia extension         Chemo-Potentiated RT 12/2019-1/2020 2/2020 CT:  IMPRESSION: Postoperative changes of hysterectomy and lymph node  resection for cervical cancer. The metastatic right pelvic lymph nodes  which were previously identified have decreased in size. No new  metastases identified.      She recently presented to the ED for an acute RUQ pain episode, which has greatly improved despite no clear diagnosis.  During this visit she underwent a CAP CT which did not demonstrate evidence of cancer but a slightly enlarged spleen. She had a mild eosinophilia (~2%) but has not traveled or eaten exotic food recently, and has not had any new contacts to suggest mono.      10/13/2020 CT:  IMPRESSION:  1.  Recurrent right external iliac  lymphadenopathy.  2.  Rebound thymic hyperplasia.     10/30/2020 PET  IMPRESSION:   In this patient with history of metastatic cervical cancer status-post  radical hysterectomy and chemoradiation, there is evidence of  recurrent disease:  1. Increased size of a hypermetabolic right external iliac chain lymph node since 2/27/2020.  2. Hypermetabolic right subpectoral and axillary lymph nodes.  Recommend follow-up in the breast center for axillary ultrasound and  possible tissue sampling.  3. Idiopathic thymic activation/hyperplasia.     She was evaluated in the breast clinic with plan made for biopsy - however at biopsy the node had shruck considerably and was thus felt to be c/w inflammation (no biopsy obtained)     3/8/2021 MRI Brain (for dizziness)  IMPRESSION:  1. Heterogeneous mixed solid and cystic 2.7 cm mass in the right cerebellum, most likely a solitary metastatic lesion. There is moderate surrounding vasogenic edema as well as mass effect on the fourth ventricle. No obstructive hydrocephalus.    2. Unremarkable MR angiogram of the head and neck.     3/15/2021 Craniotomy ([facility])  1. Midline suboccipital craniotomy for resection of mass  2. Right frontal ventriculostomy  3. Stereotactic navigation  4. Use of operating microscope     4/16/21 Completed 5 fx of gamma knife ([employer])     Plan: Paclitaxel, Cisplatin, Avastin     4/28/2021: Cycle #1 Paclitaxel, Cisplatin, Avastin  5/19/2021: Cycle #2 Paclitaxel/Cisplatin/Avastin +Neulasta  6/9/2021: Cycle #3 Paclitaxel/Cisplatin/Avastin +Neulasta  6/30/2021: Cycle #4 Paclitaxel/Cisplatin/Avastin +Neulasta    6/28/2021 plan: PET scan for interval assessment.  Will plan for 6 cycles followed by possible use of Keytruda versus observation.     7/15/2021: PET CT: IMPRESSION: In this patient with history of cervical cancer who  completed chemoradiation:  1. Resolved uptake to the right pelvic lymph node, consistent with  response to treatment.  2. No new  hypermetabolic lesions.  3. No definite abnormal intracranial uptake. Please note that PET-CT  is less sensitive for intracranial disease due to normal uptake to the  brain. Consider continued following of the previously seen lesions and  surgical changes with MRI if clinically indicated.      7/21/2021: Cycle #5 Paclitaxel/Cisplatin/Avastin +Neulasta  8/11/2021: Cycle #6 Taxotere/Cisplatin/Avastin + Neulasta (Change to Taxotere due to neuropathy) deferred due to insurance  8/18/2021: Cycle #6 Paclitaxel/Cisplatin/Avastin (dose reduced Taxol to 135 mg/m2 due to insurance coverage with Taxotere and patient in agreement with plan- neuropathy mild and improved) +Neulasta    8/21/2021 MRI (HEAD):  Impression: In this patient with history of metastatic carcinoma to  the right cerebellum:  There is no definite evidence of disease recurrence. Decreased  resection cavity enhancement. Previous right occipital lobe  enhancement is completely resolved. No new enhancing intracranial  lesions.    12/6/21 MRI brain - DIANNE  12/8/21 PET - negative for focal uptake    6/17/22 MRI (BRAIN)  IMPRESSION: In this patient with history of metastatic cervical cancer  to the cerebellum, status post resection and chemoradiation, there is  no evidence of recurrence:  Stable postsurgical changes of right cerebellar mass resection without  evidence of local recurrence. No new enhancing intracranial lesions.    PET  1. No abnormal FDG uptake within the body and neck.     2. Postsurgical changes of total abdominal hysterectomy, bilateral  salpingo-oophorectomy and periaortic and pelvic lymph node dissection  without evidence of disease recurrence.    4/28/23 CT (CAP): IMPRESSION:   1.  No evidence of recurrent or metastatic disease.     MRI (brain): IMPRESSION: Stable surgical changes of cerebellar mass resection. No   evidence of local recurrence or new intracranial metastatic disease.             Past Medical History:    Past Medical History:    Diagnosis Date     Chronic cholecystitis 09/25/2019     History of cervical cancer 2007     Metastasis to brain (H) 03/08/2021     Metastatic adenocarcinoma to cervix (H) 10/28/2019    Added automatically from request for surgery [id number]         Past Surgical History:    Past Surgical History:   Procedure Laterality Date     CHOLECYSTECTOMY, LAPOROSCOPIC  09/25/2019     HYSTERECTOMY RADICAL  2007    PAUL     INSERT PORT VASCULAR ACCESS Right 5/5/2021    Procedure: INSERTION, VASCULAR ACCESS PORT;  Surgeon: Oral Toledo MD;  Location: [facility] OR     IR CHEST PORT PLACEMENT > 5 YRS OF AGE  5/5/2021     LAPAROSCOPIC LYMPHADENECTOMY N/A 11/13/2019    Procedure: LAPAROSCOPY, resection of of paraaortic lymph node, lysis of adhesions;  Surgeon: Jluis Canela MD;  Location: [facility] OR     OPTICAL TRACKING SYSTEM CRANIOTOMY, EXCISE TUMOR, COMBINED Bilateral 3/15/2021    Procedure: stealth assisted Midline suboccipital craniectomy/craniotomy for tumor;  Surgeon: Martín Myrick MD;  Location: [facility] OR     OPTICAL TRACKING SYSTEM VENTRICULOSTOMY Right 3/15/2021    Procedure: stealth assisted  right frontal ventriculostomy;  Surgeon: Martín Myrick MD;  Location: [facility] OR         Health Maintenance Due   Topic Date Due     YEARLY PREVENTIVE VISIT  Never done     ADVANCE CARE PLANNING  Never done     HEPATITIS B IMMUNIZATION (1 of 3 - 3-dose series) Never done     Pneumococcal Vaccine: Pediatrics (0 to 5 Years) and At-Risk Patients (6 to 64 Years) (1 - PCV) Never done     HIV SCREENING  Never done     HEPATITIS C SCREENING  Never done     MAMMO SCREENING  11/09/2021     COVID-19 Vaccine (3 - Booster for Sia series) 02/15/2022     INFLUENZA VACCINE (1) 09/01/2022     PAP  11/29/2022     PHQ-2 (once per calendar year)  01/01/2023       Current Medications:     Current Outpatient Medications   Medication Sig Dispense Refill     acetaminophen (TYLENOL) 325 MG tablet Take 2 tablets (650 mg) by mouth  "every 6 hours 24 tablet 0     Ascorbic Acid (VITAMIN C GUMMIE PO) Take by mouth daily       dexamethasone (DECADRON) 4 MG tablet Take 2 tablets (8 mg) by mouth daily (with breakfast) Take 2 tablets by mouth with food on days 2-4 after chemotherapy. (Patient not taking: Reported on 5/2/2023) 12 tablet 3     DULoxetine (CYMBALTA) 30 MG capsule Take 1 capsule (30 mg) by mouth 2 times daily 60 capsule 2     famotidine (PEPCID) 10 MG tablet Take 1 tablet (10 mg) by mouth 2 times daily (Patient not taking: Reported on 5/2/2023) 60 tablet 1     gabapentin (NEURONTIN) 300 MG capsule Take 1 capsule (300 mg) by mouth At Bedtime (Patient not taking: Reported on 5/2/2023) 30 capsule 2     ibuprofen (ADVIL/MOTRIN) 200 MG tablet Take 200 mg by mouth every 4 hours as needed for mild pain       lidocaine-prilocaine (EMLA) 2.5-2.5 % external cream Apply topically as needed for moderate pain (Patient not taking: Reported on 5/2/2023) 30 g 1     loperamide (IMODIUM A-D) 2 MG tablet Take 1 tablet (2 mg) by mouth 4 times daily as needed for diarrhea Can take one 2 mg capsule after each loose stool up to 4 times a day as needed for diarrhea. This can be increased to 4 mg 4 times a day or 2 mg up to 8 times a day. (Patient not taking: Reported on 5/2/2023) 60 tablet 3     LORazepam (ATIVAN) 0.5 MG tablet Take 1 tablet (0.5 mg) by mouth every 6 hours as needed for nausea (Patient not taking: Reported on 5/2/2023) 20 tablet 0     multivitamin w/minerals (THERA-VIT-M) tablet Take 1 tablet by mouth daily       OLANZapine (ZYPREXA) 5 MG tablet Take 1 tablet (5 mg) by mouth At Bedtime (Patient not taking: Reported on 5/2/2023) 30 tablet 0     SENNA-docusate sodium (SENNA S) 8.6-50 MG tablet Take 1 tablet by mouth At Bedtime (Patient not taking: Reported on 5/2/2023) 30 tablet 3         Allergies:        Allergies   Allergen Reactions     Emend [Aprepitant]      Prochlorperazine      \"i was told I turn blue\"        Social History:     Social " "History     Tobacco Use     Smoking status: Former     Types: Cigarettes     Smokeless tobacco: Never   Vaping Use     Vaping status: Not on file   Substance Use Topics     Alcohol use: Yes     Comment: one drink a week       History   Drug Use No         Family History:     The patient's family history is notable for     Family History   Problem Relation Age of Onset     Aneurysm Mother      Breast Cancer Paternal Grandmother         bilat mastectomies     Breast Cancer Maternal Aunt         stage 1     Thyroid Cancer Sister 23         Physical Exam:     PS 0  VS: /85 (BP Location: Right arm, Patient Position: Chair, Cuff Size: Adult Large)   Pulse 89   Ht 1.651 m (5' 5\")   Wt 122 kg (269 lb)   SpO2 96%   BMI 44.76 kg/m       General Appearance: healthy and alert, no distress, overweight     HEENT: no thyromegaly, no palpable nodules or masses        Cardiovascular: regular rate and rhythm, no gallops, rubs or murmurs     Respiratory: lungs clear, no rales, rhonchi or wheezes    Musculoskeletal: extremities non tender and without edema    Skin: no lesions or rashes     Neurological: normal gait, no gross defects     Psychiatric: appropriate mood and affect                               Hematological: normal cervical, supraclavicular lymph nodes     Gastrointestinal:       abdomen soft, non-tender, non-distended, no organomegaly or masses, obese.    Genitourinary: Normal BUS, no lesions in vagina seen. BME no palpable masses or nodules.  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 She is here today for follow up and disease management.     30 minutes spent on the date of the encounter doing chart review, history and exam, documentation, and further activities as noted above.        Plan:     1.)         Rec. Cx Cancer with brain met: s/p primary therapy:  " She had discussed options of obeservation vs. maintenance therapy.  She has no residual disease on PET or brain MRI (12/2021, 6/2022) at this point, but obviously remains at risk for recurrence.  After discussing the pros and cons of each option - she decided on observation.  She will likely be a candidate for Keytruda should she recur.    -MRI of Brain and CT C/A/P with next visit     2.)  Genetic risk factors were assessed again and the patient has multiple family members with cancer diagnosed younger than age 50 including herself (age 40, but with likely HPV related disease) and her sister (thyroid cancer at age 23) among others. She met with genetics 1/2021, but did not proceed until 12/2021 (NEGATIVE)     3.) Labs and/or tests ordered include: none     4.)   Health maintenance issues addressed today include annual health maintenance and non-gynecologic issues with PCP.   Encouraged PCP follow up for knee pain but getting better.       Jluis Cnaela MD    Division of Gynecologic Oncology  [facility]    Of a 30 minute appointment, more than 50% was spent in counseling the patient.      CC  Patient Care Team:  Tyrese Bowie MD as PCP - General (Family Medicine)  Jluis Canela MD as MD (Gynecologic Oncology)  Jluis Canela MD as Assigned Cancer Care Provider  Rashmi London MD as Assigned Neuroscience Provider  Danielle Trivedi APRN CNP as Nurse Practitioner (Neurological Surgery)        Again, thank you for allowing me to participate in the care of your patient.        Sincerely,        Jluis Canela MD

## 2023-05-11 NOTE — PROGRESS NOTES
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 She is here today for follow up and disease management.       Oncology History:      Patient initially presented as a 27-year-old woman seen in referral due to a Pap smear showing high-grade squamous intraepithelial lesion that was positive for high risk HPV subtype in 2007. This Pap smear had been taken during her six week postpartum visit. Patient did have a remote history of abnormal Pap smears back in  while serving in [place] for the [employer].  Pap smears during her first pregnancy were all negative. Her Pap smear at the beginning of the most recent pregnancy was normal. Patient did have a colposcopy for evaluation of this abnormal Pap smear on 2007 and cervical biopsies of 6 and 12 o'clock position showed features consistent with papillary serous adenocarcinoma. Endocervical curettings were also taken which showed fragments of papillary serous carcinoma. The patient then underwent a LEEP procedure on 10/15/07 which showed microinvasive adenocarcinoma with a depth of invasion of 0.4 mm. Patient then made the decision to proceed with radical hysterectomy. She subsequently underwent a radical hysterectomy, bilateral salpingectomy, bilateral pelvic and periaortic lymph node dissection on 10/19/07. Patient's operative course was otherwise uncomplicated and she recoverred uneventfully.  Pathology did reveal patient to be stage IA2 adenocarcinoma of the cervix.      She underwent routine surveillence through  complicated only by SIOBHAN 1      She presented to the ED at [facility] on 2019 with severe 8.5/10 RUQ pain associated with nausea, one episode of emesis, and decreased appetite. RUQ demonstrated cholelithiasis  without cholecystitis, so an abdominal and pelvic CT scan was obtained. This revealed post-surgical changes consistent with prior hysterectomy, left adnexal cysts thought to be ovarian, and an enlarged pericaval lymph node suspicious for metastatic disease vs primary lymphoma. Her condition stabilized and she was discharged home with referrals to general surgery and oncology for biliary colic and further management of the lymphadenopathy, respectively. CT-guided right retroperitoneal lymph node biopsy on 10/15/2019 was consistent with metastatic cervical carcinoma     She underwent surgery to remove the mass and determine the extent of disease on 11/13/19     PATH:  FINAL DIAGNOSIS:   LYMPH NODES, PARA-AORTIC, RESECTION:   - Positive for carcinoma in three of four lymph nodes examined (3/4),   consistent with recurrent endocervical   adenocarcinoma   - Largest metastatic focus is 3.5 cm, positive for extra yuridia extension         Chemo-Potentiated RT 12/2019-1/2020 2/2020 CT:  IMPRESSION: Postoperative changes of hysterectomy and lymph node  resection for cervical cancer. The metastatic right pelvic lymph nodes  which were previously identified have decreased in size. No new  metastases identified.      She recently presented to the ED for an acute RUQ pain episode, which has greatly improved despite no clear diagnosis.  During this visit she underwent a CAP CT which did not demonstrate evidence of cancer but a slightly enlarged spleen. She had a mild eosinophilia (~2%) but has not traveled or eaten exotic food recently, and has not had any new contacts to suggest mono.      10/13/2020 CT:  IMPRESSION:  1.  Recurrent right external iliac lymphadenopathy.  2.  Rebound thymic hyperplasia.     10/30/2020 PET  IMPRESSION:   In this patient with history of metastatic cervical cancer status-post  radical hysterectomy and chemoradiation, there is evidence of  recurrent disease:  1. Increased size of a hypermetabolic  right external iliac chain lymph node since 2/27/2020.  2. Hypermetabolic right subpectoral and axillary lymph nodes.  Recommend follow-up in the breast center for axillary ultrasound and  possible tissue sampling.  3. Idiopathic thymic activation/hyperplasia.     She was evaluated in the breast clinic with plan made for biopsy - however at biopsy the node had shruck considerably and was thus felt to be c/w inflammation (no biopsy obtained)     3/8/2021 MRI Brain (for dizziness)  IMPRESSION:  1. Heterogeneous mixed solid and cystic 2.7 cm mass in the right cerebellum, most likely a solitary metastatic lesion. There is moderate surrounding vasogenic edema as well as mass effect on the fourth ventricle. No obstructive hydrocephalus.    2. Unremarkable MR angiogram of the head and neck.     [facility]  1. Midline suboccipital craniotomy for resection of mass  2. Right frontal ventriculostomy  3. Stereotactic navigation  4. Use of operating microscope     4/16/21 Completed 5 fx of gamma knife (Eniram)     Plan: Paclitaxel, Cisplatin, Avastin     4/28/2021: Cycle #1 Paclitaxel, Cisplatin, Avastin  5/19/2021: Cycle #2 Paclitaxel/Cisplatin/Avastin +Neulasta  6/9/2021: Cycle #3 Paclitaxel/Cisplatin/Avastin +Neulasta  6/30/2021: Cycle #4 Paclitaxel/Cisplatin/Avastin +Neulasta    6/28/2021 plan: PET scan for interval assessment.  Will plan for 6 cycles followed by possible use of Keytruda versus observation.     7/15/2021: PET CT: IMPRESSION: In this patient with history of cervical cancer who  completed chemoradiation:  1. Resolved uptake to the right pelvic lymph node, consistent with  response to treatment.  2. No new hypermetabolic lesions.  3. No definite abnormal intracranial uptake. Please note that PET-CT  is less sensitive for intracranial disease due to normal uptake to the  brain. Consider continued following of the previously seen lesions and  surgical changes with MRI if clinically  indicated.      7/21/2021: Cycle #5 Paclitaxel/Cisplatin/Avastin +Neulasta  8/11/2021: Cycle #6 Taxotere/Cisplatin/Avastin + Neulasta (Change to Taxotere due to neuropathy) deferred due to insurance  8/18/2021: Cycle #6 Paclitaxel/Cisplatin/Avastin (dose reduced Taxol to 135 mg/m2 due to insurance coverage with Taxotere and patient in agreement with plan- neuropathy mild and improved) +Neulasta    8/21/2021 MRI (HEAD):  Impression: In this patient with history of metastatic carcinoma to  the right cerebellum:  There is no definite evidence of disease recurrence. Decreased  resection cavity enhancement. Previous right occipital lobe  enhancement is completely resolved. No new enhancing intracranial  lesions.    12/6/21 MRI brain - DIANNE  12/8/21 PET - negative for focal uptake    6/17/22 MRI (BRAIN)  IMPRESSION: In this patient with history of metastatic cervical cancer  to the cerebellum, status post resection and chemoradiation, there is  no evidence of recurrence:  Stable postsurgical changes of right cerebellar mass resection without  evidence of local recurrence. No new enhancing intracranial lesions.    PET  1. No abnormal FDG uptake within the body and neck.     2. Postsurgical changes of total abdominal hysterectomy, bilateral  salpingo-oophorectomy and periaortic and pelvic lymph node dissection  without evidence of disease recurrence.    4/28/23 CT (CAP): IMPRESSION:   1.  No evidence of recurrent or metastatic disease.     MRI (brain): IMPRESSION: Stable surgical changes of cerebellar mass resection. No   evidence of local recurrence or new intracranial metastatic disease.             Past Medical History:    Past Medical History:   Diagnosis Date     Chronic cholecystitis 09/25/2019     History of cervical cancer 2007     Metastasis to brain (H) 03/08/2021     Metastatic adenocarcinoma to cervix (H) 10/28/2019    Added automatically from request for surgery [id number]         Past Surgical History:    Past  Surgical History:   Procedure Laterality Date     CHOLECYSTECTOMY, LAPOROSCOPIC  09/25/2019     HYSTERECTOMY RADICAL  2007    PAUL     INSERT PORT VASCULAR ACCESS Right 5/5/2021    Procedure: INSERTION, VASCULAR ACCESS PORT;  Surgeon: Oral Toledo MD;  Location: [facility] OR     IR CHEST PORT PLACEMENT > 5 YRS OF AGE  5/5/2021     LAPAROSCOPIC LYMPHADENECTOMY N/A 11/13/2019    Procedure: LAPAROSCOPY, resection of of paraaortic lymph node, lysis of adhesions;  Surgeon: Jluis Caneal MD;  Location: [facility] OR     OPTICAL TRACKING SYSTEM CRANIOTOMY, EXCISE TUMOR, COMBINED Bilateral 3/15/2021    Procedure: stealth assisted Midline suboccipital craniectomy/craniotomy for tumor;  Surgeon: Martín Myrick MD;  Location: [facility] OR     OPTICAL TRACKING SYSTEM VENTRICULOSTOMY Right 3/15/2021    Procedure: stealth assisted  right frontal ventriculostomy;  Surgeon: Martín Myrick MD;  Location: [facility] OR         Health Maintenance Due   Topic Date Due     YEARLY PREVENTIVE VISIT  Never done     ADVANCE CARE PLANNING  Never done     HEPATITIS B IMMUNIZATION (1 of 3 - 3-dose series) Never done     Pneumococcal Vaccine: Pediatrics (0 to 5 Years) and At-Risk Patients (6 to 64 Years) (1 - PCV) Never done     HIV SCREENING  Never done     HEPATITIS C SCREENING  Never done     MAMMO SCREENING  11/09/2021     COVID-19 Vaccine (3 - Booster for Sia series) 02/15/2022     INFLUENZA VACCINE (1) 09/01/2022     PAP  11/29/2022     PHQ-2 (once per calendar year)  01/01/2023       Current Medications:     Current Outpatient Medications   Medication Sig Dispense Refill     acetaminophen (TYLENOL) 325 MG tablet Take 2 tablets (650 mg) by mouth every 6 hours 24 tablet 0     Ascorbic Acid (VITAMIN C GUMMIE PO) Take by mouth daily       dexamethasone (DECADRON) 4 MG tablet Take 2 tablets (8 mg) by mouth daily (with breakfast) Take 2 tablets by mouth with food on days 2-4 after chemotherapy. (Patient not taking: Reported on  "5/2/2023) 12 tablet 3     DULoxetine (CYMBALTA) 30 MG capsule Take 1 capsule (30 mg) by mouth 2 times daily 60 capsule 2     famotidine (PEPCID) 10 MG tablet Take 1 tablet (10 mg) by mouth 2 times daily (Patient not taking: Reported on 5/2/2023) 60 tablet 1     gabapentin (NEURONTIN) 300 MG capsule Take 1 capsule (300 mg) by mouth At Bedtime (Patient not taking: Reported on 5/2/2023) 30 capsule 2     ibuprofen (ADVIL/MOTRIN) 200 MG tablet Take 200 mg by mouth every 4 hours as needed for mild pain       lidocaine-prilocaine (EMLA) 2.5-2.5 % external cream Apply topically as needed for moderate pain (Patient not taking: Reported on 5/2/2023) 30 g 1     loperamide (IMODIUM A-D) 2 MG tablet Take 1 tablet (2 mg) by mouth 4 times daily as needed for diarrhea Can take one 2 mg capsule after each loose stool up to 4 times a day as needed for diarrhea. This can be increased to 4 mg 4 times a day or 2 mg up to 8 times a day. (Patient not taking: Reported on 5/2/2023) 60 tablet 3     LORazepam (ATIVAN) 0.5 MG tablet Take 1 tablet (0.5 mg) by mouth every 6 hours as needed for nausea (Patient not taking: Reported on 5/2/2023) 20 tablet 0     multivitamin w/minerals (THERA-VIT-M) tablet Take 1 tablet by mouth daily       OLANZapine (ZYPREXA) 5 MG tablet Take 1 tablet (5 mg) by mouth At Bedtime (Patient not taking: Reported on 5/2/2023) 30 tablet 0     SENNA-docusate sodium (SENNA S) 8.6-50 MG tablet Take 1 tablet by mouth At Bedtime (Patient not taking: Reported on 5/2/2023) 30 tablet 3         Allergies:        Allergies   Allergen Reactions     Emend [Aprepitant]      Prochlorperazine      \"i was told I turn blue\"        Social History:     Social History     Tobacco Use     Smoking status: Former     Types: Cigarettes     Smokeless tobacco: Never   Vaping Use     Vaping status: Not on file   Substance Use Topics     Alcohol use: Yes     Comment: one drink a week       History   Drug Use No         Family History:     The " "patient's family history is notable for     Family History   Problem Relation Age of Onset     Aneurysm Mother      Breast Cancer Paternal Grandmother         bilat mastectomies     Breast Cancer Maternal Aunt         stage 1     Thyroid Cancer Sister 23         Physical Exam:     PS 0  VS: /85 (BP Location: Right arm, Patient Position: Chair, Cuff Size: Adult Large)   Pulse 89   Ht 1.651 m (5' 5\")   Wt 122 kg (269 lb)   SpO2 96%   BMI 44.76 kg/m       General Appearance: healthy and alert, no distress, overweight     HEENT: no thyromegaly, no palpable nodules or masses        Cardiovascular: regular rate and rhythm, no gallops, rubs or murmurs     Respiratory: lungs clear, no rales, rhonchi or wheezes    Musculoskeletal: extremities non tender and without edema    Skin: no lesions or rashes     Neurological: normal gait, no gross defects     Psychiatric: appropriate mood and affect                               Hematological: normal cervical, supraclavicular lymph nodes     Gastrointestinal:       abdomen soft, non-tender, non-distended, no organomegaly or masses, obese.    Genitourinary: Normal BUS, no lesions in vagina seen. BME no palpable masses or nodules.  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 She is here today for follow up and disease management.     30 minutes spent on the date of the encounter doing chart review, history and exam, documentation, and further activities as noted above.        Plan:     1.)         Rec. Cx Cancer with brain met: s/p primary therapy:  She had discussed options of obeservation vs. maintenance therapy.  She has no residual disease on PET or brain MRI (12/2021, 6/2022) at this point, but obviously remains at risk for recurrence.  After discussing the pros and cons of each option - she decided on observation.  She " will likely be a candidate for Keytruda should she recur.    -MRI of Brain and CT C/A/P with next visit     2.)  Genetic risk factors were assessed again and the patient has multiple family members with cancer diagnosed younger than age 50 including herself (age 40, but with likely HPV related disease) and her sister (thyroid cancer at age 23) among others. She met with genetics 1/2021, but did not proceed until 12/2021 (NEGATIVE)     3.) Labs and/or tests ordered include: none     4.)   Health maintenance issues addressed today include annual health maintenance and non-gynecologic issues with PCP.   Encouraged PCP follow up for knee pain but getting better.       Jluis Canela MD    Division of Gynecologic Oncology  [facility]    Of a 30 minute appointment, more than 50% was spent in counseling the patient.      CC  Patient Care Team:  Tyrese Bowie MD as PCP - General (Family Medicine)  Jluis Canela MD as MD (Gynecologic Oncology)  Jluis Canela MD as Assigned Cancer Care Provider  Rashmi London MD as Assigned Neuroscience Provider  Danielle Trivedi APRN CNP as Nurse Practitioner (Neurological Surgery)

## 2023-05-11 NOTE — NURSING NOTE
"Oncology Rooming Note    May 11, 2023 8:37 AM   Francesca Rowland is a 42 year old female who presents for:    Chief Complaint   Patient presents with     Oncology Clinic Visit     Follow up     Initial Vitals: /85 (BP Location: Right arm, Patient Position: Chair, Cuff Size: Adult Large)   Pulse 89   Ht 1.651 m (5' 5\")   Wt 122 kg (269 lb)   SpO2 96%   BMI 44.76 kg/m   Estimated body mass index is 44.76 kg/m  as calculated from the following:    Height as of this encounter: 1.651 m (5' 5\").    Weight as of this encounter: 122 kg (269 lb). Body surface area is 2.37 meters squared.  No Pain (0) Comment: Data Unavailable   No LMP recorded. Patient has had a hysterectomy.  Allergies reviewed: Yes  Medications reviewed: Yes    Medications: Medication refills not needed today.  Pharmacy name entered into [facility]: [id number] - [place], [place] - [address] [place] [address]    Clinical concerns: NO Dr. Canela was notified.      Guerda Smith CMA              "

## 2023-08-29 NOTE — PROGRESS NOTES
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 She is here today for follow up and disease management.       Oncology History:      Patient initially presented as a 27-year-old woman seen in referral due to a Pap smear showing high-grade squamous intraepithelial lesion that was positive for high risk HPV subtype in 2007. This Pap smear had been taken during her six week postpartum visit. Patient did have a remote history of abnormal Pap smears back in  while serving in [place] for the [email or web address].  Pap smears during her first pregnancy were all negative. Her Pap smear at the beginning of the most recent pregnancy was normal. Patient did have a colposcopy for evaluation of this abnormal Pap smear on 2007 and cervical biopsies of 6 and 12 o'clock position showed features consistent with papillary serous adenocarcinoma. Endocervical curettings were also taken which showed fragments of papillary serous carcinoma. The patient then underwent a LEEP procedure on 10/15/07 which showed microinvasive adenocarcinoma with a depth of invasion of 0.4 mm. Patient then made the decision to proceed with radical hysterectomy. She subsequently underwent a radical hysterectomy, bilateral salpingectomy, bilateral pelvic and periaortic lymph node dissection on 10/19/07. Patient's operative course was otherwise uncomplicated and she recoverred uneventfully.  Pathology did reveal patient to be stage IA2 adenocarcinoma of the cervix.      She underwent routine surveillence through  complicated only by SIOBHAN 1      She presented to the ED at [facility] on 2019 with severe 8.5/10 RUQ pain associated with nausea, one episode of emesis, and decreased appetite. RUQ demonstrated cholelithiasis  without cholecystitis, so an abdominal and pelvic CT scan was obtained. This revealed post-surgical changes consistent with prior hysterectomy, left adnexal cysts thought to be ovarian, and an enlarged pericaval lymph node suspicious for metastatic disease vs primary lymphoma. Her condition stabilized and she was discharged home with referrals to general surgery and oncology for biliary colic and further management of the lymphadenopathy, respectively. CT-guided right retroperitoneal lymph node biopsy on 10/15/2019 was consistent with metastatic cervical carcinoma     She underwent surgery to remove the mass and determine the extent of disease on 11/13/19     PATH:  FINAL DIAGNOSIS:   LYMPH NODES, PARA-AORTIC, RESECTION:   - Positive for carcinoma in three of four lymph nodes examined (3/4),   consistent with recurrent endocervical   adenocarcinoma   - Largest metastatic focus is 3.5 cm, positive for extra yuridia extension         Chemo-Potentiated RT 12/2019-1/2020 2/2020 CT:  IMPRESSION: Postoperative changes of hysterectomy and lymph node  resection for cervical cancer. The metastatic right pelvic lymph nodes  which were previously identified have decreased in size. No new  metastases identified.      She recently presented to the ED for an acute RUQ pain episode, which has greatly improved despite no clear diagnosis.  During this visit she underwent a CAP CT which did not demonstrate evidence of cancer but a slightly enlarged spleen. She had a mild eosinophilia (~2%) but has not traveled or eaten exotic food recently, and has not had any new contacts to suggest mono.      10/13/2020 CT:  IMPRESSION:  1.  Recurrent right external iliac lymphadenopathy.  2.  Rebound thymic hyperplasia.     10/30/2020 PET  IMPRESSION:   In this patient with history of metastatic cervical cancer status-post  radical hysterectomy and chemoradiation, there is evidence of  recurrent disease:  1. Increased size of a hypermetabolic  right external iliac chain lymph node since 2/27/2020.  2. Hypermetabolic right subpectoral and axillary lymph nodes.  Recommend follow-up in the breast center for axillary ultrasound and  possible tissue sampling.  3. Idiopathic thymic activation/hyperplasia.     She was evaluated in the breast clinic with plan made for biopsy - however at biopsy the node had shruck considerably and was thus felt to be c/w inflammation (no biopsy obtained)     3/8/2021 MRI Brain (for dizziness)  IMPRESSION:  1. Heterogeneous mixed solid and cystic 2.7 cm mass in the right cerebellum, most likely a solitary metastatic lesion. There is moderate surrounding vasogenic edema as well as mass effect on the fourth ventricle. No obstructive hydrocephalus.    2. Unremarkable MR angiogram of the head and neck.     [facility]  Midline suboccipital craniotomy for resection of mass  Right frontal ventriculostomy  Stereotactic navigation  Use of operating microscope     4/16/21 Completed 5 fx of gamma knife (Affymax)     Plan: Paclitaxel, Cisplatin, Avastin     4/28/2021: Cycle #1 Paclitaxel, Cisplatin, Avastin  5/19/2021: Cycle #2 Paclitaxel/Cisplatin/Avastin +Neulasta  6/9/2021: Cycle #3 Paclitaxel/Cisplatin/Avastin +Neulasta  6/30/2021: Cycle #4 Paclitaxel/Cisplatin/Avastin +Neulasta    6/28/2021 plan: PET scan for interval assessment.  Will plan for 6 cycles followed by possible use of Keytruda versus observation.     7/15/2021: PET CT: IMPRESSION: In this patient with history of cervical cancer who  completed chemoradiation:  1. Resolved uptake to the right pelvic lymph node, consistent with  response to treatment.  2. No new hypermetabolic lesions.  3. No definite abnormal intracranial uptake. Please note that PET-CT  is less sensitive for intracranial disease due to normal uptake to the  brain. Consider continued following of the previously seen lesions and  surgical changes with MRI if clinically  indicated.      7/21/2021: Cycle #5 Paclitaxel/Cisplatin/Avastin +Neulasta  8/11/2021: Cycle #6 Taxotere/Cisplatin/Avastin + Neulasta (Change to Taxotere due to neuropathy) deferred due to insurance  8/18/2021: Cycle #6 Paclitaxel/Cisplatin/Avastin (dose reduced Taxol to 135 mg/m2 due to insurance coverage with Taxotere and patient in agreement with plan- neuropathy mild and improved) +Neulasta    8/21/2021 MRI (HEAD):  Impression: In this patient with history of metastatic carcinoma to  the right cerebellum:  There is no definite evidence of disease recurrence. Decreased  resection cavity enhancement. Previous right occipital lobe  enhancement is completely resolved. No new enhancing intracranial  lesions.    12/6/21 MRI brain - DIANNE  12/8/21 PET - negative for focal uptake    6/17/22 MRI (BRAIN)  IMPRESSION: In this patient with history of metastatic cervical cancer  to the cerebellum, status post resection and chemoradiation, there is  no evidence of recurrence:  Stable postsurgical changes of right cerebellar mass resection without  evidence of local recurrence. No new enhancing intracranial lesions.    PET  1. No abnormal FDG uptake within the body and neck.     2. Postsurgical changes of total abdominal hysterectomy, bilateral  salpingo-oophorectomy and periaortic and pelvic lymph node dissection  without evidence of disease recurrence.    4/28/23 CT (CAP): IMPRESSION:   1.  No evidence of recurrent or metastatic disease.     MRI (brain): IMPRESSION: Stable surgical changes of cerebellar mass resection. No   evidence of local recurrence or new intracranial metastatic disease.             Past Medical History:    Past Medical History:   Diagnosis Date    Chronic cholecystitis 09/25/2019    History of cervical cancer 2007    Metastasis to brain (H) 03/08/2021    Metastatic adenocarcinoma to cervix (H) 10/28/2019    Added automatically from request for surgery [id number]         Past Surgical History:    Past  Surgical History:   Procedure Laterality Date    CHOLECYSTECTOMY, LAPOROSCOPIC  09/25/2019    HYSTERECTOMY RADICAL  2007    PAUL    INSERT PORT VASCULAR ACCESS Right 5/5/2021    Procedure: INSERTION, VASCULAR ACCESS PORT;  Surgeon: Oral Toledo MD;  Location: [facility] OR    IR CHEST PORT PLACEMENT > 5 YRS OF AGE  5/5/2021    LAPAROSCOPIC LYMPHADENECTOMY N/A 11/13/2019    Procedure: LAPAROSCOPY, resection of of paraaortic lymph node, lysis of adhesions;  Surgeon: Jluis Canela MD;  Location: [facility] OR    OPTICAL TRACKING SYSTEM CRANIOTOMY, EXCISE TUMOR, COMBINED Bilateral 3/15/2021    Procedure: stealth assisted Midline suboccipital craniectomy/craniotomy for tumor;  Surgeon: Martín Myrick MD;  Location: [facility] OR    OPTICAL TRACKING SYSTEM VENTRICULOSTOMY Right 3/15/2021    Procedure: stealth assisted  right frontal ventriculostomy;  Surgeon: Martín Myrick MD;  Location: [facility] OR         Health Maintenance Due   Topic Date Due    YEARLY PREVENTIVE VISIT  Never done    ADVANCE CARE PLANNING  Never done    HEPATITIS B IMMUNIZATION (1 of 3 - 3-dose series) Never done    Pneumococcal Vaccine: Pediatrics (0 to 5 Years) and At-Risk Patients (6 to 64 Years) (1 - PCV) Never done    HIV SCREENING  Never done    HEPATITIS C SCREENING  Never done    MAMMO SCREENING  11/09/2021    COVID-19 Vaccine (3 - Booster for Sia series) 02/15/2022    PAP  11/29/2022    PHQ-2 (once per calendar year)  01/01/2023       Current Medications:     Current Outpatient Medications   Medication Sig Dispense Refill    acetaminophen (TYLENOL) 325 MG tablet Take 2 tablets (650 mg) by mouth every 6 hours 24 tablet 0    Ascorbic Acid (VITAMIN C GUMMIE PO) Take by mouth daily      dexamethasone (DECADRON) 4 MG tablet Take 2 tablets (8 mg) by mouth daily (with breakfast) Take 2 tablets by mouth with food on days 2-4 after chemotherapy. 12 tablet 3    DULoxetine (CYMBALTA) 30 MG capsule Take 1 capsule (30 mg) by mouth 2 times  "daily 60 capsule 2    famotidine (PEPCID) 10 MG tablet Take 1 tablet (10 mg) by mouth 2 times daily 60 tablet 1    gabapentin (NEURONTIN) 300 MG capsule Take 1 capsule (300 mg) by mouth At Bedtime 30 capsule 2    ibuprofen (ADVIL/MOTRIN) 200 MG tablet Take 200 mg by mouth every 4 hours as needed for mild pain      lidocaine-prilocaine (EMLA) 2.5-2.5 % external cream Apply topically as needed for moderate pain 30 g 1    loperamide (IMODIUM A-D) 2 MG tablet Take 1 tablet (2 mg) by mouth 4 times daily as needed for diarrhea Can take one 2 mg capsule after each loose stool up to 4 times a day as needed for diarrhea. This can be increased to 4 mg 4 times a day or 2 mg up to 8 times a day. 60 tablet 3    LORazepam (ATIVAN) 0.5 MG tablet Take 1 tablet (0.5 mg) by mouth every 6 hours as needed for nausea 20 tablet 0    multivitamin w/minerals (THERA-VIT-M) tablet Take 1 tablet by mouth daily      OLANZapine (ZYPREXA) 5 MG tablet Take 1 tablet (5 mg) by mouth At Bedtime 30 tablet 0    SENNA-docusate sodium (SENNA S) 8.6-50 MG tablet Take 1 tablet by mouth At Bedtime 30 tablet 3         Allergies:        Allergies   Allergen Reactions    Emend [Aprepitant]     Prochlorperazine      \"i was told I turn blue\"        Social History:     Social History     Tobacco Use    Smoking status: Former     Types: Cigarettes    Smokeless tobacco: Never   Substance Use Topics    Alcohol use: Yes     Comment: one drink a week       History   Drug Use No         Family History:     The patient's family history is notable for     Family History   Problem Relation Age of Onset    Aneurysm Mother     Breast Cancer Paternal Grandmother         bilat mastectomies    Breast Cancer Maternal Aunt         stage 1    Thyroid Cancer Sister 23         Physical Exam:     PS 0  VS: /81 (BP Location: Right arm, Patient Position: Chair, Cuff Size: Adult Large)   Pulse 72   Ht 1.651 m (5' 5\")   Wt 117 kg (258 lb)   SpO2 96%   BMI 42.93 kg/m   "     General Appearance: healthy and alert, no distress, overweight     HEENT: no thyromegaly, no palpable nodules or masses        Cardiovascular: regular rate and rhythm, no gallops, rubs or murmurs     Respiratory: lungs clear, no rales, rhonchi or wheezes    Musculoskeletal: extremities non tender and without edema    Skin: no lesions or rashes     Neurological: normal gait, no gross defects     Psychiatric: appropriate mood and affect                               Hematological: normal cervical, supraclavicular lymph nodes     Gastrointestinal:       abdomen soft, non-tender, non-distended, no organomegaly or masses, obese.    Genitourinary: Normal BUS, no lesions in vagina seen. BME no palpable masses or nodules.  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 She is here today for follow up and disease management.     30 minutes spent on the date of the encounter doing chart review, history and exam, documentation, and further activities as noted above.        Plan:     1.)         Rec. Cx Cancer with brain met: s/p primary therapy:  She had discussed options of obeservation vs. maintenance therapy.  She has no residual disease on PET or brain MRI (12/2021, 6/2022, 4/23) at this point, but obviously remains at risk for recurrence.  After discussing the pros and cons of each option - she decided on observation.  She will likely be a candidate for Keytruda should she recur.    She has a f/unit(s) with neurosurgery panned and anticiaptes a brain scan at that time.  Otherwise will defer scanning for now.       2.)  Genetic risk factors were assessed again and the patient has multiple family members with cancer diagnosed younger than age 50 including herself (age 40, but with likely HPV related disease) and her sister (thyroid cancer at age 23) among others. She met  with genetics 1/2021, but did not proceed until 12/2021 (NEGATIVE)     3.) Labs and/or tests ordered include: none     4.)   Health maintenance issues addressed today include annual health maintenance and non-gynecologic issues with PCP.         Jluis Canela MD    Division of Gynecologic Oncology  [facility]    Of a 30 minute appointment, more than 50% was spent in counseling the patient.      CC  Patient Care Team:  Tyrese Bowie MD as PCP - General (Family Medicine)  Jluis Canela MD as MD (Gynecologic Oncology)  Jluis Canela MD as Assigned Cancer Care Provider  Rashmi London MD as Assigned Neuroscience Provider  Danielle Trivedi APRN CNP as Nurse Practitioner (Neurological Surgery)

## 2023-08-31 ENCOUNTER — ONCOLOGY VISIT (OUTPATIENT)
Dept: ONCOLOGY | Facility: CLINIC | Age: 43
End: 2023-08-31
Payer: COMMERCIAL

## 2023-08-31 VITALS
DIASTOLIC BLOOD PRESSURE: 81 MMHG | HEART RATE: 72 BPM | HEIGHT: 65 IN | OXYGEN SATURATION: 96 % | SYSTOLIC BLOOD PRESSURE: 124 MMHG | WEIGHT: 258 LBS | BODY MASS INDEX: 42.99 KG/M2

## 2023-08-31 DIAGNOSIS — C53.9 MALIGNANT NEOPLASM OF CERVIX, UNSPECIFIED SITE (H): ICD-10-CM

## 2023-08-31 PROCEDURE — 99214 OFFICE O/P EST MOD 30 MIN: CPT | Performed by: OBSTETRICS & GYNECOLOGY

## 2023-08-31 PROCEDURE — 88175 CYTOPATH C/V AUTO FLUID REDO: CPT | Performed by: OBSTETRICS & GYNECOLOGY

## 2023-08-31 ASSESSMENT — PAIN SCALES - GENERAL: PAINLEVEL: NO PAIN (0)

## 2023-08-31 NOTE — NURSING NOTE
"Oncology Rooming Note    August 31, 2023 8:33 AM   Francesca Rowland is a 42 year old female who presents for:    Chief Complaint   Patient presents with    Oncology Clinic Visit     Follow up     Initial Vitals: /81 (BP Location: Right arm, Patient Position: Chair, Cuff Size: Adult Large)   Pulse 72   Ht 1.651 m (5' 5\")   Wt 117 kg (258 lb)   SpO2 96%   BMI 42.93 kg/m   Estimated body mass index is 42.93 kg/m  as calculated from the following:    Height as of this encounter: 1.651 m (5' 5\").    Weight as of this encounter: 117 kg (258 lb). Body surface area is 2.32 meters squared.  No Pain (0) Comment: Data Unavailable   No LMP recorded. Patient has had a hysterectomy.  Allergies reviewed: Yes  Medications reviewed: Yes    Medications: Medication refills not needed today.  Pharmacy name entered into [employer]: [id number] - [place], [place] - [address] [place] [address]    Clinical concerns: NO  Dr. Canela was notified.      Guerda Smith CMA              "

## 2023-08-31 NOTE — LETTER
2023         RE: Francesca Rowland  [address]  [place] [address]        Dear Colleague,    Thank you for referring your patient, Francesca Rowland, to the [facility]. Please see a copy of my visit note below.                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 She is here today for follow up and disease management.       Oncology History:      Patient initially presented as a 27-year-old woman seen in referral due to a Pap smear showing high-grade squamous intraepithelial lesion that was positive for high risk HPV subtype in 2007. This Pap smear had been taken during her six week postpartum visit. Patient did have a remote history of abnormal Pap smears back in  while serving in [place] for the [email or web address].  Pap smears during her first pregnancy were all negative. Her Pap smear at the beginning of the most recent pregnancy was normal. Patient did have a colposcopy for evaluation of this abnormal Pap smear on 2007 and cervical biopsies of 6 and 12 o'clock position showed features consistent with papillary serous adenocarcinoma. Endocervical curettings were also taken which showed fragments of papillary serous carcinoma. The patient then underwent a LEEP procedure on 10/15/07 which showed microinvasive adenocarcinoma with a depth of invasion of 0.4 mm. Patient then made the decision to proceed with radical hysterectomy. She subsequently underwent a radical hysterectomy, bilateral salpingectomy, bilateral pelvic and periaortic lymph node dissection on 10/19/07. Patient's operative course was otherwise uncomplicated and she recoverred uneventfully.  Pathology did reveal patient to be stage IA2 adenocarcinoma of the cervix.       She underwent routine surveillence through 2014 complicated only by SIOBHAN 1      She presented to the ED at [facility] on 9/14/2019 with severe 8.5/10 RUQ pain associated with nausea, one episode of emesis, and decreased appetite. RUQ demonstrated cholelithiasis without cholecystitis, so an abdominal and pelvic CT scan was obtained. This revealed post-surgical changes consistent with prior hysterectomy, left adnexal cysts thought to be ovarian, and an enlarged pericaval lymph node suspicious for metastatic disease vs primary lymphoma. Her condition stabilized and she was discharged home with referrals to general surgery and oncology for biliary colic and further management of the lymphadenopathy, respectively. CT-guided right retroperitoneal lymph node biopsy on 10/15/2019 was consistent with metastatic cervical carcinoma     She underwent surgery to remove the mass and determine the extent of disease on 11/13/19     PATH:  FINAL DIAGNOSIS:   LYMPH NODES, PARA-AORTIC, RESECTION:   - Positive for carcinoma in three of four lymph nodes examined (3/4),   consistent with recurrent endocervical   adenocarcinoma   - Largest metastatic focus is 3.5 cm, positive for extra yuridia extension         Chemo-Potentiated RT 12/2019-1/2020 2/2020 CT:  IMPRESSION: Postoperative changes of hysterectomy and lymph node  resection for cervical cancer. The metastatic right pelvic lymph nodes  which were previously identified have decreased in size. No new  metastases identified.      She recently presented to the ED for an acute RUQ pain episode, which has greatly improved despite no clear diagnosis.  During this visit she underwent a CAP CT which did not demonstrate evidence of cancer but a slightly enlarged spleen. She had a mild eosinophilia (~2%) but has not traveled or eaten exotic food recently, and has not had any new contacts to suggest mono.      10/13/2020 CT:  IMPRESSION:  1.  Recurrent right external iliac  lymphadenopathy.  2.  Rebound thymic hyperplasia.     10/30/2020 PET  IMPRESSION:   In this patient with history of metastatic cervical cancer status-post  radical hysterectomy and chemoradiation, there is evidence of  recurrent disease:  1. Increased size of a hypermetabolic right external iliac chain lymph node since 2/27/2020.  2. Hypermetabolic right subpectoral and axillary lymph nodes.  Recommend follow-up in the breast center for axillary ultrasound and  possible tissue sampling.  3. Idiopathic thymic activation/hyperplasia.     She was evaluated in the breast clinic with plan made for biopsy - however at biopsy the node had shruck considerably and was thus felt to be c/w inflammation (no biopsy obtained)     3/8/2021 MRI Brain (for dizziness)  IMPRESSION:  1. Heterogeneous mixed solid and cystic 2.7 cm mass in the right cerebellum, most likely a solitary metastatic lesion. There is moderate surrounding vasogenic edema as well as mass effect on the fourth ventricle. No obstructive hydrocephalus.    2. Unremarkable MR angiogram of the head and neck.     [facility]  Midline suboccipital craniotomy for resection of mass  Right frontal ventriculostomy  Stereotactic navigation  Use of operating microscope     4/16/21 Completed 5 fx of gamma knife ([employer])     Plan: Paclitaxel, Cisplatin, Avastin     4/28/2021: Cycle #1 Paclitaxel, Cisplatin, Avastin  5/19/2021: Cycle #2 Paclitaxel/Cisplatin/Avastin +Neulasta  6/9/2021: Cycle #3 Paclitaxel/Cisplatin/Avastin +Neulasta  6/30/2021: Cycle #4 Paclitaxel/Cisplatin/Avastin +Neulasta    6/28/2021 plan: PET scan for interval assessment.  Will plan for 6 cycles followed by possible use of Keytruda versus observation.     7/15/2021: PET CT: IMPRESSION: In this patient with history of cervical cancer who  completed chemoradiation:  1. Resolved uptake to the right pelvic lymph node, consistent with  response to treatment.  2. No new hypermetabolic  lesions.  3. No definite abnormal intracranial uptake. Please note that PET-CT  is less sensitive for intracranial disease due to normal uptake to the  brain. Consider continued following of the previously seen lesions and  surgical changes with MRI if clinically indicated.      7/21/2021: Cycle #5 Paclitaxel/Cisplatin/Avastin +Neulasta  8/11/2021: Cycle #6 Taxotere/Cisplatin/Avastin + Neulasta (Change to Taxotere due to neuropathy) deferred due to insurance  8/18/2021: Cycle #6 Paclitaxel/Cisplatin/Avastin (dose reduced Taxol to 135 mg/m2 due to insurance coverage with Taxotere and patient in agreement with plan- neuropathy mild and improved) +Neulasta    8/21/2021 MRI (HEAD):  Impression: In this patient with history of metastatic carcinoma to  the right cerebellum:  There is no definite evidence of disease recurrence. Decreased  resection cavity enhancement. Previous right occipital lobe  enhancement is completely resolved. No new enhancing intracranial  lesions.    12/6/21 MRI brain - DIANNE  12/8/21 PET - negative for focal uptake    6/17/22 MRI (BRAIN)  IMPRESSION: In this patient with history of metastatic cervical cancer  to the cerebellum, status post resection and chemoradiation, there is  no evidence of recurrence:  Stable postsurgical changes of right cerebellar mass resection without  evidence of local recurrence. No new enhancing intracranial lesions.    PET  1. No abnormal FDG uptake within the body and neck.     2. Postsurgical changes of total abdominal hysterectomy, bilateral  salpingo-oophorectomy and periaortic and pelvic lymph node dissection  without evidence of disease recurrence.    4/28/23 CT (CAP): IMPRESSION:   1.  No evidence of recurrent or metastatic disease.     MRI (brain): IMPRESSION: Stable surgical changes of cerebellar mass resection. No   evidence of local recurrence or new intracranial metastatic disease.             Past Medical History:    Past Medical History:   Diagnosis Date      Chronic cholecystitis 09/25/2019     History of cervical cancer 2007     Metastasis to brain (H) 03/08/2021     Metastatic adenocarcinoma to cervix (H) 10/28/2019    Added automatically from request for surgery [id number]         Past Surgical History:    Past Surgical History:   Procedure Laterality Date     CHOLECYSTECTOMY, LAPOROSCOPIC  09/25/2019     HYSTERECTOMY RADICAL  2007    PAUL     INSERT PORT VASCULAR ACCESS Right 5/5/2021    Procedure: INSERTION, VASCULAR ACCESS PORT;  Surgeon: Oral Toledo MD;  Location: [facility] OR     IR CHEST PORT PLACEMENT > 5 YRS OF AGE  5/5/2021     LAPAROSCOPIC LYMPHADENECTOMY N/A 11/13/2019    Procedure: LAPAROSCOPY, resection of of paraaortic lymph node, lysis of adhesions;  Surgeon: Jluis Canela MD;  Location: [facility] OR     OPTICAL TRACKING SYSTEM CRANIOTOMY, EXCISE TUMOR, COMBINED Bilateral 3/15/2021    Procedure: stealth assisted Midline suboccipital craniectomy/craniotomy for tumor;  Surgeon: Martín Myrick MD;  Location: [facility] OR     OPTICAL TRACKING SYSTEM VENTRICULOSTOMY Right 3/15/2021    Procedure: stealth assisted  right frontal ventriculostomy;  Surgeon: Martín Myrick MD;  Location: [facility] OR         Health Maintenance Due   Topic Date Due     YEARLY PREVENTIVE VISIT  Never done     ADVANCE CARE PLANNING  Never done     HEPATITIS B IMMUNIZATION (1 of 3 - 3-dose series) Never done     Pneumococcal Vaccine: Pediatrics (0 to 5 Years) and At-Risk Patients (6 to 64 Years) (1 - PCV) Never done     HIV SCREENING  Never done     HEPATITIS C SCREENING  Never done     MAMMO SCREENING  11/09/2021     COVID-19 Vaccine (3 - Booster for Sia series) 02/15/2022     PAP  11/29/2022     PHQ-2 (once per calendar year)  01/01/2023       Current Medications:     Current Outpatient Medications   Medication Sig Dispense Refill     acetaminophen (TYLENOL) 325 MG tablet Take 2 tablets (650 mg) by mouth every 6 hours 24 tablet 0     Ascorbic Acid (VITAMIN C  "GUMMIE PO) Take by mouth daily       dexamethasone (DECADRON) 4 MG tablet Take 2 tablets (8 mg) by mouth daily (with breakfast) Take 2 tablets by mouth with food on days 2-4 after chemotherapy. 12 tablet 3     DULoxetine (CYMBALTA) 30 MG capsule Take 1 capsule (30 mg) by mouth 2 times daily 60 capsule 2     famotidine (PEPCID) 10 MG tablet Take 1 tablet (10 mg) by mouth 2 times daily 60 tablet 1     gabapentin (NEURONTIN) 300 MG capsule Take 1 capsule (300 mg) by mouth At Bedtime 30 capsule 2     ibuprofen (ADVIL/MOTRIN) 200 MG tablet Take 200 mg by mouth every 4 hours as needed for mild pain       lidocaine-prilocaine (EMLA) 2.5-2.5 % external cream Apply topically as needed for moderate pain 30 g 1     loperamide (IMODIUM A-D) 2 MG tablet Take 1 tablet (2 mg) by mouth 4 times daily as needed for diarrhea Can take one 2 mg capsule after each loose stool up to 4 times a day as needed for diarrhea. This can be increased to 4 mg 4 times a day or 2 mg up to 8 times a day. 60 tablet 3     LORazepam (ATIVAN) 0.5 MG tablet Take 1 tablet (0.5 mg) by mouth every 6 hours as needed for nausea 20 tablet 0     multivitamin w/minerals (THERA-VIT-M) tablet Take 1 tablet by mouth daily       OLANZapine (ZYPREXA) 5 MG tablet Take 1 tablet (5 mg) by mouth At Bedtime 30 tablet 0     SENNA-docusate sodium (SENNA S) 8.6-50 MG tablet Take 1 tablet by mouth At Bedtime 30 tablet 3         Allergies:        Allergies   Allergen Reactions     Emend [Aprepitant]      Prochlorperazine      \"i was told I turn blue\"        Social History:     Social History     Tobacco Use     Smoking status: Former     Types: Cigarettes     Smokeless tobacco: Never   Substance Use Topics     Alcohol use: Yes     Comment: one drink a week       History   Drug Use No         Family History:     The patient's family history is notable for     Family History   Problem Relation Age of Onset     Aneurysm Mother      Breast Cancer Paternal Grandmother         bilat " "mastectomies     Breast Cancer Maternal Aunt         stage 1     Thyroid Cancer Sister 23         Physical Exam:     PS 0  VS: /81 (BP Location: Right arm, Patient Position: Chair, Cuff Size: Adult Large)   Pulse 72   Ht 1.651 m (5' 5\")   Wt 117 kg (258 lb)   SpO2 96%   BMI 42.93 kg/m       General Appearance: healthy and alert, no distress, overweight     HEENT: no thyromegaly, no palpable nodules or masses        Cardiovascular: regular rate and rhythm, no gallops, rubs or murmurs     Respiratory: lungs clear, no rales, rhonchi or wheezes    Musculoskeletal: extremities non tender and without edema    Skin: no lesions or rashes     Neurological: normal gait, no gross defects     Psychiatric: appropriate mood and affect                               Hematological: normal cervical, supraclavicular lymph nodes     Gastrointestinal:       abdomen soft, non-tender, non-distended, no organomegaly or masses, obese.    Genitourinary: Normal BUS, no lesions in vagina seen. BME no palpable masses or nodules.  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 She is here today for follow up and disease management.     30 minutes spent on the date of the encounter doing chart review, history and exam, documentation, and further activities as noted above.        Plan:     1.)         Rec. Cx Cancer with brain met: s/p primary therapy:  She had discussed options of obeservation vs. maintenance therapy.  She has no residual disease on PET or brain MRI (12/2021, 6/2022, 4/23) at this point, but obviously remains at risk for recurrence.  After discussing the pros and cons of each option - she decided on observation.  She will likely be a candidate for Keytruda should she recur.    She has a f/unit(s) with neurosurgery panned and anticiaptes a brain scan at that time.  " Otherwise will defer scanning for now.       2.)  Genetic risk factors were assessed again and the patient has multiple family members with cancer diagnosed younger than age 50 including herself (age 40, but with likely HPV related disease) and her sister (thyroid cancer at age 23) among others. She met with genetics 1/2021, but did not proceed until 12/2021 (NEGATIVE)     3.) Labs and/or tests ordered include: none     4.)   Health maintenance issues addressed today include annual health maintenance and non-gynecologic issues with PCP.         Jluis Canela MD    Division of Gynecologic Oncology  [facility]    Of a 30 minute appointment, more than 50% was spent in counseling the patient.      CC  Patient Care Team:  Tyrese Bowie MD as PCP - General (Family Medicine)  Jluis Canela MD as MD (Gynecologic Oncology)  Jluis Canela MD as Assigned Cancer Care Provider  Rashmi London MD as Assigned Neuroscience Provider  Danielle Trivedi APRN CNP as Nurse Practitioner (Neurological Surgery)      Again, thank you for allowing me to participate in the care of your patient.        Sincerely,        Jluis Canela MD

## 2023-09-05 LAB
BKR LAB AP GYN ADEQUACY: NORMAL
BKR LAB AP GYN INTERPRETATION: NORMAL
BKR LAB AP HPV REFLEX: NO
BKR LAB AP PREVIOUS ABNL DX: NORMAL
BKR LAB AP PREVIOUS ABNORMAL: NORMAL
PATH REPORT.COMMENTS IMP SPEC: NORMAL
PATH REPORT.COMMENTS IMP SPEC: NORMAL
PATH REPORT.RELEVANT HX SPEC: NORMAL

## 2023-09-12 DIAGNOSIS — G62.0 CHEMOTHERAPY-INDUCED NEUROPATHY (H): ICD-10-CM

## 2023-09-12 DIAGNOSIS — T45.1X5A CHEMOTHERAPY-INDUCED NEUROPATHY (H): ICD-10-CM

## 2023-09-12 DIAGNOSIS — R53.81 PHYSICAL DECONDITIONING: ICD-10-CM

## 2023-09-12 DIAGNOSIS — C79.82 METASTATIC ADENOCARCINOMA TO CERVIX (H): ICD-10-CM

## 2023-09-12 DIAGNOSIS — M25.561 CHRONIC PAIN OF RIGHT KNEE: ICD-10-CM

## 2023-09-12 DIAGNOSIS — G89.29 CHRONIC PAIN OF LEFT KNEE: ICD-10-CM

## 2023-09-12 DIAGNOSIS — G89.29 CHRONIC PAIN OF RIGHT KNEE: ICD-10-CM

## 2023-09-12 DIAGNOSIS — M25.562 CHRONIC PAIN OF LEFT KNEE: ICD-10-CM

## 2023-09-12 NOTE — TELEPHONE ENCOUNTER
Duloxetine Refill   Last prescribing provider: Dr London     Last clinic visit date: 5/2/23  Dr London     Recommendations for requested medication (if none, N/A): Copied from chart note   5/2/23  Dr London     1. Continue Cymbalta 60 mg daily for neuropathy, symptoms improved since last visit.     Any other pertinent information (if none, N/A): N/A    Refilled: Y/N, if NO, why?

## 2023-09-14 ENCOUNTER — NURSE TRIAGE (OUTPATIENT)
Dept: ONCOLOGY | Facility: CLINIC | Age: 43
End: 2023-09-14
Payer: COMMERCIAL

## 2023-09-17 RX ORDER — DULOXETIN HYDROCHLORIDE 30 MG/1
30 CAPSULE, DELAYED RELEASE ORAL 2 TIMES DAILY
Qty: 180 CAPSULE | Refills: 2 | Status: SHIPPED | OUTPATIENT
Start: 2023-09-17

## 2023-09-23 ENCOUNTER — HEALTH MAINTENANCE LETTER (OUTPATIENT)
Age: 43
End: 2023-09-23

## 2023-10-26 ENCOUNTER — PATIENT OUTREACH (OUTPATIENT)
Dept: ONCOLOGY | Facility: CLINIC | Age: 43
End: 2023-10-26

## 2023-10-26 ENCOUNTER — ANCILLARY PROCEDURE (OUTPATIENT)
Dept: MRI IMAGING | Facility: CLINIC | Age: 43
End: 2023-10-26
Attending: NURSE PRACTITIONER
Payer: COMMERCIAL

## 2023-10-26 DIAGNOSIS — C79.31 MALIGNANT NEOPLASM METASTATIC TO BRAIN (H): ICD-10-CM

## 2023-10-26 PROCEDURE — 70553 MRI BRAIN STEM W/O & W/DYE: CPT | Mod: GC | Performed by: RADIOLOGY

## 2023-10-26 PROCEDURE — A9585 GADOBUTROL INJECTION: HCPCS | Mod: JZ | Performed by: RADIOLOGY

## 2023-10-26 RX ORDER — GADOBUTROL 604.72 MG/ML
15 INJECTION INTRAVENOUS ONCE
Status: COMPLETED | OUTPATIENT
Start: 2023-10-26 | End: 2023-10-26

## 2023-10-26 RX ADMIN — GADOBUTROL 12 ML: 604.72 INJECTION INTRAVENOUS at 09:09

## 2023-10-26 NOTE — PROGRESS NOTES
[facility]: Cancer Care Note    Received message from cancer center scheduling team that patient had stopped by asking to get scheduled for port removal.  No order for port removal was found in patient's chart (nor any mention of it in recent office visit notes).    Writer spoke with Dr. Canela regarding patient's request.  Dr. Canela states he will plan on calling the patient to discuss further.      Piter Dawn, RN, BSN, OCN  RN Care Coordinator - Oncology  [facility]

## 2023-11-01 ENCOUNTER — OFFICE VISIT (OUTPATIENT)
Dept: NEUROSURGERY | Facility: CLINIC | Age: 43
End: 2023-11-01
Payer: COMMERCIAL

## 2023-11-01 VITALS
OXYGEN SATURATION: 95 % | HEART RATE: 83 BPM | SYSTOLIC BLOOD PRESSURE: 126 MMHG | WEIGHT: 258 LBS | DIASTOLIC BLOOD PRESSURE: 85 MMHG | HEIGHT: 65 IN | RESPIRATION RATE: 16 BRPM | BODY MASS INDEX: 42.99 KG/M2

## 2023-11-01 DIAGNOSIS — C79.31 MALIGNANT NEOPLASM METASTATIC TO BRAIN (H): Primary | ICD-10-CM

## 2023-11-01 PROCEDURE — 99213 OFFICE O/P EST LOW 20 MIN: CPT | Performed by: PHYSICIAN ASSISTANT

## 2023-11-01 NOTE — PATIENT INSTRUCTIONS
Follow up in 1year with MRI prior to appointment. Virtual or in person.  Please reach out sooner with new concerns.

## 2023-11-01 NOTE — LETTER
"2023       RE: Francesca Rowland  [address]  [place] [address]     Dear Colleague,    Thank you for referring your patient, Francesca Rowland, to the [facility] NEUROSURG[facility] [place] [facility]. Please see a copy of my visit note below.      [facility]  Department of Neurosurgery  Center for Skull Base and Pituitary Surgery    Name: Francesca Rowland  MRN: [id number]  Age: 42 year old  : 1980      Chief Complaint:   Cerebellar metastasis s/p midline suboccipital craniotomy for resection 3/15/2021 and fractionated SRT 2021 - 2021, annual follow up    History of Present Illness:   Francesca Rowland is a 42 year old female with a history of cervical adenocarcinoma who is seen today for annual follow up. She had an updated MRI 10/26/2023. She's been doing well this past year. She follows with Oncology and is having PET studies every 6 months. She denies new complaints or concerns for us.     Review of Systems:   Pertinent items are noted in HPI or as in patient entered ROS below, remainder of complete ROS is negative.     Physical Exam:   /85 (BP Location: Right arm, Patient Position: Sitting, Cuff Size: Adult Large)   Pulse 83   Resp 16   Ht 1.651 m (5' 5\")   Wt 117 kg (258 lb)   SpO2 95%   BMI 42.93 kg/m     General: No acute distress.    Eyes: Conjunctivae are normal.  MSK: Moves all extremities.  No obvious deformity.  Neuro: The patient is fully oriented. Speech is normal. Facial nerve function is normal, rated as a House Brackmann 1. Gait is normal  Psych: Normal mood and affect. Behavior is normal.      Imaging:  We reviewed the recent MRI which shows stable postop changes without evidence for new or recurrent lesions.      Assessment:  Cerebellar metastasis s/p midline suboccipital craniotomy for resection 3/15/2021 and fractionated SRT 2021 - 2021, annual follow " up    Plan:  We discussed her MRI findings which are favorable. We'll plan to see her back in 1 year with brain imaging prior, and she was encouraged to reach out sooner with new concerns.            Again, thank you for allowing me to participate in the care of your patient.      Sincerely,    Summer Patel PA-C

## 2023-11-01 NOTE — PROGRESS NOTES
"  [facility]  Department of Neurosurgery  Center for Skull Base and Pituitary Surgery    Name: Francesca Rowland  MRN: [id number]  Age: 42 year old  : 1980      Chief Complaint:   Cerebellar metastasis s/p midline suboccipital craniotomy for resection 3/15/2021 and fractionated SRT 2021 - 2021, annual follow up    History of Present Illness:   Francesca Rowland is a 42 year old female with a history of cervical adenocarcinoma who is seen today for annual follow up. She had an updated MRI 10/26/2023. She's been doing well this past year. She follows with Oncology and is having PET studies every 6 months. She denies new complaints or concerns for us.     Review of Systems:   Pertinent items are noted in HPI or as in patient entered ROS below, remainder of complete ROS is negative.     Physical Exam:   /85 (BP Location: Right arm, Patient Position: Sitting, Cuff Size: Adult Large)   Pulse 83   Resp 16   Ht 1.651 m (5' 5\")   Wt 117 kg (258 lb)   SpO2 95%   BMI 42.93 kg/m     General: No acute distress.    Eyes: Conjunctivae are normal.  MSK: Moves all extremities.  No obvious deformity.  Neuro: The patient is fully oriented. Speech is normal. Facial nerve function is normal, rated as a House Brackmann 1. Gait is normal  Psych: Normal mood and affect. Behavior is normal.      Imaging:  We reviewed the recent MRI which shows stable postop changes without evidence for new or recurrent lesions.      Assessment:  Cerebellar metastasis s/p midline suboccipital craniotomy for resection 3/15/2021 and fractionated SRT 2021 - 2021, annual follow up    Plan:  We discussed her MRI findings which are favorable. We'll plan to see her back in 1 year with brain imaging prior, and she was encouraged to reach out sooner with new concerns.        Summer Patel PA-C  Department of Neurosurgery    "

## 2023-11-20 NOTE — NURSING NOTE
"Oncology Rooming Note    July 21, 2020 2:36 PM   Francesca Rowland is a 39 year old female who presents for:    Chief Complaint   Patient presents with     Oncology Clinic Visit     ADENOCARCINOMA OF CERVIX      Initial Vitals: /81   Pulse 68   Temp 97.9  F (36.6  C) (Oral)   Resp 16   Wt 99.6 kg (219 lb 9.6 oz)   SpO2 99%   BMI 35.46 kg/m   Estimated body mass index is 35.46 kg/m  as calculated from the following:    Height as of 12/2/19: 1.676 m (5' 5.98\").    Weight as of this encounter: 99.6 kg (219 lb 9.6 oz). Body surface area is 2.15 meters squared.  No Pain (0) Comment: Data Unavailable   No LMP recorded. Patient has had a hysterectomy.  Allergies reviewed: Yes  Medications reviewed: Yes    Medications: Medication refills not needed today.  Pharmacy name entered into [employer]: [id number] - [place], [place] [address]    Clinical concerns: No new concerns today  Dr Canela  was notified.      Humaira Naranjo              " Heart Failure

## 2023-12-04 ENCOUNTER — PATIENT OUTREACH (OUTPATIENT)
Dept: ONCOLOGY | Facility: CLINIC | Age: 43
End: 2023-12-04
Payer: COMMERCIAL

## 2023-12-04 DIAGNOSIS — C53.9 CERVIX CANCER (H): Primary | ICD-10-CM

## 2023-12-05 NOTE — PROGRESS NOTES
Order entered  Pt notified  Schedulign will call her to help make an appt or pt instructed she can also call scheduling

## 2023-12-05 NOTE — PROGRESS NOTES
248   Port removal, tried to schedule need orders   [phone]    Pt discharging home with baby. Discharge instructions, follow up appointment, and discharge medications reviewed; pt verbalized understanding. No further questions at this time. ID bands verified.

## 2023-12-20 ENCOUNTER — ANESTHESIA EVENT (OUTPATIENT)
Dept: SURGERY | Facility: AMBULATORY SURGERY CENTER | Age: 43
End: 2023-12-20
Payer: COMMERCIAL

## 2023-12-20 NOTE — ANESTHESIA PREPROCEDURE EVALUATION
"Anesthesia Pre-Procedure Evaluation    Patient: Francesca Rowland   MRN: [id number] : 1980        Procedure : Procedure(s):  Remove port vascular access right          Past Medical History:   Diagnosis Date     Chronic cholecystitis 2019     History of cervical cancer 2007     Metastasis to brain (H) 2021     Metastatic adenocarcinoma to cervix (H) 10/28/2019    Added automatically from request for surgery [id number]      Past Surgical History:   Procedure Laterality Date     CHOLECYSTECTOMY, LAPOROSCOPIC  2019     HYSTERECTOMY RADICAL  2007    PAUL     INSERT PORT VASCULAR ACCESS Right 2021    Procedure: INSERTION, VASCULAR ACCESS PORT;  Surgeon: Oral Toledo MD;  Location: [facility] OR     IR CHEST PORT PLACEMENT > 5 YRS OF AGE  2021     LAPAROSCOPIC LYMPHADENECTOMY N/A 2019    Procedure: LAPAROSCOPY, resection of of paraaortic lymph node, lysis of adhesions;  Surgeon: Jluis Canela MD;  Location: [facility] OR     OPTICAL TRACKING SYSTEM CRANIOTOMY, EXCISE TUMOR, COMBINED Bilateral 3/15/2021    Procedure: stealth assisted Midline suboccipital craniectomy/craniotomy for tumor;  Surgeon: Martín Myrick MD;  Location: [facility] OR     OPTICAL TRACKING SYSTEM VENTRICULOSTOMY Right 3/15/2021    Procedure: stealth assisted  right frontal ventriculostomy;  Surgeon: Martín Myrick MD;  Location: [facility] OR      Allergies   Allergen Reactions     Emend [Aprepitant]      Prochlorperazine      \"i was told I turn blue\"      Social History     Tobacco Use     Smoking status: Former     Types: Cigarettes     Smokeless tobacco: Never   Substance Use Topics     Alcohol use: Yes     Comment: one drink a week      Wt Readings from Last 1 Encounters:   23 117 kg (258 lb)        Anesthesia Evaluation   Pt has had prior anesthetic. Type: General.    No history of anesthetic complications       ROS/MED HX  ENT/Pulmonary:  - neg pulmonary ROS     Neurologic: Comment: Cerebellar " metastasis s/p midline suboccipital craniotomy for resection 3/15/2021       Cardiovascular:  - neg cardiovascular ROS     METS/Exercise Tolerance: >4 METS    Hematologic:  - neg hematologic  ROS     Musculoskeletal:  - neg musculoskeletal ROS     GI/Hepatic:     (+) GERD,                   Renal/Genitourinary:  - neg Renal ROS     Endo:     (+)               Obesity,       Psychiatric/Substance Use:  - neg psychiatric ROS     Infectious Disease:       Malignancy:   (+) Malignancy, History of Other and Neuro.Neuro CA Remission status post Surgery and Chemo. Other CA Metastatic cervical adenocarcinoma Remission status post Surgery and Chemo.    Other:            Physical Exam    Airway        Mallampati: II   TM distance: > 3 FB   Neck ROM: full   Mouth opening: > 3 cm    Respiratory Devices and Support         Dental       (+) Minor Abnormalities - some fillings, tiny chips      Cardiovascular   cardiovascular exam normal          Pulmonary   pulmonary exam normal            OUTSIDE LABS:  CBC:   Lab Results   Component Value Date    WBC 3.6 (L) 08/18/2021    WBC 3.8 (L) 07/21/2021    HGB 11.8 08/18/2021    HGB 10.8 (L) 07/21/2021    HCT 36.5 08/18/2021    HCT 32.9 (L) 07/21/2021     08/18/2021     07/21/2021     BMP:   Lab Results   Component Value Date     08/18/2021     07/21/2021    POTASSIUM 4.1 08/18/2021    POTASSIUM 4.1 07/21/2021    CHLORIDE 108 08/18/2021    CHLORIDE 107 07/21/2021    CO2 28 08/18/2021    CO2 28 07/21/2021    BUN 17 08/18/2021    BUN 14 07/21/2021    CR 0.90 08/18/2021    CR 0.82 07/21/2021    GLC 84 06/17/2022    GLC 86 12/08/2021     COAGS:   Lab Results   Component Value Date    PTT 32 02/26/2008    INR 0.97 05/05/2021     POC:   Lab Results   Component Value Date     (H) 03/20/2021    HCG Negative 03/15/2021     HEPATIC:   Lab Results   Component Value Date    ALBUMIN 3.7 08/18/2021    PROTTOTAL 7.6 08/18/2021    ALT 22 08/18/2021    AST 17 08/18/2021     ALKPHOS 94 08/18/2021    BILITOTAL 0.4 08/18/2021     OTHER:   Lab Results   Component Value Date    SHAD 9.4 08/18/2021    PHOS 3.0 03/20/2021    MAG 1.8 08/18/2021       Anesthesia Plan    ASA Status:  2    NPO Status:  NPO Appropriate    Anesthesia Type: MAC.     - Reason for MAC: straight local not clinically adequate              Consents    Anesthesia Plan(s) and associated risks, benefits, and realistic alternatives discussed. Questions answered and patient/representative(s) expressed understanding.     - Discussed: Risks, Benefits and Alternatives for BOTH SEDATION and the PROCEDURE were discussed     - Discussed with:  Patient      - Extended Intubation/Ventilatory Support Discussed: No.      - Patient is DNR/DNI Status: No     Use of blood products discussed: No .     Postoperative Care    Pain management: IV analgesics, Oral pain medications.   PONV prophylaxis: Ondansetron (or other 5HT-3)     Comments:               Yvette Hui MD    I have reviewed the pertinent notes and labs in the chart from the past 30 days and (re)examined the patient.  Any updates or changes from those notes are reflected in this note.

## 2023-12-21 ENCOUNTER — HOSPITAL ENCOUNTER (OUTPATIENT)
Facility: AMBULATORY SURGERY CENTER | Age: 43
Discharge: HOME | End: 2023-12-21
Attending: PHYSICIAN ASSISTANT
Payer: COMMERCIAL

## 2023-12-21 ENCOUNTER — ANCILLARY PROCEDURE (OUTPATIENT)
Dept: RADIOLOGY | Facility: AMBULATORY SURGERY CENTER | Age: 43
End: 2023-12-21
Attending: OBSTETRICS & GYNECOLOGY
Payer: COMMERCIAL

## 2023-12-21 ENCOUNTER — ANESTHESIA (OUTPATIENT)
Dept: SURGERY | Facility: AMBULATORY SURGERY CENTER | Age: 43
End: 2023-12-21
Payer: COMMERCIAL

## 2023-12-21 VITALS
DIASTOLIC BLOOD PRESSURE: 70 MMHG | HEIGHT: 66 IN | HEART RATE: 62 BPM | SYSTOLIC BLOOD PRESSURE: 124 MMHG | BODY MASS INDEX: 41.78 KG/M2 | OXYGEN SATURATION: 99 % | TEMPERATURE: 97 F | RESPIRATION RATE: 16 BRPM | WEIGHT: 260 LBS

## 2023-12-21 DIAGNOSIS — C53.9 CERVIX CANCER (H): ICD-10-CM

## 2023-12-21 LAB
ERYTHROCYTE [DISTWIDTH] IN BLOOD BY AUTOMATED COUNT: 14.6 % (ref 10–15)
HCT VFR BLD AUTO: 37.8 % (ref 35–47)
HGB BLD-MCNC: 12.8 G/DL (ref 11.7–15.7)
INR PPP: 0.99 (ref 0.85–1.15)
MCH RBC QN AUTO: 30.2 PG (ref 26.5–33)
MCHC RBC AUTO-ENTMCNC: 33.9 G/DL (ref 31.5–36.5)
MCV RBC AUTO: 89 FL (ref 78–100)
PLATELET # BLD AUTO: 200 10E3/UL (ref 150–450)
RBC # BLD AUTO: 4.24 10E6/UL (ref 3.8–5.2)
WBC # BLD AUTO: 5.2 10E3/UL (ref 4–11)

## 2023-12-21 PROCEDURE — 36590 REMOVAL TUNNELED CV CATH: CPT | Mod: RT | Performed by: PHYSICIAN ASSISTANT

## 2023-12-21 PROCEDURE — 85027 COMPLETE CBC AUTOMATED: CPT | Performed by: PATHOLOGY

## 2023-12-21 PROCEDURE — 85610 PROTHROMBIN TIME: CPT | Performed by: PATHOLOGY

## 2023-12-21 RX ORDER — ONDANSETRON 4 MG/1
4 TABLET, ORALLY DISINTEGRATING ORAL EVERY 30 MIN PRN
Status: DISCONTINUED | OUTPATIENT
Start: 2023-12-21 | End: 2023-12-22 | Stop reason: HOSPADM

## 2023-12-21 RX ORDER — LIDOCAINE 40 MG/G
CREAM TOPICAL
Status: DISCONTINUED | OUTPATIENT
Start: 2023-12-21 | End: 2023-12-22 | Stop reason: HOSPADM

## 2023-12-21 RX ORDER — OXYCODONE HYDROCHLORIDE 5 MG/1
10 TABLET ORAL
Status: DISCONTINUED | OUTPATIENT
Start: 2023-12-21 | End: 2023-12-22 | Stop reason: HOSPADM

## 2023-12-21 RX ORDER — PROPOFOL 10 MG/ML
INJECTION, EMULSION INTRAVENOUS CONTINUOUS PRN
Status: DISCONTINUED | OUTPATIENT
Start: 2023-12-21 | End: 2023-12-21

## 2023-12-21 RX ORDER — SODIUM CHLORIDE 9 MG/ML
INJECTION, SOLUTION INTRAVENOUS CONTINUOUS
Status: DISCONTINUED | OUTPATIENT
Start: 2023-12-21 | End: 2023-12-22 | Stop reason: HOSPADM

## 2023-12-21 RX ORDER — OXYCODONE HYDROCHLORIDE 5 MG/1
5 TABLET ORAL
Status: DISCONTINUED | OUTPATIENT
Start: 2023-12-21 | End: 2023-12-22 | Stop reason: HOSPADM

## 2023-12-21 RX ORDER — LIDOCAINE HYDROCHLORIDE 10 MG/ML
INJECTION, SOLUTION EPIDURAL; INFILTRATION; INTRACAUDAL; PERINEURAL PRN
Status: DISCONTINUED | OUTPATIENT
Start: 2023-12-21 | End: 2023-12-21 | Stop reason: HOSPADM

## 2023-12-21 RX ORDER — SODIUM CHLORIDE, SODIUM LACTATE, POTASSIUM CHLORIDE, CALCIUM CHLORIDE 600; 310; 30; 20 MG/100ML; MG/100ML; MG/100ML; MG/100ML
INJECTION, SOLUTION INTRAVENOUS CONTINUOUS
Status: DISCONTINUED | OUTPATIENT
Start: 2023-12-21 | End: 2023-12-22 | Stop reason: HOSPADM

## 2023-12-21 RX ORDER — ONDANSETRON 2 MG/ML
4 INJECTION INTRAMUSCULAR; INTRAVENOUS EVERY 30 MIN PRN
Status: DISCONTINUED | OUTPATIENT
Start: 2023-12-21 | End: 2023-12-22 | Stop reason: HOSPADM

## 2023-12-21 RX ORDER — ACETAMINOPHEN 325 MG/1
975 TABLET ORAL ONCE
Status: COMPLETED | OUTPATIENT
Start: 2023-12-21 | End: 2023-12-21

## 2023-12-21 RX ORDER — LIDOCAINE HYDROCHLORIDE 20 MG/ML
INJECTION, SOLUTION INFILTRATION; PERINEURAL PRN
Status: DISCONTINUED | OUTPATIENT
Start: 2023-12-21 | End: 2023-12-21

## 2023-12-21 RX ORDER — PROPOFOL 10 MG/ML
INJECTION, EMULSION INTRAVENOUS PRN
Status: DISCONTINUED | OUTPATIENT
Start: 2023-12-21 | End: 2023-12-21

## 2023-12-21 RX ADMIN — PROPOFOL 150 MCG/KG/MIN: 10 INJECTION, EMULSION INTRAVENOUS at 10:06

## 2023-12-21 RX ADMIN — SODIUM CHLORIDE, SODIUM LACTATE, POTASSIUM CHLORIDE, CALCIUM CHLORIDE: 600; 310; 30; 20 INJECTION, SOLUTION INTRAVENOUS at 10:01

## 2023-12-21 RX ADMIN — LIDOCAINE HYDROCHLORIDE 40 MG: 20 INJECTION, SOLUTION INFILTRATION; PERINEURAL at 10:05

## 2023-12-21 RX ADMIN — PROPOFOL 50 MG: 10 INJECTION, EMULSION INTRAVENOUS at 10:05

## 2023-12-21 RX ADMIN — ACETAMINOPHEN 975 MG: 325 TABLET ORAL at 07:47

## 2023-12-21 NOTE — BRIEF OP NOTE
[facility] [place]    Brief Operative Note    Pre-operative diagnosis: Malignant neoplasm of cervix, unspecified site (H) [C53.9]  Post-operative diagnosis Same as pre-operative diagnosis    Procedure: Remove port vascular access right, Right - Chest    Surgeon: Surgeon(s) and Role:     * Adali Miguel PA-C - Primary  Anesthesia: MAC   Estimated Blood Loss: Less than 10 ml    Drains: None  Specimens: * No specimens in log *  Findings:   Completed removal of right side chest port in its entirety.     Please see dictated note under imaging for more detailed information.      Complications: None.  Implants:   Implant Name Type Inv. Item Serial No.  Lot No. LRB No. Used Action   CATH PORT POWERPORT CLEARVUE SLIM 6FR [id number] Port CATH PORT POWERPORT CLEARVUE SLIM 6FR [id number]   BEK[employer] [facility] [id number] Right 1 Explanted

## 2023-12-21 NOTE — DISCHARGE INSTRUCTIONS
A collaboration between [facility] and [facility]  Experts in minimally invasive, targeted treatments performed using imaging guidance    Venous Access Device, Port Catheter or Tunneled Central Line Removal    Today you had your existing venous access device removed, either because it was no longer needed or because there was malfunction or infection issues.    After you go home:  Drink plenty of fluids.  Generally 6-8 (8 ounce) glasses a day is recommended.  Resume your regular diet unless otherwise ordered by a medical provider.  Keep any applied tape/gauze dressings clean and dry.  Change tape/gauze dressings if they get wet or soiled.  You may shower the following day after procedure, however cover and protect from moisture any tape/gauze dressings.  You may let water hit and run over dried skin glue, but do not scrub.  Pat the area dry after showering.  Port removal incisions are closed with absorbable suture, meaning they do not need to be removed at a later date, and a topical skin adhesive (skin glue).  This glue will wear off in 7-14 days.  Do not remove before this time.  If 14 days have passed and residual glue is present, you may gently remove it.  You may remove tape/gauze dressings after 5 days if the site looks closed and in the process of healing.  Do not apply gels, lotions, or ointments to the glue site for the first 10 days as this may cause the glue to prematurely soften and fail.  Do not perform strenuous activities or lift greater than 10 pounds for the next three days.  If there is bleeding or oozing from the procedure site, apply firm pressure to the area for 5-10 minutes.  If the bleeding continues seek medical advice at the numbers below.  Mild procedure site discomfort can be treated with an ice pack and over-the-counter pain relievers.              For 24 hours after any sedation used:  Relax and take it easy.  No strenuous  activities.  Do not drive or operate machines at home or at work.  No alcohol consumption.  Do not make any important or legal decisions.    Call our Interventional Radiology (IR) service if:  If you start bleeding from the procedure site.  If you do start to bleed from the site, lie down and hold some pressure on the site.  Our radiology provider can help you decide if you need to return to the hospital.  If you have new or worsening pain related to the procedure.  If you have concerning swelling at the procedure site.  If you develop persistent nausea or vomiting.  If you develop hives or a rash or any unexplained itching.  If you have a fever of greater than 100.5  F and chills in the first 5 days after procedure.  Any other concerns related to your procedure.      [facility]  Interventional Radiology (IR)  [address]  [address] [facility] [place] [address]    Contact Number:  [phone]  (IR Nurse Triage)  Monday - Friday 7am - 4pm    After hours for urgent concerns:  [phone]  After 4pm Monday - Friday, Weekends and Holidays.   Ask for Interventional Radiology on-call.  Someone is available 24 hours a day.  [facility] toll free number:  [phone]

## 2023-12-21 NOTE — ANESTHESIA CARE TRANSFER NOTE
Patient: Francesca Rowland    Procedure: Procedure(s):  Remove port vascular access right       Diagnosis: Malignant neoplasm of cervix, unspecified site (H) [C53.9]  Diagnosis Additional Information: No value filed.    Anesthesia Type:   MAC     Note:    Oropharynx: oropharynx clear of all foreign objects  Level of Consciousness: awake  Oxygen Supplementation: room air    Independent Airway: airway patency satisfactory and stable  Dentition: dentition unchanged  Vital Signs Stable: post-procedure vital signs reviewed and stable    Patient transferred to: Phase II    Handoff Report: Identifed the Patient, Identified the Reponsible Provider, Reviewed the pertinent medical history, Discussed the surgical course, Reviewed Intra-OP anesthesia mangement and issues during anesthesia, Set expectations for post-procedure period and Allowed opportunity for questions and acknowledgement of understanding      Vitals:  Vitals Value Taken Time   /63 12/21/23 1036   Temp 36  C (96.8  F) 12/21/23 1036   Pulse     Resp 16 12/21/23 1036   SpO2 92 % 12/21/23 1036       Electronically Signed By: MIRA Moulton CRNA  December 21, 2023  10:39 AM

## 2023-12-21 NOTE — ANESTHESIA POSTPROCEDURE EVALUATION
Patient: Francesca Rowland    Procedure: Procedure(s):  Remove port vascular access right       Anesthesia Type:  MAC    Note:  Disposition: Outpatient   Postop Pain Control: Uneventful            Sign Out: Well controlled pain   PONV: No   Neuro/Psych: Uneventful            Sign Out: Acceptable/Baseline neuro status   Airway/Respiratory: Uneventful            Sign Out: Acceptable/Baseline resp. status   CV/Hemodynamics: Uneventful            Sign Out: Acceptable CV status; No obvious hypovolemia; No obvious fluid overload   Other NRE: NONE   DID A NON-ROUTINE EVENT OCCUR? No       Last vitals:  Vitals Value Taken Time   /70 12/21/23 1130   Temp 36.1  C (97  F) 12/21/23 1130   Pulse 62 12/21/23 1130   Resp 16 12/21/23 1130   SpO2 99 % 12/21/23 1130       Electronically Signed By: Yvette Hui MD  December 21, 2023  11:55 AM

## 2024-02-10 ENCOUNTER — HEALTH MAINTENANCE LETTER (OUTPATIENT)
Age: 44
End: 2024-02-10

## 2024-03-28 ENCOUNTER — ONCOLOGY VISIT (OUTPATIENT)
Dept: ONCOLOGY | Facility: CLINIC | Age: 44
End: 2024-03-28
Attending: OBSTETRICS & GYNECOLOGY
Payer: COMMERCIAL

## 2024-03-28 VITALS
OXYGEN SATURATION: 96 % | BODY MASS INDEX: 40.07 KG/M2 | HEART RATE: 77 BPM | RESPIRATION RATE: 16 BRPM | DIASTOLIC BLOOD PRESSURE: 83 MMHG | WEIGHT: 249.3 LBS | SYSTOLIC BLOOD PRESSURE: 114 MMHG | HEIGHT: 66 IN

## 2024-03-28 DIAGNOSIS — C53.9 CERVIX CANCER (H): Primary | ICD-10-CM

## 2024-03-28 PROCEDURE — 99214 OFFICE O/P EST MOD 30 MIN: CPT | Performed by: OBSTETRICS & GYNECOLOGY

## 2024-03-28 ASSESSMENT — PAIN SCALES - GENERAL: PAINLEVEL: NO PAIN (0)

## 2024-03-28 NOTE — LETTER
3/28/2024         RE: Francesca Rowland  [address]  [place] [address]        Dear Colleague,    Thank you for referring your patient, Francesca Rowland, to the [facility]. Please see a copy of my visit note below.                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 She is here today for follow up and disease management.       Oncology History:      Patient initially presented as a 27-year-old woman seen in referral due to a Pap smear showing high-grade squamous intraepithelial lesion that was positive for high risk HPV subtype in 2007. This Pap smear had been taken during her six week postpartum visit. Patient did have a remote history of abnormal Pap smears back in  while serving in [place] for the [employer].  Pap smears during her first pregnancy were all negative. Her Pap smear at the beginning of the most recent pregnancy was normal. Patient did have a colposcopy for evaluation of this abnormal Pap smear on 2007 and cervical biopsies of 6 and 12 o'clock position showed features consistent with papillary serous adenocarcinoma. Endocervical curettings were also taken which showed fragments of papillary serous carcinoma. The patient then underwent a LEEP procedure on 10/15/07 which showed microinvasive adenocarcinoma with a depth of invasion of 0.4 mm. Patient then made the decision to proceed with radical hysterectomy. She subsequently underwent a radical hysterectomy, bilateral salpingectomy, bilateral pelvic and periaortic lymph node dissection on 10/19/07. Patient's operative course was otherwise uncomplicated and she recoverred uneventfully.  Pathology did reveal patient to be stage IA2 adenocarcinoma of the cervix.      She  underwent routine surveillence through 2014 complicated only by SIOBHAN 1      She presented to the ED at [facility] on 9/14/2019 with severe 8.5/10 RUQ pain associated with nausea, one episode of emesis, and decreased appetite. RUQ demonstrated cholelithiasis without cholecystitis, so an abdominal and pelvic CT scan was obtained. This revealed post-surgical changes consistent with prior hysterectomy, left adnexal cysts thought to be ovarian, and an enlarged pericaval lymph node suspicious for metastatic disease vs primary lymphoma. Her condition stabilized and she was discharged home with referrals to general surgery and oncology for biliary colic and further management of the lymphadenopathy, respectively. CT-guided right retroperitoneal lymph node biopsy on 10/15/2019 was consistent with metastatic cervical carcinoma     She underwent surgery to remove the mass and determine the extent of disease on 11/13/19     PATH:  FINAL DIAGNOSIS:   LYMPH NODES, PARA-AORTIC, RESECTION:   - Positive for carcinoma in three of four lymph nodes examined (3/4),   consistent with recurrent endocervical   adenocarcinoma   - Largest metastatic focus is 3.5 cm, positive for extra yuridia extension         Chemo-Potentiated RT 12/2019-1/2020 2/2020 CT:  IMPRESSION: Postoperative changes of hysterectomy and lymph node  resection for cervical cancer. The metastatic right pelvic lymph nodes  which were previously identified have decreased in size. No new  metastases identified.      She recently presented to the ED for an acute RUQ pain episode, which has greatly improved despite no clear diagnosis.  During this visit she underwent a CAP CT which did not demonstrate evidence of cancer but a slightly enlarged spleen. She had a mild eosinophilia (~2%) but has not traveled or eaten exotic food recently, and has not had any new contacts to suggest mono.      10/13/2020 CT:  IMPRESSION:  1.  Recurrent right external iliac  lymphadenopathy.  2.  Rebound thymic hyperplasia.     10/30/2020 PET  IMPRESSION:   In this patient with history of metastatic cervical cancer status-post  radical hysterectomy and chemoradiation, there is evidence of  recurrent disease:  1. Increased size of a hypermetabolic right external iliac chain lymph node since 2/27/2020.  2. Hypermetabolic right subpectoral and axillary lymph nodes.  Recommend follow-up in the breast center for axillary ultrasound and  possible tissue sampling.  3. Idiopathic thymic activation/hyperplasia.     She was evaluated in the breast clinic with plan made for biopsy - however at biopsy the node had shruck considerably and was thus felt to be c/w inflammation (no biopsy obtained)     3/8/2021 MRI Brain (for dizziness)  IMPRESSION:  1. Heterogeneous mixed solid and cystic 2.7 cm mass in the right cerebellum, most likely a solitary metastatic lesion. There is moderate surrounding vasogenic edema as well as mass effect on the fourth ventricle. No obstructive hydrocephalus.    2. Unremarkable MR angiogram of the head and neck.     3[facility]20[facility]  Midline suboccipital craniotomy for resection of mass  Right frontal ventriculostomy  Stereotactic navigation  Use of operating microscope     4/16/21 Completed 5 fx of gamma knife ([employer])     Plan: Paclitaxel, Cisplatin, Avastin     4/28/2021: Cycle #1 Paclitaxel, Cisplatin, Avastin  5/19/2021: Cycle #2 Paclitaxel/Cisplatin/Avastin +Neulasta  6/9/2021: Cycle #3 Paclitaxel/Cisplatin/Avastin +Neulasta  6/30/2021: Cycle #4 Paclitaxel/Cisplatin/Avastin +Neulasta    6/28/2021 plan: PET scan for interval assessment.  Will plan for 6 cycles followed by possible use of Keytruda versus observation.     7/15/2021: PET CT: IMPRESSION: In this patient with history of cervical cancer who  completed chemoradiation:  1. Resolved uptake to the right pelvic lymph node, consistent with  response to treatment.  2. No new hypermetabolic  lesions.  3. No definite abnormal intracranial uptake. Please note that PET-CT  is less sensitive for intracranial disease due to normal uptake to the  brain. Consider continued following of the previously seen lesions and  surgical changes with MRI if clinically indicated.      7/21/2021: Cycle #5 Paclitaxel/Cisplatin/Avastin +Neulasta  8/11/2021: Cycle #6 Taxotere/Cisplatin/Avastin + Neulasta (Change to Taxotere due to neuropathy) deferred due to insurance  8/18/2021: Cycle #6 Paclitaxel/Cisplatin/Avastin (dose reduced Taxol to 135 mg/m2 due to insurance coverage with Taxotere and patient in agreement with plan- neuropathy mild and improved) +Neulasta    8/21/2021 MRI (HEAD):  Impression: In this patient with history of metastatic carcinoma to  the right cerebellum:  There is no definite evidence of disease recurrence. Decreased  resection cavity enhancement. Previous right occipital lobe  enhancement is completely resolved. No new enhancing intracranial  lesions.    12/6/21 MRI brain - DIANNE  12/8/21 PET - negative for focal uptake    6/17/22 MRI (BRAIN)  IMPRESSION: In this patient with history of metastatic cervical cancer  to the cerebellum, status post resection and chemoradiation, there is  no evidence of recurrence:  Stable postsurgical changes of right cerebellar mass resection without  evidence of local recurrence. No new enhancing intracranial lesions.    PET  1. No abnormal FDG uptake within the body and neck.     2. Postsurgical changes of total abdominal hysterectomy, bilateral  salpingo-oophorectomy and periaortic and pelvic lymph node dissection  without evidence of disease recurrence.    4/28/23 CT (CAP): IMPRESSION:   1.  No evidence of recurrent or metastatic disease.     MRI (brain): IMPRESSION: Stable surgical changes of cerebellar mass resection. No   evidence of local recurrence or new intracranial metastatic disease.     10/2023 MRI  Impression: Stable postsurgical suboccipital craniotomy  changes for  resection of underlying cerebellar mass. No new abnormal intracranial  enhancement.    ROS: No bleeding, weight changes, mild constipation.  Serial URI's this winter (mild and self limited)    Past Medical History:    Past Medical History:   Diagnosis Date     Chronic cholecystitis 09/25/2019     History of cervical cancer 2007     Metastasis to brain (H) 03/08/2021     Metastatic adenocarcinoma to cervix (H) 10/28/2019    Added automatically from request for surgery [id number]         Past Surgical History:    Past Surgical History:   Procedure Laterality Date     CHOLECYSTECTOMY, LAPOROSCOPIC  09/25/2019     HYSTERECTOMY RADICAL  2007    PAUL     INSERT PORT VASCULAR ACCESS Right 5/5/2021    Procedure: INSERTION, VASCULAR ACCESS PORT;  Surgeon: Oral Tloedo MD;  Location: [facility] OR     IR CHEST PORT PLACEMENT > 5 YRS OF AGE  5/5/2021     IR PORT REMOVAL RIGHT  12/21/2023     LAPAROSCOPIC LYMPHADENECTOMY N/A 11/13/2019    Procedure: LAPAROSCOPY, resection of of paraaortic lymph node, lysis of adhesions;  Surgeon: Jluis Canela MD;  Location: [facility] OR     OPTICAL TRACKING SYSTEM CRANIOTOMY, EXCISE TUMOR, COMBINED Bilateral 3/15/2021    Procedure: stealth assisted Midline suboccipital craniectomy/craniotomy for tumor;  Surgeon: Martín Myrick MD;  Location: [facility] OR     OPTICAL TRACKING SYSTEM VENTRICULOSTOMY Right 3/15/2021    Procedure: stealth assisted  right frontal ventriculostomy;  Surgeon: Martín Myrick MD;  Location: [facility] OR     REMOVE PORT VASCULAR ACCESS Right 12/21/2023    Procedure: Remove port vascular access right;  Surgeon: Adali Miguel PA-C;  [place] [facility] [place]         Health Maintenance Due   Topic Date Due     YEARLY PREVENTIVE VISIT  Never done     ADVANCE CARE PLANNING  Never done     Pneumococcal Vaccine: Pediatrics (0 to 5 Years) and At-Risk Patients (6 to 64 Years) (1 of 2 - PCV) Never done     COLORECTAL CANCER SCREENING  Never done     HIV SCREENING   Never done     HEPATITIS C SCREENING  Never done     HEPATITIS B IMMUNIZATION (1 of 3 - 19+ 3-dose series) Never done     LIPID  Never done     MAMMO SCREENING  11/09/2021     INFLUENZA VACCINE (1) 09/01/2023     COVID-19 Vaccine (3 - 2023-24 season) 09/01/2023     PHQ-2 (once per calendar year)  01/01/2024     PAP  08/31/2024       Current Medications:     Current Outpatient Medications   Medication Sig Dispense Refill     acetaminophen (TYLENOL) 325 MG tablet Take 2 tablets (650 mg) by mouth every 6 hours 24 tablet 0     Ascorbic Acid (VITAMIN C GUMMIE PO) Take by mouth daily       dexamethasone (DECADRON) 4 MG tablet Take 2 tablets (8 mg) by mouth daily (with breakfast) Take 2 tablets by mouth with food on days 2-4 after chemotherapy. 12 tablet 3     DULoxetine (CYMBALTA) 30 MG capsule Take 1 capsule (30 mg) by mouth 2 times daily 180 capsule 2     famotidine (PEPCID) 10 MG tablet Take 1 tablet (10 mg) by mouth 2 times daily 60 tablet 1     gabapentin (NEURONTIN) 300 MG capsule Take 1 capsule (300 mg) by mouth At Bedtime 30 capsule 2     ibuprofen (ADVIL/MOTRIN) 200 MG tablet Take 200 mg by mouth every 4 hours as needed for mild pain       lidocaine-prilocaine (EMLA) 2.5-2.5 % external cream Apply topically as needed for moderate pain 30 g 1     loperamide (IMODIUM A-D) 2 MG tablet Take 1 tablet (2 mg) by mouth 4 times daily as needed for diarrhea Can take one 2 mg capsule after each loose stool up to 4 times a day as needed for diarrhea. This can be increased to 4 mg 4 times a day or 2 mg up to 8 times a day. 60 tablet 3     LORazepam (ATIVAN) 0.5 MG tablet Take 1 tablet (0.5 mg) by mouth every 6 hours as needed for nausea 20 tablet 0     multivitamin w/minerals (THERA-VIT-M) tablet Take 1 tablet by mouth daily       OLANZapine (ZYPREXA) 5 MG tablet Take 1 tablet (5 mg) by mouth At Bedtime 30 tablet 0     SENNA-docusate sodium (SENNA S) 8.6-50 MG tablet Take 1 tablet by mouth At Bedtime 30 tablet 3  "        Allergies:        Allergies   Allergen Reactions     Emend [Aprepitant]      Prochlorperazine      \"i was told I turn blue\"        Social History:     Social History     Tobacco Use     Smoking status: Former     Types: Cigarettes     Smokeless tobacco: Never   Substance Use Topics     Alcohol use: Yes     Comment: one drink a week       History   Drug Use No         Family History:     The patient's family history is notable for     Family History   Problem Relation Age of Onset     Aneurysm Mother      Breast Cancer Paternal Grandmother         bilat mastectomies     Breast Cancer Maternal Aunt         stage 1     Thyroid Cancer Sister 23         Physical Exam:     PS 0  VS: /83   Pulse 77   Resp 16   Ht 1.676 m (5' 6\")   Wt 113.1 kg (249 lb 4.8 oz)   SpO2 96%   BMI 40.24 kg/m       General Appearance: healthy and alert, no distress, overweight     HEENT: no thyromegaly, no palpable nodules or masses        Cardiovascular: regular rate and rhythm, no gallops, rubs or murmurs     Respiratory: lungs clear, no rales, rhonchi or wheezes    Musculoskeletal: extremities non tender and without edema    Skin: no lesions or rashes     Neurological: normal gait, no gross defects     Psychiatric: appropriate mood and affect                               Hematological: normal cervical, supraclavicular lymph nodes     Gastrointestinal:       abdomen soft, non-tender, non-distended, no organomegaly or masses, obese.    Genitourinary: Normal BUS, no lesions in vagina seen. BME no palpable masses or nodules.  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 She is here today for follow up and disease management.     30 minutes spent on the date of the encounter doing chart review, history and exam, documentation, and further activities as noted above.        Plan: "     1.)         Rec. Cx Cancer with brain met: s/p primary therapy:  She had discussed options of observation vs. maintenance therapy.  She has no residual disease on PET or brain MRI (12/2021, 6/2022, 4/23) at this point, but obviously remains at risk for recurrence.  After discussing the pros and cons of each option - she decided on observation.  She will likely be a candidate for Keytruda should she recur.    She has moved to annual visits with Neurosurgery after her negative scan in 10/2023    Has completed 1 year of surveillance since her last scan, will repeat presently.       2.)  Genetic risk factors were assessed again and the patient has multiple family members with cancer diagnosed younger than age 50 including herself (age 40, but with likely HPV related disease) and her sister (thyroid cancer at age 23) among others. She met with genetics 1/2021, but did not proceed until 12/2021 (NEGATIVE)     3.) Labs and/or tests ordered include: PET scan     4.)   Health maintenance issues addressed today include annual health maintenance and non-gynecologic issues with PCP.         Jluis Canela MD    Division of Gynecologic Oncology  [facility]    Of a 30 minute appointment, more than 50% was spent in counseling the patient.      CC  Patient Care Team:  Tyrese Bowie MD as PCP - General (Family Medicine)  Jluis Canela MD as MD (Gynecologic Oncology)  Jluis Canela MD as Assigned Cancer Care Provider  Danielle Trivedi APRN CNP as Nurse Practitioner (Neurological Surgery)  Summer Patel PA-C as Physician Assistant (Neurological Surgery)  Summer Patel PA-C as Assigned Neuroscience Provider      Again, thank you for allowing me to participate in the care of your patient.        Sincerely,        Jluis Canela MD

## 2024-03-28 NOTE — PATIENT INSTRUCTIONS
Plan:     Schedule appointment for PET scan soon.  Follow up with Dr. Canela after the scan, to review the results.    Please call us with any questions or concerns following your appointment with us today. Thank you for choosing [facility].    [facility] - Contact Information    Clinic Hours: Monday - Friday, 8:00 AM to 4:30 PM    Appointment Scheduling [phone] (option #4)   Triage Nurses (for symptom/side effect concerns, or urgent needs) [phone]   After-Hours Advice Line [phone]   Dr. Canela's Nurse, [place] [phone]   Fax Number [phone]

## 2024-03-28 NOTE — NURSING NOTE
"Oncology Rooming Note    March 28, 2024 8:35 AM   Francesca Rowland is a 43 year old female who presents for:    Chief Complaint   Patient presents with    Oncology Clinic Visit     Follow up     Initial Vitals: /83   Pulse 77   Resp 16   Ht 1.676 m (5' 6\")   Wt 113.1 kg (249 lb 4.8 oz)   SpO2 96%   BMI 40.24 kg/m   Estimated body mass index is 40.24 kg/m  as calculated from the following:    Height as of this encounter: 1.676 m (5' 6\").    Weight as of this encounter: 113.1 kg (249 lb 4.8 oz). Body surface area is 2.29 meters squared.  No Pain (0) Comment: Data Unavailable   No LMP recorded. Patient has had a hysterectomy.  Allergies reviewed: Yes  Medications reviewed: Yes    Medications: Medication refills not needed today.  Pharmacy name entered into [facility]: [id number] - [place], [place] - [address]    Frailty Screening:   Is the patient here for a new oncology consult visit in cancer care? 2. No      Clinical concerns: No Concerns       Tariq Carlos MA            "

## 2024-04-23 ENCOUNTER — HOSPITAL ENCOUNTER (OUTPATIENT)
Dept: PET IMAGING | Facility: CLINIC | Age: 44
Discharge: HOME | End: 2024-04-23
Attending: OBSTETRICS & GYNECOLOGY | Admitting: OBSTETRICS & GYNECOLOGY
Payer: COMMERCIAL

## 2024-04-23 DIAGNOSIS — C53.9 CERVIX CANCER (H): ICD-10-CM

## 2024-04-23 PROCEDURE — A9552 F18 FDG: HCPCS | Performed by: OBSTETRICS & GYNECOLOGY

## 2024-04-23 PROCEDURE — 74177 CT ABD & PELVIS W/CONTRAST: CPT | Mod: 26 | Performed by: STUDENT IN AN ORGANIZED HEALTH CARE EDUCATION/TRAINING PROGRAM

## 2024-04-23 PROCEDURE — 250N000011 HC RX IP 250 OP 636: Performed by: OBSTETRICS & GYNECOLOGY

## 2024-04-23 PROCEDURE — 71260 CT THORAX DX C+: CPT

## 2024-04-23 PROCEDURE — 78816 PET IMAGE W/CT FULL BODY: CPT | Mod: PS

## 2024-04-23 PROCEDURE — 343N000001 HC RX 343: Performed by: OBSTETRICS & GYNECOLOGY

## 2024-04-23 PROCEDURE — 71260 CT THORAX DX C+: CPT | Mod: 26 | Performed by: STUDENT IN AN ORGANIZED HEALTH CARE EDUCATION/TRAINING PROGRAM

## 2024-04-23 PROCEDURE — 78816 PET IMAGE W/CT FULL BODY: CPT | Mod: 26 | Performed by: STUDENT IN AN ORGANIZED HEALTH CARE EDUCATION/TRAINING PROGRAM

## 2024-04-23 RX ORDER — FLUDEOXYGLUCOSE F 18 200 MCI/ML
10-18 INJECTION, SOLUTION INTRAVENOUS ONCE
Status: COMPLETED | OUTPATIENT
Start: 2024-04-23 | End: 2024-04-23

## 2024-04-23 RX ORDER — IOPAMIDOL 755 MG/ML
10-135 INJECTION, SOLUTION INTRAVASCULAR ONCE
Status: COMPLETED | OUTPATIENT
Start: 2024-04-23 | End: 2024-04-23

## 2024-04-23 RX ADMIN — FLUDEOXYGLUCOSE F 18 14.89 MILLICURIE: 200 INJECTION, SOLUTION INTRAVENOUS at 14:25

## 2024-04-23 RX ADMIN — IOPAMIDOL 135 ML: 755 INJECTION, SOLUTION INTRAVENOUS at 15:21

## 2024-05-05 NOTE — PROGRESS NOTES
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 She is here today for follow up and disease management.       Oncology History:      Patient initially presented as a 27-year-old woman seen in referral due to a Pap smear showing high-grade squamous intraepithelial lesion that was positive for high risk HPV subtype in 2007. This Pap smear had been taken during her six week postpartum visit. Patient did have a remote history of abnormal Pap smears back in  while serving in [place] for the [employer].  Pap smears during her first pregnancy were all negative. Her Pap smear at the beginning of the most recent pregnancy was normal. Patient did have a colposcopy for evaluation of this abnormal Pap smear on 2007 and cervical biopsies of 6 and 12 o'clock position showed features consistent with papillary serous adenocarcinoma. Endocervical curettings were also taken which showed fragments of papillary serous carcinoma. The patient then underwent a LEEP procedure on 10/15/07 which showed microinvasive adenocarcinoma with a depth of invasion of 0.4 mm. Patient then made the decision to proceed with radical hysterectomy. She subsequently underwent a radical hysterectomy, bilateral salpingectomy, bilateral pelvic and periaortic lymph node dissection on 10/19/07. Patient's operative course was otherwise uncomplicated and she recoverred uneventfully.  Pathology did reveal patient to be stage IA2 adenocarcinoma of the cervix.      She underwent routine surveillence through  complicated only by SIOBHAN 1      She presented to the ED at [facility] on 2019 with severe 8.5/10 RUQ pain associated with nausea, one episode of emesis, and decreased appetite. RUQ demonstrated cholelithiasis without  cholecystitis, so an abdominal and pelvic CT scan was obtained. This revealed post-surgical changes consistent with prior hysterectomy, left adnexal cysts thought to be ovarian, and an enlarged pericaval lymph node suspicious for metastatic disease vs primary lymphoma. Her condition stabilized and she was discharged home with referrals to general surgery and oncology for biliary colic and further management of the lymphadenopathy, respectively. CT-guided right retroperitoneal lymph node biopsy on 10/15/2019 was consistent with metastatic cervical carcinoma     She underwent surgery to remove the mass and determine the extent of disease on 11/13/19     PATH:  FINAL DIAGNOSIS:   LYMPH NODES, PARA-AORTIC, RESECTION:   - Positive for carcinoma in three of four lymph nodes examined (3/4),   consistent with recurrent endocervical   adenocarcinoma   - Largest metastatic focus is 3.5 cm, positive for extra yuridia extension         Chemo-Potentiated RT 12/2019-1/2020 2/2020 CT:  IMPRESSION: Postoperative changes of hysterectomy and lymph node  resection for cervical cancer. The metastatic right pelvic lymph nodes  which were previously identified have decreased in size. No new  metastases identified.      She recently presented to the ED for an acute RUQ pain episode, which has greatly improved despite no clear diagnosis.  During this visit she underwent a CAP CT which did not demonstrate evidence of cancer but a slightly enlarged spleen. She had a mild eosinophilia (~2%) but has not traveled or eaten exotic food recently, and has not had any new contacts to suggest mono.      10/13/2020 CT:  IMPRESSION:  1.  Recurrent right external iliac lymphadenopathy.  2.  Rebound thymic hyperplasia.     10/30/2020 PET  IMPRESSION:   In this patient with history of metastatic cervical cancer status-post  radical hysterectomy and chemoradiation, there is evidence of  recurrent disease:  1. Increased size of a hypermetabolic right  external iliac chain lymph node since 2/27/2020.  2. Hypermetabolic right subpectoral and axillary lymph nodes.  Recommend follow-up in the breast center for axillary ultrasound and  possible tissue sampling.  3. Idiopathic thymic activation/hyperplasia.     She was evaluated in the breast clinic with plan made for biopsy - however at biopsy the node had shruck considerably and was thus felt to be c/w inflammation (no biopsy obtained)     3/8/2021 MRI Brain (for dizziness)  IMPRESSION:  1. Heterogeneous mixed solid and cystic 2.7 cm mass in the right cerebellum, most likely a solitary metastatic lesion. There is moderate surrounding vasogenic edema as well as mass effect on the fourth ventricle. No obstructive hydrocephalus.    2. Unremarkable MR angiogram of the head and neck.     3/15/2021 Craniotomy (Replaced by [facility])  Midline suboccipital craniotomy for resection of mass  Right frontal ventriculostomy  Stereotactic navigation  Use of operating microscope     4/16/21 Completed 5 fx of gamma knife (ROIÂ²)     Plan: Paclitaxel, Cisplatin, Avastin     4/28/2021: Cycle #1 Paclitaxel, Cisplatin, Avastin  5/19/2021: Cycle #2 Paclitaxel/Cisplatin/Avastin +Neulasta  6/9/2021: Cycle #3 Paclitaxel/Cisplatin/Avastin +Neulasta  6/30/2021: Cycle #4 Paclitaxel/Cisplatin/Avastin +Neulasta    6/28/2021 plan: PET scan for interval assessment.  Will plan for 6 cycles followed by possible use of Keytruda versus observation.     7/15/2021: PET CT: IMPRESSION: In this patient with history of cervical cancer who  completed chemoradiation:  1. Resolved uptake to the right pelvic lymph node, consistent with  response to treatment.  2. No new hypermetabolic lesions.  3. No definite abnormal intracranial uptake. Please note that PET-CT  is less sensitive for intracranial disease due to normal uptake to the  brain. Consider continued following of the previously seen lesions and  surgical changes with MRI if clinically indicated.      7/21/2021:  Cycle #5 Paclitaxel/Cisplatin/Avastin +Neulasta  8/11/2021: Cycle #6 Taxotere/Cisplatin/Avastin + Neulasta (Change to Taxotere due to neuropathy) deferred due to insurance  8/18/2021: Cycle #6 Paclitaxel/Cisplatin/Avastin (dose reduced Taxol to 135 mg/m2 due to insurance coverage with Taxotere and patient in agreement with plan- neuropathy mild and improved) +Neulasta    8/21/2021 MRI (HEAD):  Impression: In this patient with history of metastatic carcinoma to  the right cerebellum:  There is no definite evidence of disease recurrence. Decreased  resection cavity enhancement. Previous right occipital lobe  enhancement is completely resolved. No new enhancing intracranial  lesions.    12/6/21 MRI brain - DIANNE  12/8/21 PET - negative for focal uptake    6/17/22 MRI (BRAIN)  IMPRESSION: In this patient with history of metastatic cervical cancer  to the cerebellum, status post resection and chemoradiation, there is  no evidence of recurrence:  Stable postsurgical changes of right cerebellar mass resection without  evidence of local recurrence. No new enhancing intracranial lesions.    PET  1. No abnormal FDG uptake within the body and neck.     2. Postsurgical changes of total abdominal hysterectomy, bilateral  salpingo-oophorectomy and periaortic and pelvic lymph node dissection  without evidence of disease recurrence.    4/28/23 CT (CAP): IMPRESSION:   1.  No evidence of recurrent or metastatic disease.     MRI (brain): IMPRESSION: Stable surgical changes of cerebellar mass resection. No   evidence of local recurrence or new intracranial metastatic disease.     10/2023 MRI  Impression: Stable postsurgical suboccipital craniotomy changes for  resection of underlying cerebellar mass. No new abnormal intracranial  enhancement.    4/23/24 PET  1. Post surgical changes of radical hysterectomy, bilateral   salpingectomy and periaortic and pelvic lymph node dissection without   evidence of local recurrence.   2. No evidence  of abnormal FDG uptake to suggest distant metastases.   3. Focal FDG uptake in the left thyroid gland without discrete nodule,   new compared to 6/17/2022 PET/CT. Recommend further evaluation with   ultrasound.     ROS: No bleeding, weight changes, mild constipation.  Losing weight with effort (weight watchers), no neurologic signs/symptoms    Past Medical History:    Past Medical History:   Diagnosis Date    Chronic cholecystitis 09/25/2019    History of cervical cancer 2007    Metastasis to brain (H) 03/08/2021    Metastatic adenocarcinoma to cervix (H) 10/28/2019    Added automatically from request for surgery [id number]         Past Surgical History:    Past Surgical History:   Procedure Laterality Date    CHOLECYSTECTOMY, LAPOROSCOPIC  09/25/2019    HYSTERECTOMY RADICAL  2007    PAUL    INSERT PORT VASCULAR ACCESS Right 5/5/2021    Procedure: INSERTION, VASCULAR ACCESS PORT;  Surgeon: Oral Toledo MD;  [place] [facility] [place]    IR CHEST PORT PLACEMENT > 5 YRS OF AGE  5/5/2021    IR PORT REMOVAL RIGHT  12/21/2023    LAPAROSCOPIC LYMPHADENECTOMY N/A 11/13/2019    Procedure: LAPAROSCOPY, resection of of paraaortic lymph node, lysis of adhesions;  Surgeon: Jluis Canela MD;  Location: [facility] OR    OPTICAL TRACKING SYSTEM CRANIOTOMY, EXCISE TUMOR, COMBINED Bilateral 3/15/2021    Procedure: stealth assisted Midline suboccipital craniectomy/craniotomy for tumor;  Surgeon: Martín Myrick MD;  Location: [facility] OR    OPTICAL TRACKING SYSTEM VENTRICULOSTOMY Right 3/15/2021    Procedure: stealth assisted  right frontal ventriculostomy;  Surgeon: Martín Myrick MD;  Location:  OR    REMOVE PORT VASCULAR ACCESS Right 12/21/2023    Procedure: Remove port vascular access right;  Surgeon: Adali Miguel PA-C;  [place] [facility] [place]         Health Maintenance Due   Topic Date Due    YEARLY PREVENTIVE VISIT  Never done    ADVANCE CARE PLANNING  Never done    Pneumococcal Vaccine: Pediatrics (0 to 5 Years) and  At-Risk Patients (6 to 64 Years) (1 of 2 - PCV) Never done    COLORECTAL CANCER SCREENING  Never done    HIV SCREENING  Never done    HEPATITIS C SCREENING  Never done    HEPATITIS B IMMUNIZATION (1 of 3 - 19+ 3-dose series) Never done    LIPID  Never done    MAMMO SCREENING  11/09/2021    COVID-19 Vaccine (3 - 2023-24 season) 09/01/2023    PHQ-2 (once per calendar year)  01/01/2024    PAP  08/31/2024       Current Medications:     Current Outpatient Medications   Medication Sig Dispense Refill    acetaminophen (TYLENOL) 325 MG tablet Take 2 tablets (650 mg) by mouth every 6 hours 24 tablet 0    Ascorbic Acid (VITAMIN C GUMMIE PO) Take by mouth daily      dexamethasone (DECADRON) 4 MG tablet Take 2 tablets (8 mg) by mouth daily (with breakfast) Take 2 tablets by mouth with food on days 2-4 after chemotherapy. 12 tablet 3    DULoxetine (CYMBALTA) 30 MG capsule Take 1 capsule (30 mg) by mouth 2 times daily 180 capsule 2    famotidine (PEPCID) 10 MG tablet Take 1 tablet (10 mg) by mouth 2 times daily 60 tablet 1    gabapentin (NEURONTIN) 300 MG capsule Take 1 capsule (300 mg) by mouth At Bedtime 30 capsule 2    ibuprofen (ADVIL/MOTRIN) 200 MG tablet Take 200 mg by mouth every 4 hours as needed for mild pain      lidocaine-prilocaine (EMLA) 2.5-2.5 % external cream Apply topically as needed for moderate pain 30 g 1    loperamide (IMODIUM A-D) 2 MG tablet Take 1 tablet (2 mg) by mouth 4 times daily as needed for diarrhea Can take one 2 mg capsule after each loose stool up to 4 times a day as needed for diarrhea. This can be increased to 4 mg 4 times a day or 2 mg up to 8 times a day. 60 tablet 3    LORazepam (ATIVAN) 0.5 MG tablet Take 1 tablet (0.5 mg) by mouth every 6 hours as needed for nausea 20 tablet 0    multivitamin w/minerals (THERA-VIT-M) tablet Take 1 tablet by mouth daily      OLANZapine (ZYPREXA) 5 MG tablet Take 1 tablet (5 mg) by mouth At Bedtime 30 tablet 0    SENNA-docusate sodium (SENNA S) 8.6-50 MG  "tablet Take 1 tablet by mouth At Bedtime 30 tablet 3         Allergies:        Allergies   Allergen Reactions    Emend [Aprepitant]     Prochlorperazine      \"i was told I turn blue\"        Social History:     Social History     Tobacco Use    Smoking status: Former     Types: Cigarettes    Smokeless tobacco: Never   Substance Use Topics    Alcohol use: Yes     Comment: one drink a week       History   Drug Use No         Family History:     The patient's family history is notable for     Family History   Problem Relation Age of Onset    Aneurysm Mother     Breast Cancer Paternal Grandmother         bilat mastectomies    Breast Cancer Maternal Aunt         stage 1    Thyroid Cancer Sister 23         Physical Exam:  video visit only    PS 0      General Appearance: healthy and alert, no distress     HEENT: NC/AT visibly    Skin: no lesions or rashes     Neurological: normal gait, no gross defects     Psychiatric: appropriate mood and affect                                   Assessment:    Francesca Rowland is a woman with a diagnosis of 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 She is here today for follow up and disease management.     30 minutes spent on the date of the encounter doing chart review, history and exam, documentation, and further activities as noted above.        Plan:     1.)         Rec. Cx Cancer with brain met post primary therapy:  She had discussed options of observation vs. maintenance therapy.  She has no residual disease on PET or brain MRI (12/2021, 6/2022, 4/23) at this point, but obviously remains at risk for recurrence.  After discussing the pros and cons of each option - she decided on observation.  She remains active and without symptoms (recent PET largely negative - needs thyroid US) and brain MRI pending fro November per Neurosurg team.    She will likely be a candidate for Keytruda should she recur.       2.)  Genetic risk " factors were assessed again and the patient has multiple family members with cancer diagnosed younger than age 50 including herself (age 40, but with likely HPV related disease) and her sister (thyroid cancer at age 23) among others. She met with genetics 1/2021, but did not proceed until 12/2021 (NEGATIVE)     3.) Labs and/or tests ordered include: thyroid US     4.)   Health maintenance issues addressed today include annual health maintenance and non-gynecologic issues with PCP.         Jluis Canela MD    Division of Gynecologic Oncology  [facility]    Of a 30 minute appointment, more than 50% was spent in counseling the patient.      CC  Patient Care Team:  Tyrese Bowie MD as PCP - General (Family Medicine)  Jluis Canela MD as MD (Gynecologic Oncology)  Jluis Canela MD as Assigned Cancer Care Provider  Danielle Trivedi APRN CNP as Nurse Practitioner (Neurological Surgery)  Summer Patel PA-C as Physician Assistant (Neurological Surgery)  Summer Patel PA-C as Assigned Neuroscience Provider

## 2024-05-06 ENCOUNTER — VIRTUAL VISIT (OUTPATIENT)
Dept: ONCOLOGY | Facility: CLINIC | Age: 44
End: 2024-05-06
Attending: OBSTETRICS & GYNECOLOGY
Payer: COMMERCIAL

## 2024-05-06 VITALS — HEIGHT: 66 IN | WEIGHT: 248 LBS | BODY MASS INDEX: 39.86 KG/M2

## 2024-05-06 DIAGNOSIS — C53.9 MALIGNANT NEOPLASM OF CERVIX, UNSPECIFIED SITE (H): Primary | ICD-10-CM

## 2024-05-06 PROCEDURE — 99214 OFFICE O/P EST MOD 30 MIN: CPT | Mod: 95 | Performed by: OBSTETRICS & GYNECOLOGY

## 2024-05-06 ASSESSMENT — PAIN SCALES - GENERAL: PAINLEVEL: NO PAIN (0)

## 2024-05-06 NOTE — NURSING NOTE
Is the patient currently in the state of [place]? YES    Visit mode:VIDEO    If the visit is dropped, the patient can be reconnected by: VIDEO VISIT: Text to cell phone:   Telephone Information:   Mobile [phone]       Will anyone else be joining the visit? NO  (If patient encounters technical issues they should call [phone]    How would you like to obtain your AVS? MyChart    Are changes needed to the allergy or medication list? No    Are refills needed on medications prescribed by this physician? NO    Reason for visit: DONALD DE LUNA

## 2024-05-06 NOTE — NURSING NOTE
Return appt in  6 months    Thyroid ultrasound orders entered    Check out note  sent to scheduling 5/10/24

## 2024-05-06 NOTE — PROGRESS NOTES
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 She is here today for follow up and disease management.       Oncology History:      Patient initially presented as a 27-year-old woman seen in referral due to a Pap smear showing high-grade squamous intraepithelial lesion that was positive for high risk HPV subtype in 2007. This Pap smear had been taken during her six week postpartum visit. Patient did have a remote history of abnormal Pap smears back in  while serving in [place] for the [employer]  Pap smears during her first pregnancy were all negative. Her Pap smear at the beginning of the most recent pregnancy was normal. Patient did have a colposcopy for evaluation of this abnormal Pap smear on 2007 and cervical biopsies of 6 and 12 o'clock position showed features consistent with papillary serous adenocarcinoma. Endocervical curettings were also taken which showed fragments of papillary serous carcinoma. The patient then underwent a LEEP procedure on 10/15/07 which showed microinvasive adenocarcinoma with a depth of invasion of 0.4 mm. Patient then made the decision to proceed with radical hysterectomy. She subsequently underwent a radical hysterectomy, bilateral salpingectomy, bilateral pelvic and periaortic lymph node dissection on 10/19/07. Patient's operative course was otherwise uncomplicated and she recoverred uneventfully.  Pathology did reveal patient to be stage IA2 adenocarcinoma of the cervix.      She underwent routine surveillence through  complicated only by SIOBHAN 1      She presented to the ED at [facility] on 2019 with severe 8.5/10 RUQ pain associated with nausea, one episode of emesis, and decreased appetite. RUQ demonstrated cholelithiasis without  cholecystitis, so an abdominal and pelvic CT scan was obtained. This revealed post-surgical changes consistent with prior hysterectomy, left adnexal cysts thought to be ovarian, and an enlarged pericaval lymph node suspicious for metastatic disease vs primary lymphoma. Her condition stabilized and she was discharged home with referrals to general surgery and oncology for biliary colic and further management of the lymphadenopathy, respectively. CT-guided right retroperitoneal lymph node biopsy on 10/15/2019 was consistent with metastatic cervical carcinoma     She underwent surgery to remove the mass and determine the extent of disease on 11/13/19     PATH:  FINAL DIAGNOSIS:   LYMPH NODES, PARA-AORTIC, RESECTION:   - Positive for carcinoma in three of four lymph nodes examined (3/4),   consistent with recurrent endocervical   adenocarcinoma   - Largest metastatic focus is 3.5 cm, positive for extra yuridia extension         Chemo-Potentiated RT 12/2019-1/2020 2/2020 CT:  IMPRESSION: Postoperative changes of hysterectomy and lymph node  resection for cervical cancer. The metastatic right pelvic lymph nodes  which were previously identified have decreased in size. No new  metastases identified.      She recently presented to the ED for an acute RUQ pain episode, which has greatly improved despite no clear diagnosis.  During this visit she underwent a CAP CT which did not demonstrate evidence of cancer but a slightly enlarged spleen. She had a mild eosinophilia (~2%) but has not traveled or eaten exotic food recently, and has not had any new contacts to suggest mono.      10/13/2020 CT:  IMPRESSION:  1.  Recurrent right external iliac lymphadenopathy.  2.  Rebound thymic hyperplasia.     10/30/2020 PET  IMPRESSION:   In this patient with history of metastatic cervical cancer status-post  radical hysterectomy and chemoradiation, there is evidence of  recurrent disease:  1. Increased size of a hypermetabolic right  external iliac chain lymph node since 2/27/2020.  2. Hypermetabolic right subpectoral and axillary lymph nodes.  Recommend follow-up in the breast center for axillary ultrasound and  possible tissue sampling.  3. Idiopathic thymic activation/hyperplasia.     She was evaluated in the breast clinic with plan made for biopsy - however at biopsy the node had shruck considerably and was thus felt to be c/w inflammation (no biopsy obtained)     3/8/2021 MRI Brain (for dizziness)  IMPRESSION:  1. Heterogeneous mixed solid and cystic 2.7 cm mass in the right cerebellum, most likely a solitary metastatic lesion. There is moderate surrounding vasogenic edema as well as mass effect on the fourth ventricle. No obstructive hydrocephalus.    2. Unremarkable MR angiogram of the head and neck.     3/15/2021 [facility])  Midline suboccipital craniotomy for resection of mass  Right frontal ventriculostomy  Stereotactic navigation  Use of operating microscope     4/16/21 Completed 5 fx of gamma knife ([employer])     Plan: Paclitaxel, Cisplatin, Avastin     4/28/2021: Cycle #1 Paclitaxel, Cisplatin, Avastin  5/19/2021: Cycle #2 Paclitaxel/Cisplatin/Avastin +Neulasta  6/9/2021: Cycle #3 Paclitaxel/Cisplatin/Avastin +Neulasta  6/30/2021: Cycle #4 Paclitaxel/Cisplatin/Avastin +Neulasta    6/28/2021 plan: PET scan for interval assessment.  Will plan for 6 cycles followed by possible use of Keytruda versus observation.     7/15/2021: PET CT: IMPRESSION: In this patient with history of cervical cancer who  completed chemoradiation:  1. Resolved uptake to the right pelvic lymph node, consistent with  response to treatment.  2. No new hypermetabolic lesions.  3. No definite abnormal intracranial uptake. Please note that PET-CT  is less sensitive for intracranial disease due to normal uptake to the  brain. Consider continued following of the previously seen lesions and  surgical changes with MRI if clinically indicated.      7/21/2021:  Cycle #5 Paclitaxel/Cisplatin/Avastin +Neulasta  8/11/2021: Cycle #6 Taxotere/Cisplatin/Avastin + Neulasta (Change to Taxotere due to neuropathy) deferred due to insurance  8/18/2021: Cycle #6 Paclitaxel/Cisplatin/Avastin (dose reduced Taxol to 135 mg/m2 due to insurance coverage with Taxotere and patient in agreement with plan- neuropathy mild and improved) +Neulasta    8/21/2021 MRI (HEAD):  Impression: In this patient with history of metastatic carcinoma to  the right cerebellum:  There is no definite evidence of disease recurrence. Decreased  resection cavity enhancement. Previous right occipital lobe  enhancement is completely resolved. No new enhancing intracranial  lesions.    12/6/21 MRI brain - DIANNE  12/8/21 PET - negative for focal uptake    6/17/22 MRI (BRAIN)  IMPRESSION: In this patient with history of metastatic cervical cancer  to the cerebellum, status post resection and chemoradiation, there is  no evidence of recurrence:  Stable postsurgical changes of right cerebellar mass resection without  evidence of local recurrence. No new enhancing intracranial lesions.    PET  1. No abnormal FDG uptake within the body and neck.     2. Postsurgical changes of total abdominal hysterectomy, bilateral  salpingo-oophorectomy and periaortic and pelvic lymph node dissection  without evidence of disease recurrence.    4/28/23 CT (CAP): IMPRESSION:   1.  No evidence of recurrent or metastatic disease.     MRI (brain): IMPRESSION: Stable surgical changes of cerebellar mass resection. No   evidence of local recurrence or new intracranial metastatic disease.     10/2023 MRI  Impression: Stable postsurgical suboccipital craniotomy changes for  resection of underlying cerebellar mass. No new abnormal intracranial  enhancement.    4/23/24 PET  1. Post surgical changes of radical hysterectomy, bilateral   salpingectomy and periaortic and pelvic lymph node dissection without   evidence of local recurrence.   2. No evidence  of abnormal FDG uptake to suggest distant metastases.   3. Focal FDG uptake in the left thyroid gland without discrete nodule,   new compared to 6/17/2022 PET/CT. Recommend further evaluation with   ultrasound.     ROS: No bleeding, weight changes, mild constipation.  Losing weight with effort (weight watchers), no neurologic signs/symptoms    Past Medical History:    Past Medical History:   Diagnosis Date    Chronic cholecystitis 09/25/2019    History of cervical cancer 2007    Metastasis to brain (H) 03/08/2021    Metastatic adenocarcinoma to cervix (H) 10/28/2019    Added automatically from request for surgery [id number]         Past Surgical History:    Past Surgical History:   Procedure Laterality Date    CHOLECYSTECTOMY, LAPOROSCOPIC  09/25/2019    HYSTERECTOMY RADICAL  2007    PAUL    INSERT PORT VASCULAR ACCESS Right 5/5/2021    Procedure: INSERTION, VASCULAR ACCESS PORT;  Surgeon: Oral Toledo MD;  Location: [facility] [place] OR    IR CHEST PORT PLACEMENT > 5 YRS OF AGE  5/5/2021    IR PORT REMOVAL RIGHT  12/21/2023    LAPAROSCOPIC LYMPHADENECTOMY N/A 11/13/2019    Procedure: LAPAROSCOPY, resection of of paraaortic lymph node, lysis of adhesions;  Surgeon: Jluis Canela MD;  Location: [facility] OR    OPTICAL TRACKING SYSTEM CRANIOTOMY, EXCISE TUMOR, COMBINED Bilateral 3/15/2021    Procedure: stealth assisted Midline suboccipital craniectomy/craniotomy for tumor;  Surgeon: Martín Myrick MD;  Location: [facility] OR    OPTICAL TRACKING SYSTEM VENTRICULOSTOMY Right 3/15/2021    Procedure: stealth assisted  right frontal ventriculostomy;  Surgeon: Martín Myrick MD;  Location:  OR    REMOVE PORT VASCULAR ACCESS Right 12/21/2023    Procedure: Remove port vascular access right;  Surgeon: Adali Miguel PA-C;  Location: [facility] [place] OR         Health Maintenance Due   Topic Date Due    YEARLY PREVENTIVE VISIT  Never done    ADVANCE CARE PLANNING  Never done    Pneumococcal Vaccine: Pediatrics (0 to 5 Years) and  At-Risk Patients (6 to 64 Years) (1 of 2 - PCV) Never done    COLORECTAL CANCER SCREENING  Never done    HIV SCREENING  Never done    HEPATITIS C SCREENING  Never done    HEPATITIS B IMMUNIZATION (1 of 3 - 19+ 3-dose series) Never done    LIPID  Never done    MAMMO SCREENING  11/09/2021    COVID-19 Vaccine (3 - 2023-24 season) 09/01/2023    PHQ-2 (once per calendar year)  01/01/2024    PAP  08/31/2024       Current Medications:     Current Outpatient Medications   Medication Sig Dispense Refill    DULoxetine (CYMBALTA) 30 MG capsule Take 1 capsule (30 mg) by mouth 2 times daily 180 capsule 2    ibuprofen (ADVIL/MOTRIN) 200 MG tablet Take 200 mg by mouth every 4 hours as needed for mild pain      multivitamin w/minerals (THERA-VIT-M) tablet Take 1 tablet by mouth daily      acetaminophen (TYLENOL) 325 MG tablet Take 2 tablets (650 mg) by mouth every 6 hours 24 tablet 0    Ascorbic Acid (VITAMIN C GUMMIE PO) Take by mouth daily      dexamethasone (DECADRON) 4 MG tablet Take 2 tablets (8 mg) by mouth daily (with breakfast) Take 2 tablets by mouth with food on days 2-4 after chemotherapy. 12 tablet 3    famotidine (PEPCID) 10 MG tablet Take 1 tablet (10 mg) by mouth 2 times daily 60 tablet 1    gabapentin (NEURONTIN) 300 MG capsule Take 1 capsule (300 mg) by mouth At Bedtime 30 capsule 2    lidocaine-prilocaine (EMLA) 2.5-2.5 % external cream Apply topically as needed for moderate pain 30 g 1    loperamide (IMODIUM A-D) 2 MG tablet Take 1 tablet (2 mg) by mouth 4 times daily as needed for diarrhea Can take one 2 mg capsule after each loose stool up to 4 times a day as needed for diarrhea. This can be increased to 4 mg 4 times a day or 2 mg up to 8 times a day. 60 tablet 3    LORazepam (ATIVAN) 0.5 MG tablet Take 1 tablet (0.5 mg) by mouth every 6 hours as needed for nausea 20 tablet 0    OLANZapine (ZYPREXA) 5 MG tablet Take 1 tablet (5 mg) by mouth At Bedtime 30 tablet 0    SENNA-docusate sodium (SENNA S) 8.6-50 MG  "tablet Take 1 tablet by mouth At Bedtime 30 tablet 3         Allergies:        Allergies   Allergen Reactions    Emend [Aprepitant]     Prochlorperazine      \"i was told I turn blue\"        Social History:     Social History     Tobacco Use    Smoking status: Former     Types: Cigarettes    Smokeless tobacco: Never   Substance Use Topics    Alcohol use: Yes     Comment: one drink a week       History   Drug Use No         Family History:     The patient's family history is notable for     Family History   Problem Relation Age of Onset    Aneurysm Mother     Breast Cancer Paternal Grandmother         bilat mastectomies    Breast Cancer Maternal Aunt         stage 1    Thyroid Cancer Sister 23         Physical Exam:  video visit only    PS 0      General Appearance: healthy and alert, no distress     HEENT: NC/AT visibly    Skin: no lesions or rashes     Neurological: normal gait, no gross defects     Psychiatric: appropriate mood and affect                                   Assessment:    Francesca Rowland is a woman with a diagnosis of 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 She is here today for follow up and disease management.     30 minutes spent on the date of the encounter doing chart review, history and exam, documentation, and further activities as noted above.        Plan:     1.)         Rec. Cx Cancer with brain met post primary therapy:  She had discussed options of observation vs. maintenance therapy.  She has no residual disease on PET or brain MRI (12/2021, 6/2022, 4/23) at this point, but obviously remains at risk for recurrence.  After discussing the pros and cons of each option - she decided on observation.  She remains active and without symptoms (recent PET largely negative - needs thyroid US) and brain MRI pending fro November per Neurosurg team.    She will likely be a candidate for Keytruda should she recur.       2.)  Genetic risk " factors were assessed again and the patient has multiple family members with cancer diagnosed younger than age 50 including herself (age 40, but with likely HPV related disease) and her sister (thyroid cancer at age 23) among others. She met with genetics 1/2021, but did not proceed until 12/2021 (NEGATIVE)     3.) Labs and/or tests ordered include: thyroid US     4.)   Health maintenance issues addressed today include annual health maintenance and non-gynecologic issues with PCP.         Jluis Canela MD    Division of Gynecologic Oncology  [facility]    Of a 30 minute appointment, more than 50% was spent in counseling the patient.      CC  Patient Care Team:  Tyrese Bowie MD as PCP - General (Family Medicine)  Jluis Canela MD as MD (Gynecologic Oncology)  Jluis Canela MD as Assigned Cancer Care Provider  Danielle Trivedi APRN CNP as Nurse Practitioner (Neurological Surgery)  Summer Patel PA-C as Physician Assistant (Neurological Surgery)  Summer Patel PA-C as Assigned Neuroscience Provider

## 2024-05-06 NOTE — PATIENT INSTRUCTIONS
It was a pleasure seeing you in clinic today-please reach out if there are any further questions that arise following today's visit.  During business hours, you may reach me at [phone].  For urgent/emergent questions after business hours, you may reach the on-call Gynecologic Oncology Resident through the [facility]  at [phone].    All normal test results are usually communicated via letter or [employer] message.  Abnormal results (those that require a change in the previously discussed plan of care) are usually communicated via a phone call.    I recommend signing up for [employer] access if you have not already done so.  This allows you online access to your lab results and also helps you communicate efficiently with your clinic should any questions arise in your care.    Follow up appointment in 6 months with NP  scheduling will call you to help make an appointment  referral to radiology scheduling for thyroid ultrasound, scheduling will call you to help make an appointment      Dr Rola Goode RN  Phone:  [phone]  Fax:  [phone]

## 2024-05-06 NOTE — LETTER
2024         RE: Francesca Rowland  [address]  [place] [address]        Dear Colleague,    Thank you for referring your patient, Francesca Rowland, to the [facility]. Please see a copy of my visit note below.                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 She is here today for follow up and disease management.       Oncology History:      Patient initially presented as a 27-year-old woman seen in referral due to a Pap smear showing high-grade squamous intraepithelial lesion that was positive for high risk HPV subtype in 2007. This Pap smear had been taken during her six week postpartum visit. Patient did have a remote history of abnormal Pap smears back in  while serving in [place] for the [employer].  Pap smears during her first pregnancy were all negative. Her Pap smear at the beginning of the most recent pregnancy was normal. Patient did have a colposcopy for evaluation of this abnormal Pap smear on 2007 and cervical biopsies of 6 and 12 o'clock position showed features consistent with papillary serous adenocarcinoma. Endocervical curettings were also taken which showed fragments of papillary serous carcinoma. The patient then underwent a LEEP procedure on 10/15/07 which showed microinvasive adenocarcinoma with a depth of invasion of 0.4 mm. Patient then made the decision to proceed with radical hysterectomy. She subsequently underwent a radical hysterectomy, bilateral salpingectomy, bilateral pelvic and periaortic lymph node dissection on 10/19/07. Patient's operative course was otherwise uncomplicated and she recoverred uneventfully.  Pathology did reveal patient to be stage IA2 adenocarcinoma of the cervix.      She underwent  routine surveillence through 2014 complicated only by SIOBHAN 1      She presented to the ED at [facility] on 9/14/2019 with severe 8.5/10 RUQ pain associated with nausea, one episode of emesis, and decreased appetite. RUQ demonstrated cholelithiasis without cholecystitis, so an abdominal and pelvic CT scan was obtained. This revealed post-surgical changes consistent with prior hysterectomy, left adnexal cysts thought to be ovarian, and an enlarged pericaval lymph node suspicious for metastatic disease vs primary lymphoma. Her condition stabilized and she was discharged home with referrals to general surgery and oncology for biliary colic and further management of the lymphadenopathy, respectively. CT-guided right retroperitoneal lymph node biopsy on 10/15/2019 was consistent with metastatic cervical carcinoma     She underwent surgery to remove the mass and determine the extent of disease on 11/13/19     PATH:  FINAL DIAGNOSIS:   LYMPH NODES, PARA-AORTIC, RESECTION:   - Positive for carcinoma in three of four lymph nodes examined (3/4),   consistent with recurrent endocervical   adenocarcinoma   - Largest metastatic focus is 3.5 cm, positive for extra yuridia extension         Chemo-Potentiated RT 12/2019-1/2020 2/2020 CT:  IMPRESSION: Postoperative changes of hysterectomy and lymph node  resection for cervical cancer. The metastatic right pelvic lymph nodes  which were previously identified have decreased in size. No new  metastases identified.      She recently presented to the ED for an acute RUQ pain episode, which has greatly improved despite no clear diagnosis.  During this visit she underwent a CAP CT which did not demonstrate evidence of cancer but a slightly enlarged spleen. She had a mild eosinophilia (~2%) but has not traveled or eaten exotic food recently, and has not had any new contacts to suggest mono.      10/13/2020 CT:  IMPRESSION:  1.  Recurrent right external iliac lymphadenopathy.  2.   Rebound thymic hyperplasia.     10/30/2020 PET  IMPRESSION:   In this patient with history of metastatic cervical cancer status-post  radical hysterectomy and chemoradiation, there is evidence of  recurrent disease:  1. Increased size of a hypermetabolic right external iliac chain lymph node since 2/27/2020.  2. Hypermetabolic right subpectoral and axillary lymph nodes.  Recommend follow-up in the breast center for axillary ultrasound and  possible tissue sampling.  3. Idiopathic thymic activation/hyperplasia.     She was evaluated in the breast clinic with plan made for biopsy - however at biopsy the node had shruck considerably and was thus felt to be c/w inflammation (no biopsy obtained)     3/8/2021 MRI Brain (for dizziness)  IMPRESSION:  1. Heterogeneous mixed solid and cystic 2.7 cm mass in the right cerebellum, most likely a solitary metastatic lesion. There is moderate surrounding vasogenic edema as well as mass effect on the fourth ventricle. No obstructive hydrocephalus.    2. Unremarkable MR angiogram of the head and neck.     [facility]  Midline suboccipital craniotomy for resection of mass  Right frontal ventriculostomy  Stereotactic navigation  Use of operating microscope     4/16/21 Completed 5 fx of gamma knife ([facility])     Plan: Paclitaxel, Cisplatin, Avastin     4/28/2021: Cycle #1 Paclitaxel, Cisplatin, Avastin  5/19/2021: Cycle #2 Paclitaxel/Cisplatin/Avastin +Neulasta  6/9/2021: Cycle #3 Paclitaxel/Cisplatin/Avastin +Neulasta  6/30/2021: Cycle #4 Paclitaxel/Cisplatin/Avastin +Neulasta    6/28/2021 plan: PET scan for interval assessment.  Will plan for 6 cycles followed by possible use of Keytruda versus observation.     7/15/2021: PET CT: IMPRESSION: In this patient with history of cervical cancer who  completed chemoradiation:  1. Resolved uptake to the right pelvic lymph node, consistent with  response to treatment.  2. No new hypermetabolic lesions.  3. No definite  abnormal intracranial uptake. Please note that PET-CT  is less sensitive for intracranial disease due to normal uptake to the  brain. Consider continued following of the previously seen lesions and  surgical changes with MRI if clinically indicated.      7/21/2021: Cycle #5 Paclitaxel/Cisplatin/Avastin +Neulasta  8/11/2021: Cycle #6 Taxotere/Cisplatin/Avastin + Neulasta (Change to Taxotere due to neuropathy) deferred due to insurance  8/18/2021: Cycle #6 Paclitaxel/Cisplatin/Avastin (dose reduced Taxol to 135 mg/m2 due to insurance coverage with Taxotere and patient in agreement with plan- neuropathy mild and improved) +Neulasta    8/21/2021 MRI (HEAD):  Impression: In this patient with history of metastatic carcinoma to  the right cerebellum:  There is no definite evidence of disease recurrence. Decreased  resection cavity enhancement. Previous right occipital lobe  enhancement is completely resolved. No new enhancing intracranial  lesions.    12/6/21 MRI brain - DIANNE  12/8/21 PET - negative for focal uptake    6/17/22 MRI (BRAIN)  IMPRESSION: In this patient with history of metastatic cervical cancer  to the cerebellum, status post resection and chemoradiation, there is  no evidence of recurrence:  Stable postsurgical changes of right cerebellar mass resection without  evidence of local recurrence. No new enhancing intracranial lesions.    PET  1. No abnormal FDG uptake within the body and neck.     2. Postsurgical changes of total abdominal hysterectomy, bilateral  salpingo-oophorectomy and periaortic and pelvic lymph node dissection  without evidence of disease recurrence.    4/28/23 CT (CAP): IMPRESSION:   1.  No evidence of recurrent or metastatic disease.     MRI (brain): IMPRESSION: Stable surgical changes of cerebellar mass resection. No   evidence of local recurrence or new intracranial metastatic disease.     10/2023 MRI  Impression: Stable postsurgical suboccipital craniotomy changes for  resection of  underlying cerebellar mass. No new abnormal intracranial  enhancement.    4/23/24 PET  1. Post surgical changes of radical hysterectomy, bilateral   salpingectomy and periaortic and pelvic lymph node dissection without   evidence of local recurrence.   2. No evidence of abnormal FDG uptake to suggest distant metastases.   3. Focal FDG uptake in the left thyroid gland without discrete nodule,   new compared to 6/17/2022 PET/CT. Recommend further evaluation with   ultrasound.     ROS: No bleeding, weight changes, mild constipation.  Losing weight with effort (weight watchers), no neurologic signs/symptoms    Past Medical History:    Past Medical History:   Diagnosis Date    Chronic cholecystitis 09/25/2019    History of cervical cancer 2007    Metastasis to brain (H) 03/08/2021    Metastatic adenocarcinoma to cervix (H) 10/28/2019    Added automatically from request for surgery [id number]         Past Surgical History:    Past Surgical History:   Procedure Laterality Date    CHOLECYSTECTOMY, LAPOROSCOPIC  09/25/2019    HYSTERECTOMY RADICAL  2007    PAUL    INSERT PORT VASCULAR ACCESS Right 5/5/2021    Procedure: INSERTION, VASCULAR ACCESS PORT;  Surgeon: Oral Toledo MD;  Location: [facility] OR    IR CHEST PORT PLACEMENT > 5 YRS OF AGE  5/5/2021    IR PORT REMOVAL RIGHT  12/21/2023    LAPAROSCOPIC LYMPHADENECTOMY N/A 11/13/2019    Procedure: LAPAROSCOPY, resection of of paraaortic lymph node, lysis of adhesions;  Surgeon: Jluis Canela MD;  Location: [facility] OR    OPTICAL TRACKING SYSTEM CRANIOTOMY, EXCISE TUMOR, COMBINED Bilateral 3/15/2021    Procedure: stealth assisted Midline suboccipital craniectomy/craniotomy for tumor;  Surgeon: Martín Myrick MD;  Location: [facility] OR    OPTICAL TRACKING SYSTEM VENTRICULOSTOMY Right 3/15/2021    Procedure: stealth assisted  right frontal ventriculostomy;  Surgeon: Martín Myrick MD;  Location: [facility] OR    REMOVE PORT VASCULAR ACCESS Right 12/21/2023     Procedure: Remove port vascular access right;  Surgeon: Adali Miguel PA-C;  Location: [facility]         Health Maintenance Due   Topic Date Due    YEARLY PREVENTIVE VISIT  Never done    ADVANCE CARE PLANNING  Never done    Pneumococcal Vaccine: Pediatrics (0 to 5 Years) and At-Risk Patients (6 to 64 Years) (1 of 2 - PCV) Never done    COLORECTAL CANCER SCREENING  Never done    HIV SCREENING  Never done    HEPATITIS C SCREENING  Never done    HEPATITIS B IMMUNIZATION (1 of 3 - 19+ 3-dose series) Never done    LIPID  Never done    MAMMO SCREENING  11/09/2021    COVID-19 Vaccine (3 - 2023-24 season) 09/01/2023    PHQ-2 (once per calendar year)  01/01/2024    PAP  08/31/2024       Current Medications:     Current Outpatient Medications   Medication Sig Dispense Refill    acetaminophen (TYLENOL) 325 MG tablet Take 2 tablets (650 mg) by mouth every 6 hours 24 tablet 0    Ascorbic Acid (VITAMIN C GUMMIE PO) Take by mouth daily      dexamethasone (DECADRON) 4 MG tablet Take 2 tablets (8 mg) by mouth daily (with breakfast) Take 2 tablets by mouth with food on days 2-4 after chemotherapy. 12 tablet 3    DULoxetine (CYMBALTA) 30 MG capsule Take 1 capsule (30 mg) by mouth 2 times daily 180 capsule 2    famotidine (PEPCID) 10 MG tablet Take 1 tablet (10 mg) by mouth 2 times daily 60 tablet 1    gabapentin (NEURONTIN) 300 MG capsule Take 1 capsule (300 mg) by mouth At Bedtime 30 capsule 2    ibuprofen (ADVIL/MOTRIN) 200 MG tablet Take 200 mg by mouth every 4 hours as needed for mild pain      lidocaine-prilocaine (EMLA) 2.5-2.5 % external cream Apply topically as needed for moderate pain 30 g 1    loperamide (IMODIUM A-D) 2 MG tablet Take 1 tablet (2 mg) by mouth 4 times daily as needed for diarrhea Can take one 2 mg capsule after each loose stool up to 4 times a day as needed for diarrhea. This can be increased to 4 mg 4 times a day or 2 mg up to 8 times a day. 60 tablet 3    LORazepam (ATIVAN) 0.5 MG tablet Take 1 tablet (0.5  "mg) by mouth every 6 hours as needed for nausea 20 tablet 0    multivitamin w/minerals (THERA-VIT-M) tablet Take 1 tablet by mouth daily      OLANZapine (ZYPREXA) 5 MG tablet Take 1 tablet (5 mg) by mouth At Bedtime 30 tablet 0    SENNA-docusate sodium (SENNA S) 8.6-50 MG tablet Take 1 tablet by mouth At Bedtime 30 tablet 3         Allergies:        Allergies   Allergen Reactions    Emend [Aprepitant]     Prochlorperazine      \"i was told I turn blue\"        Social History:     Social History     Tobacco Use    Smoking status: Former     Types: Cigarettes    Smokeless tobacco: Never   Substance Use Topics    Alcohol use: Yes     Comment: one drink a week       History   Drug Use No         Family History:     The patient's family history is notable for     Family History   Problem Relation Age of Onset    Aneurysm Mother     Breast Cancer Paternal Grandmother         bilat mastectomies    Breast Cancer Maternal Aunt         stage 1    Thyroid Cancer Sister 23         Physical Exam:  video visit only    PS 0      General Appearance: healthy and alert, no distress     HEENT: NC/AT visibly    Skin: no lesions or rashes     Neurological: normal gait, no gross defects     Psychiatric: appropriate mood and affect                                   Assessment:    Francesca Rowland is a woman with a diagnosis of 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 She is here today for follow up and disease management.     30 minutes spent on the date of the encounter doing chart review, history and exam, documentation, and further activities as noted above.        Plan:     1.)         Rec. Cx Cancer with brain met post primary therapy:  She had discussed options of observation vs. maintenance therapy.  She has no residual disease on PET or brain MRI (12/2021, 6/2022, 4/23) at this point, but obviously remains at risk for recurrence.  After discussing the pros and cons of each " option - she decided on observation.  She remains active and without symptoms (recent PET largely negative - needs thyroid US) and brain MRI pending  per Neurosurg team.    She will likely be a candidate for Keytruda should she recur.       2.)  Genetic risk factors were assessed again and the patient has multiple family members with cancer diagnosed younger than age 50 including herself (age 40, but with likely HPV related disease) and her sister (thyroid cancer at age 23) among others. She met with genetics 2021, but did not proceed until 2021 (NEGATIVE)     3.) Labs and/or tests ordered include: thyroid US     4.)   Health maintenance issues addressed today include annual health maintenance and non-gynecologic issues with PCP.         Jluis Canela MD    Division of Gynecologic Oncology  [facility]    Of a 30 minute appointment, more than 50% was spent in counseling the patient.      CC  Patient Care Team:  Tyrese Bowie MD as PCP - General (Family Medicine)  Jluis Canela MD as MD (Gynecologic Oncology)  Jluis Canela MD as Assigned Cancer Care Provider  Danielle Trivedi APRN CNP as Nurse Practitioner (Neurological Surgery)  Summer Patel PA-C as Physician Assistant (Neurological Surgery)  Summer Patel PA-C as Assigned Neuroscience Provider      Virtual Visit Details    Type of service:  Video Visit     Originating Location (pt. 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 She is here today for follow up and disease management.       Oncology History:      Patient initially presented as a  27-year-old woman seen in referral due to a Pap smear showing high-grade squamous intraepithelial lesion that was positive for high risk HPV subtype in August 2007. This Pap smear had been taken during her six week postpartum visit. Patient did have a remote history of abnormal Pap smears back in 2001 while serving in [place] for the .  Pap smears during her first pregnancy were all negative. Her Pap smear at the beginning of the most recent pregnancy was normal. Patient did have a colposcopy for evaluation of this abnormal Pap smear on 9/2007 and cervical biopsies of 6 and 12 o'clock position showed features consistent with papillary serous adenocarcinoma. Endocervical curettings were also taken which showed fragments of papillary serous carcinoma. The patient then underwent a LEEP procedure on 10/15/07 which showed microinvasive adenocarcinoma with a depth of invasion of 0.4 mm. Patient then made the decision to proceed with radical hysterectomy. She subsequently underwent a radical hysterectomy, bilateral salpingectomy, bilateral pelvic and periaortic lymph node dissection on 10/19/07. Patient's operative course was otherwise uncomplicated and she recoverred uneventfully.  Pathology did reveal patient to be stage IA2 adenocarcinoma of the cervix.      She underwent routine surveillence through 2014 complicated only by SIOBHAN 1      She presented to the ED at [facility] on 9/14/2019 with severe 8.5/10 RUQ pain associated with nausea, one episode of emesis, and decreased appetite. RUQ demonstrated cholelithiasis without cholecystitis, so an abdominal and pelvic CT scan was obtained. This revealed post-surgical changes consistent with prior hysterectomy, left adnexal cysts thought to be ovarian, and an enlarged pericaval lymph node suspicious for metastatic disease vs primary lymphoma. Her condition stabilized and she was discharged home with referrals to general surgery and oncology for biliary colic  and further management of the lymphadenopathy, respectively. CT-guided right retroperitoneal lymph node biopsy on 10/15/2019 was consistent with metastatic cervical carcinoma     She underwent surgery to remove the mass and determine the extent of disease on 11/13/19     PATH:  FINAL DIAGNOSIS:   LYMPH NODES, PARA-AORTIC, RESECTION:   - Positive for carcinoma in three of four lymph nodes examined (3/4),   consistent with recurrent endocervical   adenocarcinoma   - Largest metastatic focus is 3.5 cm, positive for extra yuridia extension         Chemo-Potentiated RT 12/2019-1/2020 2/2020 CT:  IMPRESSION: Postoperative changes of hysterectomy and lymph node  resection for cervical cancer. The metastatic right pelvic lymph nodes  which were previously identified have decreased in size. No new  metastases identified.      She recently presented to the ED for an acute RUQ pain episode, which has greatly improved despite no clear diagnosis.  During this visit she underwent a CAP CT which did not demonstrate evidence of cancer but a slightly enlarged spleen. She had a mild eosinophilia (~2%) but has not traveled or eaten exotic food recently, and has not had any new contacts to suggest mono.      10/13/2020 CT:  IMPRESSION:  1.  Recurrent right external iliac lymphadenopathy.  2.  Rebound thymic hyperplasia.     10/30/2020 PET  IMPRESSION:   In this patient with history of metastatic cervical cancer status-post  radical hysterectomy and chemoradiation, there is evidence of  recurrent disease:  1. Increased size of a hypermetabolic right external iliac chain lymph node since 2/27/2020.  2. Hypermetabolic right subpectoral and axillary lymph nodes.  Recommend follow-up in the breast center for axillary ultrasound and  possible tissue sampling.  3. Idiopathic thymic activation/hyperplasia.     She was evaluated in the breast clinic with plan made for biopsy - however at biopsy the node had shruck considerably and was thus  felt to be c/w inflammation (no biopsy obtained)     3/8/2021 MRI Brain (for dizziness)  IMPRESSION:  1. Heterogeneous mixed solid and cystic 2.7 cm mass in the right cerebellum, most likely a solitary metastatic lesion. There is moderate surrounding vasogenic edema as well as mass effect on the fourth ventricle. No obstructive hydrocephalus.    2. Unremarkable MR angiogram of the head and neck.     3/15/2021 Craniotomy ([facility])  Midline suboccipital craniotomy for resection of mass  Right frontal ventriculostomy  Stereotactic navigation  Use of operating microscope     4/16/21 Completed 5 fx of gamma knife ([facility])     Plan: Paclitaxel, Cisplatin, Avastin     4/28/2021: Cycle #1 Paclitaxel, Cisplatin, Avastin  5/19/2021: Cycle #2 Paclitaxel/Cisplatin/Avastin +Neulasta  6/9/2021: Cycle #3 Paclitaxel/Cisplatin/Avastin +Neulasta  6/30/2021: Cycle #4 Paclitaxel/Cisplatin/Avastin +Neulasta    6/28/2021 plan: PET scan for interval assessment.  Will plan for 6 cycles followed by possible use of Keytruda versus observation.     7/15/2021: PET CT: IMPRESSION: In this patient with history of cervical cancer who  completed chemoradiation:  1. Resolved uptake to the right pelvic lymph node, consistent with  response to treatment.  2. No new hypermetabolic lesions.  3. No definite abnormal intracranial uptake. Please note that PET-CT  is less sensitive for intracranial disease due to normal uptake to the  brain. Consider continued following of the previously seen lesions and  surgical changes with MRI if clinically indicated.      7/21/2021: Cycle #5 Paclitaxel/Cisplatin/Avastin +Neulasta  8/11/2021: Cycle #6 Taxotere/Cisplatin/Avastin + Neulasta (Change to Taxotere due to neuropathy) deferred due to insurance  8/18/2021: Cycle #6 Paclitaxel/Cisplatin/Avastin (dose reduced Taxol to 135 mg/m2 due to insurance coverage with Taxotere and patient in agreement with plan- neuropathy mild and improved) +Neulasta    8/21/2021 MRI  (HEAD):  Impression: In this patient with history of metastatic carcinoma to  the right cerebellum:  There is no definite evidence of disease recurrence. Decreased  resection cavity enhancement. Previous right occipital lobe  enhancement is completely resolved. No new enhancing intracranial  lesions.    12/6/21 MRI brain - DIANNE  12/8/21 PET - negative for focal uptake    6/17/22 MRI (BRAIN)  IMPRESSION: In this patient with history of metastatic cervical cancer  to the cerebellum, status post resection and chemoradiation, there is  no evidence of recurrence:  Stable postsurgical changes of right cerebellar mass resection without  evidence of local recurrence. No new enhancing intracranial lesions.    PET  1. No abnormal FDG uptake within the body and neck.     2. Postsurgical changes of total abdominal hysterectomy, bilateral  salpingo-oophorectomy and periaortic and pelvic lymph node dissection  without evidence of disease recurrence.    4/28/23 CT (CAP): IMPRESSION:   1.  No evidence of recurrent or metastatic disease.     MRI (brain): IMPRESSION: Stable surgical changes of cerebellar mass resection. No   evidence of local recurrence or new intracranial metastatic disease.     10/2023 MRI  Impression: Stable postsurgical suboccipital craniotomy changes for  resection of underlying cerebellar mass. No new abnormal intracranial  enhancement.    4/23/24 PET  1. Post surgical changes of radical hysterectomy, bilateral   salpingectomy and periaortic and pelvic lymph node dissection without   evidence of local recurrence.   2. No evidence of abnormal FDG uptake to suggest distant metastases.   3. Focal FDG uptake in the left thyroid gland without discrete nodule,   new compared to 6/17/2022 PET/CT. Recommend further evaluation with   ultrasound.     ROS: No bleeding, weight changes, mild constipation.  Losing weight with effort (weight watchers), no neurologic signs/symptoms    Past Medical History:    Past Medical  History:   Diagnosis Date    Chronic cholecystitis 09/25/2019    History of cervical cancer 2007    Metastasis to brain (H) 03/08/2021    Metastatic adenocarcinoma to cervix (H) 10/28/2019    Added automatically from request for surgery [id number]         Past Surgical History:    Past Surgical History:   Procedure Laterality Date    CHOLECYSTECTOMY, LAPOROSCOPIC  09/25/2019    HYSTERECTOMY RADICAL  2007    PAUL    INSERT PORT VASCULAR ACCESS Right 5/5/2021    Procedure: INSERTION, VASCULAR ACCESS PORT;  Surgeon: Oral Toledo MD;  Location: [facility] OR    IR CHEST PORT PLACEMENT > 5 YRS OF AGE  5/5/2021    IR PORT REMOVAL RIGHT  12/21/2023    LAPAROSCOPIC LYMPHADENECTOMY N/A 11/13/2019    Procedure: LAPAROSCOPY, resection of of paraaortic lymph node, lysis of adhesions;  Surgeon: Jluis Canela MD;  Location: [facility] OR    OPTICAL TRACKING SYSTEM CRANIOTOMY, EXCISE TUMOR, COMBINED Bilateral 3/15/2021    Procedure: stealth assisted Midline suboccipital craniectomy/craniotomy for tumor;  Surgeon: Martín Myrick MD;  Location: [facility] OR    OPTICAL TRACKING SYSTEM VENTRICULOSTOMY Right 3/15/2021    Procedure: stealth assisted  right frontal ventriculostomy;  Surgeon: Martín Myrick MD;  Location: [facility] OR    REMOVE PORT VASCULAR ACCESS Right 12/21/2023    Procedure: Remove port vascular access right;  Surgeon: Adali Miguel PA-C;  Location: [facility] [place] OR         Health Maintenance Due   Topic Date Due    YEARLY PREVENTIVE VISIT  Never done    ADVANCE CARE PLANNING  Never done    Pneumococcal Vaccine: Pediatrics (0 to 5 Years) and At-Risk Patients (6 to 64 Years) (1 of 2 - PCV) Never done    COLORECTAL CANCER SCREENING  Never done    HIV SCREENING  Never done    HEPATITIS C SCREENING  Never done    HEPATITIS B IMMUNIZATION (1 of 3 - 19+ 3-dose series) Never done    LIPID  Never done    MAMMO SCREENING  11/09/2021    COVID-19 Vaccine (3 - 2023-24 season) 09/01/2023    PHQ-2 (once per calendar year)   "01/01/2024    PAP  08/31/2024       Current Medications:     Current Outpatient Medications   Medication Sig Dispense Refill    DULoxetine (CYMBALTA) 30 MG capsule Take 1 capsule (30 mg) by mouth 2 times daily 180 capsule 2    ibuprofen (ADVIL/MOTRIN) 200 MG tablet Take 200 mg by mouth every 4 hours as needed for mild pain      multivitamin w/minerals (THERA-VIT-M) tablet Take 1 tablet by mouth daily      acetaminophen (TYLENOL) 325 MG tablet Take 2 tablets (650 mg) by mouth every 6 hours 24 tablet 0    Ascorbic Acid (VITAMIN C GUMMIE PO) Take by mouth daily      dexamethasone (DECADRON) 4 MG tablet Take 2 tablets (8 mg) by mouth daily (with breakfast) Take 2 tablets by mouth with food on days 2-4 after chemotherapy. 12 tablet 3    famotidine (PEPCID) 10 MG tablet Take 1 tablet (10 mg) by mouth 2 times daily 60 tablet 1    gabapentin (NEURONTIN) 300 MG capsule Take 1 capsule (300 mg) by mouth At Bedtime 30 capsule 2    lidocaine-prilocaine (EMLA) 2.5-2.5 % external cream Apply topically as needed for moderate pain 30 g 1    loperamide (IMODIUM A-D) 2 MG tablet Take 1 tablet (2 mg) by mouth 4 times daily as needed for diarrhea Can take one 2 mg capsule after each loose stool up to 4 times a day as needed for diarrhea. This can be increased to 4 mg 4 times a day or 2 mg up to 8 times a day. 60 tablet 3    LORazepam (ATIVAN) 0.5 MG tablet Take 1 tablet (0.5 mg) by mouth every 6 hours as needed for nausea 20 tablet 0    OLANZapine (ZYPREXA) 5 MG tablet Take 1 tablet (5 mg) by mouth At Bedtime 30 tablet 0    SENNA-docusate sodium (SENNA S) 8.6-50 MG tablet Take 1 tablet by mouth At Bedtime 30 tablet 3         Allergies:        Allergies   Allergen Reactions    Emend [Aprepitant]     Prochlorperazine      \"i was told I turn blue\"        Social History:     Social History     Tobacco Use    Smoking status: Former     Types: Cigarettes    Smokeless tobacco: Never   Substance Use Topics    Alcohol use: Yes     Comment: one " drink a week       History   Drug Use No         Family History:     The patient's family history is notable for     Family History   Problem Relation Age of Onset    Aneurysm Mother     Breast Cancer Paternal Grandmother         bilat mastectomies    Breast Cancer Maternal Aunt         stage 1    Thyroid Cancer Sister 23         Physical Exam:  video visit only    PS 0      General Appearance: healthy and alert, no distress     HEENT: NC/AT visibly    Skin: no lesions or rashes     Neurological: normal gait, no gross defects     Psychiatric: appropriate mood and affect                                   Assessment:    Francesca Rowland is a woman with a diagnosis of 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 She is here today for follow up and disease management.     30 minutes spent on the date of the encounter doing chart review, history and exam, documentation, and further activities as noted above.        Plan:     1.)         Rec. Cx Cancer with brain met post primary therapy:  She had discussed options of observation vs. maintenance therapy.  She has no residual disease on PET or brain MRI (12/2021, 6/2022, 4/23) at this point, but obviously remains at risk for recurrence.  After discussing the pros and cons of each option - she decided on observation.  She remains active and without symptoms (recent PET largely negative - needs thyroid US) and brain MRI pending fro November per Neurosurg team.    She will likely be a candidate for Keytruda should she recur.       2.)  Genetic risk factors were assessed again and the patient has multiple family members with cancer diagnosed younger than age 50 including herself (age 40, but with likely HPV related disease) and her sister (thyroid cancer at age 23) among others. She met with genetics 1/2021, but did not proceed until 12/2021 (NEGATIVE)     3.) Labs and/or tests ordered include: thyroid US     4.)   Health  maintenance issues addressed today include annual health maintenance and non-gynecologic issues with PCP.         Jluis Canela MD    Division of Gynecologic Oncology  [facility]    Of a 30 minute appointment, more than 50% was spent in counseling the patient.      CC  Patient Care Team:  Tyrese Bowie MD as PCP - General (Family Medicine)

## 2024-05-13 ENCOUNTER — ANCILLARY PROCEDURE (OUTPATIENT)
Dept: ULTRASOUND IMAGING | Facility: CLINIC | Age: 44
End: 2024-05-13
Attending: OBSTETRICS & GYNECOLOGY
Payer: COMMERCIAL

## 2024-05-13 DIAGNOSIS — C53.9 MALIGNANT NEOPLASM OF CERVIX, UNSPECIFIED SITE (H): ICD-10-CM

## 2024-05-13 PROCEDURE — 76536 US EXAM OF HEAD AND NECK: CPT

## 2024-05-21 ENCOUNTER — VIRTUAL VISIT (OUTPATIENT)
Dept: ONCOLOGY | Facility: CLINIC | Age: 44
End: 2024-05-21
Attending: STUDENT IN AN ORGANIZED HEALTH CARE EDUCATION/TRAINING PROGRAM
Payer: COMMERCIAL

## 2024-05-21 VITALS — WEIGHT: 245 LBS | BODY MASS INDEX: 39.37 KG/M2 | HEIGHT: 66 IN

## 2024-05-21 DIAGNOSIS — E04.1 THYROID NODULE: Primary | ICD-10-CM

## 2024-05-21 DIAGNOSIS — G62.0 CHEMOTHERAPY-INDUCED NEUROPATHY (H): ICD-10-CM

## 2024-05-21 DIAGNOSIS — G89.29 CHRONIC PAIN OF RIGHT KNEE: ICD-10-CM

## 2024-05-21 DIAGNOSIS — T45.1X5A CHEMOTHERAPY-INDUCED NEUROPATHY (H): ICD-10-CM

## 2024-05-21 DIAGNOSIS — M25.562 CHRONIC PAIN OF LEFT KNEE: ICD-10-CM

## 2024-05-21 DIAGNOSIS — G89.29 CHRONIC PAIN OF LEFT KNEE: ICD-10-CM

## 2024-05-21 DIAGNOSIS — M25.561 CHRONIC PAIN OF RIGHT KNEE: ICD-10-CM

## 2024-05-21 DIAGNOSIS — R53.81 PHYSICAL DECONDITIONING: ICD-10-CM

## 2024-05-21 DIAGNOSIS — C53.9 MALIGNANT NEOPLASM OF CERVIX, UNSPECIFIED SITE (H): Primary | ICD-10-CM

## 2024-05-21 PROBLEM — E66.812 CLASS 2 SEVERE OBESITY DUE TO EXCESS CALORIES WITH SERIOUS COMORBIDITY IN ADULT (H): Status: ACTIVE | Noted: 2024-05-21

## 2024-05-21 PROBLEM — E66.01 CLASS 2 SEVERE OBESITY DUE TO EXCESS CALORIES WITH SERIOUS COMORBIDITY IN ADULT (H): Status: ACTIVE | Noted: 2024-05-21

## 2024-05-21 PROCEDURE — 99215 OFFICE O/P EST HI 40 MIN: CPT | Mod: 95 | Performed by: STUDENT IN AN ORGANIZED HEALTH CARE EDUCATION/TRAINING PROGRAM

## 2024-05-21 NOTE — LETTER
5/21/2024         RE: Francesca Rowland  [address]  [place] [address]        Dear Colleague,    Thank you for referring your patient, Francesca Rowland, to the [facility]. Please see a copy of my visit note below.    Virtual Visit Details    Type of service:  Video Visit     Originating Location (pt. Location): Home    Distant Location (provider location):  On-site  Platform used for Video Visit: [facility]   PM&R clinic note        Interval history:     Francesca Rowland presents to clinic today for follow up reg her rehab needs.   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   Was last seen in clinic on 5/2/23.  Recommendations included:  Therapy/equipment/braces:  Incorporate daily exercise regimen including walking 10 to 15 minutes/day.  Continue to monitor intake in diet.  Medications:  Continue Cymbalta 60 mg daily for neuropathy, symptoms improved since last visit.  Follow up: 1 year or earlier as needed.    Oncology History:  - Initially presented due to Pap smear showing high-grade squamous intraepithelial lesion positive for high risk HPV subtype in 8/20017.  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   - Underwent LEEP procedure on 10/15/2007 which demonstrated microinvasive adenocarcinoma.  - Proceeded with radical hysterectomy, bilateral salpingectomy, bilateral pelvic and periaortic lymph node dissection on 10/19/2007. Pathology revealed Stage IA2 adenocarcinoma of the cervix.  - Underwent routine surveillance through 2014.  - Presented to the ED at [facility] on 9/14/2019 with severe RUQ pain which imaging revealed cholelithiasis without cholecystitis, Abdominal/Pelvic CT revealed enlarged pericaval lymph node. CT guided biopsy was consistent with metastatic cervical carcinoma.  - Underwent surgery  to remove mass.  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  - 3/15/2021- Underwent craniotomy and resection of cerebellar mass.  Fatigue and neuropathy.  - 4/16/2021- Completed 5 fx of gamma knife  - 4/28/2021- 6/30/2021- Cycles 1-4 of paclitaxel/cisplatin/avastin.  - 7/21/2021: Cycle #5 Paclitaxel/Cisplatin/Avastin   - 8/18/21- Saw Terra Zelaya in clinic. Peripheral neuropathy improved with Cymbalta.  - 8/23/21- Saw Dr. Canela in clinic. Improvement in peripheral neuropathy symptoms with Cymbalta. No residual disease on PET or Brain MRI, but remains at risk for recurrence.   - 11/29/21- Saw Dr. Cardozo in clinic for follow up. Patient continues to think cymbalta is helping for neuropathic symptoms. Has neuropathy in her feet once in a while, minimal in hands. Will be starting PT. No residual disease on PET or brain MRI, remains at risk for recurrence. Patient discussed options, and decided on observation. Likely a candidate for Keytruda if she recurs.  -5/6/24- Follow up visit with Dr. Canela. No residual diease on PET or brain MRI. (Recent PET largely negative-needs thyroid US) and brain MRI pending for November. Likely a candidate for Keytruda if there is a recurrence.        Symptoms,  Francesca was seen for a return virtual visit today.  Overall, she is doing very well since her last visit.  Her neuropathy symptoms have almost completely resolved and gotten better so she has not needed to use her Cymbalta consistently.  She only uses it occasionally if symptoms flare.  She has been struggling a little with some overnight calf cramps, and this is sporadic.  Otherwise, her mobility has been great and she has had no difficulties with mobility or daily activities.  She denies any other concerns today.      Therapies/HEP,  Continues with regular home exercise regimen as tolerated.      Functionally,   Improvement in mobility and  "ADLs.      Social history is unchanged.      Medications:  Current Outpatient Medications   Medication Sig Dispense Refill     DULoxetine (CYMBALTA) 30 MG capsule Take 1 capsule (30 mg) by mouth 2 times daily 180 capsule 2     multivitamin w/minerals (THERA-VIT-M) tablet Take 1 tablet by mouth daily       acetaminophen (TYLENOL) 325 MG tablet Take 2 tablets (650 mg) by mouth every 6 hours 24 tablet 0     Ascorbic Acid (VITAMIN C GUMMIE PO) Take by mouth daily       dexamethasone (DECADRON) 4 MG tablet Take 2 tablets (8 mg) by mouth daily (with breakfast) Take 2 tablets by mouth with food on days 2-4 after chemotherapy. 12 tablet 3     famotidine (PEPCID) 10 MG tablet Take 1 tablet (10 mg) by mouth 2 times daily 60 tablet 1     gabapentin (NEURONTIN) 300 MG capsule Take 1 capsule (300 mg) by mouth At Bedtime 30 capsule 2     ibuprofen (ADVIL/MOTRIN) 200 MG tablet Take 200 mg by mouth every 4 hours as needed for mild pain       lidocaine-prilocaine (EMLA) 2.5-2.5 % external cream Apply topically as needed for moderate pain 30 g 1     loperamide (IMODIUM A-D) 2 MG tablet Take 1 tablet (2 mg) by mouth 4 times daily as needed for diarrhea Can take one 2 mg capsule after each loose stool up to 4 times a day as needed for diarrhea. This can be increased to 4 mg 4 times a day or 2 mg up to 8 times a day. 60 tablet 3     LORazepam (ATIVAN) 0.5 MG tablet Take 1 tablet (0.5 mg) by mouth every 6 hours as needed for nausea 20 tablet 0     OLANZapine (ZYPREXA) 5 MG tablet Take 1 tablet (5 mg) by mouth At Bedtime 30 tablet 0     SENNA-docusate sodium (SENNA S) 8.6-50 MG tablet Take 1 tablet by mouth At Bedtime 30 tablet 3              Physical Exam:   Ht 1.676 m (5' 6\")   Wt 111.1 kg (245 lb)   BMI 39.54 kg/m    Gen: NAD, pleasant and cooperative   HEENT: Atraumatic, normocephalic, extraocular movements appear intact  Pulm: non-labored breathing in room air  Neuro/MSK:   Orientation: Oriented to person, time, place and " situation, Exhibits good insight into her condition and ongoing treatment  Motor: Observed moving upper extremities actively against gravity    Labs/Imaging:  Lab Results   Component Value Date    WBC 5.2 12/21/2023    HGB 12.8 12/21/2023    HCT 37.8 12/21/2023    MCV 89 12/21/2023     12/21/2023     Lab Results   Component Value Date     08/18/2021    POTASSIUM 4.1 08/18/2021    CHLORIDE 108 08/18/2021    CO2 28 08/18/2021    GLC 84 06/17/2022     Lab Results   Component Value Date    GFRESTIMATED 79 08/18/2021    GFRESTBLACK >90 06/28/2021     Lab Results   Component Value Date    AST 17 08/18/2021    ALT 22 08/18/2021    ALKPHOS 94 08/18/2021    BILITOTAL 0.4 08/18/2021    BILICONJ 0.0 09/27/2007     Lab Results   Component Value Date    INR 0.99 12/21/2023     Lab Results   Component Value Date    BUN 17 08/18/2021    CR 0.90 08/18/2021              Assessment/Plan   Francesca Rowland presents to clinic today for follow up reg her rehab needs.   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 Was last seen in clinic on 5/2/23.  Overall, she is doing much better from a rehabilitation perspective since her last visit.  She has no concerns with mobility or ADLs, and her neuropathy symptoms have improved that she has not needed to take the Cymbalta regularly.  She was encouraged to continue with a regular home exercise regimen as tolerated.  We discussed as needed follow-up, and she is agreeable to this.      Therapy/equipment/braces:  Continue regular home exercise regimen as tolerated.  Follow up: As needed.      Rashmi London MD  Physical Medicine & Rehabilitation      50 minutes spent on the date of the encounter doing chart review, history and exam, documentation and further activities as noted above.               Again, thank you for allowing me to participate in the care of your patient.        Sincerely,        Rashmi London,  MD

## 2024-05-21 NOTE — PROGRESS NOTES
[facility]   PM&R clinic note        Interval history:     Francesca Rowland presents to clinic today for follow up reg her rehab needs.   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   Was last seen in clinic on 5/2/23.  Recommendations included:  Therapy/equipment/braces:  Incorporate daily exercise regimen including walking 10 to 15 minutes/day.  Continue to monitor intake in diet.  Medications:  Continue Cymbalta 60 mg daily for neuropathy, symptoms improved since last visit.  Follow up: 1 year or earlier as needed.    Oncology History:  - Initially presented due to Pap smear showing high-grade squamous intraepithelial lesion positive for high risk HPV subtype in 8/20017.  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   - Underwent LEEP procedure on 10/15/2007 which demonstrated microinvasive adenocarcinoma.  - Proceeded with radical hysterectomy, bilateral salpingectomy, bilateral pelvic and periaortic lymph node dissection on 10/19/2007. Pathology revealed Stage IA2 adenocarcinoma of the cervix.  - Underwent routine surveillance through 2014.  - Presented to the ED at [facility] on 9/14/2019 with severe RUQ pain which imaging revealed cholelithiasis without cholecystitis, Abdominal/Pelvic CT revealed enlarged pericaval lymph node. CT guided biopsy was consistent with metastatic cervical carcinoma.  - Underwent surgery to remove mass.  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  - 3/15/2021- Underwent craniotomy and resection of cerebellar mass.  Fatigue and neuropathy.  - 4/16/2021- Completed 5 fx of gamma knife  - 4/28/2021- 6/30/2021- Cycles 1-4 of paclitaxel/cisplatin/avastin.  - 7/21/2021: Cycle #5 Paclitaxel/Cisplatin/Avastin   - 8/18/21- Saw Terra Zelaya in clinic. Peripheral  neuropathy improved with Cymbalta.  - 8/23/21- Saw Dr. Canela in clinic. Improvement in peripheral neuropathy symptoms with Cymbalta. No residual disease on PET or Brain MRI, but remains at risk for recurrence.   - 11/29/21- Saw Dr. Cardozo in clinic for follow up. Patient continues to think cymbalta is helping for neuropathic symptoms. Has neuropathy in her feet once in a while, minimal in hands. Will be starting PT. No residual disease on PET or brain MRI, remains at risk for recurrence. Patient discussed options, and decided on observation. Likely a candidate for Keytruda if she recurs.  -5/6/24- Follow up visit with Dr. Canela. No residual diease on PET or brain MRI. (Recent PET largely negative-needs thyroid US) and brain MRI pending for November. Likely a candidate for Keytruda if there is a recurrence.        Symptoms,  Francesca was seen for a return virtual visit today.  Overall, she is doing very well since her last visit.  Her neuropathy symptoms have almost completely resolved and gotten better so she has not needed to use her Cymbalta consistently.  She only uses it occasionally if symptoms flare.  She has been struggling a little with some overnight calf cramps, and this is sporadic.  Otherwise, her mobility has been great and she has had no difficulties with mobility or daily activities.  She denies any other concerns today.      Therapies/HEP,  Continues with regular home exercise regimen as tolerated.      Functionally,   Improvement in mobility and ADLs.      Social history is unchanged.      Medications:  Current Outpatient Medications   Medication Sig Dispense Refill    DULoxetine (CYMBALTA) 30 MG capsule Take 1 capsule (30 mg) by mouth 2 times daily 180 capsule 2    multivitamin w/minerals (THERA-VIT-M) tablet Take 1 tablet by mouth daily      acetaminophen (TYLENOL) 325 MG tablet Take 2 tablets (650 mg) by mouth every 6 hours 24 tablet 0    Ascorbic Acid (VITAMIN C GUMMIE PO) Take by mouth daily   "    dexamethasone (DECADRON) 4 MG tablet Take 2 tablets (8 mg) by mouth daily (with breakfast) Take 2 tablets by mouth with food on days 2-4 after chemotherapy. 12 tablet 3    famotidine (PEPCID) 10 MG tablet Take 1 tablet (10 mg) by mouth 2 times daily 60 tablet 1    gabapentin (NEURONTIN) 300 MG capsule Take 1 capsule (300 mg) by mouth At Bedtime 30 capsule 2    ibuprofen (ADVIL/MOTRIN) 200 MG tablet Take 200 mg by mouth every 4 hours as needed for mild pain      lidocaine-prilocaine (EMLA) 2.5-2.5 % external cream Apply topically as needed for moderate pain 30 g 1    loperamide (IMODIUM A-D) 2 MG tablet Take 1 tablet (2 mg) by mouth 4 times daily as needed for diarrhea Can take one 2 mg capsule after each loose stool up to 4 times a day as needed for diarrhea. This can be increased to 4 mg 4 times a day or 2 mg up to 8 times a day. 60 tablet 3    LORazepam (ATIVAN) 0.5 MG tablet Take 1 tablet (0.5 mg) by mouth every 6 hours as needed for nausea 20 tablet 0    OLANZapine (ZYPREXA) 5 MG tablet Take 1 tablet (5 mg) by mouth At Bedtime 30 tablet 0    SENNA-docusate sodium (SENNA S) 8.6-50 MG tablet Take 1 tablet by mouth At Bedtime 30 tablet 3              Physical Exam:   Ht 1.676 m (5' 6\")   Wt 111.1 kg (245 lb)   BMI 39.54 kg/m    Gen: NAD, pleasant and cooperative   HEENT: Atraumatic, normocephalic, extraocular movements appear intact  Pulm: non-labored breathing in room air  Neuro/MSK:   Orientation: Oriented to person, time, place and situation, Exhibits good insight into her condition and ongoing treatment  Motor: Observed moving upper extremities actively against gravity    Labs/Imaging:  Lab Results   Component Value Date    WBC 5.2 12/21/2023    HGB 12.8 12/21/2023    HCT 37.8 12/21/2023    MCV 89 12/21/2023     12/21/2023     Lab Results   Component Value Date     08/18/2021    POTASSIUM 4.1 08/18/2021    CHLORIDE 108 08/18/2021    CO2 28 08/18/2021    GLC 84 06/17/2022     Lab Results "   Component Value Date    GFRESTIMATED 79 08/18/2021    GFRESTBLACK >90 06/28/2021     Lab Results   Component Value Date    AST 17 08/18/2021    ALT 22 08/18/2021    ALKPHOS 94 08/18/2021    BILITOTAL 0.4 08/18/2021    BILICONJ 0.0 09/27/2007     Lab Results   Component Value Date    INR 0.99 12/21/2023     Lab Results   Component Value Date    BUN 17 08/18/2021    CR 0.90 08/18/2021              Assessment/Plan   Francesca Rowland presents to clinic today for follow up reg her rehab needs.   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 Was last seen in clinic on 5/2/23.  Overall, she is doing much better from a rehabilitation perspective since her last visit.  She has no concerns with mobility or ADLs, and her neuropathy symptoms have improved that she has not needed to take the Cymbalta regularly.  She was encouraged to continue with a regular home exercise regimen as tolerated.  We discussed as needed follow-up, and she is agreeable to this.      Therapy/equipment/braces:  Continue regular home exercise regimen as tolerated.  Follow up: As needed.      Rashmi London MD  Physical Medicine & Rehabilitation      50 minutes spent on the date of the encounter doing chart review, history and exam, documentation and further activities as noted above.

## 2024-05-21 NOTE — NURSING NOTE
Is the patient currently in the state of [place]? YES    Visit mode:VIDEO    If the visit is dropped, the patient can be reconnected by: TELEPHONE VISIT: Phone number: [phone]    Will anyone else be joining the visit? NO  (If patient encounters technical issues they should call [phone]    How would you like to obtain your AVS? MyChart    Are changes needed to the allergy or medication list? No    Are refills needed on medications prescribed by this physician? NO    Reason for visit: RECHECK    Shaylee DE LUNA      Subjective:       Patient ID: Manoj Johnson is a 27 y.o. female.    Chief Complaint: Annual Exam    27-year-old female patient with no significant past medical history here for routine annual physicals and to fill out physicals paperwork for her work.  Patient has been doing well has been exercising intermittently.  Denies any fatigue, chest pain or difficulty breathing.   Had minimal headaches off and on lately for which she had CT scan which was normal.   Reports that she takes ibuprofen/Tylenol as needed for headache but not frequently lately  Patient is due for eye exam    Review of Systems   Constitutional: Negative for activity change, fatigue and unexpected weight change.   HENT: Negative for hearing loss, rhinorrhea and trouble swallowing.    Eyes: Negative for discharge and visual disturbance.   Respiratory: Negative for chest tightness, shortness of breath and wheezing.    Cardiovascular: Negative for chest pain, palpitations and leg swelling.   Gastrointestinal: Negative for abdominal pain, blood in stool, constipation, diarrhea, nausea and vomiting.   Endocrine: Negative for polydipsia and polyuria.   Genitourinary: Negative for difficulty urinating, dysuria, hematuria and menstrual problem.   Musculoskeletal: Negative for arthralgias, joint swelling, myalgias and neck pain.   Skin: Negative for rash.   Neurological: Negative for weakness, light-headedness and headaches.   Psychiatric/Behavioral: Negative for confusion, dysphoric mood and sleep disturbance.         BP 96/70 (BP Location: Left arm, Patient Position: Sitting, BP Method: Medium (Manual))   Pulse 77   Wt 56 kg (123 lb 7.3 oz)   LMP 08/05/2020 (Approximate)   SpO2 98%   BMI 19.93 kg/m²   Objective:      Physical Exam  Constitutional:       Appearance: She is well-developed.   HENT:      Head: Normocephalic and atraumatic.   Cardiovascular:      Rate and Rhythm: Normal rate and regular rhythm.      Heart sounds: Normal heart sounds. No  murmur.   Pulmonary:      Effort: Pulmonary effort is normal.      Breath sounds: Normal breath sounds. No wheezing.   Abdominal:      General: Bowel sounds are normal.      Palpations: Abdomen is soft.      Tenderness: There is no abdominal tenderness.   Skin:     General: Skin is warm and dry.      Findings: No rash.   Neurological:      Mental Status: She is alert and oriented to person, place, and time.   Psychiatric:         Mood and Affect: Mood normal.           Assessment/Plan:   1. Routine general medical examination at a health care facility  - CBC auto differential; Future  - Comprehensive metabolic panel; Future  - Lipid Panel; Future  - TSH; Future  - Urinalysis; Future  Vital signs stable today.  Clinical exam stable  Continue lifestyle modifications with low-fat and low-cholesterol diet and exercise 30 min daily  Encouraged to make an appointment with eye physician to get her eyes checked  Advised to restrict caffeine and carbonated beverages to avoid worsening headaches and drink adequate fluids  Patient is up-to-date with Pap smear with her gynecologist     Physicals paperwork has been filled out and given to patient today

## 2024-05-21 NOTE — LETTER
5/21/2024         RE: Francesca Rowland  [address]  [place] [address]        Dear Colleague,    Thank you for referring your patient, Francesca Rowland, to the [facility]. Please see a copy of my visit note below.    Virtual Visit Details    Type of service:  Video Visit     Originating Location (pt. Location): Home    Distant Location (provider location):  On-site  Platform used for Video Visit: [facility]   PM&R clinic note        Interval history:     Francesca Rowland presents to clinic today for follow up reg her rehab needs.   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   Was last seen in clinic on 5/2/23.  Recommendations included:  Therapy/equipment/braces:  Incorporate daily exercise regimen including walking 10 to 15 minutes/day.  Continue to monitor intake in diet.  Medications:  Continue Cymbalta 60 mg daily for neuropathy, symptoms improved since last visit.  Follow up: 1 year or earlier as needed.    Oncology History:  - Initially presented due to Pap smear showing high-grade squamous intraepithelial lesion positive for high risk HPV subtype in 8/20017.  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   - Underwent LEEP procedure on 10/15/2007 which demonstrated microinvasive adenocarcinoma.  - Proceeded with radical hysterectomy, bilateral salpingectomy, bilateral pelvic and periaortic lymph node dissection on 10/19/2007. Pathology revealed Stage IA2 adenocarcinoma of the cervix.  - Underwent routine surveillance through 2014.  - Presented to the ED at [facility] on 9/14/2019 with severe RUQ pain which imaging revealed cholelithiasis without cholecystitis, Abdominal/Pelvic CT revealed enlarged pericaval lymph node. CT guided biopsy was consistent with metastatic cervical carcinoma.  - Underwent surgery  to remove mass.  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  - 3/15/2021- Underwent craniotomy and resection of cerebellar mass.  Fatigue and neuropathy.  - 4/16/2021- Completed 5 fx of gamma knife  - 4/28/2021- 6/30/2021- Cycles 1-4 of paclitaxel/cisplatin/avastin.  - 7/21/2021: Cycle #5 Paclitaxel/Cisplatin/Avastin   - 8/18/21- Saw Terra Zelaya in clinic. Peripheral neuropathy improved with Cymbalta.  - 8/23/21- Saw Dr. Canela in clinic. Improvement in peripheral neuropathy symptoms with Cymbalta. No residual disease on PET or Brain MRI, but remains at risk for recurrence.   - 11/29/21- Saw Dr. Cardozo in clinic for follow up. Patient continues to think cymbalta is helping for neuropathic symptoms. Has neuropathy in her feet once in a while, minimal in hands. Will be starting PT. No residual disease on PET or brain MRI, remains at risk for recurrence. Patient discussed options, and decided on observation. Likely a candidate for Keytruda if she recurs.  -5/6/24- Follow up visit with Dr. Canela. No residual diease on PET or brain MRI. (Recent PET largely negative-needs thyroid US) and brain MRI pending for November. Likely a candidate for Keytruda if there is a recurrence.        Symptoms,  Francesca was seen for a return virtual visit today.  Overall, she is doing very well since her last visit.  Her neuropathy symptoms have almost completely resolved and gotten better so she has not needed to use her Cymbalta consistently.  She only uses it occasionally if symptoms flare.  She has been struggling a little with some overnight calf cramps, and this is sporadic.  Otherwise, her mobility has been great and she has had no difficulties with mobility or daily activities.  She denies any other concerns today.      Therapies/HEP,  Continues with regular home exercise regimen as tolerated.      Functionally,   Improvement in mobility and  "ADLs.      Social history is unchanged.      Medications:  Current Outpatient Medications   Medication Sig Dispense Refill     DULoxetine (CYMBALTA) 30 MG capsule Take 1 capsule (30 mg) by mouth 2 times daily 180 capsule 2     multivitamin w/minerals (THERA-VIT-M) tablet Take 1 tablet by mouth daily       acetaminophen (TYLENOL) 325 MG tablet Take 2 tablets (650 mg) by mouth every 6 hours 24 tablet 0     Ascorbic Acid (VITAMIN C GUMMIE PO) Take by mouth daily       dexamethasone (DECADRON) 4 MG tablet Take 2 tablets (8 mg) by mouth daily (with breakfast) Take 2 tablets by mouth with food on days 2-4 after chemotherapy. 12 tablet 3     famotidine (PEPCID) 10 MG tablet Take 1 tablet (10 mg) by mouth 2 times daily 60 tablet 1     gabapentin (NEURONTIN) 300 MG capsule Take 1 capsule (300 mg) by mouth At Bedtime 30 capsule 2     ibuprofen (ADVIL/MOTRIN) 200 MG tablet Take 200 mg by mouth every 4 hours as needed for mild pain       lidocaine-prilocaine (EMLA) 2.5-2.5 % external cream Apply topically as needed for moderate pain 30 g 1     loperamide (IMODIUM A-D) 2 MG tablet Take 1 tablet (2 mg) by mouth 4 times daily as needed for diarrhea Can take one 2 mg capsule after each loose stool up to 4 times a day as needed for diarrhea. This can be increased to 4 mg 4 times a day or 2 mg up to 8 times a day. 60 tablet 3     LORazepam (ATIVAN) 0.5 MG tablet Take 1 tablet (0.5 mg) by mouth every 6 hours as needed for nausea 20 tablet 0     OLANZapine (ZYPREXA) 5 MG tablet Take 1 tablet (5 mg) by mouth At Bedtime 30 tablet 0     SENNA-docusate sodium (SENNA S) 8.6-50 MG tablet Take 1 tablet by mouth At Bedtime 30 tablet 3              Physical Exam:   Ht 1.676 m (5' 6\")   Wt 111.1 kg (245 lb)   BMI 39.54 kg/m    Gen: NAD, pleasant and cooperative   HEENT: Atraumatic, normocephalic, extraocular movements appear intact  Pulm: non-labored breathing in room air  Neuro/MSK:   Orientation: Oriented to person, time, place and " situation, Exhibits good insight into her condition and ongoing treatment  Motor: Observed moving upper extremities actively against gravity    Labs/Imaging:  Lab Results   Component Value Date    WBC 5.2 12/21/2023    HGB 12.8 12/21/2023    HCT 37.8 12/21/2023    MCV 89 12/21/2023     12/21/2023     Lab Results   Component Value Date     08/18/2021    POTASSIUM 4.1 08/18/2021    CHLORIDE 108 08/18/2021    CO2 28 08/18/2021    GLC 84 06/17/2022     Lab Results   Component Value Date    GFRESTIMATED 79 08/18/2021    GFRESTBLACK >90 06/28/2021     Lab Results   Component Value Date    AST 17 08/18/2021    ALT 22 08/18/2021    ALKPHOS 94 08/18/2021    BILITOTAL 0.4 08/18/2021    BILICONJ 0.0 09/27/2007     Lab Results   Component Value Date    INR 0.99 12/21/2023     Lab Results   Component Value Date    BUN 17 08/18/2021    CR 0.90 08/18/2021              Assessment/Plan   Francesca Rowland presents to clinic today for follow up reg her rehab needs.   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 Was last seen in clinic on 5/2/23.  Overall, she is doing much better from a rehabilitation perspective since her last visit.  She has no concerns with mobility or ADLs, and her neuropathy symptoms have improved that she has not needed to take the Cymbalta regularly.  She was encouraged to continue with a regular home exercise regimen as tolerated.  We discussed as needed follow-up, and she is agreeable to this.      Therapy/equipment/braces:  Continue regular home exercise regimen as tolerated.  Follow up: As needed.      Rashmi London MD  Physical Medicine & Rehabilitation      50 minutes spent on the date of the encounter doing chart review, history and exam, documentation and further activities as noted above.               Again, thank you for allowing me to participate in the care of your patient.        Sincerely,        Rashmi London,  MD

## 2024-05-24 NOTE — TELEPHONE ENCOUNTER
"FUTURE VISIT INFORMATION      FUTURE VISIT INFORMATION:  Date: 7/22/24  Time: 2:40PM  [place] [facility] [place]FORMATION:  Referring provider:  Jluis Canela MD   Referring providers clinic:  [facility]  Reason for visit/diagnosis  GYN/ONC: Metastatic adenocarcinoma to Cervix - epic/ulises 5/6/24  spoke to pt     RECORDS REQUESTED FROM:       Clinic name Comments Records Status Imaging Status   [facility]  5/6/24- OV Jluis Canela MD  [facility]     Imaging  5/13/24- Us Thyroid   4/23/24- CT Chest   4/23/24- PET  Epic  PACS            \"Please notify/message CSS if patient completed outside imaging prior to scheduled appointment and/or any outside records that might have been missed at pre visit -Thank you\"  "

## 2024-07-22 ENCOUNTER — OFFICE VISIT (OUTPATIENT)
Dept: OTOLARYNGOLOGY | Facility: CLINIC | Age: 44
End: 2024-07-22
Payer: COMMERCIAL

## 2024-07-22 ENCOUNTER — PRE VISIT (OUTPATIENT)
Dept: OTOLARYNGOLOGY | Facility: CLINIC | Age: 44
End: 2024-07-22

## 2024-07-22 VITALS
HEART RATE: 71 BPM | SYSTOLIC BLOOD PRESSURE: 145 MMHG | WEIGHT: 255 LBS | DIASTOLIC BLOOD PRESSURE: 83 MMHG | TEMPERATURE: 97.7 F | BODY MASS INDEX: 40.98 KG/M2 | HEIGHT: 66 IN | OXYGEN SATURATION: 93 %

## 2024-07-22 DIAGNOSIS — E04.1 THYROID NODULE: ICD-10-CM

## 2024-07-22 PROCEDURE — 99203 OFFICE O/P NEW LOW 30 MIN: CPT | Performed by: SURGERY

## 2024-07-22 ASSESSMENT — PAIN SCALES - GENERAL: PAINLEVEL: NO PAIN (0)

## 2024-07-22 NOTE — PROGRESS NOTES
Gamma knife 2021 x 2  Repeat ultrasount 1-2 yrsSURGERY CLINIC CONSULTATION    REASON FOR CONSULTATION:  Francesca Rowland was referred by Dr. Canela for evaluation and discussion of treatment options for thyroid nodules.     HISTORY OF PRESENT ILLNESS:  Francesca Rowland is a 43 year old female who has a pertinent extensive history for cervical carcinoma.  This has been treated since 2017.  Of note she had craniotomies and CyberKnife x 2 to the right cerebellar region for metastatic lesions.  In April 2024 she had a PET scan that identified hypermetabolic focus in the left lobe of the thyroid.  This then led to an ultrasound that noted 2 nodules in the thyroid gland, a right thyroid nodule measuring 1.1 cm and a left thyroid nodule measuring 0.7 cm.  Her thyroid function tests are within normal limits.    The patient denies any problems with voice quality inspiration or swallowing.  She has no symptoms of hypo or hyperthyroidism.  She has had head neck radiation treatment.  No previous history of papillary thyroid carcinoma.  Her sister has undergone total thyroidectomy for papillary thyroid carcinoma      REVIEW OF SYSTEMS:  ROS EXAM: 10 point view of systems is pertinent for that noted in the HPI  Patient Active Problem List   Diagnosis    Depression    SIOBHAN (vulval intraepithelial neoplasia) I    Acute cholecystitis due to biliary calculus    Metastatic adenocarcinoma to cervix (H)    History of cervical cancer    Cervix cancer (H)    Brain metastasis    Cerebral edema (H)    Chemotherapy induced nausea and vomiting    Encounter for antineoplastic chemotherapy    Chemotherapy induced neutropenia (H24)    Class 2 severe obesity due to excess calories with serious comorbidity in adult (H)       Past Surgical History:   Procedure Laterality Date    CHOLECYSTECTOMY, LAPOROSCOPIC  09/25/2019    HYSTERECTOMY RADICAL  2007    PAUL    INSERT PORT VASCULAR ACCESS Right 5/5/2021    Procedure: INSERTION, VASCULAR ACCESS PORT;   "Surgeon: Oral Toledo MD;  Location: [facility] OR    IR CHEST PORT PLACEMENT > 5 YRS OF AGE  5/5/2021    IR PORT REMOVAL RIGHT  12/21/2023    LAPAROSCOPIC LYMPHADENECTOMY N/A 11/13/2019    Procedure: LAPAROSCOPY, resection of of paraaortic lymph node, lysis of adhesions;  Surgeon: Jluis Canela MD;  Location: [facility] OR    OPTICAL TRACKING SYSTEM CRANIOTOMY, EXCISE TUMOR, COMBINED Bilateral 3/15/2021    Procedure: stealth assisted Midline suboccipital craniectomy/craniotomy for tumor;  Surgeon: Martín Myrick MD;  Location: [facility] OR    OPTICAL TRACKING SYSTEM VENTRICULOSTOMY Right 3/15/2021    Procedure: stealth assisted  right frontal ventriculostomy;  Surgeon: Martín Myrick MD;  Location: [facility] OR    REMOVE PORT VASCULAR ACCESS Right 12/21/2023    Procedure: Remove port vascular access right;  Surgeon: Adali Miguel PA-C;  Location: [facility] OR       Allergies   Allergen Reactions    Emend [Aprepitant]     Prochlorperazine      \"i was told I turn blue\"       Medications reviewed in the EMR        Family History   Problem Relation Age of Onset    Aneurysm Mother     Breast Cancer Paternal Grandmother         bilat mastectomies    Breast Cancer Maternal Aunt         stage 1    Thyroid Cancer Sister 23          PHYSICAL EXAM:  BP (!) 145/83   Pulse 71   Temp 97.7  F (36.5  C)   Ht 1.676 m (5' 6\")   Wt 115.7 kg (255 lb)   SpO2 93%   BMI 41.16 kg/m      Neck: The thyroid gland is not enlarged.  I do feel a small nodule in the right lobe of the thyroid gland.  I do not feel a left thyroid nodule.  Trachea is midline.  No cervical lymphadenopathy.    I personally reviewed the radiographic images and laboratory data    ASSESSMENT:   1. Thyroid nodule        PLAN:   Based on the above I would not proceed with a biopsy of either of these thyroid nodules as they did not meet criteria for biopsy.  I would recommend a repeat ultrasound in 6 months to a year.  If either of the thyroid nodules meet " criteria based on size and TI-RADS then I would proceed with biopsy and treat accordingly    Charlotte Wren MD

## 2024-07-22 NOTE — NURSING NOTE
"Chief Complaint   Patient presents with    Consult     Per patient -thyroid nodule      Blood pressure (!) 145/83, pulse 71, temperature 97.7  F (36.5  C), height 1.676 m (5' 6\"), weight 115.7 kg (255 lb), SpO2 93%.  Monroe Griffin LPN    "

## 2024-07-22 NOTE — LETTER
7/22/2024       RE: Francesca Rowland  [address]  [place] [address]     Dear Colleague,    Thank you for referring your patient, Francesca Rowland, to the [facility] [place] [facility]. Please see a copy of my visit note below.    Gamma knife 2021 x 2  Repeat ultrasount 1-2 yrsSURGERY CLINIC CONSULTATION    REASON FOR CONSULTATION:  Francesca Rowland was referred by Dr. Canela for evaluation and discussion of treatment options for thyroid nodules.     HISTORY OF PRESENT ILLNESS:  Francesca Rowland is a 43 year old female who has a pertinent extensive history for cervical carcinoma.  This has been treated since 2017.  Of note she had craniotomies and CyberKnife x 2 to the right cerebellar region for metastatic lesions.  In April 2024 she had a PET scan that identified hypermetabolic focus in the left lobe of the thyroid.  This then led to an ultrasound that noted 2 nodules in the thyroid gland, a right thyroid nodule measuring 1.1 cm and a left thyroid nodule measuring 0.7 cm.  Her thyroid function tests are within normal limits.    The patient denies any problems with voice quality inspiration or swallowing.  She has no symptoms of hypo or hyperthyroidism.  She has had head neck radiation treatment.  No previous history of papillary thyroid carcinoma.  Her sister has undergone total thyroidectomy for papillary thyroid carcinoma      REVIEW OF SYSTEMS:  ROS EXAM: 10 point view of systems is pertinent for that noted in the HPI  Patient Active Problem List   Diagnosis     Depression     SIOBHAN (vulval intraepithelial neoplasia) I     Acute cholecystitis due to biliary calculus     Metastatic adenocarcinoma to cervix (H)     History of cervical cancer     Cervix cancer (H)     Brain metastasis     Cerebral edema (H)     Chemotherapy induced nausea and vomiting     Encounter for antineoplastic chemotherapy     Chemotherapy  "induced neutropenia (H24)     Class 2 severe obesity due to excess calories with serious comorbidity in adult (H)       Past Surgical History:   Procedure Laterality Date     CHOLECYSTECTOMY, LAPOROSCOPIC  09/25/2019     HYSTERECTOMY RADICAL  2007    PAUL     INSERT PORT VASCULAR ACCESS Right 5/5/2021    Procedure: INSERTION, VASCULAR ACCESS PORT;  Surgeon: Oral Toledo MD;  Location: [facility] OR     IR CHEST PORT PLACEMENT > 5 YRS OF AGE  5/5/2021     IR PORT REMOVAL RIGHT  12/21/2023     LAPAROSCOPIC LYMPHADENECTOMY N/A 11/13/2019    Procedure: LAPAROSCOPY, resection of of paraaortic lymph node, lysis of adhesions;  Surgeon: Jluis Canela MD;  Location: [facility] OR     OPTICAL TRACKING SYSTEM CRANIOTOMY, EXCISE TUMOR, COMBINED Bilateral 3/15/2021    Procedure: stealth assisted Midline suboccipital craniectomy/craniotomy for tumor;  Surgeon: Martín Myrick MD;  Location: [facility] OR     OPTICAL TRACKING SYSTEM VENTRICULOSTOMY Right 3/15/2021    Procedure: stealth assisted  right frontal ventriculostomy;  Surgeon: Martín Myrick MD;  Location: [facility] OR     REMOVE PORT VASCULAR ACCESS Right 12/21/2023    Procedure: Remove port vascular access right;  Surgeon: Adali Miguel PA-C;  Location: [facility] OR       Allergies   Allergen Reactions     Emend [Aprepitant]      Prochlorperazine      \"i was told I turn blue\"       Medications reviewed in the EMR        Family History   Problem Relation Age of Onset     Aneurysm Mother      Breast Cancer Paternal Grandmother         bilat mastectomies     Breast Cancer Maternal Aunt         stage 1     Thyroid Cancer Sister 23          PHYSICAL EXAM:  BP (!) 145/83   Pulse 71   Temp 97.7  F (36.5  C)   Ht 1.676 m (5' 6\")   Wt 115.7 kg (255 lb)   SpO2 93%   BMI 41.16 kg/m      Neck: The thyroid gland is not enlarged.  I do feel a small nodule in the right lobe of the thyroid gland.  I do not feel a left thyroid nodule.  Trachea is midline.  No cervical " lymphadenopathy.    I personally reviewed the radiographic images and laboratory data    ASSESSMENT:   1. Thyroid nodule        PLAN:   Based on the above I would not proceed with a biopsy of either of these thyroid nodules as they did not meet criteria for biopsy.  I would recommend a repeat ultrasound in 6 months to a year.  If either of the thyroid nodules meet criteria based on size and TI-RADS then I would proceed with biopsy and treat accordingly    Charlotte Wren MD                          Again, thank you for allowing me to participate in the care of your patient.      Sincerely,    Charlotte Wren MD

## 2024-07-22 NOTE — PATIENT INSTRUCTIONS
"You were seen in the clinic today by Dr. Wren. If you have any questions or concerns after your appointment, please call the clinic at [phone]. Press \"1\" for scheduling, press \"3\" for nurse advice.    2.   The following has been recommended for you based upon your appointment today:   -lab appointment to check thyroid function       Pratibha GARDUNO, RN  [facility]  Department of Otolaryngology  [phone]   "

## 2024-10-05 ENCOUNTER — TELEPHONE (OUTPATIENT)
Dept: NEUROSURGERY | Facility: CLINIC | Age: 44
End: 2024-10-05
Payer: COMMERCIAL

## 2024-10-05 NOTE — TELEPHONE ENCOUNTER
Patient confirmed scheduled appointment:  Date: 11/5  Time: 9a  Visit type: return phone  Provider: ELIZABET Patel  [place] [facility] [place] [facility]  Testing/imaging: MRI 10/31  Additional notes: 1 year follow up, MRI prior

## 2024-10-31 ENCOUNTER — ANCILLARY PROCEDURE (OUTPATIENT)
Dept: MRI IMAGING | Facility: CLINIC | Age: 44
End: 2024-10-31
Attending: PHYSICIAN ASSISTANT
Payer: COMMERCIAL

## 2024-10-31 DIAGNOSIS — C79.31 MALIGNANT NEOPLASM METASTATIC TO BRAIN (H): ICD-10-CM

## 2024-10-31 PROCEDURE — A9585 GADOBUTROL INJECTION: HCPCS | Performed by: RADIOLOGY

## 2024-10-31 PROCEDURE — 70553 MRI BRAIN STEM W/O & W/DYE: CPT | Performed by: RADIOLOGY

## 2024-10-31 RX ORDER — GADOBUTROL 604.72 MG/ML
11.5 INJECTION INTRAVENOUS ONCE
Status: COMPLETED | OUTPATIENT
Start: 2024-10-31 | End: 2024-10-31

## 2024-10-31 RX ADMIN — GADOBUTROL 11.5 ML: 604.72 INJECTION INTRAVENOUS at 15:52

## 2024-11-04 ENCOUNTER — ONCOLOGY VISIT (OUTPATIENT)
Dept: ONCOLOGY | Facility: CLINIC | Age: 44
End: 2024-11-04
Attending: OBSTETRICS & GYNECOLOGY
Payer: COMMERCIAL

## 2024-11-04 DIAGNOSIS — C79.82 METASTATIC ADENOCARCINOMA TO CERVIX (H): ICD-10-CM

## 2024-11-04 DIAGNOSIS — C53.9 MALIGNANT NEOPLASM OF CERVIX, UNSPECIFIED SITE (H): Primary | ICD-10-CM

## 2024-11-04 DIAGNOSIS — C79.31 MALIGNANT NEOPLASM METASTATIC TO BRAIN (H): ICD-10-CM

## 2024-11-04 PROCEDURE — 99211 OFF/OP EST MAY X REQ PHY/QHP: CPT | Performed by: OBSTETRICS & GYNECOLOGY

## 2024-11-04 PROCEDURE — 99214 OFFICE O/P EST MOD 30 MIN: CPT | Performed by: OBSTETRICS & GYNECOLOGY

## 2024-11-04 NOTE — LETTER
2024      Francesca Rowland  [address]  [place] [address]      Dear Colleague,    Thank you for referring your patient, Francesca Rowland, to the [facility]. Please see a copy of my visit note below.                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 She is here today for follow up and disease management.       Oncology History:      Patient initially presented as a 27-year-old woman seen in referral due to a Pap smear showing high-grade squamous intraepithelial lesion that was positive for high risk HPV subtype in 2007. This Pap smear had been taken during her six week postpartum visit. Patient did have a remote history of abnormal Pap smears back in  while serving in [place] for the [employer].  Pap smears during her first pregnancy were all negative. Her Pap smear at the beginning of the most recent pregnancy was normal. Patient did have a colposcopy for evaluation of this abnormal Pap smear on 2007 and cervical biopsies of 6 and 12 o'clock position showed features consistent with papillary serous adenocarcinoma. Endocervical curettings were also taken which showed fragments of papillary serous carcinoma. The patient then underwent a LEEP procedure on 10/15/07 which showed microinvasive adenocarcinoma with a depth of invasion of 0.4 mm. Patient then made the decision to proceed with radical hysterectomy. She subsequently underwent a radical hysterectomy, bilateral salpingectomy, bilateral pelvic and periaortic lymph node dissection on 10/19/07. Patient's operative course was otherwise uncomplicated and she recoverred uneventfully.  Pathology did reveal patient to be stage IA2 adenocarcinoma of the cervix.      She underwent routine  surveillence through 2014 complicated only by SIOBHAN 1      She presented to the ED at [facility] on 9/14/2019 with severe 8.5/10 RUQ pain associated with nausea, one episode of emesis, and decreased appetite. RUQ demonstrated cholelithiasis without cholecystitis, so an abdominal and pelvic CT scan was obtained. This revealed post-surgical changes consistent with prior hysterectomy, left adnexal cysts thought to be ovarian, and an enlarged pericaval lymph node suspicious for metastatic disease vs primary lymphoma. Her condition stabilized and she was discharged home with referrals to general surgery and oncology for biliary colic and further management of the lymphadenopathy, respectively. CT-guided right retroperitoneal lymph node biopsy on 10/15/2019 was consistent with metastatic cervical carcinoma     She underwent surgery to remove the mass and determine the extent of disease on 11/13/19     PATH:  FINAL DIAGNOSIS:   LYMPH NODES, PARA-AORTIC, RESECTION:   - Positive for carcinoma in three of four lymph nodes examined (3/4),   consistent with recurrent endocervical   adenocarcinoma   - Largest metastatic focus is 3.5 cm, positive for extra yuridia extension         Chemo-Potentiated RT 12/2019-1/2020 2/2020 CT:  IMPRESSION: Postoperative changes of hysterectomy and lymph node  resection for cervical cancer. The metastatic right pelvic lymph nodes  which were previously identified have decreased in size. No new  metastases identified.      She recently presented to the ED for an acute RUQ pain episode, which has greatly improved despite no clear diagnosis.  During this visit she underwent a CAP CT which did not demonstrate evidence of cancer but a slightly enlarged spleen. She had a mild eosinophilia (~2%) but has not traveled or eaten exotic food recently, and has not had any new contacts to suggest mono.      10/13/2020 CT:  IMPRESSION:  1.  Recurrent right external iliac lymphadenopathy.  2.  Rebound thymic  hyperplasia.     10/30/2020 PET  IMPRESSION:   In this patient with history of metastatic cervical cancer status-post  radical hysterectomy and chemoradiation, there is evidence of  recurrent disease:  1. Increased size of a hypermetabolic right external iliac chain lymph node since 2/27/2020.  2. Hypermetabolic right subpectoral and axillary lymph nodes.  Recommend follow-up in the breast center for axillary ultrasound and  possible tissue sampling.  3. Idiopathic thymic activation/hyperplasia.     She was evaluated in the breast clinic with plan made for biopsy - however at biopsy the node had shruck considerably and was thus felt to be c/w inflammation (no biopsy obtained)     3/8/2021 MRI Brain (for dizziness)  IMPRESSION:  1. Heterogeneous mixed solid and cystic 2.7 cm mass in the right cerebellum, most likely a solitary metastatic lesion. There is moderate surrounding vasogenic edema as well as mass effect on the fourth ventricle. No obstructive hydrocephalus.    2. Unremarkable MR angiogram of the head and neck.     3/15/2021 Craniotomy ([facility])  Midline suboccipital craniotomy for resection of mass  Right frontal ventriculostomy  Stereotactic navigation  Use of operating microscope     4/16/21 Completed 5 fx of gamma knife (triptap)     Plan: Paclitaxel, Cisplatin, Avastin     4/28/2021: Cycle #1 Paclitaxel, Cisplatin, Avastin  5/19/2021: Cycle #2 Paclitaxel/Cisplatin/Avastin +Neulasta  6/9/2021: Cycle #3 Paclitaxel/Cisplatin/Avastin +Neulasta  6/30/2021: Cycle #4 Paclitaxel/Cisplatin/Avastin +Neulasta    6/28/2021 plan: PET scan for interval assessment.  Will plan for 6 cycles followed by possible use of Keytruda versus observation.     7/15/2021: PET CT: IMPRESSION: In this patient with history of cervical cancer who  completed chemoradiation:  1. Resolved uptake to the right pelvic lymph node, consistent with  response to treatment.  2. No new hypermetabolic lesions.  3. No definite abnormal intracranial  uptake. Please note that PET-CT  is less sensitive for intracranial disease due to normal uptake to the  brain. Consider continued following of the previously seen lesions and  surgical changes with MRI if clinically indicated.      7/21/2021: Cycle #5 Paclitaxel/Cisplatin/Avastin +Neulasta  8/11/2021: Cycle #6 Taxotere/Cisplatin/Avastin + Neulasta (Change to Taxotere due to neuropathy) deferred due to insurance  8/18/2021: Cycle #6 Paclitaxel/Cisplatin/Avastin (dose reduced Taxol to 135 mg/m2 due to insurance coverage with Taxotere and patient in agreement with plan- neuropathy mild and improved) +Neulasta    8/21/2021 MRI (HEAD):  Impression: In this patient with history of metastatic carcinoma to  the right cerebellum:  There is no definite evidence of disease recurrence. Decreased  resection cavity enhancement. Previous right occipital lobe  enhancement is completely resolved. No new enhancing intracranial  lesions.    12/6/21 MRI brain - DIANNE  12/8/21 PET - negative for focal uptake    6/17/22 MRI (BRAIN)  IMPRESSION: In this patient with history of metastatic cervical cancer  to the cerebellum, status post resection and chemoradiation, there is  no evidence of recurrence:  Stable postsurgical changes of right cerebellar mass resection without  evidence of local recurrence. No new enhancing intracranial lesions.    PET  1. No abnormal FDG uptake within the body and neck.     2. Postsurgical changes of total abdominal hysterectomy, bilateral  salpingo-oophorectomy and periaortic and pelvic lymph node dissection  without evidence of disease recurrence.    4/28/23 CT (CAP): IMPRESSION:   1.  No evidence of recurrent or metastatic disease.     MRI (brain): IMPRESSION: Stable surgical changes of cerebellar mass resection. No   evidence of local recurrence or new intracranial metastatic disease.     10/2023 MRI  Impression: Stable postsurgical suboccipital craniotomy changes for  resection of underlying cerebellar  mass. No new abnormal intracranial  enhancement.    4/23/24 PET  IMPRESSION: In this patient with history of cervical cancer:     1. Post surgical changes of radical hysterectomy, bilateral  salpingectomy and periaortic and pelvic lymph node dissection without  evidence of local recurrence.  2. No evidence of abnormal FDG uptake to suggest distant metastases.  3. Focal FDG uptake in the left thyroid gland without discrete nodule,  new compared to 6/17/2022 PET/CT. Recommend further evaluation with  ultrasound.  10/31/24 MRI  IMPRESSION:  New tiny focus of enhancement in the inferior right cerebellum most  suspicious for metastasis, although this could conceivably represent a  subacute infarct. Recommend follow-up MRI in 2-3 months to evaluate  stability which would also help delineate between metastasis and  subacute infarct.    ROS: No bleeding, weight changes, mild constipation.  Serial URI's this winter (mild and self limited)    Past Medical History:    Past Medical History:   Diagnosis Date     Chronic cholecystitis 09/25/2019     History of cervical cancer 2007     Metastasis to brain (H) 03/08/2021     Metastatic adenocarcinoma to cervix (H) 10/28/2019    Added automatically from request for surgery [id number]         Past Surgical History:    Past Surgical History:   Procedure Laterality Date     CHOLECYSTECTOMY, LAPOROSCOPIC  09/25/2019     HYSTERECTOMY RADICAL  2007    PAUL     INSERT PORT VASCULAR ACCESS Right 5/5/2021    Procedure: INSERTION, VASCULAR ACCESS PORT;  Surgeon: Oral Toledo MD;  Location: [facility] OR     IR CHEST PORT PLACEMENT > 5 YRS OF AGE  5/5/2021     IR PORT REMOVAL RIGHT  12/21/2023     LAPAROSCOPIC LYMPHADENECTOMY N/A 11/13/2019    Procedure: LAPAROSCOPY, resection of of paraaortic lymph node, lysis of adhesions;  Surgeon: Jluis Canela MD;  Location: [facility] OR     OPTICAL TRACKING SYSTEM CRANIOTOMY, EXCISE TUMOR, COMBINED Bilateral 3/15/2021    Procedure: stealth assisted  Midline suboccipital craniectomy/craniotomy for tumor;  Surgeon: Martín Myrick MD;  Location: [facility] OR     OPTICAL TRACKING SYSTEM VENTRICULOSTOMY Right 3/15/2021    Procedure: stealth assisted  right frontal ventriculostomy;  Surgeon: Martín Myrick MD;  Location: [facility] OR     REMOVE PORT VASCULAR ACCESS Right 12/21/2023    Procedure: Remove port vascular access right;  Surgeon: Adali Miguel PA-C;  Location: [facility] OR         Health Maintenance Due   Topic Date Due     YEARLY PREVENTIVE VISIT  Never done     ADVANCE CARE PLANNING  Never done     Pneumococcal Vaccine: Pediatrics (0 to 5 Years) and At-Risk Patients (6 to 64 Years) (1 of 2 - PCV) Never done     COLORECTAL CANCER SCREENING  Never done     HIV SCREENING  Never done     HEPATITIS C SCREENING  Never done     HEPATITIS B IMMUNIZATION (1 of 3 - 19+ 3-dose series) Never done     LIPID  Never done     MAMMO SCREENING  11/09/2021     PAP  08/31/2024     INFLUENZA VACCINE (1) 09/01/2024     COVID-19 Vaccine (3 - 2024-25 season) 09/01/2024       Current Medications:     Current Outpatient Medications   Medication Sig Dispense Refill     acetaminophen (TYLENOL) 325 MG tablet Take 2 tablets (650 mg) by mouth every 6 hours 24 tablet 0     Ascorbic Acid (VITAMIN C GUMMIE PO) Take by mouth daily       dexamethasone (DECADRON) 4 MG tablet Take 2 tablets (8 mg) by mouth daily (with breakfast) Take 2 tablets by mouth with food on days 2-4 after chemotherapy. 12 tablet 3     DULoxetine (CYMBALTA) 30 MG capsule Take 1 capsule (30 mg) by mouth 2 times daily 180 capsule 2     famotidine (PEPCID) 10 MG tablet Take 1 tablet (10 mg) by mouth 2 times daily 60 tablet 1     gabapentin (NEURONTIN) 300 MG capsule Take 1 capsule (300 mg) by mouth At Bedtime 30 capsule 2     ibuprofen (ADVIL/MOTRIN) 200 MG tablet Take 200 mg by mouth every 4 hours as needed for mild pain       lidocaine-prilocaine (EMLA) 2.5-2.5 % external cream Apply topically as needed for moderate  "pain 30 g 1     loperamide (IMODIUM A-D) 2 MG tablet Take 1 tablet (2 mg) by mouth 4 times daily as needed for diarrhea Can take one 2 mg capsule after each loose stool up to 4 times a day as needed for diarrhea. This can be increased to 4 mg 4 times a day or 2 mg up to 8 times a day. 60 tablet 3     LORazepam (ATIVAN) 0.5 MG tablet Take 1 tablet (0.5 mg) by mouth every 6 hours as needed for nausea 20 tablet 0     multivitamin w/minerals (THERA-VIT-M) tablet Take 1 tablet by mouth daily       OLANZapine (ZYPREXA) 5 MG tablet Take 1 tablet (5 mg) by mouth At Bedtime 30 tablet 0     SENNA-docusate sodium (SENNA S) 8.6-50 MG tablet Take 1 tablet by mouth At Bedtime 30 tablet 3         Allergies:        Allergies   Allergen Reactions     Emend [Aprepitant]      Prochlorperazine      \"i was told I turn blue\"        Social History:     Social History     Tobacco Use     Smoking status: Former     Types: Cigarettes     Smokeless tobacco: Never   Substance Use Topics     Alcohol use: Yes     Comment: one drink a week       History   Drug Use No         Family History:     The patient's family history is notable for     Family History   Problem Relation Age of Onset     Aneurysm Mother      Breast Cancer Paternal Grandmother         bilat mastectomies     Breast Cancer Maternal Aunt         stage 1     Thyroid Cancer Sister 23         Physical Exam:     PS 0  VS: There were no vitals taken for this visit.     General Appearance: healthy and alert, no distress, overweight     HEENT: no thyromegaly, no palpable nodules or masses        Cardiovascular: regular rate and rhythm, no gallops, rubs or murmurs     Respiratory: lungs clear, no rales, rhonchi or wheezes    Musculoskeletal: extremities non tender and without edema    Skin: no lesions or rashes     Neurological: normal gait, no gross defects     Psychiatric: appropriate mood and affect                               Hematological: normal cervical, supraclavicular lymph " nodes     Gastrointestinal:       abdomen soft, non-tender, non-distended, no organomegaly or masses, obese.    Genitourinary: Normal BUS, no lesions in vagina seen. BME no palpable masses or nodules.  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 She is here today for follow up and disease management.     30 minutes spent on the date of the encounter doing chart review, history and exam, documentation, and further activities as noted above.        Plan:     1.)         Rec. Cx Cancer with brain met: s/p primary therapy:  She has a new cerebellar spot which is suspicious for possible met.  The recommendation is re-scan in 8-12 weeks for stability.      - MRI in 10 week   - PET scan this week to exclude other mets    She has moved to annual visits with Neurosurgery after her negative scan in 10/2023       2.)  Genetic risk factors were assessed again and the patient has multiple family members with cancer diagnosed younger than age 50 including herself (age 40, but with likely HPV related disease) and her sister (thyroid cancer at age 23) among others. She met with genetics 1/2021, but did not proceed until 12/2021 (NEGATIVE)     3.) Labs and/or tests ordered include: PET scan; head MRI     4.)   Health maintenance issues addressed today include annual health maintenance and non-gynecologic issues with PCP.         Jluis Canela MD    Division of Gynecologic Oncology  [facility]        CC  Patient Care Team:  Tyrese Bowie MD as PCP - General (Family Medicine)  Jluis Canela MD as MD (Gynecologic Oncology)  Jluis Canela MD as Assigned Cancer Care Provider  Danielle Trivedi APRN CNP as Nurse Practitioner (Neurological Surgery)  Summer Patel PA-C as Physician Assistant (Neurological  Surgery)  Charlotte Wren MD as MD (Surgery)  Rashmi London MD as Assigned Neuroscience Provider  Charlotte Wren MD as Assigned Surgical Provider      Again, thank you for allowing me to participate in the care of your patient.        Sincerely,        Jluis Canela MD

## 2024-11-05 ENCOUNTER — VIRTUAL VISIT (OUTPATIENT)
Dept: NEUROSURGERY | Facility: CLINIC | Age: 44
End: 2024-11-05
Payer: COMMERCIAL

## 2024-11-05 VITALS — WEIGHT: 255 LBS | BODY MASS INDEX: 40.98 KG/M2 | HEIGHT: 66 IN

## 2024-11-05 DIAGNOSIS — C79.31 MALIGNANT NEOPLASM METASTATIC TO BRAIN (H): Primary | ICD-10-CM

## 2024-11-05 PROCEDURE — 99212 OFFICE O/P EST SF 10 MIN: CPT | Performed by: PHYSICIAN ASSISTANT

## 2024-11-05 ASSESSMENT — PAIN SCALES - GENERAL: PAINLEVEL_OUTOF10: NO PAIN (0)

## 2024-11-05 NOTE — NURSING NOTE
Current patient location: [address] [place] [address]    Is the patient currently in the state of [place]? YES    Visit mode:TELEPHONE    If the visit is dropped, the patient can be reconnected by: TELEPHONE VISIT: Phone number:   Telephone Information:   Mobile [phone]       Will anyone else be joining the visit? NO  (If patient encounters technical issues they should call [phone]    Are changes needed to the allergy or medication list? No    Are refills needed on medications prescribed by this physician? NO    Rooming Documentation:  Questionnaire(s) not pre-assigned    Reason for visit: Follow Up    Cindy DE LUNA

## 2024-11-05 NOTE — PROGRESS NOTES
[facility]  Department of Neurosurgery    Name: Francesca Rowland  MRN: [id number]  Age: 43 year old  : 1980      Chief Complaint:   Cerebellar metastasis s/p midline suboccipital craniotomy for resection 3/15/2021 and fractionated SRT 2021 - 2021, follow up visit    History of Present Illness:   Francesca Rowland is a 43 year old right handed female with a history of metastatic cervical adenocarcinoma who is seen today by telephone visit for annual follow up. She presented in early  with persistent headaches and imbalance, found to have a right cerebellar mass, confirmed metastatic on pathology following surgical resection. She underwent SRT to the resection bed following surgery. Our last visit was 1 year ago at which time her imaging had remained stable. She had a recent updated brain MRI which unfortunately identified a possible new tiny enhancement near her right cerebellar resection/post radiation site. She feels well and has no new physical complaints today over the phone. She met with Dr. Canela in Oncology yesterday and plan was to get an updated PET and repeat brain MRI in 8 weeks.     Review of Systems:   Pertinent items are noted in HPI or as in patient entered ROS below, remainder of complete ROS is negative.     Physical Exam:   Exam today is limited due to telephone visit. Speech is normal. Answers questions appropriately.      Imaging:  We reviewed the MRI done 10/31/2024 which reveals stable postsurgical changes with a new 3mm nodular focus of enhancement in the right cerebellar tonsil.      Assessment:  Cerebellar metastasis s/p midline suboccipital craniotomy for resection 3/15/2021 and fractionated SRT 2021 - 2021, follow up visit  New 3mm focus of enhancement, right cerebellar tonsil, concerning for metastasis    Plan:  Reviewed with Dr. Myrick. We agree with 8 week follow up MRI and will arrange appointment to review as well.   We  also discussed doing a research MRI in 8 weeks through [facility], which the patient is open to. We'll reach out to the research group to initiate.       Summer Patel PA-C  Department of Neurosurgery

## 2024-11-05 NOTE — LETTER
2024       RE: Francesca Rowland  [address]  [facility] [facility] [address]     Dear Colleague,    Thank you for referring your patient, Francesca Rowland, to the [facility] [place] [facility]. Please see a copy of my visit note below.      [facility]  Department of Neurosurgery    Name: Francesca Rowland  MRN: [id number]  Age: 43 year old  : 1980      Chief Complaint:   Cerebellar metastasis s/p midline suboccipital craniotomy for resection 3/15/2021 and fractionated SRT 2021 - 2021, follow up visit    History of Present Illness:   Francesca Rowland is a 43 year old right handed female with a history of metastatic cervical adenocarcinoma who is seen today by telephone visit for annual follow up. She presented in early  with persistent headaches and imbalance, found to have a right cerebellar mass, confirmed metastatic on pathology following surgical resection. She underwent SRT to the resection bed following surgery. Our last visit was 1 year ago at which time her imaging had remained stable. She had a recent updated brain MRI which unfortunately identified a possible new tiny enhancement near her right cerebellar resection/post radiation site. She feels well and has no new physical complaints today over the phone. She met with Dr. Canela in Oncology yesterday and plan was to get an updated PET and repeat brain MRI in 8 weeks.     Review of Systems:   Pertinent items are noted in HPI or as in patient entered ROS below, remainder of complete ROS is negative.     Physical Exam:   Exam today is limited due to telephone visit. Speech is normal. Answers questions appropriately.      Imaging:  We reviewed the MRI done 10/31/2024 which reveals stable postsurgical changes with a new 3mm nodular focus of enhancement in the right cerebellar tonsil.      Assessment:  Cerebellar metastasis s/p  midline suboccipital craniotomy for resection 3/15/2021 and fractionated SRT 4/12/2021 - 4/16/2021, follow up visit  New 3mm focus of enhancement, right cerebellar tonsil, concerning for metastasis    Plan:  Reviewed with Dr. Myrick. We agree with 8 week follow up MRI and will arrange appointment to review as well.   We also discussed doing a research MRI in 8 weeks through [facility], which the patient is open to. We'll reach out to the research group to initiate.       uSmmer Patel PA-C  Department of Neurosurgery      Virtual Visit Details    Type of service:  Telephone Visit   Phone call duration: 13 minutes   Originating Location (pt. Location): Home    Distant Location (provider location):  Off-site      Again, thank you for allowing me to participate in the care of your patient.      Sincerely,    Summer Patel PA-C

## 2024-11-05 NOTE — PROGRESS NOTES
Virtual Visit Details    Type of service:  Telephone Visit   Phone call duration: 13 minutes   Originating Location (pt. Location): Home    Distant Location (provider location):  Off-site

## 2024-11-12 ENCOUNTER — TELEPHONE (OUTPATIENT)
Dept: NEUROSURGERY | Facility: CLINIC | Age: 44
End: 2024-11-12
Payer: COMMERCIAL

## 2024-11-14 NOTE — NURSING NOTE
Return appt in  12 weeks    Pet scan, ct head, mri brain orders entered    Check out note sent to scheduling

## 2024-11-14 NOTE — PATIENT INSTRUCTIONS
It was a pleasure seeing you in clinic today-please reach out if there are any further questions that arise following today's visit.  During business hours, you may reach me at [phone].  For urgent/emergent questions after business hours, you may reach the on-call Gynecologic Oncology Resident through the [facility]  at [phone].    All normal test results are usually communicated via letter or [employer] message.  Abnormal results (those that require a change in the previously discussed plan of care) are usually communicated via a phone call.    I recommend signing up for [employer] access if you have not already done so.  This allows you online access to your lab results and also helps you communicate efficiently with your clinic should any questions arise in your care.    Follow up appointment in 12 weeks with MD scheduling will call you to help make an appointment  referral to radiology for pet scan, ct head, mri pelvis, scheduling will call you to help make an appointment      Dr ulises Goode RN  Phone:  [phone]  Fax:  [phone]

## 2024-11-16 ENCOUNTER — HEALTH MAINTENANCE LETTER (OUTPATIENT)
Age: 44
End: 2024-11-16

## 2024-12-06 ENCOUNTER — ANCILLARY PROCEDURE (OUTPATIENT)
Dept: PET IMAGING | Facility: CLINIC | Age: 44
End: 2024-12-06
Attending: OBSTETRICS & GYNECOLOGY
Payer: COMMERCIAL

## 2024-12-06 DIAGNOSIS — C53.9 MALIGNANT NEOPLASM OF CERVIX, UNSPECIFIED SITE (H): ICD-10-CM

## 2024-12-06 DIAGNOSIS — C79.31 MALIGNANT NEOPLASM METASTATIC TO BRAIN (H): ICD-10-CM

## 2024-12-06 DIAGNOSIS — C79.82 METASTATIC ADENOCARCINOMA TO CERVIX (H): ICD-10-CM

## 2024-12-06 LAB
CREAT BLD-MCNC: 0.9 MG/DL (ref 0.5–1)
EGFRCR SERPLBLD CKD-EPI 2021: >60 ML/MIN/1.73M2

## 2024-12-06 PROCEDURE — 71260 CT THORAX DX C+: CPT | Mod: GC | Performed by: STUDENT IN AN ORGANIZED HEALTH CARE EDUCATION/TRAINING PROGRAM

## 2024-12-06 PROCEDURE — 74177 CT ABD & PELVIS W/CONTRAST: CPT | Mod: GC | Performed by: STUDENT IN AN ORGANIZED HEALTH CARE EDUCATION/TRAINING PROGRAM

## 2024-12-06 PROCEDURE — 82565 ASSAY OF CREATININE: CPT

## 2024-12-06 PROCEDURE — A9552 F18 FDG: HCPCS | Performed by: STUDENT IN AN ORGANIZED HEALTH CARE EDUCATION/TRAINING PROGRAM

## 2024-12-06 PROCEDURE — 78815 PET IMAGE W/CT SKULL-THIGH: CPT | Mod: GC | Performed by: STUDENT IN AN ORGANIZED HEALTH CARE EDUCATION/TRAINING PROGRAM

## 2024-12-06 RX ORDER — IOPAMIDOL 755 MG/ML
10-135 INJECTION, SOLUTION INTRAVASCULAR ONCE
Status: COMPLETED | OUTPATIENT
Start: 2024-12-06 | End: 2024-12-06

## 2024-12-06 RX ORDER — FLUDEOXYGLUCOSE F 18 200 MCI/ML
10-18 INJECTION, SOLUTION INTRAVENOUS ONCE
Status: COMPLETED | OUTPATIENT
Start: 2024-12-06 | End: 2024-12-06

## 2024-12-06 RX ADMIN — IOPAMIDOL 135 ML: 755 INJECTION, SOLUTION INTRAVASCULAR at 14:14

## 2024-12-06 RX ADMIN — FLUDEOXYGLUCOSE F 18 12.42 MILLICURIE: 200 INJECTION, SOLUTION INTRAVENOUS at 14:13

## 2024-12-26 ENCOUNTER — ANCILLARY PROCEDURE (OUTPATIENT)
Dept: MRI IMAGING | Facility: CLINIC | Age: 44
End: 2024-12-26
Attending: OBSTETRICS & GYNECOLOGY
Payer: COMMERCIAL

## 2024-12-26 DIAGNOSIS — C79.82 METASTATIC ADENOCARCINOMA TO CERVIX (H): ICD-10-CM

## 2024-12-26 DIAGNOSIS — C79.31 MALIGNANT NEOPLASM METASTATIC TO BRAIN (H): ICD-10-CM

## 2024-12-26 DIAGNOSIS — C53.9 MALIGNANT NEOPLASM OF CERVIX, UNSPECIFIED SITE (H): ICD-10-CM

## 2024-12-26 PROCEDURE — A9585 GADOBUTROL INJECTION: HCPCS | Performed by: STUDENT IN AN ORGANIZED HEALTH CARE EDUCATION/TRAINING PROGRAM

## 2024-12-26 PROCEDURE — 70553 MRI BRAIN STEM W/O & W/DYE: CPT | Performed by: STUDENT IN AN ORGANIZED HEALTH CARE EDUCATION/TRAINING PROGRAM

## 2024-12-26 RX ORDER — GADOBUTROL 604.72 MG/ML
11.5 INJECTION INTRAVENOUS ONCE
Status: COMPLETED | OUTPATIENT
Start: 2024-12-26 | End: 2024-12-26

## 2024-12-26 RX ADMIN — GADOBUTROL 11.5 ML: 604.72 INJECTION INTRAVENOUS at 15:45

## 2025-01-05 NOTE — PROGRESS NOTES
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 She is here today for follow up and disease management.       Oncology History:      Patient initially presented as a 27-year-old woman seen in referral due to a Pap smear showing high-grade squamous intraepithelial lesion that was positive for high risk HPV subtype in 2007. This Pap smear had been taken during her six week postpartum visit. Patient did have a remote history of abnormal Pap smears back in  while serving in [place] for the [employer].  Pap smears during her first pregnancy were all negative. Her Pap smear at the beginning of the most recent pregnancy was normal. Patient did have a colposcopy for evaluation of this abnormal Pap smear on 2007 and cervical biopsies of 6 and 12 o'clock position showed features consistent with papillary serous adenocarcinoma. Endocervical curettings were also taken which showed fragments of papillary serous carcinoma. The patient then underwent a LEEP procedure on 10/15/07 which showed microinvasive adenocarcinoma with a depth of invasion of 0.4 mm. Patient then made the decision to proceed with radical hysterectomy. She subsequently underwent a radical hysterectomy, bilateral salpingectomy, bilateral pelvic and periaortic lymph node dissection on 10/19/07. Patient's operative course was otherwise uncomplicated and she recoverred uneventfully.  Pathology did reveal patient to be stage IA2 adenocarcinoma of the cervix.      She underwent routine surveillence through  complicated only by SIOBHAN 1      She presented to the ED at [facility] on 2019 with severe 8.5/10 RUQ pain associated with nausea, one episode of emesis, and decreased appetite. RUQ demonstrated cholelithiasis without  cholecystitis, so an abdominal and pelvic CT scan was obtained. This revealed post-surgical changes consistent with prior hysterectomy, left adnexal cysts thought to be ovarian, and an enlarged pericaval lymph node suspicious for metastatic disease vs primary lymphoma. Her condition stabilized and she was discharged home with referrals to general surgery and oncology for biliary colic and further management of the lymphadenopathy, respectively. CT-guided right retroperitoneal lymph node biopsy on 10/15/2019 was consistent with metastatic cervical carcinoma     She underwent surgery to remove the mass and determine the extent of disease on 11/13/19     PATH:  FINAL DIAGNOSIS:   LYMPH NODES, PARA-AORTIC, RESECTION:   - Positive for carcinoma in three of four lymph nodes examined (3/4),   consistent with recurrent endocervical   adenocarcinoma   - Largest metastatic focus is 3.5 cm, positive for extra yuridia extension         Chemo-Potentiated RT 12/2019-1/2020 2/2020 CT:  IMPRESSION: Postoperative changes of hysterectomy and lymph node  resection for cervical cancer. The metastatic right pelvic lymph nodes  which were previously identified have decreased in size. No new  metastases identified.      She recently presented to the ED for an acute RUQ pain episode, which has greatly improved despite no clear diagnosis.  During this visit she underwent a CAP CT which did not demonstrate evidence of cancer but a slightly enlarged spleen. She had a mild eosinophilia (~2%) but has not traveled or eaten exotic food recently, and has not had any new contacts to suggest mono.      10/13/2020 CT:  IMPRESSION:  1.  Recurrent right external iliac lymphadenopathy.  2.  Rebound thymic hyperplasia.     10/30/2020 PET  IMPRESSION:   In this patient with history of metastatic cervical cancer status-post  radical hysterectomy and chemoradiation, there is evidence of  recurrent disease:  1. Increased size of a hypermetabolic right  external iliac chain lymph node since 2/27/2020.  2. Hypermetabolic right subpectoral and axillary lymph nodes.  Recommend follow-up in the breast center for axillary ultrasound and  possible tissue sampling.  3. Idiopathic thymic activation/hyperplasia.     She was evaluated in the breast clinic with plan made for biopsy - however at biopsy the node had shruck considerably and was thus felt to be c/w inflammation (no biopsy obtained)     3/8/2021 MRI Brain (for dizziness)  IMPRESSION:  1. Heterogeneous mixed solid and cystic 2.7 cm mass in the right cerebellum, most likely a solitary metastatic lesion. There is moderate surrounding vasogenic edema as well as mass effect on the fourth ventricle. No obstructive hydrocephalus.    2. Unremarkable MR angiogram of the head and neck.     [facility]  Midline suboccipital craniotomy for resection of mass  Right frontal ventriculostomy  Stereotactic navigation  Use of operating microscope     4/16/21 Completed 5 fx of gamma knife ("[employer]")     Plan: Paclitaxel, Cisplatin, Avastin     4/28/2021: Cycle #1 Paclitaxel, Cisplatin, Avastin  5/19/2021: Cycle #2 Paclitaxel/Cisplatin/Avastin +Neulasta  6/9/2021: Cycle #3 Paclitaxel/Cisplatin/Avastin +Neulasta  6/30/2021: Cycle #4 Paclitaxel/Cisplatin/Avastin +Neulasta    6/28/2021 plan: PET scan for interval assessment.  Will plan for 6 cycles followed by possible use of Keytruda versus observation.     7/15/2021: PET CT: IMPRESSION: In this patient with history of cervical cancer who  completed chemoradiation:  1. Resolved uptake to the right pelvic lymph node, consistent with  response to treatment.  2. No new hypermetabolic lesions.  3. No definite abnormal intracranial uptake. Please note that PET-CT  is less sensitive for intracranial disease due to normal uptake to the  brain. Consider continued following of the previously seen lesions and  surgical changes with MRI if clinically indicated.      7/21/2021:  Cycle #5 Paclitaxel/Cisplatin/Avastin +Neulasta  8/11/2021: Cycle #6 Taxotere/Cisplatin/Avastin + Neulasta (Change to Taxotere due to neuropathy) deferred due to insurance  8/18/2021: Cycle #6 Paclitaxel/Cisplatin/Avastin (dose reduced Taxol to 135 mg/m2 due to insurance coverage with Taxotere and patient in agreement with plan- neuropathy mild and improved) +Neulasta    8/21/2021 MRI (HEAD):  Impression: In this patient with history of metastatic carcinoma to  the right cerebellum:  There is no definite evidence of disease recurrence. Decreased  resection cavity enhancement. Previous right occipital lobe  enhancement is completely resolved. No new enhancing intracranial  lesions.    12/6/21 MRI brain - DIANNE  12/8/21 PET - negative for focal uptake    6/17/22 MRI (BRAIN)  IMPRESSION: In this patient with history of metastatic cervical cancer  to the cerebellum, status post resection and chemoradiation, there is  no evidence of recurrence:  Stable postsurgical changes of right cerebellar mass resection without  evidence of local recurrence. No new enhancing intracranial lesions.    PET  1. No abnormal FDG uptake within the body and neck.     2. Postsurgical changes of total abdominal hysterectomy, bilateral  salpingo-oophorectomy and periaortic and pelvic lymph node dissection  without evidence of disease recurrence.    4/28/23 CT (CAP): IMPRESSION:   1.  No evidence of recurrent or metastatic disease.     MRI (brain): IMPRESSION: Stable surgical changes of cerebellar mass resection. No   evidence of local recurrence or new intracranial metastatic disease.     10/2023 MRI  Impression: Stable postsurgical suboccipital craniotomy changes for  resection of underlying cerebellar mass. No new abnormal intracranial  enhancement.    4/23/24 PET  IMPRESSION: In this patient with history of cervical cancer:     1. Post surgical changes of radical hysterectomy, bilateral  salpingectomy and periaortic and pelvic lymph node  dissection without  evidence of local recurrence.  2. No evidence of abnormal FDG uptake to suggest distant metastases.  3. Focal FDG uptake in the left thyroid gland without discrete nodule,  new compared to 6/17/2022 PET/CT. Recommend further evaluation with  ultrasound.    10/31/24 MRI  IMPRESSION:  New tiny focus of enhancement in the inferior right cerebellum most  suspicious for metastasis, although this could conceivably represent a  subacute infarct. Recommend follow-up MRI in 2-3 months to evaluate  stability which would also help delineate between metastasis and  subacute infarct.    She met with NeuroSurg who recommended 8-12 weeks follow-up    12/6/24 PET:  1. Similar mildly FDG avid heterogeneous non-mass like enhancement in  the region of the vagina with low suspicion for local recurrence.  Recommend future PET exams with Lasix administration for better  characterization of gynecologic structures near the urinary system.     2. Increased FDG avid focus in the posterior left thyroid lobe.  Previously evaluated by ultrasound on 5/13/2024 to be a 0.7 cm TR5  thyroid nodule with recommended 1 year thyroid ultrasound follow-up.     3. Known enhancing focus in the right cerebellum is better evaluated  on brain MRI 10/31/2024.    12/26/24 MRI  Impression:  No evidence of intracranial metastatic disease. Previously seen  punctate enhancing lesion in the inferior medial aspect of the right  cerebellar metastatic lesion resection bed  is less conspicuous in  today's exam and likely represents postsurgical change.    ROS: No bleeding, weight changes, mild constipation.  Serial URI's this winter (mild and self limited)    Past Medical History:    Past Medical History:   Diagnosis Date    Chronic cholecystitis 09/25/2019    History of cervical cancer 2007    Metastasis to brain (H) 03/08/2021    Metastatic adenocarcinoma to cervix (H) 10/28/2019    Added automatically from request for surgery [id number]         Past  Surgical History:    Past Surgical History:   Procedure Laterality Date    CHOLECYSTECTOMY, LAPOROSCOPIC  09/25/2019    HYSTERECTOMY RADICAL  2007    PAUL    INSERT PORT VASCULAR ACCESS Right 5/5/2021    Procedure: INSERTION, VASCULAR ACCESS PORT;  Surgeon: Oral Toledo MD;  Location: [facility] OR    IR CHEST PORT PLACEMENT > 5 YRS OF AGE  5/5/2021    IR PORT REMOVAL RIGHT  12/21/2023    LAPAROSCOPIC LYMPHADENECTOMY N/A 11/13/2019    Procedure: LAPAROSCOPY, resection of of paraaortic lymph node, lysis of adhesions;  Surgeon: Jluis Canela MD;  Location: [facility] OR    OPTICAL TRACKING SYSTEM CRANIOTOMY, EXCISE TUMOR, COMBINED Bilateral 3/15/2021    Procedure: stealth assisted Midline suboccipital craniectomy/craniotomy for tumor;  Surgeon: Martín Myrick MD;  Location: [facility] OR    OPTICAL TRACKING SYSTEM VENTRICULOSTOMY Right 3/15/2021    Procedure: stealth assisted  right frontal ventriculostomy;  Surgeon: Martín Myrick MD;  Location: [facility] OR    REMOVE PORT VASCULAR ACCESS Right 12/21/2023    Procedure: Remove port vascular access right;  Surgeon: Adali Miguel PA-C;  [place] [facility] [place]         Health Maintenance Due   Topic Date Due    ADVANCE CARE PLANNING  Never done    YEARLY PREVENTIVE VISIT  Never done    COLORECTAL CANCER SCREENING  Never done    HIV SCREENING  Never done    HEPATITIS C SCREENING  Never done    Pneumococcal Vaccine: Pediatrics (0 to 5 Years) and At-Risk Patients (6 to 49 Years) (1 of 2 - PCV) Never done    HEPATITIS B IMMUNIZATION (1 of 3 - 19+ 3-dose series) Never done    LIPID  Never done    MAMMO SCREENING  11/09/2021    PAP  08/31/2024    INFLUENZA VACCINE (1) 09/01/2024    COVID-19 Vaccine (3 - 2024-25 season) 09/01/2024    PHQ-2 (once per calendar year)  01/01/2025       Current Medications:     Current Outpatient Medications   Medication Sig Dispense Refill    DULoxetine (CYMBALTA) 30 MG capsule Take 1 capsule (30 mg) by mouth 2 times daily 180 capsule 2     "ibuprofen (ADVIL/MOTRIN) 200 MG tablet Take 200 mg by mouth every 4 hours as needed for mild pain      multivitamin w/minerals (THERA-VIT-M) tablet Take 1 tablet by mouth daily           Allergies:        Allergies   Allergen Reactions    Emend [Aprepitant]     Prochlorperazine      \"i was told I turn blue\"        Social History:     Social History     Tobacco Use    Smoking status: Former     Types: Cigarettes    Smokeless tobacco: Never   Substance Use Topics    Alcohol use: Yes     Comment: one drink a week       History   Drug Use No         Family History:     The patient's family history is notable for     Family History   Problem Relation Age of Onset    Aneurysm Mother     Breast Cancer Paternal Grandmother         bilat mastectomies    Breast Cancer Maternal Aunt         stage 1    Thyroid Cancer Sister 23         Physical Exam:     PS 0  VS: /84 (BP Location: Right arm, Patient Position: Sitting, Cuff Size: Adult Large)   Temp 97.8  F (36.6  C) (Oral)   Resp 16   Wt 112.5 kg (248 lb 1.6 oz)   SpO2 100%   BMI 40.06 kg/m       General Appearance: healthy and alert, no distress, overweight     HEENT: no thyromegaly, no palpable nodules or masses        Cardiovascular: regular rate and rhythm, no gallops, rubs or murmurs     Respiratory: lungs clear, no rales, rhonchi or wheezes    Musculoskeletal: extremities non tender and without edema    Skin: no lesions or rashes     Neurological: normal gait, no gross defects     Psychiatric: appropriate mood and affect                               Hematological: normal cervical, supraclavicular lymph nodes     Gastrointestinal:       abdomen soft, non-tender, non-distended, no organomegaly or masses, obese.    Genitourinary: Normal external genitalia, no lesions in vagina seen. BME no palpable masses or nodules.  Rectal examination negative      Assessment:    Francesca Rowland is a woman with a diagnosis of metastatic carcinoma to the cervix (metastasis to " the brain) s/p craniotomy and gamma knife 4/2021. She is here today for follow up and disease management.     30 minutes spent on the date of the encounter doing chart review, history and exam, documentation, and further activities as noted above.        Plan:     1.)         Rec. Cx Cancer with brain met: s/p primary therapy:  She had a new cerebellar spot which is suspicious for possible met, but recent MRI F/U suggests regression (not c/w met).  No new symptoms, DIANNE on clinical exam - follow-up in 6 months     2.)  Genetic risk factors were assessed again and the patient has multiple family members with cancer diagnosed younger than age 50 including herself (age 40, but with likely HPV related disease) and her sister (thyroid cancer at age 23) among others. She met with genetics 1/2021, but did not proceed until 12/2021 (NEGATIVE)     3.) Labs and/or tests ordered include: none     4.)   Health maintenance issues addressed today include annual health maintenance and non-gynecologic issues with PCP.         Jluis Canela MD    Division of Gynecologic Oncology  [facility]        CC  Patient Care Team:  Tyrese Bowie MD as PCP - General (Family Medicine)  Jluis Canela MD as MD (Gynecologic Oncology)  Jluis Canela MD as Assigned Cancer Care Provider  Danielle Trivedi APRN CNP as Nurse Practitioner (Neurological Surgery)  Summer Patel PA-C as Physician Assistant (Neurological Surgery)  Charlotte Wren MD as MD (Surgery)  Charlotte Wren MD as Assigned Surgical Provider  Summer Patel PA-C as Assigned Neuroscience Provider

## 2025-01-06 ENCOUNTER — ONCOLOGY VISIT (OUTPATIENT)
Dept: ONCOLOGY | Facility: CLINIC | Age: 45
End: 2025-01-06
Attending: OBSTETRICS & GYNECOLOGY
Payer: COMMERCIAL

## 2025-01-06 VITALS
OXYGEN SATURATION: 100 % | TEMPERATURE: 97.8 F | SYSTOLIC BLOOD PRESSURE: 128 MMHG | BODY MASS INDEX: 40.06 KG/M2 | DIASTOLIC BLOOD PRESSURE: 84 MMHG | WEIGHT: 248.1 LBS | RESPIRATION RATE: 16 BRPM

## 2025-01-06 DIAGNOSIS — C53.9 MALIGNANT NEOPLASM OF CERVIX, UNSPECIFIED SITE (H): Primary | ICD-10-CM

## 2025-01-06 PROCEDURE — 99214 OFFICE O/P EST MOD 30 MIN: CPT | Performed by: OBSTETRICS & GYNECOLOGY

## 2025-01-06 PROCEDURE — 99213 OFFICE O/P EST LOW 20 MIN: CPT | Performed by: OBSTETRICS & GYNECOLOGY

## 2025-01-06 RX ORDER — PANTOPRAZOLE SODIUM 40 MG/1
1 TABLET, DELAYED RELEASE ORAL
COMMUNITY
Start: 2024-12-12

## 2025-01-06 ASSESSMENT — PAIN SCALES - GENERAL: PAINLEVEL_OUTOF10: NO PAIN (0)

## 2025-01-06 NOTE — NURSING NOTE
"Oncology Rooming Note    January 6, 2025 6:59 AM   Francesca Rowland is a 44 year old female who presents for:    Chief Complaint   Patient presents with    Oncology Clinic Visit     Metastatic adenocarcinoma to Cervix     Initial Vitals: /84 (BP Location: Right arm, Patient Position: Sitting, Cuff Size: Adult Large)   Temp 97.8  F (36.6  C) (Oral)   Resp 16   Wt 112.5 kg (248 lb 1.6 oz)   SpO2 100%   BMI 40.06 kg/m   Estimated body mass index is 40.06 kg/m  as calculated from the following:    Height as of 11/5/24: 1.676 m (5' 5.98\").    Weight as of this encounter: 112.5 kg (248 lb 1.6 oz). Body surface area is 2.29 meters squared.  No Pain (0) Comment: Data Unavailable   No LMP recorded. Patient has had a hysterectomy.  Allergies reviewed: Yes  Medications reviewed: Yes    Medications: Medication refills not needed today.  Pharmacy name entered into CBIT A/S: [id number] - [place], [place] - [address]    Frailty Screening:   Is the patient here for a new oncology consult visit in cancer care? 2. No      Clinical concerns: none      Yvette James, EMT  1/6/2025              "

## 2025-01-06 NOTE — PATIENT INSTRUCTIONS
It was a pleasure seeing you in clinic today-please reach out if there are any further questions that arise following today's visit.  During business hours, you may reach me at [phone].  For urgent/emergent questions after business hours, you may reach the on-call Gynecologic Oncology Resident through the [facility]  at [phone].    All normal test results are usually communicated via letter or [employer] message.  Abnormal results (those that require a change in the previously discussed plan of care) are usually communicated via a phone call.    I recommend signing up for [employer] access if you have not already done so.  This allows you online access to your lab results and also helps you communicate efficiently with your clinic should any questions arise in your care.    Follow up appointment in 6 months with MD scheduling will call you to help make an appointment      Dr Rola Goode RN  Phone:  [phone]  Fax:  [phone]

## 2025-01-06 NOTE — LETTER
2025      Francesca Rowland  [address]  [place] [address]      Dear Colleague,    Thank you for referring your patient, Francesca Rowland, to the [facility]. Please see a copy of my visit note below.                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 She is here today for follow up and disease management.       Oncology History:      Patient initially presented as a 27-year-old woman seen in referral due to a Pap smear showing high-grade squamous intraepithelial lesion that was positive for high risk HPV subtype in 2007. This Pap smear had been taken during her six week postpartum visit. Patient did have a remote history of abnormal Pap smears back in  while serving in [place] for the [employer].  Pap smears during her first pregnancy were all negative. Her Pap smear at the beginning of the most recent pregnancy was normal. Patient did have a colposcopy for evaluation of this abnormal Pap smear on 2007 and cervical biopsies of 6 and 12 o'clock position showed features consistent with papillary serous adenocarcinoma. Endocervical curettings were also taken which showed fragments of papillary serous carcinoma. The patient then underwent a LEEP procedure on 10/15/07 which showed microinvasive adenocarcinoma with a depth of invasion of 0.4 mm. Patient then made the decision to proceed with radical hysterectomy. She subsequently underwent a radical hysterectomy, bilateral salpingectomy, bilateral pelvic and periaortic lymph node dissection on 10/19/07. Patient's operative course was otherwise uncomplicated and she recoverred uneventfully.  Pathology did reveal patient to be stage IA2 adenocarcinoma of the cervix.      She underwent routine  surveillence through 2014 complicated only by SIOBHAN 1      She presented to the ED at [facility] on 9/14/2019 with severe 8.5/10 RUQ pain associated with nausea, one episode of emesis, and decreased appetite. RUQ demonstrated cholelithiasis without cholecystitis, so an abdominal and pelvic CT scan was obtained. This revealed post-surgical changes consistent with prior hysterectomy, left adnexal cysts thought to be ovarian, and an enlarged pericaval lymph node suspicious for metastatic disease vs primary lymphoma. Her condition stabilized and she was discharged home with referrals to general surgery and oncology for biliary colic and further management of the lymphadenopathy, respectively. CT-guided right retroperitoneal lymph node biopsy on 10/15/2019 was consistent with metastatic cervical carcinoma     She underwent surgery to remove the mass and determine the extent of disease on 11/13/19     PATH:  FINAL DIAGNOSIS:   LYMPH NODES, PARA-AORTIC, RESECTION:   - Positive for carcinoma in three of four lymph nodes examined (3/4),   consistent with recurrent endocervical   adenocarcinoma   - Largest metastatic focus is 3.5 cm, positive for extra yuridia extension         Chemo-Potentiated RT 12/2019-1/2020 2/2020 CT:  IMPRESSION: Postoperative changes of hysterectomy and lymph node  resection for cervical cancer. The metastatic right pelvic lymph nodes  which were previously identified have decreased in size. No new  metastases identified.      She recently presented to the ED for an acute RUQ pain episode, which has greatly improved despite no clear diagnosis.  During this visit she underwent a CAP CT which did not demonstrate evidence of cancer but a slightly enlarged spleen. She had a mild eosinophilia (~2%) but has not traveled or eaten exotic food recently, and has not had any new contacts to suggest mono.      10/13/2020 CT:  IMPRESSION:  1.  Recurrent right external iliac lymphadenopathy.  2.  Rebound thymic  hyperplasia.     10/30/2020 PET  IMPRESSION:   In this patient with history of metastatic cervical cancer status-post  radical hysterectomy and chemoradiation, there is evidence of  recurrent disease:  1. Increased size of a hypermetabolic right external iliac chain lymph node since 2/27/2020.  2. Hypermetabolic right subpectoral and axillary lymph nodes.  Recommend follow-up in the breast center for axillary ultrasound and  possible tissue sampling.  3. Idiopathic thymic activation/hyperplasia.     She was evaluated in the breast clinic with plan made for biopsy - however at biopsy the node had shruck considerably and was thus felt to be c/w inflammation (no biopsy obtained)     3/8/2021 MRI Brain (for dizziness)  IMPRESSION:  1. Heterogeneous mixed solid and cystic 2.7 cm mass in the right cerebellum, most likely a solitary metastatic lesion. There is moderate surrounding vasogenic edema as well as mass effect on the fourth ventricle. No obstructive hydrocephalus.    2. Unremarkable MR angiogram of the head and neck.     3/15/2021 Craniotomy ([facility])  Midline suboccipital craniotomy for resection of mass  Right frontal ventriculostomy  Stereotactic navigation  Use of operating microscope     4/16/21 Completed 5 fx of gamma knife ([facility])     Plan: Paclitaxel, Cisplatin, Avastin     4/28/2021: Cycle #1 Paclitaxel, Cisplatin, Avastin  5/19/2021: Cycle #2 Paclitaxel/Cisplatin/Avastin +Neulasta  6/9/2021: Cycle #3 Paclitaxel/Cisplatin/Avastin +Neulasta  6/30/2021: Cycle #4 Paclitaxel/Cisplatin/Avastin +Neulasta    6/28/2021 plan: PET scan for interval assessment.  Will plan for 6 cycles followed by possible use of Keytruda versus observation.     7/15/2021: PET CT: IMPRESSION: In this patient with history of cervical cancer who  completed chemoradiation:  1. Resolved uptake to the right pelvic lymph node, consistent with  response to treatment.  2. No new hypermetabolic lesions.  3. No definite abnormal intracranial  uptake. Please note that PET-CT  is less sensitive for intracranial disease due to normal uptake to the  brain. Consider continued following of the previously seen lesions and  surgical changes with MRI if clinically indicated.      7/21/2021: Cycle #5 Paclitaxel/Cisplatin/Avastin +Neulasta  8/11/2021: Cycle #6 Taxotere/Cisplatin/Avastin + Neulasta (Change to Taxotere due to neuropathy) deferred due to insurance  8/18/2021: Cycle #6 Paclitaxel/Cisplatin/Avastin (dose reduced Taxol to 135 mg/m2 due to insurance coverage with Taxotere and patient in agreement with plan- neuropathy mild and improved) +Neulasta    8/21/2021 MRI (HEAD):  Impression: In this patient with history of metastatic carcinoma to  the right cerebellum:  There is no definite evidence of disease recurrence. Decreased  resection cavity enhancement. Previous right occipital lobe  enhancement is completely resolved. No new enhancing intracranial  lesions.    12/6/21 MRI brain - DIANNE  12/8/21 PET - negative for focal uptake    6/17/22 MRI (BRAIN)  IMPRESSION: In this patient with history of metastatic cervical cancer  to the cerebellum, status post resection and chemoradiation, there is  no evidence of recurrence:  Stable postsurgical changes of right cerebellar mass resection without  evidence of local recurrence. No new enhancing intracranial lesions.    PET  1. No abnormal FDG uptake within the body and neck.     2. Postsurgical changes of total abdominal hysterectomy, bilateral  salpingo-oophorectomy and periaortic and pelvic lymph node dissection  without evidence of disease recurrence.    4/28/23 CT (CAP): IMPRESSION:   1.  No evidence of recurrent or metastatic disease.     MRI (brain): IMPRESSION: Stable surgical changes of cerebellar mass resection. No   evidence of local recurrence or new intracranial metastatic disease.     10/2023 MRI  Impression: Stable postsurgical suboccipital craniotomy changes for  resection of underlying cerebellar  mass. No new abnormal intracranial  enhancement.    4/23/24 PET  IMPRESSION: In this patient with history of cervical cancer:     1. Post surgical changes of radical hysterectomy, bilateral  salpingectomy and periaortic and pelvic lymph node dissection without  evidence of local recurrence.  2. No evidence of abnormal FDG uptake to suggest distant metastases.  3. Focal FDG uptake in the left thyroid gland without discrete nodule,  new compared to 6/17/2022 PET/CT. Recommend further evaluation with  ultrasound.    10/31/24 MRI  IMPRESSION:  New tiny focus of enhancement in the inferior right cerebellum most  suspicious for metastasis, although this could conceivably represent a  subacute infarct. Recommend follow-up MRI in 2-3 months to evaluate  stability which would also help delineate between metastasis and  subacute infarct.    She met with NeuroSurg who recommended 8-12 weeks follow-up    12/6/24 PET:  1. Similar mildly FDG avid heterogeneous non-mass like enhancement in  the region of the vagina with low suspicion for local recurrence.  Recommend future PET exams with Lasix administration for better  characterization of gynecologic structures near the urinary system.     2. Increased FDG avid focus in the posterior left thyroid lobe.  Previously evaluated by ultrasound on 5/13/2024 to be a 0.7 cm TR5  thyroid nodule with recommended 1 year thyroid ultrasound follow-up.     3. Known enhancing focus in the right cerebellum is better evaluated  on brain MRI 10/31/2024.    12/26/24 MRI  Impression:  No evidence of intracranial metastatic disease. Previously seen  punctate enhancing lesion in the inferior medial aspect of the right  cerebellar metastatic lesion resection bed  is less conspicuous in  today's exam and likely represents postsurgical change.    ROS: No bleeding, weight changes, mild constipation.  Serial URI's this winter (mild and self limited)    Past Medical History:    Past Medical History:    Diagnosis Date     Chronic cholecystitis 09/25/2019     History of cervical cancer 2007     Metastasis to brain (H) 03/08/2021     Metastatic adenocarcinoma to cervix (H) 10/28/2019    Added automatically from request for surgery [id number]         Past Surgical History:    Past Surgical History:   Procedure Laterality Date     CHOLECYSTECTOMY, LAPOROSCOPIC  09/25/2019     HYSTERECTOMY RADICAL  2007    PAUL     INSERT PORT VASCULAR ACCESS Right 5/5/2021    Procedure: INSERTION, VASCULAR ACCESS PORT;  Surgeon: Oral Toledo MD;  Location: [facility] OR     IR CHEST PORT PLACEMENT > 5 YRS OF AGE  5/5/2021     IR PORT REMOVAL RIGHT  12/21/2023     LAPAROSCOPIC LYMPHADENECTOMY N/A 11/13/2019    Procedure: LAPAROSCOPY, resection of of paraaortic lymph node, lysis of adhesions;  Surgeon: Jluis Canela MD;  Location: [facility] OR     OPTICAL TRACKING SYSTEM CRANIOTOMY, EXCISE TUMOR, COMBINED Bilateral 3/15/2021    Procedure: stealth assisted Midline suboccipital craniectomy/craniotomy for tumor;  Surgeon: Martín Myrick MD;  Location: [facility] OR     OPTICAL TRACKING SYSTEM VENTRICULOSTOMY Right 3/15/2021    Procedure: stealth assisted  right frontal ventriculostomy;  Surgeon: Martín Myrick MD;  Location: [facility] OR     REMOVE PORT VASCULAR ACCESS Right 12/21/2023    Procedure: Remove port vascular access right;  Surgeon: Adali Miguel PA-C;  Location: [facility] OR         Health Maintenance Due   Topic Date Due     ADVANCE CARE PLANNING  Never done     YEARLY PREVENTIVE VISIT  Never done     COLORECTAL CANCER SCREENING  Never done     HIV SCREENING  Never done     HEPATITIS C SCREENING  Never done     Pneumococcal Vaccine: Pediatrics (0 to 5 Years) and At-Risk Patients (6 to 49 Years) (1 of 2 - PCV) Never done     HEPATITIS B IMMUNIZATION (1 of 3 - 19+ 3-dose series) Never done     LIPID  Never done     MAMMO SCREENING  11/09/2021     PAP  08/31/2024     INFLUENZA VACCINE (1) 09/01/2024     COVID-19 Vaccine (3 -  "2024-25 season) 09/01/2024     PHQ-2 (once per calendar year)  01/01/2025       Current Medications:     Current Outpatient Medications   Medication Sig Dispense Refill     DULoxetine (CYMBALTA) 30 MG capsule Take 1 capsule (30 mg) by mouth 2 times daily 180 capsule 2     ibuprofen (ADVIL/MOTRIN) 200 MG tablet Take 200 mg by mouth every 4 hours as needed for mild pain       multivitamin w/minerals (THERA-VIT-M) tablet Take 1 tablet by mouth daily           Allergies:        Allergies   Allergen Reactions     Emend [Aprepitant]      Prochlorperazine      \"i was told I turn blue\"        Social History:     Social History     Tobacco Use     Smoking status: Former     Types: Cigarettes     Smokeless tobacco: Never   Substance Use Topics     Alcohol use: Yes     Comment: one drink a week       History   Drug Use No         Family History:     The patient's family history is notable for     Family History   Problem Relation Age of Onset     Aneurysm Mother      Breast Cancer Paternal Grandmother         bilat mastectomies     Breast Cancer Maternal Aunt         stage 1     Thyroid Cancer Sister 23         Physical Exam:     PS 0  VS: /84 (BP Location: Right arm, Patient Position: Sitting, Cuff Size: Adult Large)   Temp 97.8  F (36.6  C) (Oral)   Resp 16   Wt 112.5 kg (248 lb 1.6 oz)   SpO2 100%   BMI 40.06 kg/m       General Appearance: healthy and alert, no distress, overweight     HEENT: no thyromegaly, no palpable nodules or masses        Cardiovascular: regular rate and rhythm, no gallops, rubs or murmurs     Respiratory: lungs clear, no rales, rhonchi or wheezes    Musculoskeletal: extremities non tender and without edema    Skin: no lesions or rashes     Neurological: normal gait, no gross defects     Psychiatric: appropriate mood and affect                               Hematological: normal cervical, supraclavicular lymph nodes     Gastrointestinal:       abdomen soft, non-tender, non-distended, no " organomegaly or masses, obese.    Genitourinary: Normal external genitalia, no lesions in vagina seen. BME no palpable masses or nodules.  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 She is here today for follow up and disease management.     30 minutes spent on the date of the encounter doing chart review, history and exam, documentation, and further activities as noted above.        Plan:     1.)         Rec. Cx Cancer with brain met: s/p primary therapy:  She had a new cerebellar spot which is suspicious for possible met, but recent MRI F/U suggests regression (not c/w met).  No new symptoms, DIANNE on clinical exam - follow-up in 6 months     2.)  Genetic risk factors were assessed again and the patient has multiple family members with cancer diagnosed younger than age 50 including herself (age 40, but with likely HPV related disease) and her sister (thyroid cancer at age 23) among others. She met with genetics 1/2021, but did not proceed until 12/2021 (NEGATIVE)     3.) Labs and/or tests ordered include: none     4.)   Health maintenance issues addressed today include annual health maintenance and non-gynecologic issues with PCP.         Jluis Canela MD    Division of Gynecologic Oncology  [facility]        CC  Patient Care Team:  Tyrese Bowie MD as PCP - General (Family Medicine)  Jluis Canela MD as MD (Gynecologic Oncology)  Jluis Canela MD as Assigned Cancer Care Provider  Danielle Trivedi APRN CNP as Nurse Practitioner (Neurological Surgery)  Summer Patel PA-C as Physician Assistant (Neurological Surgery)  Charlotte Wren MD as MD (Surgery)  Charlotte Wren MD as Assigned Surgical Provider  Summer Patel PA-C as Assigned Neuroscience Provider      Again, thank you for  allowing me to participate in the care of your patient.        Sincerely,        Jluis Canela MD    Electronically signed

## 2025-01-07 ENCOUNTER — TELEPHONE (OUTPATIENT)
Dept: NEUROSURGERY | Facility: CLINIC | Age: 45
End: 2025-01-07
Payer: COMMERCIAL

## 2025-01-07 NOTE — TELEPHONE ENCOUNTER
Spoke to pt and she was okay with switching appt from Dr Myrick to Summer Patel tomorrow 1/8 @ 8:30.

## 2025-01-07 NOTE — TELEPHONE ENCOUNTER
RECORDS RECEIVED FROM: Internal    REASON FOR VISIT: Brain mets   PROVIDER: Summer Patel PA-C   DATE OF APPT: 1/8/25 @ 8:30 am    NOTES (FOR ALL VISITS) STATUS DETAILS   OFFICE NOTE from referring provider Internal 1/6/25, 11/4/24, 5/6/24 Jluis Canela MD @[facility]    See Additional Encounters   OFFICE NOTE from other specialist Internal 11/5/24, 11/1/23, 12/8/21 Summer Patel PA-C @[facility]    5/21/24, 5/2/23, 3/16/23 Rashmi London MD @[facility]    10/26/22, 6/29/22 [facility]    See Additional Oncology Notes   MEDICATION LIST Internal    IMAGING  (FOR ALL VISITS)     PET Internal [facility]  12/6/24 PET Oncology Eyes to Thighs  4/23/24 PET Oncology Whole Body  6/17/22 PET Oncology Whole Body  12/8/21 PET Oncology Whole Body     MRI (HEAD, NECK, SPINE) Internal [facility]  12/26/24 MR Brain  10/31/24 MR Brain  10/26/23 MR Brain  4/28/23 MR Brain  10/14/22 MR Brain  6/17/22 MR Brain  12/6/21 MR Brain  8/21/21 MR Brain  4/8/21 MR Brain  3/16/21 MR Brain     CT (HEAD, NECK, SPINE) Internal [facility]  3/20/21 CT Head  3/19/21 CT Head  3/15/21 CT Head

## 2025-01-07 NOTE — PROGRESS NOTES
[facility]  Department of Neurosurgery  Center for Skull Base and Pituitary Surgery    Name: Francesca Rowland  : 1980  Referring provider: Tyrese Bowie  2025     Reason for visit:  Cerebellar metastasis s/p midline SOC 3/15/2021 and SRT, follow up visit    HPI:  Francesca Rowland is a 43 year old right handed female who presented with adenocarcinoma of her cervix diagnosed in  with metastasis to her brain. She had done well until she developed new headaches and an MRI brain showed a large cerebellar mass. She underwent a midline suboccipital craniotomy with ventriculostomy  on 3/15/2021. She did well postoperatively and her ventriculostomy was weaned. Pathology was consistent with metastasis. She has since undergone radiotherapy to the resection bed on  and chemotherapy.    At our last visit 2024, MRI showed a small focus of enhancement in the right inferior cerebellum in the region of the prior metastasis. We recommended close follow up for which she presents today. She is feeling well without new complaints. She's working with [facility] on issues with her esophagus, she is on daily protonix currently.    She had Oncology follow up earlier this week.     Review of Systems:   Pertinent items are noted in HPI, remainder of complete ROS is negative.      Physical Exam:   General: No acute distress.     Resp: No respiratory distress.   MSK: Moves all extremities.  No obvious deformity.  Neuro: The patient is fully oriented and quite pleasant. Speech normal. Facial nerve function is normal, rated as a House Brackmann I/VI, without synkinesis. Full strength in all extremities.   Psych: Normal mood and affect. Behavior is normal.    Incisions: Right frontal incision and posterior occipital incision healing well     Imaging:  We reviewed her recent MRI 2024 and compared it to prior MRIs. This demonstrates less conspicuous enhancing focus in the right cerebellar resection bed, favored  to be postsurgical. No new metastases seen.    Assessment:  Cerebellar metastasis s/p suboccipital craniotomy 3/15/2021 and fractionated SRT 4/12/2021  Right occipital metastasis s/p SRS 4/12/2021  Metastatic cervical adenocarcinoma    Plan:   We reviewed the MRI findings today in clinic which are favorable. Reviewed with Dr. Myrick as well who met with Francesca today. We recommended follow up in 6 months with a repeat MRI initially, to ensure the area in question does not progress. She was encouraged to reach out sooner as needed with new concerns.

## 2025-01-08 ENCOUNTER — OFFICE VISIT (OUTPATIENT)
Dept: NEUROSURGERY | Facility: CLINIC | Age: 45
End: 2025-01-08
Attending: PHYSICIAN ASSISTANT
Payer: COMMERCIAL

## 2025-01-08 ENCOUNTER — PRE VISIT (OUTPATIENT)
Dept: NEUROSURGERY | Facility: CLINIC | Age: 45
End: 2025-01-08

## 2025-01-08 VITALS
DIASTOLIC BLOOD PRESSURE: 88 MMHG | WEIGHT: 247 LBS | HEIGHT: 67 IN | OXYGEN SATURATION: 98 % | HEART RATE: 66 BPM | SYSTOLIC BLOOD PRESSURE: 134 MMHG | BODY MASS INDEX: 38.77 KG/M2 | RESPIRATION RATE: 16 BRPM

## 2025-01-08 DIAGNOSIS — C79.31 MALIGNANT NEOPLASM METASTATIC TO BRAIN (H): Primary | ICD-10-CM

## 2025-01-08 PROCEDURE — 99213 OFFICE O/P EST LOW 20 MIN: CPT | Performed by: PHYSICIAN ASSISTANT

## 2025-01-08 ASSESSMENT — PAIN SCALES - GENERAL: PAINLEVEL_OUTOF10: NO PAIN (0)

## 2025-01-08 NOTE — PATIENT INSTRUCTIONS
Return in 6 months, with brain MRI prior to appointment.  Please reach out as needed in the meantime.

## 2025-02-10 ENCOUNTER — TELEPHONE (OUTPATIENT)
Dept: NEUROSURGERY | Facility: CLINIC | Age: 45
End: 2025-02-10
Payer: COMMERCIAL

## 2025-02-10 NOTE — TELEPHONE ENCOUNTER
Patient confirmed scheduled appointment:  Date: 7/9/25  Time: 12:00pm  Visit type: Return Adult Neurosurg  Provider: Summer Patel  Location: [facility]  Testing/imaging: MRI at 10:45am

## 2025-03-08 ENCOUNTER — HEALTH MAINTENANCE LETTER (OUTPATIENT)
Age: 45
End: 2025-03-08

## 2025-04-23 NOTE — LETTER
2/23/2022         RE: Francesca Rowland  [address]  [place] [address]        Dear Colleague,    Thank you for referring your patient, Francesca Rowland, to the [facility]. Please see a copy of my visit note below.    Francesca is a 41 year old who is being evaluated via a billable video visit.      How would you like to obtain your AVS? MyChart  If the video visit is dropped, the invitation should be resent by: Text to cell phone: [phone]  Will anyone else be joining your video visit? No    Terra Mohr     Video Start Time: 11:40 AM  Video-Visit Details    Type of service:  Video Visit    Video End Time:11:58 AM    Originating Location (pt. Location): Home    Distant Location (provider location):  [facility]     Platform used for Video Visit: [facility]   PM&R clinic note        Interval history:     Francesca Rowland presents to clinic today for follow up reg her rehab needs.   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  Was last seen in clinic on 12/8/21.  Recommendations included:  1. Therapy/equipment/braces:                 1. Will follow up with outpatient PT to help patient get scheduled for her evaluation and ongoing PT session. Goal is for targeted strengthening and endurance in the setting of deconditioning/fatigue.                 2. She should remain active and incorporate a regular home exercise  2. Medications:                 1. Continue Cymbalta 60 mg daily.                 2. Can consider adding HS dose of gabapentin if needed at or before next visit depending on severity of her symptoms.   4. Follow ups:  1. Patient should continue to follow with Psychology as needed for emotional support.  5. Follow up: 2-3 months    Oncology History:  - Initially presented due to Pap smear showing high-grade squamous  intraepithelial lesion positive for high risk HPV subtype in 8/20017.  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   - Underwent LEEP procedure on 10/15/2007 which demonstrated microinvasive adenocarcinoma.  - Proceeded with radical hysterectomy, bilateral salpingectomy, bilateral pelvic and periaortic lymph node dissection on 10/19/2007. Pathology revealed Stage IA2 adenocarcinoma of the cervix.  - Underwent routine surveillance through 2014.  - Presented to the ED at [facility] on 9/14/2019 with severe RUQ pain which imaging revealed cholelithiasis without cholecystitis, Abdominal/Pelvic CT revealed enlarged pericaval lymph node. CT guided biopsy was consistent with metastatic cervical carcinoma.  - Underwent surgery to remove mass.  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  - 3/15/2021- Underwent craniotomy and resection of cerebellar mass.  Fatigue and neuropathy.  - 4/16/2021- Completed 5 fx of gamma knife  - 4/28/2021- 6/30/2021- Cycles 1-4 of paclitaxel/cisplatin/avastin.  - 7/21/2021: Cycle #5 Paclitaxel/Cisplatin/Avastin   - 8/18/21- Saw Terra Zelaya in clinic. Peripheral neuropathy improved with Cymbalta.  - 8/23/21- Saw Dr. Canela in clinic. Improvement in peripheral neuropathy symptoms with Cymbalta. No residual disease on PET or Brain MRI, but remains at risk for recurrence.   - 11/29/21- Saw Dr. Cardozo in clinic for follow up. Patient continues to think cymbalta is helping for neuropathic symptoms. Has neuropathy in her feet once in a while, minimal in hands. Will be starting PT. No residual disease on PET or brain MRI, remains at risk for recurrence. Patient discussed options, and decided on observation. Likely a candidate for Keytruda if she recurs.       Symptoms,  Patient was seen for a return virtual visit today. She states that she is doing much better with her strength  and mobility since her last visit. She denies any falls or near falls at home or out in the community. She has returned to work full time 5 days per week, and feels that she is able to tolerate it just fine. She denies any significant fatigue. She feels that her neuropathy symptoms are at baseline during the day with her current dose of cymbalta, but symptoms do wake her up at night. She is wanting to consider adding the low dose gabapentin that we talked about at our last visit.  She feels that emotionally, she is in a better place than at our last visit.  She admits that she has not had a chance to reach out to outpatient PT since last visit.     Therapies/HEP,  Patient has tried to remain active, has not participated in outpatient PT.      Functionally,   Improvement with energy level and mobility since her last visit.      Social history is unchanged.        Medications:  Current Outpatient Medications   Medication Sig Dispense Refill     acetaminophen (TYLENOL) 325 MG tablet Take 2 tablets (650 mg) by mouth every 6 hours 24 tablet 0     Ascorbic Acid (VITAMIN C GUMMIE PO) Take by mouth daily       DULoxetine (CYMBALTA) 30 MG capsule Take 1 capsule (30 mg) by mouth 2 times daily 60 capsule 1     ibuprofen (ADVIL/MOTRIN) 200 MG tablet Take 1 tablet (200 mg) by mouth every 4 hours as needed for mild pain       lidocaine-prilocaine (EMLA) 2.5-2.5 % external cream Apply topically as needed for moderate pain 30 g 1     loperamide (IMODIUM A-D) 2 MG tablet Take 1 tablet (2 mg) by mouth 4 times daily as needed for diarrhea Can take one 2 mg capsule after each loose stool up to 4 times a day as needed for diarrhea. This can be increased to 4 mg 4 times a day or 2 mg up to 8 times a day. 60 tablet 3     multivitamin w/minerals (MULTI-VITAMIN) tablet Take 1 tablet by mouth daily       dexamethasone (DECADRON) 4 MG tablet Take 2 tablets (8 mg) by mouth 2 times daily (with meals) for 6 doses Take two tablets (8 mg) the  evening of chemotherapy and take two tablets (8 mg) the next morning and night following chemotherapy. Take this schedule with each chemotherapy with Taxotere. 12 tablet 0     dexamethasone (DECADRON) 4 MG tablet Take 2 tablets (8 mg) by mouth daily (with breakfast) Take 2 tablets by mouth with food on days 2-4 after chemotherapy. (Patient not taking: Reported on 2/23/2022) 12 tablet 3     famotidine (PEPCID) 10 MG tablet Take 1 tablet (10 mg) by mouth 2 times daily (Patient not taking: Reported on 2/23/2022) 60 tablet 1     LORazepam (ATIVAN) 0.5 MG tablet Take 1 tablet (0.5 mg) by mouth every 6 hours as needed for nausea (Patient not taking: Reported on 2/23/2022) 20 tablet 0     OLANZapine (ZYPREXA) 5 MG tablet Take 1 tablet (5 mg) by mouth At Bedtime (Patient not taking: Reported on 2/23/2022) 30 tablet 0     SENNA-docusate sodium (SENNA S) 8.6-50 MG tablet Take 1 tablet by mouth At Bedtime (Patient not taking: Reported on 2/23/2022) 30 tablet 3              Physical Exam:   There were no vitals taken for this visit.  Gen: NAD, pleasant and cooperative   HEENT: Atraumatic, normocephalic, extraocular movements appear intact  Pulm: non-labored breathing in room air  Neuro/MSK:   Orientation: Oriented to person, time, place and situation, Exhibits good insight into her condition and ongoing treatment  Motor: Observed moving upper extremities actively against gravity.    Labs/Imaging:  Lab Results   Component Value Date    WBC 3.6 (L) 08/18/2021    HGB 11.8 08/18/2021    HCT 36.5 08/18/2021    MCV 98 08/18/2021     08/18/2021     Lab Results   Component Value Date     08/18/2021    POTASSIUM 4.1 08/18/2021    CHLORIDE 108 08/18/2021    CO2 28 08/18/2021    GLC 86 12/08/2021     Lab Results   Component Value Date    GFRESTIMATED 79 08/18/2021    GFRESTBLACK >90 06/28/2021     Lab Results   Component Value Date    AST 17 08/18/2021    ALT 22 08/18/2021    ALKPHOS 94 08/18/2021    BILITOTAL 0.4 08/18/2021     BILICONJ 0.0 09/27/2007     Lab Results   Component Value Date    INR 0.97 05/05/2021     Lab Results   Component Value Date    BUN 17 08/18/2021    CR 0.90 08/18/2021          Assessment/Plan   Francesca Rowland presents to clinic today for follow up reg her rehab needs. 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 Was last seen in clinic on 12/8/21.  Patient overall has had an improvement in her mobility and energy level since her last visit. She did not reach out to schedule PT after the order was placed at our last visit, though as we discussed today, she can hold off on this since she feels better with her strength and mobility and is back to work full time. In terms of her neuropathy, we will add low dose gabapentin at night to see if this aids with controlling her overnight symptoms. Will plan a 4-6 month return visit. Patient is in agreement with the above plan.    1. Therapy/equipment/braces:  1. Continue to remain active and incorporate a regular home exercise  2. Can reconsider outpatient PT in the future if needed.  2. Medications:  1. Continue Cymbalta 60 mg daily. Prescription refilled at today's visit.  2. Add HS gabapentin 300 mg to help with overnight neuropathic symptoms.  3. Follow up: 4-6 months.    50 minutes spent on the date of the encounter doing chart review, history and exam, documentation and further activities as noted above.        Again, thank you for allowing me to participate in the care of your patient.      Sincerely,    Rashmi London MD     n/a

## 2025-07-06 NOTE — PROGRESS NOTES
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 She is here today for follow up and disease management.       Oncology History:      Patient initially presented as a 27-year-old woman seen in referral due to a Pap smear showing high-grade squamous intraepithelial lesion that was positive for high risk HPV subtype in 2007. This Pap smear had been taken during her six week postpartum visit. Patient did have a remote history of abnormal Pap smears back in  while serving in [place] for the [employer].  Pap smears during her first pregnancy were all negative. Her Pap smear at the beginning of the most recent pregnancy was normal. Patient did have a colposcopy for evaluation of this abnormal Pap smear on 2007 and cervical biopsies of 6 and 12 o'clock position showed features consistent with papillary serous adenocarcinoma. Endocervical curettings were also taken which showed fragments of papillary serous carcinoma. The patient then underwent a LEEP procedure on 10/15/07 which showed microinvasive adenocarcinoma with a depth of invasion of 0.4 mm. Patient then made the decision to proceed with radical hysterectomy. She subsequently underwent a radical hysterectomy, bilateral salpingectomy, bilateral pelvic and periaortic lymph node dissection on 10/19/07. Patient's operative course was otherwise uncomplicated and she recoverred uneventfully.  Pathology did reveal patient to be stage IA2 adenocarcinoma of the cervix.      She underwent routine surveillence through  complicated only by SIOBHAN 1      She presented to the ED at [facility] on 2019 with severe 8.5/10 RUQ pain associated with nausea, one episode of emesis, and decreased appetite. RUQ demonstrated cholelithiasis without  cholecystitis, so an abdominal and pelvic CT scan was obtained. This revealed post-surgical changes consistent with prior hysterectomy, left adnexal cysts thought to be ovarian, and an enlarged pericaval lymph node suspicious for metastatic disease vs primary lymphoma. Her condition stabilized and she was discharged home with referrals to general surgery and oncology for biliary colic and further management of the lymphadenopathy, respectively. CT-guided right retroperitoneal lymph node biopsy on 10/15/2019 was consistent with metastatic cervical carcinoma     She underwent surgery to remove the mass and determine the extent of disease on 11/13/19     PATH:  FINAL DIAGNOSIS:   LYMPH NODES, PARA-AORTIC, RESECTION:   - Positive for carcinoma in three of four lymph nodes examined (3/4),   consistent with recurrent endocervical   adenocarcinoma   - Largest metastatic focus is 3.5 cm, positive for extra yuridia extension         Chemo-Potentiated RT 12/2019-1/2020 2/2020 CT:  IMPRESSION: Postoperative changes of hysterectomy and lymph node  resection for cervical cancer. The metastatic right pelvic lymph nodes  which were previously identified have decreased in size. No new  metastases identified.      She recently presented to the ED for an acute RUQ pain episode, which has greatly improved despite no clear diagnosis.  During this visit she underwent a CAP CT which did not demonstrate evidence of cancer but a slightly enlarged spleen. She had a mild eosinophilia (~2%) but has not traveled or eaten exotic food recently, and has not had any new contacts to suggest mono.      10/13/2020 CT:  IMPRESSION:  1.  Recurrent right external iliac lymphadenopathy.  2.  Rebound thymic hyperplasia.     10/30/2020 PET  IMPRESSION:   In this patient with history of metastatic cervical cancer status-post  radical hysterectomy and chemoradiation, there is evidence of  recurrent disease:  1. Increased size of a hypermetabolic right  external iliac chain lymph node since 2/27/2020.  2. Hypermetabolic right subpectoral and axillary lymph nodes.  Recommend follow-up in the breast center for axillary ultrasound and  possible tissue sampling.  3. Idiopathic thymic activation/hyperplasia.     She was evaluated in the breast clinic with plan made for biopsy - however at biopsy the node had shruck considerably and was thus felt to be c/w inflammation (no biopsy obtained)     3/8/2021 MRI Brain (for dizziness)  IMPRESSION:  1. Heterogeneous mixed solid and cystic 2.7 cm mass in the right cerebellum, most likely a solitary metastatic lesion. There is moderate surrounding vasogenic edema as well as mass effect on the fourth ventricle. No obstructive hydrocephalus.    2. Unremarkable MR angiogram of the head and neck.     [facility]20[facility]  Midline suboccipital craniotomy for resection of mass  Right frontal ventriculostomy  Stereotactic navigation  Use of operating microscope     4/16/21 Completed 5 fx of gamma knife ([employer])     Plan: Paclitaxel, Cisplatin, Avastin     4/28/2021: Cycle #1 Paclitaxel, Cisplatin, Avastin  5/19/2021: Cycle #2 Paclitaxel/Cisplatin/Avastin +Neulasta  6/9/2021: Cycle #3 Paclitaxel/Cisplatin/Avastin +Neulasta  6/30/2021: Cycle #4 Paclitaxel/Cisplatin/Avastin +Neulasta    6/28/2021 plan: PET scan for interval assessment.  Will plan for 6 cycles followed by possible use of Keytruda versus observation.     7/15/2021: PET CT: IMPRESSION: In this patient with history of cervical cancer who  completed chemoradiation:  1. Resolved uptake to the right pelvic lymph node, consistent with  response to treatment.  2. No new hypermetabolic lesions.  3. No definite abnormal intracranial uptake. Please note that PET-CT  is less sensitive for intracranial disease due to normal uptake to the  brain. Consider continued following of the previously seen lesions and  surgical changes with MRI if clinically indicated.      7/21/2021:  Cycle #5 Paclitaxel/Cisplatin/Avastin +Neulasta  8/11/2021: Cycle #6 Taxotere/Cisplatin/Avastin + Neulasta (Change to Taxotere due to neuropathy) deferred due to insurance  8/18/2021: Cycle #6 Paclitaxel/Cisplatin/Avastin (dose reduced Taxol to 135 mg/m2 due to insurance coverage with Taxotere and patient in agreement with plan- neuropathy mild and improved) +Neulasta    8/21/2021 MRI (HEAD):  Impression: In this patient with history of metastatic carcinoma to  the right cerebellum:  There is no definite evidence of disease recurrence. Decreased  resection cavity enhancement. Previous right occipital lobe  enhancement is completely resolved. No new enhancing intracranial  lesions.    12/6/21 MRI brain - DIANNE  12/8/21 PET - negative for focal uptake    6/17/22 MRI (BRAIN)  IMPRESSION: In this patient with history of metastatic cervical cancer  to the cerebellum, status post resection and chemoradiation, there is  no evidence of recurrence:  Stable postsurgical changes of right cerebellar mass resection without  evidence of local recurrence. No new enhancing intracranial lesions.    PET  1. No abnormal FDG uptake within the body and neck.     2. Postsurgical changes of total abdominal hysterectomy, bilateral  salpingo-oophorectomy and periaortic and pelvic lymph node dissection  without evidence of disease recurrence.    4/28/23 CT (CAP): IMPRESSION:   1.  No evidence of recurrent or metastatic disease.     MRI (brain): IMPRESSION: Stable surgical changes of cerebellar mass resection. No   evidence of local recurrence or new intracranial metastatic disease.     10/2023 MRI  Impression: Stable postsurgical suboccipital craniotomy changes for  resection of underlying cerebellar mass. No new abnormal intracranial  enhancement.    4/23/24 PET  IMPRESSION: In this patient with history of cervical cancer:     1. Post surgical changes of radical hysterectomy, bilateral  salpingectomy and periaortic and pelvic lymph node  dissection without  evidence of local recurrence.  2. No evidence of abnormal FDG uptake to suggest distant metastases.  3. Focal FDG uptake in the left thyroid gland without discrete nodule,  new compared to 6/17/2022 PET/CT. Recommend further evaluation with  ultrasound.    10/31/24 MRI  IMPRESSION:  New tiny focus of enhancement in the inferior right cerebellum most  suspicious for metastasis, although this could conceivably represent a  subacute infarct. Recommend follow-up MRI in 2-3 months to evaluate  stability which would also help delineate between metastasis and  subacute infarct.    She met with NeuroSurg who recommended 8-12 weeks follow-up    12/6/24 PET:  1. Similar mildly FDG avid heterogeneous non-mass like enhancement in  the region of the vagina with low suspicion for local recurrence.  Recommend future PET exams with Lasix administration for better  characterization of gynecologic structures near the urinary system.     2. Increased FDG avid focus in the posterior left thyroid lobe.  Previously evaluated by ultrasound on 5/13/2024 to be a 0.7 cm TR5  thyroid nodule with recommended 1 year thyroid ultrasound follow-up.     3. Known enhancing focus in the right cerebellum is better evaluated  on brain MRI 10/31/2024.    12/26/24 MRI  Impression:  No evidence of intracranial metastatic disease. Previously seen  punctate enhancing lesion in the inferior medial aspect of the right  cerebellar metastatic lesion resection bed  is less conspicuous in  today's exam and likely represents postsurgical change.                  ROS: No bleeding, weight changes, mild constipation.  Serial URI's this winter (mild and self limited)    Past Medical History:    Past Medical History:   Diagnosis Date    Chronic cholecystitis 09/25/2019    History of cervical cancer 2007    Metastasis to brain (H) 03/08/2021    Metastatic adenocarcinoma to cervix (H) 10/28/2019    Added automatically from request for surgery [id number]          Past Surgical History:    Past Surgical History:   Procedure Laterality Date    CHOLECYSTECTOMY, LAPOROSCOPIC  09/25/2019    HYSTERECTOMY RADICAL  2007    PAUL    INSERT PORT VASCULAR ACCESS Right 5/5/2021    Procedure: INSERTION, VASCULAR ACCESS PORT;  Surgeon: Oral Toledo MD;  Location: [facility] OR    IR CHEST PORT PLACEMENT > 5 YRS OF AGE  5/5/2021    IR PORT REMOVAL RIGHT  12/21/2023    LAPAROSCOPIC LYMPHADENECTOMY N/A 11/13/2019    Procedure: LAPAROSCOPY, resection of of paraaortic lymph node, lysis of adhesions;  Surgeon: Jluis Canela MD;  Location: [facility] OR    OPTICAL TRACKING SYSTEM CRANIOTOMY, EXCISE TUMOR, COMBINED Bilateral 3/15/2021    Procedure: stealth assisted Midline suboccipital craniectomy/craniotomy for tumor;  Surgeon: Martín Myrick MD;  Location: [facility] OR    OPTICAL TRACKING SYSTEM VENTRICULOSTOMY Right 3/15/2021    Procedure: stealth assisted  right frontal ventriculostomy;  Surgeon: Martín Myrick MD;  Location: [facility] OR    REMOVE PORT VASCULAR ACCESS Right 12/21/2023    Procedure: Remove port vascular access right;  Surgeon: Adali Miguel PA-C;  [place] [facility] [place]         Health Maintenance Due   Topic Date Due    ADVANCE CARE PLANNING  Never done    YEARLY PREVENTIVE VISIT  Never done    COLORECTAL CANCER SCREENING  Never done    HIV SCREENING  Never done    HEPATITIS C SCREENING  Never done    PNEUMOCOCCAL VACCINE: PEDIATRICS (0 to 5 YEARS) AND AT-RISK PATIENTS (6 to 49 YEARS) (1 of 2 - PCV) Never done    ZOSTER VACCINE (1 of 2) Never done    HEPATITIS B VACCINE (1 of 3 - 19+ 3-dose series) Never done    LIPID  Never done    MAMMO SCREENING  11/09/2021    PAP  08/31/2024    COVID-19 VACCINE (3 - 2024-25 season) 09/01/2024    PHQ-2 (once per calendar year)  01/01/2025    DIABETES SCREENING  06/17/2025       Current Medications:     Current Outpatient Medications   Medication Sig Dispense Refill    DULoxetine (CYMBALTA) 30 MG capsule Take 1 capsule (30 mg)  "by mouth 2 times daily 180 capsule 2    ibuprofen (ADVIL/MOTRIN) 200 MG tablet Take 200 mg by mouth every 4 hours as needed for mild pain      multivitamin w/minerals (THERA-VIT-M) tablet Take 1 tablet by mouth daily      pantoprazole (PROTONIX) 40 MG EC tablet Take 1 tablet by mouth 2 times daily.           Allergies:        Allergies   Allergen Reactions    Emend [Aprepitant]     Prochlorperazine      \"i was told I turn blue\"    Other Reaction(s): \"I was told I turn blue\"        Social History:     Social History     Tobacco Use    Smoking status: Former     Types: Cigarettes    Smokeless tobacco: Never   Substance Use Topics    Alcohol use: Yes     Comment: one drink a week       History   Drug Use No         Family History:     The patient's family history is notable for     Family History   Problem Relation Age of Onset    Aneurysm Mother     Breast Cancer Paternal Grandmother         bilat mastectomies    Breast Cancer Maternal Aunt         stage 1    Thyroid Cancer Sister 23         Physical Exam:     PS 0  VS: /84   Pulse 78   Temp 97.6  F (36.4  C) (Oral)   Resp 18   Wt 111.6 kg (246 lb)   SpO2 97%   BMI 38.53 kg/m       General Appearance: healthy and alert, no distress, overweight     HEENT: no thyromegaly, no palpable nodules or masses        Cardiovascular: regular rate and rhythm, no gallops, rubs or murmurs     Respiratory: lungs clear, no rales, rhonchi or wheezes    Musculoskeletal: extremities non tender and without edema    Skin: no lesions or rashes     Neurological: normal gait, no gross defects     Psychiatric: appropriate mood and affect                               Hematological: normal cervical, supraclavicular lymph nodes     Gastrointestinal:       abdomen soft, non-tender, non-distended, no organomegaly or masses, obese.    Genitourinary: Normal external genitalia, no lesions in vagina seen. BME no palpable masses or nodules.  Rectal examination negative. 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 She is here today for follow up and disease management.     30 minutes spent on the date of the encounter doing chart review, history and exam, documentation, and further activities as noted above.        Plan:     1.)         Rec. Cx Cancer with brain met: s/p primary therapy:  She had a new cerebellar spot which is suspicious for possible met, but recent MRI F/U suggests regression (not c/w met).  No new symptoms, DIANNE on clinical exam - follow-up in 6 months     2.)  Genetic risk factors were assessed again and the patient has multiple family members with cancer diagnosed younger than age 50 including herself (age 40, but with likely HPV related disease) and her sister (thyroid cancer at age 23) among others. She met with genetics 1/2021, but did not proceed until 12/2021 (NEGATIVE)     3.) Labs and/or tests ordered include: Pap, MRI pending this week     4.)   Health maintenance issues addressed today include annual health maintenance and non-gynecologic issues with PCP.         Jluis Canela MD    Division of Gynecologic Oncology  [facility]        CC  Patient Care Team:  Tyrese Bowie MD as PCP - General (Family Medicine)  Jluis Canela MD as MD (Gynecologic Oncology)  Jluis Canela MD as Assigned Cancer Care Provider  Danielle Trivedi APRN CNP as Nurse Practitioner (Neurological Surgery)  Summer Patel PA-C as Physician Assistant (Neurological Surgery)  Charlotte Wren MD as MD (Surgery)  Charlotte Wren MD as Assigned Surgical Provider  Summer Patel PA-C as Assigned Neuroscience Provider

## 2025-07-07 ENCOUNTER — ONCOLOGY VISIT (OUTPATIENT)
Dept: ONCOLOGY | Facility: CLINIC | Age: 45
End: 2025-07-07
Attending: OBSTETRICS & GYNECOLOGY
Payer: COMMERCIAL

## 2025-07-07 VITALS
WEIGHT: 246 LBS | DIASTOLIC BLOOD PRESSURE: 84 MMHG | RESPIRATION RATE: 18 BRPM | TEMPERATURE: 97.6 F | SYSTOLIC BLOOD PRESSURE: 126 MMHG | BODY MASS INDEX: 38.53 KG/M2 | HEART RATE: 78 BPM | OXYGEN SATURATION: 97 %

## 2025-07-07 DIAGNOSIS — C53.9 MALIGNANT NEOPLASM OF CERVIX, UNSPECIFIED SITE (H): Primary | ICD-10-CM

## 2025-07-07 DIAGNOSIS — C79.31 MALIGNANT NEOPLASM METASTATIC TO BRAIN (H): ICD-10-CM

## 2025-07-07 PROCEDURE — 87624 HPV HI-RISK TYP POOLED RSLT: CPT | Performed by: OBSTETRICS & GYNECOLOGY

## 2025-07-07 PROCEDURE — 99214 OFFICE O/P EST MOD 30 MIN: CPT | Performed by: OBSTETRICS & GYNECOLOGY

## 2025-07-07 PROCEDURE — 99213 OFFICE O/P EST LOW 20 MIN: CPT | Performed by: OBSTETRICS & GYNECOLOGY

## 2025-07-07 PROCEDURE — 88175 CYTOPATH C/V AUTO FLUID REDO: CPT | Performed by: OBSTETRICS & GYNECOLOGY

## 2025-07-07 ASSESSMENT — PAIN SCALES - GENERAL: PAINLEVEL_OUTOF10: NO PAIN (0)

## 2025-07-07 NOTE — NURSING NOTE
Return appt in  6 months     pap smear done today, orders entered, specimen sent to cytology    Check out note sent to scheduling 7/7/25

## 2025-07-07 NOTE — NURSING NOTE
"Oncology Rooming Note    July 7, 2025 7:12 AM   Francesca Rowland is a 44 year old female who presents for:    Chief Complaint   Patient presents with    Oncology Clinic Visit     Metastatic adenocarcinoma to Cervix     Initial Vitals: /84   Pulse 78   Temp 97.6  F (36.4  C) (Oral)   Resp 18   Wt 111.6 kg (246 lb)   SpO2 97%   BMI 38.53 kg/m   Estimated body mass index is 38.53 kg/m  as calculated from the following:    Height as of 1/8/25: 1.702 m (5' 7\").    Weight as of this encounter: 111.6 kg (246 lb). Body surface area is 2.3 meters squared.  No Pain (0) Comment: Data Unavailable   No LMP recorded. Patient has had a hysterectomy.  Allergies reviewed: Yes  Medications reviewed: Yes    Medications: Medication refills not needed today.  Pharmacy name entered into [employer]: [id number] - [place], [place] - [address]    Frailty Screening:   Is the patient here for a new oncology consult visit in cancer care? 2. No    PHQ9:  Did this patient require a PHQ9?: No      Clinical concerns:  None      Radah Palmer CMA              "

## 2025-07-07 NOTE — LETTER
2025      Francesca Rowland  [address]  [place] [address]      Dear Colleague,    Thank you for referring your patient, Francesca Rowland, to the [facility]. Please see a copy of my visit note below.                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 She is here today for follow up and disease management.       Oncology History:      Patient initially presented as a 27-year-old woman seen in referral due to a Pap smear showing high-grade squamous intraepithelial lesion that was positive for high risk HPV subtype in 2007. This Pap smear had been taken during her six week postpartum visit. Patient did have a remote history of abnormal Pap smears back in  while serving in [place] for the [employer].  Pap smears during her first pregnancy were all negative. Her Pap smear at the beginning of the most recent pregnancy was normal. Patient did have a colposcopy for evaluation of this abnormal Pap smear on 2007 and cervical biopsies of 6 and 12 o'clock position showed features consistent with papillary serous adenocarcinoma. Endocervical curettings were also taken which showed fragments of papillary serous carcinoma. The patient then underwent a LEEP procedure on 10/15/07 which showed microinvasive adenocarcinoma with a depth of invasion of 0.4 mm. Patient then made the decision to proceed with radical hysterectomy. She subsequently underwent a radical hysterectomy, bilateral salpingectomy, bilateral pelvic and periaortic lymph node dissection on 10/19/07. Patient's operative course was otherwise uncomplicated and she recoverred uneventfully.  Pathology did reveal patient to be stage IA2 adenocarcinoma of the cervix.      She underwent routine  surveillence through 2014 complicated only by SIOBHAN 1      She presented to the ED at [facility] on 9/14/2019 with severe 8.5/10 RUQ pain associated with nausea, one episode of emesis, and decreased appetite. RUQ demonstrated cholelithiasis without cholecystitis, so an abdominal and pelvic CT scan was obtained. This revealed post-surgical changes consistent with prior hysterectomy, left adnexal cysts thought to be ovarian, and an enlarged pericaval lymph node suspicious for metastatic disease vs primary lymphoma. Her condition stabilized and she was discharged home with referrals to general surgery and oncology for biliary colic and further management of the lymphadenopathy, respectively. CT-guided right retroperitoneal lymph node biopsy on 10/15/2019 was consistent with metastatic cervical carcinoma     She underwent surgery to remove the mass and determine the extent of disease on 11/13/19     PATH:  FINAL DIAGNOSIS:   LYMPH NODES, PARA-AORTIC, RESECTION:   - Positive for carcinoma in three of four lymph nodes examined (3/4),   consistent with recurrent endocervical   adenocarcinoma   - Largest metastatic focus is 3.5 cm, positive for extra yuridia extension         Chemo-Potentiated RT 12/2019-1/2020 2/2020 CT:  IMPRESSION: Postoperative changes of hysterectomy and lymph node  resection for cervical cancer. The metastatic right pelvic lymph nodes  which were previously identified have decreased in size. No new  metastases identified.      She recently presented to the ED for an acute RUQ pain episode, which has greatly improved despite no clear diagnosis.  During this visit she underwent a CAP CT which did not demonstrate evidence of cancer but a slightly enlarged spleen. She had a mild eosinophilia (~2%) but has not traveled or eaten exotic food recently, and has not had any new contacts to suggest mono.      10/13/2020 CT:  IMPRESSION:  1.  Recurrent right external iliac lymphadenopathy.  2.  Rebound thymic  hyperplasia.     10/30/2020 PET  IMPRESSION:   In this patient with history of metastatic cervical cancer status-post  radical hysterectomy and chemoradiation, there is evidence of  recurrent disease:  1. Increased size of a hypermetabolic right external iliac chain lymph node since 2/27/2020.  2. Hypermetabolic right subpectoral and axillary lymph nodes.  Recommend follow-up in the breast center for axillary ultrasound and  possible tissue sampling.  3. Idiopathic thymic activation/hyperplasia.     She was evaluated in the breast clinic with plan made for biopsy - however at biopsy the node had shruck considerably and was thus felt to be c/w inflammation (no biopsy obtained)     3/8/2021 MRI Brain (for dizziness)  IMPRESSION:  1. Heterogeneous mixed solid and cystic 2.7 cm mass in the right cerebellum, most likely a solitary metastatic lesion. There is moderate surrounding vasogenic edema as well as mass effect on the fourth ventricle. No obstructive hydrocephalus.    2. Unremarkable MR angiogram of the head and neck.     3/15/2021 Craniotomy ([facility])  Midline suboccipital craniotomy for resection of mass  Right frontal ventriculostomy  Stereotactic navigation  Use of operating microscope     4/16/21 Completed 5 fx of gamma knife ([employer])     Plan: Paclitaxel, Cisplatin, Avastin     4/28/2021: Cycle #1 Paclitaxel, Cisplatin, Avastin  5/19/2021: Cycle #2 Paclitaxel/Cisplatin/Avastin +Neulasta  6/9/2021: Cycle #3 Paclitaxel/Cisplatin/Avastin +Neulasta  6/30/2021: Cycle #4 Paclitaxel/Cisplatin/Avastin +Neulasta    6/28/2021 plan: PET scan for interval assessment.  Will plan for 6 cycles followed by possible use of Keytruda versus observation.     7/15/2021: PET CT: IMPRESSION: In this patient with history of cervical cancer who  completed chemoradiation:  1. Resolved uptake to the right pelvic lymph node, consistent with  response to treatment.  2. No new hypermetabolic lesions.  3. No definite abnormal intracranial  uptake. Please note that PET-CT  is less sensitive for intracranial disease due to normal uptake to the  brain. Consider continued following of the previously seen lesions and  surgical changes with MRI if clinically indicated.      7/21/2021: Cycle #5 Paclitaxel/Cisplatin/Avastin +Neulasta  8/11/2021: Cycle #6 Taxotere/Cisplatin/Avastin + Neulasta (Change to Taxotere due to neuropathy) deferred due to insurance  8/18/2021: Cycle #6 Paclitaxel/Cisplatin/Avastin (dose reduced Taxol to 135 mg/m2 due to insurance coverage with Taxotere and patient in agreement with plan- neuropathy mild and improved) +Neulasta    8/21/2021 MRI (HEAD):  Impression: In this patient with history of metastatic carcinoma to  the right cerebellum:  There is no definite evidence of disease recurrence. Decreased  resection cavity enhancement. Previous right occipital lobe  enhancement is completely resolved. No new enhancing intracranial  lesions.    12/6/21 MRI brain - DIANNE  12/8/21 PET - negative for focal uptake    6/17/22 MRI (BRAIN)  IMPRESSION: In this patient with history of metastatic cervical cancer  to the cerebellum, status post resection and chemoradiation, there is  no evidence of recurrence:  Stable postsurgical changes of right cerebellar mass resection without  evidence of local recurrence. No new enhancing intracranial lesions.    PET  1. No abnormal FDG uptake within the body and neck.     2. Postsurgical changes of total abdominal hysterectomy, bilateral  salpingo-oophorectomy and periaortic and pelvic lymph node dissection  without evidence of disease recurrence.    4/28/23 CT (CAP): IMPRESSION:   1.  No evidence of recurrent or metastatic disease.     MRI (brain): IMPRESSION: Stable surgical changes of cerebellar mass resection. No   evidence of local recurrence or new intracranial metastatic disease.     10/2023 MRI  Impression: Stable postsurgical suboccipital craniotomy changes for  resection of underlying cerebellar  mass. No new abnormal intracranial  enhancement.    4/23/24 PET  IMPRESSION: In this patient with history of cervical cancer:     1. Post surgical changes of radical hysterectomy, bilateral  salpingectomy and periaortic and pelvic lymph node dissection without  evidence of local recurrence.  2. No evidence of abnormal FDG uptake to suggest distant metastases.  3. Focal FDG uptake in the left thyroid gland without discrete nodule,  new compared to 6/17/2022 PET/CT. Recommend further evaluation with  ultrasound.    10/31/24 MRI  IMPRESSION:  New tiny focus of enhancement in the inferior right cerebellum most  suspicious for metastasis, although this could conceivably represent a  subacute infarct. Recommend follow-up MRI in 2-3 months to evaluate  stability which would also help delineate between metastasis and  subacute infarct.    She met with NeuroSurg who recommended 8-12 weeks follow-up    12/6/24 PET:  1. Similar mildly FDG avid heterogeneous non-mass like enhancement in  the region of the vagina with low suspicion for local recurrence.  Recommend future PET exams with Lasix administration for better  characterization of gynecologic structures near the urinary system.     2. Increased FDG avid focus in the posterior left thyroid lobe.  Previously evaluated by ultrasound on 5/13/2024 to be a 0.7 cm TR5  thyroid nodule with recommended 1 year thyroid ultrasound follow-up.     3. Known enhancing focus in the right cerebellum is better evaluated  on brain MRI 10/31/2024.    12/26/24 MRI  Impression:  No evidence of intracranial metastatic disease. Previously seen  punctate enhancing lesion in the inferior medial aspect of the right  cerebellar metastatic lesion resection bed  is less conspicuous in  today's exam and likely represents postsurgical change.                  ROS: No bleeding, weight changes, mild constipation.  Serial URI's this winter (mild and self limited)    Past Medical History:    Past Medical  History:   Diagnosis Date     Chronic cholecystitis 09/25/2019     History of cervical cancer 2007     Metastasis to brain (H) 03/08/2021     Metastatic adenocarcinoma to cervix (H) 10/28/2019    Added automatically from request for surgery [id number]         Past Surgical History:    Past Surgical History:   Procedure Laterality Date     CHOLECYSTECTOMY, LAPOROSCOPIC  09/25/2019     HYSTERECTOMY RADICAL  2007    PAUL     INSERT PORT VASCULAR ACCESS Right 5/5/2021    Procedure: INSERTION, VASCULAR ACCESS PORT;  Surgeon: Oral Toledo MD;  Location: [facility] OR     IR CHEST PORT PLACEMENT > 5 YRS OF AGE  5/5/2021     IR PORT REMOVAL RIGHT  12/21/2023     LAPAROSCOPIC LYMPHADENECTOMY N/A 11/13/2019    Procedure: LAPAROSCOPY, resection of of paraaortic lymph node, lysis of adhesions;  Surgeon: Jluis Canela MD;  Location: [facility] OR     OPTICAL TRACKING SYSTEM CRANIOTOMY, EXCISE TUMOR, COMBINED Bilateral 3/15/2021    Procedure: stealth assisted Midline suboccipital craniectomy/craniotomy for tumor;  Surgeon: Martín Myrick MD;  Location: [facility] OR     OPTICAL TRACKING SYSTEM VENTRICULOSTOMY Right 3/15/2021    Procedure: stealth assisted  right frontal ventriculostomy;  Surgeon: Martín Myrick MD;  Location: [facility] OR     REMOVE PORT VASCULAR ACCESS Right 12/21/2023    Procedure: Remove port vascular access right;  Surgeon: Adali Miguel PA-C;  Location: [facility] OR         Health Maintenance Due   Topic Date Due     ADVANCE CARE PLANNING  Never done     YEARLY PREVENTIVE VISIT  Never done     COLORECTAL CANCER SCREENING  Never done     HIV SCREENING  Never done     HEPATITIS C SCREENING  Never done     PNEUMOCOCCAL VACCINE: PEDIATRICS (0 to 5 YEARS) AND AT-RISK PATIENTS (6 to 49 YEARS) (1 of 2 - PCV) Never done     ZOSTER VACCINE (1 of 2) Never done     HEPATITIS B VACCINE (1 of 3 - 19+ 3-dose series) Never done     LIPID  Never done     MAMMO SCREENING  11/09/2021     PAP  08/31/2024     COVID-19  "VACCINE (3 - 2024-25 season) 09/01/2024     PHQ-2 (once per calendar year)  01/01/2025     DIABETES SCREENING  06/17/2025       Current Medications:     Current Outpatient Medications   Medication Sig Dispense Refill     DULoxetine (CYMBALTA) 30 MG capsule Take 1 capsule (30 mg) by mouth 2 times daily 180 capsule 2     ibuprofen (ADVIL/MOTRIN) 200 MG tablet Take 200 mg by mouth every 4 hours as needed for mild pain       multivitamin w/minerals (THERA-VIT-M) tablet Take 1 tablet by mouth daily       pantoprazole (PROTONIX) 40 MG EC tablet Take 1 tablet by mouth 2 times daily.           Allergies:        Allergies   Allergen Reactions     Emend [Aprepitant]      Prochlorperazine      \"i was told I turn blue\"    Other Reaction(s): \"I was told I turn blue\"        Social History:     Social History     Tobacco Use     Smoking status: Former     Types: Cigarettes     Smokeless tobacco: Never   Substance Use Topics     Alcohol use: Yes     Comment: one drink a week       History   Drug Use No         Family History:     The patient's family history is notable for     Family History   Problem Relation Age of Onset     Aneurysm Mother      Breast Cancer Paternal Grandmother         bilat mastectomies     Breast Cancer Maternal Aunt         stage 1     Thyroid Cancer Sister 23         Physical Exam:     PS 0  VS: /84   Pulse 78   Temp 97.6  F (36.4  C) (Oral)   Resp 18   Wt 111.6 kg (246 lb)   SpO2 97%   BMI 38.53 kg/m       General Appearance: healthy and alert, no distress, overweight     HEENT: no thyromegaly, no palpable nodules or masses        Cardiovascular: regular rate and rhythm, no gallops, rubs or murmurs     Respiratory: lungs clear, no rales, rhonchi or wheezes    Musculoskeletal: extremities non tender and without edema    Skin: no lesions or rashes     Neurological: normal gait, no gross defects     Psychiatric: appropriate mood and affect                               Hematological: normal " cervical, supraclavicular lymph nodes     Gastrointestinal:       abdomen soft, non-tender, non-distended, no organomegaly or masses, obese.    Genitourinary: Normal external genitalia, no lesions in vagina seen. BME no palpable masses or nodules.  Rectal examination negative. 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 She is here today for follow up and disease management.     30 minutes spent on the date of the encounter doing chart review, history and exam, documentation, and further activities as noted above.        Plan:     1.)         Rec. Cx Cancer with brain met: s/p primary therapy:  She had a new cerebellar spot which is suspicious for possible met, but recent MRI F/U suggests regression (not c/w met).  No new symptoms, DIANNE on clinical exam - follow-up in 6 months     2.)  Genetic risk factors were assessed again and the patient has multiple family members with cancer diagnosed younger than age 50 including herself (age 40, but with likely HPV related disease) and her sister (thyroid cancer at age 23) among others. She met with genetics 1/2021, but did not proceed until 12/2021 (NEGATIVE)     3.) Labs and/or tests ordered include: Pap, MRI pending this week     4.)   Health maintenance issues addressed today include annual health maintenance and non-gynecologic issues with PCP.         Jluis Canela MD    Division of Gynecologic Oncology  [facility]        CC  Patient Care Team:  Tyrese Bowie MD as PCP - General (Family Medicine)  Jluis Canela MD as MD (Gynecologic Oncology)  Jluis Canela MD as Assigned Cancer Care Provider  Danielle Trivedi APRN CNP as Nurse Practitioner (Neurological Surgery)  Summer Patel PA-C as Physician Assistant (Neurological Surgery)  Charlotte Wren MD as MD  (Surgery)  Charlotte Wren MD as Assigned Surgical Provider  Summer Patel PA-C as Assigned Neuroscience Provider      Again, thank you for allowing me to participate in the care of your patient.        Sincerely,        Jluis Canela MD    Electronically signed

## 2025-07-07 NOTE — PATIENT INSTRUCTIONS
It was a pleasure seeing you in clinic today-please reach out if there are any further questions that arise following today's visit.  During business hours, you may reach me at [phone].  For urgent/emergent questions after business hours, you may reach the on-call Gynecologic Oncology Resident through the [facility]  at [phone].    All normal test results are usually communicated via letter or [employer] message.  Abnormal results (those that require a change in the previously discussed plan of care) are usually communicated via a phone call.    I recommend signing up for [employer] access if you have not already done so.  This allows you online access to your lab results and also helps you communicate efficiently with your clinic should any questions arise in your care.    Follow up appointment in 6 months with MD scheduling will call you to help make an appointment    Pap smear done today, you will be notified via my chart/telephone with test results    Dr Rola Goode RN  Phone:  [phone]  Fax:  [phone]

## 2025-07-08 LAB
HPV HR 12 DNA CVX QL NAA+PROBE: POSITIVE
HPV16 DNA CVX QL NAA+PROBE: NEGATIVE
HPV18 DNA CVX QL NAA+PROBE: NEGATIVE
HUMAN PAPILLOMA VIRUS FINAL DIAGNOSIS: ABNORMAL

## 2025-07-09 ENCOUNTER — ANCILLARY PROCEDURE (OUTPATIENT)
Dept: MRI IMAGING | Facility: CLINIC | Age: 45
End: 2025-07-09
Attending: PHYSICIAN ASSISTANT
Payer: COMMERCIAL

## 2025-07-09 ENCOUNTER — OFFICE VISIT (OUTPATIENT)
Dept: NEUROSURGERY | Facility: CLINIC | Age: 45
End: 2025-07-09
Attending: PHYSICIAN ASSISTANT
Payer: COMMERCIAL

## 2025-07-09 VITALS
OXYGEN SATURATION: 97 % | DIASTOLIC BLOOD PRESSURE: 87 MMHG | SYSTOLIC BLOOD PRESSURE: 129 MMHG | HEART RATE: 68 BPM | RESPIRATION RATE: 16 BRPM

## 2025-07-09 DIAGNOSIS — C79.31 MALIGNANT NEOPLASM METASTATIC TO BRAIN (H): Primary | ICD-10-CM

## 2025-07-09 DIAGNOSIS — C79.31 MALIGNANT NEOPLASM METASTATIC TO BRAIN (H): ICD-10-CM

## 2025-07-09 LAB
BKR AP ASSOCIATED HPV REPORT: NORMAL
BKR LAB AP GYN ADEQUACY: NORMAL
BKR LAB AP GYN INTERPRETATION: NORMAL
BKR LAB AP PREVIOUS ABNORMAL: NORMAL
PATH REPORT.COMMENTS IMP SPEC: NORMAL
PATH REPORT.COMMENTS IMP SPEC: NORMAL
PATH REPORT.RELEVANT HX SPEC: NORMAL

## 2025-07-09 PROCEDURE — A9585 GADOBUTROL INJECTION: HCPCS | Performed by: RADIOLOGY

## 2025-07-09 PROCEDURE — 70553 MRI BRAIN STEM W/O & W/DYE: CPT | Performed by: RADIOLOGY

## 2025-07-09 RX ORDER — GADOBUTROL 604.72 MG/ML
15 INJECTION INTRAVENOUS ONCE
Status: COMPLETED | OUTPATIENT
Start: 2025-07-09 | End: 2025-07-09

## 2025-07-09 RX ADMIN — GADOBUTROL 11 ML: 604.72 INJECTION INTRAVENOUS at 11:56

## 2025-07-09 NOTE — LETTER
2025       RE: Francesca Rowland  [address]  [place] [address]     Dear Colleague,    Thank you for referring your patient, Francesca Rowland, to the [facility]OS[facility]IC [place] [facility]. Please see a copy of my visit note below.      [facility]  Department of Neurosurgery  Center for Skull Base and Pituitary Surgery    Name: Francesca Rowland  MRN: [id number]  Age: 44 year old  : 1980  Referring provider: Summer Patel  2025      Chief Complaint:   Cerebellar metastasis s/p midline SOC 3/15/2021 and SRT, follow up visit     History of Present Illness:   Francesca Rowland is a 44 year old right handed female who presented with adenocarcinoma of her cervix diagnosed in  with metastasis to her brain. She had done well until she developed new headaches and an MRI brain showed a large cerebellar mass. She underwent a midline suboccipital craniotomy with ventriculostomy  on 3/15/2021. She did well postoperatively and her ventriculostomy was weaned. Pathology was consistent with metastasis. She has since undergone radiotherapy to the resection bed on  and chemotherapy.     Today she's here for follow up and just completed an MRI. She is feeling well and denies new concerns today. She met with her Oncologist this week and had a pap smear completed, positive for HPV (not 16, 18), and pending further cytology results. She'll follow up with Dr. Canela on this.     Review of Systems:   Pertinent items are noted in HPI or as in patient entered ROS below, remainder of complete ROS is negative.     Physical Exam:   /87 (BP Location: Right arm, Patient Position: Sitting)   Pulse 68   Resp 16   SpO2 97%    General: No acute distress.    Eyes: Conjunctivae are normal.  MSK: Moves all extremities.  No obvious deformity.  Neuro: The patient is fully oriented. Speech is normal. Facial nerve function is  normal, rated as a House Brackmann 1. Gait is normal.   Psych: Normal mood and affect. Behavior is normal.      Imaging:  We reviewed the MRI today in clinic. I do not appreciate any new enhancement or abnormality on today's scan. Radiology read is pending.      Assessment:  Cerebellar metastasis s/p midline SOC 3/15/2021 and SRT, follow up visit     Plan:  Pending official Radiology read, will plan for 6 month follow up with repeat brain MRI prior. She'll let us know of any new concerns in the meantime.        Summer Patel PA-C  Department of Neurosurgery      Again, thank you for allowing me to participate in the care of your patient.      Sincerely,    Summer Patel PA-C

## 2025-07-09 NOTE — PROGRESS NOTES
[facility]  Department of Neurosurgery  Center for Skull Base and Pituitary Surgery    Name: Francesca Rowland  MRN: [id number]  Age: 44 year old  : 1980  Referring provider: Summer Patel  2025      Chief Complaint:   Cerebellar metastasis s/p midline SOC 3/15/2021 and SRT, follow up visit     History of Present Illness:   Francesca Rowland is a 44 year old right handed female who presented with adenocarcinoma of her cervix diagnosed in  with metastasis to her brain. She had done well until she developed new headaches and an MRI brain showed a large cerebellar mass. She underwent a midline suboccipital craniotomy with ventriculostomy  on 3/15/2021. She did well postoperatively and her ventriculostomy was weaned. Pathology was consistent with metastasis. She has since undergone radiotherapy to the resection bed on  and chemotherapy.     Today she's here for follow up and just completed an MRI. She is feeling well and denies new concerns today. She met with her Oncologist this week and had a pap smear completed, positive for HPV (not 16, 18), and pending further cytology results. She'll follow up with Dr. Canela on this.     Review of Systems:   Pertinent items are noted in HPI or as in patient entered ROS below, remainder of complete ROS is negative.     Physical Exam:   /87 (BP Location: Right arm, Patient Position: Sitting)   Pulse 68   Resp 16   SpO2 97%    General: No acute distress.    Eyes: Conjunctivae are normal.  MSK: Moves all extremities.  No obvious deformity.  Neuro: The patient is fully oriented. Speech is normal. Facial nerve function is normal, rated as a House Brackmann 1. Gait is normal.   Psych: Normal mood and affect. Behavior is normal.      Imaging:  We reviewed the MRI today in clinic. I do not appreciate any new enhancement or abnormality on today's scan. Radiology read is pending.      Assessment:  Cerebellar metastasis s/p midline SOC 3/15/2021  and SRT, follow up visit     Plan:  Pending official Radiology read, will plan for 6 month follow up with repeat brain MRI prior. She'll let us know of any new concerns in the meantime.        Summer Patel PA-C  Department of Neurosurgery

## 2025-07-09 NOTE — DISCHARGE INSTRUCTIONS
MRI Contrast Discharge Instructions    The IV contrast you received today will pass out of your body in your  urine. This will happen in the next 24 hours. You will not feel this process.  Your urine will not change color.    Drink at least 4 extra glasses of water or juice today (unless your doctor  has restricted your fluids). This reduces the stress on your kidneys.  You may take your regular medicines.    If you are on dialysis: It is best to have dialysis today.    If you have a reaction: Most reactions happen right away. If you have  any new symptoms after leaving the hospital (such as hives or swelling),  call your hospital at the correct number below. Or call your family doctor.  If you have breathing distress or wheezing, call 911.    Special instructions: ***    I have read and understand the above information.    Signature:______________________________________ Date:___________    Staff:__________________________________________ Date:___________     Time:__________    [facility] Departments:    ___[place]: [phone]  ___[facility]: [phone]  ___[place]: [phone] ___[facility]: [phone]  ___[facility]: [phone]  ___[facility]: [phone]  ___[phone]  ___[facility]: [phone]  ___[phone]

## (undated) DEVICE — SPECIMEN BAG MEDIVAC SUCTION WHITE SOCK 65652-122

## (undated) DEVICE — WIPES FOLEY CARE SURESTEP PROVON DFC100

## (undated) DEVICE — SUCTION MANIFOLD DORNOCH ULTRA CART UL-CL500

## (undated) DEVICE — SPONGE SURGIFOAM 100 1974

## (undated) DEVICE — GOWN XLG DISP 9545

## (undated) DEVICE — NDL ANGIOCATH 14GA 1.25" 4048

## (undated) DEVICE — STRAP UNIVERSAL POSITIONING 2-PIECE 4X47X76" 91-287

## (undated) DEVICE — ESU CORD BIPOLAR AND IRR TUBING AESCULAP US355

## (undated) DEVICE — PACK CENTRAL LINE INSERTION SAN32CLFCG

## (undated) DEVICE — BUR STRK 2.3MM TAPERED ROUTER - FA2 5407-FA2-023

## (undated) DEVICE — NDL 25GA 2"  8881200441

## (undated) DEVICE — ENDO TROCAR FIRST ENTRY KII FIOS Z-THRD 12X100MM CTF73

## (undated) DEVICE — BLADE CLIPPER SGL USE 9680

## (undated) DEVICE — SUCTION IRR STRYKERFLOW II W/TIP 250-070-520

## (undated) DEVICE — SUCTION MANIFOLD NEPTUNE 2 SYS 4 PORT 0702-020-000

## (undated) DEVICE — SOL NACL 0.9% IRRIG 1000ML BOTTLE 07138-09

## (undated) DEVICE — DRAPE MAYO STAND 23X54 8337

## (undated) DEVICE — LINEN TOWEL PACK X6 WHITE 5487

## (undated) DEVICE — ESU PENCIL W/COATED BLADE E2450H

## (undated) DEVICE — SU MONOCRYL 4-0 PS-2 27" UND Y426H

## (undated) DEVICE — RX BACITRACIN OINTMENT 0.9G 1/32OZ CUR001109

## (undated) DEVICE — SU SILK 2-0 TIE 12X30" A305H

## (undated) DEVICE — KNIFE HANDLE W/15 BLADE 371615

## (undated) DEVICE — LINEN TOWEL PACK X30 5481

## (undated) DEVICE — SOL NACL 0.9% INJ 1000ML BAG 2B1324X

## (undated) DEVICE — ENDO TROCAR FIRST ENTRY KII FIOS Z-THRD 05X100MM CTF03

## (undated) DEVICE — SPONGE COTTONOID 1/4X1/4" 20-01S

## (undated) DEVICE — CLIP HORIZON MED BLUE 002200

## (undated) DEVICE — DRAPE WARMER 66X44" ORS-300

## (undated) DEVICE — SOL RINGERS LACTATED 1000ML BAG 07953-09

## (undated) DEVICE — SU ETHILON 3-0 PS-1 18" 1663H

## (undated) DEVICE — DRSG PRIMAPORE 03 1/8X6" 66000318

## (undated) DEVICE — DRAPE POUCH INSTRUMENT 1018

## (undated) DEVICE — SPONGE RAY-TEC 4X8" 7318

## (undated) DEVICE — SPONGE COTTONOID 1/2X3" 20-07S

## (undated) DEVICE — LINEN GOWN XLG 5407

## (undated) DEVICE — SHUNT BECKER EXTERNAL DRAIN 46128

## (undated) DEVICE — ESU ENDO SCISSORS 5MM CVD 5DCS

## (undated) DEVICE — SPONGE COTTONOID 1/2X1 1/2" 20-06S

## (undated) DEVICE — GLOVE PROTEXIS MICRO 7.5  2D73PM75

## (undated) DEVICE — LINEN TOWEL PACK X5 5464

## (undated) DEVICE — DRAPE IOBAN INCISE 13X13" 6640EZ

## (undated) DEVICE — DRAPE MICROSCOPE LEICA 54X150" AR8033650

## (undated) DEVICE — CONNECTOR MALE TO MALE LL

## (undated) DEVICE — NDL COUNTER 20CT 31142493

## (undated) DEVICE — Device

## (undated) DEVICE — PREP CHLORAPREP CLEAR 3ML 260400

## (undated) DEVICE — DRAPE SHEET REV FOLD 3/4 9349

## (undated) DEVICE — COVER ULTRASOUND PROBE W/GEL FLEXI-FEEL 6"X58" LF  25-FF658

## (undated) DEVICE — DRSG GAUZE 2X2" 8042

## (undated) DEVICE — SOL ADH LIQUID BENZOIN SWAB 0.6ML C1544

## (undated) DEVICE — KIT ACCESS CRANIAL  INSHITH

## (undated) DEVICE — ESU LIGASURE LAPAROSCOPIC BLUNT TIP SEALER 5MMX37CM LF1837

## (undated) DEVICE — DEVICE SUTURE PASSER 14GA WECK EFX EFXSP2

## (undated) DEVICE — DECANTER BAG 2002S

## (undated) DEVICE — SU DERMABOND ADVANCED .7ML DNX12

## (undated) DEVICE — ESU GROUND PAD ADULT W/CORD E7507

## (undated) DEVICE — KIT PATIENT POSITIONING PIGAZZI LATEX FREE 40580

## (undated) DEVICE — GLOVE PROTEXIS POWDER FREE SMT 7.5  2D72PT75X

## (undated) DEVICE — SURGICEL HEMOSTAT 4X8" 1952

## (undated) DEVICE — ENDO TROCAR SLEEVE KII Z-THREADED 05X100MM CTS02

## (undated) DEVICE — BLADE KNIFE SURG 10 371110

## (undated) DEVICE — DEVICE SUTURE GRASPER TROCAR CLOSURE 14GA PMITCSG

## (undated) DEVICE — DRAPE CRANIOTOMY W/POUCH 9450

## (undated) DEVICE — DRSG PRIMAPORE 02X3" 7133

## (undated) DEVICE — DRSG TEGADERM 2 3/8X2 3/4" 1624W

## (undated) DEVICE — PACK NEURO MINOR UMMC SNE32MNMU4

## (undated) DEVICE — SU VICRYL 3-0 SH 27" J316H

## (undated) DEVICE — SPONGE SURGIFOAM 12 1972

## (undated) DEVICE — SU VICRYL 3-0 SH 27" UND J416H

## (undated) DEVICE — ADH SKIN CLOSURE PREMIERPRO EXOFIN 1.0ML 3470

## (undated) DEVICE — PREP POVIDONE IODINE SCRUB 7.5% 4OZ APL82212

## (undated) DEVICE — BUR STRK CARBIDE MATCH HEAD 3.0MM 5820-107-530C

## (undated) DEVICE — MARKER SPHERES PASSIVE MEDT PACK 5 8801075

## (undated) DEVICE — PAD CHUX UNDERPAD 23X24" 7136

## (undated) DEVICE — SOL RINGERS LACTATED 500ML BAG 2B2323QA

## (undated) DEVICE — PIN SKULL MAYFIELD ADULT TITANIUM 3/PK A1120

## (undated) DEVICE — TRACKER PATIENT NON-INVASIVE AXIEM 9734887XOM

## (undated) DEVICE — SPONGE COTTONOID 1/2X1/2" 20-04S

## (undated) DEVICE — SPONGE COTTONOID 1X3" 20-10S

## (undated) DEVICE — SYR 01ML 27GA 0.5" NDL TBC 309623

## (undated) DEVICE — SU NUROLON 4-0 TF CR 8X18" C584D

## (undated) DEVICE — CATH TRAY FOLEY SURESTEP 16FR W/URNE MTR STLK LATEX A303316A

## (undated) DEVICE — GLOVE PROTEXIS BLUE W/NEU-THERA 7.5  2D73EB75

## (undated) DEVICE — PREP TECHNI-CARE CHLOROXYLENOL 3% 4OZ BOTTLE C222-4ZWO

## (undated) DEVICE — ESU FCP CODMAN 9"X1.0MM VERSATRU 9009100SL

## (undated) DEVICE — PREP CHLORAPREP 26ML TINTED ORANGE  260815

## (undated) DEVICE — ESU ELEC BLADE 2.75" COATED/INSULATED E1455

## (undated) DEVICE — SU VICRYL 0 TIE 54" J608H

## (undated) DEVICE — PROTECTOR ARM ONE-STEP TRENDELENBURG 40418

## (undated) DEVICE — STPL SKIN 35W ROTATING HEAD PRW35

## (undated) DEVICE — ENDO SCOPE WARMER SEAL  C3101

## (undated) DEVICE — SOL WATER IRRIG 1000ML BOTTLE 2F7114

## (undated) DEVICE — ENDO TROCAR 12MM VERSASTEP VS101012P

## (undated) DEVICE — ENDO POUCH UNIV RETRIEVAL SYSTEM INZII 10MM CD001

## (undated) DEVICE — DECANTER VIAL 2006S

## (undated) DEVICE — SU MONOCRYL 4-0 PS-2 18" UND Y496G

## (undated) DEVICE — KIT INTRODUCER FLUENT MICRO 5FRX10CM ECHO TIP KIT-038-04

## (undated) DEVICE — CLIP APPLIER ENDO ROTATING 10MM MED/LG ER320

## (undated) DEVICE — GLOVE PROTEXIS BLUE W/NEU-THERA 8.0  2D73EB80

## (undated) DEVICE — SYR 10ML FINGER CONTROL W/O NDL 309695

## (undated) DEVICE — COVER CAMERA VIDEO LASER 9X96" 04-CC227

## (undated) DEVICE — RETR ELASTIC STAYS LONE STAR BLUNT DUAL LEAD 3550-1G

## (undated) DEVICE — PREP DURAPREP 26ML APL 8630

## (undated) DEVICE — GLOVE LINER HALF FINGER NYLON KP5015/50

## (undated) DEVICE — SPONGE PATTIE NEURO DELICOT 10MMX13MM 63-03

## (undated) DEVICE — PREP SKIN SCRUB TRAY 4461A

## (undated) DEVICE — GLOVE PROTEXIS POWDER FREE SMT 6.5  2D72PT65X

## (undated) DEVICE — SHUNT STYLET 23CM AXIEM NAVIGATION SYSTEM 9735428

## (undated) DEVICE — SPONGE LAP 18X18" X8435

## (undated) DEVICE — CLIP HORIZON SM RED WIDE SLOT 001201

## (undated) DEVICE — SU VICRYL 0 CT-1 CR 8X18" J740D

## (undated) DEVICE — SU VICRYL 2-0 CT-2 CR 8X18" J726D

## (undated) DEVICE — ESU CORD BIPOLAR GREEN 10-4000

## (undated) DEVICE — SYR 30ML LL W/O NDL 302832

## (undated) DEVICE — DRAPE SHEET MED 44X70" 9355

## (undated) DEVICE — PREP POVIDONE IODINE SOLUTION 10% 4OZ

## (undated) DEVICE — PACK CRANIOTOMY

## (undated) RX ORDER — EPHEDRINE SULFATE 50 MG/ML
INJECTION, SOLUTION INTRAMUSCULAR; INTRAVENOUS; SUBCUTANEOUS
Status: DISPENSED
Start: 2019-11-13

## (undated) RX ORDER — HYDROMORPHONE HYDROCHLORIDE 1 MG/ML
INJECTION, SOLUTION INTRAMUSCULAR; INTRAVENOUS; SUBCUTANEOUS
Status: DISPENSED
Start: 2021-03-15

## (undated) RX ORDER — PROPOFOL 10 MG/ML
INJECTION, EMULSION INTRAVENOUS
Status: DISPENSED
Start: 2021-03-15

## (undated) RX ORDER — CEFAZOLIN SODIUM 2 G/100ML
INJECTION, SOLUTION INTRAVENOUS
Status: DISPENSED
Start: 2021-03-15

## (undated) RX ORDER — PHENYLEPHRINE HCL IN 0.9% NACL 1 MG/10 ML
SYRINGE (ML) INTRAVENOUS
Status: DISPENSED
Start: 2019-11-13

## (undated) RX ORDER — CEFAZOLIN SODIUM 2 G/100ML
INJECTION, SOLUTION INTRAVENOUS
Status: DISPENSED
Start: 2019-11-13

## (undated) RX ORDER — LIDOCAINE HYDROCHLORIDE 20 MG/ML
INJECTION, SOLUTION EPIDURAL; INFILTRATION; INTRACAUDAL; PERINEURAL
Status: DISPENSED
Start: 2021-03-15

## (undated) RX ORDER — HYDROMORPHONE HYDROCHLORIDE 1 MG/ML
INJECTION, SOLUTION INTRAMUSCULAR; INTRAVENOUS; SUBCUTANEOUS
Status: DISPENSED
Start: 2019-11-13

## (undated) RX ORDER — PROPOFOL 10 MG/ML
INJECTION, EMULSION INTRAVENOUS
Status: DISPENSED
Start: 2019-11-13

## (undated) RX ORDER — FENTANYL CITRATE 50 UG/ML
INJECTION, SOLUTION INTRAMUSCULAR; INTRAVENOUS
Status: DISPENSED
Start: 2020-11-25

## (undated) RX ORDER — SODIUM CHLORIDE 9 MG/ML
INJECTION, SOLUTION INTRAVENOUS
Status: DISPENSED
Start: 2020-11-25

## (undated) RX ORDER — LIDOCAINE HYDROCHLORIDE 10 MG/ML
INJECTION, SOLUTION EPIDURAL; INFILTRATION; INTRACAUDAL; PERINEURAL
Status: DISPENSED
Start: 2020-11-25

## (undated) RX ORDER — BUPIVACAINE HYDROCHLORIDE AND EPINEPHRINE 5; 5 MG/ML; UG/ML
INJECTION, SOLUTION EPIDURAL; INTRACAUDAL; PERINEURAL
Status: DISPENSED
Start: 2021-03-15

## (undated) RX ORDER — BUPIVACAINE HYDROCHLORIDE 2.5 MG/ML
INJECTION, SOLUTION EPIDURAL; INFILTRATION; INTRACAUDAL
Status: DISPENSED
Start: 2019-11-13

## (undated) RX ORDER — GLYCOPYRROLATE 0.2 MG/ML
INJECTION, SOLUTION INTRAMUSCULAR; INTRAVENOUS
Status: DISPENSED
Start: 2019-11-13

## (undated) RX ORDER — FENTANYL CITRATE 50 UG/ML
INJECTION, SOLUTION INTRAMUSCULAR; INTRAVENOUS
Status: DISPENSED
Start: 2019-11-13

## (undated) RX ORDER — MANNITOL 20 G/100ML
INJECTION, SOLUTION INTRAVENOUS
Status: DISPENSED
Start: 2021-03-15

## (undated) RX ORDER — FENTANYL CITRATE 50 UG/ML
INJECTION, SOLUTION INTRAMUSCULAR; INTRAVENOUS
Status: DISPENSED
Start: 2019-10-15

## (undated) RX ORDER — LIDOCAINE HYDROCHLORIDE 20 MG/ML
INJECTION, SOLUTION EPIDURAL; INFILTRATION; INTRACAUDAL; PERINEURAL
Status: DISPENSED
Start: 2019-11-13

## (undated) RX ORDER — LABETALOL HYDROCHLORIDE 5 MG/ML
INJECTION, SOLUTION INTRAVENOUS
Status: DISPENSED
Start: 2021-03-15

## (undated) RX ORDER — ACETAMINOPHEN 325 MG/1
TABLET ORAL
Status: DISPENSED
Start: 2021-05-05

## (undated) RX ORDER — LIDOCAINE HYDROCHLORIDE AND EPINEPHRINE 10; 10 MG/ML; UG/ML
INJECTION, SOLUTION INFILTRATION; PERINEURAL
Status: DISPENSED
Start: 2021-03-15

## (undated) RX ORDER — HYDRALAZINE HYDROCHLORIDE 20 MG/ML
INJECTION INTRAMUSCULAR; INTRAVENOUS
Status: DISPENSED
Start: 2021-03-15

## (undated) RX ORDER — ONDANSETRON 2 MG/ML
INJECTION INTRAMUSCULAR; INTRAVENOUS
Status: DISPENSED
Start: 2021-03-15

## (undated) RX ORDER — ONDANSETRON 2 MG/ML
INJECTION INTRAMUSCULAR; INTRAVENOUS
Status: DISPENSED
Start: 2019-11-13

## (undated) RX ORDER — PHENAZOPYRIDINE HYDROCHLORIDE 200 MG/1
TABLET, FILM COATED ORAL
Status: DISPENSED
Start: 2019-11-13

## (undated) RX ORDER — CEFAZOLIN SODIUM 1 G/3ML
INJECTION, POWDER, FOR SOLUTION INTRAMUSCULAR; INTRAVENOUS
Status: DISPENSED
Start: 2021-03-15

## (undated) RX ORDER — METOCLOPRAMIDE HYDROCHLORIDE 5 MG/ML
INJECTION INTRAMUSCULAR; INTRAVENOUS
Status: DISPENSED
Start: 2021-03-15

## (undated) RX ORDER — CEFAZOLIN SODIUM 1 G/3ML
INJECTION, POWDER, FOR SOLUTION INTRAMUSCULAR; INTRAVENOUS
Status: DISPENSED
Start: 2019-11-13

## (undated) RX ORDER — LIDOCAINE HYDROCHLORIDE 10 MG/ML
INJECTION, SOLUTION EPIDURAL; INFILTRATION; INTRACAUDAL; PERINEURAL
Status: DISPENSED
Start: 2019-10-15

## (undated) RX ORDER — GABAPENTIN 300 MG/1
CAPSULE ORAL
Status: DISPENSED
Start: 2019-11-13

## (undated) RX ORDER — GABAPENTIN 300 MG/1
CAPSULE ORAL
Status: DISPENSED
Start: 2021-05-05

## (undated) RX ORDER — ATROPINE SULFATE 0.4 MG/ML
AMPUL (ML) INJECTION
Status: DISPENSED
Start: 2021-03-15

## (undated) RX ORDER — CEFAZOLIN SODIUM 1 G/3ML
INJECTION, POWDER, FOR SOLUTION INTRAMUSCULAR; INTRAVENOUS
Status: DISPENSED
Start: 2021-05-05

## (undated) RX ORDER — SODIUM CHLORIDE 9 MG/ML
INJECTION, SOLUTION INTRAVENOUS
Status: DISPENSED
Start: 2021-03-15

## (undated) RX ORDER — FENTANYL CITRATE-0.9 % NACL/PF 10 MCG/ML
PLASTIC BAG, INJECTION (ML) INTRAVENOUS
Status: DISPENSED
Start: 2021-03-15

## (undated) RX ORDER — DEXAMETHASONE SODIUM PHOSPHATE 4 MG/ML
INJECTION, SOLUTION INTRA-ARTICULAR; INTRALESIONAL; INTRAMUSCULAR; INTRAVENOUS; SOFT TISSUE
Status: DISPENSED
Start: 2019-11-13

## (undated) RX ORDER — GLYCOPYRROLATE 0.2 MG/ML
INJECTION, SOLUTION INTRAMUSCULAR; INTRAVENOUS
Status: DISPENSED
Start: 2021-03-15

## (undated) RX ORDER — FENTANYL CITRATE 50 UG/ML
INJECTION, SOLUTION INTRAMUSCULAR; INTRAVENOUS
Status: DISPENSED
Start: 2021-03-15

## (undated) RX ORDER — EPHEDRINE SULFATE 50 MG/ML
INJECTION, SOLUTION INTRAMUSCULAR; INTRAVENOUS; SUBCUTANEOUS
Status: DISPENSED
Start: 2021-03-15

## (undated) RX ORDER — HEPARIN SODIUM 1000 [USP'U]/ML
INJECTION, SOLUTION INTRAVENOUS; SUBCUTANEOUS
Status: DISPENSED
Start: 2019-11-13

## (undated) RX ORDER — ACETAMINOPHEN 325 MG/1
TABLET ORAL
Status: DISPENSED
Start: 2019-11-13